# Patient Record
Sex: MALE | Race: WHITE | NOT HISPANIC OR LATINO | Employment: OTHER | URBAN - METROPOLITAN AREA
[De-identification: names, ages, dates, MRNs, and addresses within clinical notes are randomized per-mention and may not be internally consistent; named-entity substitution may affect disease eponyms.]

---

## 2017-04-09 ENCOUNTER — HOSPITAL ENCOUNTER (EMERGENCY)
Facility: HOSPITAL | Age: 72
Discharge: HOME | End: 2017-04-09
Attending: EMERGENCY MEDICINE
Payer: COMMERCIAL

## 2017-04-09 VITALS
BODY MASS INDEX: 37.75 KG/M2 | OXYGEN SATURATION: 95 % | HEART RATE: 90 BPM | DIASTOLIC BLOOD PRESSURE: 70 MMHG | SYSTOLIC BLOOD PRESSURE: 146 MMHG | HEIGHT: 76 IN | RESPIRATION RATE: 16 BRPM | WEIGHT: 310 LBS | TEMPERATURE: 99.2 F

## 2017-04-09 DIAGNOSIS — R79.1 SUPRATHERAPEUTIC INR: ICD-10-CM

## 2017-04-09 DIAGNOSIS — R04.0 ACUTE ANTERIOR EPISTAXIS: Primary | ICD-10-CM

## 2017-04-09 LAB
ANION GAP SERPL CALCULATED.3IONS-SCNC: 10 MMOL/L (ref 4–13)
APTT PPP: 41 SECONDS (ref 24–36)
BASOPHILS # BLD AUTO: 0.06 THOUSANDS/ΜL (ref 0–0.1)
BASOPHILS NFR BLD AUTO: 1 % (ref 0–1)
BUN SERPL-MCNC: 23 MG/DL (ref 5–25)
CALCIUM SERPL-MCNC: 9.2 MG/DL (ref 8.3–10.1)
CHLORIDE SERPL-SCNC: 103 MMOL/L (ref 100–108)
CO2 SERPL-SCNC: 27 MMOL/L (ref 21–32)
CREAT SERPL-MCNC: 1.17 MG/DL (ref 0.6–1.3)
EOSINOPHIL # BLD AUTO: 0.17 THOUSAND/ΜL (ref 0–0.61)
EOSINOPHIL NFR BLD AUTO: 2 % (ref 0–6)
ERYTHROCYTE [DISTWIDTH] IN BLOOD BY AUTOMATED COUNT: 16.1 % (ref 11.6–15.1)
GFR SERPL CREATININE-BSD FRML MDRD: >60 ML/MIN/1.73SQ M
GLUCOSE SERPL-MCNC: 106 MG/DL (ref 65–140)
HCT VFR BLD AUTO: 45.7 % (ref 36.5–49.3)
HGB BLD-MCNC: 14.8 G/DL (ref 12–17)
INR PPP: 3.3 (ref 0.86–1.16)
LYMPHOCYTES # BLD AUTO: 1.28 THOUSANDS/ΜL (ref 0.6–4.47)
LYMPHOCYTES NFR BLD AUTO: 16 % (ref 14–44)
MCH RBC QN AUTO: 30.4 PG (ref 26.8–34.3)
MCHC RBC AUTO-ENTMCNC: 32.4 G/DL (ref 31.4–37.4)
MCV RBC AUTO: 94 FL (ref 82–98)
MONOCYTES # BLD AUTO: 0.76 THOUSAND/ΜL (ref 0.17–1.22)
MONOCYTES NFR BLD AUTO: 10 % (ref 4–12)
NEUTROPHILS # BLD AUTO: 5.55 THOUSANDS/ΜL (ref 1.85–7.62)
NEUTS SEG NFR BLD AUTO: 71 % (ref 43–75)
PLATELET # BLD AUTO: 145 THOUSANDS/UL (ref 149–390)
PMV BLD AUTO: 11.4 FL (ref 8.9–12.7)
POTASSIUM SERPL-SCNC: 4.3 MMOL/L (ref 3.5–5.3)
PROTHROMBIN TIME: 32.4 SECONDS (ref 12–14.3)
RBC # BLD AUTO: 4.87 MILLION/UL (ref 3.88–5.62)
SODIUM SERPL-SCNC: 140 MMOL/L (ref 136–145)
WBC # BLD AUTO: 7.82 THOUSAND/UL (ref 4.31–10.16)

## 2017-04-09 PROCEDURE — A9270 NON-COVERED ITEM OR SERVICE: HCPCS | Performed by: EMERGENCY MEDICINE

## 2017-04-09 PROCEDURE — 85025 COMPLETE CBC W/AUTO DIFF WBC: CPT | Performed by: EMERGENCY MEDICINE

## 2017-04-09 PROCEDURE — 85730 THROMBOPLASTIN TIME PARTIAL: CPT | Performed by: EMERGENCY MEDICINE

## 2017-04-09 PROCEDURE — 85610 PROTHROMBIN TIME: CPT | Performed by: EMERGENCY MEDICINE

## 2017-04-09 PROCEDURE — 99284 EMERGENCY DEPT VISIT MOD MDM: CPT

## 2017-04-09 PROCEDURE — 36415 COLL VENOUS BLD VENIPUNCTURE: CPT | Performed by: EMERGENCY MEDICINE

## 2017-04-09 PROCEDURE — 80048 BASIC METABOLIC PNL TOTAL CA: CPT | Performed by: EMERGENCY MEDICINE

## 2017-04-09 RX ORDER — WARFARIN SODIUM 2 MG/1
2 TABLET ORAL
COMMUNITY
End: 2018-04-04 | Stop reason: CLARIF

## 2017-04-09 RX ORDER — OXYMETAZOLINE HYDROCHLORIDE 0.05 G/100ML
2 SPRAY NASAL ONCE
Status: COMPLETED | OUTPATIENT
Start: 2017-04-09 | End: 2017-04-09

## 2017-04-09 RX ADMIN — SILVER NITRATE APPLICATORS 1 APPLICATOR: 25; 75 STICK TOPICAL at 19:53

## 2017-04-09 RX ADMIN — OXYMETAZOLINE HYDROCHLORIDE 2 SPRAY: 5 SPRAY NASAL at 19:53

## 2017-04-12 ENCOUNTER — HOSPITAL ENCOUNTER (EMERGENCY)
Facility: HOSPITAL | Age: 72
Discharge: HOME | End: 2017-04-12
Attending: EMERGENCY MEDICINE | Admitting: EMERGENCY MEDICINE
Payer: COMMERCIAL

## 2017-04-12 ENCOUNTER — HOSPITAL ENCOUNTER (EMERGENCY)
Facility: HOSPITAL | Age: 72
Discharge: HOME | End: 2017-04-12
Admitting: EMERGENCY MEDICINE
Payer: COMMERCIAL

## 2017-04-12 VITALS
RESPIRATION RATE: 18 BRPM | TEMPERATURE: 97.5 F | HEART RATE: 64 BPM | DIASTOLIC BLOOD PRESSURE: 85 MMHG | WEIGHT: 305 LBS | OXYGEN SATURATION: 97 % | SYSTOLIC BLOOD PRESSURE: 146 MMHG | BODY MASS INDEX: 37.13 KG/M2

## 2017-04-12 VITALS
RESPIRATION RATE: 20 BRPM | TEMPERATURE: 97.4 F | SYSTOLIC BLOOD PRESSURE: 145 MMHG | DIASTOLIC BLOOD PRESSURE: 87 MMHG | OXYGEN SATURATION: 96 % | HEART RATE: 65 BPM | HEIGHT: 76 IN | BODY MASS INDEX: 37.14 KG/M2 | WEIGHT: 305 LBS

## 2017-04-12 DIAGNOSIS — R04.0 BLEEDING NOSE: Primary | ICD-10-CM

## 2017-04-12 DIAGNOSIS — R04.0 NOSEBLEED: Primary | ICD-10-CM

## 2017-04-12 LAB
ALBUMIN SERPL BCP-MCNC: 3.3 G/DL (ref 3.5–5)
ALP SERPL-CCNC: 97 U/L (ref 46–116)
ALT SERPL W P-5'-P-CCNC: 32 U/L (ref 12–78)
ANION GAP SERPL CALCULATED.3IONS-SCNC: 7 MMOL/L (ref 4–13)
APTT PPP: 37 SECONDS (ref 24–36)
AST SERPL W P-5'-P-CCNC: 41 U/L (ref 5–45)
BASOPHILS # BLD AUTO: 0.05 THOUSANDS/ΜL (ref 0–0.1)
BASOPHILS NFR BLD AUTO: 1 % (ref 0–1)
BILIRUB SERPL-MCNC: 1.9 MG/DL (ref 0.2–1)
BUN SERPL-MCNC: 21 MG/DL (ref 5–25)
CALCIUM SERPL-MCNC: 9.3 MG/DL (ref 8.3–10.1)
CHLORIDE SERPL-SCNC: 101 MMOL/L (ref 100–108)
CO2 SERPL-SCNC: 28 MMOL/L (ref 21–32)
CREAT SERPL-MCNC: 1.07 MG/DL (ref 0.6–1.3)
EOSINOPHIL # BLD AUTO: 0.22 THOUSAND/ΜL (ref 0–0.61)
EOSINOPHIL NFR BLD AUTO: 3 % (ref 0–6)
ERYTHROCYTE [DISTWIDTH] IN BLOOD BY AUTOMATED COUNT: 16.1 % (ref 11.6–15.1)
GFR SERPL CREATININE-BSD FRML MDRD: >60 ML/MIN/1.73SQ M
GLUCOSE SERPL-MCNC: 109 MG/DL (ref 65–140)
HCT VFR BLD AUTO: 46.9 % (ref 36.5–49.3)
HGB BLD-MCNC: 15.3 G/DL (ref 12–17)
INR PPP: 2.04 (ref 0.86–1.16)
LYMPHOCYTES # BLD AUTO: 1.34 THOUSANDS/ΜL (ref 0.6–4.47)
LYMPHOCYTES NFR BLD AUTO: 17 % (ref 14–44)
MCH RBC QN AUTO: 30.5 PG (ref 26.8–34.3)
MCHC RBC AUTO-ENTMCNC: 32.6 G/DL (ref 31.4–37.4)
MCV RBC AUTO: 94 FL (ref 82–98)
MONOCYTES # BLD AUTO: 0.78 THOUSAND/ΜL (ref 0.17–1.22)
MONOCYTES NFR BLD AUTO: 10 % (ref 4–12)
NEUTROPHILS # BLD AUTO: 5.38 THOUSANDS/ΜL (ref 1.85–7.62)
NEUTS SEG NFR BLD AUTO: 69 % (ref 43–75)
PLATELET # BLD AUTO: 172 THOUSANDS/UL (ref 149–390)
PMV BLD AUTO: 11.5 FL (ref 8.9–12.7)
POTASSIUM SERPL-SCNC: 3.9 MMOL/L (ref 3.5–5.3)
PROT SERPL-MCNC: 8.2 G/DL (ref 6.4–8.2)
PROTHROMBIN TIME: 22.5 SECONDS (ref 12–14.3)
RBC # BLD AUTO: 5.01 MILLION/UL (ref 3.88–5.62)
SODIUM SERPL-SCNC: 136 MMOL/L (ref 136–145)
WBC # BLD AUTO: 7.77 THOUSAND/UL (ref 4.31–10.16)

## 2017-04-12 PROCEDURE — A9270 NON-COVERED ITEM OR SERVICE: HCPCS | Performed by: EMERGENCY MEDICINE

## 2017-04-12 PROCEDURE — 85025 COMPLETE CBC W/AUTO DIFF WBC: CPT | Performed by: PHYSICIAN ASSISTANT

## 2017-04-12 PROCEDURE — 85730 THROMBOPLASTIN TIME PARTIAL: CPT | Performed by: PHYSICIAN ASSISTANT

## 2017-04-12 PROCEDURE — A9270 NON-COVERED ITEM OR SERVICE: HCPCS | Performed by: PHYSICIAN ASSISTANT

## 2017-04-12 PROCEDURE — 80053 COMPREHEN METABOLIC PANEL: CPT | Performed by: PHYSICIAN ASSISTANT

## 2017-04-12 PROCEDURE — 99283 EMERGENCY DEPT VISIT LOW MDM: CPT

## 2017-04-12 PROCEDURE — 85610 PROTHROMBIN TIME: CPT | Performed by: PHYSICIAN ASSISTANT

## 2017-04-12 PROCEDURE — 36415 COLL VENOUS BLD VENIPUNCTURE: CPT | Performed by: PHYSICIAN ASSISTANT

## 2017-04-12 RX ORDER — GINSENG 100 MG
1 CAPSULE ORAL ONCE
Status: COMPLETED | OUTPATIENT
Start: 2017-04-12 | End: 2017-04-12

## 2017-04-12 RX ORDER — GINSENG 100 MG
CAPSULE ORAL
Status: DISCONTINUED
Start: 2017-04-12 | End: 2017-04-12 | Stop reason: HOSPADM

## 2017-04-12 RX ORDER — OXYMETAZOLINE HYDROCHLORIDE 0.05 G/100ML
2 SPRAY NASAL ONCE
Status: COMPLETED | OUTPATIENT
Start: 2017-04-12 | End: 2017-04-12

## 2017-04-12 RX ADMIN — OXYMETAZOLINE HYDROCHLORIDE 2 SPRAY: 5 SPRAY NASAL at 19:53

## 2017-04-12 RX ADMIN — PHENYLEPHRINE HYDROCHLORIDE 2 SPRAY: 0.25 SPRAY NASAL at 13:08

## 2017-04-12 RX ADMIN — BACITRACIN 1 SMALL APPLICATION: 500 OINTMENT TOPICAL at 13:37

## 2017-04-12 RX ADMIN — COCAINE HYDROCHLORIDE: 40 SOLUTION TOPICAL at 19:52

## 2017-06-17 ENCOUNTER — TRANSCRIBE ORDERS (OUTPATIENT)
Dept: ADMINISTRATIVE | Facility: HOSPITAL | Age: 72
End: 2017-06-17

## 2017-06-17 ENCOUNTER — HOSPITAL ENCOUNTER (OUTPATIENT)
Dept: RADIOLOGY | Facility: HOSPITAL | Age: 72
Discharge: HOME | End: 2017-06-17
Payer: COMMERCIAL

## 2017-06-17 DIAGNOSIS — R05.9 COUGH: ICD-10-CM

## 2017-06-17 DIAGNOSIS — R05.9 COUGH: Primary | ICD-10-CM

## 2017-06-17 PROCEDURE — 71020 HB CHEST X-RAY 2VW FRONTAL&LATL: CPT

## 2018-01-14 NOTE — MISCELLANEOUS
Message  pt called and asked that we fax over his levd-r from 3/24 to Dr Edward Catherine which was done      Active Problems    1  Chronic venous hypertension with ulcer and inflammation involving right side (459 33)   (I87 331)   2  Chronic venous insufficiency (459 81) (I87 2)   3  Edema of leg (782 3) (R60 0)   4  Lymphedema (457 1) (I89 0)   5  Onychomycosis (110 1) (B35 1)   6  Peripheral vascular disease (443 9) (I73 9)   7  Tinea pedis of both feet (110 4) (B35 3)   8  Wound, open, foot (892 0) (S91 309A)    Current Meds   1  Ciprofloxacin HCl - 500 MG Oral Tablet; TAKE 1 TABLET EVERY 12 HOURS DAILY; Therapy: (Recorded:31Oct2014) to Recorded   2  Ciprofloxacin HCl - 500 MG Oral Tablet; TAKE 1 TABLET TWICE DAILY; Therapy: [id number] to (Evaluate:25Jan2016); Last Rx:15Jan2016 Ordered   3  Clotrimazole-Betamethasone 1-0 05 % External Cream; APPLY AS DIRECTED; Therapy: [id number] to (Last Rx:27Jan2016) Ordered   4  Co Q 10 CAPS; Therapy: (Recorded:59Gnx2690) to Recorded   5  Coumadin 5 MG Oral Tablet (Warfarin Sodium); Therapy: ([phone]) to Recorded   6  Digoxin-Tab 0 125 MG TABS; Therapy: ([phone]) to Recorded   7  Diltiazem HCl ER Coated Beads 180 MG Oral Capsule Extended Release 24 Hour; Therapy: ([phone]) to Recorded   8  FV Tussin DM LIQD;   Therapy: (QVWUIZ:[id number]) to Recorded   9  Hydrocodone-Acetaminophen 7 5-750 MG TABS; Therapy: ([phone]) to Recorded   10  Lasix 40 MG Oral Tablet (Furosemide); Therapy: ([phone]) to Recorded   11  Potassium Chloride Amalia ER 10 MEQ Oral Tablet Extended Release; Therapy: ([phone]) to Recorded   12  Quinapril HCl - 10 MG Oral Tablet; Therapy: ([phone]) to Recorded   13  Vitamin B12 TABS; Therapy: (Recorded:13Gvd4322) to Recorded   14  Zaroxolyn 2 5 MG TABS (Metolazone); Therapy: (Recorded:60Xqj6331) to Recorded    Allergies    1   No Known Drug Allergies    Signatures   Electronically signed by : Tia Valdez, ; May 13 2016 11:49AM EST                       (Author)

## 2018-01-15 NOTE — MISCELLANEOUS
Message  Mr Mazariegos called on 4/22/16 approximately at 1:50 pm  Patient was calling to inquire as to why we have not sent his [place], copies of his LEV done on 3/24/16  I explained to patent that we are unable to release those record and that his  would have to contact [facility] medical records and take it from there  Mr Naida Harmon states he already had "those papers signed" stating that we can send them his records and that we did not send anything  I apologized for the inconvenience and told him to call [facility] records and that I would provide him with the number  He insisted he already had the number and did not mention as if he would call or not  Active Problems    1  Chronic venous hypertension with ulcer and inflammation involving right side (459 33)   (I87 331)   2  Chronic venous insufficiency (459 81) (I87 2)   3  Edema of leg (782 3) (R60 0)   4  Lymphedema (457 1) (I89 0)   5  Onychomycosis (110 1) (B35 1)   6  Peripheral vascular disease (443 9) (I73 9)   7  Tinea pedis of both feet (110 4) (B35 3)   8  Wound, open, foot (892 0) (S91 309A)    Current Meds   1  Ciprofloxacin HCl - 500 MG Oral Tablet; TAKE 1 TABLET EVERY 12 HOURS DAILY; Therapy: (Recorded:31Oct2014) to Recorded   2  Ciprofloxacin HCl - 500 MG Oral Tablet; TAKE 1 TABLET TWICE DAILY; Therapy: [id number] to (Evaluate:25Jan2016); Last Rx:15Jan2016 Ordered   3  Clotrimazole-Betamethasone 1-0 05 % External Cream; APPLY AS DIRECTED; Therapy: [id number] to (Last Rx:27Jan2016) Ordered   4  Co Q 10 CAPS; Therapy: (Recorded:00Pzi5202) to Recorded   5  Coumadin 5 MG Oral Tablet; Therapy: ([phone]) to Recorded   6  Digoxin-Tab 0 125 MG TABS; Therapy: ([phone]) to Recorded   7  Diltiazem HCl ER Coated Beads 180 MG Oral Capsule Extended Release 24 Hour; Therapy: ([phone]) to Recorded   8  FV Tussin DM LIQD;   Therapy: (NAAHYMAI:74PQG7615) to Recorded   9  Hydrocodone-Acetaminophen 7 5-750 MG TABS; Therapy: [phone]) to Recorded   10  Lasix 40 MG Oral Tablet; Therapy: [phone]) to Recorded   11  Potassium Chloride Amalia ER 10 MEQ Oral Tablet Extended Release; Therapy: [phone]) to Recorded   12  Quinapril HCl - 10 MG Oral Tablet; Therapy: [phone]) to Recorded   13  Vitamin B12 TABS; Therapy: (Recorded:54Vsm2175) to Recorded   14  Zaroxolyn 2 5 MG TABS; Therapy: (Recorded:94Mjl7605) to Recorded    Allergies    1   No Known Drug Allergies    Signatures   Electronically signed by : Navid Murdock, ; Apr 22 2016  2:11PM EST                       (Author)

## 2018-03-09 ENCOUNTER — OFFICE VISIT (OUTPATIENT)
Dept: FAMILY MEDICINE CLINIC | Facility: HOSPITAL | Age: 73
End: 2018-03-09
Payer: COMMERCIAL

## 2018-03-09 VITALS
BODY MASS INDEX: 36.17 KG/M2 | HEIGHT: 76 IN | HEART RATE: 53 BPM | OXYGEN SATURATION: 97 % | DIASTOLIC BLOOD PRESSURE: 72 MMHG | SYSTOLIC BLOOD PRESSURE: 108 MMHG | WEIGHT: 297 LBS | TEMPERATURE: 97.5 F

## 2018-03-09 DIAGNOSIS — J32.9 SINUSITIS, UNSPECIFIED CHRONICITY, UNSPECIFIED LOCATION: Primary | ICD-10-CM

## 2018-03-09 DIAGNOSIS — K21.9 GASTROESOPHAGEAL REFLUX DISEASE WITHOUT ESOPHAGITIS: ICD-10-CM

## 2018-03-09 PROBLEM — Z87.39 HISTORY OF HERNIATED INTERVERTEBRAL DISC: Status: ACTIVE | Noted: 2018-03-09

## 2018-03-09 PROCEDURE — 3725F SCREEN DEPRESSION PERFORMED: CPT | Performed by: PHYSICIAN ASSISTANT

## 2018-03-09 PROCEDURE — 99202 OFFICE O/P NEW SF 15 MIN: CPT | Performed by: PHYSICIAN ASSISTANT

## 2018-03-09 RX ORDER — APIXABAN 5 MG/1
5 TABLET, FILM COATED ORAL 2 TIMES DAILY
COMMUNITY
End: 2020-08-14 | Stop reason: HOSPADM

## 2018-03-09 RX ORDER — BACITRACIN, NEOMYCIN, POLYMYXIN B 400; 3.5; 5 [USP'U]/G; MG/G; [USP'U]/G
OINTMENT TOPICAL 2 TIMES DAILY
Status: ON HOLD | COMMUNITY
End: 2019-01-03 | Stop reason: ALTCHOICE

## 2018-03-09 RX ORDER — FUROSEMIDE 40 MG/1
40 TABLET ORAL DAILY
COMMUNITY
End: 2019-01-16 | Stop reason: HOSPADM

## 2018-03-09 RX ORDER — VITAMIN E 268 MG
400 CAPSULE ORAL DAILY
Status: ON HOLD | COMMUNITY
End: 2019-01-03 | Stop reason: ALTCHOICE

## 2018-03-09 RX ORDER — LOSARTAN POTASSIUM 50 MG/1
50 TABLET ORAL DAILY
COMMUNITY
End: 2019-04-09 | Stop reason: SDUPTHER

## 2018-03-09 RX ORDER — ECHINACEA PURPUREA EXTRACT 125 MG
1 TABLET ORAL AS NEEDED
COMMUNITY
End: 2020-07-01 | Stop reason: HOSPADM

## 2018-03-09 RX ORDER — MELATONIN
1000 DAILY
Status: ON HOLD | COMMUNITY
End: 2019-01-03 | Stop reason: ALTCHOICE

## 2018-03-09 RX ORDER — AMOXICILLIN 500 MG/1
500 TABLET, FILM COATED ORAL
Qty: 30 TABLET | Refills: 0 | Status: SHIPPED | OUTPATIENT
Start: 2018-03-09 | End: 2018-03-19

## 2018-03-09 RX ORDER — PANTOPRAZOLE SODIUM 40 MG/1
40 TABLET, DELAYED RELEASE ORAL DAILY
COMMUNITY
End: 2018-03-09 | Stop reason: SDUPTHER

## 2018-03-09 RX ORDER — HYDROCODONE BITARTRATE AND ACETAMINOPHEN 5; 325 MG/1; MG/1
1 TABLET ORAL EVERY 6 HOURS PRN
COMMUNITY
End: 2018-03-09 | Stop reason: SDUPTHER

## 2018-03-09 RX ORDER — PANTOPRAZOLE SODIUM 40 MG/1
40 TABLET, DELAYED RELEASE ORAL DAILY
Qty: 30 TABLET | Refills: 3 | Status: SHIPPED | OUTPATIENT
Start: 2018-03-09 | End: 2019-07-03 | Stop reason: SDUPTHER

## 2018-03-09 RX ORDER — METOLAZONE 2.5 MG/1
2.5 TABLET ORAL DAILY
COMMUNITY
End: 2019-04-09 | Stop reason: ALTCHOICE

## 2018-03-09 NOTE — PROGRESS NOTES
Assessment/Plan:         Diagnoses and all orders for this visit:    Sinusitis, unspecified chronicity, unspecified location  -     amoxicillin (AMOXIL) 500 MG tablet; Take 1 tablet (500 mg total) by mouth 3 (three) times a day with meals for 10 days    Gastroesophageal reflux disease without esophagitis  -     pantoprazole (PROTONIX) 40 mg tablet; Take 1 tablet (40 mg total) by mouth daily      Subjective:      Patient ID: Lea Monique is a 71 y o  male  71year old white male c/o runny nose, and watery eyes past 1 1/2 weeks  Mucous in throat, no cough  Mucous clear, no headaches  Did have blood in mucous, last year had 4 nose bleeds in one week  Patient was a , and body guard, had multiple injuries in line of work, last one was 3 1/2 years ago  Has hx  Of foot wound, left foot, was in hospital for a long time  Has similar sx  Every year this time, concerned about getting nose bleed again, like last year  Sees cardiologist in [place], regularly, and gets blood test twice a year  URI    Associated symptoms include rhinorrhea and sinus pain  Pertinent negatives include no coughing, ear pain, headaches or sore throat  Review of Systems   Constitutional: Negative for chills, diaphoresis, fatigue and fever  HENT: Positive for hearing loss, postnasal drip, rhinorrhea, sinus pain and sinus pressure  Negative for ear pain and sore throat  Ears feel congested, has hearing aid in right ear  Respiratory: Negative for cough and shortness of breath  Neurological: Positive for weakness and light-headedness  Negative for headaches  Objective:      /72   Pulse (!) 53   Temp 97 5 °F (36 4 °C) (Tympanic)   Ht 6' 4" (1 93 m)   Wt 135 kg (297 lb)   SpO2 97%   BMI 36 15 kg/m²          Physical Exam   Constitutional: He is oriented to person, place, and time  Overweight, wheelchair bound  HENT:   Head: Normocephalic and atraumatic     Right Ear: External ear normal    Left Ear: External ear normal    Mouth/Throat: Oropharynx is clear and moist  No oropharyngeal exudate  Turbinates inflamed  Note has hearing aid, right ear  Eyes: Conjunctivae and EOM are normal  Right eye exhibits no discharge  Left eye exhibits no discharge  No scleral icterus  Cardiovascular: Normal rate, regular rhythm and normal heart sounds  Pulmonary/Chest: Effort normal and breath sounds normal  No respiratory distress  He has no wheezes  Neurological: He is alert and oriented to person, place, and time

## 2018-03-09 NOTE — PATIENT INSTRUCTIONS
Patient to start Amoxicillin 500 mg  Tid for 10 days, with meals  Info given and discussed recommendations to avoid nose bleeds, recommend otc nasal lubricant  Patient to get copy of blood test, and cardiologist to send us/fax us office visits

## 2018-04-02 ENCOUNTER — TELEPHONE (OUTPATIENT)
Dept: FAMILY MEDICINE CLINIC | Facility: HOSPITAL | Age: 73
End: 2018-04-02

## 2018-04-02 DIAGNOSIS — M25.519 SHOULDER PAIN, UNSPECIFIED CHRONICITY, UNSPECIFIED LATERALITY: Primary | ICD-10-CM

## 2018-04-02 NOTE — TELEPHONE ENCOUNTER
Pt called  He wanted to know if he had to see pain management for injection of his shoulder or would our doctor inject his shoulder  He went to pain management at [facility] previously for injection but does not want to travel that far  He would like to keep his care in [place] [address]  I was not sure what information to give this patient

## 2018-04-04 ENCOUNTER — OFFICE VISIT (OUTPATIENT)
Dept: FAMILY MEDICINE CLINIC | Facility: HOSPITAL | Age: 73
End: 2018-04-04
Payer: COMMERCIAL

## 2018-04-04 VITALS
DIASTOLIC BLOOD PRESSURE: 78 MMHG | RESPIRATION RATE: 16 BRPM | BODY MASS INDEX: 36.77 KG/M2 | HEIGHT: 76 IN | WEIGHT: 302 LBS | HEART RATE: 64 BPM | SYSTOLIC BLOOD PRESSURE: 118 MMHG | TEMPERATURE: 97.5 F

## 2018-04-04 DIAGNOSIS — J06.9 VIRAL UPPER RESPIRATORY TRACT INFECTION: Primary | ICD-10-CM

## 2018-04-04 PROCEDURE — 99213 OFFICE O/P EST LOW 20 MIN: CPT | Performed by: INTERNAL MEDICINE

## 2018-04-04 RX ORDER — BENZONATATE 100 MG/1
100 CAPSULE ORAL 3 TIMES DAILY PRN
Qty: 20 CAPSULE | Refills: 0 | Status: SHIPPED | OUTPATIENT
Start: 2018-04-04 | End: 2018-05-02 | Stop reason: DRUGHIGH

## 2018-04-04 RX ORDER — AZITHROMYCIN 500 MG/1
500 TABLET, FILM COATED ORAL EVERY 24 HOURS
Qty: 3 TABLET | Refills: 0 | Status: SHIPPED | OUTPATIENT
Start: 2018-04-04 | End: 2018-04-07

## 2018-04-04 NOTE — PROGRESS NOTES
Assessment/Plan:    Viral upper respiratory tract infection  Sx of cough, congestion, sinus pressure, fatigue    Patient Active Problem List   Diagnosis    Chronic venous hypertension with ulcer and inflammation involving right side (HCC)    Chronic venous insufficiency    Edema of leg    Lymphedema    Onychomycosis    Peripheral vascular disease (HCC)    Tinea pedis of both feet    Wound, open, foot    History of herniated intervertebral disc    Viral upper respiratory tract infection     No orders of the defined types were placed in this encounter  Diagnoses and all orders for this visit:    Viral upper respiratory tract infection          Subjective:      Patient ID: Ananth Crespo is a 71 y o  male  Presents c/o symptoms of upper respiratory inflammation  Nasal and sinus pressure, some PND,  Some ear pressure,  Occ  Scratchy throat  +/- fever  Sx for few days, not improving with OTC treatments  The following portions of the patient's history were reviewed and updated as appropriate: allergies, current medications, past family history, past medical history, past social history, past surgical history and problem list     Review of Systems   Constitutional: Positive for fatigue  HENT: Positive for congestion, postnasal drip, sinus pain and sore throat  Respiratory: Positive for cough  Neurological: Positive for headaches  Objective:      Current Outpatient Prescriptions:     apixaban (ELIQUIS) 5 mg, Take 5 mg by mouth 2 (two) times a day, Disp: , Rfl:     cholecalciferol (VITAMIN D3) 1,000 units tablet, Take 1,000 Units by mouth daily, Disp: , Rfl:     clotrimazole-betamethasone (LOTRISONE) 1-0 05 % cream, Clotrimazole-Betamethasone 1-0 05 % External Cream APPLY AS DIRECTED    Quantity: 30;  Refills: 1    [facility];  Started 27-Jan-2016 Active, Disp: , Rfl:     Coenzyme Q10 (CO Q 10) 10 MG CAPS, Co Q 10 CAPS  Refills: 0  Active, Disp: , Rfl:     digoxin (LANOXIN) 0 125 mg tablet, Digoxin-Tab 0 125 MG TABS  Refills: 0  Active, Disp: , Rfl:     diltiazem (CARDIZEM CD) 180 mg 24 hr capsule, Diltiazem HCl ER Coated Beads 180 MG Oral Capsule Extended Release 24 Hour  Refills: 0  Active, Disp: , Rfl:     furosemide (LASIX) 40 mg tablet, Take 40 mg by mouth daily Take 1 tablet Monday,Wednesday,Friday (30 min later take Metolazone tablet), Disp: , Rfl:     HYDROcodone-acetaminophen (NORCO) 7 5-325 mg per tablet, Hydrocodone-Acetaminophen 7 5-750 MG TABS  Refills: 0  Active, Disp: , Rfl:     losartan (COZAAR) 50 mg tablet, Take 50 mg by mouth daily, Disp: , Rfl:     metolazone (ZAROXOLYN) 2 5 mg tablet, Take 2 5 mg by mouth daily Take 1 tablet Monday,Wednesday,Friday, Disp: , Rfl:     neomycin-bacitracin-polymyxin b (NEOSPORIN) ointment, Apply topically 2 (two) times a day, Disp: , Rfl:     pantoprazole (PROTONIX) 40 mg tablet, Take 1 tablet (40 mg total) by mouth daily, Disp: 30 tablet, Rfl: 3    potassium chloride (K-DUR,KLOR-CON) 10 mEq tablet, Potassium Chloride Amalia ER 10 MEQ Oral Tablet Extended Release  Refills: 0  Active, Disp: , Rfl:     quinapril (ACCUPRIL) 10 mg tablet, 20 mg daily, Disp: , Rfl:     sodium chloride (OCEAN) 0 65 % nasal spray, 1 spray into each nostril as needed for congestion, Disp: , Rfl:     vitamin E, tocopherol, 400 units capsule, Take 400 Units by mouth daily, Disp: , Rfl:     warfarin (COUMADIN) 2 mg tablet, Take 2 mg by mouth daily, Disp: , Rfl:      Physical Exam   Constitutional: He is cooperative  He appears ill  HENT:   Right Ear: There is tenderness  Tympanic membrane is erythematous and bulging  Left Ear: There is tenderness  Tympanic membrane is erythematous and bulging  Nose: Mucosal edema and rhinorrhea present  Mouth/Throat: Mucous membranes are dry  Eyes: Pupils are equal, round, and reactive to light  Right eye exhibits no discharge  Left eye exhibits no discharge     Neck: Neck supple  Cardiovascular: Normal rate, regular rhythm and normal heart sounds  Pulmonary/Chest: He is in respiratory distress (cough)  He has no decreased breath sounds  He has no wheezes  He has no rales  Abdominal: Soft  Lymphadenopathy:        Head (right side): Preauricular adenopathy present  Head (left side): Preauricular adenopathy present  Cervical adenopathy: shotty nodes  Vitals reviewed

## 2018-05-02 ENCOUNTER — OFFICE VISIT (OUTPATIENT)
Dept: FAMILY MEDICINE CLINIC | Facility: HOSPITAL | Age: 73
End: 2018-05-02
Payer: COMMERCIAL

## 2018-05-02 VITALS
BODY MASS INDEX: 36.65 KG/M2 | OXYGEN SATURATION: 97 % | HEIGHT: 76 IN | SYSTOLIC BLOOD PRESSURE: 110 MMHG | DIASTOLIC BLOOD PRESSURE: 76 MMHG | HEART RATE: 71 BPM | TEMPERATURE: 96.1 F | WEIGHT: 301 LBS

## 2018-05-02 DIAGNOSIS — K21.9 GASTROESOPHAGEAL REFLUX DISEASE WITHOUT ESOPHAGITIS: ICD-10-CM

## 2018-05-02 DIAGNOSIS — R05.9 COUGH: Primary | ICD-10-CM

## 2018-05-02 PROCEDURE — 99213 OFFICE O/P EST LOW 20 MIN: CPT | Performed by: INTERNAL MEDICINE

## 2018-05-02 PROCEDURE — 1101F PT FALLS ASSESS-DOCD LE1/YR: CPT | Performed by: INTERNAL MEDICINE

## 2018-05-02 RX ORDER — OMEPRAZOLE 20 MG/1
20 CAPSULE, DELAYED RELEASE ORAL DAILY
Qty: 30 CAPSULE | Refills: 5 | Status: SHIPPED | OUTPATIENT
Start: 2018-05-02 | End: 2019-01-16 | Stop reason: HOSPADM

## 2018-05-02 RX ORDER — FLUTICASONE PROPIONATE 50 MCG
1 SPRAY, SUSPENSION (ML) NASAL DAILY
Qty: 16 G | Refills: 0 | Status: SHIPPED | OUTPATIENT
Start: 2018-05-02 | End: 2019-04-09 | Stop reason: SINTOL

## 2018-05-02 RX ORDER — BENZONATATE 200 MG/1
200 CAPSULE ORAL 3 TIMES DAILY PRN
Qty: 20 CAPSULE | Refills: 0 | Status: SHIPPED | OUTPATIENT
Start: 2018-05-02 | End: 2018-05-11 | Stop reason: SDUPTHER

## 2018-05-02 NOTE — PROGRESS NOTES
Assessment/Plan:    Cough  Treated for URI recently,  Still has cough    Gastroesophageal reflux disease without esophagitis  Does well on PPI    Patient Active Problem List   Diagnosis    Chronic venous hypertension with ulcer and inflammation involving right side (HCC)    Chronic venous insufficiency    Edema of leg    Lymphedema    Onychomycosis    Peripheral vascular disease (HCC)    Tinea pedis of both feet    Wound, open, foot    History of herniated intervertebral disc    Viral upper respiratory tract infection    Cough    Gastroesophageal reflux disease without esophagitis     No orders of the defined types were placed in this encounter  Diagnoses and all orders for this visit:    Cough  -     benzonatate (TESSALON) 200 MG capsule; Take 1 capsule (200 mg total) by mouth 3 (three) times a day as needed for cough  -     fluticasone (FLONASE) 50 mcg/act nasal spray; 1 spray into each nostril daily  -     omeprazole (PriLOSEC) 20 mg delayed release capsule; Take 1 capsule (20 mg total) by mouth daily for 30 days    Gastroesophageal reflux disease without esophagitis          Subjective:      Patient ID: Aleena Roca is a 71 y o  male  Presents c/o symptoms of upper respiratory inflammation  Nasal and sinus pressure, some PND,  Some ear pressure,  Occ  Scratchy throat  +/- fever  Sx for few days, not improving with OTC treatments  The following portions of the patient's history were reviewed and updated as appropriate: allergies, current medications, past family history, past medical history, past social history, past surgical history and problem list     Review of Systems   Constitutional: Positive for fatigue  HENT: Positive for congestion, postnasal drip, sinus pain and sore throat  Respiratory: Positive for cough  Neurological: Positive for headaches           Objective:      Current Outpatient Prescriptions:     apixaban (ELIQUIS) 5 mg, Take 5 mg by mouth 2 (two) times a day, Disp: , Rfl:     cholecalciferol (VITAMIN D3) 1,000 units tablet, Take 1,000 Units by mouth daily, Disp: , Rfl:     clotrimazole-betamethasone (LOTRISONE) 1-0 05 % cream, Clotrimazole-Betamethasone 1-0 05 % External Cream APPLY AS DIRECTED    Quantity: 30;  Refills: 1    [facility];  Started 27-Jan-2016 Active, Disp: , Rfl:     digoxin (LANOXIN) 0 125 mg tablet, Digoxin-Tab 0 125 MG TABS  Refills: 0  Active, Disp: , Rfl:     diltiazem (CARDIZEM CD) 180 mg 24 hr capsule, Diltiazem HCl ER Coated Beads 180 MG Oral Capsule Extended Release 24 Hour  Refills: 0  Active, Disp: , Rfl:     furosemide (LASIX) 40 mg tablet, Take 40 mg by mouth daily Take 1 tablet Monday,Wednesday,Friday (30 min later take Metolazone tablet), Disp: , Rfl:     HYDROcodone-acetaminophen (NORCO) 7 5-325 mg per tablet, Hydrocodone-Acetaminophen 7 5-750 MG TABS  Refills: 0  Active, Disp: , Rfl:     losartan (COZAAR) 50 mg tablet, Take 50 mg by mouth daily, Disp: , Rfl:     metolazone (ZAROXOLYN) 2 5 mg tablet, Take 2 5 mg by mouth daily Take 1 tablet Monday,Wednesday,Friday, Disp: , Rfl:     neomycin-bacitracin-polymyxin b (NEOSPORIN) ointment, Apply topically 2 (two) times a day, Disp: , Rfl:     pantoprazole (PROTONIX) 40 mg tablet, Take 1 tablet (40 mg total) by mouth daily, Disp: 30 tablet, Rfl: 3    potassium chloride (K-DUR,KLOR-CON) 10 mEq tablet, Potassium Chloride Amalia ER 10 MEQ Oral Tablet Extended Release  Refills: 0  Active, Disp: , Rfl:     quinapril (ACCUPRIL) 10 mg tablet, 20 mg daily, Disp: , Rfl:     sodium chloride (OCEAN) 0 65 % nasal spray, 1 spray into each nostril as needed for congestion, Disp: , Rfl:     vitamin E, tocopherol, 400 units capsule, Take 400 Units by mouth daily, Disp: , Rfl:     benzonatate (TESSALON) 200 MG capsule, Take 1 capsule (200 mg total) by mouth 3 (three) times a day as needed for cough, Disp: 20 capsule, Rfl: 0    fluticasone (FLONASE) 50 mcg/act nasal spray, 1 spray into each nostril daily, Disp: 16 g, Rfl: 0    omeprazole (PriLOSEC) 20 mg delayed release capsule, Take 1 capsule (20 mg total) by mouth daily for 30 days, Disp: 30 capsule, Rfl: 5     Physical Exam   Constitutional: He is cooperative  He appears ill  HENT:   Right Ear: There is tenderness  Tympanic membrane is erythematous and bulging  Left Ear: There is tenderness  Tympanic membrane is erythematous and bulging  Nose: Mucosal edema and rhinorrhea present  Mouth/Throat: Mucous membranes are dry  Eyes: Pupils are equal, round, and reactive to light  Right eye exhibits no discharge  Left eye exhibits no discharge  Neck: Neck supple  Cardiovascular: Normal rate, regular rhythm and normal heart sounds  Pulmonary/Chest: He is in respiratory distress (cough)  He has no decreased breath sounds  He has no wheezes  He has no rales  Abdominal: Soft  Lymphadenopathy:        Head (right side): Preauricular adenopathy present  Head (left side): Preauricular adenopathy present  Cervical adenopathy: shotty nodes  Vitals reviewed

## 2018-05-02 NOTE — PATIENT INSTRUCTIONS
Use flonase spray in nostril twice a day  Use ocena spray as needed  gargle warm salt water few times a day  Use cough caps 3 x day as needed

## 2018-05-11 ENCOUNTER — TELEPHONE (OUTPATIENT)
Dept: FAMILY MEDICINE CLINIC | Facility: HOSPITAL | Age: 73
End: 2018-05-11

## 2018-05-11 DIAGNOSIS — R05.9 COUGH: ICD-10-CM

## 2018-05-11 RX ORDER — BENZONATATE 100 MG/1
100 CAPSULE ORAL 3 TIMES DAILY PRN
Qty: 30 CAPSULE | Refills: 0 | Status: SHIPPED | OUTPATIENT
Start: 2018-05-11 | End: 2018-10-22 | Stop reason: SDUPTHER

## 2018-05-11 NOTE — TELEPHONE ENCOUNTER
I have not seen this patient yet  He is Dr Stefany Hernandez patient    Try Tessalon Perles 100 mg 1 p o  t i d  for 10 days, he is to be seen if not better next week

## 2018-06-11 ENCOUNTER — OFFICE VISIT (OUTPATIENT)
Dept: FAMILY MEDICINE CLINIC | Facility: HOSPITAL | Age: 73
End: 2018-06-11
Payer: COMMERCIAL

## 2018-06-11 VITALS
SYSTOLIC BLOOD PRESSURE: 128 MMHG | BODY MASS INDEX: 36.65 KG/M2 | DIASTOLIC BLOOD PRESSURE: 88 MMHG | HEART RATE: 64 BPM | OXYGEN SATURATION: 97 % | TEMPERATURE: 98.1 F | WEIGHT: 301 LBS | HEIGHT: 76 IN

## 2018-06-11 DIAGNOSIS — J06.9 UPPER RESPIRATORY TRACT INFECTION, UNSPECIFIED TYPE: Primary | ICD-10-CM

## 2018-06-11 DIAGNOSIS — J98.01 BRONCHOSPASM: ICD-10-CM

## 2018-06-11 DIAGNOSIS — R05.9 COUGH: ICD-10-CM

## 2018-06-11 PROCEDURE — 99213 OFFICE O/P EST LOW 20 MIN: CPT | Performed by: PHYSICIAN ASSISTANT

## 2018-06-11 RX ORDER — PROMETHAZINE HYDROCHLORIDE AND CODEINE PHOSPHATE 6.25; 1 MG/5ML; MG/5ML
5 SYRUP ORAL EVERY 4 HOURS PRN
Qty: 120 ML | Refills: 1 | Status: SHIPPED | OUTPATIENT
Start: 2018-06-11 | End: 2018-06-15 | Stop reason: SDUPTHER

## 2018-06-11 RX ORDER — CEFUROXIME AXETIL 250 MG/1
250 TABLET ORAL EVERY 12 HOURS SCHEDULED
Qty: 20 TABLET | Refills: 0 | Status: SHIPPED | OUTPATIENT
Start: 2018-06-11 | End: 2018-06-21

## 2018-06-11 RX ORDER — PREDNISONE 10 MG/1
10 TABLET ORAL DAILY
Qty: 20 TABLET | Refills: 2 | Status: SHIPPED | OUTPATIENT
Start: 2018-06-11 | End: 2018-10-22 | Stop reason: ALTCHOICE

## 2018-06-11 NOTE — PROGRESS NOTES
Assessment/Plan:         Diagnoses and all orders for this visit:    Upper respiratory tract infection, unspecified type  -     predniSONE 10 mg tablet; Take 1 tablet (10 mg total) by mouth daily Take 3 tabs  Daily for 3 days, then 2 tabs  Daily for 3 days, then one tab  Daily until completed  -     cefuroxime (CEFTIN) 250 mg tablet; Take 1 tablet (250 mg total) by mouth every 12 (twelve) hours for 10 days  -     promethazine-codeine (PHENERGAN WITH CODEINE) 6 25-10 mg/5 mL syrup; Take 5 mL by mouth every 4 (four) hours as needed for cough    Bronchospasm  -     predniSONE 10 mg tablet; Take 1 tablet (10 mg total) by mouth daily Take 3 tabs  Daily for 3 days, then 2 tabs  Daily for 3 days, then one tab  Daily until completed  -     cefuroxime (CEFTIN) 250 mg tablet; Take 1 tablet (250 mg total) by mouth every 12 (twelve) hours for 10 days  -     promethazine-codeine (PHENERGAN WITH CODEINE) 6 25-10 mg/5 mL syrup; Take 5 mL by mouth every 4 (four) hours as needed for cough    Cough  -     promethazine-codeine (PHENERGAN WITH CODEINE) 6 25-10 mg/5 mL syrup; Take 5 mL by mouth every 4 (four) hours as needed for cough        Subjective:      Patient ID: Andrea Jefferson is a 71 y o  male  71year old white male presents with c/o cough productive of clear mucous, since last night, but constant  Slightly sore throat, from coughing up phlegm  Has nasal congestion, and had 4 nose bleeds last year concerned about getting these sx  This year  Using Flonase, prn past one month  Cough   Associated symptoms include rhinorrhea and a sore throat  Pertinent negatives include no chills, ear pain, fever, headaches or shortness of breath  Review of Systems   Constitutional: Negative for chills, diaphoresis, fatigue and fever  HENT: Positive for congestion, hearing loss, rhinorrhea and sore throat  Negative for ear discharge, ear pain, sinus pain and sinus pressure  Respiratory: Positive for cough   Negative for shortness of breath  Neurological: Negative for dizziness, light-headedness and headaches  Objective:      /88   Pulse 64   Temp 98 1 °F (36 7 °C) (Tympanic)   Ht 6' 4" (1 93 m)   Wt (!) 137 kg (301 lb)   SpO2 97%   BMI 36 64 kg/m²          Physical Exam   Constitutional: He is oriented to person, place, and time  He appears well-developed and well-nourished  He appears distressed  HENT:   Head: Normocephalic and atraumatic  Mouth/Throat: No oropharyngeal exudate  Right TM erythematous, has hearing aid in right ear  Congestion noted in middle ears, turbinates inflamed  Eyes: Conjunctivae and EOM are normal  Right eye exhibits no discharge  Left eye exhibits no discharge  No scleral icterus  Pulmonary/Chest: He is in respiratory distress  He has wheezes  Unable to stop coughing, after attempting to take a deep breath while being examined  Neurological: He is alert and oriented to person, place, and time  Skin: He is not diaphoretic  Nursing note and vitals reviewed

## 2018-06-15 ENCOUNTER — TELEPHONE (OUTPATIENT)
Dept: FAMILY MEDICINE CLINIC | Facility: HOSPITAL | Age: 73
End: 2018-06-15

## 2018-06-15 DIAGNOSIS — J06.9 UPPER RESPIRATORY TRACT INFECTION, UNSPECIFIED TYPE: ICD-10-CM

## 2018-06-15 DIAGNOSIS — J98.01 BRONCHOSPASM: ICD-10-CM

## 2018-06-15 DIAGNOSIS — R05.9 COUGH: ICD-10-CM

## 2018-06-15 DIAGNOSIS — J06.9 VIRAL UPPER RESPIRATORY INFECTION: Primary | ICD-10-CM

## 2018-06-15 RX ORDER — PROMETHAZINE HYDROCHLORIDE AND CODEINE PHOSPHATE 6.25; 1 MG/5ML; MG/5ML
5 SYRUP ORAL EVERY 4 HOURS PRN
Qty: 120 ML | Refills: 0 | Status: SHIPPED | OUTPATIENT
Start: 2018-06-15 | End: 2018-06-18 | Stop reason: SDUPTHER

## 2018-06-18 ENCOUNTER — OFFICE VISIT (OUTPATIENT)
Dept: FAMILY MEDICINE CLINIC | Facility: HOSPITAL | Age: 73
End: 2018-06-18
Payer: COMMERCIAL

## 2018-06-18 VITALS
HEART RATE: 56 BPM | TEMPERATURE: 97.9 F | DIASTOLIC BLOOD PRESSURE: 78 MMHG | BODY MASS INDEX: 36.41 KG/M2 | WEIGHT: 299 LBS | HEIGHT: 76 IN | SYSTOLIC BLOOD PRESSURE: 114 MMHG | OXYGEN SATURATION: 97 %

## 2018-06-18 DIAGNOSIS — J98.01 BRONCHOSPASM: ICD-10-CM

## 2018-06-18 DIAGNOSIS — J06.9 UPPER RESPIRATORY TRACT INFECTION, UNSPECIFIED TYPE: ICD-10-CM

## 2018-06-18 DIAGNOSIS — R05.9 COUGH: ICD-10-CM

## 2018-06-18 PROCEDURE — 3008F BODY MASS INDEX DOCD: CPT | Performed by: PHYSICIAN ASSISTANT

## 2018-06-18 PROCEDURE — 99213 OFFICE O/P EST LOW 20 MIN: CPT | Performed by: PHYSICIAN ASSISTANT

## 2018-06-18 RX ORDER — PROMETHAZINE HYDROCHLORIDE AND CODEINE PHOSPHATE 6.25; 1 MG/5ML; MG/5ML
5 SYRUP ORAL EVERY 4 HOURS PRN
Qty: 120 ML | Refills: 1 | Status: SHIPPED | OUTPATIENT
Start: 2018-06-18 | End: 2018-10-22 | Stop reason: ALTCHOICE

## 2018-06-18 NOTE — PROGRESS NOTES
Assessment/Plan:         Diagnoses and all orders for this visit:    Upper respiratory tract infection, unspecified type  -     promethazine-codeine (PHENERGAN WITH CODEINE) 6 25-10 mg/5 mL syrup; Take 5 mL by mouth every 4 (four) hours as needed for cough  -     Complete pulmonary function test; Future    Bronchospasm  -     promethazine-codeine (PHENERGAN WITH CODEINE) 6 25-10 mg/5 mL syrup; Take 5 mL by mouth every 4 (four) hours as needed for cough  -     Complete pulmonary function test; Future    Cough  -     promethazine-codeine (PHENERGAN WITH CODEINE) 6 25-10 mg/5 mL syrup; Take 5 mL by mouth every 4 (four) hours as needed for cough  -     Complete pulmonary function test; Future        Subjective:      Patient ID: Alfred Valiente is a 71 y o  male  71year old white male presents with ongoing cough, still has medication from last week, few pills left  Feels congested, phlegm in throat, or in nasal passages  Cough worse while eating, or after taking deep breath  Plans to see ENT, requesting refill on cough syrup  Cough is day and night  Cough   Associated symptoms include ear pain and rhinorrhea  Pertinent negatives include no chills, fever, headaches, sore throat or shortness of breath  Review of Systems   Constitutional: Negative for chills, diaphoresis, fatigue and fever  HENT: Positive for congestion, ear pain and rhinorrhea  Negative for sore throat  Respiratory: Positive for cough  Negative for chest tightness and shortness of breath  Neurological: Negative for dizziness, light-headedness, numbness and headaches  Objective:      /78   Pulse 56   Temp 97 9 °F (36 6 °C) (Tympanic)   Ht 6' 4" (1 93 m)   Wt 136 kg (299 lb)   SpO2 97%   BMI 36 40 kg/m²          Physical Exam   Constitutional: He is oriented to person, place, and time  HENT:   Head: Normocephalic and atraumatic  Eyes: Conjunctivae and EOM are normal  Right eye exhibits no discharge  Left eye exhibits no discharge  Pulmonary/Chest: No respiratory distress  He has wheezes  Neurological: He is alert and oriented to person, place, and time  Nursing note and vitals reviewed

## 2018-08-22 ENCOUNTER — OFFICE VISIT (OUTPATIENT)
Dept: OBGYN CLINIC | Facility: CLINIC | Age: 73
End: 2018-08-22
Payer: COMMERCIAL

## 2018-08-22 ENCOUNTER — APPOINTMENT (OUTPATIENT)
Dept: RADIOLOGY | Facility: CLINIC | Age: 73
End: 2018-08-22
Payer: COMMERCIAL

## 2018-08-22 VITALS — SYSTOLIC BLOOD PRESSURE: 149 MMHG | HEART RATE: 84 BPM | DIASTOLIC BLOOD PRESSURE: 75 MMHG | HEIGHT: 76 IN

## 2018-08-22 DIAGNOSIS — M46.1 SI (SACROILIAC) JOINT INFLAMMATION (HCC): ICD-10-CM

## 2018-08-22 DIAGNOSIS — S30.0XXA PELVIC CONTUSION, INITIAL ENCOUNTER: Primary | ICD-10-CM

## 2018-08-22 DIAGNOSIS — M54.40 LOW BACK PAIN WITH SCIATICA, SCIATICA LATERALITY UNSPECIFIED, UNSPECIFIED BACK PAIN LATERALITY, UNSPECIFIED CHRONICITY: ICD-10-CM

## 2018-08-22 PROCEDURE — 72110 X-RAY EXAM L-2 SPINE 4/>VWS: CPT

## 2018-08-22 PROCEDURE — 99204 OFFICE O/P NEW MOD 45 MIN: CPT | Performed by: FAMILY MEDICINE

## 2018-08-22 RX ORDER — MELOXICAM 15 MG/1
15 TABLET ORAL DAILY
Qty: 60 TABLET | Refills: 0 | Status: ON HOLD | OUTPATIENT
Start: 2018-08-22 | End: 2019-01-03 | Stop reason: ALTCHOICE

## 2018-08-22 RX ORDER — CYCLOBENZAPRINE HCL 10 MG
10 TABLET ORAL 3 TIMES DAILY PRN
Qty: 30 TABLET | Refills: 0 | Status: ON HOLD | OUTPATIENT
Start: 2018-08-22 | End: 2019-01-03 | Stop reason: ALTCHOICE

## 2018-08-22 RX ORDER — PREDNISONE 20 MG/1
40 TABLET ORAL DAILY
Qty: 10 TABLET | Refills: 0 | Status: SHIPPED | OUTPATIENT
Start: 2018-08-22 | End: 2018-08-27

## 2018-08-22 NOTE — PROGRESS NOTES
Assessment:     1  Pelvic contusion, initial encounter    2  Low back pain with sciatica, sciatica laterality unspecified, unspecified back pain laterality, unspecified chronicity    3  SI (sacroiliac) joint inflammation (HCC)        Plan:     Problem List Items Addressed This Visit     Low back pain with sciatica    Relevant Medications    cyclobenzaprine (FLEXERIL) 10 mg tablet    predniSONE 20 mg tablet    meloxicam (MOBIC) 15 mg tablet    Other Relevant Orders    XR spine lumbar minimum 4 views non injury    Ambulatory referral to Pain Management    SI (sacroiliac) joint inflammation (HCC)    Relevant Medications    cyclobenzaprine (FLEXERIL) 10 mg tablet    predniSONE 20 mg tablet    meloxicam (MOBIC) 15 mg tablet    Other Relevant Orders    Ambulatory referral to Pain Management    Pelvic contusion, initial encounter - Primary    Relevant Medications    cyclobenzaprine (FLEXERIL) 10 mg tablet    predniSONE 20 mg tablet    meloxicam (MOBIC) 15 mg tablet    Other Relevant Orders    Ambulatory referral to Pain Management         Subjective:     Patient ID: Erickson Cook is a 68 y o  male  Chief Complaint:  Patient is a 21-year-old male who ambulates with the assistance of a motorized wheelchair presenting today for evaluation of lower back and pelvis pain  He reports approximately 6 days ago, having his motorized wheelchair stuck in mud  As he attempted to come out of the mud, his wheelchair went into the forward position and pinned him in the ground as the wheelchair ran over his back  He reported immediate onset of pain at the time of his injury  His pain continues today is a throbbing, achy pain along the left lower back and the left gluteal region  He does report a history of lumbar fusion and does see pain management at [facility] in [place] for pain control  He currently uses hydrocodone for his chronic pain and states that this has provided no relief to his back pain symptoms   There is no radiation of symptoms into his lower extremities  He does continue to be able to ambulate out of his motorized wheelchair with assistance  He takes he tells me he lives at home with his son and grandson  He denies any lower extremity weakness, numbness or tingling  He denies any saddle anesthesia  He denies any lose of bowel bladder function      Allergy:  No Known Allergies  Medications:  all current active meds have been reviewed  Past Medical History:  Past Medical History:   Diagnosis Date    Atrial fibrillation ([place] [address] )     Cardiac disease     Cellulitis     Chronic pain     Chronic venous hypertension     with ulceration    GERD (gastroesophageal reflux disease)     Hyperlipidemia     Hypertension     Obesity     Pulmonary hypertension ([place] [address] )     PVD (peripheral vascular disease) ([place] [address] )     Vascular disorder of extremity ([place] [address] )      Past Surgical History:  Past Surgical History:   Procedure Laterality Date    ANTERIOR RELEASE VERTEBRAL BODY W/ POSTERIOR FUSION      APPENDECTOMY  1954    CATARACT EXTRACTION      DECOMPRESSION SPINE LUMBAR POSTERIOR      ELBOW SURGERY      KNEE SURGERY      NOSE SURGERY      turbinectomy    RETINAL DETACHMENT SURGERY      RHINOPLASTY      TONSILLECTOMY      VEIN LIGATION AND STRIPPING      saphenous vein long     Family History:  Family History   Problem Relation Age of Onset    Cancer Mother         cancer of uterus    Diabetes Sister     Mental illness Neg Hx         neg fam hx    Substance Abuse Neg Hx         neg fam hx     Social History:  History   Alcohol Use No     History   Drug Use No     History   Smoking Status    Former Smoker   Smokeless Tobacco    Never Used     Review of Systems   Constitutional: Negative  HENT: Negative  Eyes: Negative  Respiratory: Negative  Cardiovascular: Negative  Gastrointestinal: Negative  Genitourinary: Negative  Musculoskeletal: Positive for arthralgias and myalgias  Skin: Negative  Allergic/Immunologic: Negative  Neurological: Negative  Hematological: Negative  Psychiatric/Behavioral: Negative  Objective:  BP Readings from Last 1 Encounters:   08/22/18 149/75      Wt Readings from Last 1 Encounters:   06/18/18 136 kg (299 lb)      BMI:   Estimated body mass index is 36 4 kg/m² as calculated from the following:    Height as of 6/18/18: 6' 4" (1 93 m)  Weight as of 6/18/18: 136 kg (299 lb)  BSA:   Estimated body surface area is 2 63 meters squared as calculated from the following:    Height as of 6/18/18: 6' 4" (1 93 m)  Weight as of 6/18/18: 136 kg (299 lb)  Physical Exam   Constitutional: He is oriented to person, place, and time  He appears well-developed and well-nourished  HENT:   Head: Normocephalic  Eyes: Pupils are equal, round, and reactive to light  Neck: Normal range of motion  Pulmonary/Chest: Effort normal    Musculoskeletal: He exhibits tenderness  Neurological: He is alert and oriented to person, place, and time  Skin: Skin is warm  Psychiatric: He has a normal mood and affect  Back Exam     Tenderness   The patient is experiencing tenderness in the lumbar and sacroiliac  Range of Motion   Extension: abnormal   Flexion: abnormal   Lateral Bend Right: abnormal   Lateral Bend Left: abnormal   Rotation Right: abnormal   Rotation Left: abnormal     Tests   Straight leg raise right: positive  Straight leg raise left: positive    Other   Sensation: normal  Gait: abnormal   Erythema: no back redness            I have personally reviewed pertinent films in PACS  No acute osseous abnormalities  Previously noted rods and screws from lumbar fusion

## 2018-10-22 ENCOUNTER — OFFICE VISIT (OUTPATIENT)
Dept: FAMILY MEDICINE CLINIC | Facility: HOSPITAL | Age: 73
End: 2018-10-22
Payer: COMMERCIAL

## 2018-10-22 VITALS
WEIGHT: 290 LBS | HEART RATE: 67 BPM | TEMPERATURE: 97.8 F | DIASTOLIC BLOOD PRESSURE: 72 MMHG | BODY MASS INDEX: 35.3 KG/M2 | SYSTOLIC BLOOD PRESSURE: 118 MMHG

## 2018-10-22 DIAGNOSIS — J02.9 PHARYNGITIS, UNSPECIFIED ETIOLOGY: Primary | ICD-10-CM

## 2018-10-22 DIAGNOSIS — R05.9 COUGH: ICD-10-CM

## 2018-10-22 PROCEDURE — 1160F RVW MEDS BY RX/DR IN RCRD: CPT | Performed by: PHYSICIAN ASSISTANT

## 2018-10-22 PROCEDURE — 1036F TOBACCO NON-USER: CPT | Performed by: PHYSICIAN ASSISTANT

## 2018-10-22 PROCEDURE — 99213 OFFICE O/P EST LOW 20 MIN: CPT | Performed by: PHYSICIAN ASSISTANT

## 2018-10-22 RX ORDER — AMOXICILLIN 500 MG/1
500 TABLET, FILM COATED ORAL
Qty: 30 TABLET | Refills: 0 | Status: SHIPPED | OUTPATIENT
Start: 2018-10-22 | End: 2018-10-23 | Stop reason: SDUPTHER

## 2018-10-22 RX ORDER — BENZONATATE 100 MG/1
100 CAPSULE ORAL 3 TIMES DAILY PRN
Qty: 30 CAPSULE | Refills: 3 | Status: SHIPPED | OUTPATIENT
Start: 2018-10-22 | End: 2018-11-01

## 2018-10-22 NOTE — PATIENT INSTRUCTIONS
Recommend Tessalon perles prn, and continuing increasing fluid intake  Given Rx  For Amoxicillin in case sx  [place]  Do not recommend Flu vaccine at this time

## 2018-10-22 NOTE — PROGRESS NOTES
Assessment/Plan:         Diagnoses and all orders for this visit:    Pharyngitis, unspecified etiology  -     amoxicillin (AMOXIL) 500 MG tablet; Take 1 tablet (500 mg total) by mouth 3 (three) times a day with meals for 10 days    Cough  -     benzonatate (TESSALON PERLES) 100 mg capsule; Take 1 capsule (100 mg total) by mouth 3 (three) times a day as needed for cough for up to 10 days        Subjective:      Patient ID: Cleveland Cage is a 68 y o  male  68year old white male c/o sore throat and joint pains since past Friday  Took cough supresants from past Rx  Had chills, and also having body aches, along with joint pains  Also has a dry cough and weakness  Sore Throat    Associated symptoms include coughing  Pertinent negatives include no congestion, ear pain, headaches or shortness of breath  Review of Systems   Constitutional: Positive for chills and fatigue  Negative for diaphoresis and fever  HENT: Positive for hearing loss and sore throat  Negative for congestion, ear pain and rhinorrhea  Respiratory: Positive for cough  Negative for shortness of breath  Neurological: Negative for dizziness, light-headedness and headaches  Objective:      /72   Pulse 67   Temp 97 8 °F (36 6 °C)   Wt 132 kg (290 lb)   BMI 35 30 kg/m²          Physical Exam   Constitutional: He is oriented to person, place, and time  He appears well-developed and well-nourished  No distress  HENT:   Head: Normocephalic and atraumatic  Right Ear: External ear normal    Left Ear: External ear normal    Mouth/Throat: Oropharynx is clear and moist  No oropharyngeal exudate  TM bulging with congestion noted in middle ears  Turbinates inflamed  Eyes: Conjunctivae and EOM are normal  Right eye exhibits no discharge  Left eye exhibits no discharge  No scleral icterus  Pulmonary/Chest: Effort normal and breath sounds normal  No respiratory distress  He has no wheezes  He has no rales  Neurological: He is alert and oriented to person, place, and time  Skin: He is not diaphoretic  Nursing note and vitals reviewed

## 2018-10-23 DIAGNOSIS — J02.9 PHARYNGITIS, UNSPECIFIED ETIOLOGY: ICD-10-CM

## 2018-10-23 RX ORDER — AMOXICILLIN 500 MG/1
500 TABLET, FILM COATED ORAL
Qty: 30 TABLET | Refills: 0 | Status: SHIPPED | OUTPATIENT
Start: 2018-10-23 | End: 2018-11-02

## 2018-12-11 ENCOUNTER — TELEPHONE (OUTPATIENT)
Dept: PAIN MEDICINE | Facility: CLINIC | Age: 73
End: 2018-12-11

## 2018-12-11 NOTE — TELEPHONE ENCOUNTER
Patient requested new patient appt with [facility]  Patient has seen previous pain doctor so we obtained records & Dr Cinthya Wetzel reviewed them  Dr Cinthya Wetzel feels he has nothing to offer  I called the patient & told him Dr Cinthya Wetzel has nothing to offer & suggested he continue with his current pain doctor  I also offered to send the patient a list of other area pain doctors  Intake & records in stack by [employer]

## 2019-01-02 ENCOUNTER — APPOINTMENT (EMERGENCY)
Dept: RADIOLOGY | Facility: HOSPITAL | Age: 74
DRG: 280 | End: 2019-01-02
Payer: COMMERCIAL

## 2019-01-02 ENCOUNTER — HOSPITAL ENCOUNTER (INPATIENT)
Facility: HOSPITAL | Age: 74
LOS: 14 days | Discharge: HOME HEALTH | DRG: 280 | End: 2019-01-16
Attending: EMERGENCY MEDICINE | Admitting: INTERNAL MEDICINE
Payer: COMMERCIAL

## 2019-01-02 DIAGNOSIS — I10 HYPERTENSION, UNSPECIFIED TYPE: ICD-10-CM

## 2019-01-02 DIAGNOSIS — I50.9 ACUTE CHF (CONGESTIVE HEART FAILURE) (HCC): Primary | ICD-10-CM

## 2019-01-02 DIAGNOSIS — R60.0 EDEMA OF LEG: ICD-10-CM

## 2019-01-02 DIAGNOSIS — Z86.79 HISTORY OF PULMONARY HYPERTENSION: ICD-10-CM

## 2019-01-02 DIAGNOSIS — L03.116 CELLULITIS OF LEFT LOWER EXTREMITY: ICD-10-CM

## 2019-01-02 DIAGNOSIS — E87.6 HYPOKALEMIA: ICD-10-CM

## 2019-01-02 DIAGNOSIS — I26.99 PULMONARY EMBOLISM (HCC): Chronic | ICD-10-CM

## 2019-01-02 DIAGNOSIS — E66.9 CLASS 2 OBESITY WITH BODY MASS INDEX (BMI) OF 37.0 TO 37.9 IN ADULT, UNSPECIFIED OBESITY TYPE, UNSPECIFIED WHETHER SERIOUS COMORBIDITY PRESENT: ICD-10-CM

## 2019-01-02 DIAGNOSIS — I48.21 PERMANENT ATRIAL FIBRILLATION (HCC): ICD-10-CM

## 2019-01-02 PROBLEM — R77.8 ELEVATED TROPONIN: Status: ACTIVE | Noted: 2019-01-02

## 2019-01-02 PROBLEM — R79.89 ELEVATED D-DIMER: Status: ACTIVE | Noted: 2019-01-02

## 2019-01-02 PROBLEM — J06.9 VIRAL UPPER RESPIRATORY TRACT INFECTION: Status: RESOLVED | Noted: 2018-04-04 | Resolved: 2019-01-02

## 2019-01-02 PROBLEM — IMO0002 WOUND ABSCESS: Status: ACTIVE | Noted: 2019-01-02

## 2019-01-02 PROBLEM — R05.9 COUGH: Status: RESOLVED | Noted: 2018-05-02 | Resolved: 2019-01-02

## 2019-01-02 PROBLEM — S81.802A WOUND OF LEFT LEG: Status: ACTIVE | Noted: 2019-01-02

## 2019-01-02 PROBLEM — R06.02 SOB (SHORTNESS OF BREATH): Status: ACTIVE | Noted: 2019-01-02

## 2019-01-02 PROBLEM — N17.9 ACUTE KIDNEY INJURY (HCC): Status: ACTIVE | Noted: 2019-01-02

## 2019-01-02 LAB
ANION GAP SERPL CALCULATED.3IONS-SCNC: 12 MMOL/L (ref 4–13)
APTT PPP: 38 SECONDS (ref 26–38)
BASOPHILS # BLD AUTO: 0.04 THOUSANDS/ΜL (ref 0–0.1)
BASOPHILS NFR BLD AUTO: 1 % (ref 0–1)
BUN SERPL-MCNC: 32 MG/DL (ref 5–25)
CALCIUM SERPL-MCNC: 9.3 MG/DL (ref 8.3–10.1)
CHLORIDE SERPL-SCNC: 100 MMOL/L (ref 100–108)
CO2 SERPL-SCNC: 27 MMOL/L (ref 21–32)
CREAT SERPL-MCNC: 1.47 MG/DL (ref 0.6–1.3)
DEPRECATED D DIMER PPP: 2008 NG/ML (FEU)
DIGOXIN SERPL-MCNC: <0.2 NG/ML (ref 0.8–2)
EOSINOPHIL # BLD AUTO: 0.05 THOUSAND/ΜL (ref 0–0.61)
EOSINOPHIL NFR BLD AUTO: 1 % (ref 0–6)
ERYTHROCYTE [DISTWIDTH] IN BLOOD BY AUTOMATED COUNT: 15.6 % (ref 11.6–15.1)
GFR SERPL CREATININE-BSD FRML MDRD: 47 ML/MIN/1.73SQ M
GLUCOSE SERPL-MCNC: 112 MG/DL (ref 65–140)
HCT VFR BLD AUTO: 46.2 % (ref 36.5–49.3)
HGB BLD-MCNC: 15.1 G/DL (ref 12–17)
IMM GRANULOCYTES # BLD AUTO: 0.05 THOUSAND/UL (ref 0–0.2)
IMM GRANULOCYTES NFR BLD AUTO: 1 % (ref 0–2)
INR PPP: 2.05 (ref 0.86–1.17)
LYMPHOCYTES # BLD AUTO: 1.1 THOUSANDS/ΜL (ref 0.6–4.47)
LYMPHOCYTES NFR BLD AUTO: 16 % (ref 14–44)
MCH RBC QN AUTO: 31.4 PG (ref 26.8–34.3)
MCHC RBC AUTO-ENTMCNC: 32.7 G/DL (ref 31.4–37.4)
MCV RBC AUTO: 96 FL (ref 82–98)
MONOCYTES # BLD AUTO: 0.51 THOUSAND/ΜL (ref 0.17–1.22)
MONOCYTES NFR BLD AUTO: 7 % (ref 4–12)
NEUTROPHILS # BLD AUTO: 5.15 THOUSANDS/ΜL (ref 1.85–7.62)
NEUTS SEG NFR BLD AUTO: 74 % (ref 43–75)
NRBC BLD AUTO-RTO: 0 /100 WBCS
NT-PROBNP SERPL-MCNC: 5876 PG/ML
PLATELET # BLD AUTO: 159 THOUSANDS/UL (ref 149–390)
PMV BLD AUTO: 11.5 FL (ref 8.9–12.7)
POTASSIUM SERPL-SCNC: 3.8 MMOL/L (ref 3.5–5.3)
PROTHROMBIN TIME: 22 SECONDS (ref 11.8–14.2)
RBC # BLD AUTO: 4.81 MILLION/UL (ref 3.88–5.62)
SODIUM SERPL-SCNC: 139 MMOL/L (ref 136–145)
TROPONIN I SERPL-MCNC: 0.14 NG/ML
TROPONIN I SERPL-MCNC: 0.15 NG/ML
WBC # BLD AUTO: 6.9 THOUSAND/UL (ref 4.31–10.16)

## 2019-01-02 PROCEDURE — 85610 PROTHROMBIN TIME: CPT | Performed by: EMERGENCY MEDICINE

## 2019-01-02 PROCEDURE — 87147 CULTURE TYPE IMMUNOLOGIC: CPT | Performed by: EMERGENCY MEDICINE

## 2019-01-02 PROCEDURE — 84484 ASSAY OF TROPONIN QUANT: CPT | Performed by: EMERGENCY MEDICINE

## 2019-01-02 PROCEDURE — 71046 X-RAY EXAM CHEST 2 VIEWS: CPT

## 2019-01-02 PROCEDURE — 99223 1ST HOSP IP/OBS HIGH 75: CPT | Performed by: NURSE PRACTITIONER

## 2019-01-02 PROCEDURE — 36415 COLL VENOUS BLD VENIPUNCTURE: CPT | Performed by: EMERGENCY MEDICINE

## 2019-01-02 PROCEDURE — 85025 COMPLETE CBC W/AUTO DIFF WBC: CPT | Performed by: EMERGENCY MEDICINE

## 2019-01-02 PROCEDURE — 93005 ELECTROCARDIOGRAM TRACING: CPT

## 2019-01-02 PROCEDURE — 87186 SC STD MICRODIL/AGAR DIL: CPT | Performed by: EMERGENCY MEDICINE

## 2019-01-02 PROCEDURE — 85730 THROMBOPLASTIN TIME PARTIAL: CPT | Performed by: EMERGENCY MEDICINE

## 2019-01-02 PROCEDURE — 87205 SMEAR GRAM STAIN: CPT | Performed by: EMERGENCY MEDICINE

## 2019-01-02 PROCEDURE — 94640 AIRWAY INHALATION TREATMENT: CPT

## 2019-01-02 PROCEDURE — 80162 ASSAY OF DIGOXIN TOTAL: CPT | Performed by: EMERGENCY MEDICINE

## 2019-01-02 PROCEDURE — 87070 CULTURE OTHR SPECIMN AEROBIC: CPT | Performed by: EMERGENCY MEDICINE

## 2019-01-02 PROCEDURE — 83880 ASSAY OF NATRIURETIC PEPTIDE: CPT | Performed by: EMERGENCY MEDICINE

## 2019-01-02 PROCEDURE — 80048 BASIC METABOLIC PNL TOTAL CA: CPT | Performed by: EMERGENCY MEDICINE

## 2019-01-02 PROCEDURE — 99285 EMERGENCY DEPT VISIT HI MDM: CPT

## 2019-01-02 PROCEDURE — 85379 FIBRIN DEGRADATION QUANT: CPT | Performed by: EMERGENCY MEDICINE

## 2019-01-02 RX ORDER — LISINOPRIL 20 MG/1
20 TABLET ORAL DAILY
Status: DISCONTINUED | OUTPATIENT
Start: 2019-01-03 | End: 2019-01-04

## 2019-01-02 RX ORDER — VITAMIN E 268 MG
400 CAPSULE ORAL DAILY
Status: DISCONTINUED | OUTPATIENT
Start: 2019-01-03 | End: 2019-01-16 | Stop reason: HOSPADM

## 2019-01-02 RX ORDER — PANTOPRAZOLE SODIUM 40 MG/1
40 TABLET, DELAYED RELEASE ORAL
Status: DISCONTINUED | OUTPATIENT
Start: 2019-01-03 | End: 2019-01-16 | Stop reason: HOSPADM

## 2019-01-02 RX ORDER — HYDROCODONE BITARTRATE AND ACETAMINOPHEN 5; 325 MG/1; MG/1
2 TABLET ORAL EVERY 6 HOURS PRN
Status: DISCONTINUED | OUTPATIENT
Start: 2019-01-02 | End: 2019-01-16 | Stop reason: HOSPADM

## 2019-01-02 RX ORDER — IPRATROPIUM BROMIDE AND ALBUTEROL SULFATE 2.5; .5 MG/3ML; MG/3ML
3 SOLUTION RESPIRATORY (INHALATION) ONCE
Status: COMPLETED | OUTPATIENT
Start: 2019-01-02 | End: 2019-01-02

## 2019-01-02 RX ORDER — POTASSIUM CHLORIDE 750 MG/1
10 TABLET, EXTENDED RELEASE ORAL DAILY
Status: DISCONTINUED | OUTPATIENT
Start: 2019-01-03 | End: 2019-01-09

## 2019-01-02 RX ORDER — FLUTICASONE PROPIONATE 50 MCG
1 SPRAY, SUSPENSION (ML) NASAL DAILY
Status: DISCONTINUED | OUTPATIENT
Start: 2019-01-03 | End: 2019-01-16 | Stop reason: HOSPADM

## 2019-01-02 RX ORDER — FUROSEMIDE 10 MG/ML
80 INJECTION INTRAMUSCULAR; INTRAVENOUS ONCE
Status: COMPLETED | OUTPATIENT
Start: 2019-01-02 | End: 2019-01-02

## 2019-01-02 RX ORDER — LOSARTAN POTASSIUM 50 MG/1
50 TABLET ORAL DAILY
Status: DISCONTINUED | OUTPATIENT
Start: 2019-01-03 | End: 2019-01-08

## 2019-01-02 RX ORDER — CYCLOBENZAPRINE HCL 10 MG
10 TABLET ORAL 3 TIMES DAILY PRN
Status: DISCONTINUED | OUTPATIENT
Start: 2019-01-02 | End: 2019-01-16 | Stop reason: HOSPADM

## 2019-01-02 RX ORDER — METOLAZONE 2.5 MG/1
2.5 TABLET ORAL
Status: DISCONTINUED | OUTPATIENT
Start: 2019-01-04 | End: 2019-01-03

## 2019-01-02 RX ORDER — DILTIAZEM HYDROCHLORIDE 180 MG/1
180 CAPSULE, COATED, EXTENDED RELEASE ORAL DAILY
Status: DISCONTINUED | OUTPATIENT
Start: 2019-01-03 | End: 2019-01-03

## 2019-01-02 RX ORDER — ECHINACEA PURPUREA EXTRACT 125 MG
1 TABLET ORAL AS NEEDED
Status: DISCONTINUED | OUTPATIENT
Start: 2019-01-02 | End: 2019-01-16 | Stop reason: HOSPADM

## 2019-01-02 RX ORDER — MELATONIN
1000 DAILY
Status: DISCONTINUED | OUTPATIENT
Start: 2019-01-03 | End: 2019-01-16 | Stop reason: HOSPADM

## 2019-01-02 RX ADMIN — IPRATROPIUM BROMIDE AND ALBUTEROL SULFATE 3 ML: 2.5; .5 SOLUTION RESPIRATORY (INHALATION) at 20:15

## 2019-01-02 RX ADMIN — FUROSEMIDE 80 MG: 10 INJECTION, SOLUTION INTRAMUSCULAR; INTRAVENOUS at 21:56

## 2019-01-03 ENCOUNTER — APPOINTMENT (INPATIENT)
Dept: NON INVASIVE DIAGNOSTICS | Facility: HOSPITAL | Age: 74
DRG: 280 | End: 2019-01-03
Payer: COMMERCIAL

## 2019-01-03 ENCOUNTER — APPOINTMENT (INPATIENT)
Dept: NUCLEAR MEDICINE | Facility: HOSPITAL | Age: 74
DRG: 280 | End: 2019-01-03
Payer: COMMERCIAL

## 2019-01-03 PROBLEM — L03.116 CELLULITIS OF LEFT LOWER EXTREMITY: Status: ACTIVE | Noted: 2019-01-02

## 2019-01-03 PROBLEM — I26.99 PULMONARY EMBOLISM (HCC): Chronic | Status: ACTIVE | Noted: 2019-01-03

## 2019-01-03 LAB
ANION GAP SERPL CALCULATED.3IONS-SCNC: 12 MMOL/L (ref 4–13)
APTT PPP: 38 SECONDS (ref 26–38)
APTT PPP: >210 SECONDS (ref 26–38)
ATRIAL RATE: 0 BPM
ATRIAL RATE: 375 BPM
ATRIAL RATE: 89 BPM
BASOPHILS # BLD AUTO: 0.05 THOUSANDS/ΜL (ref 0–0.1)
BASOPHILS NFR BLD AUTO: 1 % (ref 0–1)
BUN SERPL-MCNC: 31 MG/DL (ref 5–25)
CALCIUM SERPL-MCNC: 9 MG/DL (ref 8.3–10.1)
CHLORIDE SERPL-SCNC: 102 MMOL/L (ref 100–108)
CHOLEST SERPL-MCNC: 102 MG/DL (ref 50–200)
CO2 SERPL-SCNC: 25 MMOL/L (ref 21–32)
CREAT SERPL-MCNC: 1.26 MG/DL (ref 0.6–1.3)
EOSINOPHIL # BLD AUTO: 0.22 THOUSAND/ΜL (ref 0–0.61)
EOSINOPHIL NFR BLD AUTO: 4 % (ref 0–6)
ERYTHROCYTE [DISTWIDTH] IN BLOOD BY AUTOMATED COUNT: 15.6 % (ref 11.6–15.1)
ERYTHROCYTE [DISTWIDTH] IN BLOOD BY AUTOMATED COUNT: 15.8 % (ref 11.6–15.1)
GFR SERPL CREATININE-BSD FRML MDRD: 57 ML/MIN/1.73SQ M
GLUCOSE SERPL-MCNC: 84 MG/DL (ref 65–140)
HCT VFR BLD AUTO: 43.1 % (ref 36.5–49.3)
HCT VFR BLD AUTO: 44.7 % (ref 36.5–49.3)
HDLC SERPL-MCNC: 41 MG/DL (ref 40–60)
HGB BLD-MCNC: 14.1 G/DL (ref 12–17)
HGB BLD-MCNC: 14.7 G/DL (ref 12–17)
IMM GRANULOCYTES # BLD AUTO: 0.02 THOUSAND/UL (ref 0–0.2)
IMM GRANULOCYTES NFR BLD AUTO: 0 % (ref 0–2)
INR PPP: 1.9 (ref 0.86–1.17)
INR PPP: 2.27 (ref 0.86–1.17)
LDLC SERPL CALC-MCNC: 55 MG/DL (ref 0–100)
LYMPHOCYTES # BLD AUTO: 1.44 THOUSANDS/ΜL (ref 0.6–4.47)
LYMPHOCYTES NFR BLD AUTO: 24 % (ref 14–44)
MCH RBC QN AUTO: 31.6 PG (ref 26.8–34.3)
MCH RBC QN AUTO: 31.7 PG (ref 26.8–34.3)
MCHC RBC AUTO-ENTMCNC: 32.7 G/DL (ref 31.4–37.4)
MCHC RBC AUTO-ENTMCNC: 32.9 G/DL (ref 31.4–37.4)
MCV RBC AUTO: 97 FL (ref 82–98)
MCV RBC AUTO: 97 FL (ref 82–98)
MONOCYTES # BLD AUTO: 0.8 THOUSAND/ΜL (ref 0.17–1.22)
MONOCYTES NFR BLD AUTO: 13 % (ref 4–12)
NEUTROPHILS # BLD AUTO: 3.55 THOUSANDS/ΜL (ref 1.85–7.62)
NEUTS SEG NFR BLD AUTO: 58 % (ref 43–75)
NRBC BLD AUTO-RTO: 0 /100 WBCS
PLATELET # BLD AUTO: 119 THOUSANDS/UL (ref 149–390)
PLATELET # BLD AUTO: 165 THOUSANDS/UL (ref 149–390)
PMV BLD AUTO: 11.8 FL (ref 8.9–12.7)
PMV BLD AUTO: 12.1 FL (ref 8.9–12.7)
POTASSIUM SERPL-SCNC: 3.2 MMOL/L (ref 3.5–5.3)
PROTHROMBIN TIME: 20.7 SECONDS (ref 11.8–14.2)
PROTHROMBIN TIME: 23.8 SECONDS (ref 11.8–14.2)
QRS AXIS: -15 DEGREES
QRS AXIS: -49 DEGREES
QRS AXIS: -63 DEGREES
QRSD INTERVAL: 110 MS
QRSD INTERVAL: 92 MS
QRSD INTERVAL: 98 MS
QT INTERVAL: 290 MS
QT INTERVAL: 446 MS
QT INTERVAL: 522 MS
QTC INTERVAL: 326 MS
QTC INTERVAL: 501 MS
QTC INTERVAL: 575 MS
RBC # BLD AUTO: 4.46 MILLION/UL (ref 3.88–5.62)
RBC # BLD AUTO: 4.63 MILLION/UL (ref 3.88–5.62)
SODIUM SERPL-SCNC: 139 MMOL/L (ref 136–145)
T WAVE AXIS: 117 DEGREES
T WAVE AXIS: 200 DEGREES
T WAVE AXIS: 74 DEGREES
TRIGL SERPL-MCNC: 30 MG/DL
TROPONIN I SERPL-MCNC: 0.17 NG/ML
TROPONIN I SERPL-MCNC: 0.17 NG/ML
VENTRICULAR RATE: 73 BPM
VENTRICULAR RATE: 76 BPM
VENTRICULAR RATE: 76 BPM
WBC # BLD AUTO: 6.08 THOUSAND/UL (ref 4.31–10.16)
WBC # BLD AUTO: 7.05 THOUSAND/UL (ref 4.31–10.16)

## 2019-01-03 PROCEDURE — 99254 IP/OBS CNSLTJ NEW/EST MOD 60: CPT | Performed by: INTERNAL MEDICINE

## 2019-01-03 PROCEDURE — 85610 PROTHROMBIN TIME: CPT | Performed by: NURSE PRACTITIONER

## 2019-01-03 PROCEDURE — 93970 EXTREMITY STUDY: CPT | Performed by: SURGERY

## 2019-01-03 PROCEDURE — 93970 EXTREMITY STUDY: CPT

## 2019-01-03 PROCEDURE — 85730 THROMBOPLASTIN TIME PARTIAL: CPT | Performed by: INTERNAL MEDICINE

## 2019-01-03 PROCEDURE — A9558 XE133 XENON 10MCI: HCPCS

## 2019-01-03 PROCEDURE — 85610 PROTHROMBIN TIME: CPT | Performed by: INTERNAL MEDICINE

## 2019-01-03 PROCEDURE — 85730 THROMBOPLASTIN TIME PARTIAL: CPT | Performed by: NURSE PRACTITIONER

## 2019-01-03 PROCEDURE — 78582 LUNG VENTILAT&PERFUS IMAGING: CPT

## 2019-01-03 PROCEDURE — 84484 ASSAY OF TROPONIN QUANT: CPT | Performed by: NURSE PRACTITIONER

## 2019-01-03 PROCEDURE — 93306 TTE W/DOPPLER COMPLETE: CPT

## 2019-01-03 PROCEDURE — 99233 SBSQ HOSP IP/OBS HIGH 50: CPT | Performed by: INTERNAL MEDICINE

## 2019-01-03 PROCEDURE — 80061 LIPID PANEL: CPT | Performed by: NURSE PRACTITIONER

## 2019-01-03 PROCEDURE — 85027 COMPLETE CBC AUTOMATED: CPT | Performed by: NURSE PRACTITIONER

## 2019-01-03 PROCEDURE — 85025 COMPLETE CBC W/AUTO DIFF WBC: CPT | Performed by: NURSE PRACTITIONER

## 2019-01-03 PROCEDURE — A9540 TC99M MAA: HCPCS

## 2019-01-03 PROCEDURE — 80048 BASIC METABOLIC PNL TOTAL CA: CPT | Performed by: INTERNAL MEDICINE

## 2019-01-03 PROCEDURE — 93010 ELECTROCARDIOGRAM REPORT: CPT | Performed by: INTERNAL MEDICINE

## 2019-01-03 RX ORDER — DILTIAZEM HYDROCHLORIDE 180 MG/1
180 CAPSULE, COATED, EXTENDED RELEASE ORAL 2 TIMES DAILY
Status: ON HOLD | COMMUNITY
End: 2019-12-01 | Stop reason: SDUPTHER

## 2019-01-03 RX ORDER — HYDROCODONE BITARTRATE AND ACETAMINOPHEN 7.5; 325 MG/1; MG/1
1 TABLET ORAL EVERY 4 HOURS PRN
COMMUNITY
End: 2019-05-13 | Stop reason: SDUPTHER

## 2019-01-03 RX ORDER — MULTIVIT WITH MINERALS/LUTEIN
1000 TABLET ORAL 2 TIMES DAILY
COMMUNITY
End: 2020-08-14 | Stop reason: HOSPADM

## 2019-01-03 RX ORDER — POLYETHYLENE GLYCOL 3350 17 G/17G
17 POWDER, FOR SOLUTION ORAL DAILY PRN
Status: DISCONTINUED | OUTPATIENT
Start: 2019-01-03 | End: 2019-01-03 | Stop reason: SDUPTHER

## 2019-01-03 RX ORDER — LANOLIN ALCOHOL/MO/W.PET/CERES
1000 CREAM (GRAM) TOPICAL DAILY
COMMUNITY
End: 2019-04-09 | Stop reason: ALTCHOICE

## 2019-01-03 RX ORDER — POTASSIUM CHLORIDE 750 MG/1
10 TABLET, EXTENDED RELEASE ORAL 2 TIMES DAILY
COMMUNITY
End: 2019-01-16 | Stop reason: HOSPADM

## 2019-01-03 RX ORDER — DILTIAZEM HYDROCHLORIDE 120 MG/1
120 CAPSULE, COATED, EXTENDED RELEASE ORAL 2 TIMES DAILY
Status: DISCONTINUED | OUTPATIENT
Start: 2019-01-03 | End: 2019-01-16 | Stop reason: HOSPADM

## 2019-01-03 RX ORDER — HEPARIN SODIUM 1000 [USP'U]/ML
10000 INJECTION, SOLUTION INTRAVENOUS; SUBCUTANEOUS ONCE
Status: COMPLETED | OUTPATIENT
Start: 2019-01-03 | End: 2019-01-03

## 2019-01-03 RX ORDER — POTASSIUM CHLORIDE 20 MEQ/1
40 TABLET, EXTENDED RELEASE ORAL ONCE
Status: COMPLETED | OUTPATIENT
Start: 2019-01-03 | End: 2019-01-03

## 2019-01-03 RX ORDER — FUROSEMIDE 10 MG/ML
40 INJECTION INTRAMUSCULAR; INTRAVENOUS 2 TIMES DAILY
Status: DISCONTINUED | OUTPATIENT
Start: 2019-01-03 | End: 2019-01-07

## 2019-01-03 RX ORDER — CHOLECALCIFEROL (VITAMIN D3) 125 MCG
100 CAPSULE ORAL DAILY
Status: DISCONTINUED | OUTPATIENT
Start: 2019-01-04 | End: 2019-01-16 | Stop reason: HOSPADM

## 2019-01-03 RX ORDER — ASCORBIC ACID 500 MG
1000 TABLET ORAL DAILY
Status: DISCONTINUED | OUTPATIENT
Start: 2019-01-04 | End: 2019-01-16 | Stop reason: HOSPADM

## 2019-01-03 RX ORDER — HEPARIN SODIUM 1000 [USP'U]/ML
5000 INJECTION, SOLUTION INTRAVENOUS; SUBCUTANEOUS AS NEEDED
Status: DISCONTINUED | OUTPATIENT
Start: 2019-01-03 | End: 2019-01-03

## 2019-01-03 RX ORDER — CEFAZOLIN SODIUM 2 G/50ML
2000 SOLUTION INTRAVENOUS EVERY 8 HOURS
Status: DISCONTINUED | OUTPATIENT
Start: 2019-01-03 | End: 2019-01-04

## 2019-01-03 RX ORDER — ONDANSETRON 2 MG/ML
4 INJECTION INTRAMUSCULAR; INTRAVENOUS EVERY 6 HOURS PRN
Status: DISCONTINUED | OUTPATIENT
Start: 2019-01-03 | End: 2019-01-16 | Stop reason: HOSPADM

## 2019-01-03 RX ORDER — GUAIFENESIN 600 MG
1200 TABLET, EXTENDED RELEASE 12 HR ORAL EVERY 12 HOURS PRN
COMMUNITY
End: 2019-04-09 | Stop reason: ALTCHOICE

## 2019-01-03 RX ORDER — HEPARIN SODIUM 10000 [USP'U]/100ML
3-30 INJECTION, SOLUTION INTRAVENOUS
Status: DISCONTINUED | OUTPATIENT
Start: 2019-01-03 | End: 2019-01-03

## 2019-01-03 RX ORDER — HEPARIN SODIUM 1000 [USP'U]/ML
10000 INJECTION, SOLUTION INTRAVENOUS; SUBCUTANEOUS AS NEEDED
Status: DISCONTINUED | OUTPATIENT
Start: 2019-01-03 | End: 2019-01-03

## 2019-01-03 RX ORDER — POLYETHYLENE GLYCOL 3350 17 G/17G
17 POWDER, FOR SOLUTION ORAL DAILY PRN
Status: DISCONTINUED | OUTPATIENT
Start: 2019-01-03 | End: 2019-01-16 | Stop reason: HOSPADM

## 2019-01-03 RX ORDER — ACETAMINOPHEN 325 MG/1
650 TABLET ORAL EVERY 4 HOURS PRN
Status: DISCONTINUED | OUTPATIENT
Start: 2019-01-03 | End: 2019-01-16 | Stop reason: HOSPADM

## 2019-01-03 RX ORDER — SILDENAFIL 100 MG/1
100 TABLET, FILM COATED ORAL DAILY PRN
COMMUNITY
End: 2020-03-10 | Stop reason: HOSPADM

## 2019-01-03 RX ORDER — CHOLECALCIFEROL (VITAMIN D3) 125 MCG
1000 CAPSULE ORAL DAILY
Status: DISCONTINUED | OUTPATIENT
Start: 2019-01-04 | End: 2019-01-16 | Stop reason: HOSPADM

## 2019-01-03 RX ADMIN — LISINOPRIL 20 MG: 20 TABLET ORAL at 09:23

## 2019-01-03 RX ADMIN — POTASSIUM CHLORIDE 40 MEQ: 1500 TABLET, EXTENDED RELEASE ORAL at 23:45

## 2019-01-03 RX ADMIN — FLUTICASONE PROPIONATE 1 SPRAY: 50 SPRAY, METERED NASAL at 09:23

## 2019-01-03 RX ADMIN — FUROSEMIDE 40 MG: 10 INJECTION, SOLUTION INTRAMUSCULAR; INTRAVENOUS at 09:23

## 2019-01-03 RX ADMIN — CEFAZOLIN SODIUM 2000 MG: 2 SOLUTION INTRAVENOUS at 09:23

## 2019-01-03 RX ADMIN — HEPARIN SODIUM 10000 UNITS: 1000 INJECTION, SOLUTION INTRAVENOUS; SUBCUTANEOUS at 01:58

## 2019-01-03 RX ADMIN — HYDROCODONE BITARTRATE AND ACETAMINOPHEN 2 TABLET: 5; 325 TABLET ORAL at 21:52

## 2019-01-03 RX ADMIN — FUROSEMIDE 40 MG: 10 INJECTION, SOLUTION INTRAMUSCULAR; INTRAVENOUS at 17:07

## 2019-01-03 RX ADMIN — HEPARIN SODIUM AND DEXTROSE 18 UNITS/KG/HR: 10000; 5 INJECTION INTRAVENOUS at 01:56

## 2019-01-03 RX ADMIN — HEPARIN SODIUM AND DEXTROSE 15 UNITS/KG/HR: 10000; 5 INJECTION INTRAVENOUS at 14:24

## 2019-01-03 RX ADMIN — CEFAZOLIN SODIUM 2000 MG: 2 SOLUTION INTRAVENOUS at 01:57

## 2019-01-03 RX ADMIN — VITAMIN E CAP 400 UNIT 400 UNITS: 400 CAP at 09:23

## 2019-01-03 RX ADMIN — LOSARTAN POTASSIUM 50 MG: 50 TABLET, FILM COATED ORAL at 09:23

## 2019-01-03 RX ADMIN — CEFAZOLIN SODIUM 2000 MG: 2 SOLUTION INTRAVENOUS at 16:26

## 2019-01-03 RX ADMIN — APIXABAN 5 MG: 5 TABLET, FILM COATED ORAL at 17:07

## 2019-01-03 RX ADMIN — VITAMIN D, TAB 1000IU (100/BT) 1000 UNITS: 25 TAB at 09:23

## 2019-01-03 RX ADMIN — DILTIAZEM HYDROCHLORIDE 180 MG: 180 CAPSULE, COATED, EXTENDED RELEASE ORAL at 09:23

## 2019-01-03 RX ADMIN — PANTOPRAZOLE SODIUM 40 MG: 40 TABLET, DELAYED RELEASE ORAL at 05:35

## 2019-01-03 RX ADMIN — POTASSIUM CHLORIDE 10 MEQ: 750 TABLET, EXTENDED RELEASE ORAL at 09:23

## 2019-01-03 NOTE — ASSESSMENT & PLAN NOTE
· Continue IV Ancef  · Moderate for leukocytosis or febrile episodes  · Wound care  · Follow-up on culture result

## 2019-01-03 NOTE — ED NOTES
Telemetry Unit not able to accept pt until 2300, pt and family aware        Erica Valencia, RN  01/02/19 5960

## 2019-01-03 NOTE — ED NOTES
Pt pulled med bottle out and took 1 of his Vicodin tabs from home, discussed with Dr Hilario Beauchamp, RN  01/02/19 4993

## 2019-01-03 NOTE — ASSESSMENT & PLAN NOTE
BNP elevated to 5876  CXR shows vascular congestion with bilateral pleural effusions  Pt has bilateral LE edema  L>R  Patient had 20 lb weight gain over the last several months  Patient takes Zaroxolyn 2 5 mg and Lasix 40 mg on Mondays Wednesdays and Fridays  Will hold Zaroxolyn for now and increase Lasix to 40 mg IV b i d   Monitor I/O and daily weights  Echocardiogram   Monitor on telemetry  Patient has cardiologist but has not been seen for several months  Will place inpatient consult Cardiology

## 2019-01-03 NOTE — ASSESSMENT & PLAN NOTE
Troponin elevated at 0 14  Most likely due to acute heart failure and atrial fibrillation  Will obtain serial troponins  Monitor on telemetry  Obtain EKG with chest pain or change in rhythm

## 2019-01-03 NOTE — ASSESSMENT & PLAN NOTE
Left shin with open area draining small amount of serous drainage  Red swollen and warm to touch  Will start Ancef 2 g IV q 8 hours  Will place inpatient consult to wound care  For now will keep wound covered with clean dry dressing

## 2019-01-03 NOTE — ASSESSMENT & PLAN NOTE
· Cycling cardiac enzymes  · Echocardiogram  · Telemetry monitoring  · Await Cardiology evaluation and recommendations

## 2019-01-03 NOTE — ASSESSMENT & PLAN NOTE
Pt tachypneic with RR of 24  Most likely due to acute CHF  Administer O2 as needed to keep SPO2> 92%

## 2019-01-03 NOTE — ASSESSMENT & PLAN NOTE
· Unknown type, follow-up at transthoracic echocardiogram results  · Treating with IV diuresis Lasix 40 mg b i d   · 2 g daily sodium restricted diet strict ins and outs daily weights

## 2019-01-03 NOTE — ASSESSMENT & PLAN NOTE
Rate currently controlled in the 70s  Monitor on telemetry  Patient anticoagulated with Eliquis  Will hold Eliquis for now while patient is on heparin drip  Will hold digoxin for now due to acute kidney injury  Continue diltiazem  mg p o  Daily

## 2019-01-03 NOTE — ASSESSMENT & PLAN NOTE
Creatinine 1 47 in the ED  Up from 1 07 two years ago  Will continue diuretics for volume overload  Monitor urine output  Recheck kidney function in a m  Rachel Christian

## 2019-01-03 NOTE — ASSESSMENT & PLAN NOTE
· Continue Cozaar 50 mg lisinopril 20 mg (see home dosing)  · Continuing Cardizem 180 mg daily  · Moderate blood pressure with routine vitals

## 2019-01-03 NOTE — PLAN OF CARE
Problem: Potential for Falls  Goal: Patient will remain free of falls  INTERVENTIONS:  - Assess patient frequently for physical needs  -  Identify cognitive and physical deficits and behaviors that affect risk of falls    -  [place] fall precautions as indicated by assessment   - Educate patient/family on patient safety including physical limitations  - Instruct patient to call for assistance with activity based on assessment  - Modify environment to reduce risk of injury  - Consider OT/PT consult to assist with strengthening/mobility    Outcome: Progressing      Problem: PAIN - ADULT  Goal: Verbalizes/displays adequate comfort level or baseline comfort level  Interventions:  - Encourage patient to monitor pain and request assistance  - Assess pain using appropriate pain scale  - Administer analgesics based on type and severity of pain and evaluate response  - Implement non-pharmacological measures as appropriate and evaluate response  - Consider cultural and social influences on pain and pain management  - Notify physician/advanced practitioner if interventions unsuccessful or patient reports new pain   Outcome: Progressing      Problem: INFECTION - ADULT  Goal: Absence or prevention of progression during hospitalization  INTERVENTIONS:  - Assess and monitor for signs and symptoms of infection  - Monitor lab/diagnostic results  - Monitor all insertion sites, i e  indwelling lines, tubes, and drains  - Monitor endotracheal (as able) and nasal secretions for changes in amount and color  - [place] appropriate cooling/warming therapies per order  - Administer medications as ordered  - Instruct and encourage patient and family to use good hand hygiene technique  - Identify and instruct in appropriate isolation precautions for identified infection/condition   Outcome: Progressing    Goal: Absence of fever/infection during neutropenic period  INTERVENTIONS:  - Monitor WBC  - Implement neutropenic guidelines   Outcome: Progressing      Problem: SAFETY ADULT  Goal: Maintain or return to baseline ADL function  INTERVENTIONS:  -  Assess patient's ability to carry out ADLs; assess patient's baseline for ADL function and identify physical deficits which impact ability to perform ADLs (bathing, care of mouth/teeth, toileting, grooming, dressing, etc )  - Assess/evaluate cause of self-care deficits   - Assess range of motion  - Assess patient's mobility; develop plan if impaired  - Assess patient's need for assistive devices and provide as appropriate  - Encourage maximum independence but intervene and supervise when necessary  ¯ Involve family in performance of ADLs  ¯ Assess for home care needs following discharge   ¯ Request OT consult to assist with ADL evaluation and planning for discharge  ¯ Provide patient education as appropriate   Outcome: Progressing    Goal: Maintain or return mobility status to optimal level  INTERVENTIONS:  - Assess patient's baseline mobility status (ambulation, transfers, stairs, etc )    - Identify cognitive and physical deficits and behaviors that affect mobility  - Identify mobility aids required to assist with transfers and/or ambulation (gait belt, sit-to-stand, lift, walker, cane, etc )  - [place] fall precautions as indicated by assessment  - Record patient progress and toleration of activity level on Mobility SBAR; progress patient to next Phase/Stage  - Instruct patient to call for assistance with activity based on assessment  - Request Rehabilitation consult to assist with strengthening/weightbearing, etc    Outcome: Progressing    Goal: Patient will remain free of falls  INTERVENTIONS:  - Assess patient frequently for physical needs  -  Identify cognitive and physical deficits and behaviors that affect risk of falls    -  [place] fall precautions as indicated by assessment   - Educate patient/family on patient safety including physical limitations  - Instruct patient to call for assistance with activity based on assessment  - Modify environment to reduce risk of injury  - Consider OT/PT consult to assist with strengthening/mobility    Outcome: Progressing      Problem: DISCHARGE PLANNING  Goal: Discharge to home or other facility with appropriate resources  INTERVENTIONS:  - Identify barriers to discharge w/patient and caregiver  - Arrange for needed discharge resources and transportation as appropriate  - Identify discharge learning needs (meds, wound care, etc )  - Arrange for interpretive services to assist at discharge as needed  - Refer to Case Management Department for coordinating discharge planning if the patient needs post-hospital services based on physician/advanced practitioner order or complex needs related to functional status, cognitive ability, or social support system   Outcome: Progressing      Problem: Knowledge Deficit  Goal: Patient/family/caregiver demonstrates understanding of disease process, treatment plan, medications, and discharge instructions  Complete learning assessment and assess knowledge base  Interventions:  - Provide teaching at level of understanding  - Provide teaching via preferred learning methods   Outcome: Progressing      Problem: CARDIOVASCULAR - ADULT  Goal: Maintains optimal cardiac output and hemodynamic stability  INTERVENTIONS:  - Monitor I/O, vital signs and rhythm  - Monitor for S/S and trends of decreased cardiac output i e  bleeding, hypotension  - Administer and titrate ordered vasoactive medications to optimize hemodynamic stability  - Assess quality of pulses, skin color and temperature  - Assess for signs of decreased coronary artery perfusion - ex   Angina  - Instruct patient to report change in severity of symptoms  Outcome: Progressing    Goal: Absence of cardiac dysrhythmias or at baseline rhythm  INTERVENTIONS:  - Continuous cardiac monitoring, monitor vital signs, obtain 12 lead EKG if indicated  - Administer antiarrhythmic and heart rate control medications as ordered  - Monitor electrolytes and administer replacement therapy as ordered  Outcome: Progressing      Problem: RESPIRATORY - ADULT  Goal: Achieves optimal ventilation and oxygenation  INTERVENTIONS:  - Assess for changes in respiratory status  - Assess for changes in mentation and behavior  - Position to facilitate oxygenation and minimize respiratory effort  - Oxygen administration by appropriate delivery method based on oxygen saturation (per order) or ABGs  - Initiate smoking cessation education as indicated  - Encourage broncho-pulmonary hygiene including cough, deep breathe, Incentive Spirometry  - Assess the need for suctioning and aspirate as needed  - Assess and instruct to report SOB or any respiratory difficulty  - Respiratory Therapy support as indicated  Outcome: Progressing      Problem: METABOLIC, FLUID AND ELECTROLYTES - ADULT  Goal: Electrolytes maintained within normal limits  INTERVENTIONS:  - Monitor labs and assess patient for signs and symptoms of electrolyte imbalances  - Administer electrolyte replacement as ordered  - Monitor response to electrolyte replacements, including repeat lab results as appropriate  - Instruct patient on fluid and nutrition as appropriate  Outcome: Progressing    Goal: Fluid balance maintained  INTERVENTIONS:  - Monitor labs and assess for signs and symptoms of volume excess or deficit  - Monitor I/O and WT  - Instruct patient on fluid and nutrition as appropriate  Outcome: Progressing      Problem: SKIN/TISSUE INTEGRITY - ADULT  Goal: Skin integrity remains intact  INTERVENTIONS  - Identify patients at risk for skin breakdown  - Assess and monitor skin integrity  - Assess and monitor nutrition and hydration status  - Monitor labs (i e  albumin)  - Assess for incontinence   - Turn and reposition patient  - Assist with mobility/ambulation  - Relieve pressure over bony prominences  - Avoid friction and shearing  - Provide appropriate hygiene as needed including keeping skin clean and dry  - Evaluate need for skin moisturizer/barrier cream  - Collaborate with interdisciplinary team (i e  Nutrition, Rehabilitation, etc )   - Patient/family teaching  Outcome: Progressing    Goal: Incision(s), wounds(s) or drain site(s) healing without S/S of infection  INTERVENTIONS  - Assess and document risk factors for skin impairment   - Assess and document dressing, incision, wound bed, drain sites and surrounding tissue  - Initiate Nutrition services consult and/or wound management as needed  Outcome: Progressing      Problem: MUSCULOSKELETAL - ADULT  Goal: Maintain or return mobility to safest level of function  INTERVENTIONS:  - Assess patient's ability to carry out ADLs; assess patient's baseline for ADL function and identify physical deficits which impact ability to perform ADLs (bathing, care of mouth/teeth, toileting, grooming, dressing, etc )  - Assess/evaluate cause of self-care deficits   - Assess range of motion  - Assess patient's mobility; develop plan if impaired  - Assess patient's need for assistive devices and provide as appropriate  - Encourage maximum independence but intervene and supervise when necessary  - Involve family in performance of ADLs  - Assess for home care needs following discharge   - Request OT consult to assist with ADL evaluation and planning for discharge  - Provide patient education as appropriate  Outcome: Progressing

## 2019-01-03 NOTE — PROGRESS NOTES
Progress Note - Linwood Mazariegos 1945, 68 y o  male MRN: [id number]    Unit/Bed#: [address] [place] [address] Encounter: [id number]    Primary Care Provider: Nya Mandel PA-C   Date and time admitted to hospital: 1/2/2019  6:35 PM        * Pulmonary embolism ([facility] )   Assessment & Plan    · Lower extremity DVT visualized, likely provoked secondary to immobility  · Continue heparin drip for now, patient is reporting compliance with his Eliquis  At this point I would consider to failure of Eliquis and and will continue the heparin drip for treatment we would normally transition to Coumadin but the patient has a history of severe epistaxis on Coumadin as a result I will ask Hematology to evaluate the patient make the recommendation for anticoagulation in this setting  · O2 sat monitoring O2 by nasal cannula p r n  To maintain sat 92% or greater by pulse oximetry  · Transthoracic echocardiogram to evaluate for possible right heart strain  · Bed rest  · Supportive care     Cellulitis of left lower extremity   Assessment & Plan    · Continue IV Ancef  · Moderate for leukocytosis or febrile episodes  · Wound care  · Follow-up on culture result       Acute heart failure (HCC)   Assessment & Plan    · Unknown type, follow-up at transthoracic echocardiogram results  · Treating with IV diuresis Lasix 40 mg b i d   · 2 g daily sodium restricted diet strict ins and outs daily weights     Elevated troponin   Assessment & Plan    · Cycling cardiac enzymes  · Echocardiogram  · Telemetry monitoring  · Await Cardiology evaluation and recommendations     Atrial fibrillation ([facility] )   Assessment & Plan    · Patient's digoxin listed among home meds but his digit level was less than 0 2 on arrival  · Is likely the patient is not taking this and even though it is listed as result we will hold for now waiting Cardiology evaluation  · Continue Cardizem  80 mg p o   Q day for rate control  · Continue Eliquis for anticoagulation  · Currently rate controlled continue to monitor     Hypertension   Assessment & Plan    · Continue Cozaar 50 mg lisinopril 20 mg (see home dosing)  · Continuing Cardizem 180 mg daily  · Moderate blood pressure with routine vitals       VTE Pharmacologic Prophylaxis:   Pharmacologic: Heparin Drip  Mechanical VTE Prophylaxis in Place: Yes    Patient Centered Rounds: I have performed bedside rounds with nursing staff today  Discussions with Specialists or Other Care Team Provider:      Education and Discussions with Family / Patient:  Discussed with patient at bedside    Time Spent for Care: 30 minutes  More than 50% of total time spent on counseling and coordination of care as described above  Current Length of Stay: 1 day(s)    Current Patient Status: Inpatient   Certification Statement: The patient will continue to require additional inpatient hospital stay due to DVT PE    Discharge Plan:      Code Status: Level 1 - Full Code      Subjective:   No acute events overnight patient still reports shortness of breath  His current denies chest pain nausea vomiting diaphoresis, bowel disturbance  Objective:     Vitals:   Temp (24hrs), Av 4 °F (36 3 °C), Min:97 3 °F (36 3 °C), Max:97 4 °F (36 3 °C)    Temp:  [97 3 °F (36 3 °C)-97 4 °F (36 3 °C)] 97 4 °F (36 3 °C)  HR:  [50-86] 74  Resp:  [18-24] 18  BP: (118-134)/(60-95) 127/60  SpO2:  [91 %-96 %] 96 %  Body mass index is 37 57 kg/m²  Input and Output Summary (last 24 hours): Intake/Output Summary (Last 24 hours) at 19 1238  Last data filed at 19 1107   Gross per 24 hour   Intake                0 ml   Output             2600 ml   Net            -2600 ml       Physical Exam:     Physical Exam   Constitutional: He is oriented to person, place, and time  He appears well-developed and well-nourished  No distress  HENT:   Head: Normocephalic and atraumatic     Right Ear: External ear normal    Left Ear: External ear normal    Mouth/Throat: Oropharynx is clear and moist  No oropharyngeal exudate  Eyes: Pupils are equal, round, and reactive to light  Conjunctivae and EOM are normal  Right eye exhibits no discharge  Left eye exhibits no discharge  No scleral icterus  Neck: Normal range of motion  Neck supple  No JVD present  No tracheal deviation present  No thyromegaly present  Cardiovascular: Normal rate, regular rhythm, normal heart sounds and intact distal pulses  Exam reveals no gallop and no friction rub  No murmur heard  Pulmonary/Chest: Breath sounds normal  No stridor  No respiratory distress  He has no wheezes  He has no rales  He exhibits no tenderness  Abdominal: Soft  Bowel sounds are normal  He exhibits no distension and no mass  There is no tenderness  There is no rebound and no guarding  Musculoskeletal: Normal range of motion  He exhibits no edema, tenderness or deformity  Lymphadenopathy:     He has no cervical adenopathy  Neurological: He is alert and oriented to person, place, and time  He has normal reflexes  He displays no atrophy, no tremor and normal reflexes  No cranial nerve deficit  He exhibits normal muscle tone  He displays no seizure activity  Coordination normal    Skin: Skin is warm and dry  No rash noted  He is not diaphoretic  No erythema  No pallor  Psychiatric: He has a normal mood and affect  His behavior is normal  Judgment and thought content normal    Nursing note and vitals reviewed        Additional Data:     Labs:      Results from last 7 days  Lab Units 01/03/19  0814   WBC Thousand/uL 6 08   HEMOGLOBIN g/dL 14 1   HEMATOCRIT % 43 1   PLATELETS Thousands/uL 119*   NEUTROS PCT % 58   LYMPHS PCT % 24   MONOS PCT % 13*   EOS PCT % 4       Results from last 7 days  Lab Units 01/03/19  0814   SODIUM mmol/L 139   POTASSIUM mmol/L 3 2*   CHLORIDE mmol/L 102   CO2 mmol/L 25   BUN mg/dL 31*   CREATININE mg/dL 1 26   ANION GAP mmol/L 12   CALCIUM mg/dL 9 0   GLUCOSE RANDOM mg/dL 84       Results from last 7 days  Lab Units 01/03/19  0814   INR  2 27*                       * I Have Reviewed All Lab Data Listed Above  * Additional Pertinent Lab Tests Reviewed:  [facility] 67 Admission Reviewed    Imaging:    Imaging Reports Reviewed Today Include: chest xray  Imaging Personally Reviewed by Myself Includes:       Recent Cultures (last 7 days):       Results from last 7 days  Lab Units 01/02/19  1925   GRAM STAIN RESULT  No polys seen  No organisms seen       Last 24 Hours Medication List:     Current Facility-Administered Medications:  acetaminophen 650 mg Oral Q4H PRN NAVNEET Lewis    cefazolin 2,000 mg Intravenous Q8H MarKRISTY WhitesideNP Last Rate: 2,000 mg (01/03/19 7520)   cholecalciferol 1,000 Units Oral Daily Marnalini Nguyễn, CRNP    cyclobenzaprine 10 mg Oral TID PRN Forrestine American, CRNP    diltiazem 180 mg Oral Daily Marnalini Nguyễn, CRNP    fluticasone 1 spray Nasal Daily MarNAVNEET Whiteside    furosemide 40 mg Intravenous BID MarNAVNEET Whiteside    heparin (porcine) 3-30 Units/kg/hr (Order-Specific) Intravenous Titrated Adrian Villeda MD Last Rate: 15 Units/kg/hr (01/03/19 1004)   heparin (porcine) 10,000 Units Intravenous PRN Adrian Villeda MD    heparin (porcine) 5,000 Units Intravenous PRN Adrian Villeda MD    HYDROcodone-acetaminophen 2 tablet Oral Q6H PRN NAVNEET Lewis    influenza vaccine 0 5 mL Intramuscular Prior to discharge Adrian Villeda MD    lisinopril 20 mg Oral Daily Mar Macritchie, CRNP    losartan 50 mg Oral Daily Mar Macritchie, CRNP    ondansetron 4 mg Intravenous Q6H PRN Mar Macrijennifer, CRNP    pantoprazole 40 mg Oral Early Morning Mar Macrisvitlanaie, CRNP    potassium chloride 10 mEq Oral Daily Mar Macritchie, CRNP    sodium chloride 1 spray Each Nare PRN Forrestine American, CRNP    vitamin E (tocopherol) 400 Units Oral Daily Forrestine KRISTY SeverinoNP         Today, Patient Was Seen By: Darion Garnett MD    ** Please Note: Dictation voice to text software may have been used in the creation of this document   **

## 2019-01-03 NOTE — CONSULTS
Consultation - Cardiology   Linwood Mazariegos 68 y o  male MRN: [id number]  Unit/Bed#: [address] [place] [address] [id number] Encounter: [id number]      Assessment:  Principal Problem:    Pulmonary embolism ([facility] )  Active Problems:    Obesity    Hypertension    Atrial fibrillation ([facility] )    SOB (shortness of breath)    Elevated d-dimer    Acute kidney injury ([facility] )    Elevated troponin    Acute heart failure (HCC)    Cellulitis of left lower extremity      Plan:    1  Acute on chronic congestive heart failure - Mr Torsten Ramirez subjectively is up 20 lb over the last few months, showing signs of volume overload and significant lower extremity edema  Chest x-ray showing vascular congestion as well  Continue IV Lasix as ordered and then we will determine outpatient dosing of his diuretic regimen once he is more euvolemic  He will benefit from switching to torsemide taking this daily, with intermittent metolazone  2  Chronic atrial fibrillation - Heart rates of anything on the low side at times  PVCs also noted  At this point leave him off of digoxin and we will continue diltiazem  Possibly change over to a beta-blocker, but we will follow telemetry over the next 24 hours  On Eliquis for stroke prevention  3  Acute respiratory failure - V/Q scan shows low probability of pulmonary embolism  DVT seen and left lower extremity, likely chronic  Suggest just switching him back to Eliquis from heparin  4  Hypertension - Controlled on his current regimen  No changes made otherwise  History of Present Illness   Physician Requesting Consult: Chaz Elias MD  Reason for Consult / Principal Problem: CHF and AF    HPI: Beatriz Dodd is a 68y o  year old male who carries a history of persistent/chronic atrial fibrillation, chronic congestive heart failure, hypertension and chronic lower extremity lymphedema comes in with worsening shortness of breath, weight gain and signs of volume overload    He states the symptoms started about a week or 10 days ago  He thought he was getting congested in developing a upper respiratory infection  These progressed over the last week, where he came in for evaluation  He states he is up in weight about 20 lb over the last 2 months  He also was having a cough with sputum production  He has known chronic lower extremity edema, worse on the left from a prior injury  However both lower extremities were increasing in size, particularly the left  He was also showing cellulitic changes  He was admitted and started on IV diuretics and antibiotics  Because of the unilateral edema he also was worked up for a pulmonary embolism  His Eliquis was changed to IV heparin  Lower extremity duplex does show left lower thrombus, that was reported as likely chronic  V/Q scan shows low probability of pulmonary emboli  ECG shows atrial fibrillation with aberrancy and PVCs, with telemetry showing slow ventricular response at times  Chest x-ray showing pleural effusions and mild CHF  Mr Urban Patel states he does feel better  His breathing has improved  He is off oxygen  He denies chest pain or any symptoms of angina  He does not feel is atrial fibrillation  He denies orthopnea or PND  He denies lightheadedness or any recent syncope  He follows with a cardiologist at of [address] [place] [address], and diuretic regimen at home includes Lasix 40 mg daily with metolazone 3 days a week  Consults    Review of Systems:  Please refer to the HPI for all cardiac and pulmonary review of systems  Other than orthopedic and arthritic type complaints particularly of his hand and legs, all other 10 systems were reviewed and are negative      Historical Information   Past Medical History:   Diagnosis Date    Atrial fibrillation ([facility] )     Cardiac disease     Cellulitis     Chronic pain     Chronic venous hypertension     with ulceration    GERD (gastroesophageal reflux disease)     Hyperlipidemia     Hypertension     Obesity     [facility]     PVD (peripheral vascular disease) ([facility] )     Vascular disorder of extremity ([facility] )      Past Surgical History:   Procedure Laterality Date    ANTERIOR RELEASE VERTEBRAL BODY W/ POSTERIOR FUSION      APPENDECTOMY  1954    CATARACT EXTRACTION      DECOMPRESSION SPINE LUMBAR POSTERIOR      ELBOW SURGERY      KNEE SURGERY      NOSE SURGERY      turbinectomy    RETINAL DETACHMENT SURGERY      RHINOPLASTY      TONSILLECTOMY      VEIN LIGATION AND STRIPPING      saphenous vein long     History   Alcohol Use No     History   Drug Use No     History   Smoking Status    Former Smoker   Smokeless Tobacco    Never Used     Family History: non-contributory    Meds/Allergies   all current active meds have been reviewed  No Known Allergies      Current Facility-Administered Medications:     acetaminophen (TYLENOL) tablet 650 mg, 650 mg, Oral, Q4H PRN, Elveria , CRNP    ceFAZolin (ANCEF) IVPB (premix) 2,000 mg, 2,000 mg, Intravenous, Q8H, NAVNEET Martinez, Last Rate: 100 mL/hr at 01/03/19 0923, 2,000 mg at 01/03/19 6517    cholecalciferol (VITAMIN D3) tablet 1,000 Units, 1,000 Units, Oral, Daily, ElKRISTY PaintingNP, 1,000 Units at 01/03/19 4663    cyclobenzaprine (FLEXERIL) tablet 10 mg, 10 mg, Oral, TID PRN, Elbrockia , CRNP    diltiazem (CARDIZEM CD) 24 hr capsule 180 mg, 180 mg, Oral, Daily, Mar Macrijennifer, CRNP, 180 mg at 01/03/19 0923    fluticasone (FLONASE) 50 mcg/act nasal spray 1 spray, 1 spray, Nasal, Daily, MarKRISTY WhitesideNP, 1 spray at 01/03/19 0923    furosemide (LASIX) injection 40 mg, 40 mg, Intravenous, BID, Mar Agustinrijennifer CRNP, 40 mg at 01/03/19 0923    heparin (porcine) 25,000 units in 250 mL infusion (premix), 3-30 Units/kg/hr (Order-Specific), Intravenous, Titrated, Alan Evangelista MD, Last Rate: 18 8 mL/hr at 01/03/19 1424, 15 Units/kg/hr at 01/03/19 1424    heparin (porcine) injection 10,000 Units, 10,000 Units, Intravenous, PRN, Brad Salgado MD, 10,000 Units at 01/03/19 0158    heparin (porcine) injection 5,000 Units, 5,000 Units, Intravenous, PRN, Brad Salgado MD    HYDROcodone-acetaminophen (NORCO) 5-325 mg per tablet 2 tablet, 2 tablet, Oral, Q6H PRN, Carmelita Clayton, CRNP    influenza vaccine, high-dose (FLUZONE HIGH-DOSE) IM injection RITU 0 5 mL, 0 5 mL, Intramuscular, Prior to discharge, Brad Salgado MD    lisinopril (ZESTRIL) tablet 20 mg, 20 mg, Oral, Daily, Mar Macpeggy CRNP, 20 mg at 01/03/19 2529    losartan (COZAAR) tablet 50 mg, 50 mg, Oral, Daily, Mar Macrijennifer, CRNP, 50 mg at 01/03/19 0923    ondansetron (ZOFRAN) injection 4 mg, 4 mg, Intravenous, Q6H PRN, KRISTY GillNP    pantoprazole (PROTONIX) EC tablet 40 mg, 40 mg, Oral, Early Morning, Mar Macrijennifer, CRNP, 40 mg at 01/03/19 0535    potassium chloride (K-DUR,KLOR-CON) CR tablet 10 mEq, 10 mEq, Oral, Daily, Mar Macrijennifer, CRNP, 10 mEq at 01/03/19 1361    sodium chloride (OCEAN) 0 65 % nasal spray 1 spray, 1 spray, Each Nare, PRN, Carmelita Forrest, CRNP    vitamin E (tocopherol) capsule 400 Units, 400 Units, Oral, Daily, MarNAVNEET Whiteside, 400 Units at 01/03/19 8224    Objective   Vitals: Blood pressure 127/60, pulse 74, temperature (!) 97 4 °F (36 3 °C), temperature source Oral, resp  rate 18, height 6' 4" (1 93 m), weight (!) 140 kg (308 lb 10 3 oz), SpO2 96 %  , Body mass index is 37 57 kg/m² , Orthostatic Blood Pressures      Most Recent Value   Blood Pressure  127/60 filed at 01/03/2019 0804   Patient Position - Orthostatic VS  Lying filed at 01/03/2019 0804            Intake/Output Summary (Last 24 hours) at 01/03/19 1518  Last data filed at 01/03/19 1107   Gross per 24 hour   Intake                0 ml   Output             2600 ml   Net            -2600 ml       Physical Exam:  GEN: Karli Bill appears well, alert and oriented x 3, pleasant and cooperative   HEENT: pupils equal, round, and reactive to light; extraocular muscles intact  NECK: supple, no carotid bruits  + JVD  HEART: irregular rhythm, normal S1 and S2, no significant murmurs, clicks, gallops or rubs   LUNGS:  Diminished bilaterally; no wheezes, rales, or rhonchi   ABDOMEN: normal bowel sounds, soft, no tenderness, no distention  EXTREMITIES: peripheral pulses diminished; no clubbing, cyanosis  +++ edema left greater than right with venous stasis changes and cellulitic changes noted bilaterally    NEURO: no focal findings   SKIN: normal without suspicious lesions on exposed skin    Lab Results:   Admission on 01/02/2019   Component Date Value    Sodium 01/02/2019 139     Potassium 01/02/2019 3 8     Chloride 01/02/2019 100     CO2 01/02/2019 27     ANION GAP 01/02/2019 12     BUN 01/02/2019 32*    Creatinine 01/02/2019 1 47*    Glucose 01/02/2019 112     Calcium 01/02/2019 9 3     eGFR 01/02/2019 47     WBC 01/02/2019 6 90     RBC 01/02/2019 4 81     Hemoglobin 01/02/2019 15 1     Hematocrit 01/02/2019 46 2     MCV 01/02/2019 96     MCH 01/02/2019 31 4     MCHC 01/02/2019 32 7     RDW 01/02/2019 15 6*    MPV 01/02/2019 11 5     Platelets 20/36/5866 159     nRBC 01/02/2019 0     Neutrophils Relative 01/02/2019 74     Immat GRANS % 01/02/2019 1     Lymphocytes Relative 01/02/2019 16     Monocytes Relative 01/02/2019 7     Eosinophils Relative 01/02/2019 1     Basophils Relative 01/02/2019 1     Neutrophils Absolute 01/02/2019 5 15     Immature Grans Absolute 01/02/2019 0 05     Lymphocytes Absolute 01/02/2019 1 10     Monocytes Absolute 01/02/2019 0 51     Eosinophils Absolute 01/02/2019 0 05     Basophils Absolute 01/02/2019 0 04     Protime 01/02/2019 22 0*    INR 01/02/2019 2 05*    PTT 01/02/2019 38     Troponin I 01/02/2019 0 14*    NT-proBNP 01/02/2019 5876*    Digoxin Lvl 01/02/2019 <0 2*    D-Dimer, Quant 01/02/2019 2008*    Gram Stain Result 01/02/2019 No polys seen     Gram Stain Result 01/02/2019 No organisms seen     Troponin I 01/02/2019 0 15*    PTT 01/03/2019 38     WBC 01/03/2019 7 05     RBC 01/03/2019 4 63     Hemoglobin 01/03/2019 14 7     Hematocrit 01/03/2019 44 7     MCV 01/03/2019 97     MCH 01/03/2019 31 7     MCHC 01/03/2019 32 9     RDW 01/03/2019 15 8*    Platelets 78/83/3032 165     MPV 01/03/2019 12 1     Protime 01/03/2019 20 7*    INR 01/03/2019 1 90*    PTT 01/03/2019 >210*    Protime 01/03/2019 23 8*    INR 01/03/2019 2 27*    Troponin I 01/03/2019 0 17*    WBC 01/03/2019 6 08     RBC 01/03/2019 4 46     Hemoglobin 01/03/2019 14 1     Hematocrit 01/03/2019 43 1     MCV 01/03/2019 97     MCH 01/03/2019 31 6     MCHC 01/03/2019 32 7     RDW 01/03/2019 15 6*    MPV 01/03/2019 11 8     Platelets 46/65/6779 119*    nRBC 01/03/2019 0     Neutrophils Relative 01/03/2019 58     Immat GRANS % 01/03/2019 0     Lymphocytes Relative 01/03/2019 24     Monocytes Relative 01/03/2019 13*    Eosinophils Relative 01/03/2019 4     Basophils Relative 01/03/2019 1     Neutrophils Absolute 01/03/2019 3 55     Immature Grans Absolute 01/03/2019 0 02     Lymphocytes Absolute 01/03/2019 1 44     Monocytes Absolute 01/03/2019 0 80     Eosinophils Absolute 01/03/2019 0 22     Basophils Absolute 01/03/2019 0 05     Cholesterol 01/03/2019 102     Triglycerides 01/03/2019 30     HDL, Direct 01/03/2019 41     LDL Calculated 01/03/2019 55     Sodium 01/03/2019 139     Potassium 01/03/2019 3 2*    Chloride 01/03/2019 102     CO2 01/03/2019 25     ANION GAP 01/03/2019 12     BUN 01/03/2019 31*    Creatinine 01/03/2019 1 26     Glucose 01/03/2019 84     Calcium 01/03/2019 9 0     eGFR 01/03/2019 57     Troponin I 01/03/2019 0 17*    Ventricular Rate 01/02/2019 73     Atrial Rate 01/02/2019 89     QRSD Interval 01/02/2019 98     QT Interval 01/02/2019 522     QTC Interval 01/02/2019 575     QRS Axis 01/02/2019 -63     T Wave [place] 01/02/2019 117     Ventricular Rate 01/02/2019 76  Atrial Rate 01/02/2019 375     QRSD Interval 01/02/2019 110     QT Interval 01/02/2019 446     QTC Interval 01/02/2019 501     QRS Axis 01/02/2019 -52     T Wave Axis 01/02/2019 74     Ventricular Rate 01/02/2019 76     Atrial Rate 01/02/2019 0     QRSD Interval 01/02/2019 92     QT Interval 01/02/2019 290     QTC Interval 01/02/2019 326     QRS Axis 01/02/2019 -15     T Wave Axis 01/02/2019 200      Labs & Results:      Results from last 7 days  Lab Units 01/03/19  1153 01/03/19  0814 01/02/19  2208   TROPONIN I ng/mL 0 17* 0 17* 0 15*       Results from last 7 days  Lab Units 01/03/19  0814 01/03/19  0107 01/02/19  1924   WBC Thousand/uL 6 08 7 05 6 90   HEMOGLOBIN g/dL 14 1 14 7 15 1   HEMATOCRIT % 43 1 44 7 46 2   PLATELETS Thousands/uL 119* 165 159       Results from last 7 days  Lab Units 01/03/19  0814   TRIGLYCERIDES mg/dL 30   HDL mg/dL 41       Results from last 7 days  Lab Units 01/03/19  0814 01/02/19  1924   POTASSIUM mmol/L 3 2* 3 8   CHLORIDE mmol/L 102 100   CO2 mmol/L 25 27   BUN mg/dL 31* 32*   CREATININE mg/dL 1 26 1 47*   CALCIUM mg/dL 9 0 9 3       Results from last 7 days  Lab Units 01/03/19  0814 01/03/19  0107 01/02/19  1924   INR  2 27* 1 90* 2 05*   PTT seconds >210* 38 38             Imaging: I have personally reviewed pertinent reports  Xr Chest 2 Views    Result Date: 1/2/2019  Narrative: CHEST INDICATION:   Dyspnea  COMPARISON:  6/17/2017 EXAM PERFORMED/VIEWS:  XR CHEST PA & LATERAL FINDINGS: Heart shadow is enlarged but unchanged from prior exam  Vascular congestion with bilateral pleural effusions likely on the basis of mild CHF  Osseous structures appear within normal limits for patient age  Impression: Julian you with vascular congestion and bilateral pleural effusions likely on the basis of CHF   Workstation performed: [id number]     Nm Lung Ventilation / Perfusion    Result Date: 1/3/2019  Narrative: VENTILATION AND PERFUSION SCAN INDICATION: Chest pain, acute, PE suspected, low pretest prob; SOB COMPARISON:  Chest radiograph  1/2/2019 TECHNIQUE:  Posterior ventilation imaging was performed after the inhalation of 9 0 mCi Xe-133 gas  Multiplanar perfusion imaging was next performed following the intravenous administration of 2 0 mCi Tc-99m labeled MAA  FINDINGS:  Ventilation imaging demonstrates normal distribution of radiopharmaceutical throughout both lungs with mild patchy retention at the lung bases  Perfusion imaging demonstrates nonsegmental perfusion defects centrally  There is no significant  segmental or subsegmental defect  Impression: The probability for pulmonary embolus is low  Workstation performed: [id number]     Vas Lower Limb Venous Duplex Study, Complete Bilateral    Result Date: 1/3/2019  Narrative:  [facility] REPORT CLINICAL: Indications: Edema, unspecified [R60 9]  Patient presents with bilateral lower extremity edema (left worse than right) x 1 week  Hx: B/L LE lymphedema  CONCLUSION:  Impression: RIGHT LOWER LIMB: No evidence of acute or chronic deep vein thrombosis  No evidence of superficial thrombophlebitis noted  Doppler evaluation shows a normal response to augmentation maneuvers  Popliteal, posterior tibial and anterior tibial arterial Doppler waveforms are biphasic  LEFT LOWER LIMB: Evaluation shows non-occlusive or partially recanalized thrombus in the femoral vein and the popliteal vein which is most likely chronic  No evidence of superficial thrombophlebitis noted  Doppler evaluation shows a normal response to augmentation maneuvers  Popliteal, posterior tibial and anterior tibial arterial Doppler waveforms are biphasic  Tech Note: Patient unable to tolerate compression of the left leg and veins not well visualized at the left proximal calf due to vessel depth and edema  Technically difficult study due to patient's body habitus  Technical findings given to Dr Oscar Washington on 1/3/19 @ 0900    SIGNATURE: Electronically Signed by: Lexi Medina MD on 2019-01-03 12:06:28 PM      EKG:  Atrial fibrillation with intermittent aberrancy and PVCs  Telemetry review:  Atrial fibrillation    Counseling / Coordination of Care  Total floor / unit time spent today 40 minutes  Greater than 50% of total time was spent with the patient and / or family counseling and / or coordination of care

## 2019-01-03 NOTE — UTILIZATION REVIEW
Initial Clinical Review  Admission: Date/Time/Statement: 1/2/19 @ 2135   Orders Placed This Encounter   Procedures    Inpatient Admission (expected length of stay for this patient is greater than two midnights)     Standing Status:   Standing     Number of Occurrences:   1     Order Specific Question:   Admitting Physician     Answer:   Nyla Tobin [[id number]]     Order Specific Question:   Level of Care     Answer:   Level 1 Stepdown [13]     Order Specific Question:   Estimated length of stay     Answer:   More than 2 Midnights     Order Specific Question:   Certification     Answer:   I certify that inpatient services are medically necessary for this patient for a duration of greater than two midnights  See H&P and MD Progress Notes for additional information about the patient's course of treatment  ED: Date/Time/Mode of Arrival:   ED Arrival Information     Expected Arrival Acuity Means of Arrival Escorted By Service Admission Type    - 1/2/2019 18:17 Emergent Wheelchair Family Member General Medicine Emergency    Arrival Complaint    SOB      Chief Complaint:   Chief Complaint   Patient presents with    Shortness of Breath     Patient presents to the ER via his own wheelchair stating he has had increasing SOB and wheezing for 1 week  History of Illness:   81-year-old man with history of atrial fibrillation, lymphedema of left lower extremity, remote DVT of left lower extremity, [place] filter, peripheral vascular disease and pulmonary hypertension complains of dyspnea, shortness of breath on exertion and wheezing over the past week  This is associated with nasal congestion, rhinorrhea and postnasal drip  He has cough with yellow sputum production in the mornings  The patient states he has had chronic edema and lymphedema of left lower extremity due to a remote surgery  He states this leg has been more swollen this week with skin break and redness    ED Vital Signs:   ED Triage Vitals   Temperature Pulse Respirations Blood Pressure SpO2   01/02/19 1843 01/02/19 1843 01/02/19 1843 01/02/19 1843 01/02/19 1843   (!) 97 3 °F (36 3 °C) 81 22 121/80 94 %      Temp Source Heart Rate Source Patient Position - Orthostatic VS BP Location FiO2 (%)   01/02/19 1843 01/02/19 1843 01/02/19 1843 01/02/19 1843 --   Temporal Monitor Lying Right arm       Pain Score       01/03/19 0112       No Pain        Wt Readings from Last 1 Encounters:   01/03/19 (!) 140 kg (308 lb 10 3 oz)   Vital Signs (abnormal): Abnormal Labs/Diagnostic Test Results:   BUN 32 CR 1 47 GFR 47 PT 22 0 INR 2 05 BNP 5876 D-DIMER 2008  TROP 0 14, 0 15  DIG <0 2  CXR=Cardiomegaly with vascular congestion and bilateral pleural effusions likely on the basis of CHF   ekg=Rhythm: atrial fibrillation    Ectopy:     Ectopy: PVCs    QRS:     QRS axis:  Left    QRS intervals:  Normal  Conduction:     Conduction: normal    ST segments:     ST segments:  Normal  T waves:     T waves: normal    Q waves:     Q waves:  III, V2 and V3  Other findings:     Other findings comment:  Low voltage  ED Treatment:   Medication Administration from 01/02/2019 1817 to 01/02/2019 2317       Date/Time Order Dose Route Action Action by Comments     01/02/2019 2015 ipratropium-albuterol (DUO-NEB) 0 5-2 5 mg/3 mL inhalation solution 3 mL 3 mL Nebulization Given Star Sarmiento RN      01/02/2019 2156 furosemide (LASIX) injection 80 mg 80 mg Intravenous Given Star Sarmiento RN       Past Medical/Surgical History:    Active Ambulatory Problems     Diagnosis Date Noted    Chronic venous hypertension with ulcer and inflammation involving right side ([facility] ) 01/27/2016    Chronic venous insufficiency 03/30/2016    Edema of leg 12/19/2014    Lymphedema 01/15/2016    Onychomycosis 01/27/2016    Peripheral vascular disease ([facility] ) 01/27/2016    Tinea pedis of both feet 01/27/2016    Wound, open, foot 10/24/2014    History of herniated intervertebral disc 03/09/2018    Gastroesophageal reflux disease without esophagitis 05/02/2018    Low back pain with sciatica 08/22/2018    SI (sacroiliac) joint inflammation ([facility] ) 08/22/2018    Pelvic contusion, initial encounter 08/22/2018    PVD (peripheral vascular disease) ([place] [address] )     Pulmonary hypertension ([place] [address] )      Resolved Ambulatory Problems     Diagnosis Date Noted    Viral upper respiratory tract infection 04/04/2018    Cough 05/02/2018     Past Medical History:   Diagnosis Date    Atrial fibrillation (HCC)     Cardiac disease     Cellulitis     Chronic pain     Chronic venous hypertension     GERD (gastroesophageal reflux disease)     Hyperlipidemia     Hypertension     Obesity     Pulmonary hypertension ([place] [address] )     PVD (peripheral vascular disease) ([place] [address] )     Vascular disorder of extremity ([place] [address] )    Admitting Diagnosis: SOB (shortness of breath) [R06 02]  Permanent atrial fibrillation (HCC) [I48 2]  History of pulmonary hypertension [Z86 79]  Acute CHF (congestive heart failure) ([place] [address] ) [I50 9]  Hypertension, unspecified type [I10]  Age/Sex: 68 y o  male  Assessment/Plan:   SOB (shortness of breath)   Assessment & Plan     Pt tachypneic with RR of 24  Most likely due to acute CHF  Administer O2 as needed to keep SPO2> 92%  Acute heart failure (HCC)   Assessment & Plan     BNP elevated to 5876  CXR shows vascular congestion with bilateral pleural effusions  Pt has bilateral LE edema  L>R  Patient had 20 lb weight gain over the last several months  Patient takes Zaroxolyn 2 5 mg and Lasix 40 mg on Mondays Wednesdays and Fridays  Will hold Zaroxolyn for now and increase Lasix to 40 mg IV b i d   Monitor I/O and daily weights  Echocardiogram  Will place inpatient consult Cardiology  Elevated troponin   Assessment & Plan     Troponin elevated at 0 14  Most likely due to acute heart failure and atrial fibrillation  Will obtain serial troponins  Monitor on telemetry  Elevated d-dimer   Assessment & Plan     D-dimer elevated at 2008  May be elevated due to cellulitis of left lower extremity and poor kidney function  Unable to do CTA due to elevated creatinine  Will obtain venous duplex in a m  As well as V/Q scan  Will initiate high dose heparin drip for now  Acute kidney injury [facility])   Assessment & Plan     Creatinine 1 47 in the ED  Up from 1 07 two years ago  Will continue diuretics for volume overload  Monitor urine output  Recheck in a m  [place] Cellulitis of left lower extremity   Assessment & Plan     Left shin with open area draining small amount of serous drainage  Red swollen and warm to touch  Will start Ancef 2 g IV q 8 hours  Will place inpatient consult to wound care  For now will keep wound covered with clean dry dressing     Admission Orders:  LEVEL 1 STEPDOWN  LABS PER IV HEPARIN GTT PROTOCOL  CONSULT WOUND CARE  CONSULT CARDIO  Scheduled Meds:   Current Facility-Administered Medications:  acetaminophen 650 mg Oral Q4H PRN NAVENET Martinez    cefazolin 2,000 mg Intravenous Q8H NAVNEET Martinez Last Rate: 2,000 mg (01/03/19 2651)   cholecalciferol 1,000 Units Oral Daily NAVNEET Martinez    cyclobenzaprine 10 mg Oral TID PRN [employer]    diltiazem 180 mg Oral Daily NAVNEET Martinez    fluticasone 1 spray Nasal Daily NAVNEET Martinez    furosemide 40 mg Intravenous BID [employer]    HYDROcodone-acetaminophen 2 tablet Oral Q6H PRN NAVNEET [place]    influenza vaccine 0 5 mL Intramuscular Prior to discharge Lay Ryan MD    lisinopril 20 mg Oral Daily NAVNEET Martinez    losartan 50 mg Oral Daily NAVNEET Martinez    ondansetron 4 mg Intravenous Q6H PRN NAVNEET Martinez    pantoprazole 40 mg Oral Early Morning NAVNEET Martinez    potassium chloride 10 mEq Oral Daily NAVNEET Martinez    sodium chloride 1 spray Each Nare PRN [employer]    vitamin E (tocopherol) 400 Units Oral Daily [employer]    Continuous Infusions:   heparin (porcine) 3-30 Units/kg/hr (Order-Specific) Last Rate: Stopped (01/03/19 0853)   PRN Meds:   acetaminophen    cyclobenzaprine    heparin (porcine)    heparin (porcine)    HYDROcodone-acetaminophen    influenza vaccine    ondansetron    sodium chloride  [address] Utilization Review Department  Phone: [phone]; Fax [phone]  [email or web address]  ATTENTION: Please call with any questions or concerns to [phone]  and carefully listen to the prompts so that you are directed to the right person  Send all requests for admission clinical reviews, approved or denied determinations and any other requests to fax [phone]   All voicemails are confidential

## 2019-01-03 NOTE — ASSESSMENT & PLAN NOTE
· Patient's digoxin listed among home meds but his digit level was less than 0 2 on arrival  · Is likely the patient is not taking this and even though it is listed as result we will hold for now waiting Cardiology evaluation  · Continue Cardizem  80 mg p o   Q day for rate control  · Continue Eliquis for anticoagulation  · Currently rate controlled continue to monitor

## 2019-01-03 NOTE — ASSESSMENT & PLAN NOTE
BP controlled  Continue losartan  Hold lisinopril due to elevated creatinine  Monitor BP per nursing unit

## 2019-01-03 NOTE — H&P
H&P- Linwood Estephaniejim 1945, 68 y o  male MRN: [id number]    Unit/Bed#: [address] [place] [address] Encounter: [id number]    Primary Care Provider: Andrea Uriostegui PA-C   Date and time admitted to hospital: 1/2/2019  6:35 PM        * SOB (shortness of breath)   Assessment & Plan    Pt tachypneic with RR of 24  Most likely due to acute CHF  Administer O2 as needed to keep SPO2> 92%  Acute heart failure (HCC)   Assessment & Plan    BNP elevated to 5876  CXR shows vascular congestion with bilateral pleural effusions  Pt has bilateral LE edema  L>R  Patient had 20 lb weight gain over the last several months  Patient takes Zaroxolyn 2 5 mg and Lasix 40 mg on Mondays Wednesdays and Fridays  Will hold Zaroxolyn for now and increase Lasix to 40 mg IV b i d   Monitor I/O and daily weights  Echocardiogram   Monitor on telemetry  Patient has cardiologist but has not been seen for several months  Will place inpatient consult Cardiology  Elevated troponin   Assessment & Plan    Troponin elevated at 0 14  Most likely due to acute heart failure and atrial fibrillation  Will obtain serial troponins  Monitor on telemetry  Obtain EKG with chest pain or change in rhythm  Elevated d-dimer   Assessment & Plan    D-dimer elevated at 2008  May be elevated due to cellulitis of left lower extremity and poor kidney function  Unable to do CTA due to elevated creatinine  Will obtain venous duplex in a m  As well as V/Q scan  Will initiate high dose heparin drip for now  Acute kidney injury [facility])   Assessment & Plan    Creatinine 1 47 in the ED  Up from 1 07 two years ago  Will continue diuretics for volume overload  Monitor urine output  Recheck kidney function in a m  Luis Daniel Holloway Cellulitis of left lower extremity   Assessment & Plan    Left shin with open area draining small amount of serous drainage  Red swollen and warm to touch  Will start Ancef 2 g IV q 8 hours  Will place inpatient consult to wound care    For now will keep wound covered with clean dry dressing  Atrial fibrillation ([address] )   Assessment & Plan    Rate currently controlled in the 70s  Monitor on telemetry  Patient anticoagulated with Eliquis  Will hold Eliquis for now while patient is on heparin drip  Will hold digoxin for now due to acute kidney injury  Continue diltiazem  mg p o  Daily  Hypertension   Assessment & Plan    BP controlled  Continue losartan  Hold lisinopril due to elevated creatinine  Monitor BP per nursing unit  Obesity   Assessment & Plan    Place on 2 g sodium diet with 1800 mL fluid restriction  Place inpatient consult to Nutrition  VTE Prophylaxis: Heparin Drip  / sequential compression device   Code Status:  Full code  POLST: There is no POLST form on file for this patient (pre-hospital)  Discussion with family:  No family present    Anticipated Length of Stay:  Patient will be admitted on an Inpatient basis with an anticipated length of stay of  > 2 midnights  Justification for Hospital Stay:  IV medications    Total Time for Visit, including Counseling / Coordination of Care: 30 minutes  Greater than 50% of this total time spent on direct patient counseling and coordination of care  Chief Complaint:   Shortness of breath    History of Present Illness:    Rohini Max is a 68 y o  male with history of AFib, cardiac disease, hypertension, obesity, pulmonary hypertension, PVD who presents with complaints of difficulty breathing  Patient states that he started with a cold last week  He also says for about the last month he has had some left-sided mild chest pain with  exertion  Pain does not radiate  Pain resolved on its own  Patient started with cough for thick gray sputum and congestion  Today patient developed a wheezing and was seen by PCP  Patient was instructed to be transferred to ED for evaluation but refused EMS and instead drove self here    In the ED patient has elevated creatinine of 1 47, INR 2 05, troponin 0 14, BNP 5876, D-dimer 2008  Chest x-ray shows vascular congestion with bilateral pleural effusions  Respiratory rate elevated to 24  On exam breath sounds diminished  No signs of respiratory distress  Patient has bilateral lower extremity edema  Left greater than right  Small open wound to left shin which is draining small amount of serous drainage  Surrounding tissue red swollen and warm to touch  Patient reports weight gain of approximately 20 lb in the last several months  Review of Systems:    Review of Systems   Constitutional: Positive for unexpected weight change (20 lb in the last several months)  Negative for activity change, appetite change, chills and fever  HENT: Positive for congestion  Negative for rhinorrhea and sore throat  Respiratory: Positive for cough and shortness of breath  Negative for apnea  Cardiovascular: Positive for chest pain (Intermittent left-sided stabbing chest pain x1 month) and leg swelling  Negative for palpitations  Gastrointestinal: Negative for abdominal distention, abdominal pain, constipation, diarrhea, nausea and vomiting  Genitourinary: Negative for dysuria  Musculoskeletal: Positive for arthralgias (ralized)  Skin: Positive for wound (Left shin wound opened up last week  )  Neurological: Negative for dizziness, seizures, syncope, facial asymmetry, weakness, light-headedness, numbness and headaches  Psychiatric/Behavioral: Negative for agitation and confusion  The patient is not nervous/anxious  All other systems reviewed and are negative        Past Medical and Surgical History:     Past Medical History:   Diagnosis Date    Atrial fibrillation ([address] )     Cardiac disease     Cellulitis     Chronic pain     Chronic venous hypertension     with ulceration    GERD (gastroesophageal reflux disease)     Hyperlipidemia     Hypertension     Obesity     Pulmonary hypertension (HCC)     [facility]     Vascular disorder of extremity ([facility] )        Past Surgical History:   Procedure Laterality Date    ANTERIOR RELEASE VERTEBRAL BODY W/ POSTERIOR FUSION      APPENDECTOMY  1955    CATARACT EXTRACTION      DECOMPRESSION SPINE LUMBAR POSTERIOR      ELBOW SURGERY      KNEE SURGERY      NOSE SURGERY      turbinectomy    RETINAL DETACHMENT SURGERY      RHINOPLASTY      TONSILLECTOMY      VEIN LIGATION AND STRIPPING      saphenous vein long       Meds/Allergies:    Prior to Admission medications    Medication Sig Start Date End Date Taking?  Authorizing Provider   apixaban (ELIQUIS) 5 mg Take 5 mg by mouth 2 (two) times a day    Historical Provider, MD   cholecalciferol (VITAMIN D3) 1,000 units tablet Take 1,000 Units by mouth daily    Historical Provider, MD   cyclobenzaprine (FLEXERIL) 10 mg tablet Take 1 tablet (10 mg total) by mouth 3 (three) times a day as needed for muscle spasms 8/22/18   Monica Shine DO   digoxin (LANOXIN) 0 125 mg tablet Digoxin-Tab 0 125 MG TABS   Refills: 0    Active    Historical Provider, MD   diltiazem (CARDIZEM CD) 180 mg 24 hr capsule Diltiazem HCl ER Coated Beads 180 MG Oral Capsule Extended Release 24 Hour   Refills: 0    Active    Historical Provider, MD   fluticasone (FLONASE) 50 mcg/act nasal spray 1 spray into each nostril daily 5/2/18   Leonor Mckenzie MD   furosemide (LASIX) 40 mg tablet Take 40 mg by mouth daily Take 1 tablet Monday,Wednesday,Friday (30 min later take Metolazone tablet)    Historical Provider, MD   HYDROcodone-acetaminophen (NORCO) 7 5-325 mg per tablet Hydrocodone-Acetaminophen 7 5-750 MG TABS   Refills: 0    Active    Historical Provider, MD   losartan (COZAAR) 50 mg tablet Take 50 mg by mouth daily    Historical Provider, MD   meloxicam (MOBIC) 15 mg tablet Take 1 tablet (15 mg total) by mouth daily 8/22/18   Monica Shine DO   metolazone (ZAROXOLYN) 2 5 mg tablet Take 2 5 mg by mouth daily Take 1 tablet [address]    Historical Provider, MD   neomycin-bacitracin-polymyxin b (NEOSPORIN) ointment Apply topically 2 (two) times a day    Historical Provider, MD   omeprazole (PriLOSEC) 20 mg delayed release capsule Take 1 capsule (20 mg total) by mouth daily for 30 days 5/2/18 6/1/18  Cheryle Hernandez MD   pantoprazole (PROTONIX) 40 mg tablet Take 1 tablet (40 mg total) by mouth daily 3/9/18   Andrea Uriostegui PA-C   potassium chloride (K-DUR,KLOR-CON) 10 mEq tablet Potassium Chloride [place] ER 10 MEQ Oral Tablet Extended Release   Refills: 0    Active    Historical Provider, MD   quinapril (ACCUPRIL) 10 mg tablet 20 mg daily    Historical Provider, MD   sodium chloride (OCEAN) 0 65 % nasal spray 1 spray into each nostril as needed for congestion    Historical Provider, MD   vitamin E, tocopherol, 400 units capsule Take 400 Units by mouth daily    Historical Provider, MD     I have reviewed home medications with patient personally  Allergies: No Known Allergies    Social History:     Marital Status: Single   Occupation:  Disabled  Patient Pre-hospital Living Situation:  Lives with son and grandson  Patient Pre-hospital Level of Mobility:  Motorized wheelchair  Patient Pre-hospital Diet Restrictions:  None  Substance Use History:   History   Alcohol Use No     History   Smoking Status    Former Smoker   Smokeless Tobacco    Never Used     History   Drug Use No       Family History:    non-contributory    Physical Exam:     Vitals:   Blood Pressure: 131/89 (01/02/19 2333)  Pulse: 80 (01/02/19 2333)  Temperature: (!) 97 4 °F (36 3 °C) (01/02/19 2333)  Temp Source: Oral (01/02/19 2333)  Respirations: 22 (01/02/19 2333)  Height: 6' 4" (193 cm) (01/02/19 2333)  Weight - Scale: (!) 141 kg (310 lb) (01/02/19 1843)  SpO2: 91 % (01/02/19 2333)    Physical Exam   Constitutional: He is oriented to person, place, and time  He appears well-developed and well-nourished  He is cooperative  No distress     Morbidly obese HENT:   Head: Normocephalic and atraumatic  Eyes: Pupils are equal, round, and reactive to light  EOM are normal    Neck: Normal range of motion  Neck supple  Cardiovascular: Normal rate, normal heart sounds and intact distal pulses  An irregularly irregular rhythm present  Exam reveals no gallop and no friction rub  No murmur heard  Pulmonary/Chest: Effort normal  Tachypnea noted  No respiratory distress  He has decreased breath sounds  He has no wheezes  He has no rales  Abdominal: Soft  Bowel sounds are normal  He exhibits no distension  There is no tenderness  Musculoskeletal: Normal range of motion  He exhibits no edema (Plus one edema to right lower extremity and +2 edema to left lower extremity)  Neurological: He is alert and oriented to person, place, and time  Skin: Skin is warm and dry  Psychiatric: He has a normal mood and affect  Judgment normal    Nursing note and vitals reviewed  Additional Data:     Lab Results: I have personally reviewed pertinent reports  Results from last 7 days  Lab Units 01/02/19  1924   WBC Thousand/uL 6 90   HEMOGLOBIN g/dL 15 1   HEMATOCRIT % 46 2   PLATELETS Thousands/uL 159   NEUTROS PCT % 74   LYMPHS PCT % 16   MONOS PCT % 7   EOS PCT % 1       Results from last 7 days  Lab Units 01/02/19  1924   SODIUM mmol/L 139   POTASSIUM mmol/L 3 8   CHLORIDE mmol/L 100   CO2 mmol/L 27   BUN mg/dL 32*   CREATININE mg/dL 1 47*   ANION GAP mmol/L 12   CALCIUM mg/dL 9 3   GLUCOSE RANDOM mg/dL 112       Results from last 7 days  Lab Units 01/02/19  1924   INR  2 05*                   Imaging: I have personally reviewed pertinent reports  XR chest 2 views   ED Interpretation by Florentin Fernandez DO (01/02 2007)   Cardiomegaly  No obvious infiltrate, pulmonary effusion or pneumothorax      Final Result by Robert Moncada MD (01/02 2026)      [place] with vascular congestion and bilateral pleural effusions likely on the basis of CHF  Workstation performed: [id number]         VAS lower limb venous duplex study, complete bilateral    (Results Pending)   NM inpatient order    (Results Pending)       EKG, Pathology, and Other Studies Reviewed on Admission:   · EKG:  AFib in the 70s    Allscripts / Epic Records Reviewed: Yes     ** Please Note: This note has been constructed using a voice recognition system   **

## 2019-01-03 NOTE — ED PROVIDER NOTES
History  Chief Complaint   Patient presents with    Shortness of Breath     Patient presents to the ER via his own wheelchair stating he has had increasing SOB and wheezing for 1 week  This 70-year-old man with history of atrial fibrillation, lymphedema of left lower extremity, remote DVT of left lower extremity, [place] filter, peripheral vascular disease and pulmonary hypertension complains of dyspnea, shortness of breath on exertion and wheezing over the past week  This is associated with nasal congestion, rhinorrhea and postnasal drip  He has cough with yellow sputum production in the mornings  He has no fever, chest pain, palpitations, hemoptysis or syncope  The patient states he has had chronic edema and lymphedema of left lower extremity due to a remote surgery  He states this leg has been more swollen this week with skin break and redness  Prior to Admission Medications   Prescriptions Last Dose Informant Patient Reported? Taking?    HYDROcodone-acetaminophen (NORCO) 7 5-325 mg per tablet   Yes No   Sig: Hydrocodone-Acetaminophen 7 5-750 MG TABS   Refills: 0    Active   apixaban (ELIQUIS) 5 mg  Self Yes No   Sig: Take 5 mg by mouth 2 (two) times a day   cholecalciferol (VITAMIN D3) 1,000 units tablet   Yes No   Sig: Take 1,000 Units by mouth daily   cyclobenzaprine (FLEXERIL) 10 mg tablet   No No   Sig: Take 1 tablet (10 mg total) by mouth 3 (three) times a day as needed for muscle spasms   digoxin (LANOXIN) 0 125 mg tablet   Yes No   Sig: Digoxin-Tab 0 125 MG TABS   Refills: 0    Active   diltiazem (CARDIZEM CD) 180 mg 24 hr capsule   Yes No   Sig: Diltiazem HCl ER Coated Beads 180 MG Oral Capsule Extended Release 24 Hour   Refills: 0    Active   fluticasone (FLONASE) 50 mcg/act nasal spray   No No   Si spray into each nostril daily   furosemide (LASIX) 40 mg tablet  Self Yes No   Sig: Take 40 mg by mouth daily Take 1 tablet Monday,Wednesday,Friday (30 min later take Metolazone tablet)   losartan (COZAAR) 50 mg tablet  Self Yes No   Sig: Take 50 mg by mouth daily   meloxicam (MOBIC) 15 mg tablet   No No   Sig: Take 1 tablet (15 mg total) by mouth daily   metolazone (ZAROXOLYN) 2 5 mg tablet  Self Yes No   Sig: Take 2 5 mg by mouth daily Take 1 tablet Monday,Wednesday,Friday   neomycin-bacitracin-polymyxin b (NEOSPORIN) ointment  Self Yes No   Sig: Apply topically 2 (two) times a day   omeprazole (PriLOSEC) 20 mg delayed release capsule   No No   Sig: Take 1 capsule (20 mg total) by mouth daily for 30 days   pantoprazole (PROTONIX) 40 mg tablet   No No   Sig: Take 1 tablet (40 mg total) by mouth daily   potassium chloride (K-DUR,KLOR-CON) 10 mEq tablet   Yes No   Sig: Potassium Chloride Amalia ER 10 MEQ Oral Tablet Extended Release   Refills: 0    Active   quinapril (ACCUPRIL) 10 mg tablet   Yes No   Si mg daily   sodium chloride (OCEAN) 0 65 % nasal spray  Self Yes No   Si spray into each nostril as needed for congestion   vitamin E, tocopherol, 400 units capsule  Self Yes No   Sig: Take 400 Units by mouth daily      Facility-Administered Medications: None       Past Medical History:   Diagnosis Date    Atrial fibrillation (HCC)     Cardiac disease     Cellulitis     Chronic pain     Chronic venous hypertension     with ulceration    GERD (gastroesophageal reflux disease)     Hyperlipidemia     Hypertension     Obesity     Pulmonary hypertension (HCC)     [facility]     Vascular disorder of extremity ([facility] )        Past Surgical History:   Procedure Laterality Date    ANTERIOR RELEASE VERTEBRAL BODY W/ POSTERIOR FUSION      APPENDECTOMY      CATARACT EXTRACTION      DECOMPRESSION SPINE LUMBAR POSTERIOR      ELBOW SURGERY      KNEE SURGERY      NOSE SURGERY      turbinectomy    RETINAL DETACHMENT SURGERY      RHINOPLASTY      TONSILLECTOMY      VEIN LIGATION AND STRIPPING      saphenous vein long       Family History   Problem Relation Age of Onset    Cancer Mother         cancer of uterus    Diabetes Sister     Mental illness Neg Hx         neg fam hx    Substance Abuse Neg Hx         neg fam hx     I have reviewed and agree with the history as documented  Social History   Substance Use Topics    Smoking status: Former Smoker    Smokeless tobacco: Never Used    Alcohol use No        Review of Systems   Constitutional: Negative  HENT: Positive for congestion, postnasal drip, rhinorrhea and sinus pressure  Negative for ear pain, facial swelling, sore throat and trouble swallowing  Eyes: Negative  Respiratory: Positive for cough, shortness of breath and wheezing  Cardiovascular: Positive for leg swelling  Negative for chest pain and palpitations  Gastrointestinal: Negative  Endocrine: Negative  Genitourinary: Negative  Musculoskeletal: Negative  Skin: Negative  Allergic/Immunologic: Negative  Neurological: Negative  Hematological: Negative  Psychiatric/Behavioral: Negative  All other systems reviewed and are negative  Physical Exam  Physical Exam   Constitutional: He is oriented to person, place, and time  He appears well-developed and well-nourished  No distress  HENT:   Head: Normocephalic and atraumatic  Right Ear: External ear normal    Left Ear: External ear normal    Mouth/Throat: Oropharynx is clear and moist    Eyes: Pupils are equal, round, and reactive to light  Conjunctivae and EOM are normal    Neck: Normal range of motion  Neck supple  No JVD present  Cardiovascular: Normal rate, regular rhythm, normal heart sounds and intact distal pulses  No murmur heard  Pulmonary/Chest: Effort normal  No stridor  He has no wheezes  He has no rales  He exhibits no tenderness  Diminished breath sounds bilaterally   Abdominal: Soft  Bowel sounds are normal  He exhibits no mass  There is no tenderness  There is no rebound and no guarding     Musculoskeletal: Normal range of motion  He exhibits edema  He exhibits no tenderness  Left leg has 3+ edema  Right leg has 2+ edema  Left leg has chronic venous skin changes with several small skin ulcerations  There is a 1 centimeter x 2 centimeter superficial skin loss over the left shin with slight yellow exudate  No surrounding signs of cellulitis  Lymphadenopathy:     He has no cervical adenopathy  Neurological: He is alert and oriented to person, place, and time  He has normal reflexes  No cranial nerve deficit  Coordination normal    Skin: Skin is warm and dry  No rash noted  He is not diaphoretic  Psychiatric: He has a normal mood and affect  His behavior is normal    Nursing note and vitals reviewed        Vital Signs  ED Triage Vitals [01/02/19 1843]   Temperature Pulse Respirations Blood Pressure SpO2   (!) 97 3 °F (36 3 °C) 81 22 121/80 94 %      Temp Source Heart Rate Source Patient Position - Orthostatic VS BP Location FiO2 (%)   Temporal Monitor Lying Right arm --      Pain Score       --           Vitals:    01/02/19 2205 01/02/19 2231 01/02/19 2300 01/02/19 2333   BP: 128/75 133/76 118/68 131/89   Pulse: 86 75  80   Patient Position - Orthostatic VS: Sitting Sitting Sitting Lying       Visual Acuity      ED Medications  Medications   apixaban (ELIQUIS) tablet 5 mg (not administered)   cholecalciferol (VITAMIN D3) tablet 1,000 Units (not administered)   cyclobenzaprine (FLEXERIL) tablet 10 mg (not administered)   diltiazem (CARDIZEM CD) 24 hr capsule 180 mg (not administered)   fluticasone (FLONASE) 50 mcg/act nasal spray 1 spray (not administered)   HYDROcodone-acetaminophen (NORCO) 5-325 mg per tablet 2 tablet (not administered)   losartan (COZAAR) tablet 50 mg (not administered)   pantoprazole (PROTONIX) EC tablet 40 mg (not administered)   potassium chloride (K-DUR,KLOR-CON) CR tablet 10 mEq (not administered)   lisinopril (ZESTRIL) tablet 20 mg (not administered)   sodium chloride (OCEAN) 0 65 % nasal spray 1 spray (not administered)   vitamin E (tocopherol) capsule 400 Units (not administered)   influenza vaccine, high-dose (FLUZONE HIGH-DOSE) IM injection RITU 0 5 mL (not administered)   ipratropium-albuterol (DUO-NEB) 0 5-2 5 mg/3 mL inhalation solution 3 mL (3 mL Nebulization Given 1/2/19 2015)   furosemide (LASIX) injection 80 mg (80 mg Intravenous Given 1/2/19 2156)       Diagnostic Studies  Results Reviewed     Procedure Component Value Units Date/Time    Troponin I [[id number]]  (Abnormal) Collected:  01/02/19 2208    Lab Status:  Final result Specimen:  Blood from Line, Venous Updated:  01/02/19 2232     Troponin I 0 15 (H) ng/mL     D-Dimer [[id number]]  (Abnormal) Collected:  01/02/19 1924    Lab Status:  Final result Specimen:  Blood from Line, Venous Updated:  01/02/19 2041     D-Dimer, Rommie Coca 2,008 (H) ng/ml (FEU)     Basic metabolic panel [[id number]]  (Abnormal) Collected:  01/02/19 1924    Lab Status:  Final result Specimen:  Blood from Line, Venous Updated:  01/02/19 2018     Sodium 139 mmol/L      Potassium 3 8 mmol/L      Chloride 100 mmol/L      CO2 27 mmol/L      ANION GAP 12 mmol/L      BUN 32 (H) mg/dL      Creatinine 1 47 (H) mg/dL      Glucose 112 mg/dL      Calcium 9 3 mg/dL      eGFR 47 ml/min/1 73sq m     Narrative:         National Kidney Disease Education Program recommendations are as follows:  GFR calculation is accurate only with a steady state creatinine  Chronic Kidney disease less than 60 ml/min/1 73 sq  meters  Kidney failure less than 15 ml/min/1 73 sq  meters      Digoxin level [[id number]]  (Abnormal) Collected:  01/02/19 1924    Lab Status:  Final result Specimen:  Blood from Line, Venous Updated:  01/02/19 2018     Digoxin Lvl <0 2 (L) ng/mL     B-type natriuretic peptide [[id number]]  (Abnormal) Collected:  01/02/19 1924    Lab Status:  Final result Specimen:  Blood from Line, Venous Updated:  01/02/19 2012     NT-proBNP 5,876 (H) pg/mL     Troponin I [[id number]]  (Abnormal) Collected: 01/02/19 1924    Lab Status:  Final result Specimen:  Blood from Line, Venous Updated:  01/02/19 2008     Troponin I 0 14 (H) ng/mL     Protime-INR [[id number]]  (Abnormal) Collected:  01/02/19 1924    Lab Status:  Final result Specimen:  Blood from Line, Venous Updated:  01/02/19 2003     Protime 22 0 (H) seconds      INR 2 05 (H)    APTT [[id number]]  (Normal) Collected:  01/02/19 1924    Lab Status:  Final result Specimen:  Blood from Line, Venous Updated:  01/02/19 2003     PTT 38 seconds     CBC and differential [[id number]]  (Abnormal) Collected:  01/02/19 1924    Lab Status:  Final result Specimen:  Blood from Line, Venous Updated:  01/02/19 1949     WBC 6 90 Thousand/uL      RBC 4 81 Million/uL      Hemoglobin 15 1 g/dL      Hematocrit 46 2 %      MCV 96 fL      MCH 31 4 pg      MCHC 32 7 g/dL      RDW 15 6 (H) %      MPV 11 5 fL      Platelets 257 Thousands/uL      nRBC 0 /100 WBCs      Neutrophils Relative 74 %      Immat GRANS % 1 %      Lymphocytes Relative 16 %      Monocytes Relative 7 %      Eosinophils Relative 1 %      Basophils Relative 1 %      Neutrophils Absolute 5 15 Thousands/µL      Immature Grans Absolute 0 05 Thousand/uL      Lymphocytes Absolute 1 10 Thousands/µL      Monocytes Absolute 0 51 Thousand/µL      Eosinophils Absolute 0 05 Thousand/µL      Basophils Absolute 0 04 Thousands/µL     Wound culture and Gram stain [[id number]] Collected:  01/02/19 1925    Lab Status: In process Specimen:  Wound from Leg, Left Updated:  01/02/19 1947                 XR chest 2 views   ED Interpretation by Cherise Limon DO (01/02 2007)   Cardiomegaly  No obvious infiltrate, pulmonary effusion or pneumothorax      Final Result by Jacob Ramos MD (01/02 2026)      Roselee Yeyo with vascular congestion and bilateral pleural effusions likely on the basis of CHF              Workstation performed: [id number]                    Procedures  ECG 12 Lead Documentation  Date/Time: 1/2/2019 7:55 PM  Performed by: Nataliia Foster  Authorized by: Nataliia Foster     ECG reviewed by me, the ED Provider: yes    Patient location:  ED  Previous ECG:     Previous ECG:  Compared to current    Comparison ECG info:  Compared to EKG of December 17, 2015    Similarity:  No change  Rhythm:     Rhythm: atrial fibrillation    Ectopy:     Ectopy: PVCs    QRS:     QRS axis:  Left    QRS intervals:  Normal  Conduction:     Conduction: normal    ST segments:     ST segments:  Normal  T waves:     T waves: normal    Q waves:     Q waves:  III, V2 and V3  Other findings:     Other findings comment:  Low voltage           Phone Contacts  ED Phone Contact    ED Course  ED Course as of Jan 03 0027 Wed Jan 02, 2019 2157 Repeat EKG at 9:52 a m  Shows atrial fibrillation PVCs low voltage    There is QT prolongation It is unclear whether there is QT prolongation on the initial EKG as well                                MDM  Number of Diagnoses or Management Options  Acute CHF (congestive heart failure) ([facility] ): new and requires workup  History of pulmonary hypertension:      Amount and/or Complexity of Data Reviewed  Clinical lab tests: reviewed and ordered  Tests in the radiology section of CPT®: ordered and reviewed  Obtain history from someone other than the patient: yes  Review and summarize past medical records: yes  Discuss the patient with other providers: yes  Independent visualization of images, tracings, or specimens: yes      CritCare Time    Disposition  Final diagnoses:   Acute [facility]congestive heart failure) ([facility] )   History of pulmonary hypertension     Time reflects when diagnosis was documented in both MDM as applicable and the Disposition within this note     Time User Action Codes Description Comment    1/2/2019  9:34 PM Mike Cleveland [I50 9] Acute CHF (congestive heart failure) ([facility] )     1/2/2019  9:34 PM Mike Cleveland [Z86 79] History of pulmonary hypertension     1/2/2019 10:25 PM Natalie Nguyễn Hypertension, unspecified type     1/2/2019 10:25 PM Mckenna Nguyễn Modify [I10] Hypertension, unspecified type     1/2/2019 10:25 PM Mckenna Nguyễn Add [I48 2] P[facility]     1/2/2019 10:25 PM Mckenna Nguyễn Modify [I48 2] Permanent atrial fibrillation [facility])       ED Disposition     ED Disposition Condition Comment    Admit  Case was discussed with night practitioner and the patient's admission status was agreed to be Admission Status: inpatient status to the service of Dr Teri Estrada     Follow-up Information    None         Current Discharge Medication List      CONTINUE these medications which have NOT CHANGED    Details   apixaban (ELIQUIS) 5 mg Take 5 mg by mouth 2 (two) times a day      cholecalciferol (VITAMIN D3) 1,000 units tablet Take 1,000 Units by mouth daily      cyclobenzaprine (FLEXERIL) 10 mg tablet Take 1 tablet (10 mg total) by mouth 3 (three) times a day as needed for muscle spasms  Qty: 30 tablet, Refills: 0    Associated Diagnoses: Low back pain with sciatica, sciatica laterality unspecified, unspecified back pain laterality, unspecified chronicity; SI (sacroiliac) joint inflammation ([facility] );  Pelvic contusion, initial encounter      digoxin (LANOXIN) 0 125 mg tablet Digoxin-Tab 0 125 MG TABS   Refills: 0    Active      diltiazem (CARDIZEM CD) 180 mg 24 hr capsule Diltiazem HCl ER Coated Beads 180 MG Oral Capsule Extended Release 24 Hour   Refills: 0    Active      fluticasone (FLONASE) 50 mcg/act nasal spray 1 spray into each nostril daily  Qty: 16 g, Refills: 0    Associated Diagnoses: Cough      furosemide (LASIX) 40 mg tablet Take 40 mg by mouth daily Take 1 tablet Monday,Wednesday,Friday (30 min later take Metolazone tablet)      HYDROcodone-acetaminophen (NORCO) 7 5-325 mg per tablet Hydrocodone-Acetaminophen 7 5-750 MG TABS   Refills: 0    Active      losartan (COZAAR) 50 mg tablet Take 50 mg by mouth daily      meloxicam (MOBIC) 15 mg tablet Take 1 tablet (15 mg total) by mouth daily  Qty: 60 tablet, Refills: 0    Associated Diagnoses: Low back pain with sciatica, sciatica laterality unspecified, unspecified back pain laterality, unspecified chronicity; SI (sacroiliac) joint inflammation ([facility] ); Pelvic contusion, initial encounter      metolazone (ZAROXOLYN) 2 5 mg tablet Take 2 5 mg by mouth daily Take 1 tablet Monday,Wednesday,Friday      neomycin-bacitracin-polymyxin b (NEOSPORIN) ointment Apply topically 2 (two) times a day      omeprazole (PriLOSEC) 20 mg delayed release capsule Take 1 capsule (20 mg total) by mouth daily for 30 days  Qty: 30 capsule, Refills: 5    Associated Diagnoses: Cough      pantoprazole (PROTONIX) 40 mg tablet Take 1 tablet (40 mg total) by mouth daily  Qty: 30 tablet, Refills: 3    Associated Diagnoses: Gastroesophageal reflux disease without esophagitis      potassium chloride (K-DUR,KLOR-CON) 10 mEq tablet Potassium Chloride Amalia ER 10 MEQ Oral Tablet Extended Release   Refills: 0    Active      quinapril (ACCUPRIL) 10 mg tablet 20 mg daily      sodium chloride (OCEAN) 0 65 % nasal spray 1 spray into each nostril as needed for congestion      vitamin E, tocopherol, 400 units capsule Take 400 Units by mouth daily           No discharge procedures on file      ED Provider  Electronically Signed by           Madina Vaughan DO  01/03/19 0028

## 2019-01-03 NOTE — ASSESSMENT & PLAN NOTE
· Lower extremity DVT visualized, likely provoked secondary to immobility  · Continue heparin drip for now, patient is reporting compliance with his Eliquis  At this point I would consider to failure of Eliquis and and will continue the heparin drip for treatment we would normally transition to Coumadin but the patient has a history of severe epistaxis on Coumadin as a result I will ask Hematology to evaluate the patient make the recommendation for anticoagulation in this setting  · O2 sat monitoring O2 by nasal cannula p r n   To maintain sat 92% or greater by pulse oximetry  · Transthoracic echocardiogram to evaluate for possible right heart strain  · Bed rest  · Supportive care

## 2019-01-03 NOTE — ED NOTES
Admitted to Telemetry with ER Tech, pt has own motorized W/C, "it can't be pushed", per pt       Alessia Mcpherson, RN  01/02/19 0757

## 2019-01-03 NOTE — ASSESSMENT & PLAN NOTE
D-dimer elevated at 2008  May be elevated due to cellulitis of left lower extremity and poor kidney function  Unable to do CTA due to elevated creatinine  Will obtain venous duplex in a m  As well as V/Q scan  Will initiate high dose heparin drip for now

## 2019-01-04 PROBLEM — I50.23 ACUTE ON CHRONIC SYSTOLIC (CONGESTIVE) HEART FAILURE (HCC): Status: ACTIVE | Noted: 2019-01-02

## 2019-01-04 LAB
ANION GAP SERPL CALCULATED.3IONS-SCNC: 10 MMOL/L (ref 4–13)
BASOPHILS # BLD AUTO: 0.03 THOUSANDS/ΜL (ref 0–0.1)
BASOPHILS NFR BLD AUTO: 1 % (ref 0–1)
BUN SERPL-MCNC: 34 MG/DL (ref 5–25)
CALCIUM SERPL-MCNC: 8.6 MG/DL (ref 8.3–10.1)
CHLORIDE SERPL-SCNC: 103 MMOL/L (ref 100–108)
CO2 SERPL-SCNC: 28 MMOL/L (ref 21–32)
CREAT SERPL-MCNC: 1.45 MG/DL (ref 0.6–1.3)
EOSINOPHIL # BLD AUTO: 0.24 THOUSAND/ΜL (ref 0–0.61)
EOSINOPHIL NFR BLD AUTO: 4 % (ref 0–6)
ERYTHROCYTE [DISTWIDTH] IN BLOOD BY AUTOMATED COUNT: 15.4 % (ref 11.6–15.1)
GFR SERPL CREATININE-BSD FRML MDRD: 48 ML/MIN/1.73SQ M
GLUCOSE SERPL-MCNC: 96 MG/DL (ref 65–140)
HCT VFR BLD AUTO: 42.9 % (ref 36.5–49.3)
HGB BLD-MCNC: 13.9 G/DL (ref 12–17)
IMM GRANULOCYTES # BLD AUTO: 0.01 THOUSAND/UL (ref 0–0.2)
IMM GRANULOCYTES NFR BLD AUTO: 0 % (ref 0–2)
INR PPP: 1.73 (ref 0.86–1.17)
LYMPHOCYTES # BLD AUTO: 1.73 THOUSANDS/ΜL (ref 0.6–4.47)
LYMPHOCYTES NFR BLD AUTO: 28 % (ref 14–44)
MCH RBC QN AUTO: 31 PG (ref 26.8–34.3)
MCHC RBC AUTO-ENTMCNC: 32.4 G/DL (ref 31.4–37.4)
MCV RBC AUTO: 96 FL (ref 82–98)
MONOCYTES # BLD AUTO: 0.81 THOUSAND/ΜL (ref 0.17–1.22)
MONOCYTES NFR BLD AUTO: 13 % (ref 4–12)
NEUTROPHILS # BLD AUTO: 3.41 THOUSANDS/ΜL (ref 1.85–7.62)
NEUTS SEG NFR BLD AUTO: 54 % (ref 43–75)
NRBC BLD AUTO-RTO: 0 /100 WBCS
PLATELET # BLD AUTO: 124 THOUSANDS/UL (ref 149–390)
PMV BLD AUTO: 11.8 FL (ref 8.9–12.7)
POTASSIUM SERPL-SCNC: 3.5 MMOL/L (ref 3.5–5.3)
PROTHROMBIN TIME: 19.3 SECONDS (ref 11.8–14.2)
RBC # BLD AUTO: 4.48 MILLION/UL (ref 3.88–5.62)
SODIUM SERPL-SCNC: 141 MMOL/L (ref 136–145)
TROPONIN I SERPL-MCNC: 0.12 NG/ML
TROPONIN I SERPL-MCNC: 0.13 NG/ML
WBC # BLD AUTO: 6.23 THOUSAND/UL (ref 4.31–10.16)

## 2019-01-04 PROCEDURE — 80048 BASIC METABOLIC PNL TOTAL CA: CPT | Performed by: NURSE PRACTITIONER

## 2019-01-04 PROCEDURE — 99233 SBSQ HOSP IP/OBS HIGH 50: CPT | Performed by: INTERNAL MEDICINE

## 2019-01-04 PROCEDURE — 99232 SBSQ HOSP IP/OBS MODERATE 35: CPT | Performed by: INTERNAL MEDICINE

## 2019-01-04 PROCEDURE — 99232 SBSQ HOSP IP/OBS MODERATE 35: CPT | Performed by: NURSE PRACTITIONER

## 2019-01-04 PROCEDURE — 93306 TTE W/DOPPLER COMPLETE: CPT | Performed by: INTERNAL MEDICINE

## 2019-01-04 PROCEDURE — 85025 COMPLETE CBC W/AUTO DIFF WBC: CPT | Performed by: INTERNAL MEDICINE

## 2019-01-04 PROCEDURE — 85610 PROTHROMBIN TIME: CPT | Performed by: NURSE PRACTITIONER

## 2019-01-04 PROCEDURE — 84484 ASSAY OF TROPONIN QUANT: CPT | Performed by: INTERNAL MEDICINE

## 2019-01-04 RX ORDER — CEFUROXIME AXETIL 250 MG/1
500 TABLET ORAL EVERY 12 HOURS SCHEDULED
Status: DISCONTINUED | OUTPATIENT
Start: 2019-01-04 | End: 2019-01-05

## 2019-01-04 RX ADMIN — DILTIAZEM HYDROCHLORIDE 120 MG: 120 CAPSULE, COATED, EXTENDED RELEASE ORAL at 18:07

## 2019-01-04 RX ADMIN — Medication 100 MG: at 08:43

## 2019-01-04 RX ADMIN — FLUTICASONE PROPIONATE 1 SPRAY: 50 SPRAY, METERED NASAL at 08:43

## 2019-01-04 RX ADMIN — DILTIAZEM HYDROCHLORIDE 120 MG: 120 CAPSULE, COATED, EXTENDED RELEASE ORAL at 08:42

## 2019-01-04 RX ADMIN — APIXABAN 5 MG: 5 TABLET, FILM COATED ORAL at 08:42

## 2019-01-04 RX ADMIN — LISINOPRIL 20 MG: 20 TABLET ORAL at 08:42

## 2019-01-04 RX ADMIN — CYANOCOBALAMIN TAB 500 MCG 1000 MCG: 500 TAB at 08:42

## 2019-01-04 RX ADMIN — FUROSEMIDE 40 MG: 10 INJECTION, SOLUTION INTRAMUSCULAR; INTRAVENOUS at 18:07

## 2019-01-04 RX ADMIN — PANTOPRAZOLE SODIUM 40 MG: 40 TABLET, DELAYED RELEASE ORAL at 06:36

## 2019-01-04 RX ADMIN — LOSARTAN POTASSIUM 50 MG: 50 TABLET, FILM COATED ORAL at 08:42

## 2019-01-04 RX ADMIN — FUROSEMIDE 40 MG: 10 INJECTION, SOLUTION INTRAMUSCULAR; INTRAVENOUS at 08:42

## 2019-01-04 RX ADMIN — CEFAZOLIN SODIUM 2000 MG: 2 SOLUTION INTRAVENOUS at 01:26

## 2019-01-04 RX ADMIN — OXYCODONE HYDROCHLORIDE AND ACETAMINOPHEN 1000 MG: 500 TABLET ORAL at 08:42

## 2019-01-04 RX ADMIN — VITAMIN E CAP 400 UNIT 400 UNITS: 400 CAP at 08:43

## 2019-01-04 RX ADMIN — VITAMIN D, TAB 1000IU (100/BT) 1000 UNITS: 25 TAB at 08:42

## 2019-01-04 RX ADMIN — CEFAZOLIN SODIUM 2000 MG: 2 SOLUTION INTRAVENOUS at 08:42

## 2019-01-04 RX ADMIN — APIXABAN 5 MG: 5 TABLET, FILM COATED ORAL at 18:07

## 2019-01-04 RX ADMIN — CEFUROXIME AXETIL 500 MG: 250 TABLET ORAL at 21:18

## 2019-01-04 RX ADMIN — POTASSIUM CHLORIDE 10 MEQ: 750 TABLET, EXTENDED RELEASE ORAL at 08:42

## 2019-01-04 NOTE — PROGRESS NOTES
EVS stafff overheard pt stating" I want to put a gun to my head " This RN notified  I asked the patient if he felt that he wanted to hurt himself, pt stated " No, I do not want to hurt myself and did not say that"  [facility]

## 2019-01-04 NOTE — PROGRESS NOTES
Progress Note - Linwood Mazariegos 1945, 68 y o  male MRN: [id number]    Unit/Bed#: [address] [place] [address] Encounter: [id number]    Primary Care Provider: Taran Velásquez PA-C   Date and time admitted to hospital: 1/2/2019  6:35 PM        Pulmonary embolism [facility])   Assessment & Plan    · When the patient presented there was a lower extremity DVT that was visualized, given the patient's presentation with dyspnea was presumed that there was a PE until proven otherwise  At this point, after initial evaluation by Cardiology is likely that the DVT that was visualized is chronic  V/Q scan is low probability  Cellulitis of left lower extremity   Assessment & Plan    · Change IV Ancef to po ceftin  · Wound care       Atrial fibrillation (HCC)   Assessment & Plan    · Continue Cardizem  80 mg p o  Q day for rate control  · Continue Eliquis for anticoagulation  · Currently rate controlled continue to monitor     * Acute on chronic systolic (congestive) heart failure (HCC)   Assessment & Plan    · Ejection fraction 45% by transthoracic echocardiogram this represents acute on chronic systolic heart failure  · Continue treating with IV diuresis Lasix 40 mg b i d   · 2 g daily sodium restricted diet strict ins and outs daily weights  · Cardiology following       VTE Pharmacologic Prophylaxis:   Pharmacologic: Heparin  Mechanical VTE Prophylaxis in Place: Yes    Patient Centered Rounds: I have performed bedside rounds with nursing staff today  Discussions with Specialists or Other Care Team Provider:      Education and Discussions with Family / Patient:      Time Spent for Care: 30 minutes  More than 50% of total time spent on counseling and coordination of care as described above      Current Length of Stay: 2 day(s)    Current Patient Status: Inpatient   Certification Statement: The patient will continue to require additional inpatient hospital stay due to Ongoing need for IV diuresis    Discharge Plan:      Code Status: Level 1 - Full Code      Subjective:   No acute events overnight, today the patient reports his improvement in his breathing and improvement in his lower extremity swelling  He denies chest pain shortness of breath    Objective:     Vitals:   Temp (24hrs), Av 5 °F (36 4 °C), Min:97 3 °F (36 3 °C), Max:97 6 °F (36 4 °C)    Temp:  [97 3 °F (36 3 °C)-97 6 °F (36 4 °C)] 97 6 °F (36 4 °C)  HR:  [61-90] 61  Resp:  [18] 18  BP: (101-128)/(60-74) 121/74  SpO2:  [91 %-97 %] 91 %  Body mass index is 38 91 kg/m²  Input and Output Summary (last 24 hours): Intake/Output Summary (Last 24 hours) at 19 1053  Last data filed at 19 1001   Gross per 24 hour   Intake                0 ml   Output             2700 ml   Net            -2700 ml       Physical Exam:     Physical Exam   Constitutional: He is oriented to person, place, and time  He appears well-developed and well-nourished  No distress  HENT:   Head: Normocephalic and atraumatic  Right Ear: External ear normal    Left Ear: External ear normal    Nose: Nose normal    Eyes: Conjunctivae are normal  Right eye exhibits no discharge  Left eye exhibits no discharge  No scleral icterus  Neck: Normal range of motion  Neck supple  No JVD present  No tracheal deviation present  No thyromegaly present  Cardiovascular: Normal rate, normal heart sounds and intact distal pulses  Exam reveals no gallop and no friction rub  No murmur heard  Pulmonary/Chest: Effort normal  No stridor  No respiratory distress  He has no wheezes  He has rales (At the bases bilaterally)  He exhibits no tenderness  Abdominal: Soft  Bowel sounds are normal  He exhibits no distension  There is no tenderness  There is no rebound and no guarding  Musculoskeletal: Normal range of motion  He exhibits edema (2+ pitting edema bilateral  lower extremities left worse than right)  He exhibits no tenderness or deformity  Lymphadenopathy:     He has no cervical adenopathy  Neurological: He is alert and oriented to person, place, and time  He has normal reflexes  No cranial nerve deficit  He exhibits normal muscle tone  Coordination normal    Skin: Skin is warm and dry  No rash noted  He is not diaphoretic  No erythema  No pallor  Psychiatric: He has a normal mood and affect  His behavior is normal  Judgment and thought content normal    Nursing note and vitals reviewed  Additional Data:     Labs:      Results from last 7 days  Lab Units 01/04/19  0522   WBC Thousand/uL 6 23   HEMOGLOBIN g/dL 13 9   HEMATOCRIT % 42 9   PLATELETS Thousands/uL 124*   NEUTROS PCT % 54   LYMPHS PCT % 28   MONOS PCT % 13*   EOS PCT % 4       Results from last 7 days  Lab Units 01/04/19  0048   SODIUM mmol/L 141   POTASSIUM mmol/L 3 5   CHLORIDE mmol/L 103   CO2 mmol/L 28   BUN mg/dL 34*   CREATININE mg/dL 1 45*   ANION GAP mmol/L 10   CALCIUM mg/dL 8 6   GLUCOSE RANDOM mg/dL 96       Results from last 7 days  Lab Units 01/04/19  0048   INR  1 73*                       * I Have Reviewed All Lab Data Listed Above  * Additional Pertinent Lab Tests Reviewed:  Vladimir 67 Admission Reviewed    Imaging:    Imaging Reports Reviewed Today Include:    Imaging Personally Reviewed by Myself Includes:       Recent Cultures (last 7 days):       Results from last 7 days  Lab Units 01/02/19  1925   GRAM STAIN RESULT  No polys seen  No organisms seen       Last 24 Hours Medication List:     Current Facility-Administered Medications:  acetaminophen 650 mg Oral Q4H PRN NAVNEET Moore   apixaban 5 mg Oral BID Li Rubio MD   vitamin C 1,000 mg Oral Daily NAVNEET Moore   cefuroxime 500 mg Oral Q12H [address] Li Rubio MD   cholecalciferol 1,000 Units Oral Daily NAVNEET Martinez   co-enzyme Q-10 100 mg Oral Daily NAVNEET Martinez   cyanocobalamin 1,000 mcg Oral Daily NAVNEET Martinez   cyclobenzaprine 10 mg Oral TID PRN NAVNEET Moore   diltiazem 120 mg Oral BID NAVNEET Mason   fluticasone 1 spray Nasal Daily NAVNEET Martinez   furosemide 40 mg Intravenous BID NAVNEET Sheth   HYDROcodone-acetaminophen 2 tablet Oral Q6H PRN NAVNEET Mason   influenza vaccine 0 5 mL Intramuscular Prior to discharge Mecca Sales MD   lisinopril 20 mg Oral Daily NAVNEET Martinez   losartan 50 mg Oral Daily NAVNEET Martinze   ondansetron 4 mg Intravenous Q6H PRN NAVNEET Mason   pantoprazole 40 mg Oral Early Morning NAVNEET Martinez   polyethylene glycol 17 g Oral Daily PRN NAVNEET Mason   potassium chloride 10 mEq Oral Daily NAVNEET Martinez   sodium chloride 1 spray Each Nare PRN NAVNEET Sheth   vitamin E (tocopherol) 400 Units Oral Daily NAVNEET Sheth        Today, Patient Was Seen By: Tanya Holland MD    ** Please Note: Dictation voice to text software may have been used in the creation of this document   **

## 2019-01-04 NOTE — ASSESSMENT & PLAN NOTE
· Ejection fraction 45% by transthoracic echocardiogram this represents acute on chronic systolic heart failure  · Continue treating with IV diuresis Lasix 40 mg b i d   · 2 g daily sodium restricted diet strict ins and outs daily weights  · Cardiology following

## 2019-01-04 NOTE — ASSESSMENT & PLAN NOTE
· Continue Cardizem  80 mg p o   Q day for rate control  · Continue Eliquis for anticoagulation  · Currently rate controlled continue to monitor

## 2019-01-04 NOTE — ASSESSMENT & PLAN NOTE
· When the patient presented there was a lower extremity DVT that was visualized, given the patient's presentation with dyspnea was presumed that there was a PE until proven otherwise  At this point, after initial evaluation by Cardiology is likely that the DVT that was visualized is chronic  V/Q scan is low probability

## 2019-01-04 NOTE — CONSULTS
Consultation - Wound Care   Linwood Mazariegos 68 y o  male MRN: [id number]  Unit/Bed#: [address] [place] [address] 206-01 Encounter: [id number]    Assessment/Plan     Assessment:  Chronic venous hypertension with ulcer of left lower extremity  Cellulitis of  lower limb  Morbid obesity due to excess calories  Peripheral vascular disease, unspecified  Lymphedema, not elsewhere classified    Plan:  1  Cleanse wound with NSS, pat dry, apply Dermagran gauze to wound bed, cover with 4x4 gauze and secure with claudia and tape  Change dressing once daily  2  Patient is to elevate his LE's as often as possible to reduce his LE edema  3  Continue IV Lasix as ordered by Cardiology  4  Continue IV antibiotics as ordered by Internal Medicine    History of Present Illness   HPI:  Karli Bill is a 68 y o  male who has a PmHx of a-fib, CHF, HTN, and chronic lower extremity lymphedema  He was seen for a venous ulcer on his left lower extremity  He reports his wound developed 1 week ago  Mr Lamine Narvaez reports that he noticed his LE edema worsening prior to his hospitalization  He also noticed that his skin ripped and starting draining  He reports that the wound drains a minimal amount of drainage  Per the patient's chart, he has gained 20lbs in the past 2 months and he was admitted to the hospital for shortness of breath  He denies any pain, fevers, or chills  Inpatient consult to Wound Care  Consult performed by: NAVNEET Leal  Consult ordered by: NAVNEET Gill          Review of Systems   Constitutional: Positive for unexpected weight change  Negative for chills and fever  HENT: Positive for hearing loss  Negative for ear pain  Eyes: Negative for pain  Respiratory: Positive for shortness of breath  Cardiovascular: Positive for leg swelling  Gastrointestinal: Negative for diarrhea, nausea and vomiting  Skin: Positive for wound  Neurological: Negative for weakness     Psychiatric/Behavioral: Negative for behavioral problems, confusion and suicidal ideas  Historical Information   Past Medical History:   Diagnosis Date    [facility]     Cardiac disease     Cellulitis     Chronic pain     Chronic venous hypertension     with ulceration    GERD (gastroesophageal reflux disease)     Hyperlipidemia     Hypertension     Obesity     [facility]monary hypertension [facility]     [facility]     Vascular disorder of extremity ([place] [address] )      Past Surgical History:   Procedure Laterality Date    ANTERIOR RELEASE VERTEBRAL BODY W/ POSTERIOR FUSION      APPENDECTOMY  1955    CATARACT EXTRACTION      DECOMPRESSION SPINE LUMBAR POSTERIOR      ELBOW SURGERY      KNEE SURGERY      NOSE SURGERY      turbinectomy    RETINAL DETACHMENT SURGERY      RHINOPLASTY      TONSILLECTOMY      VEIN LIGATION AND STRIPPING      saphenous vein long     Social History   History   Alcohol Use No     History   Drug Use No     History   Smoking Status    Former Smoker   Smokeless Tobacco    Never Used     Family History:   Family History   Problem Relation Age of Onset    Cancer Mother         cancer of uterus    Diabetes Sister     Mental illness Neg Hx         neg fam hx    Substance Abuse Neg Hx         neg fam hx       Meds/Allergies   all current active meds have been reviewed  No Known Allergies    Objective   Vitals: Blood pressure 121/74, pulse 61, temperature 97 6 °F (36 4 °C), temperature source Oral, resp  rate 18, height 6' 4" (1 93 m), weight (!) 145 kg (319 lb 10 7 oz), SpO2 91 %  Physical Exam   Constitutional: He is oriented to person, place, and time  He appears well-developed and well-nourished  HENT:   Head: Normocephalic and atraumatic  Eyes: Right eye exhibits no discharge  Left eye exhibits no discharge  Neck: Normal range of motion  Cardiovascular: An irregular rhythm present  Pulmonary/Chest: He has wheezes     Slight expiratory wheezes bilaterally   Musculoskeletal: He exhibits edema  +2 pitting edema bilaterally on his lower extremities   Neurological: He is alert and oriented to person, place, and time  Skin: Skin is warm and dry  Psychiatric: He has a normal mood and affect  His behavior is normal  Judgment and thought content normal              Lab Results: I have personally reviewed pertinent reports  Imaging: I have personally reviewed pertinent reports  EKG, Pathology, and Other Studies: I have personally reviewed pertinent reports  Code Status: Level 1 - Full Code  Advance Directive and Living Will:      Power of :    POLST:      Counseling / Coordination of Care  Total floor / unit time spent today 15 minutes  Greater than 50% of total time was spent with the patient and / or family counseling and / or coordination of care  A description of the counseling / coordination of care: Wound care plan of care

## 2019-01-04 NOTE — MALNUTRITION/BMI
This medical record reflects one or more clinical indicators suggestive of malnutrition and/or morbid obesity  BMI Findings: Body mass index is 38 91 kg/m²  Treat with diet  See Nutrition note dated 01/04/2019  for additional details  Completed nutrition assessment is viewable in the nutrition documentation

## 2019-01-04 NOTE — PROGRESS NOTES
Cardiology Progress Note - Linwood Mazariegos 68 y o  male MRN: [id number]    Unit/Bed#: [address] [place] [address] 206-01 Encounter: [id number]      Assessment:  Principal Problem:    Acute on chronic systolic (congestive) heart failure (HCC)  Active Problems:    Obesity    Hypertension    Atrial fibrillation (HCC)    SOB (shortness of breath)    Elevated d-dimer    Acute kidney injury ([facility] )    Elevated troponin    Cellulitis of left lower extremity    Pulmonary embolism ([facility] )      Plan:    1  Acute on chronic diastolic congestive heart failure - Mr Gonzalez Sharma subjectively is up 20 lb over the last few months, showing signs of volume overload and significant lower extremity edema  Hard to tell if he has clinically changed, given in accurate weights  Continue IV Lasix today  When changing back to oral suggest 40 mg of torsemide daily  He could also continue 2 to 3 times a week metolazone  Continue to follow urine output and blood work closely  2  Cardiomyopathy - Ejection fraction 45% any also has significant LVH  He also appears to have at least moderate mitral regurgitation  He will need close outpatient follow-up with his cardiologist, at [facility]      3  Chronic atrial fibrillation - Heart rates controlled  Continue diltiazem and we will leave him off digoxin from this point forward  On Eliquis for stroke prevention      4  Acute respiratory failure - V/Q scan shows low probability of pulmonary embolism  DVT seen and left lower extremity, likely chronic  Remains on Eliquis for stroke prevention for atrial fibrillation      5  Hypertension - Controlled on his current regimen  No changes made otherwise  Subjective:   Patient seen and examined  No significant events overnight  Patient states breathing is improved  Still volume overloaded  Objective:     Vitals: Blood pressure 123/67, pulse 60, temperature 98 °F (36 7 °C), temperature source Oral, resp   rate 18, height 6' 4" (1 93 m), weight (!) 145 kg (319 lb 10 7 oz), SpO2 93 %  , Body mass index is 38 91 kg/m² , Orthostatic Blood Pressures      Most Recent Value   Blood Pressure  123/67 filed at 01/04/2019 1457   Patient Position - Orthostatic VS  Sitting filed at 01/04/2019 1457            Intake/Output Summary (Last 24 hours) at 01/04/19 1531  Last data filed at 01/04/19 1339   Gross per 24 hour   Intake                0 ml   Output             2600 ml   Net            -2600 ml     Telemetry review:  Atrial fibrillation with intermittent PVCs      Physical Exam:    GEN: Ankush Childress appears well, alert and oriented x 3, pleasant and cooperative   HEENT: pupils equal, round, and reactive to light; extraocular muscles intact  NECK: supple, no carotid bruits  ++ JVD  HEART: irregular rhythm, distant S1 and S2, no significant murmurs, clicks, gallops or rubs   LUNGS:  Diminished bilaterally; no wheezes, rales, or rhonchi   ABDOMEN: normal bowel sounds, soft, no tenderness, no distention  EXTREMITIES: peripheral pulses normal; no clubbing, cyanosis  +++ edema left greater than right with venous stasis changes in cellulitic changes    NEURO: no focal findings   SKIN: normal without suspicious lesions on exposed skin    Medications:      Current Facility-Administered Medications:     acetaminophen (TYLENOL) tablet 650 mg, 650 mg, Oral, Q4H PRN, NAVNEET Medeiros    apixaban (ELIQUIS) tablet 5 mg, 5 mg, Oral, BID, Lisa Rios MD, 5 mg at 01/04/19 7787    ascorbic acid (VITAMIN C) tablet 1,000 mg, 1,000 mg, Oral, Daily, NAVNEET Martinez, 1,000 mg at 01/04/19 6582    cefuroxime (CEFTIN) tablet 500 mg, 500 mg, Oral, Q12H [address], Lisa Rios MD    cholecalciferol (VITAMIN D3) tablet 1,000 Units, 1,000 Units, Oral, Daily, NAVNEET Medeiros, 1,000 Units at 01/04/19 5836    co-enzyme Q-10 capsule 100 mg, 100 mg, Oral, Daily, NAVNEET Martinez, 100 mg at 01/04/19 8390    cyanocobalamin (VITAMIN B-12) tablet 1,000 mcg, 1,000 mcg, Oral, Daily, Dwaine Tiwari NAVNEET Nguyễn, 1,000 mcg at 01/04/19 1828    cyclobenzaprine (FLEXERIL) tablet 10 mg, 10 mg, Oral, TID PRN, NAVNEET Hauser    diltiazem (CARDIZEM CD) 24 hr capsule 120 mg, 120 mg, Oral, BID, NAVNEET Martinez, 120 mg at 01/04/19 0842    fluticasone (FLONASE) 50 mcg/act nasal spray 1 spray, 1 spray, Nasal, Daily, NAVNEET Martinez, 1 spray at 01/04/19 0843    furosemide (LASIX) injection 40 mg, 40 mg, Intravenous, BID, NAVNEET Martinez, 40 mg at 01/04/19 3269    HYDROcodone-acetaminophen (NORCO) 5-325 mg per tablet 2 tablet, 2 tablet, Oral, Q6H PRN, NAVNEET Hauser, 2 tablet at 01/03/19 2152    influenza vaccine, high-dose (FLUZONE HIGH-DOSE) IM injection RITU 0 5 mL, 0 5 mL, Intramuscular, Prior to discharge, Lay Ryan MD    lisinopril (ZESTRIL) tablet 20 mg, 20 mg, Oral, Daily, NAVNEET Martinez, 20 mg at 01/04/19 4367    losartan (COZAAR) tablet 50 mg, 50 mg, Oral, Daily, NAVNEET Martinez, 50 mg at 01/04/19 0842    ondansetron (ZOFRAN) injection 4 mg, 4 mg, Intravenous, Q6H PRN, NAVNEET Hauser    pantoprazole (PROTONIX) EC tablet 40 mg, 40 mg, Oral, Early Morning, NAVNEET Martinez, 40 mg at 01/04/19 0636    polyethylene glycol (MIRALAX) packet 17 g, 17 g, Oral, Daily PRN, NAVNEET Hauser    potassium chloride (K-DUR,KLOR-CON) CR tablet 10 mEq, 10 mEq, Oral, Daily, NAVNEET Martinez, 10 mEq at 01/04/19 0842    sodium chloride (OCEAN) 0 65 % nasal spray 1 spray, 1 spray, Each Nare, PRN, NAVNEET Hauser    vitamin E (tocopherol) capsule 400 Units, 400 Units, Oral, Daily, NAVNEET Martinez, 400 Units at 01/04/19 0843     Labs & Results:      Results from last 7 days  Lab Units 01/04/19  1325 01/04/19  1022 01/03/19  1153   TROPONIN I ng/mL 0 13* 0 12* 0 17*       Results from last 7 days  Lab Units 01/04/19  0522 01/03/19  0814 01/03/19  0107   WBC Thousand/uL 6 23 6 08 7 05   HEMOGLOBIN g/dL 13 9 14 1 14 7 HEMATOCRIT % 42 9 43 1 44 7   PLATELETS Thousands/uL 124* 119* 165       Results from last 7 days  Lab Units 01/03/19  0814   TRIGLYCERIDES mg/dL 30   HDL mg/dL 41       Results from last 7 days  Lab Units 01/04/19  0048 01/03/19  0814 01/02/19  1924   POTASSIUM mmol/L 3 5 3 2* 3 8   CHLORIDE mmol/L 103 102 100   CO2 mmol/L 28 25 27   BUN mg/dL 34* 31* 32*   CREATININE mg/dL 1 45* 1 26 1 47*   CALCIUM mg/dL 8 6 9 0 9 3       Results from last 7 days  Lab Units 01/04/19  0048 01/03/19  0814 01/03/19  0107 01/02/19  1924   INR  1 73* 2 27* 1 90* 2 05*   PTT seconds  --  >210* 38 38             EKG personally reviewed by Guy Maharaj MD   Atrial fibrillation with intermittent aberrancy and PVCs  ECHO:  LEFT VENTRICLE:  Systolic function was mildly reduced  Ejection fraction was estimated to be 45 %  There was mild diffuse hypokinesis with regional variations  More servere hypokinesis was seen in the inferior wall  Wall thickness was moderately to markedly increased      VENTRICULAR SEPTUM:  There was moderate dyssynergic motion      RIGHT VENTRICLE:  The ventricle was dilated  Systolic function was mildly reduced      LEFT ATRIUM:  The atrium was moderately dilated      RIGHT ATRIUM:  The atrium was moderately dilated      MITRAL VALVE:  There was mild annular calcification  There was at least moderate regurgitation  The regurgitant jet was eccentric and not well captured on most images      TRICUSPID VALVE:  There was mild regurgitation      Counseling / Coordination of Care  Total floor / unit time spent today 25 minutes  Greater than 50% of total time was spent with the patient and / or family counseling and / or coordination of care

## 2019-01-05 LAB
BACTERIA WND AEROBE CULT: ABNORMAL
GRAM STN SPEC: ABNORMAL
GRAM STN SPEC: ABNORMAL

## 2019-01-05 PROCEDURE — 99233 SBSQ HOSP IP/OBS HIGH 50: CPT | Performed by: INTERNAL MEDICINE

## 2019-01-05 RX ORDER — DOXYCYCLINE HYCLATE 100 MG/1
100 CAPSULE ORAL EVERY 12 HOURS SCHEDULED
Status: DISCONTINUED | OUTPATIENT
Start: 2019-01-05 | End: 2019-01-14

## 2019-01-05 RX ADMIN — FUROSEMIDE 40 MG: 10 INJECTION, SOLUTION INTRAMUSCULAR; INTRAVENOUS at 17:25

## 2019-01-05 RX ADMIN — Medication 100 MG: at 08:24

## 2019-01-05 RX ADMIN — DILTIAZEM HYDROCHLORIDE 120 MG: 120 CAPSULE, COATED, EXTENDED RELEASE ORAL at 17:25

## 2019-01-05 RX ADMIN — POTASSIUM CHLORIDE 10 MEQ: 750 TABLET, EXTENDED RELEASE ORAL at 08:24

## 2019-01-05 RX ADMIN — FLUTICASONE PROPIONATE 1 SPRAY: 50 SPRAY, METERED NASAL at 08:24

## 2019-01-05 RX ADMIN — HYDROCODONE BITARTRATE AND ACETAMINOPHEN 2 TABLET: 5; 325 TABLET ORAL at 23:28

## 2019-01-05 RX ADMIN — PANTOPRAZOLE SODIUM 40 MG: 40 TABLET, DELAYED RELEASE ORAL at 05:15

## 2019-01-05 RX ADMIN — CYANOCOBALAMIN TAB 500 MCG 1000 MCG: 500 TAB at 08:24

## 2019-01-05 RX ADMIN — VITAMIN E CAP 400 UNIT 400 UNITS: 400 CAP at 08:24

## 2019-01-05 RX ADMIN — OXYCODONE HYDROCHLORIDE AND ACETAMINOPHEN 1000 MG: 500 TABLET ORAL at 08:24

## 2019-01-05 RX ADMIN — APIXABAN 5 MG: 5 TABLET, FILM COATED ORAL at 17:25

## 2019-01-05 RX ADMIN — DILTIAZEM HYDROCHLORIDE 120 MG: 120 CAPSULE, COATED, EXTENDED RELEASE ORAL at 08:24

## 2019-01-05 RX ADMIN — VITAMIN D, TAB 1000IU (100/BT) 1000 UNITS: 25 TAB at 08:24

## 2019-01-05 RX ADMIN — LOSARTAN POTASSIUM 50 MG: 50 TABLET, FILM COATED ORAL at 08:24

## 2019-01-05 RX ADMIN — FUROSEMIDE 40 MG: 10 INJECTION, SOLUTION INTRAMUSCULAR; INTRAVENOUS at 08:23

## 2019-01-05 RX ADMIN — HYDROCODONE BITARTRATE AND ACETAMINOPHEN 2 TABLET: 5; 325 TABLET ORAL at 05:15

## 2019-01-05 RX ADMIN — DOXYCYCLINE HYCLATE 100 MG: 100 CAPSULE ORAL at 20:54

## 2019-01-05 RX ADMIN — CEFUROXIME AXETIL 500 MG: 250 TABLET ORAL at 08:23

## 2019-01-05 RX ADMIN — APIXABAN 5 MG: 5 TABLET, FILM COATED ORAL at 08:24

## 2019-01-05 NOTE — ASSESSMENT & PLAN NOTE
· Continue Cardizem  80 mg p o   Q day for rate control  · Continue Eliquis for anticoagulation  · Still rate controlled continue to monitor  · Cardiology following

## 2019-01-05 NOTE — ASSESSMENT & PLAN NOTE
· Ejection fraction 45% by transthoracic echocardiogram this represents acute on chronic systolic heart failure  · Continue treating with IV diuresis Lasix 40 mg b i d   · 2 g daily sodium restricted diet strict ins and outs daily weights  · Cardiology following, appreciate their assistance

## 2019-01-05 NOTE — PROGRESS NOTES
Progress Note - Linwood Mazariegos 1945, 68 y o  male MRN: [id number]    Unit/Bed#: [address] [place] [address] Encounter: [id number]    Primary Care Provider: Leighton Salazar PA-C   Date and time admitted to hospital: 1/2/2019  6:35 PM        Cellulitis of left lower extremity   Assessment & Plan    · Culture came back positive with MR MCKEON  · Contact isolation  · Change p  Joseluis Freiberg to p o  Doxycycline to treat   · Wound care       Atrial fibrillation (HCC)   Assessment & Plan    · Continue Cardizem  80 mg p o  Q day for rate control  · Continue Eliquis for anticoagulation  · Still rate controlled continue to monitor  · Cardiology following     * Acute on chronic systolic (congestive) heart failure (HCC)   Assessment & Plan    · Ejection fraction 45% by transthoracic echocardiogram this represents acute on chronic systolic heart failure  · Continue treating with IV diuresis Lasix 40 mg b i d   · 2 g daily sodium restricted diet strict ins and outs daily weights  · Cardiology following, appreciate their assistance       VTE Pharmacologic Prophylaxis:   Pharmacologic: Apixaban (Eliquis)  Mechanical VTE Prophylaxis in Place: Yes    Patient Centered Rounds: I have performed bedside rounds with nursing staff today  Discussions with Specialists or Other Care Team Provider:      Education and Discussions with Family / Patient:      Time Spent for Care: 30 minutes  More than 50% of total time spent on counseling and coordination of care as described above  Current Length of Stay: 3 day(s)    Current Patient Status: Inpatient   Certification Statement: The patient will continue to require additional inpatient hospital stay due to Treatment of MRSA cellulitis, IV diuresis    Discharge Plan:      Code Status: Level 1 - Full Code      Subjective:   No acute events overnight no new complaints today  Patient denies chest pain shortness of breath    He reports that his lower extremity edema seems to be improving as his legs are softer    Objective:     Vitals:   Temp (24hrs), Av 6 °F (36 4 °C), Min:97 4 °F (36 3 °C), Max:97 9 °F (36 6 °C)    Temp:  [97 4 °F (36 3 °C)-97 9 °F (36 6 °C)] 97 9 °F (36 6 °C)  HR:  [73-78] 77  Resp:  [18-20] 20  BP: (113-118)/(58-70) 113/58  SpO2:  [92 %-96 %] 96 %  Body mass index is 38 78 kg/m²  Input and Output Summary (last 24 hours): Intake/Output Summary (Last 24 hours) at 19 1808  Last data filed at 19 1701   Gross per 24 hour   Intake                0 ml   Output             2600 ml   Net            -2600 ml       Physical Exam:     Physical Exam   Constitutional: He is oriented to person, place, and time  He appears well-developed and well-nourished  No distress  HENT:   Head: Normocephalic and atraumatic  Right Ear: External ear normal    Left Ear: External ear normal    Nose: Nose normal    Eyes: Conjunctivae are normal  Right eye exhibits no discharge  Left eye exhibits no discharge  No scleral icterus  Neck: Normal range of motion  Neck supple  No JVD present  No tracheal deviation present  No thyromegaly present  Cardiovascular: Normal rate, normal heart sounds and intact distal pulses  Exam reveals no gallop and no friction rub  No murmur heard  Irregularly irregular rate and rhythm   Pulmonary/Chest: Breath sounds normal  No stridor  No respiratory distress  He has no wheezes  He has no rales  Abdominal: Soft  Bowel sounds are normal  He exhibits no distension  There is no tenderness  There is no rebound and no guarding  Musculoskeletal: Normal range of motion  He exhibits edema (2+ pitting edema bilateral lower extremities, left lower extremity wrapped in Ace bandage)  He exhibits no tenderness or deformity  Neurological: He is alert and oriented to person, place, and time  He exhibits normal muscle tone  Coordination normal    Skin: Skin is warm and dry  No rash noted  He is not diaphoretic  No erythema  No pallor     Psychiatric: He has a normal mood and affect  His behavior is normal  Judgment and thought content normal    Nursing note and vitals reviewed  Additional Data:     Labs:      Results from last 7 days  Lab Units 01/04/19  0522   WBC Thousand/uL 6 23   HEMOGLOBIN g/dL 13 9   HEMATOCRIT % 42 9   PLATELETS Thousands/uL 124*   NEUTROS PCT % 54   LYMPHS PCT % 28   MONOS PCT % 13*   EOS PCT % 4       Results from last 7 days  Lab Units 01/04/19  0048   SODIUM mmol/L 141   POTASSIUM mmol/L 3 5   CHLORIDE mmol/L 103   CO2 mmol/L 28   BUN mg/dL 34*   CREATININE mg/dL 1 45*   ANION GAP mmol/L 10   CALCIUM mg/dL 8 6   GLUCOSE RANDOM mg/dL 96       Results from last 7 days  Lab Units 01/04/19  0048   INR  1 73*                       * I Have Reviewed All Lab Data Listed Above  * Additional Pertinent Lab Tests Reviewed:  Vladimir 66 Admission Reviewed    Imaging:    Imaging Reports Reviewed Today Include:    Imaging Personally Reviewed by Myself Includes:       Recent Cultures (last 7 days):       Results from last 7 days  Lab Units 01/02/19  1925   GRAM STAIN RESULT  No polys seen  No organisms seen   WOUND CULTURE  1+ Growth of Methicillin Resistant Staphylococcus aureus*       Last 24 Hours Medication List:     Current Facility-Administered Medications:  acetaminophen 650 mg Oral Q4H PRN NAVNEET Martinez   apixaban 5 mg Oral BID Baljit Duran MD   vitamin C 1,000 mg Oral Daily NAVNEET Solorzano   cholecalciferol 1,000 Units Oral Daily NAVNEET Martinez   co-enzyme Q-10 100 mg Oral Daily NAVNEET Martinez   cyanocobalamin 1,000 mcg Oral Daily NAVNEET Martinez   cyclobenzaprine 10 mg Oral TID PRN NAVNEET Solorzano   diltiazem 120 mg Oral BID NAVNEET Martinez   doxycycline hyclate 100 mg Oral Q12H Cole Brar MD   fluticasone 1 spray Nasal Daily NAVNEET Martinez   furosemide 40 mg Intravenous BID NAVNEET Solorzano   HYDROcodone-acetaminophen 2 tablet Oral Q6H PRN NAVNEET Damico   influenza vaccine 0 5 mL Intramuscular Prior to discharge Alan Evangelista MD   losartan 50 mg Oral Daily NAVNEET Damico   ondansetron 4 mg Intravenous Q6H PRN NAVNEET Trujillo   pantoprazole 40 mg Oral Early Morning NAVNEET Martinez   polyethylene glycol 17 g Oral Daily PRN NAVNEET Damico   potassium chloride 10 mEq Oral Daily NAVNEET Martinez   sodium chloride 1 spray Each Nare PRN NAVNEET Trujillo   vitamin E (tocopherol) 400 Units Oral Daily NAVNEET Trujillo        Today, Patient Was Seen By: Ariela Fournier MD    ** Please Note: Dictation voice to text software may have been used in the creation of this document   **

## 2019-01-06 PROCEDURE — 99232 SBSQ HOSP IP/OBS MODERATE 35: CPT | Performed by: INTERNAL MEDICINE

## 2019-01-06 RX ADMIN — CYANOCOBALAMIN TAB 500 MCG 1000 MCG: 500 TAB at 08:17

## 2019-01-06 RX ADMIN — FUROSEMIDE 40 MG: 10 INJECTION, SOLUTION INTRAMUSCULAR; INTRAVENOUS at 08:17

## 2019-01-06 RX ADMIN — FUROSEMIDE 40 MG: 10 INJECTION, SOLUTION INTRAMUSCULAR; INTRAVENOUS at 17:16

## 2019-01-06 RX ADMIN — OXYCODONE HYDROCHLORIDE AND ACETAMINOPHEN 1000 MG: 500 TABLET ORAL at 08:17

## 2019-01-06 RX ADMIN — HYDROCODONE BITARTRATE AND ACETAMINOPHEN 2 TABLET: 5; 325 TABLET ORAL at 22:41

## 2019-01-06 RX ADMIN — VITAMIN D, TAB 1000IU (100/BT) 1000 UNITS: 25 TAB at 08:17

## 2019-01-06 RX ADMIN — APIXABAN 5 MG: 5 TABLET, FILM COATED ORAL at 17:15

## 2019-01-06 RX ADMIN — DOXYCYCLINE HYCLATE 100 MG: 100 CAPSULE ORAL at 08:17

## 2019-01-06 RX ADMIN — LOSARTAN POTASSIUM 50 MG: 50 TABLET, FILM COATED ORAL at 08:17

## 2019-01-06 RX ADMIN — DOXYCYCLINE HYCLATE 100 MG: 100 CAPSULE ORAL at 22:15

## 2019-01-06 RX ADMIN — APIXABAN 5 MG: 5 TABLET, FILM COATED ORAL at 08:17

## 2019-01-06 RX ADMIN — POTASSIUM CHLORIDE 10 MEQ: 750 TABLET, EXTENDED RELEASE ORAL at 08:17

## 2019-01-06 RX ADMIN — DILTIAZEM HYDROCHLORIDE 120 MG: 120 CAPSULE, COATED, EXTENDED RELEASE ORAL at 17:15

## 2019-01-06 RX ADMIN — VITAMIN E CAP 400 UNIT 400 UNITS: 400 CAP at 08:20

## 2019-01-06 RX ADMIN — PANTOPRAZOLE SODIUM 40 MG: 40 TABLET, DELAYED RELEASE ORAL at 07:22

## 2019-01-06 RX ADMIN — FLUTICASONE PROPIONATE 1 SPRAY: 50 SPRAY, METERED NASAL at 08:19

## 2019-01-06 RX ADMIN — Medication 100 MG: at 08:19

## 2019-01-06 RX ADMIN — DILTIAZEM HYDROCHLORIDE 120 MG: 120 CAPSULE, COATED, EXTENDED RELEASE ORAL at 08:17

## 2019-01-06 NOTE — ASSESSMENT & PLAN NOTE
· Ejection fraction 45% by transthoracic echocardiogram this represents acute on chronic systolic heart failure  · Continue treating with IV diuresis Lasix 40 mg b i d , slow progress  · 2 g daily sodium restricted diet strict ins and outs daily weights  · Cardiology following, appreciate their assistance

## 2019-01-06 NOTE — PROGRESS NOTES
Progress Note - Linwood Mazariegos 1945, 68 y o  male MRN: [id number]    Unit/Bed#: [address] [place] [address] Encounter: [id number]    Primary Care Provider: Nimo Angel PA-C   Date and time admitted to hospital: 1/2/2019  6:35 PM        Cellulitis of left lower extremity   Assessment & Plan    · Culture came back positive with MR MCKEON  · Contact isolation  · Continue p o  Doxycycline to treat   · Wound care       Atrial fibrillation (HCC)   Assessment & Plan    · Continue Cardizem  mg p o  Q day for rate control  · Continue Eliquis for anticoagulation  · Still rate controlled continue to monitor  · Cardiology following     * Acute on chronic systolic (congestive) heart failure (HCC)   Assessment & Plan    · Ejection fraction 45% by transthoracic echocardiogram this represents acute on chronic systolic heart failure  · Continue treating with IV diuresis Lasix 40 mg b i d , slow progress  · 2 g daily sodium restricted diet strict ins and outs daily weights  · Cardiology following, appreciate their assistance         VTE Pharmacologic Prophylaxis:   Pharmacologic: Apixaban (Eliquis)  Mechanical VTE Prophylaxis in Place: Yes    Patient Centered Rounds: I have performed bedside rounds with nursing staff today  Discussions with Specialists or Other Care Team Provider:      Education and Discussions with Family / Patient:      Time Spent for Care: 20 minutes  More than 50% of total time spent on counseling and coordination of care as described above      Current Length of Stay: 4 day(s)    Current Patient Status: Inpatient   Certification Statement: The patient will continue to require additional inpatient hospital stay due to IV diuresis    Discharge Plan:  Plans for discharge home once medically stable    Code Status: Level 1 - Full Code      Subjective:   No acute events overnight today no new complaints denies chest pain shortness of breath lower extremity edema seems unchanged    Objective:     Vitals:   Temp (24hrs), Av 9 °F (36 6 °C), Min:97 5 °F (36 4 °C), Max:98 4 °F (36 9 °C)    Temp:  [97 5 °F (36 4 °C)-98 4 °F (36 9 °C)] 97 5 °F (36 4 °C)  HR:  [75-80] 75  Resp:  [18-20] 20  BP: (104-145)/(64-79) 104/64  SpO2:  [94 %-99 %] 99 %  Body mass index is 38 96 kg/m²  Input and Output Summary (last 24 hours): Intake/Output Summary (Last 24 hours) at 19 1701  Last data filed at 19 1330   Gross per 24 hour   Intake                0 ml   Output             1750 ml   Net            -1750 ml       Physical Exam:     Physical Exam   Constitutional: He is oriented to person, place, and time  He appears well-developed and well-nourished  No distress  HENT:   Head: Normocephalic and atraumatic  Right Ear: External ear normal    Left Ear: External ear normal    Nose: Nose normal    Mouth/Throat: Oropharynx is clear and moist  No oropharyngeal exudate  Eyes: Conjunctivae are normal  Right eye exhibits no discharge  Left eye exhibits no discharge  No scleral icterus  Neck: Normal range of motion  Neck supple  No JVD present  No tracheal deviation present  No thyromegaly present  Cardiovascular: Normal rate, regular rhythm, normal heart sounds and intact distal pulses  Exam reveals no gallop and no friction rub  No murmur heard  Pulmonary/Chest: Breath sounds normal  No stridor  No respiratory distress  He has no wheezes  He has no rales  Abdominal: Soft  Bowel sounds are normal  He exhibits no distension  There is no tenderness  There is no rebound and no guarding  Musculoskeletal: Normal range of motion  He exhibits edema  He exhibits no tenderness or deformity  Neurological: He is alert and oriented to person, place, and time  He exhibits normal muscle tone  Coordination normal    Skin: Skin is warm and dry  No rash noted  He is not diaphoretic  There is erythema (Darkened skin around lower extremities bilaterally consistent with chronic edematous states)  No pallor     Psychiatric: He has a normal mood and affect  His behavior is normal  Judgment and thought content normal    Nursing note and vitals reviewed  Additional Data:     Labs:      Results from last 7 days  Lab Units 01/04/19  0522   WBC Thousand/uL 6 23   HEMOGLOBIN g/dL 13 9   HEMATOCRIT % 42 9   PLATELETS Thousands/uL 124*   NEUTROS PCT % 54   LYMPHS PCT % 28   MONOS PCT % 13*   EOS PCT % 4       Results from last 7 days  Lab Units 01/04/19  0048   SODIUM mmol/L 141   POTASSIUM mmol/L 3 5   CHLORIDE mmol/L 103   CO2 mmol/L 28   BUN mg/dL 34*   CREATININE mg/dL 1 45*   ANION GAP mmol/L 10   CALCIUM mg/dL 8 6   GLUCOSE RANDOM mg/dL 96       Results from last 7 days  Lab Units 01/04/19  0048   INR  1 73*                       * I Have Reviewed All Lab Data Listed Above  * Additional Pertinent Lab Tests Reviewed:  Vladimir 66 Admission Reviewed    Imaging:    Imaging Reports Reviewed Today Include:    Imaging Personally Reviewed by Myself Includes:       Recent Cultures (last 7 days):       Results from last 7 days  Lab Units 01/02/19  1925   GRAM STAIN RESULT  No polys seen  No organisms seen   WOUND CULTURE  1+ Growth of Methicillin Resistant Staphylococcus aureus*       Last 24 Hours Medication List:     Current Facility-Administered Medications:  acetaminophen 650 mg Oral Q4H PRN NAVNEET Martinez   apixaban 5 mg Oral BID Nash Bergman MD   vitamin C 1,000 mg Oral Daily NAVNEET Soto   cholecalciferol 1,000 Units Oral Daily NAVNEET Martinez   co-enzyme Q-10 100 mg Oral Daily NAVNEET Martinez   cyanocobalamin 1,000 mcg Oral Daily NAVNEET Martinez   cyclobenzaprine 10 mg Oral TID PRN NAVNEET Soto   diltiazem 120 mg Oral BID NAVNEET Martinez   doxycycline hyclate 100 mg Oral Q12H Alta Palacios MD   fluticasone 1 spray Nasal Daily NAVNEET Martinez   furosemide 40 mg Intravenous BID NAVNEET Soto   HYDROcodone-acetaminophen 2 tablet Oral Q6H PRN Bud Gilma, CRNP   influenza vaccine 0 5 mL Intramuscular Prior to discharge Nelly Vieira MD   losartan 50 mg Oral Daily [employer] NAVNEET Nguyễn   ondansetron 4 mg Intravenous Q6H PRN Bud Gilma, CRNP   pantoprazole 40 mg Oral Early Morning MarNAVNEET Whiteside   polyethylene glycol 17 g Oral Daily PRN [employer], CRNP   potassium chloride 10 mEq Oral Daily MarNAVNEET Whiteside   sodium chloride 1 spray Each Nare PRN Bud Gilma, CRNP   vitamin E (tocopherol) 400 Units Oral Daily [place] Gilma, NAVNEET        Today, Patient Was Seen By: Stefan Lucia MD    ** Please Note: Dictation voice to text software may have been used in the creation of this document   **

## 2019-01-06 NOTE — ASSESSMENT & PLAN NOTE
· Continue Cardizem  mg p o   Q day for rate control  · Continue Eliquis for anticoagulation  · Still rate controlled continue to monitor  · Cardiology following

## 2019-01-06 NOTE — PROGRESS NOTES
Pt observed to be sleeping for short intervals overnight during hrly rounds after admin of PRN pain med

## 2019-01-07 LAB
ANION GAP SERPL CALCULATED.3IONS-SCNC: 8 MMOL/L (ref 4–13)
BASOPHILS # BLD AUTO: 0.03 THOUSANDS/ΜL (ref 0–0.1)
BASOPHILS NFR BLD AUTO: 1 % (ref 0–1)
BUN SERPL-MCNC: 42 MG/DL (ref 5–25)
CALCIUM SERPL-MCNC: 8.5 MG/DL (ref 8.3–10.1)
CHLORIDE SERPL-SCNC: 102 MMOL/L (ref 100–108)
CO2 SERPL-SCNC: 29 MMOL/L (ref 21–32)
CREAT SERPL-MCNC: 1.33 MG/DL (ref 0.6–1.3)
EOSINOPHIL # BLD AUTO: 0.26 THOUSAND/ΜL (ref 0–0.61)
EOSINOPHIL NFR BLD AUTO: 4 % (ref 0–6)
ERYTHROCYTE [DISTWIDTH] IN BLOOD BY AUTOMATED COUNT: 15.2 % (ref 11.6–15.1)
GFR SERPL CREATININE-BSD FRML MDRD: 53 ML/MIN/1.73SQ M
GLUCOSE SERPL-MCNC: 85 MG/DL (ref 65–140)
HCT VFR BLD AUTO: 41.9 % (ref 36.5–49.3)
HGB BLD-MCNC: 13.9 G/DL (ref 12–17)
IMM GRANULOCYTES # BLD AUTO: 0.03 THOUSAND/UL (ref 0–0.2)
IMM GRANULOCYTES NFR BLD AUTO: 1 % (ref 0–2)
LYMPHOCYTES # BLD AUTO: 1.74 THOUSANDS/ΜL (ref 0.6–4.47)
LYMPHOCYTES NFR BLD AUTO: 27 % (ref 14–44)
MAGNESIUM SERPL-MCNC: 1.5 MG/DL (ref 1.6–2.6)
MCH RBC QN AUTO: 31.8 PG (ref 26.8–34.3)
MCHC RBC AUTO-ENTMCNC: 33.2 G/DL (ref 31.4–37.4)
MCV RBC AUTO: 96 FL (ref 82–98)
MONOCYTES # BLD AUTO: 0.73 THOUSAND/ΜL (ref 0.17–1.22)
MONOCYTES NFR BLD AUTO: 11 % (ref 4–12)
NEUTROPHILS # BLD AUTO: 3.66 THOUSANDS/ΜL (ref 1.85–7.62)
NEUTS SEG NFR BLD AUTO: 56 % (ref 43–75)
NRBC BLD AUTO-RTO: 0 /100 WBCS
PLATELET # BLD AUTO: 129 THOUSANDS/UL (ref 149–390)
PMV BLD AUTO: 11.5 FL (ref 8.9–12.7)
POTASSIUM SERPL-SCNC: 3.8 MMOL/L (ref 3.5–5.3)
RBC # BLD AUTO: 4.37 MILLION/UL (ref 3.88–5.62)
SODIUM SERPL-SCNC: 139 MMOL/L (ref 136–145)
WBC # BLD AUTO: 6.45 THOUSAND/UL (ref 4.31–10.16)

## 2019-01-07 PROCEDURE — 99232 SBSQ HOSP IP/OBS MODERATE 35: CPT | Performed by: HOSPITALIST

## 2019-01-07 PROCEDURE — 83735 ASSAY OF MAGNESIUM: CPT | Performed by: INTERNAL MEDICINE

## 2019-01-07 PROCEDURE — 85025 COMPLETE CBC W/AUTO DIFF WBC: CPT | Performed by: INTERNAL MEDICINE

## 2019-01-07 PROCEDURE — 99232 SBSQ HOSP IP/OBS MODERATE 35: CPT | Performed by: INTERNAL MEDICINE

## 2019-01-07 PROCEDURE — 80048 BASIC METABOLIC PNL TOTAL CA: CPT | Performed by: INTERNAL MEDICINE

## 2019-01-07 RX ORDER — BUMETANIDE 0.25 MG/ML
2 INJECTION, SOLUTION INTRAMUSCULAR; INTRAVENOUS 2 TIMES DAILY
Status: DISCONTINUED | OUTPATIENT
Start: 2019-01-07 | End: 2019-01-08

## 2019-01-07 RX ADMIN — Medication 100 MG: at 09:00

## 2019-01-07 RX ADMIN — APIXABAN 5 MG: 5 TABLET, FILM COATED ORAL at 08:59

## 2019-01-07 RX ADMIN — VITAMIN E CAP 400 UNIT 400 UNITS: 400 CAP at 09:00

## 2019-01-07 RX ADMIN — APIXABAN 5 MG: 5 TABLET, FILM COATED ORAL at 17:03

## 2019-01-07 RX ADMIN — POTASSIUM CHLORIDE 10 MEQ: 750 TABLET, EXTENDED RELEASE ORAL at 08:59

## 2019-01-07 RX ADMIN — FUROSEMIDE 40 MG: 10 INJECTION, SOLUTION INTRAMUSCULAR; INTRAVENOUS at 09:00

## 2019-01-07 RX ADMIN — PANTOPRAZOLE SODIUM 40 MG: 40 TABLET, DELAYED RELEASE ORAL at 06:00

## 2019-01-07 RX ADMIN — CYANOCOBALAMIN TAB 500 MCG 1000 MCG: 500 TAB at 08:59

## 2019-01-07 RX ADMIN — BUMETANIDE 2 MG: 0.25 INJECTION INTRAMUSCULAR; INTRAVENOUS at 13:01

## 2019-01-07 RX ADMIN — DOXYCYCLINE HYCLATE 100 MG: 100 CAPSULE ORAL at 21:06

## 2019-01-07 RX ADMIN — DOXYCYCLINE HYCLATE 100 MG: 100 CAPSULE ORAL at 08:59

## 2019-01-07 RX ADMIN — VITAMIN D, TAB 1000IU (100/BT) 1000 UNITS: 25 TAB at 08:59

## 2019-01-07 RX ADMIN — DILTIAZEM HYDROCHLORIDE 120 MG: 120 CAPSULE, COATED, EXTENDED RELEASE ORAL at 08:59

## 2019-01-07 RX ADMIN — LOSARTAN POTASSIUM 50 MG: 50 TABLET, FILM COATED ORAL at 08:59

## 2019-01-07 RX ADMIN — FLUTICASONE PROPIONATE 1 SPRAY: 50 SPRAY, METERED NASAL at 08:59

## 2019-01-07 RX ADMIN — OXYCODONE HYDROCHLORIDE AND ACETAMINOPHEN 1000 MG: 500 TABLET ORAL at 08:59

## 2019-01-07 RX ADMIN — DILTIAZEM HYDROCHLORIDE 120 MG: 120 CAPSULE, COATED, EXTENDED RELEASE ORAL at 17:03

## 2019-01-07 NOTE — PROGRESS NOTES
Cardiology Progress Note - Linwood Mazariegos 68 y o  male MRN: [id number]    Unit/Bed#: [address] [place] [address] [id number] Encounter: [id number]        Subjective:    No significant events overnight  No chest pain  +++ edema bilaterally  ROS    Objective:   Vitals: Blood pressure 127/88, pulse 93, temperature 98 1 °F (36 7 °C), temperature source Oral, resp  rate 20, height 6' 4" (1 93 m), weight (!) 145 kg (320 lb 1 7 oz), SpO2 95 %  , Body mass index is 38 96 kg/m² , Orthostatic Blood Pressures      Most Recent Value   Blood Pressure  127/88 filed at 01/07/2019 5781   Patient Position - Orthostatic VS  Lying filed at 01/06/2019 6194         Systolic (83YTN), BPM:854 , Min:104 , IRVING:131     Diastolic (04QOX), NWE:59, Min:64, Max:88      Intake/Output Summary (Last 24 hours) at 01/07/19 1125  Last data filed at 01/07/19 0600   Gross per 24 hour   Intake               90 ml   Output             2260 ml   Net            -2170 ml     Weight (last 2 days)     Date/Time   Weight    01/06/19 0756  (!)  145 (320 11)    01/05/19 0804  (!)  145 (318 57)                    Physical Exam   Constitutional: He is oriented to person, place, and time  No distress  HENT:   Mouth/Throat: No oropharyngeal exudate  Eyes: No scleral icterus  Neck: No JVD present  Cardiovascular: Normal rate  An irregularly irregular rhythm present  No murmur heard  Pulmonary/Chest: Effort normal and breath sounds normal  No respiratory distress  He has no wheezes  He has no rales  Abdominal: Soft  Bowel sounds are normal  He exhibits no distension  There is no tenderness  There is no rebound  Musculoskeletal: He exhibits no edema  Neurological: He is alert and oriented to person, place, and time  Skin: Skin is warm and dry  He is not diaphoretic  Psychiatric: He has a normal mood and affect   His behavior is normal          Laboratory Results:    Results from last 7 days  Lab Units 01/04/19  1325 01/04/19  1022 01/03/19  1153   TROPONIN I ng/mL 0 13* 0 12* 0 17*       CBC with diff:   Results from last 7 days  Lab Units 01/07/19  0614 01/04/19  0522 01/03/19  0814 01/03/19  0107 01/02/19  1924   WBC Thousand/uL 6 45 6 23 6 08 7 05 6 90   HEMOGLOBIN g/dL 13 9 13 9 14 1 14 7 15 1   HEMATOCRIT % 41 9 42 9 43 1 44 7 46 2   MCV fL 96 96 97 97 96   PLATELETS Thousands/uL 129* 124* 119* 165 159   MCH pg 31 8 31 0 31 6 31 7 31 4   MCHC g/dL 33 2 32 4 32 7 32 9 32 7   RDW % 15 2* 15 4* 15 6* 15 8* 15 6*   MPV fL 11 5 11 8 11 8 12 1 11 5   NRBC AUTO /100 WBCs 0 0 0  --  0         CMP:  Results from last 7 days  Lab Units 01/07/19  0614 01/04/19  0048 01/03/19  0814 01/02/19  1924   POTASSIUM mmol/L 3 8 3 5 3 2* 3 8   CHLORIDE mmol/L 102 103 102 100   CO2 mmol/L 29 28 25 27   BUN mg/dL 42* 34* 31* 32*   CREATININE mg/dL 1 33* 1 45* 1 26 1 47*   CALCIUM mg/dL 8 5 8 6 9 0 9 3   EGFR ml/min/1 73sq m 53 48 57 47         BMP:  Results from last 7 days  Lab Units 01/07/19  0614 01/04/19  0048 01/03/19  0814 01/02/19  1924   POTASSIUM mmol/L 3 8 3 5 3 2* 3 8   CHLORIDE mmol/L 102 103 102 100   CO2 mmol/L 29 28 25 27   BUN mg/dL 42* 34* 31* 32*   CREATININE mg/dL 1 33* 1 45* 1 26 1 47*   CALCIUM mg/dL 8 5 8 6 9 0 9 3       BNP: No results for input(s): BNP in the last 72 hours      Magnesium:   Results from last 7 days  Lab Units 01/07/19 0614   MAGNESIUM mg/dL 1 5*       Coags:   Results from last 7 days  Lab Units 01/04/19  0048 01/03/19  0814 01/03/19  0107 01/02/19  1924   PTT seconds  --  >210* 38 38   INR  1 73* 2 27* 1 90* 2 05*       TSH: No results found for: TSH    Hemoglobin A1C       Lipid Profile:   Results from last 7 days  Lab Units 01/03/19  0814   TRIGLYCERIDES mg/dL 30   HDL mg/dL 41       Cardiac testing:   Results for orders placed during the hospital encounter of 01/02/19   Echo complete with contrast if indicated    Narrative [address] [place] [address]    Transthoracic Echocardiogram  2D, M-mode, Doppler, and Color Doppler    Study date:  2019    Patient: Jannie Fletcher  MR number: [id number]  Account number: [de-identified]  : 1945  Age: 68 years  Gender: Male  Status: Inpatient  Location: Bedside  Height: 76 in  Weight: 308 lb  BP: 127/ 60 mmHg    Indications: Heart failure  Diagnoses: I50 9 - Heart failure, unspecified    Sonographer:  Jm HERNANDEZ, [facility]  Primary Physician:  Steven Murdock PA-C  Referring Physician:  Steven Murdock PA-C  Group:  [facility]  Interpreting Physician:  Norma Wilson MD    SUMMARY    LEFT VENTRICLE:  Systolic function was mildly reduced  Ejection fraction was estimated to be 45 %  There was mild diffuse hypokinesis with regional variations  More servere hypokinesis was seen in the inferior wall  Wall thickness was moderately to markedly increased  VENTRICULAR SEPTUM:  There was moderate dyssynergic motion  RIGHT VENTRICLE:  The ventricle was dilated  Systolic function was mildly reduced  LEFT ATRIUM:  The atrium was moderately dilated  RIGHT ATRIUM:  The atrium was moderately dilated  MITRAL VALVE:  There was mild annular calcification  There was at least moderate regurgitation  The regurgitant jet was eccentric and not well captured on most images  TRICUSPID VALVE:  There was mild regurgitation  HISTORY: PRIOR HISTORY: Pulmonary embolus, heart failure, hypertension, PVD  Risk factors: hypertension and morbid obesity  PROCEDURE: The procedure was performed at the bedside  This was a routine study  The transthoracic approach was used  The study included complete 2D imaging, M-mode, complete spectral Doppler, and color Doppler  Images were obtained from  the parasternal, apical, subcostal, and suprasternal notch acoustic windows  Echocardiographic views were limited due to restricted patient mobility  Image quality was adequate  LEFT VENTRICLE: Size was normal  Systolic function was mildly reduced   Ejection fraction was estimated to be 45 %  There was mild diffuse hypokinesis with regional variations  More servere hypokinesis was seen in the inferior wall  Wall  thickness was moderately to markedly increased  DOPPLER: Transmitral flow pattern: atrial fibrillation  Left ventricular diastolic function parameters were abnormal     VENTRICULAR SEPTUM: There was moderate dyssynergic motion  RIGHT VENTRICLE: The ventricle was dilated  Systolic function was mildly reduced  Wall thickness was normal     LEFT ATRIUM: The atrium was moderately dilated  RIGHT ATRIUM: The atrium was moderately dilated  MITRAL VALVE: There was mild annular calcification  Valve structure was normal  There was normal leaflet separation  DOPPLER: The transmitral velocity was within the normal range  There was no evidence for stenosis  There was at least  moderate regurgitation  The regurgitant jet was eccentric and not well captured on most images  AORTIC VALVE: The valve was trileaflet  Leaflets exhibited normal thickness, normal cuspal separation, and sclerosis  DOPPLER: Transaortic velocity was within the normal range  There was no evidence for stenosis  There was no significant  regurgitation  TRICUSPID VALVE: The valve structure was normal  There was normal leaflet separation  DOPPLER: The transtricuspid velocity was within the normal range  There was no evidence for stenosis  There was mild regurgitation  PULMONIC VALVE: Not well visualized  DOPPLER: The transpulmonic velocity was within the normal range  There was no significant regurgitation  PERICARDIUM: There was no pericardial effusion  The pericardium was normal in appearance  AORTA: The root exhibited normal size  SYSTEMIC VEINS: IVC: The inferior vena cava was not well visualized      SYSTEM MEASUREMENT TABLES    2D  %FS: 31 8 %  Ao Diam: 4 05 cm  EDV(Teich): 95 67 ml  EF Biplane: 61 24 %  EF(Teich): 59 92 %  ESV(Teich): 38 35 ml  IVSd: 1 56 cm  LA Area: 39 52 cm2  LA Diam: 5 17 cm  LVEDV MOD A2C: 184 74 ml  LVEDV MOD A4C: 196 07 ml  LVEDV MOD BP: 192 04 ml  LVEF MOD A2C: 58 57 %  LVEF MOD A4C: 63 99 %  LVESV MOD A2C: 76 54 ml  LVESV MOD A4C: 70 6 ml  LVESV MOD BP: 74 43 ml  LVIDd: 4 57 cm  LVIDs: 3 11 cm  LVLd A2C: 9 79 cm  LVLd A4C: 9 45 cm  LVLs A2C: 8 52 cm  LVLs A4C: 8 26 cm  LVOT Diam: 2 09 cm  LVPWd: 1 75 cm  RA Area: 40 17 cm2  RVIDd: 5 4 cm  SV MOD A2C: 108 2 ml  SV MOD A4C: 125 48 ml  SV(Teich): 57 33 ml    CW  TR Vmax: 2 27 m/s  TR maxP 6 mmHg    MM  TAPSE: 1 07 cm    [facility]    Prepared and electronically signed by    Deisy Saucedo MD  Signed 2019 09:02:08       No results found for this or any previous visit  No results found for this or any previous visit  No results found for this or any previous visit      Meds/Allergies   current meds:   Current Facility-Administered Medications   Medication Dose Route Frequency    acetaminophen (TYLENOL) tablet 650 mg  650 mg Oral Q4H PRN    apixaban (ELIQUIS) tablet 5 mg  5 mg Oral BID    ascorbic acid (VITAMIN C) tablet 1,000 mg  1,000 mg Oral Daily    cholecalciferol (VITAMIN D3) tablet 1,000 Units  1,000 Units Oral Daily    co-enzyme Q-10 capsule 100 mg  100 mg Oral Daily    cyanocobalamin (VITAMIN B-12) tablet 1,000 mcg  1,000 mcg Oral Daily    cyclobenzaprine (FLEXERIL) tablet 10 mg  10 mg Oral TID PRN    diltiazem (CARDIZEM CD) 24 hr capsule 120 mg  120 mg Oral BID    doxycycline hyclate (VIBRAMYCIN) capsule 100 mg  100 mg Oral Q12H FRANKIE    fluticasone (FLONASE) 50 mcg/act nasal spray 1 spray  1 spray Nasal Daily    furosemide (LASIX) injection 40 mg  40 mg Intravenous BID    HYDROcodone-acetaminophen (NORCO) 5-325 mg per tablet 2 tablet  2 tablet Oral Q6H PRN    influenza vaccine, high-dose (FLUZONE HIGH-DOSE) IM injection RITU 0 5 mL  0 5 mL Intramuscular Prior to discharge    losartan (COZAAR) tablet 50 mg  50 mg Oral Daily    ondansetron (ZOFRAN) injection 4 mg  4 mg Intravenous Q6H PRN    pantoprazole (PROTONIX) EC tablet 40 mg  40 mg Oral Early Morning    polyethylene glycol (MIRALAX) packet 17 g  17 g Oral Daily PRN    potassium chloride (K-DUR,KLOR-CON) CR tablet 10 mEq  10 mEq Oral Daily    sodium chloride (OCEAN) 0 65 % nasal spray 1 spray  1 spray Each Nare PRN    vitamin E (tocopherol) capsule 400 Units  400 Units Oral Daily     Prescriptions Prior to Admission   Medication    Ascorbic Acid (VITAMIN C) 1000 MG tablet    co-enzyme Q-10 50 MG capsule    cyanocobalamin (VITAMIN B-12) 1,000 mcg tablet    diltiazem (CARDIZEM CD) 120 mg 24 hr capsule    guaiFENesin (MUCINEX) 600 mg 12 hr tablet    HYDROcodone-acetaminophen (NORCO) 7 5-325 mg per tablet    potassium chloride (K-DUR,KLOR-CON) 10 mEq tablet    sildenafil (VIAGRA) 100 mg tablet    apixaban (ELIQUIS) 5 mg    fluticasone (FLONASE) 50 mcg/act nasal spray    furosemide (LASIX) 40 mg tablet    losartan (COZAAR) 50 mg tablet    metolazone (ZAROXOLYN) 2 5 mg tablet    omeprazole (PriLOSEC) 20 mg delayed release capsule    pantoprazole (PROTONIX) 40 mg tablet    sodium chloride (OCEAN) 0 65 % nasal spray          Assessment:  Principal Problem:    Acute on chronic systolic (congestive) heart failure (HCC)  Active Problems:    Obesity    Hypertension    Atrial fibrillation (HCC)    SOB (shortness of breath)    Elevated d-dimer    Acute kidney injury (HCC)    Elevated troponin    Cellulitis of left lower extremity    Pulmonary embolism (HCC)    Plan:    Acute on Chronic Diastolic CHF/ Persistent AF/ HTN     It appears he has about 20 pounds of volume overload  Change Lasix to Bumex  Please weigh on a portable standing scale  Strict I/ O  Continue rate control and anticoagulation with Eliquis  Counseling / Coordination of Care  Total floor / unit time spent today 25 minutes    Greater than 50% of total time was spent with the patient and / or family counseling and / or coordination of care  A description of the counseling / coordination of care

## 2019-01-07 NOTE — PROGRESS NOTES
Pt observed to be sleeping during most hrly rounds overnight; denies pain after PRN med last night   Denies pain this am

## 2019-01-07 NOTE — ASSESSMENT & PLAN NOTE
· Culture came back positive with  SA  · Contact isolation  · Continue p o   Doxycycline to treat   · Wound care

## 2019-01-07 NOTE — PROGRESS NOTES
Progress Note - Linwood Mazariegos 1945, 68 y o  male MRN: [id number]    Unit/Bed#: [address] [place] [address] Encounter: [id number]    Primary Care Provider: Beba Pinzon PA-C   Date and time admitted to hospital: 1/2/2019  6:35 PM        Pulmonary embolism [facility])   Assessment & Plan    · When the patient presented there was a lower extremity DVT that was visualized, given the patient's presentation with dyspnea was presumed that there was a PE until proven otherwise  At this point, after initial evaluation by Cardiology is likely that the DVT that was visualized is chronic  V/Q scan is low probability  Cellulitis of left lower extremity   Assessment & Plan    · Culture came back positive with MR MCKEON  · Contact isolation  · Continue p o  Doxycycline to treat   · Wound care       Acute kidney injury [facility])   Assessment & Plan    -baseline Cr 1 1-1 2  -CKD3 at baseline  -continue trend creatinine with diuresis     Atrial fibrillation (HCC)   Assessment & Plan    · Continue Cardizem  mg p o  Q day for rate control  · Continue Eliquis for anticoagulation  · Still rate controlled continue to monitor  · Cardiology following     Hypertension   Assessment & Plan    -continue Cozaar/Cardizem     Obesity   Assessment & Plan    -Due to excess calories  -encourage weight loss     * Acute on chronic systolic (congestive) heart failure (HCC)   Assessment & Plan    · Ejection fraction 45% by transthoracic echocardiogram this represents acute on chronic systolic heart failure  · Continue treating with IV diuresis Lasix 40 mg b i d , slow progress  · 2 g daily sodium restricted diet strict ins and outs daily weights  · Cardiology following, appreciate their assistance       VTE Pharmacologic Prophylaxis:   Pharmacologic: Apixaban (Eliquis)  Mechanical VTE Prophylaxis in Place: No    Patient Centered Rounds: I have performed bedside rounds with nursing staff today      Education and Discussions with Family / Patient: Patient    Time Spent for Care: 15 minutes  More than 50% of total time spent on counseling and coordination of care as described above  Current Length of Stay: 5 day(s)    Current Patient Status: Inpatient   Certification Statement: The patient will continue to require additional inpatient hospital stay due to CHF exacerbation    Discharge Plan:  1-2 days    Code Status: Level 1 - Full Code      Subjective:   Patient denies shortness of breath  Objective:     Vitals:   Temp (24hrs), Av 4 °F (36 3 °C), Min:96 7 °F (35 9 °C), Max:98 1 °F (36 7 °C)    Temp:  [96 7 °F (35 9 °C)-98 1 °F (36 7 °C)] 98 1 °F (36 7 °C)  HR:  [64-93] 93  Resp:  [20] 20  BP: (104-127)/(64-88) 127/88  SpO2:  [95 %-99 %] 95 %  Body mass index is 38 96 kg/m²  Input and Output Summary (last 24 hours): Intake/Output Summary (Last 24 hours) at 19 1138  Last data filed at 19 0600   Gross per 24 hour   Intake               90 ml   Output             2260 ml   Net            -2170 ml       Physical Exam:     Physical Exam  Gen: NAD, AAOx3, well developed, well nourished  Eyes: EOMI, PERRLA, no scleral icterus  ENMT:  Oropharynx clear of erythema or exudates, no nasal discharge, no otic discharge, moist mucous membranes  Neck:  Supple  Lymph:  No anterior or posterior cervical or supraclavicular lymphadenopathy  Cardiovascular:  Normal rate, irregularly irregular rhythm, normal S1-S2, no murmurs, rubs, or gallops  Lungs:  Clear to auscultation bilaterally anteriorly, no wheezes, or rales, or rhonchi  Abdomen:  Positive bowel sounds, soft, nontender, nondistended, no palpable organomegaly   Skin:  Left lower extremity with clean dry and intact dressing  Trace lower extremity edema    Neuro: Cranial nerves 2-12 are intact, non-focal      Additional Data:     Labs:      Results from last 7 days  Lab Units 19  0614   WBC Thousand/uL 6 45   HEMOGLOBIN g/dL 13 9   HEMATOCRIT % 41 9   PLATELETS Thousands/uL 129* NEUTROS PCT % 56   LYMPHS PCT % 27   MONOS PCT % 11   EOS PCT % 4       Results from last 7 days  Lab Units 01/07/19  0614   SODIUM mmol/L 139   POTASSIUM mmol/L 3 8   CHLORIDE mmol/L 102   CO2 mmol/L 29   BUN mg/dL 42*   CREATININE mg/dL 1 33*   ANION GAP mmol/L 8   CALCIUM mg/dL 8 5   GLUCOSE RANDOM mg/dL 85       Results from last 7 days  Lab Units 01/04/19  0048   INR  1 73*                       * I Have Reviewed All Lab Data Listed Above  * Additional Pertinent Lab Tests Reviewed:  Vladimir 67 Admission Reviewed    Recent Cultures (last 7 days):       Results from last 7 days  Lab Units 01/02/19  1925   GRAM STAIN RESULT  No polys seen  No organisms seen   WOUND CULTURE  1+ Growth of Methicillin Resistant Staphylococcus aureus*       Last 24 Hours Medication List:     Current Facility-Administered Medications:  acetaminophen 650 mg Oral Q4H PRN NAVNEET Martinez   apixaban 5 mg Oral BID Artem Espinoza MD   vitamin C 1,000 mg Oral Daily NAVNEET Haider   cholecalciferol 1,000 Units Oral Daily NAVNEET Martinez   co-enzyme Q-10 100 mg Oral Daily NAVNEET Martinez   cyanocobalamin 1,000 mcg Oral Daily NAVNEET Martinez   cyclobenzaprine 10 mg Oral TID PRN NAVNEET Haider   diltiazem 120 mg Oral BID NAVNEET Martinez   doxycycline hyclate 100 mg Oral Q12H [address] Artem Espinoza MD   fluticasone 1 spray Nasal Daily NAVNEET Martinez   furosemide 40 mg Intravenous BID NAVNEET Haider   HYDROcodone-acetaminophen 2 tablet Oral Q6H PRN NAVNEET Brock   influenza vaccine 0 5 mL Intramuscular Prior to discharge Anne Morris MD   losartan 50 mg Oral Daily NAVNEET Martinez   ondansetron 4 mg Intravenous Q6H PRN NAVNEET Martinez   pantoprazole 40 mg Oral Early Morning NAVNEET Martinez   polyethylene glycol 17 g Oral Daily PRN NAVNEET Brock   potassium chloride 10 mEq Oral Daily NAVNEET Martinez   sodium chloride 1 spray Each Nare PRN NAVNEET Roman   vitamin E (tocopherol) 400 Units Oral Daily NAVNEET Roman        Today, Patient Was Seen By: Shruthi Blakely MD    ** Please Note: Dictation voice to text software may have been used in the creation of this document   **

## 2019-01-08 PROBLEM — I50.33 ACUTE ON CHRONIC DIASTOLIC CONGESTIVE HEART FAILURE (HCC): Status: ACTIVE | Noted: 2019-01-02

## 2019-01-08 PROBLEM — D69.6 THROMBOCYTOPENIA (HCC): Status: ACTIVE | Noted: 2019-01-08

## 2019-01-08 PROBLEM — I21.A1 TYPE 2 MYOCARDIAL INFARCTION (HCC): Status: ACTIVE | Noted: 2019-01-02

## 2019-01-08 PROBLEM — I82.509 CHRONIC DEEP VEIN THROMBOSIS (DVT) (HCC): Status: ACTIVE | Noted: 2019-01-03

## 2019-01-08 LAB
ANION GAP SERPL CALCULATED.3IONS-SCNC: 9 MMOL/L (ref 4–13)
BACTERIA UR QL AUTO: ABNORMAL /HPF
BILIRUB UR QL STRIP: NEGATIVE
BUN SERPL-MCNC: 42 MG/DL (ref 5–25)
CALCIUM SERPL-MCNC: 8.8 MG/DL (ref 8.3–10.1)
CHLORIDE SERPL-SCNC: 101 MMOL/L (ref 100–108)
CLARITY UR: CLEAR
CO2 SERPL-SCNC: 30 MMOL/L (ref 21–32)
COLOR UR: YELLOW
CREAT SERPL-MCNC: 1.33 MG/DL (ref 0.6–1.3)
GFR SERPL CREATININE-BSD FRML MDRD: 53 ML/MIN/1.73SQ M
GLUCOSE SERPL-MCNC: 88 MG/DL (ref 65–140)
GLUCOSE UR STRIP-MCNC: NEGATIVE MG/DL
HGB UR QL STRIP.AUTO: NEGATIVE
KETONES UR STRIP-MCNC: NEGATIVE MG/DL
LEUKOCYTE ESTERASE UR QL STRIP: NEGATIVE
NITRITE UR QL STRIP: NEGATIVE
NON-SQ EPI CELLS URNS QL MICRO: ABNORMAL /HPF
PH UR STRIP.AUTO: 5.5 [PH] (ref 4.5–8)
POTASSIUM SERPL-SCNC: 3.8 MMOL/L (ref 3.5–5.3)
PROT UR STRIP-MCNC: ABNORMAL MG/DL
RBC #/AREA URNS AUTO: ABNORMAL /HPF
SODIUM SERPL-SCNC: 140 MMOL/L (ref 136–145)
SP GR UR STRIP.AUTO: 1.01 (ref 1–1.03)
UROBILINOGEN UR QL STRIP.AUTO: 0.2 E.U./DL
WBC #/AREA URNS AUTO: ABNORMAL /HPF

## 2019-01-08 PROCEDURE — 80048 BASIC METABOLIC PNL TOTAL CA: CPT | Performed by: PHYSICIAN ASSISTANT

## 2019-01-08 PROCEDURE — 99232 SBSQ HOSP IP/OBS MODERATE 35: CPT | Performed by: INTERNAL MEDICINE

## 2019-01-08 PROCEDURE — 81001 URINALYSIS AUTO W/SCOPE: CPT | Performed by: PHYSICIAN ASSISTANT

## 2019-01-08 PROCEDURE — 99232 SBSQ HOSP IP/OBS MODERATE 35: CPT | Performed by: PHYSICIAN ASSISTANT

## 2019-01-08 RX ORDER — NYSTATIN 100000 [USP'U]/G
POWDER TOPICAL 2 TIMES DAILY
Status: DISCONTINUED | OUTPATIENT
Start: 2019-01-08 | End: 2019-01-16 | Stop reason: HOSPADM

## 2019-01-08 RX ORDER — BUMETANIDE 0.25 MG/ML
0.5 INJECTION INTRAMUSCULAR; INTRAVENOUS CONTINUOUS
Status: DISCONTINUED | OUTPATIENT
Start: 2019-01-08 | End: 2019-01-11

## 2019-01-08 RX ADMIN — NYSTATIN: 100000 POWDER TOPICAL at 17:21

## 2019-01-08 RX ADMIN — Medication 100 MG: at 08:41

## 2019-01-08 RX ADMIN — APIXABAN 5 MG: 5 TABLET, FILM COATED ORAL at 17:19

## 2019-01-08 RX ADMIN — FLUTICASONE PROPIONATE 1 SPRAY: 50 SPRAY, METERED NASAL at 08:41

## 2019-01-08 RX ADMIN — Medication 0.5 MG/HR: at 09:55

## 2019-01-08 RX ADMIN — DILTIAZEM HYDROCHLORIDE 120 MG: 120 CAPSULE, COATED, EXTENDED RELEASE ORAL at 17:19

## 2019-01-08 RX ADMIN — CYANOCOBALAMIN TAB 500 MCG 1000 MCG: 500 TAB at 08:41

## 2019-01-08 RX ADMIN — OXYCODONE HYDROCHLORIDE AND ACETAMINOPHEN 1000 MG: 500 TABLET ORAL at 08:41

## 2019-01-08 RX ADMIN — DOXYCYCLINE HYCLATE 100 MG: 100 CAPSULE ORAL at 21:03

## 2019-01-08 RX ADMIN — DOXYCYCLINE HYCLATE 100 MG: 100 CAPSULE ORAL at 08:41

## 2019-01-08 RX ADMIN — NYSTATIN: 100000 POWDER TOPICAL at 09:55

## 2019-01-08 RX ADMIN — DILTIAZEM HYDROCHLORIDE 120 MG: 120 CAPSULE, COATED, EXTENDED RELEASE ORAL at 08:42

## 2019-01-08 RX ADMIN — PANTOPRAZOLE SODIUM 40 MG: 40 TABLET, DELAYED RELEASE ORAL at 06:16

## 2019-01-08 RX ADMIN — VITAMIN E CAP 400 UNIT 400 UNITS: 400 CAP at 08:41

## 2019-01-08 RX ADMIN — VITAMIN D, TAB 1000IU (100/BT) 1000 UNITS: 25 TAB at 08:42

## 2019-01-08 RX ADMIN — POTASSIUM CHLORIDE 10 MEQ: 750 TABLET, EXTENDED RELEASE ORAL at 08:42

## 2019-01-08 RX ADMIN — APIXABAN 5 MG: 5 TABLET, FILM COATED ORAL at 08:42

## 2019-01-08 NOTE — ASSESSMENT & PLAN NOTE
· Initially this was thought to represent acute DVT in the patient was changed from Eliquis to heparin infusion however venous duplex is noted to suggest that this was likely chronic  · Continue Eliquis  · Venous duplex: Evaluation shows non-occlusive or partially recanalized thrombus in the femoral vein and the popliteal vein which is most likely chronic    · VQ: low probability

## 2019-01-08 NOTE — PROGRESS NOTES
Cardiology Progress Note - Linwood Mazariegos 68 y o  male MRN: [id number]    Unit/Bed#: [address] [place] [address] [id number] Encounter: [id number]        Subjective:    No significant events overnight  No chest pain  He complains of left foot pain  ROS    Objective:   Vitals: Blood pressure 124/61, pulse 81, temperature 97 6 °F (36 4 °C), temperature source Oral, resp  rate 18, height 6' 4" (1 93 m), weight (!) 140 kg (309 lb 11 9 oz), SpO2 93 %  , Body mass index is 37 7 kg/m² , Orthostatic Blood Pressures      Most Recent Value   Blood Pressure  124/61 filed at 01/08/2019 0749   Patient Position - Orthostatic VS  Lying filed at 01/08/2019 8747         Systolic (36COY), VEJ:863 , Min:91 , IVZ:280     Diastolic (96MDG), GNT:56, Min:55, Max:79      Intake/Output Summary (Last 24 hours) at 01/08/19 0821  Last data filed at 01/08/19 0750   Gross per 24 hour   Intake              480 ml   Output             2250 ml   Net            -1770 ml     Weight (last 2 days)     Date/Time   Weight    01/08/19 0749  (!)  140 (309 75)    01/07/19 1309  (!)  141 (310 19)    01/06/19 0756  (!)  145 (320 11)                    Physical Exam   Constitutional: He is oriented to person, place, and time  No distress  HENT:   Mouth/Throat: No oropharyngeal exudate  Eyes: No scleral icterus  Neck: No JVD present  Cardiovascular: Normal rate and regular rhythm  Pulmonary/Chest: Effort normal  He has no wheezes  He has no rales  Abdominal: Soft  Bowel sounds are normal  He exhibits no distension  There is no tenderness  There is no rebound  Musculoskeletal: He exhibits edema  Neurological: He is alert and oriented to person, place, and time  Skin: Skin is warm and dry  He is not diaphoretic  Psychiatric: He has a normal mood and affect   His behavior is normal          Laboratory Results:    Results from last 7 days  Lab Units 01/04/19  1325 01/04/19  1022 01/03/19  1153   TROPONIN I ng/mL 0 13* 0 12* 0 17*       CBC with diff:   Results from last 7 days  Lab Units 01/07/19  0614 01/04/19  0522 01/03/19  0814 01/03/19  0107 01/02/19  1924   WBC Thousand/uL 6 45 6 23 6 08 7 05 6 90   HEMOGLOBIN g/dL 13 9 13 9 14 1 14 7 15 1   HEMATOCRIT % 41 9 42 9 43 1 44 7 46 2   MCV fL 96 96 97 97 96   PLATELETS Thousands/uL 129* 124* 119* 165 159   MCH pg 31 8 31 0 31 6 31 7 31 4   MCHC g/dL 33 2 32 4 32 7 32 9 32 7   RDW % 15 2* 15 4* 15 6* 15 8* 15 6*   MPV fL 11 5 11 8 11 8 12 1 11 5   NRBC AUTO /100 WBCs 0 0 0  --  0         CMP:  Results from last 7 days  Lab Units 01/07/19  0614 01/04/19  0048 01/03/19  0814 01/02/19  1924   POTASSIUM mmol/L 3 8 3 5 3 2* 3 8   CHLORIDE mmol/L 102 103 102 100   CO2 mmol/L 29 28 25 27   BUN mg/dL 42* 34* 31* 32*   CREATININE mg/dL 1 33* 1 45* 1 26 1 47*   CALCIUM mg/dL 8 5 8 6 9 0 9 3   EGFR ml/min/1 73sq m 53 48 57 47         BMP:  Results from last 7 days  Lab Units 01/07/19  0614 01/04/19  0048 01/03/19  0814 01/02/19  1924   POTASSIUM mmol/L 3 8 3 5 3 2* 3 8   CHLORIDE mmol/L 102 103 102 100   CO2 mmol/L 29 28 25 27   BUN mg/dL 42* 34* 31* 32*   CREATININE mg/dL 1 33* 1 45* 1 26 1 47*   CALCIUM mg/dL 8 5 8 6 9 0 9 3       BNP: No results for input(s): BNP in the last 72 hours      Magnesium:   Results from last 7 days  Lab Units 01/07/19  0614   MAGNESIUM mg/dL 1 5*       Coags:   Results from last 7 days  Lab Units 01/04/19  0048 01/03/19  0814 01/03/19  0107 01/02/19  1924   PTT seconds  --  >210* 38 38   INR  1 73* 2 27* 1 90* 2 05*       TSH: No results found for: TSH    Hemoglobin A1C       Lipid Profile:   Results from last 7 days  Lab Units 01/03/19  0814   TRIGLYCERIDES mg/dL 30   HDL mg/dL 41       Cardiac testing:   Results for orders placed during the hospital encounter of 01/02/19   Echo complete with contrast if indicated    [place] [address] [facility] [address]    Transthoracic Echocardiogram  2D, M-mode, Doppler, and Color Doppler    Study date: 2019    Patient: Jaja Shea  MR number: [id number]  Account number: [de-identified]  : 1945  Age: 68 years  Gender: Male  Status: Inpatient  Location: Bedside  Height: 76 in  Weight: 308 lb  BP: 127/ 60 mmHg    Indications: Heart failure  Diagnoses: I50 9 - Heart failure, unspecified    Sonographer:  Rohini HERNANDEZ, [facility]  Primary Physician:  Lizbeth Abad PA-C  Referring Physician:  Lizbeth Abad PA-C  Group:  [facility]  Interpreting Physician:  Shannon Arroyo MD    SUMMARY    LEFT VENTRICLE:  Systolic function was mildly reduced  Ejection fraction was estimated to be 45 %  There was mild diffuse hypokinesis with regional variations  More servere hypokinesis was seen in the inferior wall  Wall thickness was moderately to markedly increased  VENTRICULAR SEPTUM:  There was moderate dyssynergic motion  RIGHT VENTRICLE:  The ventricle was dilated  Systolic function was mildly reduced  LEFT ATRIUM:  The atrium was moderately dilated  RIGHT ATRIUM:  The atrium was moderately dilated  MITRAL VALVE:  There was mild annular calcification  There was at least moderate regurgitation  The regurgitant jet was eccentric and not well captured on most images  TRICUSPID VALVE:  There was mild regurgitation  HISTORY: PRIOR HISTORY: Pulmonary embolus, heart failure, hypertension, PVD  Risk factors: hypertension and morbid obesity  PROCEDURE: The procedure was performed at the bedside  This was a routine study  The transthoracic approach was used  The study included complete 2D imaging, M-mode, complete spectral Doppler, and color Doppler  Images were obtained from  the parasternal, apical, subcostal, and suprasternal notch acoustic windows  Echocardiographic views were limited due to restricted patient mobility  Image quality was adequate  LEFT VENTRICLE: Size was normal  Systolic function was mildly reduced  Ejection fraction was estimated to be 45 %  There was mild diffuse hypokinesis with regional variations  More servere hypokinesis was seen in the inferior wall  Wall  thickness was moderately to markedly increased  DOPPLER: Transmitral flow pattern: atrial fibrillation  Left ventricular diastolic function parameters were abnormal     VENTRICULAR SEPTUM: There was moderate dyssynergic motion  RIGHT VENTRICLE: The ventricle was dilated  Systolic function was mildly reduced  Wall thickness was normal     LEFT ATRIUM: The atrium was moderately dilated  RIGHT ATRIUM: The atrium was moderately dilated  MITRAL VALVE: There was mild annular calcification  Valve structure was normal  There was normal leaflet separation  DOPPLER: The transmitral velocity was within the normal range  There was no evidence for stenosis  There was at least  moderate regurgitation  The regurgitant jet was eccentric and not well captured on most images  AORTIC VALVE: The valve was trileaflet  Leaflets exhibited normal thickness, normal cuspal separation, and sclerosis  DOPPLER: Transaortic velocity was within the normal range  There was no evidence for stenosis  There was no significant  regurgitation  TRICUSPID VALVE: The valve structure was normal  There was normal leaflet separation  DOPPLER: The transtricuspid velocity was within the normal range  There was no evidence for stenosis  There was mild regurgitation  PULMONIC VALVE: Not well visualized  DOPPLER: The transpulmonic velocity was within the normal range  There was no significant regurgitation  PERICARDIUM: There was no pericardial effusion  The pericardium was normal in appearance  AORTA: The root exhibited normal size  SYSTEMIC VEINS: IVC: The inferior vena cava was not well visualized      SYSTEM MEASUREMENT TABLES    2D  %FS: 31 8 %  Ao Diam: 4 05 cm  EDV(Teich): 95 67 ml  EF Biplane: 61 24 %  EF(Teich): 59 92 %  ESV(Teich): 38 35 ml  IVSd: 1 56 cm  LA Area: 39 52 cm2  LA Diam: 5 17 cm  LVEDV MOD A2C: 184 74 ml  LVEDV MOD A4C: 196 07 ml  LVEDV MOD BP: 192 04 ml  LVEF MOD A2C: 58 57 %  LVEF MOD A4C: 63 99 %  LVESV MOD A2C: 76 54 ml  LVESV MOD A4C: 70 6 ml  LVESV MOD BP: 74 43 ml  LVIDd: 4 57 cm  LVIDs: 3 11 cm  LVLd A2C: 9 79 cm  LVLd A4C: 9 45 cm  LVLs A2C: 8 52 cm  LVLs A4C: 8 26 cm  LVOT Diam: 2 09 cm  LVPWd: 1 75 cm  RA Area: 40 17 cm2  RVIDd: 5 4 cm  SV MOD A2C: 108 2 ml  SV MOD A4C: 125 48 ml  SV(Teich): 57 33 ml    CW  TR Vmax: 2 27 m/s  TR maxP 6 mmHg    MM  TAPSE: 1 07 cm    [facility]    Prepared and electronically signed by    Montana Kessler MD  Signed 2019 09:02:08       No results found for this or any previous visit  No results found for this or any previous visit  No results found for this or any previous visit      Meds/Allergies   current meds:   Current Facility-Administered Medications   Medication Dose Route Frequency    acetaminophen (TYLENOL) tablet 650 mg  650 mg Oral Q4H PRN    apixaban (ELIQUIS) tablet 5 mg  5 mg Oral BID    ascorbic acid (VITAMIN C) tablet 1,000 mg  1,000 mg Oral Daily    bumetanide (BUMEX) 12 5 mg infusion 50 mL  0 5 mg/hr Intravenous Continuous    cholecalciferol (VITAMIN D3) tablet 1,000 Units  1,000 Units Oral Daily    co-enzyme Q-10 capsule 100 mg  100 mg Oral Daily    cyanocobalamin (VITAMIN B-12) tablet 1,000 mcg  1,000 mcg Oral Daily    cyclobenzaprine (FLEXERIL) tablet 10 mg  10 mg Oral TID PRN    diltiazem (CARDIZEM CD) 24 hr capsule 120 mg  120 mg Oral BID    doxycycline hyclate (VIBRAMYCIN) capsule 100 mg  100 mg Oral Q12H FRANKIE    fluticasone (FLONASE) 50 mcg/act nasal spray 1 spray  1 spray Nasal Daily    HYDROcodone-acetaminophen (NORCO) 5-325 mg per tablet 2 tablet  2 tablet Oral Q6H PRN    influenza vaccine, high-dose (FLUZONE HIGH-DOSE) IM injection RITU 0 5 mL  0 5 mL Intramuscular Prior to discharge    nystatin (MYCOSTATIN) powder   Topical BID    ondansetron (ZOFRAN) injection 4 mg  4 mg Intravenous Q6H PRN    pantoprazole (PROTONIX) EC tablet 40 mg  40 mg Oral Early Morning    polyethylene glycol (MIRALAX) packet 17 g  17 g Oral Daily PRN    potassium chloride (K-DUR,KLOR-CON) CR tablet 10 mEq  10 mEq Oral Daily    sodium chloride (OCEAN) 0 65 % nasal spray 1 spray  1 spray Each Nare PRN    vitamin E (tocopherol) capsule 400 Units  400 Units Oral Daily     Prescriptions Prior to Admission   Medication    Ascorbic Acid (VITAMIN C) 1000 MG tablet    co-enzyme Q-10 50 MG capsule    cyanocobalamin (VITAMIN B-12) 1,000 mcg tablet    diltiazem (CARDIZEM CD) 120 mg 24 hr capsule    guaiFENesin (MUCINEX) 600 mg 12 hr tablet    HYDROcodone-acetaminophen (NORCO) 7 5-325 mg per tablet    potassium chloride (K-DUR,KLOR-CON) 10 mEq tablet    sildenafil (VIAGRA) 100 mg tablet    apixaban (ELIQUIS) 5 mg    fluticasone (FLONASE) 50 mcg/act nasal spray    furosemide (LASIX) 40 mg tablet    losartan (COZAAR) 50 mg tablet    metolazone (ZAROXOLYN) 2 5 mg tablet    omeprazole (PriLOSEC) 20 mg delayed release capsule    pantoprazole (PROTONIX) 40 mg tablet    sodium chloride (OCEAN) 0 65 % nasal spray          Assessment:  Principal Problem:    Acute on chronic diastolic congestive heart failure (HCC)  Active Problems:    Gastroesophageal reflux disease without esophagitis    Obesity    Hypertension    Atrial fibrillation (HCC)    SOB (shortness of breath)    Elevated d-dimer    Acute kidney injury (HCC)    Elevated troponin    Cellulitis of left lower extremity    Chronic deep vein thrombosis (DVT) (HCC)    Plan:    Acute on Chronic Diastolic CHF: Volume overload with diuretic resistance  Recommend Bumex drip  Continue rate control and antiagulation for persistent AF  Counseling / Coordination of Care  Total floor / unit time spent today 25 minutes  Greater than 50% of total time was spent with the patient and / or family counseling and / or coordination of care    A description of the counseling / coordination of care

## 2019-01-08 NOTE — ASSESSMENT & PLAN NOTE
· Continue Cardizem  mg PO daily for rate control  · Continue Eliquis for anticoagulation  · Rate controlled   HR 90-96  · Cardiology following

## 2019-01-08 NOTE — ASSESSMENT & PLAN NOTE
· Ejection fraction 45% on ECHO during this admission  Mild diffuse hypokinesis with regional variations  · Continue treating with IV diuresis - changed from IV lasix to IV bumex yesterday by cardiology  Plan for Bumex drip beginning today  · 2 g daily sodium restricted diet  I/Os, daily weights  · Cardiology following    · Net negative 13 L this admission  · Weights lable since admission from 310-320    Wt Readings from Last 3 Encounters:   01/07/19 (!) 141 kg (310 lb 3 oz)   10/22/18 132 kg (290 lb)   06/18/18 136 kg (299 lb)       Intake/Output Summary (Last 24 hours) at 01/08/19 0739  Last data filed at 01/08/19 0619   Gross per 24 hour   Intake              480 ml   Output             1950 ml   Net            -1470 ml

## 2019-01-08 NOTE — ASSESSMENT & PLAN NOTE
Lab Results   Component Value Date    CREATININE 1 33 (H) 01/07/2019    CREATININE 1 45 (H) 01/04/2019    CREATININE 1 26 01/03/2019    CREATININE 1 47 (H) 01/02/2019     · Creatinine at baseline 0 9-1 1  · Currently 1 33  Improved from 1 45 on 1/4/2019  · Repeat BMP now and daily while on diuresis  · Hold losartan secondary to hypotension and SAMY    · Check UA and PVR

## 2019-01-08 NOTE — ASSESSMENT & PLAN NOTE
· BP currently 91/55 last evening but today 124/61 this AM  · Stop losartan with SAMY  · Continue cardizem with hold parameters  · BP at baseline 105-120 past 2 days    · Monitor with diuresis

## 2019-01-08 NOTE — PROGRESS NOTES
[address] Internal Medicine  Progress Note - Linwood Mazariegos 1945, 68 y o  male MRN: [id number]  Unit/Bed#: [address] [place] [address] Encounter: [id number]  Primary Care Provider: Mara Tang PA-C   Date and time admitted to hospital: 1/2/2019  6:35 PM    * Acute on chronic diastolic congestive heart failure ([address] )   Assessment & Plan    · Ejection fraction 45% on ECHO during this admission  Mild diffuse hypokinesis with regional variations  · Continue treating with IV diuresis - changed from IV lasix to IV bumex yesterday by cardiology  Plan for Bumex drip beginning today  · 2 g daily sodium restricted diet  I/Os, daily weights  · Cardiology following  · Net negative 13 L this admission  · Weights lable since admission from 310-320    Wt Readings from Last 3 Encounters:   01/07/19 (!) 141 kg (310 lb 3 oz)   10/22/18 132 kg (290 lb)   06/18/18 136 kg (299 lb)       Intake/Output Summary (Last 24 hours) at 01/08/19 0739  Last data filed at 01/08/19 0619   Gross per 24 hour   Intake              480 ml   Output             1950 ml   Net            -1470 ml          Atrial fibrillation (HCC)   Assessment & Plan    · Continue Cardizem  mg PO daily for rate control  · Continue Eliquis for anticoagulation  · Rate controlled  HR 90-96  · Cardiology following     Hypertension   Assessment & Plan    · BP currently 91/55 last evening but today 124/61 this AM  · Stop losartan with SAMY  · Continue cardizem with hold parameters  · BP at baseline 105-120 past 2 days  · Monitor with diuresis      Obesity   Assessment & Plan    Body mass index is 37 76 kg/m²  Recommend dietary changes     Cellulitis of left lower extremity   Assessment & Plan    · Culture came back positive with MRSA  · Contact isolation  · Continue PO doxycycline (dose #5, day #3)   Continue PO doxycycline for total of 7 days  · Wound care  · afebrile       Gastroesophageal reflux disease without esophagitis   Assessment & Plan    · Continue PPI Chronic deep vein thrombosis (DVT) (HCC)   Assessment & Plan    · Initially this was thought to represent acute DVT in the patient was changed from Eliquis to heparin infusion however venous duplex is noted to suggest that this was likely chronic  · Continue Eliquis  · Venous duplex: Evaluation shows non-occlusive or partially recanalized thrombus in the femoral vein and the popliteal vein which is most likely chronic  · VQ: low probability     Type 2 myocardial infarction [facility])   Assessment & Plan    · Troponins noted be 0 12-0 15  · Cardiology following  · Likely related to CHF  Acute kidney injury [facility])   Assessment & Plan    Lab Results   Component Value Date    CREATININE 1 33 (H) 01/07/2019    CREATININE 1 45 (H) 01/04/2019    CREATININE 1 26 01/03/2019    CREATININE 1 47 (H) 01/02/2019     · Creatinine at baseline 0 9-1 1  · Currently 1 33  Improved from 1 45 on 1/4/2019  · Repeat BMP now and daily while on diuresis  · Hold losartan secondary to hypotension and SAMY  · Check UA and PVR     Thrombocytopenia (HCC)   Assessment & Plan    · Mild  Continue to trend  VTE Pharmacologic Prophylaxis:   Pharmacologic: Apixaban (Eliquis)  Mechanical VTE Prophylaxis in Place: No wounds      Education and Discussions with Family / Patient: patient    Time Spent for Care: 30 minutes  More than 50% of total time spent on counseling and coordination of care as described above  Current Length of Stay: 6 day(s)    Current Patient Status: Inpatient   Certification Statement: The patient will continue to require additional inpatient hospital stay due to IV diuresis    Discharge Plan:  Continue IV diuresis    Code Status: Level 1 - Full Code      Subjective:   Mr Hannah Vidales is a 81-year-old male who was noted to be admitted on 1/2/2019    He has past medical history of atrial fibrillation, hypertension, obesity, pulmonary hypertension, peripheral vascular disease who presented to the emergency department with shortness of breath  He was also complaining of intermittent chest pain for 1 month  Patient was noted to have a a KI, elevated troponin, elevated proBNP on imaging on admission with bilateral pleural effusions  There was noted to P acute heart failure, elevated troponin, elevated D-dimer, a KI, cellulitis of left lower extremity in concern for pulmonary embolism  The patient was started on IV heparin and V/Q scan was ordered  Left lower extremity DVT was noted which was nonocclusive or partially recanalized thrombus in the femoral vein and popliteal vein which was felt to be chronic  V/Q scan was performed which showed low likelihood for pulmonary embolism  The patient was noted to be discontinued from Eliquis and this was noted to be suspected failure  The patient was noted to be on chronic atrial fibrillation with PVCs  He was discontinued from digoxin and continued on diltiazem  He was noted to have significant left ventricular hypertrophy an EF of 45%  On 2018 the patient was changed from IV Lasix to Bumex  Patient reports that he has erythema and foul odor in his skin folds  He is asking for nystatin  He has no chest pain  He still having bilateral lower extremity edema  He reports he is urinating less often than previous  Objective:     Vitals:   Temp (24hrs), Av 1 °F (36 7 °C), Min:97 6 °F (36 4 °C), Max:98 6 °F (37 °C)    Temp:  [97 6 °F (36 4 °C)-98 6 °F (37 °C)] 97 6 °F (36 4 °C)  HR:  [81-96] 81  Resp:  [18-20] 18  BP: ()/(55-79) 124/61  SpO2:  [93 %-96 %] 93 %  Body mass index is 37 7 kg/m²  Input and Output Summary (last 24 hours): Intake/Output Summary (Last 24 hours) at 19 0828  Last data filed at 19 0750   Gross per 24 hour   Intake              480 ml   Output             2250 ml   Net            -1770 ml       Physical Exam:     Physical Exam   Constitutional: No distress  HENT:   Head: Normocephalic and atraumatic     Eyes: Conjunctivae are normal  No scleral icterus  Neck: No JVD present  Cardiovascular: Normal rate, normal heart sounds and intact distal pulses  An irregularly irregular rhythm present  No murmur heard  Pulmonary/Chest: No accessory muscle usage  No respiratory distress  He has no wheezes  He has no rales  Decreased effort  Decreased at bases   Abdominal: Soft  Bowel sounds are normal  He exhibits no distension  There is no tenderness  There is no rigidity, no rebound and no guarding  Musculoskeletal: He exhibits edema (Bilateral lower extremity) and tenderness (Right foot)  Neurological: He is alert  Hard of hearing   Skin: Skin is warm and dry  No rash noted  He is not diaphoretic  No erythema  Left leg dressed with Ace wrap  Bilateral lower extremities with 2+ edema and chronic venous stasis changes   Psychiatric: He has a normal mood and affect  His behavior is normal    Nursing note and vitals reviewed  Additional Data:     Labs:      Results from last 7 days  Lab Units 01/07/19  0614   WBC Thousand/uL 6 45   HEMOGLOBIN g/dL 13 9   HEMATOCRIT % 41 9   PLATELETS Thousands/uL 129*   NEUTROS PCT % 56   LYMPHS PCT % 27   MONOS PCT % 11   EOS PCT % 4       Results from last 7 days  Lab Units 01/07/19  0614   SODIUM mmol/L 139   POTASSIUM mmol/L 3 8   CHLORIDE mmol/L 102   CO2 mmol/L 29   BUN mg/dL 42*   CREATININE mg/dL 1 33*   ANION GAP mmol/L 8   CALCIUM mg/dL 8 5   GLUCOSE RANDOM mg/dL 85       Results from last 7 days  Lab Units 01/04/19  0048   INR  1 73*                       * I Have Reviewed All Lab Data Listed Above  * Additional Pertinent Lab Tests Reviewed:  All Labs Within Last 24 Hours Reviewed    Imaging:    Imaging Reports Reviewed Today Include: CXR, venous duplex  Imaging Personally Reviewed by Myself Includes:  none    Recent Cultures (last 7 days):       Results from last 7 days  Lab Units 01/02/19 1925   GRAM STAIN RESULT  No polys seen  No organisms seen   WOUND CULTURE  1+ Growth of Methicillin Resistant Staphylococcus aureus*       Last 24 Hours Medication List:     Current Facility-Administered Medications:  acetaminophen 650 mg Oral Q4H PRN NAVNEET Solorznao   apixaban 5 mg Oral BID Baljit Duran MD   vitamin C 1,000 mg Oral Daily NAVNEET Martinez   bumetanide 0 5 mg/hr Intravenous Continuous Dana Francis MD   cholecalciferol 1,000 Units Oral Daily NAVNEET Martinez   co-enzyme Q-10 100 mg Oral Daily NAVNEET Martinez   cyanocobalamin 1,000 mcg Oral Daily NAVNEET Martinez   cyclobenzaprine 10 mg Oral TID PRN NAVNEET Solorzano   diltiazem 120 mg Oral BID NAVNEET Pan   doxycycline hyclate 100 mg Oral Q12H [address] Baljit Duran MD   fluticasone 1 spray Nasal Daily NAVNEET Solorzano   HYDROcodone-acetaminophen 2 tablet Oral Q6H PRN NAVNEET Pan   influenza vaccine 0 5 mL Intramuscular Prior to discharge Kamar Yuen MD   nystatin  Topical BID Hannah Baird PA-C   ondansetron 4 mg Intravenous Q6H PRN NAVNEET Pan   pantoprazole 40 mg Oral Early Morning NAVNEET Martinez   polyethylene glycol 17 g Oral Daily PRN NAVNEET Pan   potassium chloride 10 mEq Oral Daily NAVNEET Martinez   sodium chloride 1 spray Each Nare PRN NAVNEET Pan   vitamin E (tocopherol) 400 Units Oral Daily NAVNEET Solorzano        Today, Patient Was Seen By: Kiarra Diaz PA-C    ** Please Note: Dictation voice to text software may have been used in the creation of this document   **

## 2019-01-08 NOTE — ASSESSMENT & PLAN NOTE
· Culture came back positive with MRSA  · Contact isolation  · Continue PO doxycycline (dose #5, day #3)   Continue PO doxycycline for total of 7 days  · Wound care  · afebrile

## 2019-01-09 LAB
ALBUMIN SERPL BCP-MCNC: 3.2 G/DL (ref 3.5–5)
ALP SERPL-CCNC: 87 U/L (ref 46–116)
ALT SERPL W P-5'-P-CCNC: 21 U/L (ref 12–78)
ANION GAP SERPL CALCULATED.3IONS-SCNC: 8 MMOL/L (ref 4–13)
AST SERPL W P-5'-P-CCNC: 39 U/L (ref 5–45)
BILIRUB SERPL-MCNC: 1.4 MG/DL (ref 0.2–1)
BUN SERPL-MCNC: 43 MG/DL (ref 5–25)
CALCIUM SERPL-MCNC: 9 MG/DL (ref 8.3–10.1)
CHLORIDE SERPL-SCNC: 101 MMOL/L (ref 100–108)
CO2 SERPL-SCNC: 32 MMOL/L (ref 21–32)
CREAT SERPL-MCNC: 1.32 MG/DL (ref 0.6–1.3)
ERYTHROCYTE [DISTWIDTH] IN BLOOD BY AUTOMATED COUNT: 15 % (ref 11.6–15.1)
GFR SERPL CREATININE-BSD FRML MDRD: 54 ML/MIN/1.73SQ M
GLUCOSE SERPL-MCNC: 85 MG/DL (ref 65–140)
HCT VFR BLD AUTO: 42.7 % (ref 36.5–49.3)
HGB BLD-MCNC: 14.1 G/DL (ref 12–17)
MCH RBC QN AUTO: 31.9 PG (ref 26.8–34.3)
MCHC RBC AUTO-ENTMCNC: 33 G/DL (ref 31.4–37.4)
MCV RBC AUTO: 97 FL (ref 82–98)
PLATELET # BLD AUTO: 132 THOUSANDS/UL (ref 149–390)
PMV BLD AUTO: 12 FL (ref 8.9–12.7)
POTASSIUM SERPL-SCNC: 3.5 MMOL/L (ref 3.5–5.3)
PROT SERPL-MCNC: 7.8 G/DL (ref 6.4–8.2)
RBC # BLD AUTO: 4.42 MILLION/UL (ref 3.88–5.62)
SODIUM SERPL-SCNC: 141 MMOL/L (ref 136–145)
WBC # BLD AUTO: 6.4 THOUSAND/UL (ref 4.31–10.16)

## 2019-01-09 PROCEDURE — 90662 IIV NO PRSV INCREASED AG IM: CPT | Performed by: INTERNAL MEDICINE

## 2019-01-09 PROCEDURE — 99232 SBSQ HOSP IP/OBS MODERATE 35: CPT | Performed by: PHYSICIAN ASSISTANT

## 2019-01-09 PROCEDURE — 99232 SBSQ HOSP IP/OBS MODERATE 35: CPT | Performed by: INTERNAL MEDICINE

## 2019-01-09 PROCEDURE — 80053 COMPREHEN METABOLIC PANEL: CPT | Performed by: PHYSICIAN ASSISTANT

## 2019-01-09 PROCEDURE — G0008 ADMIN INFLUENZA VIRUS VAC: HCPCS | Performed by: INTERNAL MEDICINE

## 2019-01-09 PROCEDURE — 85027 COMPLETE CBC AUTOMATED: CPT | Performed by: PHYSICIAN ASSISTANT

## 2019-01-09 RX ORDER — METOLAZONE 2.5 MG/1
2.5 TABLET ORAL DAILY
Status: DISCONTINUED | OUTPATIENT
Start: 2019-01-09 | End: 2019-01-16 | Stop reason: HOSPADM

## 2019-01-09 RX ORDER — POTASSIUM CHLORIDE 20 MEQ/1
40 TABLET, EXTENDED RELEASE ORAL ONCE
Status: COMPLETED | OUTPATIENT
Start: 2019-01-09 | End: 2019-01-09

## 2019-01-09 RX ORDER — POTASSIUM CHLORIDE 20 MEQ/1
40 TABLET, EXTENDED RELEASE ORAL DAILY
Status: DISCONTINUED | OUTPATIENT
Start: 2019-01-10 | End: 2019-01-11

## 2019-01-09 RX ADMIN — CYANOCOBALAMIN TAB 500 MCG 1000 MCG: 500 TAB at 08:06

## 2019-01-09 RX ADMIN — VITAMIN D, TAB 1000IU (100/BT) 1000 UNITS: 25 TAB at 08:07

## 2019-01-09 RX ADMIN — Medication 100 MG: at 08:07

## 2019-01-09 RX ADMIN — POTASSIUM CHLORIDE 40 MEQ: 1500 TABLET, EXTENDED RELEASE ORAL at 09:39

## 2019-01-09 RX ADMIN — DOXYCYCLINE HYCLATE 100 MG: 100 CAPSULE ORAL at 21:43

## 2019-01-09 RX ADMIN — APIXABAN 5 MG: 5 TABLET, FILM COATED ORAL at 17:01

## 2019-01-09 RX ADMIN — HYDROCODONE BITARTRATE AND ACETAMINOPHEN 2 TABLET: 5; 325 TABLET ORAL at 08:13

## 2019-01-09 RX ADMIN — DOXYCYCLINE HYCLATE 100 MG: 100 CAPSULE ORAL at 08:06

## 2019-01-09 RX ADMIN — DILTIAZEM HYDROCHLORIDE 120 MG: 120 CAPSULE, COATED, EXTENDED RELEASE ORAL at 17:01

## 2019-01-09 RX ADMIN — OXYCODONE HYDROCHLORIDE AND ACETAMINOPHEN 1000 MG: 500 TABLET ORAL at 08:07

## 2019-01-09 RX ADMIN — METOLAZONE 2.5 MG: 2.5 TABLET ORAL at 09:38

## 2019-01-09 RX ADMIN — PANTOPRAZOLE SODIUM 40 MG: 40 TABLET, DELAYED RELEASE ORAL at 05:42

## 2019-01-09 RX ADMIN — DILTIAZEM HYDROCHLORIDE 120 MG: 120 CAPSULE, COATED, EXTENDED RELEASE ORAL at 08:07

## 2019-01-09 RX ADMIN — Medication 0.5 MG/HR: at 13:36

## 2019-01-09 RX ADMIN — APIXABAN 5 MG: 5 TABLET, FILM COATED ORAL at 08:07

## 2019-01-09 RX ADMIN — FLUTICASONE PROPIONATE 1 SPRAY: 50 SPRAY, METERED NASAL at 08:07

## 2019-01-09 RX ADMIN — VITAMIN E CAP 400 UNIT 400 UNITS: 400 CAP at 08:07

## 2019-01-09 RX ADMIN — NYSTATIN: 100000 POWDER TOPICAL at 08:07

## 2019-01-09 RX ADMIN — POTASSIUM CHLORIDE 10 MEQ: 750 TABLET, EXTENDED RELEASE ORAL at 08:06

## 2019-01-09 RX ADMIN — INFLUENZA A VIRUS A/MICHIGAN/45/2015 X-275 (H1N1) ANTIGEN (FORMALDEHYDE INACTIVATED), INFLUENZA A VIRUS A/SINGAPORE/INFIMH-16-0019/2016 IVR-186 (H3N2) ANTIGEN (FORMALDEHYDE INACTIVATED), AND INFLUENZA B VIRUS B/MARYLAND/15/2016 BX-69A (A B/COLORADO/6/2017-LIKE VIRUS) ANTIGEN (FORMALDEHYDE INACTIVATED) 0.5 ML: 60; 60; 60 INJECTION, SUSPENSION INTRAMUSCULAR at 08:13

## 2019-01-09 NOTE — ASSESSMENT & PLAN NOTE
· BP currently 113/76  · Continue to hold losartan with SAMY  · Continue cardizem with hold parameters  · BP at baseline 105-120 past 2 days    · Monitor with diuresis

## 2019-01-09 NOTE — NURSING NOTE
No issues or complaints from 7p-11p  Patient requested acewrap to be removed temporarily  Offers no c/o pain otherwise  Bumex gtt infusing per order

## 2019-01-09 NOTE — ASSESSMENT & PLAN NOTE
· Culture came back positive with MRSA  · Contact isolation  · Continue PO doxycycline (day #4/7)   Continue PO doxycycline for total of 7 days  · Wound care  · Remains afebrile with no evidence of leukocytosis

## 2019-01-09 NOTE — PROGRESS NOTES
[facility]  Progress Note - Linwood Estephaniejim 1945, 68 y o  male MRN: [id number]  Unit/Bed#: [address] [place] [address] Encounter: [id number]  Primary Care Provider: Jessee Trejo PA-C   Date and time admitted to hospital: 1/2/2019  6:35 PM    * Acute on chronic diastolic congestive heart failure ([address] )   Assessment & Plan    · Ejection fraction 45% on ECHO during this admission  Mild diffuse hypokinesis with regional variations  · Continue treating with IV diuresis - continue on IV Bumex infusion today  Metolazone added by Cardiology this morning  · Forty mEq p o  Of potassium x1 now  Increase daily mEq to 40 mg  · 2 g daily sodium restricted diet  I/Os, daily weights  · Cardiology following  · Net negative 16 83 L this admission  Net -2 379 L yesterday  · Weights labile since admission from 310-320  Currently decreased to 307    Wt Readings from Last 3 Encounters:   01/09/19 (!) 140 kg (307 lb 8 7 oz)   10/22/18 132 kg (290 lb)   06/18/18 136 kg (299 lb)       Intake/Output Summary (Last 24 hours) at 01/09/19 0944  Last data filed at 01/09/19 0809   Gross per 24 hour   Intake              136 ml   Output             3665 ml   Net            -3529 ml          Atrial fibrillation (HCC)   Assessment & Plan    · Continue Cardizem  mg PO daily for rate control  · Continue Eliquis for anticoagulation  · Rate controlled  HR 90-96  · Cardiology following     Hypertension   Assessment & Plan    · BP currently 113/76  · Continue to hold losartan with SAMY  · Continue cardizem with hold parameters  · BP at baseline 105-120 past 2 days  · Monitor with diuresis      Obesity   Assessment & Plan    Body mass index is 37 44 kg/m²  Recommend dietary changes     Cellulitis of left lower extremity   Assessment & Plan    · Culture came back positive with MRSA  · Contact isolation  · Continue PO doxycycline (day #4/7)   Continue PO doxycycline for total of 7 days  · Wound care  · Remains afebrile with no evidence of leukocytosis       Gastroesophageal reflux disease without esophagitis   Assessment & Plan    · Continue PPI     Chronic deep vein thrombosis (DVT) ([address] )   Assessment & Plan    · Initially this was thought to represent acute DVT in the patient was changed from Eliquis to heparin infusion however venous duplex is noted to suggest that this was likely chronic  · Continue Eliquis  · Venous duplex: Evaluation shows non-occlusive or partially recanalized thrombus in the femoral vein and the popliteal vein which is most likely chronic  · VQ: low probability     Type 2 myocardial infarction [facility])   Assessment & Plan    · Troponins noted be 0 12-0 15  · Cardiology following  · Likely related to CHF  Acute kidney injury [facility])   Assessment & Plan    Lab Results   Component Value Date    CREATININE 1 32 (H) 01/09/2019    CREATININE 1 33 (H) 01/08/2019    CREATININE 1 33 (H) 01/07/2019    CREATININE 1 45 (H) 01/04/2019     · Creatinine at baseline 0 9-1 1  · Currently 1 32  Improved from 1 45 on 1/4/2019  · Repeat BMP now and daily while on diuresis  May need to accept higher creatinine to get euvolemic  · Hold losartan secondary to hypotension and SAMY  · UA with trace protein  · Check PVR     Thrombocytopenia (HCC)   Assessment & Plan    · Mild  Continue to trend  · Today 132       VTE Pharmacologic Prophylaxis:   Pharmacologic: Apixaban (Eliquis)  Mechanical VTE Prophylaxis in Place: Yes    Patient Centered Rounds: I have performed bedside rounds with nursing staff today  Discussions with Specialists or Other Care Team Provider: nursing    Education and Discussions with Family / Patient: patient, son via phone    Time Spent for Care: 30 minutes  More than 50% of total time spent on counseling and coordination of care as described above      Current Length of Stay: 7 day(s)    Current Patient Status: Inpatient   Certification Statement: The patient will continue to require additional inpatient hospital stay due to IV bumex    Discharge Plan: not stable given on IV bumex    Code Status: Level 1 - Full Code      Subjective:   Feels okay- frustrated because he states someone told him that "i am supposed to lose 20 lbs in 2 days"  No chest pain or sob  Still with leg edema    Objective:     Vitals:   Temp (24hrs), Av 1 °F (36 7 °C), Min:97 6 °F (36 4 °C), Max:98 5 °F (36 9 °C)    Temp:  [97 6 °F (36 4 °C)-98 5 °F (36 9 °C)] 97 6 °F (36 4 °C)  HR:  [69-80] 69  Resp:  [18] 18  BP: (113-147)/(76-84) 113/76  SpO2:  [92 %-95 %] 94 %  Body mass index is 37 44 kg/m²  Input and Output Summary (last 24 hours): Intake/Output Summary (Last 24 hours) at 19 0952  Last data filed at 19 0809   Gross per 24 hour   Intake              136 ml   Output             3665 ml   Net            -3529 ml       Physical Exam:     Physical Exam   Constitutional: He is oriented to person, place, and time  No distress  HENT:   Head: Normocephalic and atraumatic  Eyes: Conjunctivae are normal    Neck: No JVD present  Cardiovascular: Normal rate, regular rhythm, normal heart sounds and intact distal pulses  No murmur heard  Pulmonary/Chest: Effort normal  No accessory muscle usage  No respiratory distress  He has no wheezes  He has rales (faint bibasilar)  Abdominal: Soft  Bowel sounds are normal  He exhibits no distension  There is no tenderness  There is no rigidity, no rebound and no guarding  Musculoskeletal: He exhibits edema (2+ BLE)  BLE edema noted  Dressing CDI   Neurological: He is alert and oriented to person, place, and time  Skin: Skin is warm and dry  No rash noted  He is not diaphoretic  No erythema  Psychiatric: He has a normal mood and affect  His behavior is normal    Nursing note and vitals reviewed        Additional Data:     Labs:      Results from last 7 days  Lab Units 19  0536 19  0614   WBC Thousand/uL 6 40 6 45   HEMOGLOBIN g/dL 14 1 13 9   HEMATOCRIT % 42 7 41 9   PLATELETS Thousands/uL 132* 129*   NEUTROS PCT %  --  56   LYMPHS PCT %  --  27   MONOS PCT %  --  11   EOS PCT %  --  4       Results from last 7 days  Lab Units 01/09/19  0536   SODIUM mmol/L 141   POTASSIUM mmol/L 3 5   CHLORIDE mmol/L 101   CO2 mmol/L 32   BUN mg/dL 43*   CREATININE mg/dL 1 32*   ANION GAP mmol/L 8   CALCIUM mg/dL 9 0   ALBUMIN g/dL 3 2*   TOTAL BILIRUBIN mg/dL 1 40*   ALK PHOS U/L 87   ALT U/L 21   AST U/L 39   GLUCOSE RANDOM mg/dL 85       Results from last 7 days  Lab Units 01/04/19  0048   INR  1 73*                       * I Have Reviewed All Lab Data Listed Above  * Additional Pertinent Lab Tests Reviewed:  All Labs Within Last 24 Hours Reviewed    Imaging:    Imaging Reports Reviewed Today Include: none  Imaging Personally Reviewed by Myself Includes:  none    Recent Cultures (last 7 days):       Results from last 7 days  Lab Units 01/02/19  1925   GRAM STAIN RESULT  No polys seen  No organisms seen   WOUND CULTURE  1+ Growth of Methicillin Resistant Staphylococcus aureus*       Last 24 Hours Medication List:     Current Facility-Administered Medications:  acetaminophen 650 mg Oral Q4H PRN ElNAVNEET Painting    apixaban 5 mg Oral BID Ariela Fournier MD    vitamin C 1,000 mg Oral Daily NAVNEET Martinez    bumetanide 0 5 mg/hr Intravenous Continuous Violeta Ball MD Last Rate: 0 5 mg/hr (01/09/19 0600)   cholecalciferol 1,000 Units Oral Daily NAVNEET Martinez    co-enzyme Q-10 100 mg Oral Daily NAVNEET Martinez    cyanocobalamin 1,000 mcg Oral Daily NAVNEET Martinez    cyclobenzaprine 10 mg Oral TID PRN NAVNEET Trujillo    diltiazem 120 mg Oral BID NAVNEET Damico    doxycycline hyclate 100 mg Oral Q12H [facility] Ariela Fournier MD    fluticasone 1 spray Nasal Daily NAVNEET Trujillo    HYDROcodone-acetaminophen 2 tablet Oral Q6H PRN NAVNEET Damico    metolazone 2 5 mg Oral Daily Violeta Ball MD    nystatin  Topical BID Marlou Limes KEISHA Baird    ondansetron 4 mg Intravenous Q6H PRN NAVNEET Richard    pantoprazole 40 mg Oral Early Morning NAVNEET Martinez    polyethylene glycol 17 g Oral Daily PRN NAVNEET Richard    [START ON 1/10/2019] potassium chloride 40 mEq Oral Daily Hannah Baird PA-C    sodium chloride 1 spray Each Nare PRN NAVNEET Richard    vitamin E (tocopherol) 400 Units Oral Daily NAVNEET Richard         Today, Patient Was Seen By: Areli Sung PA-C    ** Please Note: Dictation voice to text software may have been used in the creation of this document   **

## 2019-01-09 NOTE — ASSESSMENT & PLAN NOTE
· Ejection fraction 45% on ECHO during this admission  Mild diffuse hypokinesis with regional variations  · Continue treating with IV diuresis - continue on IV Bumex infusion today  Metolazone added by Cardiology this morning  · Forty mEq p o  Of potassium x1 now  Increase daily mEq to 40 mg  · 2 g daily sodium restricted diet  I/Os, daily weights  · Cardiology following  · Net negative 16 83 L this admission  Net -2 379 L yesterday  · Weights labile since admission from 310-320    Currently decreased to 307    Wt Readings from Last 3 Encounters:   01/09/19 (!) 140 kg (307 lb 8 7 oz)   10/22/18 132 kg (290 lb)   06/18/18 136 kg (299 lb)       Intake/Output Summary (Last 24 hours) at 01/09/19 0944  Last data filed at 01/09/19 0809   Gross per 24 hour   Intake              136 ml   Output             3665 ml   Net            -3529 ml

## 2019-01-09 NOTE — UTILIZATION REVIEW
[address]  Utilization Review Department  Phone: [phone]; Fax [phone]  [email or web address]  ATTENTION: Please call with any questions or concerns to [phone]  and carefully listen to the prompts so that you are directed to the right person  Send all requests for admission clinical reviews, approved or denied determinations and any other requests to fax [phone]   All voicemails are confidential   Continued Stay Review    Date: 1/19/2018  Vital Signs: /76 (BP Location: Right arm)   Pulse 69   Temp 97 6 °F (36 4 °C) (Oral)   Resp 18   Ht 6' 4" (1 93 m)   Wt (!) 140 kg (307 lb 8 7 oz)   SpO2 94%   BMI 37 44 kg/m²   Assessment/Plan: acute on chronic diastolic chf-- ej 99%  Continue with iv diuresis-- continue bumex infusion today -- kcl 40 -- 2 gm na restr diet   Daily wt    celluitlis lle -- culture + mrsa-- contact isolation -- doxycycline day 4 of 7-- wound care  vq low prob-- dvt likely chronic  garry-- baseline cr 0 9-1 1  Cr 1 32  The patient will continue to require additional inpatient hospital stay due to IV bumex  Medications:   Scheduled Meds:   Current Facility-Administered Medications:  acetaminophen 650 mg Oral Q4H PRN NAVNEET Mendez    apixaban 5 mg Oral BID Nate Mendez MD    vitamin C 1,000 mg Oral Daily NAVNEET Martinez    bumetanide 0 5 mg/hr Intravenous Continuous Elyse Uriostegui MD Last Rate: 0 5 mg/hr (01/09/19 0600)   cholecalciferol 1,000 Units Oral Daily NAVNEET Martinez    co-enzyme Q-10 100 mg Oral Daily NAVNEET Martinez    cyanocobalamin 1,000 mcg Oral Daily NAVNEET Martinez    cyclobenzaprine 10 mg Oral TID PRN NAVNEET Mendez    diltiazem 120 mg Oral BID NAVNEET Mendiola    doxycycline hyclate 100 mg Oral Q12H [facility] Nate Mendez MD    fluticasone 1 spray Nasal Daily Doneen Cari, CRNP    HYDROcodone-acetaminophen 2 tablet Oral Q6H PRN NAVNEET Mendez metolazone 2 5 mg Oral Daily Elyse Uriostegui MD    nystatin  Topical BID Nicole Baird PA-C    ondansetron 4 mg Intravenous Q6H PRN NAVNEET Mendez    pantoprazole 40 mg Oral Early Morning NAVNEET Martinez    polyethylene glycol 17 g Oral Daily PRN NAVNEET Mendez    [START ON 1/10/2019] potassium chloride 40 mEq Oral Daily Hannah Baird PA-C    sodium chloride 1 spray Each Nare PRN NAVNEET Mendez    vitamin E (tocopherol) 400 Units Oral Daily NAVNEET Martinez      Continuous Infusions:   bumetanide 0 5 mg/hr Last Rate: 0 5 mg/hr (01/09/19 0600)     PRN Meds:   acetaminophen    cyclobenzaprine    HYDROcodone-acetaminophenx1    ondansetron    polyethylene glycol    sodium chloride  Pertinent Labs/Diagnostic Results:platlets 132-----bun 42--cr 1 32--alb 3 2--t  Bili 1  40Age/Sex: 68 y o  male   Discharge Plan:  Plans to go home lives with son and grandson

## 2019-01-09 NOTE — ASSESSMENT & PLAN NOTE
Lab Results   Component Value Date    CREATININE 1 32 (H) 01/09/2019    CREATININE 1 33 (H) 01/08/2019    CREATININE 1 33 (H) 01/07/2019    CREATININE 1 45 (H) 01/04/2019     · Creatinine at baseline 0 9-1 1  · Currently 1 32  Improved from 1 45 on 1/4/2019  · Repeat BMP now and daily while on diuresis  May need to accept higher creatinine to get euvolemic  · Hold losartan secondary to hypotension and SAMY    · UA with trace protein  · Check PVR

## 2019-01-09 NOTE — PROGRESS NOTES
Cardiology Progress Note - Linwood Mazariegos 68 y o  male MRN: [id number]    Unit/Bed#: [address] [place] [address] 206-01 Encounter: [id number]        Subjective:    No significant events overnight  No chest pain  Still has LE edema  ROS    Objective:   Vitals: Blood pressure 113/76, pulse 69, temperature 97 6 °F (36 4 °C), temperature source Oral, resp  rate 18, height 6' 4" (1 93 m), weight (!) 140 kg (307 lb 8 7 oz), SpO2 94 %  , Body mass index is 37 44 kg/m² , Orthostatic Blood Pressures      Most Recent Value   Blood Pressure  113/76 filed at 01/09/2019 1036   Patient Position - Orthostatic VS  Lying filed at 01/09/2019 0780         Systolic (52XGD), KVD:969 , Min:113 , YGN:393     Diastolic (57JWH), DTR:81, Min:76, Max:84      Intake/Output Summary (Last 24 hours) at 01/09/19 0917  Last data filed at 01/09/19 0809   Gross per 24 hour   Intake              136 ml   Output             3665 ml   Net            -3529 ml     Weight (last 2 days)     Date/Time   Weight    01/09/19 0752  (!)  140 (307 54)    01/08/19 0749  (!)  140 (309 75)    01/07/19 1309  (!)  141 (310 19)                    Physical Exam   Constitutional: He is oriented to person, place, and time  No distress  HENT:   Mouth/Throat: No oropharyngeal exudate  Eyes: No scleral icterus  Neck: No JVD present  Cardiovascular: Normal rate  An irregularly irregular rhythm present  Pulmonary/Chest: Effort normal and breath sounds normal  No respiratory distress  He has no wheezes  He has no rales  Abdominal: Soft  Bowel sounds are normal  He exhibits no distension  There is no tenderness  There is no rebound  Musculoskeletal: Normal range of motion  He exhibits edema  Neurological: He is alert and oriented to person, place, and time  Skin: Skin is warm and dry  He is not diaphoretic           Laboratory Results:    Results from last 7 days  Lab Units 01/04/19  1325 01/04/19  1022 01/03/19  1153   TROPONIN I ng/mL 0 13* 0 12* 0 17*       CBC with diff: Results from last 7 days  Lab Units 01/09/19  0536 01/07/19  0614 01/04/19  0522 01/03/19  0814 01/03/19 0107 01/02/19  1924   WBC Thousand/uL 6 40 6 45 6 23 6 08 7 05 6 90   HEMOGLOBIN g/dL 14 1 13 9 13 9 14 1 14 7 15 1   HEMATOCRIT % 42 7 41 9 42 9 43 1 44 7 46 2   MCV fL 97 96 96 97 97 96   PLATELETS Thousands/uL 132* 129* 124* 119* 165 159   MCH pg 31 9 31 8 31 0 31 6 31 7 31 4   MCHC g/dL 33 0 33 2 32 4 32 7 32 9 32 7   RDW % 15 0 15 2* 15 4* 15 6* 15 8* 15 6*   MPV fL 12 0 11 5 11 8 11 8 12 1 11 5   NRBC AUTO /100 WBCs  --  0 0 0  --  0         CMP:  Results from last 7 days  Lab Units 01/09/19  0536 01/08/19  0840 01/07/19  0614 01/04/19 0048 01/03/19 0814 01/02/19  1924   POTASSIUM mmol/L 3 5 3 8 3 8 3 5 3 2* 3 8   CHLORIDE mmol/L 101 101 102 103 102 100   CO2 mmol/L 32 30 29 28 25 27   BUN mg/dL 43* 42* 42* 34* 31* 32*   CREATININE mg/dL 1 32* 1 33* 1 33* 1 45* 1 26 1 47*   CALCIUM mg/dL 9 0 8 8 8 5 8 6 9 0 9 3   AST U/L 39  --   --   --   --   --    ALT U/L 21  --   --   --   --   --    ALK PHOS U/L 87  --   --   --   --   --    EGFR ml/min/1 73sq m 54 53 53 48 57 47         BMP:  Results from last 7 days  Lab Units 01/09/19  0536 01/08/19  0840 01/07/19  0614 01/04/19 0048 01/03/19 0814 01/02/19  1924   POTASSIUM mmol/L 3 5 3 8 3 8 3 5 3 2* 3 8   CHLORIDE mmol/L 101 101 102 103 102 100   CO2 mmol/L 32 30 29 28 25 27   BUN mg/dL 43* 42* 42* 34* 31* 32*   CREATININE mg/dL 1 32* 1 33* 1 33* 1 45* 1 26 1 47*   CALCIUM mg/dL 9 0 8 8 8 5 8 6 9 0 9 3       BNP: No results for input(s): BNP in the last 72 hours      Magnesium:   Results from last 7 days  Lab Units 01/07/19  0614   MAGNESIUM mg/dL 1 5*       Coags:   Results from last 7 days  Lab Units 01/04/19  0048 01/03/19  0814 01/03/19  0107 01/02/19  1924   PTT seconds  --  >210* 38 38   INR  1 73* 2 27* 1 90* 2 05*       TSH: No results found for: TSH    Hemoglobin A1C       Lipid Profile:   Results from last 7 days  Lab Units 01/03/19  0892 TRIGLYCERIDES mg/dL 30   HDL mg/dL 41       Cardiac testing:   Results for orders placed during the hospital encounter of 19   Echo complete with contrast if indicated    Narrative [address]  [address]    Transthoracic Echocardiogram  2D, M-mode, Doppler, and Color Doppler    Study date:  2019    Patient: Melo Whitfield  MR number: [id number]  Account number: [de-identified]  : 1945  Age: 68 years  Gender: Male  Status: Inpatient  Location: Bedside  Height: 76 in  Weight: 308 lb  BP: 127/ 60 mmHg    Indications: Heart failure  Diagnoses: I50 9 - Heart failure, unspecified    Sonographer:  Abundio HERNANDEZ, RCS  Primary Physician:  Joanie Luke PA-C  Referring Physician:  Joanie Luke PA-C  Group:  [facility]  Interpreting Physician:  Zev Cheng MD    SUMMARY    LEFT VENTRICLE:  Systolic function was mildly reduced  Ejection fraction was estimated to be 45 %  There was mild diffuse hypokinesis with regional variations  More servere hypokinesis was seen in the inferior wall  Wall thickness was moderately to markedly increased  VENTRICULAR SEPTUM:  There was moderate dyssynergic motion  RIGHT VENTRICLE:  The ventricle was dilated  Systolic function was mildly reduced  LEFT ATRIUM:  The atrium was moderately dilated  RIGHT ATRIUM:  The atrium was moderately dilated  MITRAL VALVE:  There was mild annular calcification  There was at least moderate regurgitation  The regurgitant jet was eccentric and not well captured on most images  TRICUSPID VALVE:  There was mild regurgitation  HISTORY: PRIOR HISTORY: Pulmonary embolus, heart failure, hypertension, PVD  Risk factors: hypertension and morbid obesity  PROCEDURE: The procedure was performed at the bedside  This was a routine study  The transthoracic approach was used   The study included complete 2D imaging, M-mode, complete spectral Doppler, and color Doppler  Images were obtained from  the parasternal, apical, subcostal, and suprasternal notch acoustic windows  Echocardiographic views were limited due to restricted patient mobility  Image quality was adequate  LEFT VENTRICLE: Size was normal  Systolic function was mildly reduced  Ejection fraction was estimated to be 45 %  There was mild diffuse hypokinesis with regional variations  More servere hypokinesis was seen in the inferior wall  Wall  thickness was moderately to markedly increased  DOPPLER: Transmitral flow pattern: atrial fibrillation  Left ventricular diastolic function parameters were abnormal     VENTRICULAR SEPTUM: There was moderate dyssynergic motion  RIGHT VENTRICLE: The ventricle was dilated  Systolic function was mildly reduced  Wall thickness was normal     LEFT ATRIUM: The atrium was moderately dilated  RIGHT ATRIUM: The atrium was moderately dilated  MITRAL VALVE: There was mild annular calcification  Valve structure was normal  There was normal leaflet separation  DOPPLER: The transmitral velocity was within the normal range  There was no evidence for stenosis  There was at least  moderate regurgitation  The regurgitant jet was eccentric and not well captured on most images  AORTIC VALVE: The valve was trileaflet  Leaflets exhibited normal thickness, normal cuspal separation, and sclerosis  DOPPLER: Transaortic velocity was within the normal range  There was no evidence for stenosis  There was no significant  regurgitation  TRICUSPID VALVE: The valve structure was normal  There was normal leaflet separation  DOPPLER: The transtricuspid velocity was within the normal range  There was no evidence for stenosis  There was mild regurgitation  PULMONIC VALVE: Not well visualized  DOPPLER: The transpulmonic velocity was within the normal range  There was no significant regurgitation  PERICARDIUM: There was no pericardial effusion   The pericardium was normal in appearance  AORTA: The root exhibited normal size  SYSTEMIC VEINS: IVC: The inferior vena cava was not well visualized  SYSTEM MEASUREMENT TABLES    2D  %FS: 31 8 %  Ao Diam: 4 05 cm  EDV(Teich): 95 67 ml  EF Biplane: 61 24 %  EF(Teich): 59 92 %  ESV(Teich): 38 35 ml  IVSd: 1 56 cm  LA Area: 39 52 cm2  LA Diam: 5 17 cm  LVEDV MOD A2C: 184 74 ml  LVEDV MOD A4C: 196 07 ml  LVEDV MOD BP: 192 04 ml  LVEF MOD A2C: 58 57 %  LVEF MOD A4C: 63 99 %  LVESV MOD A2C: 76 54 ml  LVESV MOD A4C: 70 6 ml  LVESV MOD BP: 74 43 ml  LVIDd: 4 57 cm  LVIDs: 3 11 cm  LVLd A2C: 9 79 cm  LVLd A4C: 9 45 cm  LVLs A2C: 8 52 cm  LVLs A4C: 8 26 cm  LVOT Diam: 2 09 cm  LVPWd: 1 75 cm  RA Area: 40 17 cm2  RVIDd: 5 4 cm  SV MOD A2C: 108 2 ml  SV MOD A4C: 125 48 ml  SV(Teich): 57 33 ml    CW  TR Vmax: 2 27 m/s  TR maxP 6 mmHg    MM  TAPSE: 1 07 cm    [facility]    Prepared and electronically signed by    Shannon Arroyo MD  Signed 2019 09:02:08       No results found for this or any previous visit  No results found for this or any previous visit  No results found for this or any previous visit      Meds/Allergies   current meds:   Current Facility-Administered Medications   Medication Dose Route Frequency    acetaminophen (TYLENOL) tablet 650 mg  650 mg Oral Q4H PRN    apixaban (ELIQUIS) tablet 5 mg  5 mg Oral BID    ascorbic acid (VITAMIN C) tablet 1,000 mg  1,000 mg Oral Daily    bumetanide (BUMEX) 12 5 mg infusion 50 mL  0 5 mg/hr Intravenous Continuous    cholecalciferol (VITAMIN D3) tablet 1,000 Units  1,000 Units Oral Daily    co-enzyme Q-10 capsule 100 mg  100 mg Oral Daily    cyanocobalamin (VITAMIN B-12) tablet 1,000 mcg  1,000 mcg Oral Daily    cyclobenzaprine (FLEXERIL) tablet 10 mg  10 mg Oral TID PRN    diltiazem (CARDIZEM CD) 24 hr capsule 120 mg  120 mg Oral BID    doxycycline hyclate (VIBRAMYCIN) capsule 100 mg  100 mg Oral Q12H [address]    fluticasone (FLONASE) 50 mcg/act nasal spray 1 spray  1 spray Nasal Daily    HYDROcodone-acetaminophen (NORCO) 5-325 mg per tablet 2 tablet  2 tablet Oral Q6H PRN    metolazone (ZAROXOLYN) tablet 2 5 mg  2 5 mg Oral Daily    nystatin (MYCOSTATIN) powder   Topical BID    ondansetron (ZOFRAN) injection 4 mg  4 mg Intravenous Q6H PRN    pantoprazole (PROTONIX) EC tablet 40 mg  40 mg Oral Early Morning    polyethylene glycol (MIRALAX) packet 17 g  17 g Oral Daily PRN    [START ON 1/10/2019] potassium chloride (K-DUR,KLOR-CON) CR tablet 40 mEq  40 mEq Oral Daily    sodium chloride (OCEAN) 0 65 % nasal spray 1 spray  1 spray Each Nare PRN    vitamin E (tocopherol) capsule 400 Units  400 Units Oral Daily     Prescriptions Prior to Admission   Medication    Ascorbic Acid (VITAMIN C) 1000 MG tablet    co-enzyme Q-10 50 MG capsule    cyanocobalamin (VITAMIN B-12) 1,000 mcg tablet    diltiazem (CARDIZEM CD) 120 mg 24 hr capsule    guaiFENesin (MUCINEX) 600 mg 12 hr tablet    HYDROcodone-acetaminophen (NORCO) 7 5-325 mg per tablet    potassium chloride (K-DUR,KLOR-CON) 10 mEq tablet    sildenafil (VIAGRA) 100 mg tablet    apixaban (ELIQUIS) 5 mg    fluticasone (FLONASE) 50 mcg/act nasal spray    furosemide (LASIX) 40 mg tablet    losartan (COZAAR) 50 mg tablet    metolazone (ZAROXOLYN) 2 5 mg tablet    omeprazole (PriLOSEC) 20 mg delayed release capsule    pantoprazole (PROTONIX) 40 mg tablet    sodium chloride (OCEAN) 0 65 % nasal spray         bumetanide 0 5 mg/hr Last Rate: 0 5 mg/hr (01/09/19 0600)     Assessment:  Principal Problem:    Acute on chronic diastolic congestive heart failure (HCC)  Active Problems:    Gastroesophageal reflux disease without esophagitis    Obesity    Hypertension    Atrial fibrillation (HCC)    Acute kidney injury (HCC)    Type 2 myocardial infarction (HCC)    Cellulitis of left lower extremity    Chronic deep vein thrombosis (DVT) (HCC)    Thrombocytopenia [facility])    Plan:    Acute on Chronic Diastolic CHF: Improvement in diuresis but also very diuretic resistant  Add metolazone  Keep K repleted  Continue IV bumex drip until he is clinically euvolemic and I think he will likely need multiple days of IV bumex drip if tolerated  Persistent AF: Continue anticoagulation and rate control  Counseling / Coordination of Care  Total floor / unit time spent today 25 minutes  Greater than 50% of total time was spent with the patient and / or family counseling and / or coordination of care  A description of the counseling / coordination of care

## 2019-01-10 LAB
ALBUMIN SERPL BCP-MCNC: 3.2 G/DL (ref 3.5–5)
ALP SERPL-CCNC: 87 U/L (ref 46–116)
ALT SERPL W P-5'-P-CCNC: 23 U/L (ref 12–78)
ANION GAP SERPL CALCULATED.3IONS-SCNC: 10 MMOL/L (ref 4–13)
AST SERPL W P-5'-P-CCNC: 37 U/L (ref 5–45)
BILIRUB SERPL-MCNC: 1.6 MG/DL (ref 0.2–1)
BUN SERPL-MCNC: 48 MG/DL (ref 5–25)
CALCIUM SERPL-MCNC: 9.5 MG/DL (ref 8.3–10.1)
CHLORIDE SERPL-SCNC: 97 MMOL/L (ref 100–108)
CO2 SERPL-SCNC: 33 MMOL/L (ref 21–32)
CREAT SERPL-MCNC: 1.43 MG/DL (ref 0.6–1.3)
ERYTHROCYTE [DISTWIDTH] IN BLOOD BY AUTOMATED COUNT: 15.1 % (ref 11.6–15.1)
GFR SERPL CREATININE-BSD FRML MDRD: 49 ML/MIN/1.73SQ M
GLUCOSE SERPL-MCNC: 78 MG/DL (ref 65–140)
HCT VFR BLD AUTO: 43.6 % (ref 36.5–49.3)
HGB BLD-MCNC: 14.4 G/DL (ref 12–17)
MAGNESIUM SERPL-MCNC: 1.5 MG/DL (ref 1.6–2.6)
MCH RBC QN AUTO: 31.8 PG (ref 26.8–34.3)
MCHC RBC AUTO-ENTMCNC: 33 G/DL (ref 31.4–37.4)
MCV RBC AUTO: 96 FL (ref 82–98)
PLATELET # BLD AUTO: 139 THOUSANDS/UL (ref 149–390)
PMV BLD AUTO: 12 FL (ref 8.9–12.7)
POTASSIUM SERPL-SCNC: 3.2 MMOL/L (ref 3.5–5.3)
PROT SERPL-MCNC: 8.4 G/DL (ref 6.4–8.2)
RBC # BLD AUTO: 4.53 MILLION/UL (ref 3.88–5.62)
SODIUM SERPL-SCNC: 140 MMOL/L (ref 136–145)
WBC # BLD AUTO: 6.28 THOUSAND/UL (ref 4.31–10.16)

## 2019-01-10 PROCEDURE — 83735 ASSAY OF MAGNESIUM: CPT | Performed by: PHYSICIAN ASSISTANT

## 2019-01-10 PROCEDURE — 99232 SBSQ HOSP IP/OBS MODERATE 35: CPT | Performed by: PHYSICIAN ASSISTANT

## 2019-01-10 PROCEDURE — 80053 COMPREHEN METABOLIC PANEL: CPT | Performed by: PHYSICIAN ASSISTANT

## 2019-01-10 PROCEDURE — 85027 COMPLETE CBC AUTOMATED: CPT | Performed by: PHYSICIAN ASSISTANT

## 2019-01-10 PROCEDURE — 99232 SBSQ HOSP IP/OBS MODERATE 35: CPT | Performed by: INTERNAL MEDICINE

## 2019-01-10 RX ORDER — POTASSIUM CHLORIDE 20 MEQ/1
40 TABLET, EXTENDED RELEASE ORAL ONCE
Status: COMPLETED | OUTPATIENT
Start: 2019-01-10 | End: 2019-01-10

## 2019-01-10 RX ORDER — MAGNESIUM SULFATE 1 G/100ML
1 INJECTION INTRAVENOUS ONCE
Status: COMPLETED | OUTPATIENT
Start: 2019-01-10 | End: 2019-01-10

## 2019-01-10 RX ADMIN — Medication 0.5 MG/HR: at 10:09

## 2019-01-10 RX ADMIN — PANTOPRAZOLE SODIUM 40 MG: 40 TABLET, DELAYED RELEASE ORAL at 06:27

## 2019-01-10 RX ADMIN — NYSTATIN: 100000 POWDER TOPICAL at 08:17

## 2019-01-10 RX ADMIN — MAGNESIUM SULFATE HEPTAHYDRATE 1 G: 1 INJECTION, SOLUTION INTRAVENOUS at 08:17

## 2019-01-10 RX ADMIN — DILTIAZEM HYDROCHLORIDE 120 MG: 120 CAPSULE, COATED, EXTENDED RELEASE ORAL at 17:14

## 2019-01-10 RX ADMIN — APIXABAN 5 MG: 5 TABLET, FILM COATED ORAL at 17:14

## 2019-01-10 RX ADMIN — APIXABAN 5 MG: 5 TABLET, FILM COATED ORAL at 08:16

## 2019-01-10 RX ADMIN — CYCLOBENZAPRINE HYDROCHLORIDE 10 MG: 10 TABLET, FILM COATED ORAL at 22:54

## 2019-01-10 RX ADMIN — OXYCODONE HYDROCHLORIDE AND ACETAMINOPHEN 1000 MG: 500 TABLET ORAL at 08:16

## 2019-01-10 RX ADMIN — HYDROCODONE BITARTRATE AND ACETAMINOPHEN 2 TABLET: 5; 325 TABLET ORAL at 02:46

## 2019-01-10 RX ADMIN — POTASSIUM CHLORIDE 40 MEQ: 1500 TABLET, EXTENDED RELEASE ORAL at 08:16

## 2019-01-10 RX ADMIN — HYDROCODONE BITARTRATE AND ACETAMINOPHEN 2 TABLET: 5; 325 TABLET ORAL at 21:12

## 2019-01-10 RX ADMIN — METOLAZONE 2.5 MG: 2.5 TABLET ORAL at 08:16

## 2019-01-10 RX ADMIN — DILTIAZEM HYDROCHLORIDE 120 MG: 120 CAPSULE, COATED, EXTENDED RELEASE ORAL at 08:16

## 2019-01-10 RX ADMIN — VITAMIN E CAP 400 UNIT 400 UNITS: 400 CAP at 08:16

## 2019-01-10 RX ADMIN — VITAMIN D, TAB 1000IU (100/BT) 1000 UNITS: 25 TAB at 08:16

## 2019-01-10 RX ADMIN — Medication 100 MG: at 08:17

## 2019-01-10 RX ADMIN — DOXYCYCLINE HYCLATE 100 MG: 100 CAPSULE ORAL at 08:16

## 2019-01-10 RX ADMIN — CYANOCOBALAMIN TAB 500 MCG 1000 MCG: 500 TAB at 08:16

## 2019-01-10 RX ADMIN — FLUTICASONE PROPIONATE 1 SPRAY: 50 SPRAY, METERED NASAL at 08:15

## 2019-01-10 RX ADMIN — DOXYCYCLINE HYCLATE 100 MG: 100 CAPSULE ORAL at 21:13

## 2019-01-10 RX ADMIN — NYSTATIN 1 APPLICATION: 100000 POWDER TOPICAL at 17:14

## 2019-01-10 NOTE — ASSESSMENT & PLAN NOTE
Lab Results   Component Value Date    CREATININE 1 43 (H) 01/10/2019    CREATININE 1 32 (H) 01/09/2019    CREATININE 1 33 (H) 01/08/2019    CREATININE 1 33 (H) 01/07/2019     · Creatinine at baseline 0 9-1 1  · Currently 1 43    · Repeat BMP daily while on diuresis  May need to accept higher creatinine to get euvolemic but has been trending upward, losartan on hold   Will discuss with cardio holding metazolone today  · Hold losartan   · UA with trace protein  · Check PVR

## 2019-01-10 NOTE — ASSESSMENT & PLAN NOTE
· Culture came back positive with MRSA  · Contact isolation  · Continue PO doxycycline (day #5/7)   Continue PO doxycycline for total of 7 days  · Wound care  · Remains afebrile with no evidence of leukocytosis

## 2019-01-10 NOTE — ASSESSMENT & PLAN NOTE
· Ejection fraction 45% on ECHO during this admission  Mild diffuse hypokinesis with regional variations  · Continue treating with IV diuresis - continue on IV Bumex infusion today  Metolazone added by Cardiology yesterday  · Creatinine continues to rise  Need to monitor creatinine closely with IV diuresis  If rises any further may need to hold metolazone and ARB med  · 40 mEq p o  Of potassium x1 now  Also on daily 40 mEq potassium  · 2 g daily sodium restricted diet  I/Os, daily weights  · Cardiology following  · Net negative 19 7 L this admission  Net -4 4 L yesterday  · Weights labile since admission from 310-320  Yesterday 307   Check weight today  · Tele while on continuous infusion of diuretics    Wt Readings from Last 3 Encounters:   01/09/19 (!) 140 kg (307 lb 8 7 oz)   10/22/18 132 kg (290 lb)   06/18/18 136 kg (299 lb)       Intake/Output Summary (Last 24 hours) at 01/10/19 0746  Last data filed at 01/10/19 0630   Gross per 24 hour   Intake              120 ml   Output             4000 ml   Net            -3880 ml

## 2019-01-10 NOTE — PROGRESS NOTES
[address] Internal Medicine  Progress Note - Linwood Mazariegos 1945, 68 y o  male MRN: [id number]  Unit/Bed#: [address] [place] [address] Encounter: [id number]  Primary Care Provider: Andrea Uriostegui PA-C   Date and time admitted to hospital: 1/2/2019  6:35 PM    * Acute on chronic diastolic congestive heart failure ([address] )   Assessment & Plan    · Ejection fraction 45% on ECHO during this admission  Mild diffuse hypokinesis with regional variations  · Continue treating with IV diuresis - continue on IV Bumex infusion today  Metolazone added by Cardiology yesterday  · Creatinine continues to rise  Need to monitor creatinine closely with IV diuresis  If rises any further may need to hold metolazone and ARB med  · 40 mEq p o  Of potassium x1 now  Also on daily 40 mEq potassium  · 2 g daily sodium restricted diet  I/Os, daily weights  · Cardiology following  · Net negative 19 7 L this admission  Net -4 4 L yesterday  · Weights labile since admission from 310-320  Yesterday 307  Check weight today  · Tele while on continuous infusion of diuretics    Wt Readings from Last 3 Encounters:   01/09/19 (!) 140 kg (307 lb 8 7 oz)   10/22/18 132 kg (290 lb)   06/18/18 136 kg (299 lb)       Intake/Output Summary (Last 24 hours) at 01/10/19 0746  Last data filed at 01/10/19 0630   Gross per 24 hour   Intake              120 ml   Output             4000 ml   Net            -3880 ml          Atrial fibrillation (HCC)   Assessment & Plan    · Continue Cardizem  mg PO daily for rate control  · Continue Eliquis for anticoagulation  · Rate controlled  HR 90-96  · Cardiology following     Hypertension   Assessment & Plan    · BP currently 134/87  · Continue to hold losartan with SAMY  · Continue cardizem with hold parameters  · Monitor with diuresis      Obesity   Assessment & Plan    Body mass index is 37 44 kg/m²    Recommend dietary changes     Cellulitis of left lower extremity   Assessment & Plan    · Culture came back positive with MRSA  · Contact isolation  · Continue PO doxycycline (day #5/7)  Continue PO doxycycline for total of 7 days  · Wound care  · Remains afebrile with no evidence of leukocytosis       Gastroesophageal reflux disease without esophagitis   Assessment & Plan    · Continue PPI     Chronic deep vein thrombosis (DVT) ([facility] [address] )   Assessment & Plan    · Initially this was thought to represent acute DVT in the patient was changed from Eliquis to heparin infusion however venous duplex is noted to suggest that this was likely chronic  · Continue Eliquis  · Venous duplex: Evaluation shows non-occlusive or partially recanalized thrombus in the femoral vein and the popliteal vein which is most likely chronic  · VQ: low probability     Type 2 myocardial infarction [facility])   Assessment & Plan    · Troponins noted be 0 12-0 15  · Cardiology following  · Likely related to CHF  Acute kidney injury [facility])   Assessment & Plan    Lab Results   Component Value Date    CREATININE 1 43 (H) 01/10/2019    CREATININE 1 32 (H) 01/09/2019    CREATININE 1 33 (H) 01/08/2019    CREATININE 1 33 (H) 01/07/2019     · Creatinine at baseline 0 9-1 1  · Currently 1 43    · Repeat BMP daily while on diuresis  May need to accept higher creatinine to get euvolemic but has been trending upward, losartan on hold  Will discuss with cardio holding metazolone today  · Hold losartan   · UA with trace protein  · Check PVR     Thrombocytopenia (HCC)   Assessment & Plan    · Mild  Continue to trend  · Today 139       VTE Pharmacologic Prophylaxis:   Pharmacologic: Apixaban (Eliquis)  Mechanical VTE Prophylaxis in Place: No wounds      Discussions with Specialists or Other Care Team Provider: will discuss with cardio    Education and Discussions with Family / Patient: patient    Time Spent for Care: 30 minutes  More than 50% of total time spent on counseling and coordination of care as described above      Current Length of Stay: 8 day(s)    Current Patient Status: Inpatient   Certification Statement: The patient will continue to require additional inpatient hospital stay due to IV diuresis    Discharge Plan: not stable for d/c given IV bumex- likely 48 hrs    Code Status: Level 1 - Full Code      Subjective:   Patient reports he feels that he is almost at his euvolemic state  He feels that at home when he knows his water pills are working as when his hand started to cramp up  He wants to be taken off Bumex  He denies any nausea or vomiting  He reports that he has pain in his right wrist secondary to a prior fracture which she follows with Orthopedics as an outpatient 4  Objective:     Vitals:   Temp (24hrs), Av 8 °F (36 6 °C), Min:97 6 °F (36 4 °C), Max:98 1 °F (36 7 °C)    Temp:  [97 6 °F (36 4 °C)-98 1 °F (36 7 °C)] 97 6 °F (36 4 °C)  HR:  [65-70] 70  Resp:  [18-20] 18  BP: (113-134)/(65-87) 134/87  SpO2:  [94 %-97 %] 97 %  Body mass index is 37 44 kg/m²  Input and Output Summary (last 24 hours): Intake/Output Summary (Last 24 hours) at 01/10/19 0751  Last data filed at 01/10/19 0630   Gross per 24 hour   Intake              120 ml   Output             4000 ml   Net            -3880 ml       Physical Exam:     Physical Exam   Constitutional: No distress  HENT:   Head: Normocephalic and atraumatic  Eyes: Conjunctivae are normal  No scleral icterus  Cardiovascular: Normal rate, regular rhythm, normal heart sounds and intact distal pulses  No murmur heard  Pulmonary/Chest: Effort normal and breath sounds normal  No accessory muscle usage  No respiratory distress  He has no wheezes  He has no rales  Abdominal: Soft  Bowel sounds are normal  He exhibits no distension  There is no tenderness  There is no rigidity, no rebound and no guarding  Musculoskeletal: He exhibits edema ( 2+ BLE edema) and tenderness ( right wrist deformity and tenderness- chronic)  Chronic BLE edma, chronic venous stasis changes, wounds dressed and wrapped   No surrounding erythema   Neurological: He is alert  Skin: Skin is warm and dry  No rash noted  He is not diaphoretic  No erythema  Psychiatric: He has a normal mood and affect  Nursing note and vitals reviewed  Additional Data:     Labs:      Results from last 7 days  Lab Units 01/10/19  0559  01/07/19  0614   WBC Thousand/uL 6 28  < > 6 45   HEMOGLOBIN g/dL 14 4  < > 13 9   HEMATOCRIT % 43 6  < > 41 9   PLATELETS Thousands/uL 139*  < > 129*   NEUTROS PCT %  --   --  56   LYMPHS PCT %  --   --  27   MONOS PCT %  --   --  11   EOS PCT %  --   --  4   < > = values in this interval not displayed  Results from last 7 days  Lab Units 01/10/19  0559   SODIUM mmol/L 140   POTASSIUM mmol/L 3 2*   CHLORIDE mmol/L 97*   CO2 mmol/L 33*   BUN mg/dL 48*   CREATININE mg/dL 1 43*   ANION GAP mmol/L 10   CALCIUM mg/dL 9 5   ALBUMIN g/dL 3 2*   TOTAL BILIRUBIN mg/dL 1 60*   ALK PHOS U/L 87   ALT U/L 23   AST U/L 37   GLUCOSE RANDOM mg/dL 78       Results from last 7 days  Lab Units 01/04/19  0048   INR  1 73*                       * I Have Reviewed All Lab Data Listed Above  * Additional Pertinent Lab Tests Reviewed:  All Labs Within Last 24 Hours Reviewed    Imaging:    Imaging Reports Reviewed Today Include: none  Imaging Personally Reviewed by Myself Includes:  none    Recent Cultures (last 7 days):           Last 24 Hours Medication List:     Current Facility-Administered Medications:  acetaminophen 650 mg Oral Q4H PRN NAVNEET Carlos    apixaban 5 mg Oral BID Jonathan Wilkinson MD    vitamin C 1,000 mg Oral Daily NAVNEET Ching    bumetanide 0 5 mg/hr Intravenous Continuous Fantasma Sharma MD Last Rate: 0 5 mg/hr (01/09/19 1336)   cholecalciferol 1,000 Units Oral Daily NAVNEET Martinez    co-enzyme Q-10 100 mg Oral Daily NAVNEET Martinez    cyanocobalamin 1,000 mcg Oral Daily NAVNEET Martinez    cyclobenzaprine 10 mg Oral TID PRN NAVNEET Carlos    diltiazem 120 mg Oral BID Phil Saxena NAVNEET Nguyễn    doxycycline hyclate 100 mg Oral Q12H [address] Jonathan Wilkinson MD    fluticasone 1 spray Nasal Daily NAVNEET Carlos    HYDROcodone-acetaminophen 2 tablet Oral Q6H PRN NAVNEET Carlos    [START ON 1/11/2019] magnesium oxide 400 mg Oral BID Hannah Baird PA-C    magnesium sulfate 1 g Intravenous Once Dylan Lundy PA-C    metolazone 2 5 mg Oral Daily Fantasma Sharma MD    nystatin  Topical BID Hannah Baird PA-C    ondansetron 4 mg Intravenous Q6H PRN NAVNEET Ching    pantoprazole 40 mg Oral Early Morning NAVNEET Martinez    polyethylene glycol 17 g Oral Daily PRN NAVNEET Ching    potassium chloride 40 mEq Oral Daily Hannah Baird PA-C    potassium chloride 40 mEq Oral Once [facility] KEISHA Baird    sodium chloride 1 spray Each Nare PRN NAVNEET Carlos    vitamin E (tocopherol) 400 Units Oral Daily NAVNEET Carlos         Today, Patient Was Seen By: Dylan Lundy PA-C    ** Please Note: Dictation voice to text software may have been used in the creation of this document   **

## 2019-01-10 NOTE — ASSESSMENT & PLAN NOTE
· BP currently 134/87  · Continue to hold losartan with SAMY  · Continue cardizem with hold parameters    · Monitor with diuresis

## 2019-01-10 NOTE — PROGRESS NOTES
Cardiology Progress Note - Linwood Jurist 68 y o  male MRN: [id number]    Unit/Bed#: [address] [place] [address] 206-01 Encounter: [id number]        Subjective:    No significant events overnight  Feeling better  Continues to diurese  Review of Systems   Cardiovascular: Positive for leg swelling  Negative for chest pain and palpitations  Respiratory: Positive for shortness of breath  Objective:   Vitals: Blood pressure 133/65, pulse 98, temperature 98 4 °F (36 9 °C), temperature source Oral, resp  rate 18, height 6' 4" (1 93 m), weight 134 kg (295 lb 13 7 oz), SpO2 93 %  , Body mass index is 36 01 kg/m² , Orthostatic Blood Pressures      Most Recent Value   Blood Pressure  133/65 filed at 01/10/2019 1519   Patient Position - Orthostatic VS  Lying filed at 01/10/2019 1303         Systolic (88ATY), THJ:115 , Min:118 , ILP:851     Diastolic (48QTG), KDV:23, Min:65, Max:87      Intake/Output Summary (Last 24 hours) at 01/10/19 1523  Last data filed at 01/10/19 1501   Gross per 24 hour   Intake              120 ml   Output             3705 ml   Net            -3585 ml     Weight (last 2 days)     Date/Time   Weight    01/10/19 0900  134 (295 86)    01/09/19 0752  (!)  140 (307 54)    01/08/19 0749  (!)  140 (309 75)                  Telemetry Review: Afib    Physical Exam   Cardiovascular: Normal rate and normal heart sounds  An irregularly irregular rhythm present  Exam reveals no gallop and no friction rub  No murmur heard  1+ B LE edema   Pulmonary/Chest: Breath sounds normal  He has no wheezes  He has no rales  Musculoskeletal: He exhibits no edema           Laboratory Results:    Results from last 7 days  Lab Units 01/04/19  1325 01/04/19  1022   TROPONIN I ng/mL 0 13* 0 12*       CBC with diff:   Results from last 7 days  Lab Units 01/10/19  0559 01/09/19  0536 01/07/19  0614 01/04/19  0522   WBC Thousand/uL 6 28 6 40 6 45 6 23   HEMOGLOBIN g/dL 14 4 14 1 13 9 13 9   HEMATOCRIT % 43 6 42 7 41 9 42 9   MCV fL 96 97 96 96   PLATELETS Thousands/uL 139* 132* 129* 124*   MCH pg 31 8 31 9 31 8 31 0   MCHC g/dL 33 0 33 0 33 2 32 4   RDW % 15 1 15 0 15 2* 15 4*   MPV fL 12 0 12 0 11 5 11 8   NRBC AUTO /100 WBCs  --   --  0 0         CMP:  Results from last 7 days  Lab Units 01/10/19  0559 19  0536 19  0840 19  0614 19  0048   POTASSIUM mmol/L 3 2* 3 5 3 8 3 8 3 5   CHLORIDE mmol/L 97* 101 101 102 103   CO2 mmol/L 33* 32 30 29 28   BUN mg/dL 48* 43* 42* 42* 34*   CREATININE mg/dL 1 43* 1 32* 1 33* 1 33* 1 45*   CALCIUM mg/dL 9 5 9 0 8 8 8 5 8 6   AST U/L 37 39  --   --   --    ALT U/L 23 21  --   --   --    ALK PHOS U/L 87 87  --   --   --    EGFR ml/min/1 73sq m 49 54 53 53 48         BMP:  Results from last 7 days  Lab Units 01/10/19  0559 19  0536 19  0840 19  0614 19  0048   POTASSIUM mmol/L 3 2* 3 5 3 8 3 8 3 5   CHLORIDE mmol/L 97* 101 101 102 103   CO2 mmol/L 33* 32 30 29 28   BUN mg/dL 48* 43* 42* 42* 34*   CREATININE mg/dL 1 43* 1 32* 1 33* 1 33* 1 45*   CALCIUM mg/dL 9 5 9 0 8 8 8 5 8 6       BNP:     Magnesium:   Results from last 7 days  Lab Units 01/10/19  0559 19  0614   MAGNESIUM mg/dL 1 5* 1 5*       Coags:   Results from last 7 days  Lab Units 19  0048   INR  1 73*           Cardiac testing:   Results for orders placed during the hospital encounter of 19   Echo complete with contrast if indicated    Narrative [address] [place] [address]    Transthoracic Echocardiogram  2D, M-mode, Doppler, and Color Doppler    Study date:  2019    Patient: Barbar Barthel  MR number: [id number]  Account number: [de-identified]  : 1945  Age: 68 years  Gender: Male  Status: Inpatient  Location: Bedside  Height: 76 in  Weight: 308 lb  BP: 127/ 60 mmHg    Indications: Heart failure      Diagnoses: I50 9 - Heart failure, unspecified    Sonographer:  Brittany HERNANDEZ, RCS  Primary Physician:  Mara Tang, KEISHA  Referring Physician:  Leighton Salazar PA-C  Group:  [facility]  Interpreting Physician:  Nasrin Murdock MD    SUMMARY    LEFT VENTRICLE:  Systolic function was mildly reduced  Ejection fraction was estimated to be 45 %  There was mild diffuse hypokinesis with regional variations  More servere hypokinesis was seen in the inferior wall  Wall thickness was moderately to markedly increased  VENTRICULAR SEPTUM:  There was moderate dyssynergic motion  RIGHT VENTRICLE:  The ventricle was dilated  Systolic function was mildly reduced  LEFT ATRIUM:  The atrium was moderately dilated  RIGHT ATRIUM:  The atrium was moderately dilated  MITRAL VALVE:  There was mild annular calcification  There was at least moderate regurgitation  The regurgitant jet was eccentric and not well captured on most images  TRICUSPID VALVE:  There was mild regurgitation  HISTORY: PRIOR HISTORY: Pulmonary embolus, heart failure, hypertension, PVD  Risk factors: hypertension and morbid obesity  PROCEDURE: The procedure was performed at the bedside  This was a routine study  The transthoracic approach was used  The study included complete 2D imaging, M-mode, complete spectral Doppler, and color Doppler  Images were obtained from  the parasternal, apical, subcostal, and suprasternal notch acoustic windows  Echocardiographic views were limited due to restricted patient mobility  Image quality was adequate  LEFT VENTRICLE: Size was normal  Systolic function was mildly reduced  Ejection fraction was estimated to be 45 %  There was mild diffuse hypokinesis with regional variations  More servere hypokinesis was seen in the inferior wall  Wall  thickness was moderately to markedly increased  DOPPLER: Transmitral flow pattern: atrial fibrillation  Left ventricular diastolic function parameters were abnormal     VENTRICULAR SEPTUM: There was moderate dyssynergic motion      RIGHT VENTRICLE: The ventricle was dilated  Systolic function was mildly reduced  Wall thickness was normal     LEFT ATRIUM: The atrium was moderately dilated  RIGHT ATRIUM: The atrium was moderately dilated  MITRAL VALVE: There was mild annular calcification  Valve structure was normal  There was normal leaflet separation  DOPPLER: The transmitral velocity was within the normal range  There was no evidence for stenosis  There was at least  moderate regurgitation  The regurgitant jet was eccentric and not well captured on most images  AORTIC VALVE: The valve was trileaflet  Leaflets exhibited normal thickness, normal cuspal separation, and sclerosis  DOPPLER: Transaortic velocity was within the normal range  There was no evidence for stenosis  There was no significant  regurgitation  TRICUSPID VALVE: The valve structure was normal  There was normal leaflet separation  DOPPLER: The transtricuspid velocity was within the normal range  There was no evidence for stenosis  There was mild regurgitation  PULMONIC VALVE: Not well visualized  DOPPLER: The transpulmonic velocity was within the normal range  There was no significant regurgitation  PERICARDIUM: There was no pericardial effusion  The pericardium was normal in appearance  AORTA: The root exhibited normal size  SYSTEMIC VEINS: IVC: The inferior vena cava was not well visualized      SYSTEM MEASUREMENT TABLES    2D  %FS: 31 8 %  Ao Diam: 4 05 cm  EDV(Teich): 95 67 ml  EF Biplane: 61 24 %  EF(Teich): 59 92 %  ESV(Teich): 38 35 ml  IVSd: 1 56 cm  LA Area: 39 52 cm2  LA Diam: 5 17 cm  LVEDV MOD A2C: 184 74 ml  LVEDV MOD A4C: 196 07 ml  LVEDV MOD BP: 192 04 ml  LVEF MOD A2C: 58 57 %  LVEF MOD A4C: 63 99 %  LVESV MOD A2C: 76 54 ml  LVESV MOD A4C: 70 6 ml  LVESV MOD BP: 74 43 ml  LVIDd: 4 57 cm  LVIDs: 3 11 cm  LVLd A2C: 9 79 cm  LVLd A4C: 9 45 cm  LVLs A2C: 8 52 cm  LVLs A4C: 8 26 cm  LVOT Diam: 2 09 cm  LVPWd: 1 75 cm  RA Area: 40 17 cm2  RVIDd: 5 4 cm  SV MOD A2C: 108 2 ml  SV MOD A4C: 125 48 ml  SV(Teich): 57 33 ml    CW  TR Vmax: 2 27 m/s  TR maxP 6 mmHg    MM  TAPSE: 1 07 cm    [facility]    Prepared and electronically signed by    Norma Lemos MD  Signed 2019 09:02:08       No results found for this or any previous visit  No results found for this or any previous visit  No results found for this or any previous visit      Meds/Allergies     Current Facility-Administered Medications:  acetaminophen 650 mg Oral Q4H PRN [place], CRNP    apixaban 5 mg Oral BID Jd Willard MD    vitamin C 1,000 mg Oral Daily Maricruz NAVNEET Guzman    bumetanide 0 5 mg/hr Intravenous Continuous Gable Leventhal, MD Last Rate: 0 5 mg/hr (01/10/19 1009)   cholecalciferol 1,000 Units Oral Daily NAVNEET Martinez    co-enzyme Q-10 100 mg Oral Daily NAVNEET Martinez    cyanocobalamin 1,000 mcg Oral Daily NAVNEET Martinez    cyclobenzaprine 10 mg Oral TID PRN [place], NAVNEET    diltiazem 120 mg Oral BID NAVNEET Baxter    doxycycline hyclate 100 mg Oral Q12H [address] Jd Willard MD    fluticasone 1 spray Nasal Daily [place], NAVNEET    HYDROcodone-acetaminophen 2 tablet Oral Q6H PRN Markel EssexNAVNEET    [START ON 2019] magnesium oxide 400 mg Oral BID Hannah Baird PA-C    metolazone 2 5 mg Oral Daily Gable Leventhal, MD    nystatin  Topical BID Hannah Baird PA-C    ondansetron 4 mg Intravenous Q6H PRN NAVNEET Baxter    pantoprazole 40 mg Oral Early Morning NAVNEET Martinez    polyethylene glycol 17 g Oral Daily PRN NAVNEET Baxter    potassium chloride 40 mEq Oral Daily Hannah Baird PA-C    sodium chloride 1 spray Each Nare PRN [place], NAVNEET    vitamin E (tocopherol) 400 Units Oral Daily NAVNEET Martinez        bumetanide 0 5 mg/hr Last Rate: 0 5 mg/hr (01/10/19 1009)     Prescriptions Prior to Admission   Medication    Ascorbic Acid (VITAMIN C) 1000 MG tablet    co-enzyme Q-10 50 MG capsule    cyanocobalamin (VITAMIN B-12) 1,000 mcg tablet    diltiazem (CARDIZEM CD) 120 mg 24 hr capsule    guaiFENesin (MUCINEX) 600 mg 12 hr tablet    HYDROcodone-acetaminophen (NORCO) 7 5-325 mg per tablet    potassium chloride (K-DUR,KLOR-CON) 10 mEq tablet    sildenafil (VIAGRA) 100 mg tablet    apixaban (ELIQUIS) 5 mg    fluticasone (FLONASE) 50 mcg/act nasal spray    furosemide (LASIX) 40 mg tablet    losartan (COZAAR) 50 mg tablet    metolazone (ZAROXOLYN) 2 5 mg tablet    omeprazole (PriLOSEC) 20 mg delayed release capsule    pantoprazole (PROTONIX) 40 mg tablet    sodium chloride (OCEAN) 0 65 % nasal spray       Assessment:  Principal Problem:    Acute on chronic diastolic congestive heart failure (HCC)  Active Problems:    Gastroesophageal reflux disease without esophagitis    Obesity    Hypertension    Atrial fibrillation (HCC)    Acute kidney injury (HCC)    Type 2 myocardial infarction (HCC)    Cellulitis of left lower extremity    Chronic deep vein thrombosis (DVT) (HCC)    Thrombocytopenia (HCC)      Impression:  1  Acute on chronic diastolic heart failure - continues to diurese aggressively on bumex gtt  Will continue infusion until euvolemic, but renal function is slowly increasing  Will follow  2  Atrial fibrillation - rate controlled  On anticoagulation  3  HTN - controlled  Plan:  1  Continue bumex gtt  2  Continue remainder of medications  3  Watch renal function

## 2019-01-11 LAB
ANION GAP SERPL CALCULATED.3IONS-SCNC: 8 MMOL/L (ref 4–13)
BUN SERPL-MCNC: 57 MG/DL (ref 5–25)
CALCIUM SERPL-MCNC: 9.1 MG/DL (ref 8.3–10.1)
CHLORIDE SERPL-SCNC: 94 MMOL/L (ref 100–108)
CO2 SERPL-SCNC: 35 MMOL/L (ref 21–32)
CREAT SERPL-MCNC: 1.46 MG/DL (ref 0.6–1.3)
ERYTHROCYTE [DISTWIDTH] IN BLOOD BY AUTOMATED COUNT: 15 % (ref 11.6–15.1)
GFR SERPL CREATININE-BSD FRML MDRD: 47 ML/MIN/1.73SQ M
GLUCOSE SERPL-MCNC: 81 MG/DL (ref 65–140)
HCT VFR BLD AUTO: 46.5 % (ref 36.5–49.3)
HGB BLD-MCNC: 15.7 G/DL (ref 12–17)
MAGNESIUM SERPL-MCNC: 1.7 MG/DL (ref 1.6–2.6)
MCH RBC QN AUTO: 31.8 PG (ref 26.8–34.3)
MCHC RBC AUTO-ENTMCNC: 33.8 G/DL (ref 31.4–37.4)
MCV RBC AUTO: 94 FL (ref 82–98)
PLATELET # BLD AUTO: 144 THOUSANDS/UL (ref 149–390)
PMV BLD AUTO: 12.1 FL (ref 8.9–12.7)
POTASSIUM SERPL-SCNC: 5.3 MMOL/L (ref 3.5–5.3)
RBC # BLD AUTO: 4.93 MILLION/UL (ref 3.88–5.62)
SODIUM SERPL-SCNC: 137 MMOL/L (ref 136–145)
WBC # BLD AUTO: 6.77 THOUSAND/UL (ref 4.31–10.16)

## 2019-01-11 PROCEDURE — 99233 SBSQ HOSP IP/OBS HIGH 50: CPT | Performed by: HOSPITALIST

## 2019-01-11 PROCEDURE — 83735 ASSAY OF MAGNESIUM: CPT | Performed by: PHYSICIAN ASSISTANT

## 2019-01-11 PROCEDURE — 80048 BASIC METABOLIC PNL TOTAL CA: CPT | Performed by: PHYSICIAN ASSISTANT

## 2019-01-11 PROCEDURE — 99232 SBSQ HOSP IP/OBS MODERATE 35: CPT | Performed by: INTERNAL MEDICINE

## 2019-01-11 PROCEDURE — 85027 COMPLETE CBC AUTOMATED: CPT | Performed by: PHYSICIAN ASSISTANT

## 2019-01-11 RX ORDER — BUMETANIDE 0.25 MG/ML
2 INJECTION, SOLUTION INTRAMUSCULAR; INTRAVENOUS 2 TIMES DAILY
Status: DISCONTINUED | OUTPATIENT
Start: 2019-01-11 | End: 2019-01-12

## 2019-01-11 RX ADMIN — DILTIAZEM HYDROCHLORIDE 120 MG: 120 CAPSULE, COATED, EXTENDED RELEASE ORAL at 17:35

## 2019-01-11 RX ADMIN — VITAMIN E CAP 400 UNIT 400 UNITS: 400 CAP at 08:40

## 2019-01-11 RX ADMIN — APIXABAN 5 MG: 5 TABLET, FILM COATED ORAL at 08:40

## 2019-01-11 RX ADMIN — Medication 100 MG: at 08:40

## 2019-01-11 RX ADMIN — PANTOPRAZOLE SODIUM 40 MG: 40 TABLET, DELAYED RELEASE ORAL at 05:18

## 2019-01-11 RX ADMIN — FLUTICASONE PROPIONATE 1 SPRAY: 50 SPRAY, METERED NASAL at 08:40

## 2019-01-11 RX ADMIN — VITAMIN D, TAB 1000IU (100/BT) 1000 UNITS: 25 TAB at 08:40

## 2019-01-11 RX ADMIN — Medication 400 MG: at 08:39

## 2019-01-11 RX ADMIN — APIXABAN 5 MG: 5 TABLET, FILM COATED ORAL at 17:35

## 2019-01-11 RX ADMIN — DILTIAZEM HYDROCHLORIDE 120 MG: 120 CAPSULE, COATED, EXTENDED RELEASE ORAL at 08:39

## 2019-01-11 RX ADMIN — DOXYCYCLINE HYCLATE 100 MG: 100 CAPSULE ORAL at 08:39

## 2019-01-11 RX ADMIN — Medication 0.5 MG/HR: at 16:11

## 2019-01-11 RX ADMIN — OXYCODONE HYDROCHLORIDE AND ACETAMINOPHEN 1000 MG: 500 TABLET ORAL at 08:39

## 2019-01-11 RX ADMIN — CYANOCOBALAMIN TAB 500 MCG 1000 MCG: 500 TAB at 08:39

## 2019-01-11 RX ADMIN — DOXYCYCLINE HYCLATE 100 MG: 100 CAPSULE ORAL at 22:08

## 2019-01-11 RX ADMIN — METOLAZONE 2.5 MG: 2.5 TABLET ORAL at 08:39

## 2019-01-11 RX ADMIN — Medication 400 MG: at 17:35

## 2019-01-11 RX ADMIN — NYSTATIN: 100000 POWDER TOPICAL at 08:40

## 2019-01-11 RX ADMIN — Medication 0.5 MG/HR: at 00:46

## 2019-01-11 RX ADMIN — NYSTATIN: 100000 POWDER TOPICAL at 17:35

## 2019-01-11 NOTE — PROGRESS NOTES
Pt observed to be awake during most hrly rounds, using urinal most times  Denies pain/discomfort  Bumex infusing as ordered

## 2019-01-11 NOTE — PROGRESS NOTES
Progress Note - Linwood Mazariegos 1945, 68 y o  male MRN: [id number]    Unit/Bed#: [address] [address] Encounter: [id number]    Primary Care Provider: Bj Freedman PA-C   Date and time admitted to hospital: 1/2/2019  6:35 PM        Thrombocytopenia (HCC)   Assessment & Plan    · Mild  Continue to trend  · Today 144     Chronic deep vein thrombosis (DVT) (HCC)   Assessment & Plan    · Initially this was thought to represent acute DVT in the patient was changed from Eliquis to heparin infusion however venous duplex is noted to suggest that this was likely chronic  · Continue Eliquis  · Venous duplex: Evaluation shows non-occlusive or partially recanalized thrombus in the femoral vein and the popliteal vein which is most likely chronic  · VQ: low probability     Cellulitis of left lower extremity   Assessment & Plan    · Culture came back positive with MRSA  · Contact isolation  · Continue PO doxycycline (day #5/7)  Continue PO doxycycline for total of 7 days  · Wound care  · Remains afebrile with no evidence of leukocytosis       Type 2 myocardial infarction [facility])   Assessment & Plan    · Indeterminate Troponins noted be 0 12-0 15  · Cardiology following  · Likely related to CHF  Acute kidney injury [facility])   Assessment & Plan    Lab Results   Component Value Date    CREATININE 1 46 (H) 01/11/2019    CREATININE 1 43 (H) 01/10/2019    CREATININE 1 32 (H) 01/09/2019    CREATININE 1 33 (H) 01/08/2019     · Creatinine at baseline 0 9-1 1  · Currently 1 46    · Trend creatinine daily while on diuresis  May need to accept higher creatinine to get euvolemic but has been trending upward, losartan on hold  · UA with trace protein     Atrial fibrillation ([address] )   Assessment & Plan    -continue Cardizem/Eliquis     Hypertension   Assessment & Plan    · Continue to hold losartan with SAMY  · Continue cardizem with hold parameters    · Monitor with diuresis      Obesity   Assessment & Plan    Body mass index is 34 08 kg/m²  Recommend dietary changes     Gastroesophageal reflux disease without esophagitis   Assessment & Plan    · Continue PPI     * Acute on chronic diastolic congestive heart failure (HCC)   Assessment & Plan    · Ejection fraction 45% on ECHO during this admission  Mild diffuse hypokinesis with regional variations  · Continue treating with IV diuresis - continue on IV Bumex infusion today  Metolazone added by Cardiology  · -25L diuresis this admission  · Wt 127kg form 141Kg on admission         VTE Pharmacologic Prophylaxis:   Pharmacologic: Apixaban (Eliquis)  Mechanical VTE Prophylaxis in Place: No    Patient Centered Rounds: I have performed bedside rounds with nursing staff today  Education and Discussions with Family / Patient:  Patient    Time Spent for Care: 20 minutes  More than 50% of total time spent on counseling and coordination of care as described above  Current Length of Stay: 9 day(s)    Current Patient Status: Inpatient   Certification Statement: The patient will continue to require additional inpatient hospital stay due to CHF exacerbation    Discharge Plan:  2-3 days    Code Status: Level 1 - Full Code      Subjective:   Patient denies shortness of breath at this moment    Objective:     Vitals:   Temp (24hrs), Av 5 °F (36 9 °C), Min:98 2 °F (36 8 °C), Max:98 9 °F (37 2 °C)    Temp:  [98 2 °F (36 8 °C)-98 9 °F (37 2 °C)] 98 9 °F (37 2 °C)  HR:  [90-98] 91  Resp:  [18-20] 20  BP: (128-135)/(63-80) 128/80  SpO2:  [93 %-96 %] 95 %  Body mass index is 34 08 kg/m²  Input and Output Summary (last 24 hours):        Intake/Output Summary (Last 24 hours) at 19 1122  Last data filed at 19 1047   Gross per 24 hour   Intake              616 ml   Output             5410 ml   Net            -4794 ml       Physical Exam:     Physical Exam  Gen: NAD, AAOx3, well developed, well nourished  Eyes: EOMI, PERRLA, no scleral icterus  ENMT:  Oropharynx clear of erythema or exudates, no nasal discharge, no otic discharge, moist mucous membranes  Neck:  Supple  Cardiovascular:  Normal rate, irregularly irregular rhythm, normal S1-S2, no murmurs, rubs, or gallops  Lungs:  Clear to auscultation bilaterally, no wheezes, or rales, or rhonchi  Abdomen:  Positive bowel sounds, soft, nontender, nondistended, no palpable organomegaly   Skin:  Trace lower extremity edema, right lower extremity with clean dry and intact dressing  Neuro: Cranial nerves 2-12 are intact, non-focal, 5/5 strength in all 4 extremities      Additional Data:     Labs:      Results from last 7 days  Lab Units 01/11/19 0528 01/07/19  0614   WBC Thousand/uL 6 77  < > 6 45   HEMOGLOBIN g/dL 15 7  < > 13 9   HEMATOCRIT % 46 5  < > 41 9   PLATELETS Thousands/uL 144*  < > 129*   NEUTROS PCT %  --   --  56   LYMPHS PCT %  --   --  27   MONOS PCT %  --   --  11   EOS PCT %  --   --  4   < > = values in this interval not displayed  Results from last 7 days  Lab Units 01/11/19 0528 01/10/19  0559   SODIUM mmol/L 137 140   POTASSIUM mmol/L 5 3 3 2*   CHLORIDE mmol/L 94* 97*   CO2 mmol/L 35* 33*   BUN mg/dL 57* 48*   CREATININE mg/dL 1 46* 1 43*   ANION GAP mmol/L 8 10   CALCIUM mg/dL 9 1 9 5   ALBUMIN g/dL  --  3 2*   TOTAL BILIRUBIN mg/dL  --  1 60*   ALK PHOS U/L  --  87   ALT U/L  --  23   AST U/L  --  37   GLUCOSE RANDOM mg/dL 81 78                           * I Have Reviewed All Lab Data Listed Above  * Additional Pertinent Lab Tests Reviewed:  [address] Admission Reviewed    Recent Cultures (last 7 days):           Last 24 Hours Medication List:     Current Facility-Administered Medications:  acetaminophen 650 mg Oral Q4H PRN NAVNEET Hauser    apixaban 5 mg Oral BID Garrett Figueroa MD    vitamin C 1,000 mg Oral Daily NAVNEET De La Fuente    bumetanide 0 5 mg/hr Intravenous Continuous Dallin Martins MD Last Rate: 0 5 mg/hr (01/11/19 0559)   cholecalciferol 1,000 Units Oral Daily NAVNEET Hauser co-enzyme Q-10 100 mg Oral Daily NAVNEET Martinez    cyanocobalamin 1,000 mcg Oral Daily NAVNEET Martinez    cyclobenzaprine 10 mg Oral TID PRN Opternativeon NAVNEET Gr    diltiazem 120 mg Oral BID [place]    doxycycline hyclate 100 mg Oral Q12H [address] Inocencio Chaudhry MD    fluticasone 1 spray Nasal Daily [place]    HYDROcodone-acetaminophen 2 tablet Oral Q6H PRN NAVNEET Green    magnesium oxide 400 mg Oral BID Hannah Baird PA-C    metolazone 2 5 mg Oral Daily Ritu Gipson MD    nystatin  Topical BID Hannah Baird PA-C    ondansetron 4 mg Intravenous Q6H PRN NAVNEET Martinez    pantoprazole 40 mg Oral Early Morning NAVNEET Martinez    polyethylene glycol 17 g Oral Daily PRN NAVNEET Green    sodium chloride 1 spray Each Nare PRN [place]VNEET    vitamin E (tocopherol) 400 Units Oral Daily [place]VNEET         Today, Patient Was Seen By: David Stein MD    ** Please Note: Dictation voice to text software may have been used in the creation of this document   **

## 2019-01-11 NOTE — ASSESSMENT & PLAN NOTE
· Continue to hold losartan with SAMY  · Continue cardizem with hold parameters    · Monitor with diuresis

## 2019-01-11 NOTE — PROGRESS NOTES
Cardiology Progress Note - Linwood Jurist 68 y o  male MRN: [id number]    Unit/Bed#: [address] [place] [address]  Encounter: [id number]        Subjective:    No significant events overnight  Feeling better  Continues to diurese  Review of Systems   Cardiovascular: Positive for leg swelling  Negative for chest pain and palpitations  Respiratory: Positive for shortness of breath  Objective:   Vitals: Blood pressure 137/74, pulse 97, temperature 97 6 °F (36 4 °C), temperature source Oral, resp  rate 18, height 6' 4" (1 93 m), weight 127 kg (279 lb 15 8 oz), SpO2 93 %  , Body mass index is 34 08 kg/m² ,   Orthostatic Blood Pressures      Most Recent Value   Blood Pressure  137/74 filed at 2019 1455   Patient Position - Orthostatic VS  Sitting filed at 2019 7256         Systolic (99BTM), EQ , Min:128 , OBP:759     Diastolic (20RQE), GFA:58, Min:63, Max:80      Intake/Output Summary (Last 24 hours) at 19 1745  Last data filed at 19 1047   Gross per 24 hour   Intake              616 ml   Output             4510 ml   Net            -3894 ml     Weight (last 2 days)     Date/Time   Weight    19 1109  127 (279 98)    01/10/19 0900  134 (295 86)    19 0752  (!)  140 (307 54)                  Telemetry Review: Afib    Physical Exam   Cardiovascular: Normal rate and normal heart sounds  An irregularly irregular rhythm present  Exam reveals no gallop and no friction rub  No murmur heard  1+ B LE edema   Pulmonary/Chest: Breath sounds normal  He has no wheezes  He has no rales  Musculoskeletal: He exhibits no edema           Laboratory Results:        CBC with diff:     Results from last 7 days  Lab Units 19  0528 01/10/19  0559 19  0536 19  0614   WBC Thousand/uL 6 77 6 28 6 40 6 45   HEMOGLOBIN g/dL 15 7 14 4 14 1 13 9   HEMATOCRIT % 46 5 43 6 42 7 41 9   MCV fL 94 96 97 96   PLATELETS Thousands/uL 144* 139* 132* 129*   MCH pg 31 8 31 8 31 9 31 8   MCHC g/dL 33 8 33 0 33 0 33 2   RDW % 15 0 15 1 15 0 15 2*   MPV fL 12 1 12 0 12 0 11 5   NRBC AUTO /100 WBCs  --   --   --  0         CMP:    Results from last 7 days  Lab Units 01/11/19  0528 01/10/19  0559 19  0536 19  0840 19  0614   POTASSIUM mmol/L 5 3 3 2* 3 5 3 8 3 8   CHLORIDE mmol/L 94* 97* 101 101 102   CO2 mmol/L 35* 33* 32 30 29   BUN mg/dL 57* 48* 43* 42* 42*   CREATININE mg/dL 1 46* 1 43* 1 32* 1 33* 1 33*   CALCIUM mg/dL 9 1 9 5 9 0 8 8 8 5   AST U/L  --  37 39  --   --    ALT U/L  --  23 21  --   --    ALK PHOS U/L  --  87 87  --   --    EGFR ml/min/1 73sq m 47 49 54 53 53         BMP:    Results from last 7 days  Lab Units 01/11/19  0528 01/10/19  0559 19  0536 19  0840 19  0614   POTASSIUM mmol/L 5 3 3 2* 3 5 3 8 3 8   CHLORIDE mmol/L 94* 97* 101 101 102   CO2 mmol/L 35* 33* 32 30 29   BUN mg/dL 57* 48* 43* 42* 42*   CREATININE mg/dL 1 46* 1 43* 1 32* 1 33* 1 33*   CALCIUM mg/dL 9 1 9 5 9 0 8 8 8 5       BNP:     Magnesium:     Results from last 7 days  Lab Units 01/11/19  0528 01/10/19  0559 19  0614   MAGNESIUM mg/dL 1 7 1 5* 1 5*       Coags:           Cardiac testing:   Results for orders placed during the hospital encounter of 19   Echo complete with contrast if indicated    [address] [address]    Transthoracic Echocardiogram  2D, M-mode, Doppler, and Color Doppler    Study date:  2019    Patient: Seema Huff  MR number: [id number]  Account number: [de-identified]  : 1945  Age: 68 years  Gender: Male  Status: Inpatient  Location: Bedside  Height: 76 in  Weight: 308 lb  BP: 127/ 60 mmHg    Indications: Heart failure      Diagnoses: I50 9 - Heart failure, unspecified    Sonographer:  Lulu Jackson BS, RCS  Primary Physician:  Bautista El PA-C  Referring Physician:  Bautista El PA-C  Group:  [facility]  Interpreting Physician:  Ginna Sagastume, MD    SUMMARY    LEFT VENTRICLE:  Systolic function was mildly reduced  Ejection fraction was estimated to be 45 %  There was mild diffuse hypokinesis with regional variations  More servere hypokinesis was seen in the inferior wall  Wall thickness was moderately to markedly increased  VENTRICULAR SEPTUM:  There was moderate dyssynergic motion  RIGHT VENTRICLE:  The ventricle was dilated  Systolic function was mildly reduced  LEFT ATRIUM:  The atrium was moderately dilated  RIGHT ATRIUM:  The atrium was moderately dilated  MITRAL VALVE:  There was mild annular calcification  There was at least moderate regurgitation  The regurgitant jet was eccentric and not well captured on most images  TRICUSPID VALVE:  There was mild regurgitation  HISTORY: PRIOR HISTORY: Pulmonary embolus, heart failure, hypertension, PVD  Risk factors: hypertension and morbid obesity  PROCEDURE: The procedure was performed at the bedside  This was a routine study  The transthoracic approach was used  The study included complete 2D imaging, M-mode, complete spectral Doppler, and color Doppler  Images were obtained from  the parasternal, apical, subcostal, and suprasternal notch acoustic windows  Echocardiographic views were limited due to restricted patient mobility  Image quality was adequate  LEFT VENTRICLE: Size was normal  Systolic function was mildly reduced  Ejection fraction was estimated to be 45 %  There was mild diffuse hypokinesis with regional variations  More servere hypokinesis was seen in the inferior wall  Wall  thickness was moderately to markedly increased  DOPPLER: Transmitral flow pattern: atrial fibrillation  Left ventricular diastolic function parameters were abnormal     VENTRICULAR SEPTUM: There was moderate dyssynergic motion  RIGHT VENTRICLE: The ventricle was dilated  Systolic function was mildly reduced   Wall thickness was normal     LEFT ATRIUM: The atrium was moderately dilated  RIGHT ATRIUM: The atrium was moderately dilated  MITRAL VALVE: There was mild annular calcification  Valve structure was normal  There was normal leaflet separation  DOPPLER: The transmitral velocity was within the normal range  There was no evidence for stenosis  There was at least  moderate regurgitation  The regurgitant jet was eccentric and not well captured on most images  AORTIC VALVE: The valve was trileaflet  Leaflets exhibited normal thickness, normal cuspal separation, and sclerosis  DOPPLER: Transaortic velocity was within the normal range  There was no evidence for stenosis  There was no significant  regurgitation  TRICUSPID VALVE: The valve structure was normal  There was normal leaflet separation  DOPPLER: The transtricuspid velocity was within the normal range  There was no evidence for stenosis  There was mild regurgitation  PULMONIC VALVE: Not well visualized  DOPPLER: The transpulmonic velocity was within the normal range  There was no significant regurgitation  PERICARDIUM: There was no pericardial effusion  The pericardium was normal in appearance  AORTA: The root exhibited normal size  SYSTEMIC VEINS: IVC: The inferior vena cava was not well visualized      SYSTEM MEASUREMENT TABLES    2D  %FS: 31 8 %  Ao Diam: 4 05 cm  EDV(Teich): 95 67 ml  EF Biplane: 61 24 %  EF(Teich): 59 92 %  ESV(Teich): 38 35 ml  IVSd: 1 56 cm  LA Area: 39 52 cm2  LA Diam: 5 17 cm  LVEDV MOD A2C: 184 74 ml  LVEDV MOD A4C: 196 07 ml  LVEDV MOD BP: 192 04 ml  LVEF MOD A2C: 58 57 %  LVEF MOD A4C: 63 99 %  LVESV MOD A2C: 76 54 ml  LVESV MOD A4C: 70 6 ml  LVESV MOD BP: 74 43 ml  LVIDd: 4 57 cm  LVIDs: 3 11 cm  LVLd A2C: 9 79 cm  LVLd A4C: 9 45 cm  LVLs A2C: 8 52 cm  LVLs A4C: 8 26 cm  LVOT Diam: 2 09 cm  LVPWd: 1 75 cm  RA Area: 40 17 cm2  RVIDd: 5 4 cm  SV MOD A2C: 108 2 ml  SV MOD A4C: 125 48 ml  SV(Teich): 57 33 ml    CW  TR Vmax: 2 27 m/s  TR maxP 6 mmHg    MM  TAPSE: 1 07 [facility] Accredited Echocardiography Laboratory    Prepared and electronically signed by    Michelle Foss MD  Signed 04-Jan-2019 09:02:08           Meds/Allergies     Current Facility-Administered Medications:  acetaminophen 650 mg Oral Q4H PRN NAVNEET Gill    apixaban 5 mg Oral BID Tawana Marion MD    vitamin C 1,000 mg Oral Daily NAVNEET Gill    bumetanide 0 5 mg/hr Intravenous Continuous Hollie Peabody, MD Last Rate: 0 5 mg/hr (01/11/19 1611)   cholecalciferol 1,000 Units Oral Daily NAVNEET Martinez    co-enzyme Q-10 100 mg Oral Daily NAVNEET Martinez    cyanocobalamin 1,000 mcg Oral Daily NAVNEET Martinez    cyclobenzaprine 10 mg Oral TID PRN NAVNEET Gill    diltiazem 120 mg Oral BID NAVNEET Thomas    doxycycline hyclate 100 mg Oral Q12H [facility] Tawana Marion MD    fluticasone 1 spray Nasal Daily NAVNEET Gill    HYDROcodone-acetaminophen 2 tablet Oral Q6H PRN NAVNEET Thomas    magnesium oxide 400 mg Oral BID Hannah Baird PA-C    metolazone 2 5 mg Oral Daily Hollie Peabody, MD    nystatin  Topical BID Hannah Baird PA-C    ondansetron 4 mg Intravenous Q6H PRN NAVNEET Thomas    pantoprazole 40 mg Oral Early Morning NAVNEET Martinez    polyethylene glycol 17 g Oral Daily PRN NAVNEET Thomas    sodium chloride 1 spray Each Nare PRN NAVNEET Gill    vitamin E (tocopherol) 400 Units Oral Daily NAVNEET Martinez        bumetanide 0 5 mg/hr Last Rate: 0 5 mg/hr (01/11/19 1611)     Prescriptions Prior to Admission   Medication    Ascorbic Acid (VITAMIN C) 1000 MG tablet    co-enzyme Q-10 50 MG capsule    cyanocobalamin (VITAMIN B-12) 1,000 mcg tablet    diltiazem (CARDIZEM CD) 120 mg 24 hr capsule    guaiFENesin (MUCINEX) 600 mg 12 hr tablet    HYDROcodone-acetaminophen (NORCO) 7 5-325 mg per tablet    potassium chloride (K-DUR,KLOR-CON) 10 mEq tablet    sildenafil (VIAGRA) 100 mg tablet    apixaban (ELIQUIS) 5 mg    fluticasone (FLONASE) 50 mcg/act nasal spray    furosemide (LASIX) 40 mg tablet    losartan (COZAAR) 50 mg tablet    metolazone (ZAROXOLYN) 2 5 mg tablet    omeprazole (PriLOSEC) 20 mg delayed release capsule    pantoprazole (PROTONIX) 40 mg tablet    sodium chloride (OCEAN) 0 65 % nasal spray       Assessment:  Principal Problem:    Acute on chronic diastolic congestive heart failure (HCC)  Active Problems:    Gastroesophageal reflux disease without esophagitis    Obesity    Hypertension    Atrial fibrillation (HCC)    Acute kidney injury ([address] )    Type 2 myocardial infarction (HCC)    Cellulitis of left lower extremity    Chronic deep vein thrombosis (DVT) ([facility])    Thrombocytopenia ([facility])      Impression:  1  Acute on chronic diastolic heart failure - continues to diurese aggressively on bumex gtt  Will switch to intermittent dosing of Bumex  2  Atrial fibrillation - rate controlled  On anticoagulation  3  HTN - controlled  4  SAMY - stable  Plan:  1  Switch to intermittent dosing diuretics  2  Continue remainder of medications  3  Watch renal function

## 2019-01-11 NOTE — ASSESSMENT & PLAN NOTE
· Ejection fraction 45% on ECHO during this admission  Mild diffuse hypokinesis with regional variations  · Continue treating with IV diuresis - continue on IV Bumex infusion today  Metolazone added by Cardiology    · -25L diuresis this admission  · Wt 127kg form 141Kg on admission

## 2019-01-12 LAB
ANION GAP SERPL CALCULATED.3IONS-SCNC: 9 MMOL/L (ref 4–13)
BUN SERPL-MCNC: 63 MG/DL (ref 5–25)
CALCIUM SERPL-MCNC: 9.3 MG/DL (ref 8.3–10.1)
CHLORIDE SERPL-SCNC: 91 MMOL/L (ref 100–108)
CO2 SERPL-SCNC: 37 MMOL/L (ref 21–32)
CREAT SERPL-MCNC: 1.62 MG/DL (ref 0.6–1.3)
GFR SERPL CREATININE-BSD FRML MDRD: 42 ML/MIN/1.73SQ M
GLUCOSE SERPL-MCNC: 75 MG/DL (ref 65–140)
POTASSIUM SERPL-SCNC: 3.1 MMOL/L (ref 3.5–5.3)
SODIUM SERPL-SCNC: 137 MMOL/L (ref 136–145)

## 2019-01-12 PROCEDURE — 99233 SBSQ HOSP IP/OBS HIGH 50: CPT | Performed by: INTERNAL MEDICINE

## 2019-01-12 PROCEDURE — 99232 SBSQ HOSP IP/OBS MODERATE 35: CPT | Performed by: INTERNAL MEDICINE

## 2019-01-12 PROCEDURE — 80048 BASIC METABOLIC PNL TOTAL CA: CPT | Performed by: INTERNAL MEDICINE

## 2019-01-12 RX ORDER — POTASSIUM CHLORIDE 20 MEQ/1
40 TABLET, EXTENDED RELEASE ORAL ONCE
Status: COMPLETED | OUTPATIENT
Start: 2019-01-12 | End: 2019-01-12

## 2019-01-12 RX ORDER — POTASSIUM CHLORIDE 14.9 MG/ML
20 INJECTION INTRAVENOUS
Status: COMPLETED | OUTPATIENT
Start: 2019-01-12 | End: 2019-01-12

## 2019-01-12 RX ORDER — BUMETANIDE 1 MG/1
2 TABLET ORAL 2 TIMES DAILY
Status: DISCONTINUED | OUTPATIENT
Start: 2019-01-12 | End: 2019-01-16 | Stop reason: HOSPADM

## 2019-01-12 RX ADMIN — HYDROCODONE BITARTRATE AND ACETAMINOPHEN 2 TABLET: 5; 325 TABLET ORAL at 01:52

## 2019-01-12 RX ADMIN — POTASSIUM CHLORIDE 20 MEQ: 200 INJECTION, SOLUTION INTRAVENOUS at 12:10

## 2019-01-12 RX ADMIN — OXYCODONE HYDROCHLORIDE AND ACETAMINOPHEN 1000 MG: 500 TABLET ORAL at 08:44

## 2019-01-12 RX ADMIN — DOXYCYCLINE HYCLATE 100 MG: 100 CAPSULE ORAL at 08:44

## 2019-01-12 RX ADMIN — Medication 400 MG: at 18:41

## 2019-01-12 RX ADMIN — DILTIAZEM HYDROCHLORIDE 120 MG: 120 CAPSULE, COATED, EXTENDED RELEASE ORAL at 08:44

## 2019-01-12 RX ADMIN — CYANOCOBALAMIN TAB 500 MCG 1000 MCG: 500 TAB at 08:44

## 2019-01-12 RX ADMIN — DILTIAZEM HYDROCHLORIDE 120 MG: 120 CAPSULE, COATED, EXTENDED RELEASE ORAL at 18:41

## 2019-01-12 RX ADMIN — NYSTATIN: 100000 POWDER TOPICAL at 08:47

## 2019-01-12 RX ADMIN — Medication 400 MG: at 08:44

## 2019-01-12 RX ADMIN — VITAMIN D, TAB 1000IU (100/BT) 1000 UNITS: 25 TAB at 08:44

## 2019-01-12 RX ADMIN — Medication 100 MG: at 08:45

## 2019-01-12 RX ADMIN — POTASSIUM CHLORIDE 40 MEQ: 1500 TABLET, EXTENDED RELEASE ORAL at 11:25

## 2019-01-12 RX ADMIN — METOLAZONE 2.5 MG: 2.5 TABLET ORAL at 08:44

## 2019-01-12 RX ADMIN — DOXYCYCLINE HYCLATE 100 MG: 100 CAPSULE ORAL at 21:27

## 2019-01-12 RX ADMIN — APIXABAN 5 MG: 5 TABLET, FILM COATED ORAL at 18:41

## 2019-01-12 RX ADMIN — NYSTATIN: 100000 POWDER TOPICAL at 18:43

## 2019-01-12 RX ADMIN — APIXABAN 5 MG: 5 TABLET, FILM COATED ORAL at 08:44

## 2019-01-12 RX ADMIN — BUMETANIDE 2 MG: 0.25 INJECTION INTRAMUSCULAR; INTRAVENOUS at 08:48

## 2019-01-12 RX ADMIN — BUMETANIDE 2 MG: 1 TABLET ORAL at 18:41

## 2019-01-12 RX ADMIN — POTASSIUM CHLORIDE 20 MEQ: 200 INJECTION, SOLUTION INTRAVENOUS at 13:42

## 2019-01-12 RX ADMIN — VITAMIN E CAP 400 UNIT 400 UNITS: 400 CAP at 08:45

## 2019-01-12 RX ADMIN — FLUTICASONE PROPIONATE 1 SPRAY: 50 SPRAY, METERED NASAL at 08:44

## 2019-01-12 RX ADMIN — PANTOPRAZOLE SODIUM 40 MG: 40 TABLET, DELAYED RELEASE ORAL at 05:57

## 2019-01-12 NOTE — PROGRESS NOTES
Cardiology Progress Note - Linwood Jurjim 68 y o  male MRN: [id number]    Unit/Bed#: [address] [place] [address]  Encounter: [id number]        Subjective:    Developed phlebitis of right hand  Continues to diuese  Review of Systems   Cardiovascular: Positive for leg swelling  Negative for chest pain and palpitations  Respiratory: Positive for shortness of breath  Objective:   Vitals: Blood pressure 111/80, pulse 76, temperature 97 5 °F (36 4 °C), temperature source Oral, resp  rate 16, height 6' 4" (1 93 m), weight 126 kg (277 lb 4 8 oz), SpO2 96 %  , Body mass index is 33 75 kg/m² ,   Orthostatic Blood Pressures      Most Recent Value   Blood Pressure  111/80 filed at 2019 0840   Patient Position - Orthostatic VS  Lying filed at 2019 5046         Systolic (72AEL), VWI:926 , Min:88 , RL     Diastolic (65XNM), LKE:30, Min:50, Max:80      Intake/Output Summary (Last 24 hours) at 19 1456  Last data filed at 19 1341   Gross per 24 hour   Intake              580 ml   Output             1925 ml   Net            -1345 ml     Weight (last 2 days)     Date/Time   Weight    19 0600  126 (277 3)    19 1109  127 (279 98)    01/10/19 0900  134 (295 86)                  Telemetry Review: Afib    Physical Exam   Cardiovascular: Normal rate and normal heart sounds  An irregularly irregular rhythm present  Exam reveals no gallop and no friction rub  No murmur heard  1+ B LE edema   Pulmonary/Chest: Breath sounds normal  He has no wheezes  He has no rales  Musculoskeletal: He exhibits no edema           Laboratory Results:        CBC with diff:     Results from last 7 days  Lab Units 19  0528 01/10/19  0559 19  0536 19  0614   WBC Thousand/uL 6 77 6 28 6 40 6 45   HEMOGLOBIN g/dL 15 7 14 4 14 1 13 9   HEMATOCRIT % 46 5 43 6 42 7 41 9   MCV fL 94 96 97 96   PLATELETS Thousands/uL 144* 139* 132* 129*   MCH pg 31 8 31 8 31 9 31 8   MCHC g/dL 33 8 33 0 33 0 33 2   RDW % 15 0 15 1 15 0 15 2*   MPV fL 12 1 12 0 12 0 11 5   NRBC AUTO /100 WBCs  --   --   --  0         CMP:    Results from last 7 days  Lab Units 19  0504 19  0528 01/10/19  0559 19  0536 19  0840 19  0614   POTASSIUM mmol/L 3 1* 5 3 3 2* 3 5 3 8 3 8   CHLORIDE mmol/L 91* 94* 97* 101 101 102   CO2 mmol/L 37* 35* 33* 32 30 29   BUN mg/dL 63* 57* 48* 43* 42* 42*   CREATININE mg/dL 1 62* 1 46* 1 43* 1 32* 1 33* 1 33*   CALCIUM mg/dL 9 3 9 1 9 5 9 0 8 8 8 5   AST U/L  --   --  37 39  --   --    ALT U/L  --   --  23 21  --   --    ALK PHOS U/L  --   --  87 87  --   --    EGFR ml/min/1 73sq m 42 47 49 54 53 53         BMP:    Results from last 7 days  Lab Units 19  0504 19  0528 01/10/19  0559 19  0536 19  0840 19  0614   POTASSIUM mmol/L 3 1* 5 3 3 2* 3 5 3 8 3 8   CHLORIDE mmol/L 91* 94* 97* 101 101 102   CO2 mmol/L 37* 35* 33* 32 30 29   BUN mg/dL 63* 57* 48* 43* 42* 42*   CREATININE mg/dL 1 62* 1 46* 1 43* 1 32* 1 33* 1 33*   CALCIUM mg/dL 9 3 9 1 9 5 9 0 8 8 8 5       BNP:     Magnesium:     Results from last 7 days  Lab Units 19  0528 01/10/19  0559 19  0614   MAGNESIUM mg/dL 1 7 1 5* 1 5*       Coags:           Cardiac testing:   Results for orders placed during the hospital encounter of 19   Echo complete with contrast if indicated    [place] [address] [employer] [place], [place] [address]    Transthoracic Echocardiogram  2D, M-mode, Doppler, and Color Doppler    Study date:  2019    Patient: Jacinta Barthel  MR number: [id number]  Account number: [de-identified]  : 1945  Age: 68 years  Gender: Male  Status: Inpatient  Location: Bedside  Height: 76 in  Weight: 308 lb  BP: 127/ 60 mmHg    Indications: Heart failure      Diagnoses: I50 9 - Heart failure, unspecified    Sonographer:  Keith HERNANDEZ, RCS  Primary Physician:  Taran Velásquez PA-C  Referring Physician:  Taran Velásquez PA-C  Group:  [facility]  Interpreting Physician:  Samir Buchanan MD    SUMMARY    LEFT VENTRICLE:  Systolic function was mildly reduced  Ejection fraction was estimated to be 45 %  There was mild diffuse hypokinesis with regional variations  More servere hypokinesis was seen in the inferior wall  Wall thickness was moderately to markedly increased  VENTRICULAR SEPTUM:  There was moderate dyssynergic motion  RIGHT VENTRICLE:  The ventricle was dilated  Systolic function was mildly reduced  LEFT ATRIUM:  The atrium was moderately dilated  RIGHT ATRIUM:  The atrium was moderately dilated  MITRAL VALVE:  There was mild annular calcification  There was at least moderate regurgitation  The regurgitant jet was eccentric and not well captured on most images  TRICUSPID VALVE:  There was mild regurgitation  HISTORY: PRIOR HISTORY: Pulmonary embolus, heart failure, hypertension, PVD  Risk factors: hypertension and morbid obesity  PROCEDURE: The procedure was performed at the bedside  This was a routine study  The transthoracic approach was used  The study included complete 2D imaging, M-mode, complete spectral Doppler, and color Doppler  Images were obtained from  the parasternal, apical, subcostal, and suprasternal notch acoustic windows  Echocardiographic views were limited due to restricted patient mobility  Image quality was adequate  LEFT VENTRICLE: Size was normal  Systolic function was mildly reduced  Ejection fraction was estimated to be 45 %  There was mild diffuse hypokinesis with regional variations  More servere hypokinesis was seen in the inferior wall  Wall  thickness was moderately to markedly increased  DOPPLER: Transmitral flow pattern: atrial fibrillation  Left ventricular diastolic function parameters were abnormal     VENTRICULAR SEPTUM: There was moderate dyssynergic motion  RIGHT VENTRICLE: The ventricle was dilated  Systolic function was mildly reduced   Wall thickness was normal     LEFT ATRIUM: The atrium was moderately dilated  RIGHT ATRIUM: The atrium was moderately dilated  MITRAL VALVE: There was mild annular calcification  Valve structure was normal  There was normal leaflet separation  DOPPLER: The transmitral velocity was within the normal range  There was no evidence for stenosis  There was at least  moderate regurgitation  The regurgitant jet was eccentric and not well captured on most images  AORTIC VALVE: The valve was trileaflet  Leaflets exhibited normal thickness, normal cuspal separation, and sclerosis  DOPPLER: Transaortic velocity was within the normal range  There was no evidence for stenosis  There was no significant  regurgitation  TRICUSPID VALVE: The valve structure was normal  There was normal leaflet separation  DOPPLER: The transtricuspid velocity was within the normal range  There was no evidence for stenosis  There was mild regurgitation  PULMONIC VALVE: Not well visualized  DOPPLER: The transpulmonic velocity was within the normal range  There was no significant regurgitation  PERICARDIUM: There was no pericardial effusion  The pericardium was normal in appearance  AORTA: The root exhibited normal size  SYSTEMIC VEINS: IVC: The inferior vena cava was not well visualized      SYSTEM MEASUREMENT TABLES    2D  %FS: 31 8 %  Ao Diam: 4 05 cm  EDV(Teich): 95 67 ml  EF Biplane: 61 24 %  EF(Teich): 59 92 %  ESV(Teich): 38 35 ml  IVSd: 1 56 cm  LA Area: 39 52 cm2  LA Diam: 5 17 cm  LVEDV MOD A2C: 184 74 ml  LVEDV MOD A4C: 196 07 ml  LVEDV MOD BP: 192 04 ml  LVEF MOD A2C: 58 57 %  LVEF MOD A4C: 63 99 %  LVESV MOD A2C: 76 54 ml  LVESV MOD A4C: 70 6 ml  LVESV MOD BP: 74 43 ml  LVIDd: 4 57 cm  LVIDs: 3 11 cm  LVLd A2C: 9 79 cm  LVLd A4C: 9 45 cm  LVLs A2C: 8 52 cm  LVLs A4C: 8 26 cm  LVOT Diam: 2 09 cm  LVPWd: 1 75 cm  RA Area: 40 17 cm2  RVIDd: 5 4 cm  SV MOD A2C: 108 2 ml  SV MOD A4C: 125 48 ml  SV(Teich): 57 33 ml    CW  TR Vmax: 2 27 m/s  TR maxP 6 mmHg    MM  TAPSE: 1 07 cm    [facility]    Prepared and electronically signed by    Agustin Cheek MD  Signed 2019 09:02:08           Meds/Allergies     Current Facility-Administered Medications:  acetaminophen 650 mg Oral Q4H PRN Bud Gilma, NAVNEET    apixaban 5 mg Oral BID Stefan Lucia MD    vitamin C 1,000 mg Oral Daily Altamease NAVNEET Flores    bumetanide 2 mg Intravenous BID Jackie Cornell MD    cholecalciferol 1,000 Units Oral Daily NAVNEET Martinez    co-enzyme Q-10 100 mg Oral Daily NAVNEET Martinez    cyanocobalamin 1,000 mcg Oral Daily NAVNEET Martinez    cyclobenzaprine 10 mg Oral TID PRN Bud GilmaNAVNEET jones    diltiazem 120 mg Oral BID Altamease NAVNEET Flores    doxycycline hyclate 100 mg Oral Q12H [facility] Stefan Lucia MD    fluticasone 1 spray Nasal Daily Bud GilmaNAVNEET    HYDROcodone-acetaminophen 2 tablet Oral Q6H PRN Altamease NAVNEET Flores    magnesium oxide 400 mg Oral BID Hannah Baird PA-C    metolazone 2 5 mg Oral Daily Samson Cronin MD    nystatin  Topical BID Hannah Baird PA-C    ondansetron 4 mg Intravenous Q6H PRN Altamease NAVNEET Flores    pantoprazole 40 mg Oral Early Morning NAVNEET Martinez    polyethylene glycol 17 g Oral Daily PRN Altamease NAVNEET Flores    potassium chloride 20 mEq Intravenous Q2H Franck Alejandra Last Rate: 20 mEq (19 1342)   sodium chloride 1 spray Each Nare PRN NAVNEET Martinez    vitamin E (tocopherol) 400 Units Oral Daily NAVNEET Martinez         Prescriptions Prior to Admission   Medication    Ascorbic Acid (VITAMIN C) 1000 MG tablet    co-enzyme Q-10 50 MG capsule    cyanocobalamin (VITAMIN B-12) 1,000 mcg tablet    diltiazem (CARDIZEM CD) 120 mg 24 hr capsule    guaiFENesin (MUCINEX) 600 mg 12 hr tablet    HYDROcodone-acetaminophen (NORCO) 7 5-325 mg per tablet    potassium chloride (K-DUR,KLOR-CON) 10 mEq tablet  sildenafil (VIAGRA) 100 mg tablet    apixaban (ELIQUIS) 5 mg    fluticasone (FLONASE) 50 mcg/act nasal spray    furosemide (LASIX) 40 mg tablet    losartan (COZAAR) 50 mg tablet    metolazone (ZAROXOLYN) 2 5 mg tablet    omeprazole (PriLOSEC) 20 mg delayed release capsule    pantoprazole (PROTONIX) 40 mg tablet    sodium chloride (OCEAN) 0 65 % nasal spray       Assessment:  Principal Problem:    Acute on chronic diastolic congestive heart failure (HCC)  Active Problems:    Gastroesophageal reflux disease without esophagitis    Obesity    Hypertension    Atrial fibrillation (HCC)    Acute kidney injury (HCC)    Type 2 myocardial infarction (HCC)    Cellulitis of left lower extremity    Chronic deep vein thrombosis (DVT) (HCC)    Thrombocytopenia (HCC)      Impression:  1  Acute on chronic diastolic heart failure - continues to diurese aggressively  Switch to oral bumex  2  Atrial fibrillation - rate controlled  On anticoagulation  3  HTN - controlled  4  SAMY - stable  Plan:  1  Switch to oral diuretics  2  Continue remainder of medications  3  Watch renal function  4  Will sign off for now  Patient to follow up with his cardiologist in [place]

## 2019-01-12 NOTE — PLAN OF CARE
Problem: Potential for Falls  Goal: Patient will remain free of falls  INTERVENTIONS:  - Assess patient frequently for physical needs  -  Identify cognitive and physical deficits and behaviors that affect risk of falls    -  [place] fall precautions as indicated by assessment   - Educate patient/family on patient safety including physical limitations  - Instruct patient to call for assistance with activity based on assessment  - Modify environment to reduce risk of injury  - Consider OT/PT consult to assist with strengthening/mobility    Outcome: Progressing      Problem: PAIN - ADULT  Goal: Verbalizes/displays adequate comfort level or baseline comfort level  Interventions:  - Encourage patient to monitor pain and request assistance  - Assess pain using appropriate pain scale  - Administer analgesics based on type and severity of pain and evaluate response  - Implement non-pharmacological measures as appropriate and evaluate response  - Consider cultural and social influences on pain and pain management  - Notify physician/advanced practitioner if interventions unsuccessful or patient reports new pain   Outcome: Progressing      Problem: INFECTION - ADULT  Goal: Absence or prevention of progression during hospitalization  INTERVENTIONS:  - Assess and monitor for signs and symptoms of infection  - Monitor lab/diagnostic results  - Monitor all insertion sites, i e  indwelling lines, tubes, and drains  - Monitor endotracheal (as able) and nasal secretions for changes in amount and color  - [place] appropriate cooling/warming therapies per order  - Administer medications as ordered  - Instruct and encourage patient and family to use good hand hygiene technique  - Identify and instruct in appropriate isolation precautions for identified infection/condition   Outcome: Progressing    Goal: Absence of fever/infection during neutropenic period  INTERVENTIONS:  - Monitor WBC  - Implement neutropenic guidelines   Outcome: Progressing      Problem: SAFETY ADULT  Goal: Maintain or return to baseline ADL function  INTERVENTIONS:  -  Assess patient's ability to carry out ADLs; assess patient's baseline for ADL function and identify physical deficits which impact ability to perform ADLs (bathing, care of mouth/teeth, toileting, grooming, dressing, etc )  - Assess/evaluate cause of self-care deficits   - Assess range of motion  - Assess patient's mobility; develop plan if impaired  - Assess patient's need for assistive devices and provide as appropriate  - Encourage maximum independence but intervene and supervise when necessary  ¯ Involve family in performance of ADLs  ¯ Assess for home care needs following discharge   ¯ Request OT consult to assist with ADL evaluation and planning for discharge  ¯ Provide patient education as appropriate   Outcome: Progressing    Goal: Maintain or return mobility status to optimal level  INTERVENTIONS:  - Assess patient's baseline mobility status (ambulation, transfers, stairs, etc )    - Identify cognitive and physical deficits and behaviors that affect mobility  - Identify mobility aids required to assist with transfers and/or ambulation (gait belt, sit-to-stand, lift, walker, cane, etc )  - [place] fall precautions as indicated by assessment  - Record patient progress and toleration of activity level on Mobility SBAR; progress patient to next Phase/Stage  - Instruct patient to call for assistance with activity based on assessment  - Request Rehabilitation consult to assist with strengthening/weightbearing, etc    Outcome: Progressing    Goal: Patient will remain free of falls  INTERVENTIONS:  - Assess patient frequently for physical needs  -  Identify cognitive and physical deficits and behaviors that affect risk of falls    -  [place] fall precautions as indicated by assessment   - Educate patient/family on patient safety including physical limitations  - Instruct patient to call for assistance with activity based on assessment  - Modify environment to reduce risk of injury  - Consider OT/PT consult to assist with strengthening/mobility    Outcome: Progressing      Problem: DISCHARGE PLANNING  Goal: Discharge to home or other facility with appropriate resources  INTERVENTIONS:  - Identify barriers to discharge w/patient and caregiver  - Arrange for needed discharge resources and transportation as appropriate  - Identify discharge learning needs (meds, wound care, etc )  - Arrange for interpretive services to assist at discharge as needed  - Refer to Case Management Department for coordinating discharge planning if the patient needs post-hospital services based on physician/advanced practitioner order or complex needs related to functional status, cognitive ability, or social support system   Outcome: Progressing      Problem: Knowledge Deficit  Goal: Patient/family/caregiver demonstrates understanding of disease process, treatment plan, medications, and discharge instructions  Complete learning assessment and assess knowledge base  Interventions:  - Provide teaching at level of understanding  - Provide teaching via preferred learning methods   Outcome: Progressing      Problem: CARDIOVASCULAR - ADULT  Goal: Maintains optimal cardiac output and hemodynamic stability  INTERVENTIONS:  - Monitor I/O, vital signs and rhythm  - Monitor for S/S and trends of decreased cardiac output i e  bleeding, hypotension  - Administer and titrate ordered vasoactive medications to optimize hemodynamic stability  - Assess quality of pulses, skin color and temperature  - Assess for signs of decreased coronary artery perfusion - ex   Angina  - Instruct patient to report change in severity of symptoms   Outcome: Progressing    Goal: Absence of cardiac dysrhythmias or at baseline rhythm  INTERVENTIONS:  - Continuous cardiac monitoring, monitor vital signs, obtain 12 lead EKG if indicated  - Administer antiarrhythmic and heart rate control medications as ordered  - Monitor electrolytes and administer replacement therapy as ordered   Outcome: Progressing      Problem: RESPIRATORY - ADULT  Goal: Achieves optimal ventilation and oxygenation  INTERVENTIONS:  - Assess for changes in respiratory status  - Assess for changes in mentation and behavior  - Position to facilitate oxygenation and minimize respiratory effort  - Oxygen administration by appropriate delivery method based on oxygen saturation (per order) or ABGs  - Initiate smoking cessation education as indicated  - Encourage broncho-pulmonary hygiene including cough, deep breathe, Incentive Spirometry  - Assess the need for suctioning and aspirate as needed  - Assess and instruct to report SOB or any respiratory difficulty  - Respiratory Therapy support as indicated   Outcome: Progressing      Problem: METABOLIC, FLUID AND ELECTROLYTES - ADULT  Goal: Electrolytes maintained within normal limits  INTERVENTIONS:  - Monitor labs and assess patient for signs and symptoms of electrolyte imbalances  - Administer electrolyte replacement as ordered  - Monitor response to electrolyte replacements, including repeat lab results as appropriate  - Instruct patient on fluid and nutrition as appropriate   Outcome: Progressing    Goal: Fluid balance maintained  INTERVENTIONS:  - Monitor labs and assess for signs and symptoms of volume excess or deficit  - Monitor I/O and WT  - Instruct patient on fluid and nutrition as appropriate   Outcome: Progressing      Problem: SKIN/TISSUE INTEGRITY - ADULT  Goal: Skin integrity remains intact  INTERVENTIONS  - Identify patients at risk for skin breakdown  - Assess and monitor skin integrity  - Assess and monitor nutrition and hydration status  - Monitor labs (i e  albumin)  - Assess for incontinence   - Turn and reposition patient  - Assist with mobility/ambulation  - Relieve pressure over bony prominences  - Avoid friction and shearing  - Provide appropriate hygiene as needed including keeping skin clean and dry  - Evaluate need for skin moisturizer/barrier cream  - Collaborate with interdisciplinary team (i e  Nutrition, Rehabilitation, etc )   - Patient/family teaching   Outcome: Progressing    Goal: Incision(s), wounds(s) or drain site(s) healing without S/S of infection  INTERVENTIONS  - Assess and document risk factors for skin impairment   - Assess and document dressing, incision, wound bed, drain sites and surrounding tissue  - Initiate Nutrition services consult and/or wound management as needed   Outcome: Progressing      Problem: MUSCULOSKELETAL - ADULT  Goal: Maintain or return mobility to safest level of function  INTERVENTIONS:  - Assess patient's ability to carry out ADLs; assess patient's baseline for ADL function and identify physical deficits which impact ability to perform ADLs (bathing, care of mouth/teeth, toileting, grooming, dressing, etc )  - Assess/evaluate cause of self-care deficits   - Assess range of motion  - Assess patient's mobility; develop plan if impaired  - Assess patient's need for assistive devices and provide as appropriate  - Encourage maximum independence but intervene and supervise when necessary  - Involve family in performance of ADLs  - Assess for home care needs following discharge   - Request OT consult to assist with ADL evaluation and planning for discharge  - Provide patient education as appropriate   Outcome: Progressing      Problem: Prexisting or High Potential for Compromised Skin Integrity  Goal: Skin integrity is maintained or improved  INTERVENTIONS:  - Identify patients at risk for skin breakdown  - Assess and monitor skin integrity  - Assess and monitor nutrition and hydration status  - Monitor labs (i e  albumin)  - Assess for incontinence   - Turn and reposition patient  - Assist with mobility/ambulation  - Relieve pressure over bony prominences  - Avoid friction and shearing  - Provide appropriate hygiene as needed including keeping skin clean and dry  - Evaluate need for skin moisturizer/barrier cream  - Collaborate with interdisciplinary team (i e  Nutrition, Rehabilitation, etc )   - Patient/family teaching   Outcome: Progressing

## 2019-01-12 NOTE — PROGRESS NOTES
Progress Note - Linwood Mazariegos 1945, 68 y o  male MRN: [id number]    Unit/Bed#: [address] [address]01 Encounter: [id number]    Primary Care Provider: Mara Tang PA-C   Date and time admitted to hospital: 1/2/2019  6:35 PM           Thrombocytopenia (HCC)   Assessment & Plan     · Mild  Continue to trend  · Today 133      Chronic deep vein thrombosis (DVT) (HCC)   Assessment & Plan     · Initially this was thought to represent acute DVT in the patient was changed from Eliquis to heparin infusion however venous duplex is noted to suggest that this was likely chronic  · Continue Eliquis  · Venous duplex: Evaluation shows non-occlusive or partially recanalized thrombus in the femoral vein and the popliteal vein which is most likely chronic  · VQ: low probability      Cellulitis of left lower extremity   Assessment & Plan     · Culture came back positive with MRSA  · Contact isolation  · Continue PO doxycycline (day # 6/7)  Continue PO doxycycline for total of 7 days  · Wound care  · Remains afebrile with no evidence of leukocytosis  · DC doxycycline tomorrow if clinical course allows          Type 2 myocardial infarction [facility])   Assessment & Plan     · Indeterminate Troponins noted be 0 12-0 15  · Cardiology following  · Likely related to CHF  · Continue with maximal medical management      Acute kidney injury [facility])   Assessment & Plan             Lab Results   Component Value Date     CREATININE 1 46 (H) 01/11/2019     CREATININE 1 43 (H) 01/10/2019     CREATININE 1 32 (H) 01/09/2019     CREATININE 1 33 (H) 01/08/2019      · Creatinine at baseline 0 9-1 1  · Currently 1 46    · Trend creatinine daily while on diuresis  May need to accept higher creatinine to get euvolemic but has been trending upward, losartan on hold     · UA with trace protein      Atrial fibrillation (HCC)   Assessment & Plan     -continue Cardizem/Eliquis      Hypertension   Assessment & Plan     · Continue to hold losartan with SAMY  · Continue cardizem with hold parameters  · Monitor with diuresis       Obesity   Assessment & Plan     Body mass index is 34 08 kg/m²  Recommend dietary changes      Gastroesophageal reflux disease without esophagitis   Assessment & Plan     · Continue PPI      * Acute on chronic diastolic congestive heart failure (HCC)   Assessment & Plan     · Ejection fraction 45% on ECHO during this admission  Mild diffuse hypokinesis with regional variations  · Continue treating with IV diuresis - continue on IV Bumex infusion today  Metolazone added by Cardiology  · -25L diuresis this admission  · Wt 127kg form 141Kg on admission          VTE Pharmacologic Prophylaxis:   Pharmacologic: Apixaban (Eliquis)  Mechanical VTE Prophylaxis in Place: No    Patient Centered Rounds: I have performed bedside rounds with nursing staff today  Education and Discussions with Family / Patient:  Patient    Time Spent for Care:   35 minutes  More than 50% of total time spent on counseling and coordination of care as described above  Current Length of Stay: 10 day(s)    Current Patient Status: Inpatient   Certification Statement: The patient will continue to require additional inpatient hospital stay due to CHF exacerbation    Discharge Plan:  2-3 days    Code Status: Level 1 - Full Code      Subjective:   No new complaints today  I am about the same   No events reported per nursing staff  Objective:     Vitals:   Temp (24hrs), Av 5 °F (36 4 °C), Min:97 5 °F (36 4 °C), Max:97 6 °F (36 4 °C)    Temp:  [97 5 °F (36 4 °C)-97 6 °F (36 4 °C)] 97 5 °F (36 4 °C)  HR:  [71-97] 76  Resp:  [16-18] 16  BP: ()/(50-80) 111/80  SpO2:  [93 %-96 %] 96 %  Body mass index is 33 75 kg/m²  Input and Output Summary (last 24 hours):        Intake/Output Summary (Last 24 hours) at 19 1258  Last data filed at 19 1219   Gross per 24 hour   Intake              480 ml   Output             1925 ml   Net            -1445 ml Physical Exam:     Physical Exam    Constitutional:  Well developed, well nourished, no acute distress, non-toxic appearance   Eyes:  PERRL, conjunctiva normal   HENT:  Atraumatic, external ears normal, nose normal, oropharynx moist, no pharyngeal exudates  Neck- normal range of motion, no tenderness, supple   Respiratory:  No respiratory distress, normal breath sounds, no rales, no wheezing   Cardiovascular:  Normal rate, irregularly irregular ridden, no murmurs, no gallops, no rubs   GI:  Soft, nondistended, normal bowel sounds, nontender, no organomegaly, no mass, no rebound, no guarding   :  No costovertebral angle tenderness   Musculoskeletal:  No edema, no tenderness, no deformities  Back- no tenderness  Skin:  Trace lower extremity edema, right lower extremity with clean dry and intact dressing  Neuro: Cranial nerves 2-12 are intact, non-focal, 5/5 strength in all 4 extremities      Additional Data:     Labs:      Results from last 7 days  Lab Units 01/11/19  0528  01/07/19  0614   WBC Thousand/uL 6 77  < > 6 45   HEMOGLOBIN g/dL 15 7  < > 13 9   HEMATOCRIT % 46 5  < > 41 9   PLATELETS Thousands/uL 144*  < > 129*   NEUTROS PCT %  --   --  56   LYMPHS PCT %  --   --  27   MONOS PCT %  --   --  11   EOS PCT %  --   --  4   < > = values in this interval not displayed  Results from last 7 days  Lab Units 01/12/19  0504  01/10/19  0559   SODIUM mmol/L 137  < > 140   POTASSIUM mmol/L 3 1*  < > 3 2*   CHLORIDE mmol/L 91*  < > 97*   CO2 mmol/L 37*  < > 33*   BUN mg/dL 63*  < > 48*   CREATININE mg/dL 1 62*  < > 1 43*   ANION GAP mmol/L 9  < > 10   CALCIUM mg/dL 9 3  < > 9 5   ALBUMIN g/dL  --   --  3 2*   TOTAL BILIRUBIN mg/dL  --   --  1 60*   ALK PHOS U/L  --   --  87   ALT U/L  --   --  23   AST U/L  --   --  37   GLUCOSE RANDOM mg/dL 75  < > 78   < > = values in this interval not displayed  * I Have Reviewed All Lab Data Listed Above    * Additional Pertinent Lab Tests Reviewed: [address] Admission Reviewed    Recent Cultures (last 7 days):           Last 24 Hours Medication List:     Current Facility-Administered Medications:  acetaminophen 650 mg Oral Q4H PRN KaylaNAVNEET Diaz    apixaban 5 mg Oral BID Paola Leavitt MD    vitamin C 1,000 mg Oral Daily NAVNEET Martinez    bumetanide 2 mg Intravenous BID Krystal Lazcano MD    cholecalciferol 1,000 Units Oral Daily NAVNEET Martinez    co-enzyme Q-10 100 mg Oral Daily NAVNEET Martinez    cyanocobalamin 1,000 mcg Oral Daily NAVNEET Martinez    cyclobenzaprine 10 mg Oral TID PRN Kayla NAVNEET Powell    diltiazem 120 mg Oral BID NAVNEET Rhodes    doxycycline hyclate 100 mg Oral Q12H [address] Paola Leavitt MD    fluticasone 1 spray Nasal Daily KaylaNAVNEET Clements    HYDROcodone-acetaminophen 2 tablet Oral Q6H PRN NAVNEET Rhodes    magnesium oxide 400 mg Oral BID Hannah Baird PA-C    metolazone 2 5 mg Oral Daily Angel Urbano MD    nystatin  Topical BID Hannah Baird PA-C    ondansetron 4 mg Intravenous Q6H PRN NAVNEET Rhodes    pantoprazole 40 mg Oral Early Morning NAVNEET Martinez    polyethylene glycol 17 g Oral Daily PRN NAVNEET Rhodes    potassium chloride 20 mEq Intravenous Q2H Franck Alejandra Last Rate: 20 mEq (01/12/19 1210)   sodium chloride 1 spray Each Nare PRN NAVNEET Rhodes    vitamin E (tocopherol) 400 Units Oral Daily KaylaNAVNEET Clements         Today, Patient Was Seen By: Aida Westbrook    ** Please Note: Dictation voice to text software may have been used in the creation of this document   **

## 2019-01-13 LAB
ANION GAP SERPL CALCULATED.3IONS-SCNC: 6 MMOL/L (ref 4–13)
BASOPHILS # BLD AUTO: 0.05 THOUSANDS/ΜL (ref 0–0.1)
BASOPHILS NFR BLD AUTO: 1 % (ref 0–1)
BUN SERPL-MCNC: 67 MG/DL (ref 5–25)
CALCIUM SERPL-MCNC: 9.4 MG/DL (ref 8.3–10.1)
CHLORIDE SERPL-SCNC: 91 MMOL/L (ref 100–108)
CO2 SERPL-SCNC: 38 MMOL/L (ref 21–32)
CREAT SERPL-MCNC: 1.66 MG/DL (ref 0.6–1.3)
EOSINOPHIL # BLD AUTO: 0.31 THOUSAND/ΜL (ref 0–0.61)
EOSINOPHIL NFR BLD AUTO: 5 % (ref 0–6)
ERYTHROCYTE [DISTWIDTH] IN BLOOD BY AUTOMATED COUNT: 14.6 % (ref 11.6–15.1)
GFR SERPL CREATININE-BSD FRML MDRD: 41 ML/MIN/1.73SQ M
GLUCOSE SERPL-MCNC: 82 MG/DL (ref 65–140)
HCT VFR BLD AUTO: 46 % (ref 36.5–49.3)
HGB BLD-MCNC: 15.3 G/DL (ref 12–17)
IMM GRANULOCYTES # BLD AUTO: 0.01 THOUSAND/UL (ref 0–0.2)
IMM GRANULOCYTES NFR BLD AUTO: 0 % (ref 0–2)
LYMPHOCYTES # BLD AUTO: 1.93 THOUSANDS/ΜL (ref 0.6–4.47)
LYMPHOCYTES NFR BLD AUTO: 28 % (ref 14–44)
MCH RBC QN AUTO: 31.2 PG (ref 26.8–34.3)
MCHC RBC AUTO-ENTMCNC: 33.3 G/DL (ref 31.4–37.4)
MCV RBC AUTO: 94 FL (ref 82–98)
MONOCYTES # BLD AUTO: 0.98 THOUSAND/ΜL (ref 0.17–1.22)
MONOCYTES NFR BLD AUTO: 14 % (ref 4–12)
NEUTROPHILS # BLD AUTO: 3.61 THOUSANDS/ΜL (ref 1.85–7.62)
NEUTS SEG NFR BLD AUTO: 52 % (ref 43–75)
NRBC BLD AUTO-RTO: 0 /100 WBCS
PLATELET # BLD AUTO: 168 THOUSANDS/UL (ref 149–390)
PMV BLD AUTO: 11.8 FL (ref 8.9–12.7)
POTASSIUM SERPL-SCNC: 3 MMOL/L (ref 3.5–5.3)
RBC # BLD AUTO: 4.91 MILLION/UL (ref 3.88–5.62)
SODIUM SERPL-SCNC: 135 MMOL/L (ref 136–145)
WBC # BLD AUTO: 6.89 THOUSAND/UL (ref 4.31–10.16)

## 2019-01-13 PROCEDURE — 80048 BASIC METABOLIC PNL TOTAL CA: CPT | Performed by: INTERNAL MEDICINE

## 2019-01-13 PROCEDURE — 99233 SBSQ HOSP IP/OBS HIGH 50: CPT | Performed by: INTERNAL MEDICINE

## 2019-01-13 PROCEDURE — 85025 COMPLETE CBC W/AUTO DIFF WBC: CPT | Performed by: INTERNAL MEDICINE

## 2019-01-13 RX ORDER — POTASSIUM CHLORIDE 20 MEQ/1
40 TABLET, EXTENDED RELEASE ORAL ONCE
Status: DISCONTINUED | OUTPATIENT
Start: 2019-01-13 | End: 2019-01-13 | Stop reason: SDUPTHER

## 2019-01-13 RX ORDER — POTASSIUM CHLORIDE 20 MEQ/1
40 TABLET, EXTENDED RELEASE ORAL 2 TIMES DAILY
Status: COMPLETED | OUTPATIENT
Start: 2019-01-13 | End: 2019-01-13

## 2019-01-13 RX ADMIN — Medication 100 MG: at 08:25

## 2019-01-13 RX ADMIN — Medication 400 MG: at 17:11

## 2019-01-13 RX ADMIN — NYSTATIN: 100000 POWDER TOPICAL at 17:15

## 2019-01-13 RX ADMIN — METOLAZONE 2.5 MG: 2.5 TABLET ORAL at 08:24

## 2019-01-13 RX ADMIN — DILTIAZEM HYDROCHLORIDE 120 MG: 120 CAPSULE, COATED, EXTENDED RELEASE ORAL at 17:11

## 2019-01-13 RX ADMIN — PANTOPRAZOLE SODIUM 40 MG: 40 TABLET, DELAYED RELEASE ORAL at 05:29

## 2019-01-13 RX ADMIN — DILTIAZEM HYDROCHLORIDE 120 MG: 120 CAPSULE, COATED, EXTENDED RELEASE ORAL at 08:24

## 2019-01-13 RX ADMIN — APIXABAN 5 MG: 5 TABLET, FILM COATED ORAL at 17:11

## 2019-01-13 RX ADMIN — POTASSIUM CHLORIDE 40 MEQ: 1500 TABLET, EXTENDED RELEASE ORAL at 09:21

## 2019-01-13 RX ADMIN — VITAMIN E CAP 400 UNIT 400 UNITS: 400 CAP at 08:25

## 2019-01-13 RX ADMIN — POTASSIUM CHLORIDE 40 MEQ: 1500 TABLET, EXTENDED RELEASE ORAL at 17:11

## 2019-01-13 RX ADMIN — VITAMIN D, TAB 1000IU (100/BT) 1000 UNITS: 25 TAB at 08:24

## 2019-01-13 RX ADMIN — NYSTATIN: 100000 POWDER TOPICAL at 08:26

## 2019-01-13 RX ADMIN — DOXYCYCLINE HYCLATE 100 MG: 100 CAPSULE ORAL at 20:44

## 2019-01-13 RX ADMIN — DOXYCYCLINE HYCLATE 100 MG: 100 CAPSULE ORAL at 08:24

## 2019-01-13 RX ADMIN — BUMETANIDE 2 MG: 1 TABLET ORAL at 17:13

## 2019-01-13 RX ADMIN — HYDROCODONE BITARTRATE AND ACETAMINOPHEN 2 TABLET: 5; 325 TABLET ORAL at 23:13

## 2019-01-13 RX ADMIN — Medication 400 MG: at 08:24

## 2019-01-13 RX ADMIN — HYDROCODONE BITARTRATE AND ACETAMINOPHEN 2 TABLET: 5; 325 TABLET ORAL at 00:34

## 2019-01-13 RX ADMIN — BUMETANIDE 2 MG: 1 TABLET ORAL at 08:25

## 2019-01-13 RX ADMIN — APIXABAN 5 MG: 5 TABLET, FILM COATED ORAL at 08:24

## 2019-01-13 RX ADMIN — OXYCODONE HYDROCHLORIDE AND ACETAMINOPHEN 1000 MG: 500 TABLET ORAL at 08:24

## 2019-01-13 RX ADMIN — FLUTICASONE PROPIONATE 1 SPRAY: 50 SPRAY, METERED NASAL at 08:26

## 2019-01-13 RX ADMIN — CYANOCOBALAMIN TAB 500 MCG 1000 MCG: 500 TAB at 08:24

## 2019-01-13 NOTE — PLAN OF CARE
Problem: Potential for Falls  Goal: Patient will remain free of falls  INTERVENTIONS:  - Assess patient frequently for physical needs  -  Identify cognitive and physical deficits and behaviors that affect risk of falls    -  [place] fall precautions as indicated by assessment   - Educate patient/family on patient safety including physical limitations  - Instruct patient to call for assistance with activity based on assessment  - Modify environment to reduce risk of injury  - Consider OT/PT consult to assist with strengthening/mobility    Outcome: Progressing      Problem: PAIN - ADULT  Goal: Verbalizes/displays adequate comfort level or baseline comfort level  Interventions:  - Encourage patient to monitor pain and request assistance  - Assess pain using appropriate pain scale  - Administer analgesics based on type and severity of pain and evaluate response  - Implement non-pharmacological measures as appropriate and evaluate response  - Consider cultural and social influences on pain and pain management  - Notify physician/advanced practitioner if interventions unsuccessful or patient reports new pain   Outcome: Progressing      Problem: INFECTION - ADULT  Goal: Absence or prevention of progression during hospitalization  INTERVENTIONS:  - Assess and monitor for signs and symptoms of infection  - Monitor lab/diagnostic results  - Monitor all insertion sites, i e  indwelling lines, tubes, and drains  - Monitor endotracheal (as able) and nasal secretions for changes in amount and color  - [place] appropriate cooling/warming therapies per order  - Administer medications as ordered  - Instruct and encourage patient and family to use good hand hygiene technique  - Identify and instruct in appropriate isolation precautions for identified infection/condition   Outcome: Progressing    Goal: Absence of fever/infection during neutropenic period  INTERVENTIONS:  - Monitor WBC  - Implement neutropenic guidelines   Outcome: Progressing      Problem: SAFETY ADULT  Goal: Maintain or return to baseline ADL function  INTERVENTIONS:  -  Assess patient's ability to carry out ADLs; assess patient's baseline for ADL function and identify physical deficits which impact ability to perform ADLs (bathing, care of mouth/teeth, toileting, grooming, dressing, etc )  - Assess/evaluate cause of self-care deficits   - Assess range of motion  - Assess patient's mobility; develop plan if impaired  - Assess patient's need for assistive devices and provide as appropriate  - Encourage maximum independence but intervene and supervise when necessary  ¯ Involve family in performance of ADLs  ¯ Assess for home care needs following discharge   ¯ Request OT consult to assist with ADL evaluation and planning for discharge  ¯ Provide patient education as appropriate   Outcome: Progressing    Goal: Maintain or return mobility status to optimal level  INTERVENTIONS:  - Assess patient's baseline mobility status (ambulation, transfers, stairs, etc )    - Identify cognitive and physical deficits and behaviors that affect mobility  - Identify mobility aids required to assist with transfers and/or ambulation (gait belt, sit-to-stand, lift, walker, cane, etc )  - [place] fall precautions as indicated by assessment  - Record patient progress and toleration of activity level on Mobility SBAR; progress patient to next Phase/Stage  - Instruct patient to call for assistance with activity based on assessment  - Request Rehabilitation consult to assist with strengthening/weightbearing, etc    Outcome: Progressing    Goal: Patient will remain free of falls  INTERVENTIONS:  - Assess patient frequently for physical needs  -  Identify cognitive and physical deficits and behaviors that affect risk of falls    -  [place] fall precautions as indicated by assessment   - Educate patient/family on patient safety including physical limitations  - Instruct patient to call for assistance with activity based on assessment  - Modify environment to reduce risk of injury  - Consider OT/PT consult to assist with strengthening/mobility    Outcome: Progressing      Problem: DISCHARGE PLANNING  Goal: Discharge to home or other facility with appropriate resources  INTERVENTIONS:  - Identify barriers to discharge w/patient and caregiver  - Arrange for needed discharge resources and transportation as appropriate  - Identify discharge learning needs (meds, wound care, etc )  - Arrange for interpretive services to assist at discharge as needed  - Refer to Case Management Department for coordinating discharge planning if the patient needs post-hospital services based on physician/advanced practitioner order or complex needs related to functional status, cognitive ability, or social support system   Outcome: Progressing      Problem: Knowledge Deficit  Goal: Patient/family/caregiver demonstrates understanding of disease process, treatment plan, medications, and discharge instructions  Complete learning assessment and assess knowledge base  Interventions:  - Provide teaching at level of understanding  - Provide teaching via preferred learning methods   Outcome: Progressing      Problem: CARDIOVASCULAR - ADULT  Goal: Maintains optimal cardiac output and hemodynamic stability  INTERVENTIONS:  - Monitor I/O, vital signs and rhythm  - Monitor for S/S and trends of decreased cardiac output i e  bleeding, hypotension  - Administer and titrate ordered vasoactive medications to optimize hemodynamic stability  - Assess quality of pulses, skin color and temperature  - Assess for signs of decreased coronary artery perfusion - ex   Angina  - Instruct patient to report change in severity of symptoms   Outcome: Progressing    Goal: Absence of cardiac dysrhythmias or at baseline rhythm  INTERVENTIONS:  - Continuous cardiac monitoring, monitor vital signs, obtain 12 lead EKG if indicated  - Administer antiarrhythmic and heart rate control medications as ordered  - Monitor electrolytes and administer replacement therapy as ordered   Outcome: Progressing      Problem: RESPIRATORY - ADULT  Goal: Achieves optimal ventilation and oxygenation  INTERVENTIONS:  - Assess for changes in respiratory status  - Assess for changes in mentation and behavior  - Position to facilitate oxygenation and minimize respiratory effort  - Oxygen administration by appropriate delivery method based on oxygen saturation (per order) or ABGs  - Initiate smoking cessation education as indicated  - Encourage broncho-pulmonary hygiene including cough, deep breathe, Incentive Spirometry  - Assess the need for suctioning and aspirate as needed  - Assess and instruct to report SOB or any respiratory difficulty  - Respiratory Therapy support as indicated   Outcome: Progressing      Problem: METABOLIC, FLUID AND ELECTROLYTES - ADULT  Goal: Electrolytes maintained within normal limits  INTERVENTIONS:  - Monitor labs and assess patient for signs and symptoms of electrolyte imbalances  - Administer electrolyte replacement as ordered  - Monitor response to electrolyte replacements, including repeat lab results as appropriate  - Instruct patient on fluid and nutrition as appropriate   Outcome: Progressing    Goal: Fluid balance maintained  INTERVENTIONS:  - Monitor labs and assess for signs and symptoms of volume excess or deficit  - Monitor I/O and WT  - Instruct patient on fluid and nutrition as appropriate   Outcome: Progressing      Problem: SKIN/TISSUE INTEGRITY - ADULT  Goal: Skin integrity remains intact  INTERVENTIONS  - Identify patients at risk for skin breakdown  - Assess and monitor skin integrity  - Assess and monitor nutrition and hydration status  - Monitor labs (i e  albumin)  - Assess for incontinence   - Turn and reposition patient  - Assist with mobility/ambulation  - Relieve pressure over bony prominences  - Avoid friction and shearing  - Provide appropriate hygiene as needed including keeping skin clean and dry  - Evaluate need for skin moisturizer/barrier cream  - Collaborate with interdisciplinary team (i e  Nutrition, Rehabilitation, etc )   - Patient/family teaching   Outcome: Progressing    Goal: Incision(s), wounds(s) or drain site(s) healing without S/S of infection  INTERVENTIONS  - Assess and document risk factors for skin impairment   - Assess and document dressing, incision, wound bed, drain sites and surrounding tissue  - Initiate Nutrition services consult and/or wound management as needed   Outcome: Progressing      Problem: MUSCULOSKELETAL - ADULT  Goal: Maintain or return mobility to safest level of function  INTERVENTIONS:  - Assess patient's ability to carry out ADLs; assess patient's baseline for ADL function and identify physical deficits which impact ability to perform ADLs (bathing, care of mouth/teeth, toileting, grooming, dressing, etc )  - Assess/evaluate cause of self-care deficits   - Assess range of motion  - Assess patient's mobility; develop plan if impaired  - Assess patient's need for assistive devices and provide as appropriate  - Encourage maximum independence but intervene and supervise when necessary  - Involve family in performance of ADLs  - Assess for home care needs following discharge   - Request OT consult to assist with ADL evaluation and planning for discharge  - Provide patient education as appropriate   Outcome: Progressing      Problem: Prexisting or High Potential for Compromised Skin Integrity  Goal: Skin integrity is maintained or improved  INTERVENTIONS:  - Identify patients at risk for skin breakdown  - Assess and monitor skin integrity  - Assess and monitor nutrition and hydration status  - Monitor labs (i e  albumin)  - Assess for incontinence   - Turn and reposition patient  - Assist with mobility/ambulation  - Relieve pressure over bony prominences  - Avoid friction and shearing  - Provide appropriate hygiene as needed including keeping skin clean and dry  - Evaluate need for skin moisturizer/barrier cream  - Collaborate with interdisciplinary team (i e  Nutrition, Rehabilitation, etc )   - Patient/family teaching   Outcome: Progressing

## 2019-01-13 NOTE — PROGRESS NOTES
Progress Note - Linwood Mazariegos 1945, 68 y o  male MRN: [id number]    Unit/Bed#: [address] [address] Encounter: [id number]    Primary Care Provider: Bautista El PA-C   Date and time admitted to hospital: 1/2/2019  6:35 PM           Thrombocytopenia (HCC)   Assessment & Plan     · Mild  Continue to trend  · Today 133      Chronic deep vein thrombosis (DVT) (HCC)   Assessment & Plan     · Initially this was thought to represent acute DVT in the patient was changed from Eliquis to heparin infusion however venous duplex is noted to suggest that this was likely chronic  · Continue Eliquis  · Venous duplex: Evaluation shows non-occlusive or partially recanalized thrombus in the femoral vein and the popliteal vein which is most likely chronic  · VQ: low probability      Cellulitis of left lower extremity   Assessment & Plan     · Culture came back positive with MRSA/continues to improve  · Contact isolation  · Continue PO doxycycline (day # 7/7)  Continue PO doxycycline for total of 7 days DC antibiotics in a m  · Wound care  · Remains afebrile with no evidence of leukocytosis  · DC doxycycline tomorrow if clinical course allows          Type 2 myocardial infarction [facility])   Assessment & Plan     · Indeterminate Troponins noted be 0 12-0 15  · Cardiology following  · Likely related to CHF  · Continue with maximal medical management      Acute kidney injury [facility])   Assessment & Plan             Lab Results   Component Value Date     CREATININE 1 46 (H) 01/11/2019     CREATININE 1 43 (H) 01/10/2019     CREATININE 1 32 (H) 01/09/2019     CREATININE 1 33 (H) 01/08/2019      · Creatinine at baseline 0 9-1 1  · Currently 1 46    · Trend creatinine daily while on diuresis  May need to accept higher creatinine to get euvolemic but has been trending upward, losartan on hold     · UA with trace protein      Atrial fibrillation ([address] )   Assessment & Plan     -continue Cardizem/Eliquis      Hypertension   Assessment & Plan     · Continue to hold losartan with SAMY  · Continue cardizem with hold parameters  · Monitor with diuresis       Obesity   Assessment & Plan     Body mass index is 34 08 kg/m²  Recommend dietary changes      Gastroesophageal reflux disease without esophagitis   Assessment & Plan     · Continue PPI      * Acute on chronic diastolic congestive heart failure (HCC)   Assessment & Plan     · Ejection fraction 45% on ECHO during this admission  Mild diffuse hypokinesis with regional variations  · Continue treating with IV diuresis - continue on IV Bumex infusion today  Metolazone added by Cardiology  · -25L diuresis this admission  · Wt 127kg form 141Kg on admission        Discharge:  Plan to discharge tomorrow if potassium correct  My concern is patient has been aggressively diuresed and had potassium may further decrease  Will follow up in a m  Labs  I have discussed current treatment plan and possibility of discharging him tomorrow with patient's son Annmarie Gracia patient and son agreeable with current treatment plan  VTE Pharmacologic Prophylaxis:   Pharmacologic: Apixaban (Eliquis)  Mechanical VTE Prophylaxis in Place: No    Patient Centered Rounds: I have performed bedside rounds with nursing staff today  Education and Discussions with Family / Patient:  Patient    Time Spent for Care:   35 minutes  More than 50% of total time spent on counseling and coordination of care as described above  Current Length of Stay: 11 day(s)    Current Patient Status: Inpatient   Certification Statement: The patient will continue to require additional inpatient hospital stay due to CHF exacerbation    Discharge Plan:  2-3 days    Code Status: Level 1 - Full Code      Subjective:   No new complaints today  I am about the same   No events reported per nursing staff  Did report patient is usually quite angry and apparently told them does not want any IV potassium any longer    I had prolonged conversation with patient given his further drop of potassium despite having aggressive potassium supplementation with IV and p o  Yesterday  Currently he is only agreeable to take p o  Potassium  Objective:     Vitals:   Temp (24hrs), Av 8 °F (36 6 °C), Min:97 6 °F (36 4 °C), Max:97 9 °F (36 6 °C)    Temp:  [97 6 °F (36 4 °C)-97 9 °F (36 6 °C)] 97 6 °F (36 4 °C)  HR:  [76-81] 81  Resp:  [18] 18  BP: (115-135)/(66-74) 115/66  SpO2:  [92 %-98 %] 92 %  Body mass index is 33 35 kg/m²  Input and Output Summary (last 24 hours): Intake/Output Summary (Last 24 hours) at 19 1210  Last data filed at 19 0851   Gross per 24 hour   Intake              220 ml   Output             2050 ml   Net            -1830 ml       Physical Exam:     Physical Exam    Constitutional:  Well developed, well nourished, no acute distress, non-toxic appearance   Eyes:  PERRL, conjunctiva normal   HENT:  Atraumatic, external ears normal, nose normal, oropharynx moist, no pharyngeal exudates  Neck- normal range of motion, no tenderness, supple   Respiratory:  No respiratory distress, normal breath sounds, no rales, no wheezing   Cardiovascular:  Normal rate, irregularly irregular ridden, no murmurs, no gallops, no rubs   GI:  Soft, nondistended, normal bowel sounds, nontender, no organomegaly, no mass, no rebound, no guarding   :  No costovertebral angle tenderness   Musculoskeletal:  No edema, no tenderness, no deformities   Back- no tenderness  Skin:  Trace lower extremity edema, right lower extremity with clean dry and intact dressing  Neuro: Cranial nerves 2-12 are intact, non-focal, 5/5 strength in all 4 extremities      Additional Data:     Labs:      Results from last 7 days  Lab Units 19  0503   WBC Thousand/uL 6 89   HEMOGLOBIN g/dL 15 3   HEMATOCRIT % 46 0   PLATELETS Thousands/uL 168   NEUTROS PCT % 52   LYMPHS PCT % 28   MONOS PCT % 14*   EOS PCT % 5       Results from last 7 days  Lab Units 19  0502  01/10/19  0559 SODIUM mmol/L 135*  < > 140   POTASSIUM mmol/L 3 0*  < > 3 2*   CHLORIDE mmol/L 91*  < > 97*   CO2 mmol/L 38*  < > 33*   BUN mg/dL 67*  < > 48*   CREATININE mg/dL 1 66*  < > 1 43*   ANION GAP mmol/L 6  < > 10   CALCIUM mg/dL 9 4  < > 9 5   ALBUMIN g/dL  --   --  3 2*   TOTAL BILIRUBIN mg/dL  --   --  1 60*   ALK PHOS U/L  --   --  87   ALT U/L  --   --  23   AST U/L  --   --  37   GLUCOSE RANDOM mg/dL 82  < > 78   < > = values in this interval not displayed  * I Have Reviewed All Lab Data Listed Above  * Additional Pertinent Lab Tests Reviewed:  [address] Admission Reviewed    Recent Cultures (last 7 days):           Last 24 Hours Medication List:     Current Facility-Administered Medications:  acetaminophen 650 mg Oral Q4H PRN Rubi Daily, CRNP   apixaban 5 mg Oral BID Qing Galindo MD   vitamin C 1,000 mg Oral Daily NAVNEET Martinez   bumetanide 2 mg Oral BID Hamida Spicer MD   cholecalciferol 1,000 Units Oral Daily NAVNEET Martinez   co-enzyme Q-10 100 mg Oral Daily NAVNEET Martinez   cyanocobalamin 1,000 mcg Oral Daily NAVNEET Martinez   cyclobenzaprine 10 mg Oral TID PRN Rubi Daily, CRROCÍO   diltiazem 120 mg Oral BID NAVNEET Srinivasan   doxycycline hyclate 100 mg Oral Q12H [address] Qing Galindo MD   fluticasone 1 spray Nasal Daily Rubi Daily, CRROCÍO   HYDROcodone-acetaminophen 2 tablet Oral Q6H PRN NAVNEET Srinivasan   magnesium oxide 400 mg Oral BID Hannah Baird PA-C   metolazone 2 5 mg Oral Daily Joss Bro MD   nystatin  Topical BID Hannah Baird PA-C   ondansetron 4 mg Intravenous Q6H PRN NAVNEET Martinez   pantoprazole 40 mg Oral Early Morning NAVNEET Martinez   polyethylene glycol 17 g Oral Daily PRN NAVNEET Martinez   potassium chloride 40 mEq Oral BID Franck Alejandra   sodium chloride 1 spray Each Nare PRN Rubi Daily, CRNP   vitamin E (tocopherol) 400 Units Oral Daily Samir Anguiano NAVNEET Nguyễn        Today, Patient Was Seen By: Preet Lux    ** Please Note: Dictation voice to text software may have been used in the creation of this document   **

## 2019-01-14 LAB
ANION GAP SERPL CALCULATED.3IONS-SCNC: 4 MMOL/L (ref 4–13)
ANION GAP SERPL CALCULATED.3IONS-SCNC: 4 MMOL/L (ref 4–13)
BASOPHILS # BLD AUTO: 0.05 THOUSANDS/ΜL (ref 0–0.1)
BASOPHILS NFR BLD AUTO: 1 % (ref 0–1)
BUN SERPL-MCNC: 75 MG/DL (ref 5–25)
BUN SERPL-MCNC: 76 MG/DL (ref 5–25)
CALCIUM SERPL-MCNC: 9.8 MG/DL (ref 8.3–10.1)
CALCIUM SERPL-MCNC: 9.8 MG/DL (ref 8.3–10.1)
CHLORIDE SERPL-SCNC: 93 MMOL/L (ref 100–108)
CHLORIDE SERPL-SCNC: 93 MMOL/L (ref 100–108)
CO2 SERPL-SCNC: 40 MMOL/L (ref 21–32)
CO2 SERPL-SCNC: 40 MMOL/L (ref 21–32)
CREAT SERPL-MCNC: 1.71 MG/DL (ref 0.6–1.3)
CREAT SERPL-MCNC: 1.77 MG/DL (ref 0.6–1.3)
EOSINOPHIL # BLD AUTO: 0.26 THOUSAND/ΜL (ref 0–0.61)
EOSINOPHIL NFR BLD AUTO: 3 % (ref 0–6)
ERYTHROCYTE [DISTWIDTH] IN BLOOD BY AUTOMATED COUNT: 14.7 % (ref 11.6–15.1)
GFR SERPL CREATININE-BSD FRML MDRD: 38 ML/MIN/1.73SQ M
GFR SERPL CREATININE-BSD FRML MDRD: 39 ML/MIN/1.73SQ M
GLUCOSE SERPL-MCNC: 91 MG/DL (ref 65–140)
GLUCOSE SERPL-MCNC: 93 MG/DL (ref 65–140)
HCT VFR BLD AUTO: 49.9 % (ref 36.5–49.3)
HGB BLD-MCNC: 16.4 G/DL (ref 12–17)
IMM GRANULOCYTES # BLD AUTO: 0.02 THOUSAND/UL (ref 0–0.2)
IMM GRANULOCYTES NFR BLD AUTO: 0 % (ref 0–2)
LYMPHOCYTES # BLD AUTO: 2.03 THOUSANDS/ΜL (ref 0.6–4.47)
LYMPHOCYTES NFR BLD AUTO: 25 % (ref 14–44)
MCH RBC QN AUTO: 31.1 PG (ref 26.8–34.3)
MCHC RBC AUTO-ENTMCNC: 32.9 G/DL (ref 31.4–37.4)
MCV RBC AUTO: 95 FL (ref 82–98)
MONOCYTES # BLD AUTO: 0.92 THOUSAND/ΜL (ref 0.17–1.22)
MONOCYTES NFR BLD AUTO: 12 % (ref 4–12)
NEUTROPHILS # BLD AUTO: 4.73 THOUSANDS/ΜL (ref 1.85–7.62)
NEUTS SEG NFR BLD AUTO: 59 % (ref 43–75)
NRBC BLD AUTO-RTO: 0 /100 WBCS
PLATELET # BLD AUTO: 181 THOUSANDS/UL (ref 149–390)
PMV BLD AUTO: 12.2 FL (ref 8.9–12.7)
POTASSIUM SERPL-SCNC: 3.4 MMOL/L (ref 3.5–5.3)
POTASSIUM SERPL-SCNC: 3.5 MMOL/L (ref 3.5–5.3)
RBC # BLD AUTO: 5.27 MILLION/UL (ref 3.88–5.62)
SODIUM SERPL-SCNC: 137 MMOL/L (ref 136–145)
SODIUM SERPL-SCNC: 137 MMOL/L (ref 136–145)
WBC # BLD AUTO: 8.01 THOUSAND/UL (ref 4.31–10.16)

## 2019-01-14 PROCEDURE — 80048 BASIC METABOLIC PNL TOTAL CA: CPT | Performed by: INTERNAL MEDICINE

## 2019-01-14 PROCEDURE — 85025 COMPLETE CBC W/AUTO DIFF WBC: CPT | Performed by: INTERNAL MEDICINE

## 2019-01-14 PROCEDURE — 99232 SBSQ HOSP IP/OBS MODERATE 35: CPT | Performed by: INTERNAL MEDICINE

## 2019-01-14 RX ORDER — POTASSIUM CHLORIDE 20 MEQ/1
40 TABLET, EXTENDED RELEASE ORAL ONCE
Status: COMPLETED | OUTPATIENT
Start: 2019-01-14 | End: 2019-01-14

## 2019-01-14 RX ADMIN — Medication 400 MG: at 17:34

## 2019-01-14 RX ADMIN — DOXYCYCLINE HYCLATE 100 MG: 100 CAPSULE ORAL at 09:02

## 2019-01-14 RX ADMIN — NYSTATIN: 100000 POWDER TOPICAL at 17:34

## 2019-01-14 RX ADMIN — APIXABAN 5 MG: 5 TABLET, FILM COATED ORAL at 17:34

## 2019-01-14 RX ADMIN — CYANOCOBALAMIN TAB 500 MCG 1000 MCG: 500 TAB at 09:02

## 2019-01-14 RX ADMIN — PANTOPRAZOLE SODIUM 40 MG: 40 TABLET, DELAYED RELEASE ORAL at 05:26

## 2019-01-14 RX ADMIN — OXYCODONE HYDROCHLORIDE AND ACETAMINOPHEN 1000 MG: 500 TABLET ORAL at 09:02

## 2019-01-14 RX ADMIN — BUMETANIDE 2 MG: 1 TABLET ORAL at 17:35

## 2019-01-14 RX ADMIN — METOLAZONE 2.5 MG: 2.5 TABLET ORAL at 09:02

## 2019-01-14 RX ADMIN — POTASSIUM CHLORIDE 40 MEQ: 1500 TABLET, EXTENDED RELEASE ORAL at 13:06

## 2019-01-14 RX ADMIN — Medication 100 MG: at 09:02

## 2019-01-14 RX ADMIN — HYDROCODONE BITARTRATE AND ACETAMINOPHEN 2 TABLET: 5; 325 TABLET ORAL at 23:05

## 2019-01-14 RX ADMIN — FLUTICASONE PROPIONATE 1 SPRAY: 50 SPRAY, METERED NASAL at 09:07

## 2019-01-14 RX ADMIN — DILTIAZEM HYDROCHLORIDE 120 MG: 120 CAPSULE, COATED, EXTENDED RELEASE ORAL at 17:33

## 2019-01-14 RX ADMIN — Medication 400 MG: at 09:02

## 2019-01-14 RX ADMIN — VITAMIN E CAP 400 UNIT 400 UNITS: 400 CAP at 09:02

## 2019-01-14 RX ADMIN — DILTIAZEM HYDROCHLORIDE 120 MG: 120 CAPSULE, COATED, EXTENDED RELEASE ORAL at 09:02

## 2019-01-14 RX ADMIN — NYSTATIN: 100000 POWDER TOPICAL at 09:07

## 2019-01-14 RX ADMIN — BUMETANIDE 2 MG: 1 TABLET ORAL at 09:02

## 2019-01-14 RX ADMIN — VITAMIN D, TAB 1000IU (100/BT) 1000 UNITS: 25 TAB at 09:02

## 2019-01-14 RX ADMIN — APIXABAN 5 MG: 5 TABLET, FILM COATED ORAL at 09:02

## 2019-01-14 NOTE — PLAN OF CARE
Problem: Potential for Falls  Goal: Patient will remain free of falls  INTERVENTIONS:  - Assess patient frequently for physical needs  -  Identify cognitive and physical deficits and behaviors that affect risk of falls    -  [place] fall precautions as indicated by assessment   - Educate patient/family on patient safety including physical limitations  - Instruct patient to call for assistance with activity based on assessment  - Modify environment to reduce risk of injury  - Consider OT/PT consult to assist with strengthening/mobility    Outcome: Progressing      Problem: PAIN - ADULT  Goal: Verbalizes/displays adequate comfort level or baseline comfort level  Interventions:  - Encourage patient to monitor pain and request assistance  - Assess pain using appropriate pain scale  - Administer analgesics based on type and severity of pain and evaluate response  - Implement non-pharmacological measures as appropriate and evaluate response  - Consider cultural and social influences on pain and pain management  - Notify physician/advanced practitioner if interventions unsuccessful or patient reports new pain   Outcome: Progressing      Problem: INFECTION - ADULT  Goal: Absence or prevention of progression during hospitalization  INTERVENTIONS:  - Assess and monitor for signs and symptoms of infection  - Monitor lab/diagnostic results  - Monitor all insertion sites, i e  indwelling lines, tubes, and drains  - Monitor endotracheal (as able) and nasal secretions for changes in amount and color  - [place] appropriate cooling/warming therapies per order  - Administer medications as ordered  - Instruct and encourage patient and family to use good hand hygiene technique  - Identify and instruct in appropriate isolation precautions for identified infection/condition   Outcome: Progressing    Goal: Absence of fever/infection during neutropenic period  INTERVENTIONS:  - Monitor WBC  - Implement neutropenic guidelines   Outcome: Progressing      Problem: SAFETY ADULT  Goal: Maintain or return to baseline ADL function  INTERVENTIONS:  -  Assess patient's ability to carry out ADLs; assess patient's baseline for ADL function and identify physical deficits which impact ability to perform ADLs (bathing, care of mouth/teeth, toileting, grooming, dressing, etc )  - Assess/evaluate cause of self-care deficits   - Assess range of motion  - Assess patient's mobility; develop plan if impaired  - Assess patient's need for assistive devices and provide as appropriate  - Encourage maximum independence but intervene and supervise when necessary  ¯ Involve family in performance of ADLs  ¯ Assess for home care needs following discharge   ¯ Request OT consult to assist with ADL evaluation and planning for discharge  ¯ Provide patient education as appropriate   Outcome: Progressing    Goal: Patient will remain free of falls  INTERVENTIONS:  - Assess patient frequently for physical needs  -  Identify cognitive and physical deficits and behaviors that affect risk of falls    -  [place] fall precautions as indicated by assessment   - Educate patient/family on patient safety including physical limitations  - Instruct patient to call for assistance with activity based on assessment  - Modify environment to reduce risk of injury  - Consider OT/PT consult to assist with strengthening/mobility    Outcome: Progressing      Problem: DISCHARGE PLANNING  Goal: Discharge to home or other facility with appropriate resources  INTERVENTIONS:  - Identify barriers to discharge w/patient and caregiver  - Arrange for needed discharge resources and transportation as appropriate  - Identify discharge learning needs (meds, wound care, etc )  - Arrange for interpretive services to assist at discharge as needed  - Refer to Case Management Department for coordinating discharge planning if the patient needs post-hospital services based on physician/advanced practitioner order or complex needs related to functional status, cognitive ability, or social support system   Outcome: Progressing      Problem: Knowledge Deficit  Goal: Patient/family/caregiver demonstrates understanding of disease process, treatment plan, medications, and discharge instructions  Complete learning assessment and assess knowledge base  Interventions:  - Provide teaching at level of understanding  - Provide teaching via preferred learning methods   Outcome: Progressing      Problem: CARDIOVASCULAR - ADULT  Goal: Maintains optimal cardiac output and hemodynamic stability  INTERVENTIONS:  - Monitor I/O, vital signs and rhythm  - Monitor for S/S and trends of decreased cardiac output i e  bleeding, hypotension  - Administer and titrate ordered vasoactive medications to optimize hemodynamic stability  - Assess quality of pulses, skin color and temperature  - Assess for signs of decreased coronary artery perfusion - ex   Angina  - Instruct patient to report change in severity of symptoms   Outcome: Progressing    Goal: Absence of cardiac dysrhythmias or at baseline rhythm  INTERVENTIONS:  - Continuous cardiac monitoring, monitor vital signs, obtain 12 lead EKG if indicated  - Administer antiarrhythmic and heart rate control medications as ordered  - Monitor electrolytes and administer replacement therapy as ordered   Outcome: Progressing      Problem: RESPIRATORY - ADULT  Goal: Achieves optimal ventilation and oxygenation  INTERVENTIONS:  - Assess for changes in respiratory status  - Assess for changes in mentation and behavior  - Position to facilitate oxygenation and minimize respiratory effort  - Oxygen administration by appropriate delivery method based on oxygen saturation (per order) or ABGs  - Initiate smoking cessation education as indicated  - Encourage broncho-pulmonary hygiene including cough, deep breathe, Incentive Spirometry  - Assess the need for suctioning and aspirate as needed  - Assess and instruct to report SOB or any respiratory difficulty  - Respiratory Therapy support as indicated   Outcome: Progressing      Problem: METABOLIC, FLUID AND ELECTROLYTES - ADULT  Goal: Electrolytes maintained within normal limits  INTERVENTIONS:  - Monitor labs and assess patient for signs and symptoms of electrolyte imbalances  - Administer electrolyte replacement as ordered  - Monitor response to electrolyte replacements, including repeat lab results as appropriate  - Instruct patient on fluid and nutrition as appropriate   Outcome: Progressing    Goal: Fluid balance maintained  INTERVENTIONS:  - Monitor labs and assess for signs and symptoms of volume excess or deficit  - Monitor I/O and WT  - Instruct patient on fluid and nutrition as appropriate   Outcome: Progressing      Problem: SKIN/TISSUE INTEGRITY - ADULT  Goal: Skin integrity remains intact  INTERVENTIONS  - Identify patients at risk for skin breakdown  - Assess and monitor skin integrity  - Assess and monitor nutrition and hydration status  - Monitor labs (i e  albumin)  - Assess for incontinence   - Turn and reposition patient  - Assist with mobility/ambulation  - Relieve pressure over bony prominences  - Avoid friction and shearing  - Provide appropriate hygiene as needed including keeping skin clean and dry  - Evaluate need for skin moisturizer/barrier cream  - Collaborate with interdisciplinary team (i e  Nutrition, Rehabilitation, etc )   - Patient/family teaching   Outcome: Progressing    Goal: Incision(s), wounds(s) or drain site(s) healing without S/S of infection  INTERVENTIONS  - Assess and document risk factors for skin impairment   - Assess and document dressing, incision, wound bed, drain sites and surrounding tissue  - Initiate Nutrition services consult and/or wound management as needed   Outcome: Progressing      Problem: MUSCULOSKELETAL - ADULT  Goal: Maintain or return mobility to safest level of function  INTERVENTIONS:  - Assess patient's ability to carry out ADLs; assess patient's baseline for ADL function and identify physical deficits which impact ability to perform ADLs (bathing, care of mouth/teeth, toileting, grooming, dressing, etc )  - Assess/evaluate cause of self-care deficits   - Assess range of motion  - Assess patient's mobility; develop plan if impaired  - Assess patient's need for assistive devices and provide as appropriate  - Encourage maximum independence but intervene and supervise when necessary  - Involve family in performance of ADLs  - Assess for home care needs following discharge   - Request OT consult to assist with ADL evaluation and planning for discharge  - Provide patient education as appropriate   Outcome: Progressing      Problem: Prexisting or High Potential for Compromised Skin Integrity  Goal: Skin integrity is maintained or improved  INTERVENTIONS:  - Identify patients at risk for skin breakdown  - Assess and monitor skin integrity  - Assess and monitor nutrition and hydration status  - Monitor labs (i e  albumin)  - Assess for incontinence   - Turn and reposition patient  - Assist with mobility/ambulation  - Relieve pressure over bony prominences  - Avoid friction and shearing  - Provide appropriate hygiene as needed including keeping skin clean and dry  - Evaluate need for skin moisturizer/barrier cream  - Collaborate with interdisciplinary team (i e  Nutrition, Rehabilitation, etc )   - Patient/family teaching   Outcome: Progressing

## 2019-01-14 NOTE — ASSESSMENT & PLAN NOTE
· Ejection fraction 45% on ECHO during this admission  Mild diffuse hypokinesis with regional variations  · Patient L p o   Diuresis, Cardiology signed off appreciate their assistance  · -25L diuresis this admission  · Wt 127kg form 141Kg on admission

## 2019-01-14 NOTE — PROGRESS NOTES
Progress Note - Linwood Mazariegos 1945, 68 y o  male MRN: [id number]    Unit/Bed#: [address] [place] [address] Encounter: [id number]    Primary Care Provider: Cyn Bhatti PA-C   Date and time admitted to hospital: 1/2/2019  6:35 PM        Chronic deep vein thrombosis (DVT) ([facility] [address] )   Assessment & Plan    · Initially this was thought to represent acute DVT in the patient was changed from Eliquis to heparin infusion however venous duplex is noted to suggest that this was likely chronic  · Continue Eliquis  · Venous duplex: Evaluation shows non-occlusive or partially recanalized thrombus in the femoral vein and the popliteal vein which is most likely chronic  · VQ: low probability     Thrombocytopenia (HCC)   Assessment & Plan    · No active bleeding  Continue to monitor  · CBC in the a m  Atrial fibrillation ([facility] [address] )   Assessment & Plan    -continue Cardizem/Eliquis     * Acute on chronic diastolic congestive heart failure (HCC)   Assessment & Plan    · Ejection fraction 45% on ECHO during this admission  Mild diffuse hypokinesis with regional variations  · Patient L p o  Diuresis, Cardiology signed off appreciate their assistance  · -25L diuresis this admission  · Wt 127kg form 141Kg on admission         VTE Pharmacologic Prophylaxis:   Pharmacologic: Apixaban (Eliquis)  Mechanical VTE Prophylaxis in Place: Yes    Patient Centered Rounds: I have performed bedside rounds with nursing staff today  Discussions with Specialists or Other Care Team Provider:      Education and Discussions with Family / Patient:      Time Spent for Care: 20 minutes  More than 50% of total time spent on counseling and coordination of care as described above  Current Length of Stay: 12 day(s)    Current Patient Status: Inpatient   Certification Statement: The patient will continue to require additional inpatient hospital stay due to Ongoing treatment of infection    Discharge Plan:   Will be for discharge home once medically stable    Code Status: Level 1 - Full Code      Subjective:   No acute events overnight no new complaints this morning denies chest pain shortness of breath nausea vomiting abdominal pain bowel disturbance    Objective:     Vitals:   Temp (24hrs), Av 9 °F (36 6 °C), Min:97 8 °F (36 6 °C), Max:97 9 °F (36 6 °C)    Temp:  [97 8 °F (36 6 °C)-97 9 °F (36 6 °C)] 97 8 °F (36 6 °C)  HR:  [74] 74  Resp:  [18] 18  BP: (133-152)/(72-92) 133/72  SpO2:  [95 %-96 %] 95 %  Body mass index is 33 33 kg/m²  Input and Output Summary (last 24 hours): Intake/Output Summary (Last 24 hours) at 19 1533  Last data filed at 19 1123   Gross per 24 hour   Intake                0 ml   Output             1600 ml   Net            -1600 ml       Physical Exam:     Physical Exam   Constitutional: He is oriented to person, place, and time  He appears well-developed and well-nourished  No distress  HENT:   Head: Normocephalic and atraumatic  Right Ear: External ear normal    Left Ear: External ear normal    Nose: Nose normal    Eyes: Conjunctivae are normal  Right eye exhibits no discharge  Left eye exhibits no discharge  No scleral icterus  Neck: Normal range of motion  Neck supple  No JVD present  No tracheal deviation present  No thyromegaly present  Cardiovascular: Normal rate, normal heart sounds and intact distal pulses  Exam reveals no gallop and no friction rub  No murmur heard  Irregularly irregular   Pulmonary/Chest: Effort normal and breath sounds normal  No stridor  No respiratory distress  He has no wheezes  He has no rales  He exhibits no tenderness  Abdominal: Soft  Bowel sounds are normal  He exhibits no distension  There is no tenderness  There is no rebound and no guarding  Musculoskeletal: Normal range of motion  He exhibits edema (Trace pitting edema bilateral lower extremities)  He exhibits no tenderness or deformity  Neurological: He is alert and oriented to person, place, and time   He exhibits normal muscle tone  Coordination normal    Skin: Skin is warm and dry  No rash noted  He is not diaphoretic  There is erythema (Resolving bilateral lower extremities)  No pallor  Psychiatric: He has a normal mood and affect  His behavior is normal  Judgment and thought content normal    Nursing note and vitals reviewed  Additional Data:     Labs:      Results from last 7 days  Lab Units 01/14/19  0548   WBC Thousand/uL 8 01   HEMOGLOBIN g/dL 16 4   HEMATOCRIT % 49 9*   PLATELETS Thousands/uL 181   NEUTROS PCT % 59   LYMPHS PCT % 25   MONOS PCT % 12   EOS PCT % 3       Results from last 7 days  Lab Units 01/14/19  0554  01/10/19  0559   SODIUM mmol/L 137  < > 140   POTASSIUM mmol/L 3 5  < > 3 2*   CHLORIDE mmol/L 93*  < > 97*   CO2 mmol/L 40*  < > 33*   BUN mg/dL 76*  < > 48*   CREATININE mg/dL 1 77*  < > 1 43*   ANION GAP mmol/L 4  < > 10   CALCIUM mg/dL 9 8  < > 9 5   ALBUMIN g/dL  --   --  3 2*   TOTAL BILIRUBIN mg/dL  --   --  1 60*   ALK PHOS U/L  --   --  87   ALT U/L  --   --  23   AST U/L  --   --  37   GLUCOSE RANDOM mg/dL 93  < > 78   < > = values in this interval not displayed  * I Have Reviewed All Lab Data Listed Above  * Additional Pertinent Lab Tests Reviewed:  [address] Admission Reviewed    Imaging:    Imaging Reports Reviewed Today Include:    Imaging Personally Reviewed by Myself Includes:       Recent Cultures (last 7 days):           Last 24 Hours Medication List:     Current Facility-Administered Medications:  acetaminophen 650 mg Oral Q4H PRN [employer]ET   apixaban 5 mg Oral BID Savannah Trevino MD   vitamin C 1,000 mg Oral Daily NAVNEET Martinez   bumetanide 2 mg Oral BID Xochilt Reyes MD   cholecalciferol 1,000 Units Oral Daily NAVNEET Martinez   co-enzyme Q-10 100 mg Oral Daily MarNAVNEET Whiteside   cyanocobalamin 1,000 mcg Oral Daily NAVNEET Martinez   cyclobenzaprine 10 mg Oral TID PRN [employer], NAVNEET   diltiazem 120 mg Oral BID NAVNEET Ramos   fluticasone 1 spray Nasal Daily NAVNEET Ramos   HYDROcodone-acetaminophen 2 tablet Oral Q6H PRN Eudelia Mass NAVNEET Nguyễn   magnesium oxide 400 mg Oral BID Hannah Baird PA-C   metolazone 2 5 mg Oral Daily Indigo Huynh MD   nystatin  Topical BID Hannah Baird PA-C   ondansetron 4 mg Intravenous Q6H PRN Eudelia Mass NAVNEET Nguyễn   pantoprazole 40 mg Oral Early Morning NAVNEET Martinez   polyethylene glycol 17 g Oral Daily PRN Eudelia Mass NAVNEET Nguyễn   sodium chloride 1 spray Each Nare PRN NAVNEET Ramos   vitamin E (tocopherol) 400 Units Oral Daily NAVNEET Ramos        Today, Patient Was Seen By: Mark Weston MD    ** Please Note: Dictation voice to text software may have been used in the creation of this document   **

## 2019-01-15 PROBLEM — E87.6 HYPOKALEMIA: Status: ACTIVE | Noted: 2019-01-15

## 2019-01-15 LAB
ANION GAP SERPL CALCULATED.3IONS-SCNC: 9 MMOL/L (ref 4–13)
BASOPHILS # BLD AUTO: 0.05 THOUSANDS/ΜL (ref 0–0.1)
BASOPHILS NFR BLD AUTO: 1 % (ref 0–1)
BUN SERPL-MCNC: 78 MG/DL (ref 5–25)
CALCIUM SERPL-MCNC: 9.9 MG/DL (ref 8.3–10.1)
CHLORIDE SERPL-SCNC: 91 MMOL/L (ref 100–108)
CO2 SERPL-SCNC: 36 MMOL/L (ref 21–32)
CREAT SERPL-MCNC: 1.58 MG/DL (ref 0.6–1.3)
EOSINOPHIL # BLD AUTO: 0.24 THOUSAND/ΜL (ref 0–0.61)
EOSINOPHIL NFR BLD AUTO: 3 % (ref 0–6)
ERYTHROCYTE [DISTWIDTH] IN BLOOD BY AUTOMATED COUNT: 14.6 % (ref 11.6–15.1)
GFR SERPL CREATININE-BSD FRML MDRD: 43 ML/MIN/1.73SQ M
GLUCOSE SERPL-MCNC: 85 MG/DL (ref 65–140)
HCT VFR BLD AUTO: 48.8 % (ref 36.5–49.3)
HGB BLD-MCNC: 16.6 G/DL (ref 12–17)
IMM GRANULOCYTES # BLD AUTO: 0.01 THOUSAND/UL (ref 0–0.2)
IMM GRANULOCYTES NFR BLD AUTO: 0 % (ref 0–2)
LYMPHOCYTES # BLD AUTO: 2.36 THOUSANDS/ΜL (ref 0.6–4.47)
LYMPHOCYTES NFR BLD AUTO: 32 % (ref 14–44)
MAGNESIUM SERPL-MCNC: 2.1 MG/DL (ref 1.6–2.6)
MCH RBC QN AUTO: 31.7 PG (ref 26.8–34.3)
MCHC RBC AUTO-ENTMCNC: 34 G/DL (ref 31.4–37.4)
MCV RBC AUTO: 93 FL (ref 82–98)
MONOCYTES # BLD AUTO: 0.9 THOUSAND/ΜL (ref 0.17–1.22)
MONOCYTES NFR BLD AUTO: 12 % (ref 4–12)
NEUTROPHILS # BLD AUTO: 3.83 THOUSANDS/ΜL (ref 1.85–7.62)
NEUTS SEG NFR BLD AUTO: 52 % (ref 43–75)
NRBC BLD AUTO-RTO: 0 /100 WBCS
PLATELET # BLD AUTO: 179 THOUSANDS/UL (ref 149–390)
PMV BLD AUTO: 11.8 FL (ref 8.9–12.7)
POTASSIUM SERPL-SCNC: 2.9 MMOL/L (ref 3.5–5.3)
RBC # BLD AUTO: 5.24 MILLION/UL (ref 3.88–5.62)
SODIUM SERPL-SCNC: 136 MMOL/L (ref 136–145)
WBC # BLD AUTO: 7.39 THOUSAND/UL (ref 4.31–10.16)

## 2019-01-15 PROCEDURE — 80048 BASIC METABOLIC PNL TOTAL CA: CPT | Performed by: INTERNAL MEDICINE

## 2019-01-15 PROCEDURE — 99233 SBSQ HOSP IP/OBS HIGH 50: CPT | Performed by: INTERNAL MEDICINE

## 2019-01-15 PROCEDURE — 83735 ASSAY OF MAGNESIUM: CPT | Performed by: NURSE PRACTITIONER

## 2019-01-15 PROCEDURE — 85025 COMPLETE CBC W/AUTO DIFF WBC: CPT | Performed by: INTERNAL MEDICINE

## 2019-01-15 RX ORDER — POTASSIUM CHLORIDE 20 MEQ/1
40 TABLET, EXTENDED RELEASE ORAL 2 TIMES DAILY
Status: DISCONTINUED | OUTPATIENT
Start: 2019-01-15 | End: 2019-01-16

## 2019-01-15 RX ORDER — POTASSIUM CHLORIDE 14.9 MG/ML
20 INJECTION INTRAVENOUS ONCE
Status: COMPLETED | OUTPATIENT
Start: 2019-01-15 | End: 2019-01-15

## 2019-01-15 RX ADMIN — DILTIAZEM HYDROCHLORIDE 120 MG: 120 CAPSULE, COATED, EXTENDED RELEASE ORAL at 17:33

## 2019-01-15 RX ADMIN — POTASSIUM CHLORIDE 40 MEQ: 1500 TABLET, EXTENDED RELEASE ORAL at 17:32

## 2019-01-15 RX ADMIN — DILTIAZEM HYDROCHLORIDE 120 MG: 120 CAPSULE, COATED, EXTENDED RELEASE ORAL at 09:38

## 2019-01-15 RX ADMIN — PANTOPRAZOLE SODIUM 40 MG: 40 TABLET, DELAYED RELEASE ORAL at 06:26

## 2019-01-15 RX ADMIN — POTASSIUM CHLORIDE 20 MEQ: 200 INJECTION, SOLUTION INTRAVENOUS at 09:38

## 2019-01-15 RX ADMIN — Medication 100 MG: at 09:39

## 2019-01-15 RX ADMIN — Medication 400 MG: at 17:33

## 2019-01-15 RX ADMIN — BUMETANIDE 2 MG: 1 TABLET ORAL at 09:38

## 2019-01-15 RX ADMIN — VITAMIN D, TAB 1000IU (100/BT) 1000 UNITS: 25 TAB at 09:38

## 2019-01-15 RX ADMIN — APIXABAN 5 MG: 5 TABLET, FILM COATED ORAL at 17:33

## 2019-01-15 RX ADMIN — POTASSIUM CHLORIDE 40 MEQ: 1500 TABLET, EXTENDED RELEASE ORAL at 09:38

## 2019-01-15 RX ADMIN — APIXABAN 5 MG: 5 TABLET, FILM COATED ORAL at 09:38

## 2019-01-15 RX ADMIN — CYANOCOBALAMIN TAB 500 MCG 1000 MCG: 500 TAB at 09:38

## 2019-01-15 RX ADMIN — Medication 400 MG: at 09:38

## 2019-01-15 RX ADMIN — FLUTICASONE PROPIONATE 1 SPRAY: 50 SPRAY, METERED NASAL at 14:12

## 2019-01-15 RX ADMIN — OXYCODONE HYDROCHLORIDE AND ACETAMINOPHEN 1000 MG: 500 TABLET ORAL at 09:38

## 2019-01-15 RX ADMIN — VITAMIN E CAP 400 UNIT 400 UNITS: 400 CAP at 09:39

## 2019-01-15 RX ADMIN — BUMETANIDE 2 MG: 1 TABLET ORAL at 17:33

## 2019-01-15 RX ADMIN — NYSTATIN: 100000 POWDER TOPICAL at 14:13

## 2019-01-15 RX ADMIN — METOLAZONE 2.5 MG: 2.5 TABLET ORAL at 09:38

## 2019-01-15 NOTE — ASSESSMENT & PLAN NOTE
· Culture came back positive with MRSA  · Contact isolation  · completed course of doxy  · Wound care  · Remains afebrile with no evidence of leukocytosis

## 2019-01-15 NOTE — ASSESSMENT & PLAN NOTE
Lab Results   Component Value Date    CREATININE 1 58 (H) 01/15/2019    CREATININE 1 77 (H) 01/14/2019    CREATININE 1 71 (H) 01/14/2019    CREATININE 1 66 (H) 01/13/2019     · Creatinine at baseline 0 9-1 1    · Currently 1 46    · Trend creatinine daily while hospitalized  · Losartan restarted  · UA with trace protein

## 2019-01-15 NOTE — ASSESSMENT & PLAN NOTE
· restart losartan for hypokalemia, monitor renal function  · Continue cardizem with hold parameters    · Monitor with diuresis

## 2019-01-15 NOTE — ASSESSMENT & PLAN NOTE
· Ongoing  · Bmp in AM  · Restart losartan  · Scheduled K supplement  · Tele monitoring for life threatening arrhythmias which may arise from low K

## 2019-01-15 NOTE — ASSESSMENT & PLAN NOTE
· Ejection fraction 45% on ECHO during this admission  Mild diffuse hypokinesis with regional variations  · Patient L p o   Diuresis, Cardiology signed off appreciate their assistance  · -25L diuresis this admission  · Wt 127kg form 141Kg on admission  · spoke to patient's cardiologist today, plan is K supplement scheduled, monitor BMP and restart home diuretic dosing on d/c

## 2019-01-15 NOTE — UTILIZATION REVIEW
Continued Stay Review    Date: 01/15/19  Vital Signs: /68 (BP Location: Right arm)   Pulse 74   Temp (!) 97 4 °F (36 3 °C) (Oral)   Resp 18   Ht 6' 4" (1 93 m)   Wt 124 kg (273 lb 2 4 oz)   SpO2 93%   BMI 33 25 kg/m²   Assessment/Plan:   Chronic deep vein thrombosis (DVT) ([facility])   Assessment & Plan     · Initially this was thought to represent acute DVT in the patient was changed from Eliquis to heparin infusion however venous duplex is noted to suggest that this was likely chronic  · Continue Eliquis  · Venous duplex: Evaluation shows non-occlusive or partially recanalized thrombus in the femoral vein and the popliteal vein which is most likely chronic  · VQ: low probability      Hypokalemia   Assessment & Plan     · Ongoing  · Bmp in AM  · Restart losartan  · Scheduled K supplement  · Tele monitoring for life threatening arrhythmias which may arise from low K      Thrombocytopenia ([facility])   Assessment & Plan     · No active bleeding  Continue to monitor  · CBC in the a m       Cellulitis of left lower extremity   Assessment & Plan     · Culture came back positive with MRSA  · Contact isolation  · completed course of doxy  · Wound care  · Remains afebrile with no evidence of leukocytosis         Acute kidney injury [facility])   Assessment & Plan             Lab Results   Component Value Date     CREATININE 1 58 (H) 01/15/2019     CREATININE 1 77 (H) 01/14/2019     CREATININE 1 71 (H) 01/14/2019     CREATININE 1 66 (H) 01/13/2019      · Creatinine at baseline 0 9-1 1  · Currently 1 46    · Trend creatinine daily while hospitalized  · Losartan restarted  · UA with trace protein      Atrial fibrillation (HCC)   Assessment & Plan     -continue Cardizem/Eliquis      Hypertension   Assessment & Plan     · restart losartan for hypokalemia, monitor renal function  · Continue cardizem with hold parameters    · Monitor with diuresis       * Acute on chronic diastolic congestive heart failure [facility])   Assessment & Plan   · Ejection fraction 45% on ECHO during this admission  Mild diffuse hypokinesis with regional variations  · Patient L p o  Diuresis, Cardiology signed off appreciate their assistance  · -25L diuresis this admission  · Wt 127kg form 141Kg on admission  · spoke to patient's cardiologist today, plan is K supplement scheduled, monitor BMP and restart home diuretic dosing on d/c            VTE Pharmacologic Prophylaxis:   Pharmacologic: Apixaban (Eliquis)  Mechanical VTE Prophylaxis in Place: Yes      Current Patient Status: Inpatient   Certification Statement: The patient will continue to require additional inpatient hospital stay due to hypokalemia    Medications:   Scheduled Meds:   Current Facility-Administered Medications:  acetaminophen 650 mg Oral Q4H PRN   apixaban 5 mg Oral BID   vitamin C 1,000 mg Oral Daily   bumetanide 2 mg Oral BID   cholecalciferol 1,000 Units Oral Daily   co-enzyme Q-10 100 mg Oral Daily   cyanocobalamin 1,000 mcg Oral Daily   cyclobenzaprine 10 mg Oral TID PRN   diltiazem 120 mg Oral BID   fluticasone 1 spray Nasal Daily   HYDROcodone-acetaminophen 2 tablet Oral Q6H PRN   magnesium oxide 400 mg Oral BID   metolazone 2 5 mg Oral Daily   nystatin  Topical BID   ondansetron 4 mg Intravenous Q6H PRN   pantoprazole 40 mg Oral Early Morning   polyethylene glycol 17 g Oral Daily PRN   potassium chloride 40 mEq Oral BID   sodium chloride 1 spray Each Nare PRN   vitamin E (tocopherol) 400 Units Oral Daily     Continuous Infusions:    PRN Meds:   acetaminophen    cyclobenzaprine    HYDROcodone-acetaminophen    ondansetron    polyethylene glycol    sodium chloride  Pertinent Labs/Diagnostic Results: k 2 9, chl 91, co2 36, bun 78, creat 1 58  Age/Sex: 68 y o  male   Discharge Plan: d/c home once medically stable    [address] Utilization Review Department  Phone: [phone]; Fax [phone]  [email or web address]  ATTENTION: Please call with any questions or concerns to [phone]  and carefully listen to the prompts so that you are directed to the right person  Send all requests for admission clinical reviews, approved or denied determinations and any other requests to fax [phone]   All voicemails are confidential

## 2019-01-15 NOTE — PROGRESS NOTES
pts IV expires today Pt refusing to have it changed  Per dr Tanna Flynn, Pt still requires iv access for hypokalemia   Iv left in at this time

## 2019-01-15 NOTE — PROGRESS NOTES
Progress Note - Linwood Mazariegos 1945, 68 y o  male MRN: [id number]    Unit/Bed#: [address] [place] [address] Encounter: [id number]    Primary Care Provider: Lee Love PA-C   Date and time admitted to hospital: 1/2/2019  6:35 PM        Chronic deep vein thrombosis (DVT) ([facility] [address] )   Assessment & Plan    · Initially this was thought to represent acute DVT in the patient was changed from Eliquis to heparin infusion however venous duplex is noted to suggest that this was likely chronic  · Continue Eliquis  · Venous duplex: Evaluation shows non-occlusive or partially recanalized thrombus in the femoral vein and the popliteal vein which is most likely chronic  · VQ: low probability     Hypokalemia   Assessment & Plan    · Ongoing  · Bmp in AM  · Restart losartan  · Scheduled K supplement  · Tele monitoring for life threatening arrhythmias which may arise from low K     Thrombocytopenia (HCC)   Assessment & Plan    · No active bleeding  Continue to monitor  · CBC in the a m  Cellulitis of left lower extremity   Assessment & Plan    · Culture came back positive with MRSA  · Contact isolation  · completed course of doxy  · Wound care  · Remains afebrile with no evidence of leukocytosis       Acute kidney injury [facility])   Assessment & Plan    Lab Results   Component Value Date    CREATININE 1 58 (H) 01/15/2019    CREATININE 1 77 (H) 01/14/2019    CREATININE 1 71 (H) 01/14/2019    CREATININE 1 66 (H) 01/13/2019     · Creatinine at baseline 0 9-1 1  · Currently 1 46    · Trend creatinine daily while hospitalized  · Losartan restarted  · UA with trace protein     Atrial fibrillation ([facility] )   Assessment & Plan    -continue Cardizem/Eliquis     Hypertension   Assessment & Plan    · restart losartan for hypokalemia, monitor renal function  · Continue cardizem with hold parameters    · Monitor with diuresis      * Acute on chronic diastolic congestive heart failure (HCC)   Assessment & Plan    · Ejection fraction 45% on ECHO during this admission  Mild diffuse hypokinesis with regional variations  · Patient L p o  Diuresis, Cardiology signed off appreciate their assistance  · -25L diuresis this admission  · Wt 127kg form 141Kg on admission  · spoke to patient's cardiologist today, plan is K supplement scheduled, monitor BMP and restart home diuretic dosing on d/c         VTE Pharmacologic Prophylaxis:   Pharmacologic: Apixaban (Eliquis)  Mechanical VTE Prophylaxis in Place: Yes    Patient Centered Rounds: I have performed bedside rounds with nursing staff today  Discussions with Specialists or Other Care Team Provider:  Discussed by phone with patient's cardiologist    Education and Discussions with Family / Patient:      Time Spent for Care: 20 minutes  More than 50% of total time spent on counseling and coordination of care as described above  Current Length of Stay: 13 day(s)    Current Patient Status: Inpatient   Certification Statement: The patient will continue to require additional inpatient hospital stay due to hypokalemia    Discharge Plan: d/c home once medically stable    Code Status: Level 1 - Full Code      Subjective:   No acute events o/n, no c/o today  Denies cp/sob    Objective:     Vitals:   Temp (24hrs), Av 6 °F (36 4 °C), Min:97 4 °F (36 3 °C), Max:97 9 °F (36 6 °C)    Temp:  [97 4 °F (36 3 °C)-97 9 °F (36 6 °C)] 97 4 °F (36 3 °C)  HR:  [63-85] 74  Resp:  [18] 18  BP: (124-129)/(68-94) 124/68  SpO2:  [93 %-95 %] 93 %  Body mass index is 33 25 kg/m²  Input and Output Summary (last 24 hours): Intake/Output Summary (Last 24 hours) at 01/15/19 0848  Last data filed at 01/15/19 0735   Gross per 24 hour   Intake                0 ml   Output             2500 ml   Net            -2500 ml       Physical Exam:     Physical Exam   Constitutional: He is oriented to person, place, and time  He appears well-developed and well-nourished  No distress  HENT:   Head: Normocephalic and atraumatic     Right Ear: External ear normal    Left Ear: External ear normal    Nose: Nose normal    Eyes: Conjunctivae are normal  Right eye exhibits no discharge  Left eye exhibits no discharge  No scleral icterus  Neck: Normal range of motion  Neck supple  No JVD present  No tracheal deviation present  No thyromegaly present  Cardiovascular: Normal rate, normal heart sounds and intact distal pulses  Exam reveals no gallop and no friction rub  No murmur heard  Irregularly irregular   Pulmonary/Chest: Effort normal and breath sounds normal  No stridor  No respiratory distress  He has no wheezes  He has no rales  Abdominal: Soft  Bowel sounds are normal  He exhibits no distension  There is no tenderness  There is no rebound and no guarding  Musculoskeletal: Normal range of motion  He exhibits edema (trace pitting le edema)  He exhibits no tenderness or deformity  Neurological: He is alert and oriented to person, place, and time  He exhibits normal muscle tone  Coordination normal    Skin: Skin is warm and dry  No rash noted  He is not diaphoretic  No erythema  No pallor  Psychiatric: He has a normal mood and affect  His behavior is normal  Judgment and thought content normal    Nursing note and vitals reviewed          Additional Data:     Labs:      Results from last 7 days  Lab Units 01/15/19  0549   WBC Thousand/uL 7 39   HEMOGLOBIN g/dL 16 6   HEMATOCRIT % 48 8   PLATELETS Thousands/uL 179   NEUTROS PCT % 52   LYMPHS PCT % 32   MONOS PCT % 12   EOS PCT % 3       Results from last 7 days  Lab Units 01/15/19  0549  01/10/19  0559   SODIUM mmol/L 136  < > 140   POTASSIUM mmol/L 2 9*  < > 3 2*   CHLORIDE mmol/L 91*  < > 97*   CO2 mmol/L 36*  < > 33*   BUN mg/dL 78*  < > 48*   CREATININE mg/dL 1 58*  < > 1 43*   ANION GAP mmol/L 9  < > 10   CALCIUM mg/dL 9 9  < > 9 5   ALBUMIN g/dL  --   --  3 2*   TOTAL BILIRUBIN mg/dL  --   --  1 60*   ALK PHOS U/L  --   --  87   ALT U/L  --   --  23   AST U/L  --   --  37   GLUCOSE RANDOM mg/dL 85 < > 78   < > = values in this interval not displayed  * I Have Reviewed All Lab Data Listed Above  * Additional Pertinent Lab Tests Reviewed: [facility] Admission Reviewed    Imaging:    Imaging Reports Reviewed Today Include:    Imaging Personally Reviewed by Myself Includes:       Recent Cultures (last 7 days):           Last 24 Hours Medication List:     Current Facility-Administered Medications:  acetaminophen 650 mg Oral Q4H PRN Forrestine Maycol CRNP   apixaban 5 mg Oral BID Darion Garnett MD   vitamin C 1,000 mg Oral Daily NAVNEET Martinez   bumetanide 2 mg Oral BID Nadia Snyder MD   cholecalciferol 1,000 Units Oral Daily NAVNEET Martinez   co-enzyme Q-10 100 mg Oral Daily NAVNEET Martinez   cyanocobalamin 1,000 mcg Oral Daily NAVNEET Martienz   cyclobenzaprine 10 mg Oral TID PRN Forrestine Maycol CRROCÍO   diltiazem 120 mg Oral BID NAVNEET Martinez   fluticasone 1 spray Nasal Daily ForresNAVNEET Vaz   HYDROcodone-acetaminophen 2 tablet Oral Q6H PRN NAVNEET Lewis   magnesium oxide 400 mg Oral BID Hannah Baird PA-C   metolazone 2 5 mg Oral Daily Belgica Renteria MD   nystatin  Topical BID Hannah Baird PA-C   ondansetron 4 mg Intravenous Q6H PRN NAVNEET Martinez   pantoprazole 40 mg Oral Early Morning NAVNEET Martinez   polyethylene glycol 17 g Oral Daily PRN NAVNEET Lewis   potassium chloride 40 mEq Oral BID Darion Garnett MD   potassium chloride 20 mEq Intravenous Once Darion Garnett MD   sodium chloride 1 spray Each Nare PRN Forrestine American, CRNP   vitamin E (tocopherol) 400 Units Oral Daily ForNAVNEET Anderson        Today, Patient Was Seen By: Darion Garnett MD    ** Please Note: Dictation voice to text software may have been used in the creation of this document   **

## 2019-01-16 ENCOUNTER — TELEPHONE (OUTPATIENT)
Dept: FAMILY MEDICINE CLINIC | Facility: HOSPITAL | Age: 74
End: 2019-01-16

## 2019-01-16 VITALS
HEIGHT: 76 IN | TEMPERATURE: 97.4 F | DIASTOLIC BLOOD PRESSURE: 74 MMHG | RESPIRATION RATE: 18 BRPM | SYSTOLIC BLOOD PRESSURE: 116 MMHG | BODY MASS INDEX: 32.8 KG/M2 | OXYGEN SATURATION: 96 % | HEART RATE: 65 BPM | WEIGHT: 269.31 LBS

## 2019-01-16 LAB
ANION GAP SERPL CALCULATED.3IONS-SCNC: 9 MMOL/L (ref 4–13)
BASOPHILS # BLD AUTO: 0.05 THOUSANDS/ΜL (ref 0–0.1)
BASOPHILS NFR BLD AUTO: 1 % (ref 0–1)
BUN SERPL-MCNC: 74 MG/DL (ref 5–25)
CALCIUM SERPL-MCNC: 9.6 MG/DL (ref 8.3–10.1)
CHLORIDE SERPL-SCNC: 91 MMOL/L (ref 100–108)
CO2 SERPL-SCNC: 37 MMOL/L (ref 21–32)
CREAT SERPL-MCNC: 1.63 MG/DL (ref 0.6–1.3)
EOSINOPHIL # BLD AUTO: 0.18 THOUSAND/ΜL (ref 0–0.61)
EOSINOPHIL NFR BLD AUTO: 2 % (ref 0–6)
ERYTHROCYTE [DISTWIDTH] IN BLOOD BY AUTOMATED COUNT: 14.6 % (ref 11.6–15.1)
GFR SERPL CREATININE-BSD FRML MDRD: 41 ML/MIN/1.73SQ M
GLUCOSE SERPL-MCNC: 94 MG/DL (ref 65–140)
HCT VFR BLD AUTO: 47.9 % (ref 36.5–49.3)
HGB BLD-MCNC: 16.3 G/DL (ref 12–17)
IMM GRANULOCYTES # BLD AUTO: 0.03 THOUSAND/UL (ref 0–0.2)
IMM GRANULOCYTES NFR BLD AUTO: 0 % (ref 0–2)
LYMPHOCYTES # BLD AUTO: 2.07 THOUSANDS/ΜL (ref 0.6–4.47)
LYMPHOCYTES NFR BLD AUTO: 28 % (ref 14–44)
MCH RBC QN AUTO: 31.7 PG (ref 26.8–34.3)
MCHC RBC AUTO-ENTMCNC: 34 G/DL (ref 31.4–37.4)
MCV RBC AUTO: 93 FL (ref 82–98)
MONOCYTES # BLD AUTO: 0.93 THOUSAND/ΜL (ref 0.17–1.22)
MONOCYTES NFR BLD AUTO: 13 % (ref 4–12)
NEUTROPHILS # BLD AUTO: 4.19 THOUSANDS/ΜL (ref 1.85–7.62)
NEUTS SEG NFR BLD AUTO: 56 % (ref 43–75)
NRBC BLD AUTO-RTO: 0 /100 WBCS
PLATELET # BLD AUTO: 169 THOUSANDS/UL (ref 149–390)
PMV BLD AUTO: 11.9 FL (ref 8.9–12.7)
POTASSIUM SERPL-SCNC: 3.2 MMOL/L (ref 3.5–5.3)
RBC # BLD AUTO: 5.15 MILLION/UL (ref 3.88–5.62)
SODIUM SERPL-SCNC: 137 MMOL/L (ref 136–145)
WBC # BLD AUTO: 7.45 THOUSAND/UL (ref 4.31–10.16)

## 2019-01-16 PROCEDURE — 80048 BASIC METABOLIC PNL TOTAL CA: CPT | Performed by: INTERNAL MEDICINE

## 2019-01-16 PROCEDURE — 99238 HOSP IP/OBS DSCHRG MGMT 30/<: CPT | Performed by: INTERNAL MEDICINE

## 2019-01-16 PROCEDURE — 85025 COMPLETE CBC W/AUTO DIFF WBC: CPT | Performed by: INTERNAL MEDICINE

## 2019-01-16 RX ORDER — POTASSIUM CHLORIDE 20 MEQ/1
40 TABLET, EXTENDED RELEASE ORAL
Status: DISCONTINUED | OUTPATIENT
Start: 2019-01-16 | End: 2019-01-16 | Stop reason: HOSPADM

## 2019-01-16 RX ORDER — BUMETANIDE 2 MG/1
2 TABLET ORAL 2 TIMES DAILY
Qty: 30 TABLET | Refills: 0 | Status: SHIPPED | OUTPATIENT
Start: 2019-01-16 | End: 2019-12-06 | Stop reason: ALTCHOICE

## 2019-01-16 RX ORDER — POTASSIUM CHLORIDE 20 MEQ/1
40 TABLET, EXTENDED RELEASE ORAL
Qty: 90 TABLET | Refills: 0 | Status: SHIPPED | OUTPATIENT
Start: 2019-01-16 | End: 2019-04-09 | Stop reason: DRUGHIGH

## 2019-01-16 RX ADMIN — Medication 400 MG: at 08:09

## 2019-01-16 RX ADMIN — POTASSIUM CHLORIDE 40 MEQ: 1500 TABLET, EXTENDED RELEASE ORAL at 08:09

## 2019-01-16 RX ADMIN — OXYCODONE HYDROCHLORIDE AND ACETAMINOPHEN 1000 MG: 500 TABLET ORAL at 08:09

## 2019-01-16 RX ADMIN — FLUTICASONE PROPIONATE 1 SPRAY: 50 SPRAY, METERED NASAL at 08:13

## 2019-01-16 RX ADMIN — DILTIAZEM HYDROCHLORIDE 120 MG: 120 CAPSULE, COATED, EXTENDED RELEASE ORAL at 08:09

## 2019-01-16 RX ADMIN — VITAMIN E CAP 400 UNIT 400 UNITS: 400 CAP at 08:10

## 2019-01-16 RX ADMIN — METOLAZONE 2.5 MG: 2.5 TABLET ORAL at 08:09

## 2019-01-16 RX ADMIN — APIXABAN 5 MG: 5 TABLET, FILM COATED ORAL at 08:09

## 2019-01-16 RX ADMIN — HYDROCODONE BITARTRATE AND ACETAMINOPHEN 2 TABLET: 5; 325 TABLET ORAL at 05:09

## 2019-01-16 RX ADMIN — POTASSIUM CHLORIDE 40 MEQ: 1500 TABLET, EXTENDED RELEASE ORAL at 11:45

## 2019-01-16 RX ADMIN — CYANOCOBALAMIN TAB 500 MCG 1000 MCG: 500 TAB at 08:09

## 2019-01-16 RX ADMIN — Medication 100 MG: at 08:10

## 2019-01-16 RX ADMIN — PANTOPRAZOLE SODIUM 40 MG: 40 TABLET, DELAYED RELEASE ORAL at 05:09

## 2019-01-16 RX ADMIN — BUMETANIDE 2 MG: 1 TABLET ORAL at 08:10

## 2019-01-16 RX ADMIN — NYSTATIN: 100000 POWDER TOPICAL at 08:14

## 2019-01-16 RX ADMIN — VITAMIN D, TAB 1000IU (100/BT) 1000 UNITS: 25 TAB at 08:09

## 2019-01-16 NOTE — PLAN OF CARE
CARDIOVASCULAR - ADULT     Maintains optimal cardiac output and hemodynamic stability Adequate for Discharge     Absence of cardiac dysrhythmias or at baseline rhythm Adequate for Discharge        DISCHARGE PLANNING     Discharge to home or other facility with appropriate resources Adequate for Discharge        DISCHARGE PLANNING - CARE MANAGEMENT     Discharge to post-acute care or home with appropriate resources Adequate for Discharge        INFECTION - ADULT     Absence or prevention of progression during hospitalization Adequate for Discharge     Absence of fever/infection during neutropenic period Adequate for Discharge        Knowledge Deficit     Patient/family/caregiver demonstrates understanding of disease process, treatment plan, medications, and discharge instructions Adequate for Discharge        METABOLIC, FLUID AND ELECTROLYTES - ADULT     Electrolytes maintained within normal limits Adequate for Discharge     Fluid balance maintained Adequate for Discharge        MUSCULOSKELETAL - ADULT     Maintain or return mobility to safest level of function Adequate for Discharge        PAIN - ADULT     Verbalizes/displays adequate comfort level or baseline comfort level Adequate for Discharge        Potential for Falls     Patient will remain free of falls Adequate for Discharge        Prexisting or High Potential for Compromised Skin Integrity     Skin integrity is maintained or improved Adequate for Discharge        RESPIRATORY - ADULT     Achieves optimal ventilation and oxygenation Adequate for Discharge        SAFETY ADULT     Maintain or return to baseline ADL function Adequate for Discharge     Maintain or return mobility status to optimal level Adequate for Discharge     Patient will remain free of falls Adequate for Discharge        SKIN/TISSUE INTEGRITY - ADULT     Skin integrity remains intact Adequate for Discharge     Incision(s), wounds(s) or drain site(s) healing without S/S of infection Adequate for Discharge

## 2019-01-16 NOTE — DISCHARGE SUMMARY
Discharge Summary - Linwood Mazariegos 68 y o  male MRN: [id number]    Unit/Bed#: [address] [place] [address] [id number] Encounter: [id number]    Admission Date:   Admission Orders     Ordered        01/02/19 2150  Inpatient Admission (expected length of stay for this patient is greater than two midnights)  Once         01/02/19 2135  Inpatient Admission (expected length of stay for this patient is greater than two midnights)  Once               Admitting Diagnosis: SOB (shortness of breath) [R06 02]  Permanent atrial fibrillation (HCC) [I48 2]  History of pulmonary hypertension [Z86 79]  Acute CHF (congestive heart failure) ([facility] [address] ) [I50 9]  Hypertension, unspecified type [I10]    HPI: Mendel Founds is a 68 y o  male with history of AFib, cardiac disease, hypertension, obesity, pulmonary hypertension, PVD who presents with complaints of difficulty breathing  Patient states that he started with a cold last week  He also says for about the last month he has had some left-sided mild chest pain with  exertion  Pain does not radiate  Pain resolved on its own  Patient started with cough for thick gray sputum and congestion  Today patient developed a wheezing and was seen by PCP  Patient was instructed to be transferred to ED for evaluation but refused EMS and instead drove self here  In the ED patient has elevated creatinine of 1 47, INR 2 05, troponin 0 14, BNP 5876, D-dimer 2008  Chest x-ray shows vascular congestion with bilateral pleural effusions  Respiratory rate elevated to 24  On exam breath sounds diminished  No signs of respiratory distress  Patient has bilateral lower extremity edema  Left greater than right  Small open wound to left shin which is draining small amount of serous drainage  Surrounding tissue red swollen and warm to touch  Patient reports weight gain of approximately 20 lb in the last several months      Procedures Performed:   Orders Placed This Encounter   Procedures    ED ECG Documentation Only       Summary of Hospital Course:  Patient's culture for his leg cellulitis came back positive for MRSA  Patient was continued on contact isolation throughout hospitalization is result  Patient face to a treatment course for his lower extremity cellulitis using doxycycline and will not require further antibiotics for treatment of this going home  Source lower extremity edema goes, the patient has diuretic changed from Lasix to Bumex the Bumex will be was prescribed continued on discharge  There was a question of possible DVT given his presentation with lower extremity edema, as result ultrasound was performed of lower extremities that indicated a chronic DVT  V/Q scan was performed that was low probability confirming the chronic nature of the patient's DVT  He will continue on therapeutic anticoagulation with Eliquis on discharge  Finally, patient suffered from persistent hypokalemia likely representing an increased efficacy of Bumex over his Lasix dosing  Patient was supplemented for several days in the hospital and our current supplementation since leaving the patient with a mild asymptomatic hypokalemia has been up titrated and our plan will be to discharge home with home health care and a BMP to recheck the patient's renal function and potassium 11/18/19 CC to his primary care physician  I personally contacted and discussed the case with the patient's primary care physician she is aware of the follow-up potassium and will moderate for him and adjust his regimen outpatient as needed  Significant Findings, Care, Treatment and Services Provided:      Complications:  None    Discharge Diagnosis:  Bilateral lower extremity MRSA cellulitis, chronic lower extremity DVT, acute on chronic diastolic congestive heart failure    Resolved Problems  Date Reviewed: 1/14/2019    None          Condition at Discharge: good         Discharge instructions/Information to patient and family:   See after visit summary for information provided to patient and family  Provisions for Follow-Up Care:  See after visit summary for information related to follow-up care and any pertinent home health orders  PCP: Nya Mandel PA-C    Disposition: Home    Planned Readmission: No    Discharge Statement   I spent 30 minutes discharging the patient  This time was spent on the day of discharge  I had direct contact with the patient on the day of discharge  Additional documentation is required if more than 30 minutes were spent on discharge  Discharge Medications:  See after visit summary for reconciled discharge medications provided to patient and family

## 2019-01-17 ENCOUNTER — TELEPHONE (OUTPATIENT)
Dept: FAMILY MEDICINE CLINIC | Facility: HOSPITAL | Age: 74
End: 2019-01-17

## 2019-01-17 NOTE — TELEPHONE ENCOUNTER
I did not see pt in the hospital, neither did I discharge him, can't comment  Is he coming for RONY?

## 2019-01-17 NOTE — PLAN OF CARE

## 2019-01-18 ENCOUNTER — LAB REQUISITION (OUTPATIENT)
Dept: LAB | Facility: HOSPITAL | Age: 74
End: 2019-01-18
Payer: COMMERCIAL

## 2019-01-18 ENCOUNTER — TRANSITIONAL CARE MANAGEMENT (OUTPATIENT)
Dept: FAMILY MEDICINE CLINIC | Facility: HOSPITAL | Age: 74
End: 2019-01-18

## 2019-01-18 DIAGNOSIS — E87.6 HYPOKALEMIA: ICD-10-CM

## 2019-01-18 LAB
ANION GAP SERPL CALCULATED.3IONS-SCNC: 6 MMOL/L (ref 4–13)
BUN SERPL-MCNC: 85 MG/DL (ref 5–25)
CALCIUM SERPL-MCNC: 9.5 MG/DL (ref 8.3–10.1)
CHLORIDE SERPL-SCNC: 93 MMOL/L (ref 100–108)
CO2 SERPL-SCNC: 35 MMOL/L (ref 21–32)
CREAT SERPL-MCNC: 2.41 MG/DL (ref 0.6–1.3)
GFR SERPL CREATININE-BSD FRML MDRD: 26 ML/MIN/1.73SQ M
GLUCOSE SERPL-MCNC: 89 MG/DL (ref 65–140)
POTASSIUM SERPL-SCNC: 4.2 MMOL/L (ref 3.5–5.3)
SODIUM SERPL-SCNC: 134 MMOL/L (ref 136–145)

## 2019-01-18 PROCEDURE — 80048 BASIC METABOLIC PNL TOTAL CA: CPT | Performed by: INTERNAL MEDICINE

## 2019-01-18 NOTE — TELEPHONE ENCOUNTER
Masha Wright see call  Lillie Lafleur will be calling pt today for RONY   Do you want to give wound care orders for [facility]?  >

## 2019-01-21 ENCOUNTER — TELEPHONE (OUTPATIENT)
Dept: OTHER | Facility: OTHER | Age: 74
End: 2019-01-21

## 2019-01-21 NOTE — TELEPHONE ENCOUNTER
I called pt  I tried to get the story from him as to what he wanted  He told me he has been waiting all weekend for an answer  I asked about what and he said about  blood work dated 1/16/19  and what to do with his diuretic  He was a hospital pt  Seen last here in our office in oct 2018  Has new doctor now --[facility] dr Emeka Shultz  He said he called [facility] and asked them and they never got back to him, so he told them today not to come back any longer  I was going thru his chart to see what I could find out and it looks like a hospitalist ordered the b/w  He said he called his new doctors office and they were no help  He got upset with me because I couldn't find out answers for him and then he ended up hanging up on me  I kept telling him I was trying to help him  dk    I talked to my manager and her and I agreed to send over the hospital notes and the latest BMP to his new doctor  I faxed over to [phone],    I also tried calling their office to give them a heads up, but their menu did not give me a doctors office option to press  Phone [phone]      nicki

## 2019-01-24 ENCOUNTER — LAB REQUISITION (OUTPATIENT)
Dept: LAB | Facility: HOSPITAL | Age: 74
End: 2019-01-24
Payer: COMMERCIAL

## 2019-01-24 DIAGNOSIS — I48.91 ATRIAL FIBRILLATION (HCC): ICD-10-CM

## 2019-01-24 DIAGNOSIS — I50.9 HEART FAILURE (HCC): ICD-10-CM

## 2019-01-24 LAB
ALBUMIN SERPL BCP-MCNC: 3.8 G/DL (ref 3.5–5)
ALP SERPL-CCNC: 105 U/L (ref 46–116)
ALT SERPL W P-5'-P-CCNC: 33 U/L (ref 12–78)
ANION GAP SERPL CALCULATED.3IONS-SCNC: 9 MMOL/L (ref 4–13)
AST SERPL W P-5'-P-CCNC: 48 U/L (ref 5–45)
BILIRUB SERPL-MCNC: 1.3 MG/DL (ref 0.2–1)
BUN SERPL-MCNC: 84 MG/DL (ref 5–25)
CALCIUM SERPL-MCNC: 9.7 MG/DL (ref 8.3–10.1)
CHLORIDE SERPL-SCNC: 94 MMOL/L (ref 100–108)
CO2 SERPL-SCNC: 30 MMOL/L (ref 21–32)
CREAT SERPL-MCNC: 2.5 MG/DL (ref 0.6–1.3)
GFR SERPL CREATININE-BSD FRML MDRD: 25 ML/MIN/1.73SQ M
GLUCOSE SERPL-MCNC: 83 MG/DL (ref 65–140)
MAGNESIUM SERPL-MCNC: 1.8 MG/DL (ref 1.6–2.6)
NT-PROBNP SERPL-MCNC: 3319 PG/ML
POTASSIUM SERPL-SCNC: 4.1 MMOL/L (ref 3.5–5.3)
PROT SERPL-MCNC: 8.9 G/DL (ref 6.4–8.2)
SODIUM SERPL-SCNC: 133 MMOL/L (ref 136–145)

## 2019-01-24 PROCEDURE — 83735 ASSAY OF MAGNESIUM: CPT | Performed by: INTERNAL MEDICINE

## 2019-01-24 PROCEDURE — 83880 ASSAY OF NATRIURETIC PEPTIDE: CPT | Performed by: INTERNAL MEDICINE

## 2019-01-24 PROCEDURE — 80053 COMPREHEN METABOLIC PANEL: CPT | Performed by: INTERNAL MEDICINE

## 2019-01-29 ENCOUNTER — LAB REQUISITION (OUTPATIENT)
Dept: LAB | Facility: HOSPITAL | Age: 74
End: 2019-01-29
Payer: COMMERCIAL

## 2019-01-29 DIAGNOSIS — I50.33 ACUTE ON CHRONIC DIASTOLIC CONGESTIVE HEART FAILURE (HCC): ICD-10-CM

## 2019-01-29 DIAGNOSIS — I11.0 HYPERTENSIVE HEART DISEASE WITH HEART FAILURE (HCC): ICD-10-CM

## 2019-01-29 LAB
ANION GAP SERPL CALCULATED.3IONS-SCNC: 9 MMOL/L (ref 4–13)
BUN SERPL-MCNC: 84 MG/DL (ref 5–25)
CALCIUM SERPL-MCNC: 10 MG/DL (ref 8.3–10.1)
CHLORIDE SERPL-SCNC: 98 MMOL/L (ref 100–108)
CO2 SERPL-SCNC: 29 MMOL/L (ref 21–32)
CREAT SERPL-MCNC: 1.93 MG/DL (ref 0.6–1.3)
GFR SERPL CREATININE-BSD FRML MDRD: 34 ML/MIN/1.73SQ M
GLUCOSE SERPL-MCNC: 75 MG/DL (ref 65–140)
NT-PROBNP SERPL-MCNC: 3478 PG/ML
POTASSIUM SERPL-SCNC: 4.7 MMOL/L (ref 3.5–5.3)
SODIUM SERPL-SCNC: 136 MMOL/L (ref 136–145)

## 2019-01-29 PROCEDURE — 83880 ASSAY OF NATRIURETIC PEPTIDE: CPT | Performed by: INTERNAL MEDICINE

## 2019-01-29 PROCEDURE — 80048 BASIC METABOLIC PNL TOTAL CA: CPT | Performed by: INTERNAL MEDICINE

## 2019-01-31 ENCOUNTER — TELEPHONE (OUTPATIENT)
Dept: OBGYN CLINIC | Facility: HOSPITAL | Age: 74
End: 2019-01-31

## 2019-01-31 NOTE — TELEPHONE ENCOUNTER
Note posted 12/11/18:  Patient requested new patient appt with [facility]  Patient has seen previous pain doctor so we obtained records & Dr Maria Del Carmen Soto reviewed them  Dr Maria Del Carmen Soto feels he has nothing to offer  I called the patient & told him Dr Maria Del Carmen Soto has nothing to offer & suggested he continue with his current pain doctor  I also offered to send the patient a list of other area pain doctors  Called pt and informed him of this note  Sent another list of area PM drs

## 2019-02-04 ENCOUNTER — TRANSCRIBE ORDERS (OUTPATIENT)
Dept: LAB | Facility: HOSPITAL | Age: 74
End: 2019-02-04

## 2019-02-04 ENCOUNTER — LAB REQUISITION (OUTPATIENT)
Dept: LAB | Facility: HOSPITAL | Age: 74
End: 2019-02-04
Payer: COMMERCIAL

## 2019-02-04 DIAGNOSIS — I21.A1 TYPE 2 MYOCARDIAL INFARCTION (HCC): Primary | ICD-10-CM

## 2019-02-04 DIAGNOSIS — I21.A1 TYPE 2 MYOCARDIAL INFARCTION (HCC): ICD-10-CM

## 2019-02-04 DIAGNOSIS — I11.0 HYPERTENSIVE HEART DISEASE WITH HEART FAILURE (HCC): ICD-10-CM

## 2019-02-04 LAB
ANION GAP SERPL CALCULATED.3IONS-SCNC: 11 MMOL/L (ref 4–13)
BUN SERPL-MCNC: 48 MG/DL (ref 5–25)
CALCIUM SERPL-MCNC: 9.3 MG/DL (ref 8.3–10.1)
CHLORIDE SERPL-SCNC: 100 MMOL/L (ref 100–108)
CO2 SERPL-SCNC: 26 MMOL/L (ref 21–32)
CREAT SERPL-MCNC: 1.59 MG/DL (ref 0.6–1.3)
GFR SERPL CREATININE-BSD FRML MDRD: 43 ML/MIN/1.73SQ M
GLUCOSE SERPL-MCNC: 79 MG/DL (ref 65–140)
NT-PROBNP SERPL-MCNC: 3874 PG/ML
POTASSIUM SERPL-SCNC: 4.7 MMOL/L (ref 3.5–5.3)
SODIUM SERPL-SCNC: 137 MMOL/L (ref 136–145)

## 2019-02-04 PROCEDURE — 36415 COLL VENOUS BLD VENIPUNCTURE: CPT

## 2019-02-04 PROCEDURE — 80048 BASIC METABOLIC PNL TOTAL CA: CPT | Performed by: INTERNAL MEDICINE

## 2019-02-04 PROCEDURE — 83880 ASSAY OF NATRIURETIC PEPTIDE: CPT

## 2019-02-13 ENCOUNTER — LAB REQUISITION (OUTPATIENT)
Dept: LAB | Facility: HOSPITAL | Age: 74
End: 2019-02-13
Payer: COMMERCIAL

## 2019-02-13 DIAGNOSIS — I50.33 ACUTE ON CHRONIC DIASTOLIC CONGESTIVE HEART FAILURE (HCC): ICD-10-CM

## 2019-02-13 LAB
ANION GAP SERPL CALCULATED.3IONS-SCNC: 10 MMOL/L (ref 4–13)
BUN SERPL-MCNC: 34 MG/DL (ref 5–25)
CALCIUM SERPL-MCNC: 9.3 MG/DL (ref 8.3–10.1)
CHLORIDE SERPL-SCNC: 103 MMOL/L (ref 100–108)
CO2 SERPL-SCNC: 27 MMOL/L (ref 21–32)
CREAT SERPL-MCNC: 1.24 MG/DL (ref 0.6–1.3)
GFR SERPL CREATININE-BSD FRML MDRD: 58 ML/MIN/1.73SQ M
GLUCOSE SERPL-MCNC: 75 MG/DL (ref 65–140)
NT-PROBNP SERPL-MCNC: 4594 PG/ML
POTASSIUM SERPL-SCNC: 4.3 MMOL/L (ref 3.5–5.3)
SODIUM SERPL-SCNC: 140 MMOL/L (ref 136–145)

## 2019-02-13 PROCEDURE — 83880 ASSAY OF NATRIURETIC PEPTIDE: CPT | Performed by: INTERNAL MEDICINE

## 2019-02-13 PROCEDURE — 80048 BASIC METABOLIC PNL TOTAL CA: CPT | Performed by: INTERNAL MEDICINE

## 2019-02-26 ENCOUNTER — LAB REQUISITION (OUTPATIENT)
Dept: LAB | Facility: HOSPITAL | Age: 74
End: 2019-02-26
Payer: COMMERCIAL

## 2019-02-26 DIAGNOSIS — I11.0 HYPERTENSIVE HEART DISEASE WITH HEART FAILURE (HCC): ICD-10-CM

## 2019-02-26 DIAGNOSIS — E87.6 HYPOKALEMIA: ICD-10-CM

## 2019-02-26 DIAGNOSIS — I50.33 ACUTE ON CHRONIC DIASTOLIC CONGESTIVE HEART FAILURE (HCC): ICD-10-CM

## 2019-02-26 LAB
ANION GAP SERPL CALCULATED.3IONS-SCNC: 11 MMOL/L (ref 4–13)
BUN SERPL-MCNC: 32 MG/DL (ref 5–25)
CALCIUM SERPL-MCNC: 9 MG/DL (ref 8.3–10.1)
CHLORIDE SERPL-SCNC: 101 MMOL/L (ref 100–108)
CO2 SERPL-SCNC: 28 MMOL/L (ref 21–32)
CREAT SERPL-MCNC: 1.12 MG/DL (ref 0.6–1.3)
GFR SERPL CREATININE-BSD FRML MDRD: 65 ML/MIN/1.73SQ M
GLUCOSE SERPL-MCNC: 77 MG/DL (ref 65–140)
NT-PROBNP SERPL-MCNC: 4743 PG/ML
POTASSIUM SERPL-SCNC: 4.2 MMOL/L (ref 3.5–5.3)
SODIUM SERPL-SCNC: 140 MMOL/L (ref 136–145)

## 2019-02-26 PROCEDURE — 80048 BASIC METABOLIC PNL TOTAL CA: CPT | Performed by: INTERNAL MEDICINE

## 2019-02-26 PROCEDURE — 83880 ASSAY OF NATRIURETIC PEPTIDE: CPT | Performed by: INTERNAL MEDICINE

## 2019-04-09 ENCOUNTER — OFFICE VISIT (OUTPATIENT)
Dept: FAMILY MEDICINE CLINIC | Facility: HOSPITAL | Age: 74
End: 2019-04-09
Payer: COMMERCIAL

## 2019-04-09 VITALS
HEART RATE: 86 BPM | DIASTOLIC BLOOD PRESSURE: 78 MMHG | SYSTOLIC BLOOD PRESSURE: 110 MMHG | WEIGHT: 280 LBS | BODY MASS INDEX: 34.1 KG/M2 | OXYGEN SATURATION: 96 % | TEMPERATURE: 100 F | HEIGHT: 76 IN

## 2019-04-09 DIAGNOSIS — I48.19 PERSISTENT ATRIAL FIBRILLATION (HCC): ICD-10-CM

## 2019-04-09 DIAGNOSIS — J40 BRONCHITIS: Primary | ICD-10-CM

## 2019-04-09 DIAGNOSIS — I95.2 HYPOTENSION DUE TO DRUGS: ICD-10-CM

## 2019-04-09 PROBLEM — S30.0XXA PELVIC CONTUSION, INITIAL ENCOUNTER: Status: RESOLVED | Noted: 2018-08-22 | Resolved: 2019-04-09

## 2019-04-09 PROBLEM — R26.2 AMBULATORY DYSFUNCTION: Status: ACTIVE | Noted: 2019-04-09

## 2019-04-09 PROBLEM — E87.6 HYPOKALEMIA: Status: RESOLVED | Noted: 2019-01-15 | Resolved: 2019-04-09

## 2019-04-09 PROBLEM — Z87.39 HISTORY OF HERNIATED INTERVERTEBRAL DISC: Status: RESOLVED | Noted: 2018-03-09 | Resolved: 2019-04-09

## 2019-04-09 PROBLEM — L03.116 CELLULITIS OF LEFT LOWER EXTREMITY: Status: RESOLVED | Noted: 2019-01-02 | Resolved: 2019-04-09

## 2019-04-09 PROBLEM — M46.1 SI (SACROILIAC) JOINT INFLAMMATION (HCC): Status: RESOLVED | Noted: 2018-08-22 | Resolved: 2019-04-09

## 2019-04-09 PROBLEM — I21.A1 TYPE 2 MYOCARDIAL INFARCTION (HCC): Status: RESOLVED | Noted: 2019-01-02 | Resolved: 2019-04-09

## 2019-04-09 PROBLEM — N17.9 ACUTE KIDNEY INJURY (HCC): Status: RESOLVED | Noted: 2019-01-02 | Resolved: 2019-04-09

## 2019-04-09 PROCEDURE — 3725F SCREEN DEPRESSION PERFORMED: CPT | Performed by: INTERNAL MEDICINE

## 2019-04-09 PROCEDURE — 99214 OFFICE O/P EST MOD 30 MIN: CPT | Performed by: INTERNAL MEDICINE

## 2019-04-09 RX ORDER — LOSARTAN POTASSIUM 50 MG/1
25 TABLET ORAL DAILY
Qty: 30 TABLET | Refills: 0 | Status: SHIPPED | OUTPATIENT
Start: 2019-04-09 | End: 2020-03-10 | Stop reason: HOSPADM

## 2019-04-09 RX ORDER — CEFUROXIME AXETIL 500 MG/1
500 TABLET ORAL EVERY 12 HOURS SCHEDULED
Qty: 14 TABLET | Refills: 0 | Status: SHIPPED | OUTPATIENT
Start: 2019-04-09 | End: 2019-04-16

## 2019-04-09 RX ORDER — MELOXICAM 15 MG/1
15 TABLET ORAL DAILY
COMMUNITY
End: 2019-04-24 | Stop reason: SDUPTHER

## 2019-04-09 RX ORDER — VITAMIN E 268 MG
800 CAPSULE ORAL DAILY
COMMUNITY
End: 2020-03-10 | Stop reason: HOSPADM

## 2019-04-09 RX ORDER — MELATONIN
1000 DAILY
COMMUNITY
End: 2020-08-14 | Stop reason: HOSPADM

## 2019-04-24 DIAGNOSIS — M54.42 LOW BACK PAIN WITH LEFT-SIDED SCIATICA, UNSPECIFIED BACK PAIN LATERALITY, UNSPECIFIED CHRONICITY: Primary | ICD-10-CM

## 2019-04-24 RX ORDER — MELOXICAM 15 MG/1
15 TABLET ORAL DAILY
Qty: 30 TABLET | Refills: 1 | Status: SHIPPED | OUTPATIENT
Start: 2019-04-24 | End: 2019-12-01 | Stop reason: HOSPADM

## 2019-05-13 ENCOUNTER — HOSPITAL ENCOUNTER (OUTPATIENT)
Dept: RADIOLOGY | Facility: HOSPITAL | Age: 74
Discharge: HOME | End: 2019-05-13
Attending: INTERNAL MEDICINE
Payer: COMMERCIAL

## 2019-05-13 ENCOUNTER — OFFICE VISIT (OUTPATIENT)
Dept: FAMILY MEDICINE CLINIC | Facility: HOSPITAL | Age: 74
End: 2019-05-13
Payer: COMMERCIAL

## 2019-05-13 VITALS
OXYGEN SATURATION: 98 % | HEIGHT: 76 IN | DIASTOLIC BLOOD PRESSURE: 70 MMHG | SYSTOLIC BLOOD PRESSURE: 110 MMHG | WEIGHT: 282 LBS | BODY MASS INDEX: 34.34 KG/M2 | TEMPERATURE: 97.9 F | HEART RATE: 62 BPM

## 2019-05-13 DIAGNOSIS — I48.19 PERSISTENT ATRIAL FIBRILLATION (HCC): ICD-10-CM

## 2019-05-13 DIAGNOSIS — G89.29 CHRONIC LEFT SHOULDER PAIN: ICD-10-CM

## 2019-05-13 DIAGNOSIS — I10 ESSENTIAL HYPERTENSION: ICD-10-CM

## 2019-05-13 DIAGNOSIS — I73.9 PVD (PERIPHERAL VASCULAR DISEASE) (HCC): ICD-10-CM

## 2019-05-13 DIAGNOSIS — I50.32 CHRONIC DIASTOLIC CONGESTIVE HEART FAILURE (HCC): ICD-10-CM

## 2019-05-13 DIAGNOSIS — M25.512 CHRONIC LEFT SHOULDER PAIN: ICD-10-CM

## 2019-05-13 DIAGNOSIS — I89.0 LYMPHEDEMA: ICD-10-CM

## 2019-05-13 DIAGNOSIS — I50.32 CHRONIC DIASTOLIC CONGESTIVE HEART FAILURE (HCC): Primary | ICD-10-CM

## 2019-05-13 PROBLEM — I50.33 ACUTE ON CHRONIC DIASTOLIC CONGESTIVE HEART FAILURE (HCC): Status: RESOLVED | Noted: 2019-01-02 | Resolved: 2019-05-13

## 2019-05-13 PROCEDURE — 71046 X-RAY EXAM CHEST 2 VIEWS: CPT

## 2019-05-13 PROCEDURE — 99214 OFFICE O/P EST MOD 30 MIN: CPT | Performed by: INTERNAL MEDICINE

## 2019-05-13 RX ORDER — HYDROCODONE BITARTRATE AND ACETAMINOPHEN 7.5; 325 MG/1; MG/1
1 TABLET ORAL EVERY 4 HOURS PRN
Qty: 120 TABLET | Refills: 0 | Status: SHIPPED | OUTPATIENT
Start: 2019-05-13 | End: 2019-07-23 | Stop reason: SDUPTHER

## 2019-05-17 ENCOUNTER — TELEPHONE (OUTPATIENT)
Dept: FAMILY MEDICINE CLINIC | Facility: HOSPITAL | Age: 74
End: 2019-05-17

## 2019-05-17 LAB
ALBUMIN SERPL-MCNC: 4.1 G/DL (ref 3.5–4.8)
ALBUMIN/GLOB SERPL: 1.1 {RATIO} (ref 1.2–2.2)
ALP SERPL-CCNC: 99 IU/L (ref 39–117)
ALT SERPL-CCNC: 15 IU/L (ref 0–44)
AST SERPL-CCNC: 25 IU/L (ref 0–40)
BASOPHILS # BLD AUTO: 0 X10E3/UL (ref 0–0.2)
BASOPHILS NFR BLD AUTO: 1 %
BILIRUB SERPL-MCNC: 1.2 MG/DL (ref 0–1.2)
BUN SERPL-MCNC: 35 MG/DL (ref 8–27)
BUN/CREAT SERPL: 25 (ref 10–24)
CALCIUM SERPL-MCNC: 9.7 MG/DL (ref 8.6–10.2)
CHLORIDE SERPL-SCNC: 99 MMOL/L (ref 96–106)
CO2 SERPL-SCNC: 23 MMOL/L (ref 20–29)
CREAT SERPL-MCNC: 1.4 MG/DL (ref 0.76–1.27)
EOSINOPHIL # BLD AUTO: 0.2 X10E3/UL (ref 0–0.4)
EOSINOPHIL NFR BLD AUTO: 4 %
ERYTHROCYTE [DISTWIDTH] IN BLOOD BY AUTOMATED COUNT: 16.3 % (ref 12.3–15.4)
GLOBULIN SER-MCNC: 3.7 G/DL (ref 1.5–4.5)
GLUCOSE SERPL-MCNC: 90 MG/DL (ref 65–99)
HCT VFR BLD AUTO: 39.9 % (ref 37.5–51)
HGB BLD-MCNC: 13.3 G/DL (ref 13–17.7)
IMM GRANULOCYTES # BLD: 0 X10E3/UL (ref 0–0.1)
IMM GRANULOCYTES NFR BLD: 0 %
LABCORP COMMENT: NORMAL
LYMPHOCYTES # BLD AUTO: 1.4 X10E3/UL (ref 0.7–3.1)
LYMPHOCYTES NFR BLD AUTO: 23 %
MCH RBC QN AUTO: 32.3 PG (ref 26.6–33)
MCHC RBC AUTO-ENTMCNC: 33.3 G/DL (ref 31.5–35.7)
MCV RBC AUTO: 97 FL (ref 79–97)
MONOCYTES # BLD AUTO: 0.7 X10E3/UL (ref 0.1–0.9)
MONOCYTES NFR BLD AUTO: 11 %
NEUTROPHILS # BLD AUTO: 3.8 X10E3/UL (ref 1.4–7)
NEUTROPHILS NFR BLD AUTO: 61 %
NT-PROBNP SERPL-MCNC: 3499 PG/ML (ref 0–376)
PLATELET # BLD AUTO: 155 X10E3/UL (ref 150–379)
POTASSIUM SERPL-SCNC: 4.7 MMOL/L (ref 3.5–5.2)
PROT SERPL-MCNC: 7.8 G/DL (ref 6–8.5)
RBC # BLD AUTO: 4.12 X10E6/UL (ref 4.14–5.8)
SL AMB EGFR AFRICAN AMERICAN: 58 ML/MIN/1.73
SL AMB EGFR NON AFRICAN AMERICAN: 50 ML/MIN/1.73
SODIUM SERPL-SCNC: 138 MMOL/L (ref 134–144)
WBC # BLD AUTO: 6.1 X10E3/UL (ref 3.4–10.8)

## 2019-05-20 PROBLEM — G89.29 CHRONIC LEFT SHOULDER PAIN: Status: ACTIVE | Noted: 2019-05-20

## 2019-05-20 PROBLEM — M25.512 CHRONIC LEFT SHOULDER PAIN: Status: ACTIVE | Noted: 2019-05-20

## 2019-05-24 ENCOUNTER — CONSULT (OUTPATIENT)
Dept: OBGYN CLINIC | Facility: CLINIC | Age: 74
End: 2019-05-24
Payer: COMMERCIAL

## 2019-05-24 ENCOUNTER — APPOINTMENT (OUTPATIENT)
Dept: RADIOLOGY | Facility: CLINIC | Age: 74
End: 2019-05-24
Payer: COMMERCIAL

## 2019-05-24 VITALS
HEART RATE: 71 BPM | SYSTOLIC BLOOD PRESSURE: 131 MMHG | DIASTOLIC BLOOD PRESSURE: 84 MMHG | HEIGHT: 76 IN | BODY MASS INDEX: 34.34 KG/M2 | WEIGHT: 282 LBS

## 2019-05-24 DIAGNOSIS — M25.512 CHRONIC LEFT SHOULDER PAIN: ICD-10-CM

## 2019-05-24 DIAGNOSIS — M12.812 ROTATOR CUFF ARTHROPATHY OF LEFT SHOULDER: Primary | ICD-10-CM

## 2019-05-24 DIAGNOSIS — G89.29 CHRONIC LEFT SHOULDER PAIN: ICD-10-CM

## 2019-05-24 PROCEDURE — 73030 X-RAY EXAM OF SHOULDER: CPT

## 2019-05-24 PROCEDURE — 20610 DRAIN/INJ JOINT/BURSA W/O US: CPT | Performed by: ORTHOPAEDIC SURGERY

## 2019-05-24 PROCEDURE — 99203 OFFICE O/P NEW LOW 30 MIN: CPT | Performed by: ORTHOPAEDIC SURGERY

## 2019-05-24 RX ORDER — METHYLPREDNISOLONE ACETATE 40 MG/ML
1 INJECTION, SUSPENSION INTRA-ARTICULAR; INTRALESIONAL; INTRAMUSCULAR; SOFT TISSUE
Status: COMPLETED | OUTPATIENT
Start: 2019-05-24 | End: 2019-05-24

## 2019-05-24 RX ORDER — LIDOCAINE HYDROCHLORIDE 10 MG/ML
3 INJECTION, SOLUTION EPIDURAL; INFILTRATION; INTRACAUDAL; PERINEURAL
Status: COMPLETED | OUTPATIENT
Start: 2019-05-24 | End: 2019-05-24

## 2019-05-24 RX ADMIN — LIDOCAINE HYDROCHLORIDE 3 ML: 10 INJECTION, SOLUTION EPIDURAL; INFILTRATION; INTRACAUDAL; PERINEURAL at 11:26

## 2019-05-24 RX ADMIN — METHYLPREDNISOLONE ACETATE 1 ML: 40 INJECTION, SUSPENSION INTRA-ARTICULAR; INTRALESIONAL; INTRAMUSCULAR; SOFT TISSUE at 11:26

## 2019-06-13 ENCOUNTER — TELEPHONE (OUTPATIENT)
Dept: FAMILY MEDICINE CLINIC | Facility: HOSPITAL | Age: 74
End: 2019-06-13

## 2019-06-13 ENCOUNTER — OFFICE VISIT (OUTPATIENT)
Dept: FAMILY MEDICINE CLINIC | Facility: HOSPITAL | Age: 74
End: 2019-06-13
Payer: COMMERCIAL

## 2019-06-13 VITALS
DIASTOLIC BLOOD PRESSURE: 60 MMHG | TEMPERATURE: 98.5 F | BODY MASS INDEX: 34.1 KG/M2 | HEART RATE: 56 BPM | SYSTOLIC BLOOD PRESSURE: 110 MMHG | HEIGHT: 76 IN | WEIGHT: 280 LBS

## 2019-06-13 DIAGNOSIS — I48.19 PERSISTENT ATRIAL FIBRILLATION (HCC): ICD-10-CM

## 2019-06-13 DIAGNOSIS — I50.32 CHRONIC DIASTOLIC CONGESTIVE HEART FAILURE (HCC): ICD-10-CM

## 2019-06-13 DIAGNOSIS — R05.9 COUGH: Primary | ICD-10-CM

## 2019-06-13 DIAGNOSIS — I10 ESSENTIAL HYPERTENSION: ICD-10-CM

## 2019-06-13 PROCEDURE — 99214 OFFICE O/P EST MOD 30 MIN: CPT | Performed by: INTERNAL MEDICINE

## 2019-06-13 RX ORDER — SPIRONOLACTONE 50 MG/1
50 TABLET, FILM COATED ORAL DAILY
Qty: 30 TABLET | Refills: 5 | Status: SHIPPED | OUTPATIENT
Start: 2019-06-13 | End: 2019-07-23 | Stop reason: ALTCHOICE

## 2019-07-03 DIAGNOSIS — K21.9 GASTROESOPHAGEAL REFLUX DISEASE WITHOUT ESOPHAGITIS: ICD-10-CM

## 2019-07-03 RX ORDER — PANTOPRAZOLE SODIUM 40 MG/1
40 TABLET, DELAYED RELEASE ORAL DAILY
Qty: 90 TABLET | Refills: 3 | Status: SHIPPED | OUTPATIENT
Start: 2019-07-03 | End: 2020-05-07 | Stop reason: SDUPTHER

## 2019-07-09 ENCOUNTER — HOSPITAL ENCOUNTER (OUTPATIENT)
Dept: RADIOLOGY | Facility: HOSPITAL | Age: 74
Discharge: HOME | End: 2019-07-09
Attending: PODIATRIST
Payer: COMMERCIAL

## 2019-07-09 ENCOUNTER — TRANSCRIBE ORDERS (OUTPATIENT)
Dept: ADMINISTRATIVE | Facility: HOSPITAL | Age: 74
End: 2019-07-09

## 2019-07-09 ENCOUNTER — APPOINTMENT (OUTPATIENT)
Dept: WOUND CARE | Facility: HOSPITAL | Age: 74
End: 2019-07-09
Payer: COMMERCIAL

## 2019-07-09 DIAGNOSIS — T84.223A DISPLACEMENT OF INTERNAL FIXATION DEVICE OF BONES OF FOOT, INITIAL ENCOUNTER (HCC): ICD-10-CM

## 2019-07-09 DIAGNOSIS — T84.223A DISPLACEMENT OF INTERNAL FIXATION DEVICE OF BONES OF FOOT, INITIAL ENCOUNTER (HCC): Primary | ICD-10-CM

## 2019-07-09 PROCEDURE — 11042 DBRDMT SUBQ TIS 1ST 20SQCM/<: CPT | Performed by: PODIATRIST

## 2019-07-09 PROCEDURE — 73630 X-RAY EXAM OF FOOT: CPT

## 2019-07-09 PROCEDURE — 99213 OFFICE O/P EST LOW 20 MIN: CPT | Performed by: PODIATRIST

## 2019-07-23 ENCOUNTER — TRANSCRIBE ORDERS (OUTPATIENT)
Dept: ADMINISTRATIVE | Facility: HOSPITAL | Age: 74
End: 2019-07-23

## 2019-07-23 ENCOUNTER — APPOINTMENT (OUTPATIENT)
Dept: WOUND CARE | Facility: HOSPITAL | Age: 74
End: 2019-07-23
Payer: COMMERCIAL

## 2019-07-23 ENCOUNTER — OFFICE VISIT (OUTPATIENT)
Dept: FAMILY MEDICINE CLINIC | Facility: HOSPITAL | Age: 74
End: 2019-07-23
Payer: COMMERCIAL

## 2019-07-23 VITALS
BODY MASS INDEX: 32.15 KG/M2 | DIASTOLIC BLOOD PRESSURE: 80 MMHG | SYSTOLIC BLOOD PRESSURE: 110 MMHG | HEART RATE: 52 BPM | HEIGHT: 76 IN | WEIGHT: 264 LBS

## 2019-07-23 DIAGNOSIS — I48.19 PERSISTENT ATRIAL FIBRILLATION (HCC): ICD-10-CM

## 2019-07-23 DIAGNOSIS — L97.919 IDIOPATHIC CHRONIC VENOUS HYPERTENSION OF RIGHT LOWER EXTREMITY WITH ULCER AND INFLAMMATION (HCC): Primary | ICD-10-CM

## 2019-07-23 DIAGNOSIS — M25.512 CHRONIC LEFT SHOULDER PAIN: ICD-10-CM

## 2019-07-23 DIAGNOSIS — R60.0 EDEMA OF LEG: Primary | ICD-10-CM

## 2019-07-23 DIAGNOSIS — I87.331 IDIOPATHIC CHRONIC VENOUS HYPERTENSION OF RIGHT LOWER EXTREMITY WITH ULCER AND INFLAMMATION (HCC): Primary | ICD-10-CM

## 2019-07-23 DIAGNOSIS — G89.29 CHRONIC LEFT SHOULDER PAIN: ICD-10-CM

## 2019-07-23 PROCEDURE — 99213 OFFICE O/P EST LOW 20 MIN: CPT | Performed by: INTERNAL MEDICINE

## 2019-07-23 PROCEDURE — 11042 DBRDMT SUBQ TIS 1ST 20SQCM/<: CPT | Performed by: PODIATRIST

## 2019-07-23 PROCEDURE — 99213 OFFICE O/P EST LOW 20 MIN: CPT | Performed by: PODIATRIST

## 2019-07-23 RX ORDER — HYDROCODONE BITARTRATE AND ACETAMINOPHEN 7.5; 325 MG/1; MG/1
1 TABLET ORAL EVERY 4 HOURS PRN
Qty: 120 TABLET | Refills: 0 | Status: SHIPPED | OUTPATIENT
Start: 2019-07-23 | End: 2019-08-16 | Stop reason: SDUPTHER

## 2019-07-23 NOTE — PROGRESS NOTES
Assessment/Plan:       Diagnoses and all orders for this visit:    Edema of leg    Persistent atrial fibrillation ([address] )          All of the above diagnoses have been assessed  Additional COMMENTS/PLAN: Only taking bumex BID MWF-this could be more days      Subjective:      Patient ID: Tyler Ascencio is a 68 y o  male  HPI     Here to f/u on edema -this did go down  The following portions of the patient's history were revised and updated as appropriate: Problem list, allergies, med list, FH, SH, Past medical and surgical histories  Current Outpatient Medications   Medication Sig Dispense Refill    apixaban (ELIQUIS) 5 mg Take 2 5 mg by mouth 2 (two) times a day       Ascorbic Acid (VITAMIN C) 1000 MG tablet Take 1,000 mg by mouth daily      bumetanide (BUMEX) 2 mg tablet Take 1 tablet (2 mg total) by mouth 2 (two) times a day 30 tablet 0    cholecalciferol (VITAMIN D3) 1,000 units tablet Take 1,000 Units by mouth daily      diltiazem (CARDIZEM CD) 180 mg 24 hr capsule Take 180 mg by mouth 2 (two) times a day       HYDROcodone-acetaminophen (NORCO) 7 5-325 mg per tablet Take 1 tablet by mouth every 4 (four) hours as needed for pain Hydrocodone-acetaminophen 7 5-750mg Max Daily Amount: 6 tablets 120 tablet 0    losartan (COZAAR) 50 mg tablet Take 0 5 tablets (25 mg total) by mouth daily 30 tablet 0    meloxicam (MOBIC) 15 mg tablet Take 1 tablet (15 mg total) by mouth daily 30 tablet 1    pantoprazole (PROTONIX) 40 mg tablet Take 1 tablet (40 mg total) by mouth daily 90 tablet 3    sildenafil (VIAGRA) 100 mg tablet Take 100 mg by mouth daily as needed for erectile dysfunction      sodium chloride (OCEAN) 0 65 % nasal spray 1 spray into each nostril as needed for congestion      vitamin E, tocopherol, 400 units capsule Take 800 Units by mouth daily       No current facility-administered medications for this visit  Review of Systems   All other systems reviewed and are negative  Objective:    /80 (BP Location: Left arm, Patient Position: Sitting, Cuff Size: Large)   Pulse (!) 52   Ht 6' 4" (1 93 m)   Wt 120 kg (264 lb) Comment: pt reported  BMI 32 14 kg/m²     BP Readings from Last 3 Encounters:   07/23/19 110/80   06/13/19 110/60   05/24/19 131/84                  Wt Readings from Last 3 Encounters:   07/23/19 120 kg (264 lb)   06/13/19 127 kg (280 lb)   05/24/19 128 kg (282 lb)         Physical Exam   Constitutional: He appears well-developed and well-nourished  Cardiovascular: Normal rate  Pulmonary/Chest: He is in respiratory distress  Musculoskeletal: He exhibits edema (plus 1 edema)  Vitals reviewed  No visits with results within 2 Week(s) from this visit     Latest known visit with results is:   Orders Only on 05/15/2019   Component Date Value Ref Range Status    White Blood Cell Count 05/15/2019 6 1  3 4 - 10 8 x10E3/uL Final    Red Blood Cell Count 05/15/2019 4 12* 4 14 - 5 80 x10E6/uL Final    Hemoglobin 05/15/2019 13 3  13 0 - 17 7 g/dL Final    HCT 05/15/2019 39 9  37 5 - 51 0 % Final    MCV 05/15/2019 97  79 - 97 fL Final    MCH 05/15/2019 32 3  26 6 - 33 0 pg Final    MCHC 05/15/2019 33 3  31 5 - 35 7 g/dL Final    RDW 05/15/2019 16 3* 12 3 - 15 4 % Final    Platelet Count 06/85/7947 155  150 - 379 x10E3/uL Final    Comment:                **Effective May 20, 2019 the reference interval for**                   Platelets will be changing to:                                 0 - 7 d            140 - 396 x10E3/uL                                 8 - 30 d           139 - 531 x10E3/uL                                31 d - 999 yrs      150 - 450 x10E3/uL      Neutrophils 05/15/2019 61  Not Estab  % Final    Lymphocytes 05/15/2019 23  Not Estab  % Final    Monocytes 05/15/2019 11  Not Estab  % Final    Eosinophils 05/15/2019 4  Not Estab  % Final    Basophils PCT 05/15/2019 1  Not Estab  % Final    Neutrophils (Absolute) 05/15/2019 3 8  1 4 - 7 0 x10E3/uL Final    Lymphocytes (Absolute) 05/15/2019 1 4  0 7 - 3 1 x10E3/uL Final    Monocytes (Absolute) 05/15/2019 0 7  0 1 - 0 9 x10E3/uL Final    Eosinophils (Absolute) 05/15/2019 0 2  0 0 - 0 4 x10E3/uL Final    Basophils ABS 05/15/2019 0 0  0 0 - 0 2 x10E3/uL Final    Immature Granulocytes 05/15/2019 0  Not Estab  % Final    Immature Granulocytes (Absolute) 05/15/2019 0 0  0 0 - 0 1 x10E3/uL Final    Comment: A hand-written panel/profile was received from your office  In  accordance with the [facility] Ambiguous Test Code Policy dated July 0374, we have assigned CBC with Differential/Platelet, Test Code  #[id number] to this request  If this is not the testing you wished to  receive on this specimen, please contact the [facility] Department to clarify the test order  We  appreciate your business        Glucose, Random 05/15/2019 90  65 - 99 mg/dL Final    BUN 05/15/2019 35* 8 - 27 mg/dL Final    Creatinine 05/15/2019 1 40* 0 76 - 1 27 mg/dL Final    eGFR Non African American 05/15/2019 50* >59 mL/min/1 73 Final    eGFR  05/15/2019 58* >59 mL/min/1 73 Final    SL AMB BUN/CREATININE RATIO 05/15/2019 25* 10 - 24 Final    Sodium 05/15/2019 138  134 - 144 mmol/L Final    Potassium 05/15/2019 4 7  3 5 - 5 2 mmol/L Final    Chloride 05/15/2019 99  96 - 106 mmol/L Final    CO2 05/15/2019 23  20 - 29 mmol/L Final    CALCIUM 05/15/2019 9 7  8 6 - 10 2 mg/dL Final    Protein, Total 05/15/2019 7 8  6 0 - 8 5 g/dL Final    Albumin 05/15/2019 4 1  3 5 - 4 8 g/dL Final    Globulin, Total 05/15/2019 3 7  1 5 - 4 5 g/dL Final    Albumin/Globulin Ratio 05/15/2019 1 1* 1 2 - 2 2 Final    TOTAL BILIRUBIN 05/15/2019 1 2  0 0 - 1 2 mg/dL Final    Alk Phos Isoenzymes 05/15/2019 99  39 - 117 IU/L Final    AST 05/15/2019 25  0 - 40 IU/L Final    ALT 05/15/2019 15  0 - 44 IU/L Final    proBNP 05/15/2019 3,499* 0 - 376 pg/mL Final    Comment: The following cut-points have been suggested for the  use of proBNP for the diagnostic evaluation of heart  failure (HF) in patients with acute dyspnea:  Modality                     Age           Optimal Cut                             (years)            Point  ------------------------------------------------------  Diagnosis (rule in HF)        <50            450 pg/mL                            50 - 75            900 pg/mL                                >75           1800 pg/mL  Exclusion (rule out HF)  Age independent     300 pg/mL      Comment 05/15/2019 Comment   Final    Comment: Julius Ragsdale CMP14 Default  Carroll Major CMP14 Default  A hand-written panel/profile was received from your office  In  accordance with the [facility] Ambiguous Test Code Policy dated July 7293, we have completed your order by using the closest currently  or formerly recognized AMA panel  We have assigned Comprehensive  Metabolic Panel (14), Test Code #[id number] to this request   If this  is not the testing you wished to receive on this specimen, please  contact the [facility] Client Inquiry/Technical Services Department  to clarify the test order  We appreciate your business             Angela Pina MD

## 2019-07-24 ENCOUNTER — LAB REQUISITION (OUTPATIENT)
Dept: LAB | Facility: HOSPITAL | Age: 74
End: 2019-07-24
Payer: COMMERCIAL

## 2019-07-24 DIAGNOSIS — L02.611 CUTANEOUS ABSCESS OF RIGHT FOOT: ICD-10-CM

## 2019-07-24 PROCEDURE — 87077 CULTURE AEROBIC IDENTIFY: CPT | Performed by: PODIATRIST

## 2019-07-24 PROCEDURE — 87205 SMEAR GRAM STAIN: CPT | Performed by: PODIATRIST

## 2019-07-24 PROCEDURE — 87070 CULTURE OTHR SPECIMN AEROBIC: CPT | Performed by: PODIATRIST

## 2019-07-24 PROCEDURE — 87186 SC STD MICRODIL/AGAR DIL: CPT | Performed by: PODIATRIST

## 2019-07-25 NOTE — PROGRESS NOTES
Ortho Sports Medicine Shoulder Follow Up Visit     Assesment:     68year old Male Left shoulder severe rotator cuff arthropathy     Plan:    Conservative treatment:    Ice to shoulder 1-2 times daily, for 20 minutes at a time  Home exercise program for shoulder, including ROM and strenthening  Instructions given to patient of what exercises to perform  10 day course Naproxen 500mg BID given       Imaging: All imaging from today was reviewed by myself and explained to the patient  Injection:    No Injection planned at this time  Surgery:     No surgery is recommended at this point, continue with conservative treatment plan as noted  Follow up:    Return if symptoms worsen or fail to improve  consider future steroid injections      Chief Complaint   Patient presents with    Left Shoulder - Follow-up         History of Present Illness: The patient is returns for follow up of Left shoulder pain  Since the prior visit, He reports minimal improvement  He received an injection at his last appointment and stated that it took the edge off, but he still gets pain in the shoulder when he leans up against anything  He has been doing his home exercises  Pain is improved by rest and injection  Pain is aggravated by overall shoulder use  Symptoms include clicking and popping  The patient has weakness  The patient has tried rest, ice, NSAIDS, physical therapy and injection  I have reviewed the past medical, surgical, social and family history, medications and allergies as documented in the EMR  Review of systems: ROS is negative other than that noted in the HPI  Constitutional: Negative for fatigue and fever  Physical Exam:    Blood pressure 131/82, pulse 82, height 6' 4" (1 93 m), weight 120 kg (264 lb)      General/Constitutional: NAD, well developed, well nourished  HENT: Normocephalic, atraumatic  CV: Intact distal pulses, regular rate  Resp: No respiratory distress or labored breathing  Lymphatic: No lymphadenopathy palpated  Neuro: Alert and Oriented x 3, no focal deficits  Psych: Normal mood, normal affect, normal judgement, normal behavior  Skin: Warm, dry, no rashes, no erythema      Shoulder focused exam:       RIGHT LEFT    Scapula Atrophy Negative Negative     Winging Negative Negative     Protraction Negative Negative    Rotator cuff SS 5/5 4/5     IS 5/5 4/5     SubS 5/5 4/5    ROM     100     ER0 60 30                         TTP: AC Negative Negative     Biceps Negative Negative     Coracoid Negative Negative    Special Tests: O'Briens Negative Negative     Bear-shear Negative Positive     Cross body Adduction Negative Positive     Speeds  Negative Positive     Kvng's Negative Negative     Whipple Negative Positive       Neer Negative Positive     Hensley Negative Positive    Instability: Apprehension & relocation not tested not tested     Load & shift not tested not tested    Other: Crank Negative Negative               UE NV Exam: +2 Radial pulses bilaterally  Sensation intact to light touch C5-T1 bilaterally, Radial/median/ulnar nerve motor intact    Cervical ROM is full without pain, numbness or tingling      Shoulder Imaging    No imaging was performed today

## 2019-07-26 ENCOUNTER — OFFICE VISIT (OUTPATIENT)
Dept: OBGYN CLINIC | Facility: CLINIC | Age: 74
End: 2019-07-26
Payer: COMMERCIAL

## 2019-07-26 VITALS
SYSTOLIC BLOOD PRESSURE: 131 MMHG | WEIGHT: 264 LBS | BODY MASS INDEX: 32.15 KG/M2 | DIASTOLIC BLOOD PRESSURE: 82 MMHG | HEIGHT: 76 IN | HEART RATE: 82 BPM

## 2019-07-26 DIAGNOSIS — M12.812 ROTATOR CUFF ARTHROPATHY OF LEFT SHOULDER: Primary | ICD-10-CM

## 2019-07-26 LAB
BACTERIA WND AEROBE CULT: ABNORMAL
BACTERIA WND AEROBE CULT: ABNORMAL
GRAM STN SPEC: ABNORMAL
GRAM STN SPEC: ABNORMAL

## 2019-07-26 PROCEDURE — 99213 OFFICE O/P EST LOW 20 MIN: CPT | Performed by: ORTHOPAEDIC SURGERY

## 2019-07-26 RX ORDER — NAPROXEN 500 MG/1
500 TABLET ORAL 2 TIMES DAILY WITH MEALS
Qty: 20 TABLET | Refills: 0 | Status: SHIPPED | OUTPATIENT
Start: 2019-07-26 | End: 2019-12-01 | Stop reason: HOSPADM

## 2019-07-30 ENCOUNTER — APPOINTMENT (OUTPATIENT)
Dept: WOUND CARE | Facility: HOSPITAL | Age: 74
End: 2019-07-30
Payer: COMMERCIAL

## 2019-07-30 PROCEDURE — 11042 DBRDMT SUBQ TIS 1ST 20SQCM/<: CPT | Performed by: PODIATRIST

## 2019-07-30 PROCEDURE — 99213 OFFICE O/P EST LOW 20 MIN: CPT | Performed by: PODIATRIST

## 2019-08-06 ENCOUNTER — APPOINTMENT (OUTPATIENT)
Dept: WOUND CARE | Facility: HOSPITAL | Age: 74
End: 2019-08-06
Payer: COMMERCIAL

## 2019-08-06 PROCEDURE — 11042 DBRDMT SUBQ TIS 1ST 20SQCM/<: CPT

## 2019-08-14 ENCOUNTER — TELEPHONE (OUTPATIENT)
Dept: FAMILY MEDICINE CLINIC | Facility: HOSPITAL | Age: 74
End: 2019-08-14

## 2019-08-15 ENCOUNTER — HOSPITAL ENCOUNTER (OUTPATIENT)
Dept: NON INVASIVE DIAGNOSTICS | Facility: HOSPITAL | Age: 74
Discharge: HOME | End: 2019-08-15
Attending: PODIATRIST
Payer: COMMERCIAL

## 2019-08-15 DIAGNOSIS — I87.331 IDIOPATHIC CHRONIC VENOUS HYPERTENSION OF RIGHT LOWER EXTREMITY WITH ULCER AND INFLAMMATION (HCC): ICD-10-CM

## 2019-08-15 DIAGNOSIS — L97.919 IDIOPATHIC CHRONIC VENOUS HYPERTENSION OF RIGHT LOWER EXTREMITY WITH ULCER AND INFLAMMATION (HCC): ICD-10-CM

## 2019-08-15 PROCEDURE — 93925 LOWER EXTREMITY STUDY: CPT

## 2019-08-15 PROCEDURE — 93923 UPR/LXTR ART STDY 3+ LVLS: CPT

## 2019-08-16 ENCOUNTER — OFFICE VISIT (OUTPATIENT)
Dept: FAMILY MEDICINE CLINIC | Facility: HOSPITAL | Age: 74
End: 2019-08-16
Payer: COMMERCIAL

## 2019-08-16 VITALS
TEMPERATURE: 98.4 F | WEIGHT: 270 LBS | HEIGHT: 76 IN | BODY MASS INDEX: 32.88 KG/M2 | HEART RATE: 63 BPM | SYSTOLIC BLOOD PRESSURE: 120 MMHG | DIASTOLIC BLOOD PRESSURE: 70 MMHG

## 2019-08-16 DIAGNOSIS — M25.512 CHRONIC LEFT SHOULDER PAIN: ICD-10-CM

## 2019-08-16 DIAGNOSIS — G89.29 CHRONIC LEFT SHOULDER PAIN: ICD-10-CM

## 2019-08-16 DIAGNOSIS — R19.7 DIARRHEA OF PRESUMED INFECTIOUS ORIGIN: Primary | ICD-10-CM

## 2019-08-16 PROCEDURE — 93922 UPR/L XTREMITY ART 2 LEVELS: CPT | Performed by: SURGERY

## 2019-08-16 PROCEDURE — 99214 OFFICE O/P EST MOD 30 MIN: CPT | Performed by: INTERNAL MEDICINE

## 2019-08-16 PROCEDURE — 93925 LOWER EXTREMITY STUDY: CPT | Performed by: SURGERY

## 2019-08-16 RX ORDER — HYDROCODONE BITARTRATE AND ACETAMINOPHEN 7.5; 325 MG/1; MG/1
1 TABLET ORAL EVERY 4 HOURS PRN
Qty: 120 TABLET | Refills: 0 | Status: SHIPPED | OUTPATIENT
Start: 2019-08-16 | End: 2019-09-06 | Stop reason: SDUPTHER

## 2019-08-16 RX ORDER — METRONIDAZOLE 500 MG/1
500 TABLET ORAL EVERY 8 HOURS SCHEDULED
Qty: 21 TABLET | Refills: 0 | Status: SHIPPED | OUTPATIENT
Start: 2019-08-16 | End: 2019-08-23

## 2019-08-16 NOTE — PROGRESS NOTES
Assessment/Plan:       Diagnoses and all orders for this visit:    Diarrhea of presumed infectious origin  -     Clostridium difficile toxin by PCR; Future  -     metroNIDAZOLE (FLAGYL) 500 mg tablet; Take 1 tablet (500 mg total) by mouth every 8 (eight) hours for 7 days    Chronic left shoulder pain  -     HYDROcodone-acetaminophen (NORCO) 7 5-325 mg per tablet; Take 1 tablet by mouth every 4 (four) hours as needed for pain Hydrocodone-acetaminophen 7 5-750mg Max Daily Amount: 4 tablets          All of the above diagnoses have been assessed  Additional COMMENTS/PLAN:  Concerned about C diff  Will place empirically on treatment for that at this time  Subjective:      Patient ID: Evelyne Denise is a 69 y o  male  HPI     Diarrhea-started Monday  Moving bowels [id number] since last hours  No N/V  No fever-did have chills 2 days ago  No blood in stool  Was on an antibiotic x2  The following portions of the patient's history were revised and updated as appropriate: Problem list, allergies, med list, FH, SH, Past medical and surgical histories      Current Outpatient Medications   Medication Sig Dispense Refill    apixaban (ELIQUIS) 5 mg Take 2 5 mg by mouth 2 (two) times a day       Ascorbic Acid (VITAMIN C) 1000 MG tablet Take 1,000 mg by mouth daily      bumetanide (BUMEX) 2 mg tablet Take 1 tablet (2 mg total) by mouth 2 (two) times a day 30 tablet 0    cholecalciferol (VITAMIN D3) 1,000 units tablet Take 1,000 Units by mouth daily      diltiazem (CARDIZEM CD) 180 mg 24 hr capsule Take 180 mg by mouth 2 (two) times a day       HYDROcodone-acetaminophen (NORCO) 7 5-325 mg per tablet Take 1 tablet by mouth every 4 (four) hours as needed for pain Hydrocodone-acetaminophen 7 5-750mg Max Daily Amount: 4 tablets 120 tablet 0    losartan (COZAAR) 50 mg tablet Take 0 5 tablets (25 mg total) by mouth daily 30 tablet 0    meloxicam (MOBIC) 15 mg tablet Take 1 tablet (15 mg total) by mouth daily 30 tablet 1    naproxen (NAPROSYN) 500 mg tablet Take 1 tablet (500 mg total) by mouth 2 (two) times a day with meals for 10 days 20 tablet 0    pantoprazole (PROTONIX) 40 mg tablet Take 1 tablet (40 mg total) by mouth daily 90 tablet 3    sildenafil (VIAGRA) 100 mg tablet Take 100 mg by mouth daily as needed for erectile dysfunction      sodium chloride (OCEAN) 0 65 % nasal spray 1 spray into each nostril as needed for congestion      vitamin E, tocopherol, 400 units capsule Take 800 Units by mouth daily      metroNIDAZOLE (FLAGYL) 500 mg tablet Take 1 tablet (500 mg total) by mouth every 8 (eight) hours for 7 days 21 tablet 0     No current facility-administered medications for this visit  Review of Systems   All other systems reviewed and are negative  Objective:    /70 (BP Location: Left arm, Patient Position: Sitting, Cuff Size: Large)   Pulse 63   Temp 98 4 °F (36 9 °C) (Tympanic)   Ht 6' 4" (1 93 m)   Wt 122 kg (270 lb) Comment: patient reported  BMI 32 87 kg/m²     BP Readings from Last 3 Encounters:   08/16/19 120/70   07/26/19 131/82   07/23/19 110/80                  Wt Readings from Last 3 Encounters:   08/16/19 122 kg (270 lb)   07/26/19 120 kg (264 lb)   07/23/19 120 kg (264 lb)         Physical Exam   Constitutional: He appears well-developed and well-nourished  Abdominal: Soft  Bowel sounds are normal  He exhibits no distension  Vitals reviewed  No visits with results within 2 Week(s) from this visit  Latest known visit with results is:   Lab Requisition on 07/24/2019   Component Date Value Ref Range Status    Wound Culture 07/24/2019 3+ Growth of Serratia marcescens*  Final    Wound Culture 07/24/2019 1+ Growth of    Final    Mixed Skin Danni    Gram Stain Result 07/24/2019 1+ Gram negative rods*  Final    This is an appended report  These results have been appended to a previously preliminary verified report      Gram Stain Result 07/24/2019 1+ Polys* Final    This is an appended report  These results have been appended to a previously preliminary verified report           Stefany Mathis MD

## 2019-08-20 ENCOUNTER — APPOINTMENT (OUTPATIENT)
Dept: WOUND CARE | Facility: HOSPITAL | Age: 74
End: 2019-08-20
Payer: COMMERCIAL

## 2019-08-20 PROCEDURE — 11042 DBRDMT SUBQ TIS 1ST 20SQCM/<: CPT

## 2019-08-26 ENCOUNTER — TELEPHONE (OUTPATIENT)
Dept: FAMILY MEDICINE CLINIC | Facility: HOSPITAL | Age: 74
End: 2019-08-26

## 2019-08-27 ENCOUNTER — APPOINTMENT (OUTPATIENT)
Dept: WOUND CARE | Facility: HOSPITAL | Age: 74
End: 2019-08-27
Payer: COMMERCIAL

## 2019-08-27 PROCEDURE — 11042 DBRDMT SUBQ TIS 1ST 20SQCM/<: CPT

## 2019-09-03 ENCOUNTER — APPOINTMENT (OUTPATIENT)
Dept: WOUND CARE | Facility: HOSPITAL | Age: 74
End: 2019-09-03
Payer: COMMERCIAL

## 2019-09-03 DIAGNOSIS — R05.9 COUGH: Primary | ICD-10-CM

## 2019-09-03 PROCEDURE — 11042 DBRDMT SUBQ TIS 1ST 20SQCM/<: CPT

## 2019-09-03 RX ORDER — BENZONATATE 100 MG/1
100 CAPSULE ORAL 3 TIMES DAILY PRN
Qty: 20 CAPSULE | Refills: 0 | Status: SHIPPED | OUTPATIENT
Start: 2019-09-03 | End: 2020-06-18 | Stop reason: HOSPADM

## 2019-09-05 ENCOUNTER — TELEPHONE (OUTPATIENT)
Dept: FAMILY MEDICINE CLINIC | Facility: HOSPITAL | Age: 74
End: 2019-09-05

## 2019-09-05 NOTE — TELEPHONE ENCOUNTER
Last time I saw him he had a few days of diarrhea-if this is a new illness I don't know what to prescribe

## 2019-09-06 ENCOUNTER — OFFICE VISIT (OUTPATIENT)
Dept: FAMILY MEDICINE CLINIC | Facility: HOSPITAL | Age: 74
End: 2019-09-06
Payer: COMMERCIAL

## 2019-09-06 VITALS
WEIGHT: 277 LBS | BODY MASS INDEX: 33.72 KG/M2 | SYSTOLIC BLOOD PRESSURE: 110 MMHG | HEART RATE: 64 BPM | TEMPERATURE: 96.9 F | DIASTOLIC BLOOD PRESSURE: 66 MMHG

## 2019-09-06 DIAGNOSIS — M25.512 CHRONIC LEFT SHOULDER PAIN: ICD-10-CM

## 2019-09-06 DIAGNOSIS — G89.29 CHRONIC LEFT SHOULDER PAIN: ICD-10-CM

## 2019-09-06 DIAGNOSIS — J06.9 UPPER RESPIRATORY TRACT INFECTION, UNSPECIFIED TYPE: Primary | ICD-10-CM

## 2019-09-06 PROCEDURE — 99213 OFFICE O/P EST LOW 20 MIN: CPT | Performed by: PHYSICIAN ASSISTANT

## 2019-09-06 RX ORDER — AZITHROMYCIN 250 MG/1
TABLET, FILM COATED ORAL
Qty: 6 TABLET | Refills: 0 | Status: SHIPPED | OUTPATIENT
Start: 2019-09-06 | End: 2019-10-22 | Stop reason: SDUPTHER

## 2019-09-06 RX ORDER — HYDROCODONE BITARTRATE AND ACETAMINOPHEN 7.5; 325 MG/1; MG/1
1 TABLET ORAL EVERY 4 HOURS PRN
Qty: 120 TABLET | Refills: 0 | Status: SHIPPED | OUTPATIENT
Start: 2019-09-06 | End: 2019-10-09 | Stop reason: SDUPTHER

## 2019-09-06 RX ORDER — POTASSIUM CHLORIDE 750 MG/1
TABLET, FILM COATED, EXTENDED RELEASE ORAL
Refills: 10 | COMMUNITY
Start: 2019-08-26 | End: 2020-03-10 | Stop reason: HOSPADM

## 2019-09-06 NOTE — PROGRESS NOTES
Assessment/Plan:         Diagnoses and all orders for this visit:    Upper respiratory tract infection, unspecified type  -     azithromycin (ZITHROMAX) 250 mg tablet; Take 2 tablets today then 1 tablet daily x 4 days    Chronic left shoulder pain  -     HYDROcodone-acetaminophen (NORCO) 7 5-325 mg per tablet; Take 1 tablet by mouth every 4 (four) hours as needed for pain Hydrocodone-acetaminophen 7 5-750mg Max Daily Amount: 4 tablets    Other orders  -     potassium chloride (K-DUR) 10 mEq tablet; TAKE THREE TABLETS BY MOUTH TWICE A DAY IN THE MORNING AND AT DINNER TIME        Subjective:      Patient ID: Anshul Godwin is a 69 y o  male  69year old white male c/o cough and nasal congestion past 7-10 days  No runny nose  Has been in bed a great deal due to foot problem, open wounds/screws coming out of skin, from past surgeries, right foot/lower ext  seeing Dr Barbie Foster - podiatry  Review of Systems   Constitutional: Positive for chills and fatigue  Negative for diaphoresis and fever  HENT: Positive for congestion and hearing loss  Negative for ear pain, rhinorrhea and sore throat  Respiratory: Positive for cough and shortness of breath  Neurological: Positive for dizziness  Objective:      /66   Pulse 64   Wt 126 kg (277 lb)   BMI 33 72 kg/m²          Physical Exam   Constitutional: He is oriented to person, place, and time  He appears well-developed and well-nourished  No distress  HENT:   Head: Normocephalic and atraumatic  Right Ear: External ear normal    Left Ear: External ear normal    Nose: Nose normal    Mouth/Throat: Oropharynx is clear and moist  No oropharyngeal exudate  Pulmonary/Chest: Effort normal and breath sounds normal  No stridor  No respiratory distress  He has no wheezes  He has no rales  Neurological: He is alert and oriented to person, place, and time  Skin: He is not diaphoretic  Nursing note and vitals reviewed

## 2019-09-06 NOTE — PATIENT INSTRUCTIONS
Start Zithromax antibiotics, and over the counter probiotics, ex  Align, or [employer]  Also to try otc Robitussin

## 2019-09-10 ENCOUNTER — APPOINTMENT (OUTPATIENT)
Dept: WOUND CARE | Facility: HOSPITAL | Age: 74
End: 2019-09-10
Payer: COMMERCIAL

## 2019-09-10 PROCEDURE — 11042 DBRDMT SUBQ TIS 1ST 20SQCM/<: CPT

## 2019-09-17 ENCOUNTER — APPOINTMENT (OUTPATIENT)
Dept: WOUND CARE | Facility: HOSPITAL | Age: 74
End: 2019-09-17
Payer: COMMERCIAL

## 2019-09-17 PROCEDURE — 99213 OFFICE O/P EST LOW 20 MIN: CPT

## 2019-10-01 ENCOUNTER — APPOINTMENT (OUTPATIENT)
Dept: WOUND CARE | Facility: HOSPITAL | Age: 74
End: 2019-10-01
Payer: COMMERCIAL

## 2019-10-01 ENCOUNTER — TRANSCRIBE ORDERS (OUTPATIENT)
Dept: ADMINISTRATIVE | Facility: HOSPITAL | Age: 74
End: 2019-10-01

## 2019-10-01 DIAGNOSIS — M79.662 PAIN OF LEFT CALF: Primary | ICD-10-CM

## 2019-10-01 PROCEDURE — 29581 APPL MULTLAYER CMPRN SYS LEG: CPT

## 2019-10-08 ENCOUNTER — APPOINTMENT (OUTPATIENT)
Dept: WOUND CARE | Facility: HOSPITAL | Age: 74
End: 2019-10-08
Payer: COMMERCIAL

## 2019-10-08 PROCEDURE — 11042 DBRDMT SUBQ TIS 1ST 20SQCM/<: CPT

## 2019-10-09 DIAGNOSIS — M25.512 CHRONIC LEFT SHOULDER PAIN: ICD-10-CM

## 2019-10-09 DIAGNOSIS — G89.29 CHRONIC LEFT SHOULDER PAIN: ICD-10-CM

## 2019-10-10 RX ORDER — HYDROCODONE BITARTRATE AND ACETAMINOPHEN 7.5; 325 MG/1; MG/1
1 TABLET ORAL EVERY 6 HOURS PRN
Qty: 120 TABLET | Refills: 0 | Status: SHIPPED | OUTPATIENT
Start: 2019-10-10 | End: 2020-06-18 | Stop reason: HOSPADM

## 2019-10-11 ENCOUNTER — TRANSCRIBE ORDERS (OUTPATIENT)
Dept: ADMINISTRATIVE | Facility: HOSPITAL | Age: 74
End: 2019-10-11

## 2019-10-15 ENCOUNTER — HOSPITAL ENCOUNTER (OUTPATIENT)
Dept: NON INVASIVE DIAGNOSTICS | Facility: HOSPITAL | Age: 74
Discharge: HOME | End: 2019-10-15
Attending: PODIATRIST
Payer: COMMERCIAL

## 2019-10-15 ENCOUNTER — APPOINTMENT (OUTPATIENT)
Dept: WOUND CARE | Facility: HOSPITAL | Age: 74
End: 2019-10-15
Payer: COMMERCIAL

## 2019-10-15 DIAGNOSIS — M79.662 PAIN OF LEFT CALF: ICD-10-CM

## 2019-10-15 PROCEDURE — 93971 EXTREMITY STUDY: CPT

## 2019-10-15 PROCEDURE — 99212 OFFICE O/P EST SF 10 MIN: CPT

## 2019-10-15 PROCEDURE — 93971 EXTREMITY STUDY: CPT | Performed by: INTERNAL MEDICINE

## 2019-10-22 ENCOUNTER — TELEPHONE (OUTPATIENT)
Dept: FAMILY MEDICINE CLINIC | Facility: HOSPITAL | Age: 74
End: 2019-10-22

## 2019-10-22 DIAGNOSIS — J06.9 UPPER RESPIRATORY TRACT INFECTION, UNSPECIFIED TYPE: ICD-10-CM

## 2019-10-22 RX ORDER — AZITHROMYCIN 250 MG/1
TABLET, FILM COATED ORAL
Qty: 6 TABLET | Refills: 0 | Status: SHIPPED | OUTPATIENT
Start: 2019-10-22 | End: 2019-10-26

## 2019-10-25 ENCOUNTER — TELEPHONE (OUTPATIENT)
Dept: FAMILY MEDICINE CLINIC | Facility: HOSPITAL | Age: 74
End: 2019-10-25

## 2019-10-25 NOTE — TELEPHONE ENCOUNTER
Had few questions about recent back pain, and was recently on metalozone for edema, then told to stop after having back pain  Recommend increasing water if suspected kidney stone, until back pain goes away, or vitamin D 2000 IU daily

## 2019-11-18 ENCOUNTER — OFFICE VISIT (OUTPATIENT)
Dept: FAMILY MEDICINE CLINIC | Facility: HOSPITAL | Age: 74
End: 2019-11-18
Payer: COMMERCIAL

## 2019-11-18 ENCOUNTER — HOSPITAL ENCOUNTER (OUTPATIENT)
Dept: RADIOLOGY | Facility: HOSPITAL | Age: 74
Discharge: HOME | End: 2019-11-18
Attending: INTERNAL MEDICINE

## 2019-11-18 ENCOUNTER — APPOINTMENT (OUTPATIENT)
Dept: LAB | Facility: HOSPITAL | Age: 74
End: 2019-11-18
Attending: INTERNAL MEDICINE
Payer: COMMERCIAL

## 2019-11-18 VITALS
SYSTOLIC BLOOD PRESSURE: 118 MMHG | HEIGHT: 76 IN | HEART RATE: 64 BPM | WEIGHT: 285 LBS | TEMPERATURE: 96.3 F | RESPIRATION RATE: 16 BRPM | BODY MASS INDEX: 34.7 KG/M2 | DIASTOLIC BLOOD PRESSURE: 68 MMHG

## 2019-11-18 DIAGNOSIS — I50.32 CHRONIC DIASTOLIC CONGESTIVE HEART FAILURE (HCC): Primary | ICD-10-CM

## 2019-11-18 DIAGNOSIS — R06.02 SOB (SHORTNESS OF BREATH): ICD-10-CM

## 2019-11-18 DIAGNOSIS — I48.19 PERSISTENT ATRIAL FIBRILLATION (HCC): ICD-10-CM

## 2019-11-18 LAB — NT-PROBNP SERPL-MCNC: 4386 PG/ML

## 2019-11-18 PROCEDURE — 99214 OFFICE O/P EST MOD 30 MIN: CPT | Performed by: INTERNAL MEDICINE

## 2019-11-18 PROCEDURE — 36415 COLL VENOUS BLD VENIPUNCTURE: CPT

## 2019-11-18 PROCEDURE — 83880 ASSAY OF NATRIURETIC PEPTIDE: CPT

## 2019-11-18 NOTE — PROGRESS NOTES
Assessment/Plan:       Diagnoses and all orders for this visit:    Chronic diastolic congestive heart failure (HCC)    Persistent atrial fibrillation    SOB (shortness of breath)  -     Basic metabolic panel; Future  -     NT-BNP PRO; Future  -     XR chest pa & lateral; Future  -     CBC; Future          All of the above diagnoses have been assessed  Additional COMMENTS/PLAN: need to r/o chf      Subjective:      Patient ID: Servando Rush is a 69 y o  male  HPI     Has URi sxs  This is for the last few days  No cough or fever , chills  NO CP, PND or orthopnea  f    The following portions of the patient's history were revised and updated as appropriate: Problem list, allergies, med list, FH, SH, Past medical and surgical histories      Current Outpatient Medications   Medication Sig Dispense Refill    apixaban (ELIQUIS) 5 mg Take 5 mg by mouth 2 (two) times a day       Ascorbic Acid (VITAMIN C) 1000 MG tablet Take 1,000 mg by mouth daily      benzonatate (TESSALON PERLES) 100 mg capsule Take 1 capsule (100 mg total) by mouth 3 (three) times a day as needed for cough 20 capsule 0    bumetanide (BUMEX) 2 mg tablet Take 1 tablet (2 mg total) by mouth 2 (two) times a day (Patient taking differently: Take 2 mg by mouth Take one tablet by mouth every Tues, Thurs, Sat and Sunday and 1 tablet twice a day on Mon, Wed and Friday) 30 tablet 0    cholecalciferol (VITAMIN D3) 1,000 units tablet Take 1,000 Units by mouth daily      diltiazem (CARDIZEM CD) 180 mg 24 hr capsule Take 180 mg by mouth 2 (two) times a day       HYDROcodone-acetaminophen (NORCO) 7 5-325 mg per tablet Take 1 tablet by mouth every 6 (six) hours as needed for pain Hydrocodone-acetaminophen 7 5-750mg Max Daily Amount: 4 tablets 120 tablet 0    losartan (COZAAR) 50 mg tablet Take 0 5 tablets (25 mg total) by mouth daily 30 tablet 0    meloxicam (MOBIC) 15 mg tablet Take 1 tablet (15 mg total) by mouth daily 30 tablet 1    naproxen (NAPROSYN) 500 mg tablet Take 1 tablet (500 mg total) by mouth 2 (two) times a day with meals for 10 days 20 tablet 0    pantoprazole (PROTONIX) 40 mg tablet Take 1 tablet (40 mg total) by mouth daily 90 tablet 3    potassium chloride (K-DUR) 10 mEq tablet TAKE THREE TABLETS BY MOUTH TWICE A DAY IN THE MORNING AND AT DINNER TIME  10    sildenafil (VIAGRA) 100 mg tablet Take 100 mg by mouth daily as needed for erectile dysfunction      sodium chloride (OCEAN) 0 65 % nasal spray 1 spray into each nostril as needed for congestion      vitamin E, tocopherol, 400 units capsule Take 800 Units by mouth daily       No current facility-administered medications for this visit  Review of Systems      Objective:    /68   Pulse 64   Temp (!) 96 3 °F (35 7 °C) (Tympanic)   Resp 16   Ht 6' 4" (1 93 m)   Wt 129 kg (285 lb)   BMI 34 69 kg/m²     BP Readings from Last 3 Encounters:   11/18/19 118/68   09/06/19 110/66   08/16/19 120/70                  Wt Readings from Last 3 Encounters:   11/18/19 129 kg (285 lb)   09/06/19 126 kg (277 lb)   08/16/19 122 kg (270 lb)         Physical Exam   Constitutional: He appears well-developed and well-nourished  Neck: No JVD present  Cardiovascular:   afib   Pulmonary/Chest: He has wheezes  Abdominal: Soft  Bowel sounds are normal    Musculoskeletal: Edema: plus 2  Vitals reviewed  No visits with results within 2 Week(s) from this visit  Latest known visit with results is:   Lab Requisition on 07/24/2019   Component Date Value Ref Range Status    Wound Culture 07/24/2019 3+ Growth of Serratia marcescens*  Final    Wound Culture 07/24/2019 1+ Growth of    Final    Mixed Skin Danni    Gram Stain Result 07/24/2019 1+ Gram negative rods*  Final    This is an appended report  These results have been appended to a previously preliminary verified report   Gram Stain Result 07/24/2019 1+ Polys*  Final    This is an appended report   These results have been appended to a previously preliminary verified report           Keiry Van MD

## 2019-11-20 ENCOUNTER — TELEPHONE (OUTPATIENT)
Dept: FAMILY MEDICINE CLINIC | Facility: HOSPITAL | Age: 74
End: 2019-11-20

## 2019-11-20 DIAGNOSIS — I50.32 CHRONIC DIASTOLIC CONGESTIVE HEART FAILURE (HCC): Primary | ICD-10-CM

## 2019-11-20 NOTE — TELEPHONE ENCOUNTER
----- Message from Rocco Ac MD sent at 11/20/2019  3:06 PM EST -----  Add spironolactone 25 mg daily -apt in 10-14 days with BMP the day before

## 2019-11-21 RX ORDER — SPIRONOLACTONE 25 MG/1
25 TABLET ORAL DAILY
Qty: 30 TABLET | Refills: 5 | OUTPATIENT
Start: 2019-11-21 | End: 2020-02-17 | Stop reason: SDUPTHER

## 2019-11-22 LAB
BASOPHILS # BLD AUTO: 0 X10E3/UL (ref 0–0.2)
BASOPHILS NFR BLD AUTO: 0 %
BUN SERPL-MCNC: 49 MG/DL (ref 8–27)
BUN/CREAT SERPL: 33 (ref 10–24)
CALCIUM SERPL-MCNC: 10 MG/DL (ref 8.6–10.2)
CHLORIDE SERPL-SCNC: 99 MMOL/L (ref 96–106)
CO2 SERPL-SCNC: 24 MMOL/L (ref 20–29)
CREAT SERPL-MCNC: 1.5 MG/DL (ref 0.76–1.27)
EOSINOPHIL # BLD AUTO: 0.2 X10E3/UL (ref 0–0.4)
EOSINOPHIL NFR BLD AUTO: 3 %
ERYTHROCYTE [DISTWIDTH] IN BLOOD BY AUTOMATED COUNT: 15.3 % (ref 12.3–15.4)
GLUCOSE SERPL-MCNC: 87 MG/DL (ref 65–99)
HCT VFR BLD AUTO: 41.3 % (ref 37.5–51)
HGB BLD-MCNC: 14.5 G/DL (ref 13–17.7)
IMM GRANULOCYTES # BLD: 0 X10E3/UL (ref 0–0.1)
IMM GRANULOCYTES NFR BLD: 0 %
LYMPHOCYTES # BLD AUTO: 1.2 X10E3/UL (ref 0.7–3.1)
LYMPHOCYTES NFR BLD AUTO: 19 %
MCH RBC QN AUTO: 32.3 PG (ref 26.6–33)
MCHC RBC AUTO-ENTMCNC: 35.1 G/DL (ref 31.5–35.7)
MCV RBC AUTO: 92 FL (ref 79–97)
MONOCYTES # BLD AUTO: 0.8 X10E3/UL (ref 0.1–0.9)
MONOCYTES NFR BLD AUTO: 14 %
NEUTROPHILS # BLD AUTO: 3.9 X10E3/UL (ref 1.4–7)
NEUTROPHILS NFR BLD AUTO: 64 %
NT-PROBNP SERPL-MCNC: 3807 PG/ML (ref 0–376)
PLATELET # BLD AUTO: 117 X10E3/UL (ref 150–450)
POTASSIUM SERPL-SCNC: 4.2 MMOL/L (ref 3.5–5.2)
RBC # BLD AUTO: 4.49 X10E6/UL (ref 4.14–5.8)
SL AMB EGFR AFRICAN AMERICAN: 53 ML/MIN/1.73
SL AMB EGFR NON AFRICAN AMERICAN: 46 ML/MIN/1.73
SODIUM SERPL-SCNC: 140 MMOL/L (ref 134–144)
WBC # BLD AUTO: 6.1 X10E3/UL (ref 3.4–10.8)

## 2019-11-24 ENCOUNTER — HOSPITAL ENCOUNTER (OUTPATIENT)
Dept: RADIOLOGY | Facility: HOSPITAL | Age: 74
Discharge: HOME | DRG: 291 | End: 2019-11-24
Attending: INTERNAL MEDICINE
Payer: COMMERCIAL

## 2019-11-24 ENCOUNTER — HOSPITAL ENCOUNTER (INPATIENT)
Facility: HOSPITAL | Age: 74
LOS: 7 days | Discharge: HOME | DRG: 291 | End: 2019-12-01
Attending: EMERGENCY MEDICINE | Admitting: INTERNAL MEDICINE
Payer: COMMERCIAL

## 2019-11-24 ENCOUNTER — TELEPHONE (OUTPATIENT)
Dept: OTHER | Facility: OTHER | Age: 74
End: 2019-11-24

## 2019-11-24 DIAGNOSIS — I50.33 ACUTE ON CHRONIC DIASTOLIC CONGESTIVE HEART FAILURE (HCC): Primary | ICD-10-CM

## 2019-11-24 PROBLEM — N17.9 ACUTE KIDNEY INJURY SUPERIMPOSED ON CKD (HCC): Status: ACTIVE | Noted: 2019-11-24

## 2019-11-24 PROBLEM — I50.43 ACUTE ON CHRONIC COMBINED SYSTOLIC AND DIASTOLIC CONGESTIVE HEART FAILURE (HCC): Status: ACTIVE | Noted: 2019-01-02

## 2019-11-24 PROBLEM — M25.531 CHRONIC PAIN OF RIGHT WRIST: Status: ACTIVE | Noted: 2019-11-24

## 2019-11-24 PROBLEM — R77.8 ELEVATED TROPONIN LEVEL NOT DUE MYOCARDIAL INFARCTION: Status: ACTIVE | Noted: 2019-11-24

## 2019-11-24 PROBLEM — N18.9 ACUTE KIDNEY INJURY SUPERIMPOSED ON CKD (HCC): Status: ACTIVE | Noted: 2019-11-24

## 2019-11-24 PROBLEM — G89.29 CHRONIC PAIN OF RIGHT WRIST: Status: ACTIVE | Noted: 2019-11-24

## 2019-11-24 LAB
ANION GAP SERPL CALCULATED.3IONS-SCNC: 7 MMOL/L (ref 4–13)
BASOPHILS # BLD AUTO: 0.04 THOUSANDS/ΜL (ref 0–0.1)
BASOPHILS NFR BLD AUTO: 1 % (ref 0–1)
BUN SERPL-MCNC: 47 MG/DL (ref 5–25)
CALCIUM SERPL-MCNC: 9.8 MG/DL (ref 8.3–10.1)
CHLORIDE SERPL-SCNC: 101 MMOL/L (ref 100–108)
CO2 SERPL-SCNC: 28 MMOL/L (ref 21–32)
CREAT SERPL-MCNC: 1.79 MG/DL (ref 0.6–1.3)
EOSINOPHIL # BLD AUTO: 0.09 THOUSAND/ΜL (ref 0–0.61)
EOSINOPHIL NFR BLD AUTO: 1 % (ref 0–6)
ERYTHROCYTE [DISTWIDTH] IN BLOOD BY AUTOMATED COUNT: 15.7 % (ref 11.6–15.1)
GFR SERPL CREATININE-BSD FRML MDRD: 37 ML/MIN/1.73SQ M
GLUCOSE SERPL-MCNC: 93 MG/DL (ref 65–140)
HCT VFR BLD AUTO: 45.8 % (ref 36.5–49.3)
HGB BLD-MCNC: 14.9 G/DL (ref 12–17)
IMM GRANULOCYTES # BLD AUTO: 0.01 THOUSAND/UL (ref 0–0.2)
IMM GRANULOCYTES NFR BLD AUTO: 0 % (ref 0–2)
LYMPHOCYTES # BLD AUTO: 1.08 THOUSANDS/ΜL (ref 0.6–4.47)
LYMPHOCYTES NFR BLD AUTO: 17 % (ref 14–44)
MCH RBC QN AUTO: 31.4 PG (ref 26.8–34.3)
MCHC RBC AUTO-ENTMCNC: 32.5 G/DL (ref 31.4–37.4)
MCV RBC AUTO: 96 FL (ref 82–98)
MONOCYTES # BLD AUTO: 0.56 THOUSAND/ΜL (ref 0.17–1.22)
MONOCYTES NFR BLD AUTO: 9 % (ref 4–12)
NEUTROPHILS # BLD AUTO: 4.64 THOUSANDS/ΜL (ref 1.85–7.62)
NEUTS SEG NFR BLD AUTO: 72 % (ref 43–75)
NT-PROBNP SERPL-MCNC: 6437 PG/ML
PLATELET # BLD AUTO: 119 THOUSANDS/UL (ref 149–390)
PMV BLD AUTO: 11.6 FL (ref 8.9–12.7)
POTASSIUM SERPL-SCNC: 4.1 MMOL/L (ref 3.5–5.3)
RBC # BLD AUTO: 4.75 MILLION/UL (ref 3.88–5.62)
SODIUM SERPL-SCNC: 136 MMOL/L (ref 136–145)
TROPONIN I SERPL-MCNC: 0.09 NG/ML
TROPONIN I SERPL-MCNC: 0.1 NG/ML
TROPONIN I SERPL-MCNC: 0.11 NG/ML
WBC # BLD AUTO: 6.42 THOUSAND/UL (ref 4.31–10.16)

## 2019-11-24 PROCEDURE — 84484 ASSAY OF TROPONIN QUANT: CPT | Performed by: EMERGENCY MEDICINE

## 2019-11-24 PROCEDURE — 36415 COLL VENOUS BLD VENIPUNCTURE: CPT | Performed by: EMERGENCY MEDICINE

## 2019-11-24 PROCEDURE — 93005 ELECTROCARDIOGRAM TRACING: CPT

## 2019-11-24 PROCEDURE — 99222 1ST HOSP IP/OBS MODERATE 55: CPT | Performed by: INTERNAL MEDICINE

## 2019-11-24 PROCEDURE — 85025 COMPLETE CBC W/AUTO DIFF WBC: CPT | Performed by: EMERGENCY MEDICINE

## 2019-11-24 PROCEDURE — 83880 ASSAY OF NATRIURETIC PEPTIDE: CPT | Performed by: EMERGENCY MEDICINE

## 2019-11-24 PROCEDURE — 80048 BASIC METABOLIC PNL TOTAL CA: CPT | Performed by: EMERGENCY MEDICINE

## 2019-11-24 PROCEDURE — 99285 EMERGENCY DEPT VISIT HI MDM: CPT

## 2019-11-24 PROCEDURE — 99285 EMERGENCY DEPT VISIT HI MDM: CPT | Performed by: EMERGENCY MEDICINE

## 2019-11-24 PROCEDURE — 71046 X-RAY EXAM CHEST 2 VIEWS: CPT

## 2019-11-24 PROCEDURE — 84484 ASSAY OF TROPONIN QUANT: CPT | Performed by: INTERNAL MEDICINE

## 2019-11-24 PROCEDURE — 96374 THER/PROPH/DIAG INJ IV PUSH: CPT

## 2019-11-24 RX ORDER — SILDENAFIL 100 MG/1
100 TABLET, FILM COATED ORAL DAILY PRN
Status: DISCONTINUED | OUTPATIENT
Start: 2019-11-24 | End: 2019-11-24

## 2019-11-24 RX ORDER — FUROSEMIDE 10 MG/ML
40 INJECTION INTRAMUSCULAR; INTRAVENOUS
Status: DISCONTINUED | OUTPATIENT
Start: 2019-11-24 | End: 2019-11-25

## 2019-11-24 RX ORDER — POTASSIUM CHLORIDE 750 MG/1
20 TABLET, EXTENDED RELEASE ORAL 2 TIMES DAILY WITH MEALS
Status: DISCONTINUED | OUTPATIENT
Start: 2019-11-24 | End: 2019-12-01 | Stop reason: HOSPADM

## 2019-11-24 RX ORDER — PANTOPRAZOLE SODIUM 40 MG/1
40 TABLET, DELAYED RELEASE ORAL
Status: DISCONTINUED | OUTPATIENT
Start: 2019-11-25 | End: 2019-12-01 | Stop reason: HOSPADM

## 2019-11-24 RX ORDER — MELATONIN
1000 DAILY
Status: DISCONTINUED | OUTPATIENT
Start: 2019-11-25 | End: 2019-12-01 | Stop reason: HOSPADM

## 2019-11-24 RX ORDER — BUMETANIDE 0.25 MG/ML
1 INJECTION, SOLUTION INTRAMUSCULAR; INTRAVENOUS ONCE
Status: COMPLETED | OUTPATIENT
Start: 2019-11-24 | End: 2019-11-24

## 2019-11-24 RX ORDER — BENZONATATE 100 MG/1
100 CAPSULE ORAL 3 TIMES DAILY PRN
Status: DISCONTINUED | OUTPATIENT
Start: 2019-11-24 | End: 2019-12-01 | Stop reason: HOSPADM

## 2019-11-24 RX ORDER — VITAMIN E 268 MG
800 CAPSULE ORAL DAILY
Status: DISCONTINUED | OUTPATIENT
Start: 2019-11-25 | End: 2019-12-01 | Stop reason: HOSPADM

## 2019-11-24 RX ORDER — DILTIAZEM HYDROCHLORIDE 180 MG/1
180 CAPSULE, COATED, EXTENDED RELEASE ORAL DAILY
Status: DISCONTINUED | OUTPATIENT
Start: 2019-11-25 | End: 2019-12-01 | Stop reason: HOSPADM

## 2019-11-24 RX ORDER — ASCORBIC ACID 500 MG
1000 TABLET ORAL DAILY
Status: DISCONTINUED | OUTPATIENT
Start: 2019-11-25 | End: 2019-12-01 | Stop reason: HOSPADM

## 2019-11-24 RX ORDER — HYDROCODONE BITARTRATE AND ACETAMINOPHEN 5; 325 MG/1; MG/1
1 TABLET ORAL EVERY 6 HOURS PRN
Status: DISCONTINUED | OUTPATIENT
Start: 2019-11-24 | End: 2019-12-01 | Stop reason: HOSPADM

## 2019-11-24 RX ADMIN — Medication 400 MG: at 18:32

## 2019-11-24 RX ADMIN — POTASSIUM CHLORIDE 20 MEQ: 750 TABLET, EXTENDED RELEASE ORAL at 17:55

## 2019-11-24 RX ADMIN — BUMETANIDE 1 MG: 0.25 INJECTION INTRAMUSCULAR; INTRAVENOUS at 15:28

## 2019-11-24 RX ADMIN — APIXABAN 5 MG: 5 TABLET, FILM COATED ORAL at 17:55

## 2019-11-24 RX ADMIN — FUROSEMIDE 40 MG: 10 INJECTION, SOLUTION INTRAMUSCULAR; INTRAVENOUS at 18:32

## 2019-11-24 NOTE — ASSESSMENT & PLAN NOTE
History of chronic left shoulder pain status post steroid injection  Still in pain with minimal movement  Continue with patient's home medication hydrocodone p r n

## 2019-11-24 NOTE — ASSESSMENT & PLAN NOTE
Wt Readings from Last 3 Encounters:   11/24/19 130 kg (287 lb)   11/18/19 129 kg (285 lb)   09/06/19 126 kg (277 lb)       Acute on chronic diastolic congestive failure, present on admission, as evidenced by shortness of breath, chest x-ray finding of interstitial edema and right pleural effusion (my read), proBNP 6,437    Last echo (January 2019), EF 45% with moderate akinesis and ventricular dilation    · Monitor on telemetry  · Follow-up official chest x-ray read  · 1 mg Bumex given in the ED  · Switch to IV Lasix 40 mg b i d   For now  · Daily weight  · Strict Is/Os  · Goal net -1 L in 24 hours  · Continue with Cardizem  · Check echo in the morning  · Cardiology consulted, appreciate recommendations

## 2019-11-24 NOTE — H&P
H&P- Linwood Jurist 1945, 69 y o  male MRN: [id number]    Unit/Bed#: [address] [place] [address] Encounter: [id number]    Primary Care Provider: Alex Rivera MD   Date and time admitted to hospital: 11/24/2019  2:21 PM        * Acute on chronic combined systolic and diastolic congestive heart failure (HCC)  Assessment & Plan  Wt Readings from Last 3 Encounters:   11/24/19 130 kg (287 lb)   11/18/19 129 kg (285 lb)   09/06/19 126 kg (277 lb)       Acute on chronic diastolic congestive failure, present on admission, as evidenced by shortness of breath, chest x-ray finding of interstitial edema and right pleural effusion (my read), proBNP 6,437    Last echo (January 2019), EF 45% with moderate akinesis and ventricular dilation    · Monitor on telemetry  · Follow-up official chest x-ray read  · 1 mg Bumex given in the ED  · Switch to IV Lasix 40 mg b i d  For now  · Daily weight  · Strict Is/Os  · Goal net -1 L in 24 hours  · Continue with Cardizem  · Check echo in the morning  · Cardiology consulted, appreciate recommendations    Elevated troponin level not due myocardial infarction  Assessment & Plan  Elevated troponin, 0 09 the setting of acute on chronic CHF  No EKG changes seen  No chest pain  Trend troponin    Acute kidney injury superimposed on CKD [facility])  Assessment & Plan  · Patient presenting with acute kidney injury, creatinine 1 79, on presentation superimposed on CKD, baseline creatinine 1 12-1 5, in setting of cardiorenal syndrome  · IV Lasix 40 mg b i d  Started  · Continue with diuretics  · Trend BMP    Chronic pain of right wrist  Assessment & Plan  History of chronic pain of the right wrist, in the setting of fracture 1 year ago  Patient reports that he initially set his wrist himself and by the time he presented to Orthopedics it was too late to do anything   They recommended other interventions but he is unwilling to have any surgeries as he believes it would hamper his ability to use his crutches prn   · Pain control with hydrocodone  · No acute intervention    Chronic left shoulder pain  Assessment & Plan  History of chronic left shoulder pain status post steroid injection  Still in pain with minimal movement  Continue with patient's home medication hydrocodone p r n  Persistent atrial fibrillation  Assessment & Plan  History of persistent Afib, currently rate controlled  Continue diltiazem and Eliquis    Essential hypertension  Assessment & Plan  History of hypertension  Continue with Cardizem and Lasix for now  Hold losartan and spironolactone secondary to elevated creatinine  Losartan and spironolactone can resume tomorrow once creatinine is at baseline    Gastroesophageal reflux disease without esophagitis  Assessment & Plan  History of GERD continue with PPI          VTE Prophylaxis: Apixaban (Eliquis)  / reason for no mechanical VTE prophylaxis Patient has small wounds on both legs   Code Status:  Full code  POLST: There is no POLST form on file for this patient (pre-hospital)  Discussion with family:  Patient would like to update family himself at this point  Anticipated Length of Stay:  Patient will be admitted on an Inpatient basis with an anticipated length of stay of  greater than 2 midnights  Justification for Hospital Stay:  Acute CHF    Total Time for Visit, including Counseling / Coordination of Care: 45 minutes  Greater than 50% of this total time spent on direct patient counseling and coordination of care  Chief Complaint:  Shortness of breath    History of Present Illness:    Ric Mendieta is a 69 y o  male with past medical history of combined systolic and diastolic CHF, EF 84% , AFib on Eliquis who presents with shortness of breath of 10 days duration, associated with cough, congestion, bilateral lower extremity swelling worse on the left    Patient was seen by his primary care physician and started on Z-Jacinto with improvement of upper respiratory tract infections but bilateral lower extremity swelling increased  Patient reports weight gain of over 17 lb  At baseline patient alternates between the wheelchair and crutches but he denies orthopnea or hemoptysis  In the ER, patient was tachypneic respiratory rate 23 but hemodynamically stable and saturating 96% on room air  Labs were notable for BUN 47, creatinine 1 79, troponin 0 09, proBNP 6,437, and chest x-ray finding of pleural effusion (preliminary read)  Review of Systems:    Review of Systems   Constitutional: Positive for fatigue and unexpected weight change (weight gain)  Negative for activity change, appetite change, chills, diaphoresis and fever  HENT: Positive for congestion and postnasal drip  Negative for dental problem and drooling  Respiratory: Positive for cough and shortness of breath  Negative for apnea, choking, chest tightness, wheezing and stridor  Gastrointestinal: Negative for abdominal distention, abdominal pain, anal bleeding, blood in stool and constipation  Genitourinary: Negative for difficulty urinating, dysuria, enuresis, flank pain, frequency and genital sores  Musculoskeletal: Positive for arthralgias (Left shoulder pain, right wrist pain) and gait problem (wheelchair bound, transfers)  Negative for back pain  Skin: Positive for color change (stasis dermatitis) and wound (small scratches noted on both legs)  Neurological: Negative for dizziness, tremors, seizures, syncope, facial asymmetry, speech difficulty, weakness, light-headedness, numbness and headaches  Hematological: Negative for adenopathy  Does not bruise/bleed easily  Psychiatric/Behavioral: Negative for agitation, behavioral problems, confusion, decreased concentration, dysphoric mood and hallucinations  The patient is not nervous/anxious and is not hyperactive          Past Medical and Surgical History:     Past Medical History:   Diagnosis Date    Atrial fibrillation [facility])     Cardiac disease     Cellulitis     Chronic pain     Chronic venous hypertension     with ulceration    GERD (gastroesophageal reflux disease)     Hyperlipidemia     Hypertension     Obesity     Pulmonary hypertension (HCC)     PVD (peripheral vascular disease) ([facility] [address]     [facility]        Past Surgical History:   Procedure Laterality Date    ANTERIOR RELEASE VERTEBRAL BODY W/ POSTERIOR FUSION      APPENDECTOMY  1955    CATARACT EXTRACTION      DECOMPRESSION SPINE LUMBAR POSTERIOR      ELBOW SURGERY      KNEE SURGERY      NOSE SURGERY      turbinectomy    RETINAL DETACHMENT SURGERY      RHINOPLASTY      TONSILLECTOMY      VEIN LIGATION AND STRIPPING      saphenous vein long       Meds/Allergies:    Prior to Admission medications    Medication Sig Start Date End Date Taking?  Authorizing Provider   apixaban (ELIQUIS) 5 mg Take 5 mg by mouth 2 (two) times a day     Historical Provider, MD   Ascorbic Acid (VITAMIN C) 1000 MG tablet Take 1,000 mg by mouth daily    Historical Provider, MD   benzonatate (TESSALON PERLES) 100 mg capsule Take 1 capsule (100 mg total) by mouth 3 (three) times a day as needed for cough 9/3/19   Alejandra Hernandez MD   bumetanide (BUMEX) 2 mg tablet Take 1 tablet (2 mg total) by mouth 2 (two) times a day  Patient taking differently: Take 2 mg by mouth Take one tablet by mouth every Tues, Thurs, Sat and Sunday and 1 tablet twice a day on Mon, Wed and Friday 1/16/19   Ghada Judd MD   cholecalciferol (VITAMIN D3) 1,000 units tablet Take 1,000 Units by mouth daily    Historical Provider, MD   diltiazem (CARDIZEM CD) 180 mg 24 hr capsule Take 180 mg by mouth 2 (two) times a day     Historical Provider, MD   HYDROcodone-acetaminophen ([facility]) 7 5-325 mg per tablet Take 1 tablet by mouth every 6 (six) hours as needed for pain Hydrocodone-acetaminophen 7 5-750mg Max Daily Amount: 4 tablets 10/10/19   Alejandra Hernandez MD   losartan (COZAAR) 50 mg tablet Take 0 5 tablets (25 mg total) by mouth daily 4/9/19   Stacey He MD   meloxicam (MOBIC) 15 mg tablet Take 1 tablet (15 mg total) by mouth daily 4/24/19   Misbah Shaikh PA-C   naproxen (NAPROSYN) 500 mg tablet Take 1 tablet (500 mg total) by mouth 2 (two) times a day with meals for 10 days 7/26/19 11/18/19  Anna Lackey PA-C   pantoprazole (PROTONIX) 40 mg tablet Take 1 tablet (40 mg total) by mouth daily 7/3/19   Stacey He MD   potassium chloride (K-DUR) 10 mEq tablet TAKE THREE TABLETS BY MOUTH TWICE A DAY IN THE MORNING AND AT [facility] TIME 8/26/19   Historical Provider, MD   sildenafil (VIAGRA) 100 mg tablet Take 100 mg by mouth daily as needed for erectile dysfunction    Historical Provider, MD   sodium chloride (OCEAN) 0 65 % nasal spray 1 spray into each nostril as needed for congestion    Historical Provider, MD   spironolactone (ALDACTONE) 25 mg tablet Take 1 tablet (25 mg total) by mouth daily 11/21/19   Stacey He MD   vitamin E, tocopherol, 400 units capsule Take 800 Units by mouth daily    Historical Provider, MD MARADIAGA have reviewed home medications using allscripts      Allergies: No Known Allergies    Social History:     Marital Status:  Single  Occupation:  Retired  Patient Pre-hospital Living Situation:  Lives with his son and grandson  Patient Pre-hospital Level of Mobility:  Wheelchair bound  Patient Pre-hospital Diet Restrictions:  Cardiac diet, low-salt  Substance Use History:   Social History     Substance and Sexual Activity   Alcohol Use No    Frequency: Never     Social History     Tobacco Use   Smoking Status Never Smoker   Smokeless Tobacco Never Used     Social History     Substance and Sexual Activity   Drug Use No       Family History:    Family History   Problem Relation Age of Onset    Cancer Mother         cancer of uterus    Diabetes Sister     Mental illness Neg Hx         neg fam hx    Substance Abuse Neg Hx         neg fam hx       Physical Exam:     Vitals:   Blood Pressure: 118/74 (11/24/19 1700)  Pulse: 63 (11/24/19 1700)  Temperature: 97 7 °F (36 5 °C) (11/24/19 1700)  Temp Source: Oral (11/24/19 1700)  Respirations: 22 (11/24/19 1700)  Height: 6' 4" (193 cm) (11/24/19 1426)  Weight - Scale: 130 kg (287 lb) (11/24/19 1745)  SpO2: 92 % (11/24/19 1700)    Physical Exam   Constitutional: He is oriented to person, place, and time  He appears well-developed and well-nourished  HENT:   Head: Normocephalic and atraumatic  Right Ear: External ear normal    Left Ear: External ear normal    Nose: Nose normal    Eyes: Pupils are equal, round, and reactive to light  Conjunctivae and EOM are normal  Right eye exhibits no discharge  Left eye exhibits no discharge  No scleral icterus  Neck: Normal range of motion  Neck supple  No thyromegaly present  Cardiovascular: An irregular rhythm present  Murmur heard  Systolic murmur is present with a grade of 2/6  Pulmonary/Chest: No stridor  Tachypnea noted  He has decreased breath sounds in the right lower field and the left lower field  He has no wheezes  He has no rhonchi  He has no rales  Abdominal: Soft  Bowel sounds are normal  He exhibits no distension and no mass  There is no tenderness  There is no guarding  Musculoskeletal:        Left shoulder: He exhibits decreased range of motion, tenderness (Chronic tender shoulder s/p steroid injection), bony tenderness and decreased strength  He exhibits no swelling and no effusion  Right wrist: He exhibits decreased range of motion, tenderness, bony tenderness, swelling and deformity (Deformed right wrist s/p fracture one year ago, for which he never saw orthopedics until later)  Neurological: He is alert and oriented to person, place, and time  He displays normal reflexes  No cranial nerve deficit  Coordination normal    Skin: Capillary refill takes 2 to 3 seconds  Rash (Bilateral stasis dermatitis) noted  He is not diaphoretic  There is erythema  No pallor     Cold feet   Psychiatric: He has a normal mood and affect  His behavior is normal        Additional Data:     Lab Results: I have personally reviewed pertinent reports  Results from last 7 days   Lab Units 11/24/19  1520   WBC Thousand/uL 6 42   HEMOGLOBIN g/dL 14 9   HEMATOCRIT % 45 8   PLATELETS Thousands/uL 119*   NEUTROS PCT % 72   LYMPHS PCT % 17   MONOS PCT % 9   EOS PCT % 1     Results from last 7 days   Lab Units 11/24/19  1520   SODIUM mmol/L 136   POTASSIUM mmol/L 4 1   CHLORIDE mmol/L 101   CO2 mmol/L 28   BUN mg/dL 47*   CREATININE mg/dL 1 79*   ANION GAP mmol/L 7   CALCIUM mg/dL 9 8   GLUCOSE RANDOM mg/dL 93                       Imaging: I have personally reviewed pertinent reports  No orders to display       EKG, Pathology, and Other Studies Reviewed on Admission:   · EKG:  Rate controlled AFib    Allscripts / [facility] Records Reviewed: Yes     ** Please Note: This note has been constructed using a voice recognition system   **

## 2019-11-24 NOTE — ASSESSMENT & PLAN NOTE
History of hypertension  Continue with Cardizem and Lasix for now  Hold losartan and spironolactone secondary to elevated creatinine  Losartan and spironolactone can resume tomorrow once creatinine is at baseline

## 2019-11-24 NOTE — ASSESSMENT & PLAN NOTE
Elevated troponin, 0 09 the setting of acute on chronic CHF  No EKG changes seen  No chest pain  Trend troponin

## 2019-11-24 NOTE — TELEPHONE ENCOUNTER
Carri Haynes 1945  CONFIDENTIALTY NOTICE: This fax transmission is intended only for the addressee  It contains information that is legally privileged,  confidential or otherwise protected from use or disclosure  If you are not the intended recipient, you are strictly prohibited from reviewing,  disclosing, copying using or disseminating any of this information or taking any action in reliance on or regarding this information  If you have  received this fax in error, please notify us immediately by telephone so that we can arrange for its return to us  Page:   Call Id: [id number]  Health Call  Standard Call Report  Health Call  Patient Name: Carri Haynes  Gender: Male  : 1945  Age: 69 Y 3 M 22 D  Return Phone  Number: [phone] (Cell)  Address: [place] [address]  City/State/Zip: [address] [place] [address]  Practice Name: [facility]  Practice Charged:  Physician:  [address] [place] [address] Name:  Relationship To  Patient: Self  Return Phone Number: [phone] (Cell)  Presenting Problem: "I am short of breath  I'm not sure if I  should go to the ER "  Service Type: Triage  Charged Service 1:  Pharmacy Name and  Number:  Nurse Assessment  Nurse: Molinda Saint Date/Time: 2019 10:35:55  AM  Type of assessment required:  ---General (Adult or Child)  Duration of Current S/S  ---1 week  Location/Radiation  ---Breathing  Temperature (F) and route:  ---Denies  Symptom Specific Meds (Dose/Time):  ---Regular medicine  Other S/S  ---Patient stated that he has been short of breath for one week  Patient stated that he is  SOB even at rest  Patient has been having non productive cough  Denies any chest pain  Patient stated that his "left leg from knee down is swollen, and it looks like 8 times  bigger than other lag " Patient denies any other symptoms    Pain Scale on scale of 1-10, 10 being the worst:  ---Denies  Symptom progression:  ---worse  Carri Haynes 1945  CONFIDENTIALTY NOTICE: This fax transmission is intended only for the addressee  It contains information that is legally privileged,  confidential or otherwise protected from use or disclosure  If you are not the intended recipient, you are strictly prohibited from reviewing,  disclosing, copying using or disseminating any of this information or taking any action in reliance on or regarding this information  If you have  received this fax in error, please notify us immediately by telephone so that we can arrange for its return to us  Page: 2 of 2  Call Id: [id number]  Nurse Assessment  Intake and Output  ---Normal intake, reduced urination  Last Exam/Treatment:  ---11/18/2019  Protocols  Protocol Title Nurse Date/Time  Breathing Difficulty Diego Keith 11/24/2019 10:46:56 AM  Question Caller Affirmed  Disp  Time Disposition Final User  11/24/2019 10:49:13 AM See Physician within 4 Hours (or PCP  triage)  Yes [facility] [address] Advice Given Per Protocol  SEE PHYSICIAN WITHIN 4 HOURS (or PCP triage): * IF OFFICE WILL BE CLOSED AND NO PCP TRIAGE: You need to be seen  within the next 3 or 4 hours  A nearby Urgent Care Center is often a good source of care  Another choice is to go to the ER  Go sooner if  you become worse  CALL BACK IF: * You become worse  CARE ADVICE given per Breathing Difficulty (Adult) guideline  Caller Understands: Yes  Caller Disagree/Comply: Comply  PreDisposition: Unsure  Comments  User: Moris Acevedo Date/Time: 11/24/2019 10:50:59 AM  Patient agreed to go to [place]  in [facility]  Patient stated that his son would drive him to ER as soon as he gets home  any minute

## 2019-11-24 NOTE — ASSESSMENT & PLAN NOTE
History of chronic pain of the right wrist, in the setting of fracture 1 year ago  Patient reports that he initially set his wrist himself and by the time he presented to Orthopedics it was too late to do anything  They recommended other interventions but he is unwilling to have any surgeries as he believes it would hamper his ability to use his crutches prn    · Pain control with hydrocodone  · No acute intervention

## 2019-11-24 NOTE — PLAN OF CARE
Problem: Potential for Falls  Goal: Patient will remain free of falls  Description  INTERVENTIONS:  - Assess patient frequently for physical needs  -  Identify cognitive and physical deficits and behaviors that affect risk of falls    -  [place] fall precautions as indicated by assessment   - Educate patient/family on patient safety including physical limitations  - Instruct patient to call for assistance with activity based on assessment  - Modify environment to reduce risk of injury  - Consider OT/PT consult to assist with strengthening/mobility  Outcome: Progressing     Problem: PAIN - ADULT  Goal: Verbalizes/displays adequate comfort level or baseline comfort level  Description  Interventions:  - Encourage patient to monitor pain and request assistance  - Assess pain using appropriate pain scale  - Administer analgesics based on type and severity of pain and evaluate response  - Implement non-pharmacological measures as appropriate and evaluate response  - Consider cultural and social influences on pain and pain management  - Notify physician/advanced practitioner if interventions unsuccessful or patient reports new pain  Outcome: Progressing     Problem: INFECTION - ADULT  Goal: Absence or prevention of progression during hospitalization  Description  INTERVENTIONS:  - Assess and monitor for signs and symptoms of infection  - Monitor lab/diagnostic results  - Monitor all insertion sites, i e  indwelling lines, tubes, and drains    - [place] appropriate cooling/warming therapies per order  - Administer medications as ordered  - Instruct and encourage patient and family to use good hand hygiene technique  - Identify and instruct in appropriate isolation precautions for identified infection/condition   Outcome: Progressing     Problem: SAFETY ADULT  Goal: Maintain or return to baseline ADL function  Description  INTERVENTIONS:  -  Assess patient's ability to carry out ADLs; assess patient's baseline for ADL function and identify physical deficits which impact ability to perform ADLs (bathing, care of mouth/teeth, toileting, grooming, dressing, etc )  - Assess/evaluate cause of self-care deficits   - Assess range of motion  - Assess patient's mobility; develop plan if impaired  - Assess patient's need for assistive devices and provide as appropriate  - Encourage maximum independence but intervene and supervise when necessary  - Involve family in performance of ADLs  - Assess for home care needs following discharge   - Consider OT consult to assist with ADL evaluation and planning for discharge  - Provide patient education as appropriate  Outcome: Progressing

## 2019-11-24 NOTE — ED PROVIDER NOTES
History  Chief Complaint   Patient presents with    Shortness of Breath     Pt with dyspnea x 10 days, denies fever, denies N/V/D  This 71-year-old male with history of atrial fibrillation, chronic diastolic congestive heart failure, pulmonary arterial hypertension, peripheral vascular disease and chronic pain states that he has been having shortness of breath for 2 5 weeks  Just combing his hair or bending over try to pull his compression socks on cause shortness of breath  States his left leg has swelling up tremendously over the last few weeks  He states that he has gained 17 pounds within the last month  He had Dopplers done on 10/15/2019 that failed to show any acute DVT of left lower extremity  He has a very mild nonproductive cough  He denies any new chest pain, palpitations, fever and GI symptoms  He states this feels like prior episodes of CHF          Prior to Admission Medications   Prescriptions Last Dose Informant Patient Reported? Taking?    Ascorbic Acid (VITAMIN C) 1000 MG tablet  Self Yes No   Sig: Take 1,000 mg by mouth daily   HYDROcodone-acetaminophen (NORCO) 7 5-325 mg per tablet   No No   Sig: Take 1 tablet by mouth every 6 (six) hours as needed for pain Hydrocodone-acetaminophen 7 5-750mg Max Daily Amount: 4 tablets   apixaban (ELIQUIS) 5 mg  Self Yes No   Sig: Take 5 mg by mouth 2 (two) times a day    benzonatate (TESSALON PERLES) 100 mg capsule   No No   Sig: Take 1 capsule (100 mg total) by mouth 3 (three) times a day as needed for cough   bumetanide (BUMEX) 2 mg tablet  Self No No   Sig: Take 1 tablet (2 mg total) by mouth 2 (two) times a day   Patient taking differently: Take 2 mg by mouth Take one tablet by mouth every Tues, Thurs, Sat and Sunday and 1 tablet twice a day on Mon, Wed and Friday   cholecalciferol (VITAMIN D3) 1,000 units tablet  Self Yes No   Sig: Take 1,000 Units by mouth daily   diltiazem (CARDIZEM CD) 180 mg 24 hr capsule  Self Yes No   Sig: Take 180 mg by mouth 2 (two) times a day    losartan (COZAAR) 50 mg tablet  Self No No   Sig: Take 0 5 tablets (25 mg total) by mouth daily   meloxicam (MOBIC) 15 mg tablet  Self No No   Sig: Take 1 tablet (15 mg total) by mouth daily   naproxen (NAPROSYN) 500 mg tablet  Self No No   Sig: Take 1 tablet (500 mg total) by mouth 2 (two) times a day with meals for 10 days   pantoprazole (PROTONIX) 40 mg tablet  Self No No   Sig: Take 1 tablet (40 mg total) by mouth daily   potassium chloride (K-DUR) 10 mEq tablet   Yes No   Sig: TAKE THREE TABLETS BY MOUTH TWICE A DAY IN THE MORNING AND AT DINNER TIME   sildenafil (VIAGRA) 100 mg tablet  Self Yes No   Sig: Take 100 mg by mouth daily as needed for erectile dysfunction   sodium chloride (OCEAN) 0 65 % nasal spray  Self Yes No   Si spray into each nostril as needed for congestion   spironolactone (ALDACTONE) 25 mg tablet   No No   Sig: Take 1 tablet (25 mg total) by mouth daily   vitamin E, tocopherol, 400 units capsule  Self Yes No   Sig: Take 800 Units by mouth daily      Facility-Administered Medications: None       Past Medical History:   Diagnosis Date    Atrial fibrillation (HCC)     Cardiac disease     Cellulitis     Chronic pain     Chronic venous hypertension     with ulceration    GERD (gastroesophageal reflux disease)     Hyperlipidemia     Hypertension     Obesity     Pulmonary hypertension ([facility] [address] )     PVD [facility]peripheral vascular disease) [facility]     [facility]        Past Surgical History:   Procedure Laterality Date    ANTERIOR RELEASE VERTEBRAL BODY W/ POSTERIOR FUSION      APPENDECTOMY      CATARACT EXTRACTION      DECOMPRESSION SPINE LUMBAR POSTERIOR      ELBOW SURGERY      KNEE SURGERY      NOSE SURGERY      turbinectomy    RETINAL DETACHMENT SURGERY      RHINOPLASTY      TONSILLECTOMY      VEIN LIGATION AND STRIPPING      saphenous vein long       Family History   Problem Relation Age of Onset    Cancer Mother cancer of uterus    Diabetes Sister     Mental illness Neg Hx         neg fam hx    Substance Abuse Neg Hx         neg fam hx     I have reviewed and agree with the history as documented  Social History     Tobacco Use    Smoking status: Never Smoker    Smokeless tobacco: Never Used   Substance Use Topics    Alcohol use: No     Frequency: Never    Drug use: No        Review of Systems   Constitutional: Positive for activity change and unexpected weight change  Negative for chills, diaphoresis, fatigue and fever  HENT: Positive for congestion and postnasal drip  Negative for dental problem, ear pain, facial swelling, rhinorrhea, sinus pressure and sore throat  Eyes: Negative  Respiratory: Positive for cough and shortness of breath  Cardiovascular: Negative  Gastrointestinal: Negative  Endocrine: Negative  Genitourinary: Negative  Musculoskeletal: Positive for arthralgias (Chronic left shoulder pain) and gait problem  Skin: Negative  Allergic/Immunologic: Negative  Hematological: Negative  Psychiatric/Behavioral: Negative  All other systems reviewed and are negative  Physical Exam  Physical Exam   Constitutional: He is oriented to person, place, and time  He appears well-developed  Non-toxic appearance  He appears ill  He appears distressed  Elevated BMI   HENT:   Head: Normocephalic and atraumatic  Right Ear: External ear normal    Left Ear: External ear normal    Mouth/Throat: Oropharynx is clear and moist    Eyes: Pupils are equal, round, and reactive to light  Conjunctivae and EOM are normal    Neck: Normal range of motion  Neck supple  No JVD present  Cardiovascular: Normal rate, normal heart sounds and intact distal pulses  No murmur heard  Irregularly irregular   Pulmonary/Chest: Effort normal  No accessory muscle usage  Tachypnea noted  He has decreased breath sounds  He has no rales  He exhibits no bony tenderness, no crepitus and no deformity  Abdominal: Soft  Bowel sounds are normal  He exhibits no mass  There is no tenderness  There is no rebound and no guarding  Musculoskeletal: Normal range of motion  He exhibits no tenderness  Right lower leg: He exhibits edema  Left lower leg: He exhibits edema  Lymphadenopathy:     He has no cervical adenopathy  Neurological: He is alert and oriented to person, place, and time  He has normal reflexes  No cranial nerve deficit  Coordination normal    Skin: Skin is warm and dry  Capillary refill takes 2 to 3 seconds  No rash noted  There is a 2 centimeter right 1 5 centimeter raised thickened skin lesion of the pannus on the left side of the lower abdomen  Both legs are edematous with darkened skin  There are several superficially opened wounds of both lower extremities  No sign of acute infection  No palpable calf cord  Psychiatric: He has a normal mood and affect  His behavior is normal    Nursing note and vitals reviewed        Vital Signs  ED Triage Vitals   Temperature Pulse Respirations Blood Pressure SpO2   11/24/19 1426 11/24/19 1426 11/24/19 1426 11/24/19 1429 11/24/19 1426   97 9 °F (36 6 °C) 75 22 121/70 96 %      Temp Source Heart Rate Source Patient Position - Orthostatic VS BP Location FiO2 (%)   11/24/19 1426 11/24/19 1426 11/24/19 1429 11/24/19 1429 --   Temporal Monitor Sitting Right arm       Pain Score       11/24/19 1426       No Pain           Vitals:    11/24/19 1430 11/24/19 1445 11/24/19 1500 11/24/19 1530   BP: 121/70 119/59 111/57 111/61   Pulse: 69 71 (!) 48 (!) 53   Patient Position - Orthostatic VS:             Visual Acuity      ED Medications  Medications   bumetanide (BUMEX) injection 1 mg (1 mg Intravenous Given 11/24/19 1528)       Diagnostic Studies  Results Reviewed     Procedure Component Value Units Date/Time    NT-BNP PRO [[id number]]  (Abnormal) Collected:  11/24/19 1520    Lab Status:  Final result Specimen:  Blood from Arm, Right Updated:  11/24/19 1554     NT-proBNP 6,437 pg/mL     Troponin I [[id number]]  (Abnormal) Collected:  11/24/19 1520    Lab Status:  Final result Specimen:  Blood from Arm, Right Updated:  11/24/19 1550     Troponin I 0 09 ng/mL     Basic metabolic panel [[id number]]  (Abnormal) Collected:  11/24/19 1520    Lab Status:  Final result Specimen:  Blood from Arm, Right Updated:  11/24/19 1540     Sodium 136 mmol/L      Potassium 4 1 mmol/L      Chloride 101 mmol/L      CO2 28 mmol/L      ANION GAP 7 mmol/L      BUN 47 mg/dL      Creatinine 1 79 mg/dL      Glucose 93 mg/dL      Calcium 9 8 mg/dL      eGFR 37 ml/min/1 73sq m     Narrative:       [place] guidelines for Chronic Kidney Disease (CKD):     Stage 1 with normal or high GFR (GFR > 90 mL/min/1 73 square meters)    Stage 2 Mild CKD (GFR = 60-89 mL/min/1 73 square meters)    Stage 3A Moderate CKD (GFR = 45-59 mL/min/1 73 square meters)    Stage 3B Moderate CKD (GFR = 30-44 mL/min/1 73 square meters)    Stage 4 Severe CKD (GFR = 15-29 mL/min/1 73 square meters)    Stage 5 End Stage CKD (GFR <15 mL/min/1 73 square meters)  Note: GFR calculation is accurate only with a steady state creatinine    CBC and differential [[id number]]  (Abnormal) Collected:  11/24/19 1520    Lab Status:  Final result Specimen:  Blood from Arm, Right Updated:  11/24/19 1529     WBC 6 42 Thousand/uL      RBC 4 75 Million/uL      Hemoglobin 14 9 g/dL      Hematocrit 45 8 %      MCV 96 fL      MCH 31 4 pg      MCHC 32 5 g/dL      RDW 15 7 %      MPV 11 6 fL      Platelets 532 Thousands/uL      Neutrophils Relative 72 %      Immat GRANS % 0 %      Lymphocytes Relative 17 %      Monocytes Relative 9 %      Eosinophils Relative 1 %      Basophils Relative 1 %      Neutrophils Absolute 4 64 Thousands/µL      Immature Grans Absolute 0 01 Thousand/uL      Lymphocytes Absolute 1 08 Thousands/µL      Monocytes Absolute 0 56 Thousand/µL      Eosinophils Absolute 0 09 Thousand/µL      Basophils Absolute 0 04 Thousands/µL              PROCEDURE: Outpatient PA, lat CXR from today: cardiomegaly, unchanged since3 5/13/19  Blunting of right costophrenic angle, more prominent than on 5/13/19, r/o effusion  No orders to display              Procedures  ECG 12 Lead Documentation Only  Date/Time: 11/24/2019 2:37 PM  Performed by: Bambi Mack DO  Authorized by: Bambi Mack DO     ECG reviewed by me, the ED Provider: yes    Patient location:  ED  Previous ECG:     Previous ECG:  Compared to current    Comparison ECG info:  Compared to EKG of January 2, 2019,    Similarity:  No change  Rhythm:     Rhythm: atrial fibrillation    Ectopy:     Ectopy: PVCs      PVCs:  Unifocal and infrequent  QRS:     QRS axis:  Left    QRS intervals:  Normal  Conduction:     Conduction: normal    ST segments:     ST segments:  Normal  T waves:     T waves: normal    Q waves:     Q waves:  V1 and V2           ED Course  ED Course as of Nov 24 1619   Sun Nov 24, 2019   1612 Patient unchanged  Tachypneic at rest  Elevated BNP and trop  Texted IM for admit                                    MDM  Number of Diagnoses or Management Options  Acute on chronic diastolic congestive heart failure ([facility] [address] ): established and worsening     Amount and/or Complexity of Data Reviewed  Clinical lab tests: ordered and reviewed  Tests in the radiology section of CPT®: ordered and reviewed  Review and summarize past medical records: yes  Discuss the patient with other providers: yes  Independent visualization of images, tracings, or specimens: yes        Disposition  Final diagnoses:   Acute on chronic diastolic congestive heart failure ([address] )     Time reflects when diagnosis was documented in both MDM as applicable and the Disposition within this note     Time User Action Codes Description Comment    11/24/2019  4:12 PM Valeriy Iron Add [I50 33] Acute on chronic diastolic congestive heart failure [facility])       ED Disposition     None Follow-up Information    None         Patient's Medications   Discharge Prescriptions    No medications on file     No discharge procedures on file      ED Provider  Electronically Signed by           Jonathan Pablo DO  11/24/19 7402

## 2019-11-24 NOTE — ASSESSMENT & PLAN NOTE
· Patient presenting with acute kidney injury, creatinine 1 79, on presentation superimposed on CKD, baseline creatinine 1 12-1 5, in setting of cardiorenal syndrome  · IV Lasix 40 mg b i d   Started  · Continue with diuretics  · Trend BMP

## 2019-11-25 ENCOUNTER — APPOINTMENT (INPATIENT)
Dept: NON INVASIVE DIAGNOSTICS | Facility: HOSPITAL | Age: 74
DRG: 291 | End: 2019-11-25
Payer: COMMERCIAL

## 2019-11-25 ENCOUNTER — TELEPHONE (OUTPATIENT)
Dept: FAMILY MEDICINE CLINIC | Facility: HOSPITAL | Age: 74
End: 2019-11-25

## 2019-11-25 LAB
ANION GAP SERPL CALCULATED.3IONS-SCNC: 8 MMOL/L (ref 4–13)
ATRIAL RATE: 416 BPM
BASOPHILS # BLD AUTO: 0.03 THOUSANDS/ΜL (ref 0–0.1)
BASOPHILS NFR BLD AUTO: 1 % (ref 0–1)
BUN SERPL-MCNC: 47 MG/DL (ref 5–25)
CALCIUM SERPL-MCNC: 9.4 MG/DL (ref 8.3–10.1)
CHLORIDE SERPL-SCNC: 101 MMOL/L (ref 100–108)
CO2 SERPL-SCNC: 28 MMOL/L (ref 21–32)
CREAT SERPL-MCNC: 1.63 MG/DL (ref 0.6–1.3)
EOSINOPHIL # BLD AUTO: 0.21 THOUSAND/ΜL (ref 0–0.61)
EOSINOPHIL NFR BLD AUTO: 4 % (ref 0–6)
ERYTHROCYTE [DISTWIDTH] IN BLOOD BY AUTOMATED COUNT: 15.8 % (ref 11.6–15.1)
GFR SERPL CREATININE-BSD FRML MDRD: 41 ML/MIN/1.73SQ M
GLUCOSE SERPL-MCNC: 76 MG/DL (ref 65–140)
HCT VFR BLD AUTO: 45.8 % (ref 36.5–49.3)
HGB BLD-MCNC: 14.6 G/DL (ref 12–17)
IMM GRANULOCYTES # BLD AUTO: 0.01 THOUSAND/UL (ref 0–0.2)
IMM GRANULOCYTES NFR BLD AUTO: 0 % (ref 0–2)
LYMPHOCYTES # BLD AUTO: 1.18 THOUSANDS/ΜL (ref 0.6–4.47)
LYMPHOCYTES NFR BLD AUTO: 21 % (ref 14–44)
MAGNESIUM SERPL-MCNC: 2.2 MG/DL (ref 1.6–2.6)
MCH RBC QN AUTO: 30.8 PG (ref 26.8–34.3)
MCHC RBC AUTO-ENTMCNC: 31.9 G/DL (ref 31.4–37.4)
MCV RBC AUTO: 97 FL (ref 82–98)
MONOCYTES # BLD AUTO: 0.62 THOUSAND/ΜL (ref 0.17–1.22)
MONOCYTES NFR BLD AUTO: 11 % (ref 4–12)
NEUTROPHILS # BLD AUTO: 3.65 THOUSANDS/ΜL (ref 1.85–7.62)
NEUTS SEG NFR BLD AUTO: 63 % (ref 43–75)
PLATELET # BLD AUTO: 100 THOUSANDS/UL (ref 149–390)
PMV BLD AUTO: 12.2 FL (ref 8.9–12.7)
POTASSIUM SERPL-SCNC: 4 MMOL/L (ref 3.5–5.3)
QRS AXIS: -72 DEGREES
QRSD INTERVAL: 110 MS
QT INTERVAL: 442 MS
QTC INTERVAL: 473 MS
RBC # BLD AUTO: 4.74 MILLION/UL (ref 3.88–5.62)
SODIUM SERPL-SCNC: 137 MMOL/L (ref 136–145)
T WAVE AXIS: 94 DEGREES
VENTRICULAR RATE: 69 BPM
WBC # BLD AUTO: 5.7 THOUSAND/UL (ref 4.31–10.16)

## 2019-11-25 PROCEDURE — 93010 ELECTROCARDIOGRAM REPORT: CPT | Performed by: INTERNAL MEDICINE

## 2019-11-25 PROCEDURE — 99222 1ST HOSP IP/OBS MODERATE 55: CPT | Performed by: INTERNAL MEDICINE

## 2019-11-25 PROCEDURE — 93971 EXTREMITY STUDY: CPT | Performed by: INTERNAL MEDICINE

## 2019-11-25 PROCEDURE — 83735 ASSAY OF MAGNESIUM: CPT | Performed by: NURSE PRACTITIONER

## 2019-11-25 PROCEDURE — 85025 COMPLETE CBC W/AUTO DIFF WBC: CPT | Performed by: INTERNAL MEDICINE

## 2019-11-25 PROCEDURE — 87081 CULTURE SCREEN ONLY: CPT | Performed by: HOSPITALIST

## 2019-11-25 PROCEDURE — 93306 TTE W/DOPPLER COMPLETE: CPT

## 2019-11-25 PROCEDURE — 80048 BASIC METABOLIC PNL TOTAL CA: CPT | Performed by: NURSE PRACTITIONER

## 2019-11-25 PROCEDURE — 93971 EXTREMITY STUDY: CPT

## 2019-11-25 PROCEDURE — 99233 SBSQ HOSP IP/OBS HIGH 50: CPT | Performed by: HOSPITALIST

## 2019-11-25 PROCEDURE — 93306 TTE W/DOPPLER COMPLETE: CPT | Performed by: INTERNAL MEDICINE

## 2019-11-25 RX ORDER — BUMETANIDE 0.25 MG/ML
0.5 INJECTION INTRAMUSCULAR; INTRAVENOUS CONTINUOUS
Status: DISCONTINUED | OUTPATIENT
Start: 2019-11-25 | End: 2019-11-29

## 2019-11-25 RX ORDER — FUROSEMIDE 10 MG/ML
40 INJECTION INTRAMUSCULAR; INTRAVENOUS ONCE
Status: COMPLETED | OUTPATIENT
Start: 2019-11-25 | End: 2019-11-25

## 2019-11-25 RX ORDER — FUROSEMIDE 10 MG/ML
80 INJECTION INTRAMUSCULAR; INTRAVENOUS
Status: DISCONTINUED | OUTPATIENT
Start: 2019-11-25 | End: 2019-11-25

## 2019-11-25 RX ADMIN — PANTOPRAZOLE SODIUM 40 MG: 40 TABLET, DELAYED RELEASE ORAL at 06:13

## 2019-11-25 RX ADMIN — POTASSIUM CHLORIDE 20 MEQ: 750 TABLET, EXTENDED RELEASE ORAL at 16:39

## 2019-11-25 RX ADMIN — DILTIAZEM HYDROCHLORIDE 180 MG: 180 CAPSULE, COATED, EXTENDED RELEASE ORAL at 09:09

## 2019-11-25 RX ADMIN — POTASSIUM CHLORIDE 20 MEQ: 750 TABLET, EXTENDED RELEASE ORAL at 07:40

## 2019-11-25 RX ADMIN — MELATONIN 1000 UNITS: at 09:09

## 2019-11-25 RX ADMIN — Medication 0.5 MG/HR: at 14:04

## 2019-11-25 RX ADMIN — Medication 800 UNITS: at 09:14

## 2019-11-25 RX ADMIN — APIXABAN 5 MG: 5 TABLET, FILM COATED ORAL at 19:29

## 2019-11-25 RX ADMIN — APIXABAN 5 MG: 5 TABLET, FILM COATED ORAL at 09:09

## 2019-11-25 RX ADMIN — Medication 400 MG: at 09:10

## 2019-11-25 RX ADMIN — FUROSEMIDE 40 MG: 10 INJECTION, SOLUTION INTRAMUSCULAR; INTRAVENOUS at 09:07

## 2019-11-25 RX ADMIN — HYDROCODONE BITARTRATE AND ACETAMINOPHEN 1 TABLET: 5; 325 TABLET ORAL at 22:09

## 2019-11-25 RX ADMIN — Medication 400 MG: at 19:29

## 2019-11-25 RX ADMIN — OXYCODONE HYDROCHLORIDE AND ACETAMINOPHEN 1000 MG: 500 TABLET ORAL at 09:09

## 2019-11-25 RX ADMIN — HYDROCODONE BITARTRATE AND ACETAMINOPHEN 1 TABLET: 5; 325 TABLET ORAL at 06:22

## 2019-11-25 RX ADMIN — FUROSEMIDE 40 MG: 10 INJECTION, SOLUTION INTRAMUSCULAR; INTRAVENOUS at 07:40

## 2019-11-25 NOTE — ASSESSMENT & PLAN NOTE
-History of chronic pain of the right wrist, in the setting of fracture 1 year ago  Patient reports that he initially set his wrist himself and by the time he presented to Orthopedics it was too late to do anything   They recommended other interventions but he is unwilling to have any surgeries as he believes it would hamper his ability to use his crutches prn   -Pain control with hydrocodone (chronic opioid use/dependence)  -No acute intervention

## 2019-11-25 NOTE — NURSING NOTE
Pt described feeling of bladder fullness, was bladder scanned and found to have 736 ml  Urinary retention protocols obtained  Pt asked to try again prior to straight cath, was able to urinate 450 ml  Follow up bladder scan revealed 233 ml  ZOE Loving RN

## 2019-11-25 NOTE — ASSESSMENT & PLAN NOTE
-Indeterminate, 0 09/0 11/0 10 the setting of acute on chronic CHF  -No ischemic EKG changes or chest pain

## 2019-11-25 NOTE — ASSESSMENT & PLAN NOTE
-History of chronic left shoulder pain status post steroid injection  -Still in pain with minimal movement  -Continue with patient's home medication hydrocodone p r n

## 2019-11-25 NOTE — PROGRESS NOTES
Progress Note - Linwood Mazariegos 1945, 69 y o  male MRN: [id number]    Unit/Bed#: [address] [place] [address] Encounter: [id number]    Primary Care Provider: Enrique Villagran MD   Date and time admitted to hospital: 11/24/2019  2:21 PM        Elevated troponin level not due myocardial infarction  Assessment & Plan  -Indeterminate, 0 09/0 11/0 10 the setting of acute on chronic CHF  -No ischemic EKG changes or chest pain    Acute kidney injury superimposed on CKD (HCC)  Assessment & Plan  -Cr 1 79, on presentation   -baseline Cr 1 5  -pt with cardiorenal syndrome  -Cr improving with IV lasix    Chronic pain of right wrist  Assessment & Plan  -History of chronic pain of the right wrist, in the setting of fracture 1 year ago  Patient reports that he initially set his wrist himself and by the time he presented to Orthopedics it was too late to do anything  They recommended other interventions but he is unwilling to have any surgeries as he believes it would hamper his ability to use his crutches prn   -Pain control with hydrocodone (chronic opioid use/dependence)  -No acute intervention    Chronic left shoulder pain  Assessment & Plan  -History of chronic left shoulder pain status post steroid injection  -Still in pain with minimal movement  -Continue with patient's home medication hydrocodone p r n      Persistent atrial fibrillation  Assessment & Plan  -Continue diltiazem and Eliquis    Essential hypertension  Assessment & Plan  -cont Cardizem/Lasix for now  -Holding losartan and spironolactone secondary to slightly elevated creatinine (baseline Cr 1 5, was 1 79 on admission, now 1 63)  -based on current BPs, will continue to hold    Class 1 obesity due to excess calories with serious comorbidity and body mass index (BMI) of 34 0 to 34 9 in adult  Assessment & Plan  -encourage wt loss    Gastroesophageal reflux disease without esophagitis  Assessment & Plan  -continue PPI    * Acute on chronic combined systolic and diastolic congestive heart failure [facility])  Assessment & Plan  Wt Readings from Last 3 Encounters:   19 130 kg (285 lb 15 oz)   19 129 kg (285 lb)   19 126 kg (277 lb)       -CXR: Mild pulmonary vascular congestion and small bilateral pleural effusions  -proBNP 6,437  -Last echo (2019), EF 45% with moderate akinesis and ventricular dilation   -check echo this admission  -cont IV Lasix but increase to 80mg IV Q12H  -note that LLE edema>RLE, check LLE U/S        VTE Pharmacologic Prophylaxis:   Pharmacologic: Apixaban (Eliquis)  Mechanical VTE Prophylaxis in Place: No    Patient Centered Rounds: I have performed bedside rounds with nursing staff today  Education and Discussions with Family / Patient: Pt    Time Spent for Care: 20 minutes  More than 50% of total time spent on counseling and coordination of care as described above  Current Length of Stay: 1 day(s)    Current Patient Status: Inpatient   Certification Statement: The patient will continue to require additional inpatient hospital stay due to CHF    Discharge Plan: 3+ days    Code Status: Level 1 - Full Code      Subjective:   Patient currently denies chest pain or shortness of breath  Objective:     Vitals:   Temp (24hrs), Av 7 °F (36 5 °C), Min:97 5 °F (36 4 °C), Max:97 9 °F (36 6 °C)    Temp:  [97 5 °F (36 4 °C)-97 9 °F (36 6 °C)] 97 5 °F (36 4 °C)  HR:  [48-75] 55  Resp:  [14-24] 20  BP: (104-121)/(57-74) 116/69  SpO2:  [92 %-96 %] 95 %  Body mass index is 34 81 kg/m²  Input and Output Summary (last 24 hours):        Intake/Output Summary (Last 24 hours) at 2019 0824  Last data filed at 2019 0755  Gross per 24 hour   Intake 480 ml   Output 2100 ml   Net -1620 ml       Physical Exam:     Physical Exam  Gen: NAD, AAOx3, well developed, well nourished  Eyes: EOMI, PERRLA, no scleral icterus  ENMT:  no nasal discharge, no otic discharge, moist mucous membranes  Neck:  Supple  Cardiovascular:  irreg/irreg, normal S1-S2, no murmurs, rubs, or gallops  Lungs:  Clear to auscultation bilaterally, no wheezes, or rales, or rhonchi  Abdomen:  Positive bowel sounds, soft, nontender, nondistended, no palpable organomegaly   Skin:  Intact, no obvious lesions or rashes, LLE 4+ edema, RLE 3+ edema  Neuro: Cranial nerves 2-12 are intact, non-focal, 5/5 strength in all 4 extremities      Additional Data:     Labs:    Results from last 7 days   Lab Units 11/25/19  0449   WBC Thousand/uL 5 70   HEMOGLOBIN g/dL 14 6   HEMATOCRIT % 45 8   PLATELETS Thousands/uL 100*   NEUTROS PCT % 63   LYMPHS PCT % 21   MONOS PCT % 11   EOS PCT % 4     Results from last 7 days   Lab Units 11/25/19  0449   SODIUM mmol/L 137   POTASSIUM mmol/L 4 0   CHLORIDE mmol/L 101   CO2 mmol/L 28   BUN mg/dL 47*   CREATININE mg/dL 1 63*   ANION GAP mmol/L 8   CALCIUM mg/dL 9 4   GLUCOSE RANDOM mg/dL 76                           * I Have Reviewed All Lab Data Listed Above  * Additional Pertinent Lab Tests Reviewed:  [address] Admission Reviewed    Recent Cultures (last 7 days):           Last 24 Hours Medication List:     Current Facility-Administered Medications:  apixaban 5 mg Oral BID Dada Villeda MD   vitamin C 1,000 mg Oral Daily Dada Villeda MD   benzonatate 100 mg Oral TID PRN Dada Villeda MD   cholecalciferol 1,000 Units Oral Daily Dada Villeda MD   diltiazem 180 mg Oral Daily Dada Villeda MD   furosemide 40 mg Intravenous Once Luis Fernando Iraheta MD   furosemide 80 mg Intravenous BID (diuretic) Luis Fernando Iraheta MD   HYDROcodone-acetaminophen 1 tablet Oral Q6H PRN Dada Villeda MD   magnesium oxide 400 mg Oral BID Dada Villeda MD   pantoprazole 40 mg Oral Early Morning Dada Villeda MD   potassium chloride 20 mEq Oral BID With Meals Dada Villeda MD   vitamin E (tocopherol) 800 Units Oral Daily Dada Villeda MD        Today, Patient Was Seen By: Luis Fernando Iraheta MD    ** Please Note: Dictation voice to text software may have been used in the creation of this document   **

## 2019-11-25 NOTE — NURSING NOTE
Pt OOB with assist of one to the bathroom, pt depends on holding onto furniture to complete ambulation, pt weak, SOB with exertion

## 2019-11-25 NOTE — ASSESSMENT & PLAN NOTE
Wt Readings from Last 3 Encounters:   11/25/19 130 kg (285 lb 15 oz)   11/18/19 129 kg (285 lb)   09/06/19 126 kg (277 lb)       -CXR: Mild pulmonary vascular congestion and small bilateral pleural effusions    -proBNP 6,437  -Last echo (January 2019), EF 45% with moderate akinesis and ventricular dilation   -check echo this admission  -cont IV Lasix but increase to 80mg IV Q12H  -note that LLE edema>RLE, check LLE U/S

## 2019-11-25 NOTE — SOCIAL WORK
LOS 1   Patient is not a 30 day re admission or a Medicare Bundled patient  Met with patient and reviewed the discharge planning process including identifying help at home and patient preference for discharge  Patient resides in a modular home with his son and grandson  With 2 LUCERO  He uses a commercial food cart in the home and a power chair when outside since his doorways in the home are not wide enough for the power chair  He drives and prepare meals  He reports no h/o of SNF/BHU and D&A rehab admission  Patient uses [facility] in [facility] for his medications and can afford his current medications  He is disappointed with a workman's comp claim that was denied by the courts  He has been followed by [facility] in the past and not sure if he wants them to see him again   Will follow and assist

## 2019-11-25 NOTE — ASSESSMENT & PLAN NOTE
-cont Cardizem/Lasix for now  -Holding losartan and spironolactone secondary to slightly elevated creatinine (baseline Cr 1 5, was 1 79 on admission, now 1 63)  -based on current BPs, will continue to hold

## 2019-11-25 NOTE — UTILIZATION REVIEW
Initial Clinical Review    Admission: Date/Time/Statement: Inpatient Admission Orders (From admission, onward)     Ordered        11/24/19 1619  Inpatient Admission (expected length of stay for this patient Order details is greater than two midnights)  Once                   Orders Placed This Encounter   Procedures    Inpatient Admission (expected length of stay for this patient Order details is greater than two midnights)     Standing Status:   Standing     Number of Occurrences:   1     Order Specific Question:   Admitting Physician     Answer:   Johnella Felty     Order Specific Question:   Level of Care     Answer:   Med Surg [16]     Order Specific Question:   Estimated length of stay     Answer:   More than 2 Midnights     Order Specific Question:   Certification     Answer:   I certify that inpatient services are medically necessary for this patient for a duration of greater than two midnights  See H&P and MD Progress Notes for additional information about the patient's course of treatment  ED Arrival Information     Expected Arrival Acuity Means of Arrival Escorted By Service Admission Type    - 11/24/2019 14:18 Urgent Wheelchair Family Member Hospitalist Urgent    Arrival Complaint    SOB        Chief Complaint   Patient presents with    Shortness of Breath     Pt with dyspnea x 10 days, denies fever, denies N/V/D  Assessment/Plan:   25-year-old male with history of atrial fibrillation (on eliquis), chronic diastolic congestive heart failure (EF 45%), pulmonary arterial hypertension, peripheral vascular disease and chronic pain states that he has been having shortness of breath  States his left leg has swelling up tremendously over the last few weeks, gained 17 pounds within the last month  He had Dopplers done on 10/15/2019 that failed to show any acute DVT of left lower extremity   Presents bradycardic & tachypneic, sob, cough, lungs with decreased breath sounds, ble edema, 2 centimeter right 1 5 centimeter raised thickened skin lesion of the pannus on the left side of the lower abdomen  Both legs are edematous with darkened skin with several superficially opened wounds of both lower extremities, cxr +pulonary vascular congestion, +troponin, elevated bnp, garry  Admitted to inpatient status for acute on chronic combined chf  Placed on contact isolation, started on iv diuresis with lasix bid, fluid restriction, cardio consulted  ED Triage Vitals   Temperature Pulse Respirations Blood Pressure SpO2   11/24/19 1426 11/24/19 1426 11/24/19 1426 11/24/19 1429 11/24/19 1426   97 9 °F (36 6 °C) 75 22 121/70 96 %      Temp Source Heart Rate Source Patient Position - Orthostatic VS BP Location FiO2 (%)   11/24/19 1426 11/24/19 1426 11/24/19 1429 11/24/19 1429 --   Temporal Monitor Sitting Right arm       Pain Score       11/24/19 1426       No Pain        Wt Readings from Last 1 Encounters:   11/25/19 130 kg (285 lb 15 oz)     Additional Vital Signs:   11/24/19 1530    53  Abnormal   23  Abnormal   111/61  93 %       11/24/19 1515      24  Abnormal            11/24/19 1500    48  Abnormal   23  Abnormal   111/57  92 %       11/24/19 1445    71  20  119/59  93 %       11/24/19 1430    69  14  121/70  92 %       11/24/19 1429        121/70      Sitting   11/24/19 1426  97 9 °F (36 6 °C)  75  22    96 %  None (Room air)     Pertinent Labs/Diagnostic Test Results:   Results from last 7 days   Lab Units 11/25/19 0449 11/24/19  1520 11/19/19  1320   WBC Thousand/uL 5 70 6 42  --    WHITE BLOOD CELL COUNT  x10E3/uL  --   --  6 1   HEMOGLOBIN g/dL 14 6 14 9  --    HEMOGLOBIN  g/dL  --   --  14 5   HEMATOCRIT % 45 8 45 8  --    HEMATOCRIT  %  --   --  41 3   PLATELETS Thousands/uL 100* 119*  --    PLATELETS  K65T3/TE  --   --  117*   NEUTROS ABS Thousands/µL 3 65 4 64  --    NEUTROS ABS   x10E3/uL  --   --  3 9     Results from last 7 days   Lab Units 11/25/19  0449 11/24/19  1520 11/19/19  1320   SODIUM mmol/L 137 136 140   POTASSIUM mmol/L 4 0 4 1 4 2   CHLORIDE mmol/L 101 101 99   CO2 mmol/L 28 28 24   ANION GAP mmol/L 8 7  --    BUN mg/dL 47* 47* 49*   CREATININE mg/dL 1 63* 1 79* 1 50*   EGFR ml/min/1 73sq m 41 37  --    CALCIUM mg/dL 9 4 9 8  --    MAGNESIUM mg/dL 2 2  --   --      Results from last 7 days   Lab Units 11/25/19  0449 11/24/19  1520 11/19/19  1320   GLUCOSE RANDOM mg/dL 76 93 87     Results from last 7 days   Lab Units 11/24/19  2216 11/24/19  1810 11/24/19  1520   TROPONIN I ng/mL 0 10* 0 11* 0 09*     Results from last 7 days   Lab Units 11/24/19  1520 11/18/19  1339   NT-PRO BNP pg/mL 6,437* 4,386*     11/24  Cxr= pulmonary vascular congestion and small bilateral pleural effusions  11/25  ble venous duplex=RIGHT LOWER LIMB LIMITED:  Evaluation shows no evidence of thrombus in the common femoral vein  Doppler evaluation shows a normal response to augmentation maneuvers  LEFT LOWER LIMB:  No gross evidence of acute deep vein thrombosis based on color flow analysis  No gross evidence of superficial thrombophlebitis noted  Doppler evaluation shows a normal response to augmentation maneuvers    Popliteal, posterior tibial and anterior tibial arterial Doppler waveforms are  Hyperemic  ED Treatment:   Medication Administration from 11/24/2019 1418 to 11/24/2019 1710       Date/Time Order Dose Route     11/24/2019 1528 bumetanide (BUMEX) injection 1 mg 1 mg Intravenous        Past Medical History:   Diagnosis Date    Atrial fibrillation (HCC)     Cardiac disease     Cellulitis     Chronic pain     Chronic venous hypertension     with ulceration    GERD (gastroesophageal reflux disease)     Hyperlipidemia     Hypertension     Obesity     [facility]monary hypertension ([facility] )     PVD (peripheral vascular disease) ([facility] )     Vascular disorder of extremity ([facility] )      Present on Admission:   Gastroesophageal reflux disease without esophagitis   Persistent atrial fibrillation   Essential hypertension   Chronic left shoulder pain   Chronic pain of right wrist   Acute on chronic combined systolic and diastolic congestive heart failure (HCC)   Acute kidney injury superimposed on CKD (HCC)   Elevated troponin level not due myocardial infarction  Admitting Diagnosis: SOB (shortness of breath) [R06 02]  Acute on chronic diastolic congestive heart failure (HCC) [I50 33]  Age/Sex: 69 y o  male  Admission Orders:  Telemetry  Consult nutrition  Consult cardio  Incentive spirometry  Urinary retention protocol  1800cc fluid restriction  Contact isolation  Scheduled Medications:  apixaban 5 mg Oral BID   vitamin C 1,000 mg Oral Daily   cholecalciferol 1,000 Units Oral Daily   diltiazem 180 mg Oral Daily   furosemide 80 mg Intravenous BID (diuretic)   magnesium oxide 400 mg Oral BID   pantoprazole 40 mg Oral Early Morning   potassium chloride 20 mEq Oral BID With Meals   vitamin E (tocopherol) 800 Units Oral Daily     PRN Meds:  benzonatate 100 mg Oral TID PRN   HYDROcodone-acetaminophen 1 tablet Oral Q6H PRN     Network Utilization Review Department  [email or web address]  ATTENTION: Please call with any questions or concerns to [phone] and carefully listen to the prompts so that you are directed to the right person  All voicemails are confidential   Fadi Sheehan all requests for admission clinical reviews, approved or denied determinations and any other requests to dedicated fax number below belonging to the campus where the patient is receiving treatment    FACILITY NAME UR FAX NUMBER   ADMISSION DENIALS (Administrative/Medical Necessity) [phone]   PARENT CHILD HEALTH (Maternity/NICU/Pediatrics) [phone]   [facility] [id number] [place] [address] [address] [facility] [phone]   [address] [phone]

## 2019-11-25 NOTE — PLAN OF CARE
Problem: Potential for Falls  Goal: Patient will remain free of falls  Description  INTERVENTIONS:  - Assess patient frequently for physical needs  -  Identify cognitive and physical deficits and behaviors that affect risk of falls    -  [place] fall precautions as indicated by assessment   - Educate patient/family on patient safety including physical limitations  - Instruct patient to call for assistance with activity based on assessment  - Modify environment to reduce risk of injury  - Consider OT/PT consult to assist with strengthening/mobility  Outcome: Progressing     Problem: PAIN - ADULT  Goal: Verbalizes/displays adequate comfort level or baseline comfort level  Description  Interventions:  - Encourage patient to monitor pain and request assistance  - Assess pain using appropriate pain scale  - Administer analgesics based on type and severity of pain and evaluate response  - Implement non-pharmacological measures as appropriate and evaluate response  - Consider cultural and social influences on pain and pain management  - Notify physician/advanced practitioner if interventions unsuccessful or patient reports new pain  Outcome: Progressing     Problem: INFECTION - ADULT  Goal: Absence or prevention of progression during hospitalization  Description  INTERVENTIONS:  - Assess and monitor for signs and symptoms of infection  - Monitor lab/diagnostic results  - Monitor all insertion sites, i e  indwelling lines, tubes, and drains    - [place] appropriate cooling/warming therapies per order  - Administer medications as ordered  - Instruct and encourage patient and family to use good hand hygiene technique  - Identify and instruct in appropriate isolation precautions for identified infection/condition   Outcome: Progressing  Goal: Absence of fever/infection during neutropenic period  Description  INTERVENTIONS:  - Monitor WBC    Outcome: Progressing     Problem: SAFETY ADULT  Goal: Maintain or return to baseline ADL function  Description  INTERVENTIONS:  -  Assess patient's ability to carry out ADLs; assess patient's baseline for ADL function and identify physical deficits which impact ability to perform ADLs (bathing, care of mouth/teeth, toileting, grooming, dressing, etc )  - Assess/evaluate cause of self-care deficits   - Assess range of motion  - Assess patient's mobility; develop plan if impaired  - Assess patient's need for assistive devices and provide as appropriate  - Encourage maximum independence but intervene and supervise when necessary  - Involve family in performance of ADLs  - Assess for home care needs following discharge   - Consider OT consult to assist with ADL evaluation and planning for discharge  - Provide patient education as appropriate  Outcome: Progressing  Goal: Maintain or return mobility status to optimal level  Description  INTERVENTIONS:  - Assess patient's baseline mobility status (ambulation, transfers, stairs, etc )    - Identify cognitive and physical deficits and behaviors that affect mobility  - Identify mobility aids required to assist with transfers and/or ambulation (gait belt, sit-to-stand, lift, walker, cane, etc )  - [place] fall precautions as indicated by assessment  - Record patient progress and toleration of activity level on Mobility SBAR; progress patient to next Phase/Stage  - Instruct patient to call for assistance with activity based on assessment  - Consider rehabilitation consult to assist with strengthening/weightbearing, etc   Outcome: Progressing     Problem: DISCHARGE PLANNING  Goal: Discharge to home or other facility with appropriate resources  Description  INTERVENTIONS:  - Identify barriers to discharge w/patient and caregiver  - Arrange for needed discharge resources and transportation as appropriate  - Identify discharge learning needs (meds, wound care, etc )  - Arrange for interpretive services to assist at discharge as needed  - Refer to Case Management Department for coordinating discharge planning if the patient needs post-hospital services based on physician/advanced practitioner order or complex needs related to functional status, cognitive ability, or social support system  Outcome: Progressing     Problem: Knowledge Deficit  Goal: Patient/family/caregiver demonstrates understanding of disease process, treatment plan, medications, and discharge instructions  Description  Complete learning assessment and assess knowledge base    Interventions:  - Provide teaching at level of understanding  - Provide teaching via preferred learning methods  Outcome: Progressing     Problem: Prexisting or High Potential for Compromised Skin Integrity  Goal: Skin integrity is maintained or improved  Description  INTERVENTIONS:  - Identify patients at risk for skin breakdown  - Assess and monitor skin integrity  - Assess and monitor nutrition and hydration status  - Monitor labs   - Assess for incontinence   - Turn and reposition patient  - Assist with mobility/ambulation  - Relieve pressure over bony prominences  - Avoid friction and shearing  - Provide appropriate hygiene as needed including keeping skin clean and dry  - Evaluate need for skin moisturizer/barrier cream  - Collaborate with interdisciplinary team   - Patient/family teaching  - Consider wound care consult   Outcome: Progressing

## 2019-11-25 NOTE — CONSULTS
Consultation - Cardiology   Linwood Mazariegos 69 y o  male MRN: [id number]  Unit/Bed#: [address] [place] [address] Encounter: [id number]    Assessment/Plan     Assessment:    Acute on Chronic Diastolic CHF    Persistent Atrial Fibrillation      Plan:    1  Acute on Chronic Diastolic CHF: HE has marked volume overload and is about twenty pounds over his dry weight  He has some diuretic resistance, would recommend bumex drip to improve diuresis  Trend I/O, renal function and daily weights  Would diurese as tolerated  He may have chronic volume overload as well so if tolerated would shoot below his "dry weight" if possible  2  Persistent Atrial Fibrillation: Continue rate control with diltiazem and continue anticoagulation  Will review his echocardiogram        History of Present Illness   Physician Requesting Consult: Lionel Valerio MD  Reason for Consult / Principal Problem: Shortness of breath  HPI: Anthony Cherry is a 69y o  year old male who presents with shortness of breath  PMHx is significant for Chronic Diastolic CHF and Persistent Atrial Fibrillation  He clearly has a history of diuretic resistance and has chronic lower extremity edema  Over the past few weeks he has had an increase in water weight despite outpatient modification of his oral diuretics  His primary cards is in [place]  He currently has no chest pain at rest, no dyspnea at rest, he has abdominal distention and marked bilateral LE edema  He is adherent with his medical therapy  Inpatient consult to Cardiology  Consult performed by: Ivania Alvarez MD  Consult ordered by: Raulito Alonzo MD          Review of Systems   Constitutional: Positive for fatigue  HENT: Negative  Eyes: Negative  Respiratory: Positive for shortness of breath  Cardiovascular: Positive for leg swelling  Gastrointestinal: Negative  Endocrine: Negative  Genitourinary: Negative  Musculoskeletal: Negative  Skin: Negative  Allergic/Immunologic: Negative  Neurological: Negative  Hematological: Negative  Psychiatric/Behavioral: Negative          Historical Information   Past Medical History:   Diagnosis Date    Atrial fibrillation ([place] [address] )     Cardiac disease     Cellulitis     Chronic pain     Chronic venous hypertension     with ulceration    GERD (gastroesophageal reflux disease)     Hyperlipidemia     Hypertension     Obesity     Pulmonary hypertension ([place] [address] )     PVD (peripheral vascular disease) ([place] [address] )     Vascular disorder of extremity ([place] [address] )      Past Surgical History:   Procedure Laterality Date    ANTERIOR RELEASE VERTEBRAL BODY W/ POSTERIOR FUSION      APPENDECTOMY  1955    CATARACT EXTRACTION      DECOMPRESSION SPINE LUMBAR POSTERIOR      ELBOW SURGERY      KNEE SURGERY      NOSE SURGERY      turbinectomy    RETINAL DETACHMENT SURGERY      RHINOPLASTY      TONSILLECTOMY      VEIN LIGATION AND STRIPPING      saphenous vein long     Social History     Substance and Sexual Activity   Alcohol Use No    Frequency: Never     Social History     Substance and Sexual Activity   Drug Use No     Social History     Tobacco Use   Smoking Status Never Smoker   Smokeless Tobacco Never Used     Family History:   Family History   Problem Relation Age of Onset    Cancer Mother         cancer of uterus    Diabetes Sister     Mental illness Neg Hx         neg fam hx    Substance Abuse Neg Hx         neg fam hx       Meds/Allergies   current meds:   Current Facility-Administered Medications   Medication Dose Route Frequency    apixaban (ELIQUIS) tablet 5 mg  5 mg Oral BID    ascorbic acid (VITAMIN C) tablet 1,000 mg  1,000 mg Oral Daily    benzonatate (TESSALON PERLES) capsule 100 mg  100 mg Oral TID PRN    bumetanide (BUMEX) 12 5 mg infusion 50 mL  0 5 mg/hr Intravenous Continuous    cholecalciferol (VITAMIN D3) tablet 1,000 Units  1,000 Units Oral Daily    diltiazem (CARDIZEM CD) 24 hr capsule 180 mg  180 mg Oral Daily    HYDROcodone-acetaminophen (NORCO) 5-325 mg per tablet 1 tablet  1 tablet Oral Q6H PRN    magnesium oxide (MAG-OX) tablet 400 mg  400 mg Oral BID    pantoprazole (PROTONIX) EC tablet 40 mg  40 mg Oral Early Morning    potassium chloride (K-DUR,KLOR-CON) CR tablet 20 mEq  20 mEq Oral BID With Meals    vitamin E (tocopherol) capsule 800 Units  800 Units Oral Daily     No Known Allergies    Objective   Vitals: Blood pressure 116/69, pulse 55, temperature 97 5 °F (36 4 °C), temperature source Oral, resp  rate 20, height 6' 4" (1 93 m), weight 130 kg (285 lb 15 oz), SpO2 95 %  Orthostatic Blood Pressures      Most Recent Value   Blood Pressure  116/69 filed at 11/24/2019 2249   Patient Position - Orthostatic VS  Lying filed at 11/24/2019 2249            Intake/Output Summary (Last 24 hours) at 11/25/2019 0848  Last data filed at 11/25/2019 0755  Gross per 24 hour   Intake 480 ml   Output 2100 ml   Net -1620 ml       Invasive Devices     Peripheral Intravenous Line            Peripheral IV 11/24/19 Right Hand less than 1 day                Physical Exam   Constitutional: He is oriented to person, place, and time  No distress  HENT:   Mouth/Throat: No oropharyngeal exudate  Eyes: No scleral icterus  Neck: JVD present  Cardiovascular: Normal rate  An irregularly irregular rhythm present  Pulmonary/Chest: Effort normal  He has no wheezes  He has rales  Abdominal: Soft  He exhibits distension  Musculoskeletal: He exhibits edema  Neurological: He is alert and oriented to person, place, and time  Skin: Skin is warm and dry  He is not diaphoretic  There is erythema  Psychiatric: He has a normal mood and affect  His behavior is normal        Lab Results:   I have personally reviewed pertinent lab results      CBC with diff:   Results from last 7 days   Lab Units 11/25/19  0449   WBC Thousand/uL 5 70   RBC Million/uL 4 74   HEMOGLOBIN g/dL 14 6   HEMATOCRIT % 45 8   MCV fL 97   MCH pg 30 8 MCHC g/dL 31 9   RDW % 15 8*   MPV fL 12 2   PLATELETS Thousands/uL 100*     CMP:   Results from last 7 days   Lab Units 11/25/19  0449   SODIUM mmol/L 137   POTASSIUM mmol/L 4 0   CHLORIDE mmol/L 101   CO2 mmol/L 28   BUN mg/dL 47*   CREATININE mg/dL 1 63*   CALCIUM mg/dL 9 4   EGFR ml/min/1 73sq m 41     Troponin:   0   Lab Value Date/Time    TROPONINI 0 10 (H) 11/24/2019 2216    TROPONINI 0 11 (H) 11/24/2019 1810    TROPONINI 0 09 (H) 11/24/2019 1520    TROPONINI 0 13 (H) 01/04/2019 1325    TROPONINI 0 12 (H) 01/04/2019 1022    TROPONINI 0 17 (H) 01/03/2019 1153    TROPONINI 0 17 (H) 01/03/2019 0814    TROPONINI 0 15 (H) 01/02/2019 2208    TROPONINI 0 14 (H) 01/02/2019 1924     BNP:   Results from last 7 days   Lab Units 11/25/19  0449   POTASSIUM mmol/L 4 0   CHLORIDE mmol/L 101   CO2 mmol/L 28   BUN mg/dL 47*   CREATININE mg/dL 1 63*   CALCIUM mg/dL 9 4   EGFR ml/min/1 73sq m 41     Coags:     TSH:     Magnesium:   Results from last 7 days   Lab Units 11/25/19  0449   MAGNESIUM mg/dL 2 2     Lipid Profile:     Imaging: I have personally reviewed pertinent films in PACS  EKG: Atrial Fibrillation LAD  Code Status: Level 1 - Full Code  Advance Directive and Living Will:      Power of :    POLST:      Counseling / Coordination of Care  Total floor / unit time spent today 45 minutes  Greater than 50% of total time was spent with the patient and / or family counseling and / or coordination of care  A description of the counseling / coordination of care

## 2019-11-25 NOTE — UTILIZATION REVIEW
Notification of Inpatient Admission/Inpatient Authorization Request   This is a Notification of Inpatient Admission for [address]  Be advised that this patient was admitted to our facility under Inpatient Status  Contact Valerie Fairchild at [phone] for additional admission information  Radhika Nails UR DEPT  DEDICATED [phone]  Patient Name:   Lizbet Romero   YOB: 1945       [address]   [address]:   [address] [facility]  Tax ID: [id number]  NPI: [id number] Attending Provider/NPI: Phil Hairston, [address] [place] [address]   Attending Physician:  ANTONIO Bray  [employer] Practioner ID- [id number]  Primary Office:  [address] [place] [address]  Phone: [phone]  Fax: [phone]      Place of Service Code: 24     Place of Service Name:  [address] [place] [address]   Start Date: 11/24/19 1618     Discharge Date & Time: No discharge date for patient encounter  Type of Admission: Inpatient Status Discharge Disposition (if discharged): Home with [address]   Patient Diagnoses: SOB (shortness of breath) [R06 02]  Acute on chronic diastolic congestive heart failure ([facility] ) [I50 33]     Orders: Admission Orders (From admission, onward)     Ordered        11/24/19 1619  Inpatient Admission (expected length of stay for this patient Order details is greater than two midnights)  Once                    Assigned Utilization Review Contact: Jaquan Fairchild  Utilization   Network Utilization Review Department  Phone: [phone]; Fax [phone]  Email: [email or web address]   ATTENTION PAYERS: Please call the assigned Utilization  directly with any questions or concerns ALL voicemails in the department are confidential  Send all requests for admission clinical reviews, approved or denied determinations and any other requests to dedicated fax number belonging to the campus where the patient is receiving treatment

## 2019-11-25 NOTE — ASSESSMENT & PLAN NOTE
-Cr 1 79, on presentation   -baseline Cr 1 5  -pt with cardiorenal syndrome  -Cr improving with IV lasix

## 2019-11-25 NOTE — CONSULTS
Visited pt to provided CHF diet education  Pt with minimal interest with speaking with this RD  Just reports that he is waiting  for lunch to my questions  Pt known to us from multiple admissions  Pt not amenable to diet ed today or on any recent admit  Will re-attempt diet ed again at next follow up

## 2019-11-26 LAB
ANION GAP SERPL CALCULATED.3IONS-SCNC: 11 MMOL/L (ref 4–13)
BUN SERPL-MCNC: 48 MG/DL (ref 5–25)
CALCIUM SERPL-MCNC: 9.3 MG/DL (ref 8.3–10.1)
CHLORIDE SERPL-SCNC: 96 MMOL/L (ref 100–108)
CO2 SERPL-SCNC: 28 MMOL/L (ref 21–32)
CREAT SERPL-MCNC: 1.58 MG/DL (ref 0.6–1.3)
GFR SERPL CREATININE-BSD FRML MDRD: 43 ML/MIN/1.73SQ M
GLUCOSE SERPL-MCNC: 85 MG/DL (ref 65–140)
MRSA NOSE QL CULT: NORMAL
POTASSIUM SERPL-SCNC: 4.1 MMOL/L (ref 3.5–5.3)
SODIUM SERPL-SCNC: 135 MMOL/L (ref 136–145)

## 2019-11-26 PROCEDURE — 99232 SBSQ HOSP IP/OBS MODERATE 35: CPT | Performed by: INTERNAL MEDICINE

## 2019-11-26 PROCEDURE — 99232 SBSQ HOSP IP/OBS MODERATE 35: CPT | Performed by: HOSPITALIST

## 2019-11-26 PROCEDURE — 80048 BASIC METABOLIC PNL TOTAL CA: CPT | Performed by: HOSPITALIST

## 2019-11-26 RX ADMIN — PANTOPRAZOLE SODIUM 40 MG: 40 TABLET, DELAYED RELEASE ORAL at 05:52

## 2019-11-26 RX ADMIN — MELATONIN 1000 UNITS: at 08:48

## 2019-11-26 RX ADMIN — APIXABAN 5 MG: 5 TABLET, FILM COATED ORAL at 17:46

## 2019-11-26 RX ADMIN — OXYCODONE HYDROCHLORIDE AND ACETAMINOPHEN 1000 MG: 500 TABLET ORAL at 08:48

## 2019-11-26 RX ADMIN — POTASSIUM CHLORIDE 20 MEQ: 750 TABLET, EXTENDED RELEASE ORAL at 08:48

## 2019-11-26 RX ADMIN — HYDROCODONE BITARTRATE AND ACETAMINOPHEN 1 TABLET: 5; 325 TABLET ORAL at 12:18

## 2019-11-26 RX ADMIN — Medication 800 UNITS: at 08:49

## 2019-11-26 RX ADMIN — APIXABAN 5 MG: 5 TABLET, FILM COATED ORAL at 08:49

## 2019-11-26 RX ADMIN — POTASSIUM CHLORIDE 20 MEQ: 750 TABLET, EXTENDED RELEASE ORAL at 17:46

## 2019-11-26 RX ADMIN — Medication 0.5 MG/HR: at 07:20

## 2019-11-26 RX ADMIN — HYDROCODONE BITARTRATE AND ACETAMINOPHEN 1 TABLET: 5; 325 TABLET ORAL at 23:05

## 2019-11-26 RX ADMIN — Medication 400 MG: at 08:49

## 2019-11-26 RX ADMIN — Medication 400 MG: at 17:46

## 2019-11-26 NOTE — PROGRESS NOTES
Cardiology Progress Note - Linwood Mazariegos 69 y o  male MRN: [id number]    Unit/Bed#: [address] [place] [address] Encounter: [id number]        Subjective:    No significant events overnight  Diuresed over night  BP was lower this AM and bumex was held  Review of Systems   Constitution: Positive for malaise/fatigue  Cardiovascular: Positive for leg swelling  Negative for chest pain  Respiratory: Positive for shortness of breath  Objective:   Vitals: Blood pressure 96/71, pulse 74, temperature 97 9 °F (36 6 °C), temperature source Oral, resp  rate 18, height 6' 4" (1 93 m), weight 127 kg (279 lb 3 2 oz), SpO2 96 %  , Body mass index is 33 99 kg/m² , Orthostatic Blood Pressures      Most Recent Value   Blood Pressure  96/71 filed at 11/26/2019 1994   Patient Position - Orthostatic VS  Sitting filed at 11/26/2019 7541         Systolic (27DEK), HCQ:265 , Min:96 , NSN:447     Diastolic (96GNK), KQI:98, Min:62, Max:82      Intake/Output Summary (Last 24 hours) at 11/26/2019 0824  Last data filed at 11/26/2019 0700  Gross per 24 hour   Intake 236 ml   Output 3900 ml   Net -3664 ml     Weight (last 2 days)     Date/Time   Weight    11/26/19 0545   127 (279 2)    11/25/19 0600   130 (285 94)    11/24/19 1745   130 (287)    11/24/19 1430   (!) 149 (327 82)    11/24/19 1426   130 (287 26)                    Physical Exam   Constitutional: He is oriented to person, place, and time  No distress  HENT:   Mouth/Throat: No oropharyngeal exudate  Eyes: No scleral icterus  Neck: JVD present  Cardiovascular: Normal rate  An irregularly irregular rhythm present  Pulmonary/Chest: Effort normal and breath sounds normal  He has no wheezes  He has no rales  Abdominal: Soft  He exhibits distension  Musculoskeletal: He exhibits edema  Neurological: He is alert and oriented to person, place, and time  Skin: Skin is warm and dry  He is not diaphoretic  Psychiatric: He has a normal mood and affect   His behavior is normal  Laboratory Results:  Results from last 7 days   Lab Units 11/24/19  2216 11/24/19  1810 11/24/19  1520   TROPONIN I ng/mL 0 10* 0 11* 0 09*       CBC with diff: Results from last 7 days   Lab Units 11/25/19 0449 11/24/19  1520 11/19/19  1320   WBC Thousand/uL 5 70 6 42  --    WHITE BLOOD CELL COUNT  x10E3/uL  --   --  6 1   HEMOGLOBIN g/dL 14 6 14 9  --    HEMOGLOBIN  g/dL  --   --  14 5   HEMATOCRIT % 45 8 45 8  --    HEMATOCRIT  %  --   --  41 3   MCV fL 97 96  --    MCV  fL  --   --  92   PLATELETS Thousands/uL 100* 119*  --    PLATELETS  X50N7/MH  --   --  117*   MCH pg 30 8 31 4  --    MCH  pg  --   --  32 3   MCHC g/dL 31 9 32 5  --    MCHC  g/dL  --   --  35 1   RDW % 15 8* 15 7*  --    RDW  %  --   --  15 3   MPV fL 12 2 11 6  --          CMP:  Results from last 7 days   Lab Units 11/25/19 0449 11/24/19  1520 11/19/19  1320   POTASSIUM mmol/L 4 0 4 1 4 2   CHLORIDE mmol/L 101 101 99   CO2 mmol/L 28 28 24   BUN mg/dL 47* 47* 49*   CREATININE mg/dL 1 63* 1 79* 1 50*   CALCIUM mg/dL 9 4 9 8  --    EGFR ml/min/1 73sq m 41 37  --          BMP:  Results from last 7 days   Lab Units 11/25/19 0449 11/24/19  1520 11/19/19  1320   POTASSIUM mmol/L 4 0 4 1 4 2   CHLORIDE mmol/L 101 101 99   CO2 mmol/L 28 28 24   BUN mg/dL 47* 47* 49*   CREATININE mg/dL 1 63* 1 79* 1 50*   CALCIUM mg/dL 9 4 9 8  --        BNP: No results for input(s): BNP in the last 72 hours      Magnesium:   Results from last 7 days   Lab Units 11/25/19  0449   MAGNESIUM mg/dL 2 2       Coags:       TSH: No results found for: TSH    Hemoglobin A1C       Lipid Profile:       Cardiac testing:   Results for orders placed during the hospital encounter of 11/24/19   Echo complete with contrast if indicated    [address]    Transthoracic Echocardiogram  2D, M-mode, Doppler, and Color Doppler    Study date:  25-Nov-2019    Patient: David Shultz  MR number: [id number]  Account number: [de-identified]  : 1945  Age: 69 years  Gender: Male  Status: Inpatient  Location: [facility]  Height: 76 in  Weight: 284 5 lb  BP: 116/ 69 mmHg    Indications: Heart failure    Diagnoses: I50 9 - Heart failure, unspecified    Sonographer:  DELLA Martin  Primary Physician:  Wilbert Sims MD  Referring Physician:  Anne Wilkinson MD  Group:  [facility]  Interpreting Physician:  Nesha Andersen MD    SUMMARY    LEFT VENTRICLE:  Systolic function was mildly reduced by visual assessment  Ejection fraction was estimated to be 45 %  There was mild diffuse hypokinesis  Wall thickness was markedly increased  RIGHT VENTRICLE:  The ventricle was moderately dilated  Systolic function was mildly reduced  LEFT ATRIUM:  The atrium was moderately dilated  RIGHT ATRIUM:  The atrium was markedly dilated  MITRAL VALVE:  There was mild regurgitation  TRICUSPID VALVE:  There was mild regurgitation  HISTORY: PRIOR HISTORY: GERD; Obesity; HTN; HLD; Afib; CHF; Edema; Lymphedema; PVD; Pulm HTN; DVT    PROCEDURE: The study was performed in the [facility]  This was a routine study  The transthoracic approach was used  The study included complete 2D imaging, M-mode, complete spectral Doppler, and color Doppler  Echocardiographic views were limited due to decreased penetration and lung interference  This was a technically difficult study  LEFT VENTRICLE: Size was normal  Systolic function was mildly reduced by visual assessment  Ejection fraction was estimated to be 45 %  There was mild diffuse hypokinesis  Wall thickness was markedly increased  DOPPLER: Transmitral flow  pattern: atrial fibrillation  RIGHT VENTRICLE: The ventricle was moderately dilated  Systolic function was mildly reduced  DOPPLER: Estimated peak pressure was at least 60 mmHg  LEFT ATRIUM: The atrium was moderately dilated      RIGHT ATRIUM: The atrium was markedly dilated  MITRAL VALVE: Valve structure was normal  There was normal leaflet separation  DOPPLER: The transmitral velocity was within the normal range  There was no evidence for stenosis  There was mild regurgitation  AORTIC VALVE: The valve was trileaflet  Leaflets exhibited normal thickness and normal cuspal separation  DOPPLER: Transaortic velocity was within the normal range  There was no evidence for stenosis  There was no regurgitation  TRICUSPID VALVE: The valve structure was normal  There was normal leaflet separation  DOPPLER: The transtricuspid velocity was within the normal range  There was no evidence for stenosis  There was mild regurgitation  PULMONIC VALVE: Leaflets exhibited normal thickness, no calcification, and normal cuspal separation  DOPPLER: The transpulmonic velocity was within the normal range  There was no regurgitation  PERICARDIUM: There was no pericardial effusion  AORTA: The root exhibited normal size  SYSTEMIC VEINS: IVC: The inferior vena cava was not well visualized      SYSTEM MEASUREMENT TABLES    2D  %FS: 9 84 %  AV Diam: 2 98 cm  EDV(Teich): 51 19 ml  EF Biplane: 22 23 %  EF(Teich): 22 17 %  ESV(Teich): 39 85 ml  IVSd: 1 6 cm  LA Area: 37 85 cm2  LA Diam: 5 36 cm  LVEDV MOD A2C: 242 91 ml  LVEDV MOD A4C: 147 73 ml  LVEDV MOD BP: 187 2 ml  LVEF MOD A2C: 25 41 %  LVEF MOD A4C: 21 32 %  LVESV MOD A2C: 181 19 ml  LVESV MOD A4C: 116 24 ml  LVESV MOD BP: 145 57 ml  LVIDd: 3 51 cm  LVIDs: 3 16 cm  LVLd A2C: 9 1 cm  LVLd A4C: 9 09 cm  LVLs A2C: 8 43 cm  LVLs A4C: 8 06 cm  LVPWd: 1 61 cm  RA Area: 60 3 cm2  RVIDd: 5 72 cm  SV MOD A2C: 61 71 ml  SV MOD A4C: 31 49 ml  SV(Teich): 11 35 ml    CW  RAP: 10 mmHg  TR Vmax: 2 98 m/s  TR maxP 44 mmHg    MM  TAPSE: 2 93 cm    PW  E': 0 05 m/s  E/E': 12 61  MV A Anthony: 0 29 m/s  MV Dec Garvin: 5 25 m/s2  MV DecT: 124 59 ms  MV E Anthony: 0 65 m/s  MV E/A Ratio: 2 24  MV PHT: 36 13 ms  MVA By PHT: 6 09 cm2  RVSP: 45 44 mmHg    [facility] Accredited Ech[facility]    Prepared and electronically signed by    Ramiro Stallworth MD  Signed [id number] 14:49:13       No results found for this or any previous visit  No results found for this or any previous visit  No results found for this or any previous visit      Meds/Allergies   current meds:   Current Facility-Administered Medications   Medication Dose Route Frequency    apixaban (ELIQUIS) tablet 5 mg  5 mg Oral BID    ascorbic acid (VITAMIN C) tablet 1,000 mg  1,000 mg Oral Daily    benzonatate (TESSALON PERLES) capsule 100 mg  100 mg Oral TID PRN    bumetanide (BUMEX) 12 5 mg infusion 50 mL  0 5 mg/hr Intravenous Continuous    cholecalciferol (VITAMIN D3) tablet 1,000 Units  1,000 Units Oral Daily    diltiazem (CARDIZEM CD) 24 hr capsule 180 mg  180 mg Oral Daily    HYDROcodone-acetaminophen (NORCO) 5-325 mg per tablet 1 tablet  1 tablet Oral Q6H PRN    magnesium oxide (MAG-OX) tablet 400 mg  400 mg Oral BID    pantoprazole (PROTONIX) EC tablet 40 mg  40 mg Oral Early Morning    potassium chloride (K-DUR,KLOR-CON) CR tablet 20 mEq  20 mEq Oral BID With Meals    vitamin E (tocopherol) capsule 800 Units  800 Units Oral Daily     Medications Prior to Admission   Medication    apixaban (ELIQUIS) 5 mg    Ascorbic Acid (VITAMIN C) 1000 MG tablet    benzonatate (TESSALON PERLES) 100 mg capsule    bumetanide (BUMEX) 2 mg tablet    cholecalciferol (VITAMIN D3) 1,000 units tablet    diltiazem (CARDIZEM CD) 180 mg 24 hr capsule    HYDROcodone-acetaminophen (NORCO) 7 5-325 mg per tablet    losartan (COZAAR) 50 mg tablet    meloxicam (MOBIC) 15 mg tablet    naproxen (NAPROSYN) 500 mg tablet    pantoprazole (PROTONIX) 40 mg tablet    potassium chloride (K-DUR) 10 mEq tablet    sildenafil (VIAGRA) 100 mg tablet    sodium chloride (OCEAN) 0 65 % nasal spray    spironolactone (ALDACTONE) 25 mg tablet    vitamin E, tocopherol, 400 units capsule         bumetanide 0 5 mg/hr Last Rate: 0 5 mg/hr (11/26/19 6937)     Assessment:  Principal Problem:    Acute on chronic combined systolic and diastolic congestive heart failure (HCC)  Active Problems:    Gastroesophageal reflux disease without esophagitis    Class 1 obesity due to excess calories with serious comorbidity and body mass index (BMI) of 34 0 to 34 9 in adult    Essential hypertension    Persistent atrial fibrillation    Chronic left shoulder pain    Chronic pain of right wrist    Acute kidney injury superimposed on CKD (HCC)    Elevated troponin level not due myocardial infarction    Plan:    Acute on Chronic Diastolic CHF: Continue with IV bumex drip once his BP has improved  Trend I/O, daily weights, renal function  He likely has chronic volume overload as well so if possible diurese below "dry weight"    Persistent AF: Continue diltiazem and AC  Counseling / Coordination of Care  Total floor / unit time spent today 25 minutes  Greater than 50% of total time was spent with the patient and / or family counseling and / or coordination of care  A description of the counseling / coordination of care

## 2019-11-26 NOTE — PROGRESS NOTES
Progress Note - Linwood Mazariegos 1945, 69 y o  male MRN: [id number]    Unit/Bed#: [address] [place] [address] Encounter: [id number]    Primary Care Provider: Soo Mckay MD   Date and time admitted to hospital: 11/24/2019  2:21 PM        Elevated troponin level not due myocardial infarction  Assessment & Plan  -Indeterminate, 0 09/0 11/0 10 the setting of acute on chronic CHF  -No ischemic EKG changes or chest pain    Acute kidney injury superimposed on CKD (HCC)  Assessment & Plan  -Cr 1 79, on presentation   -baseline Cr 1 5  -pt with cardiorenal syndrome  -follow Cr    Chronic pain of right wrist  Assessment & Plan  -History of chronic pain of the right wrist, in the setting of fracture 1 year ago  Patient reports that he initially set his wrist himself and by the time he presented to Orthopedics it was too late to do anything  They recommended other interventions but he is unwilling to have any surgeries as he believes it would hamper his ability to use his crutches prn   -Pain control with hydrocodone (chronic opioid use/dependence)  -No acute intervention    Chronic left shoulder pain  Assessment & Plan  -History of chronic left shoulder pain status post steroid injection  -Still in pain with minimal movement  -Continue with patient's home medication hydrocodone p r n      Persistent atrial fibrillation  Assessment & Plan  -Continue diltiazem (holding parameters) and Eliquis    Essential hypertension  Assessment & Plan  -cont Cardizem/Lasix qs BP allows  -Holding losartan and spironolactone for now    Class 1 obesity due to excess calories with serious comorbidity and body mass index (BMI) of 34 0 to 34 9 in adult  Assessment & Plan  -encourage wt loss    Gastroesophageal reflux disease without esophagitis  Assessment & Plan  -continue PPI    * Acute on chronic combined systolic and diastolic congestive heart failure (HCC)  Assessment & Plan  Wt Readings from Last 3 Encounters:   11/26/19 127 kg (279 lb 3 2 oz) 19 129 kg (285 lb)   19 126 kg (277 lb)       -CXR: Mild pulmonary vascular congestion and small bilateral pleural effusions  -proBNP 6,437  -Echo: EF 45%, mild diffuse hypokinesis, mild reduction in RV systolic function  -cont bumex gtt as long as BP allows        VTE Pharmacologic Prophylaxis:   Pharmacologic: Apixaban (Eliquis)  Mechanical VTE Prophylaxis in Place: No    Patient Centered Rounds: I have performed bedside rounds with nursing staff today  Education and Discussions with Family / Patient: Pt    Time Spent for Care: 20 minutes  More than 50% of total time spent on counseling and coordination of care as described above  Current Length of Stay: 2 day(s)    Current Patient Status: Inpatient   Certification Statement: The patient will continue to require additional inpatient hospital stay due to CHF    Discharge Plan: 1-3 days    Code Status: Level 1 - Full Code      Subjective:   Patient denies shortness of breath at this moment  He did have some shortness of breath last night  Objective:     Vitals:   Temp (24hrs), Av 8 °F (36 6 °C), Min:97 6 °F (36 4 °C), Max:97 9 °F (36 6 °C)    Temp:  [97 6 °F (36 4 °C)-97 9 °F (36 6 °C)] 97 9 °F (36 6 °C)  HR:  [72-82] 74  Resp:  [18] 18  BP: ()/(62-82) 96/71  SpO2:  [93 %-96 %] 96 %  Body mass index is 33 99 kg/m²  Input and Output Summary (last 24 hours): Intake/Output Summary (Last 24 hours) at 2019 1144  Last data filed at 2019 1101  Gross per 24 hour   Intake 476 ml   Output 4850 ml   Net -4374 ml       Physical Exam:     Physical Exam   Constitutional: He is oriented to person, place, and time  He appears well-developed and well-nourished  HENT:   Head: Normocephalic and atraumatic  Eyes: Pupils are equal, round, and reactive to light  EOM are normal    Neck: Normal range of motion  Neck supple  Cardiovascular: Normal rate and normal heart sounds  Exam reveals no gallop and no friction rub     No murmur heard  Irregularly irregular rhythm   Pulmonary/Chest: Effort normal and breath sounds normal  No respiratory distress  Abdominal: Soft  Bowel sounds are normal  He exhibits no distension  There is no tenderness  Musculoskeletal: Normal range of motion  He exhibits edema (Left lower extremity greater than right lower extremity)  Neurological: He is alert and oriented to person, place, and time  No cranial nerve deficit  Skin: Skin is warm and dry  Psychiatric: He has a normal mood and affect  Vitals reviewed  Additional Data:     Labs:    Results from last 7 days   Lab Units 11/25/19  0449   WBC Thousand/uL 5 70   HEMOGLOBIN g/dL 14 6   HEMATOCRIT % 45 8   PLATELETS Thousands/uL 100*   NEUTROS PCT % 63   LYMPHS PCT % 21   MONOS PCT % 11   EOS PCT % 4     Results from last 7 days   Lab Units 11/25/19  0449   SODIUM mmol/L 137   POTASSIUM mmol/L 4 0   CHLORIDE mmol/L 101   CO2 mmol/L 28   BUN mg/dL 47*   CREATININE mg/dL 1 63*   ANION GAP mmol/L 8   CALCIUM mg/dL 9 4   GLUCOSE RANDOM mg/dL 76                           * I Have Reviewed All Lab Data Listed Above  * Additional Pertinent Lab Tests Reviewed:  Vladimir 67 Admission Reviewed    Recent Cultures (last 7 days):           Last 24 Hours Medication List:     Current Facility-Administered Medications:  apixaban 5 mg Oral BID Maryan Rosenberg MD    vitamin C 1,000 mg Oral Daily Maryan Rosenberg MD    benzonatate 100 mg Oral TID PRN Maryan Rosenberg MD    bumetanide 0 5 mg/hr Intravenous Continuous Tiara Downey MD Last Rate: Stopped (11/26/19 6714)   cholecalciferol 1,000 Units Oral Daily Maryan Rosenberg MD    diltiazem 180 mg Oral Daily Maryan Rosenberg MD    HYDROcodone-acetaminophen 1 tablet Oral Q6H PRN Maryan Rosenberg MD    magnesium oxide 400 mg Oral BID Maryan Rosenberg MD    pantoprazole 40 mg Oral Early Morning Maryan Rosenberg MD    potassium chloride 20 mEq Oral BID With Meals Delio Elliott MD    vitamin E (tocopherol) 800 Units Oral Daily Delio Elliott MD         Today, Patient Was Seen By: Yady Stringer MD    ** Please Note: Dictation voice to text software may have been used in the creation of this document   **

## 2019-11-26 NOTE — ASSESSMENT & PLAN NOTE
Wt Readings from Last 3 Encounters:   11/26/19 127 kg (279 lb 3 2 oz)   11/18/19 129 kg (285 lb)   09/06/19 126 kg (277 lb)       -CXR: Mild pulmonary vascular congestion and small bilateral pleural effusions    -proBNP 6,437  -Echo: EF 45%, mild diffuse hypokinesis, mild reduction in RV systolic function  -cont bumex gtt as long as BP allows

## 2019-11-26 NOTE — PLAN OF CARE
Problem: Potential for Falls  Goal: Patient will remain free of falls  Description  INTERVENTIONS:  - Assess patient frequently for physical needs  -  Identify cognitive and physical deficits and behaviors that affect risk of falls    -  [place] fall precautions as indicated by assessment   - Educate patient/family on patient safety including physical limitations  - Instruct patient to call for assistance with activity based on assessment  - Modify environment to reduce risk of injury  - Consider OT/PT consult to assist with strengthening/mobility  Outcome: Progressing

## 2019-11-27 LAB
ANION GAP SERPL CALCULATED.3IONS-SCNC: 8 MMOL/L (ref 4–13)
BUN SERPL-MCNC: 51 MG/DL (ref 5–25)
CALCIUM SERPL-MCNC: 9.2 MG/DL (ref 8.3–10.1)
CHLORIDE SERPL-SCNC: 98 MMOL/L (ref 100–108)
CO2 SERPL-SCNC: 31 MMOL/L (ref 21–32)
CREAT SERPL-MCNC: 1.63 MG/DL (ref 0.6–1.3)
GFR SERPL CREATININE-BSD FRML MDRD: 41 ML/MIN/1.73SQ M
GLUCOSE SERPL-MCNC: 70 MG/DL (ref 65–140)
POTASSIUM SERPL-SCNC: 3.8 MMOL/L (ref 3.5–5.3)
SODIUM SERPL-SCNC: 137 MMOL/L (ref 136–145)

## 2019-11-27 PROCEDURE — 99232 SBSQ HOSP IP/OBS MODERATE 35: CPT | Performed by: INTERNAL MEDICINE

## 2019-11-27 PROCEDURE — 80048 BASIC METABOLIC PNL TOTAL CA: CPT | Performed by: HOSPITALIST

## 2019-11-27 PROCEDURE — 99233 SBSQ HOSP IP/OBS HIGH 50: CPT | Performed by: HOSPITALIST

## 2019-11-27 RX ADMIN — HYDROCODONE BITARTRATE AND ACETAMINOPHEN 1 TABLET: 5; 325 TABLET ORAL at 11:44

## 2019-11-27 RX ADMIN — Medication 400 MG: at 17:23

## 2019-11-27 RX ADMIN — PANTOPRAZOLE SODIUM 40 MG: 40 TABLET, DELAYED RELEASE ORAL at 05:11

## 2019-11-27 RX ADMIN — DILTIAZEM HYDROCHLORIDE 180 MG: 180 CAPSULE, COATED, EXTENDED RELEASE ORAL at 09:17

## 2019-11-27 RX ADMIN — POTASSIUM CHLORIDE 20 MEQ: 750 TABLET, EXTENDED RELEASE ORAL at 09:16

## 2019-11-27 RX ADMIN — MELATONIN 1000 UNITS: at 09:17

## 2019-11-27 RX ADMIN — Medication 0.5 MG/HR: at 10:46

## 2019-11-27 RX ADMIN — APIXABAN 5 MG: 5 TABLET, FILM COATED ORAL at 09:17

## 2019-11-27 RX ADMIN — Medication 800 UNITS: at 09:19

## 2019-11-27 RX ADMIN — Medication 400 MG: at 09:17

## 2019-11-27 RX ADMIN — POTASSIUM CHLORIDE 20 MEQ: 750 TABLET, EXTENDED RELEASE ORAL at 17:23

## 2019-11-27 RX ADMIN — OXYCODONE HYDROCHLORIDE AND ACETAMINOPHEN 1000 MG: 500 TABLET ORAL at 09:18

## 2019-11-27 RX ADMIN — APIXABAN 5 MG: 5 TABLET, FILM COATED ORAL at 17:23

## 2019-11-27 NOTE — PROGRESS NOTES
Cardiology Progress Note - Linwood Mazariegos 69 y o  male MRN: [id number]    Unit/Bed#: [address] [place] [address] Encounter: [id number]      Assessment:  Principal Problem:    Acute on chronic combined systolic and diastolic congestive heart failure (HCC)  Active Problems:    Gastroesophageal reflux disease without esophagitis    Class 1 obesity due to excess calories with serious comorbidity and body mass index (BMI) of 34 0 to 34 9 in adult    Essential hypertension    Persistent atrial fibrillation    Chronic left shoulder pain    Chronic pain of right wrist    Acute kidney injury superimposed on CKD (HCC)    Elevated troponin level not due myocardial infarction      Plan:  Patient is comfortable  He feels improved  He continues on intravenous diuresis in the setting of acute on chronic diastolic congestive heart failure  BMP today with potassium of 3 8 and creatinine of 1 63  He has chronic atrial fibrillation on rate control and anticoagulation  To continue diuresis and monitor renal function and electrolytes  Subjective:   Patient seen and examined  No significant events overnight   negative  Objective:     Vitals: Blood pressure 118/80, pulse 70, temperature 97 5 °F (36 4 °C), temperature source Oral, resp  rate 17, height 6' 4" (1 93 m), weight 122 kg (268 lb 8 3 oz), SpO2 96 %  , Body mass index is 32 69 kg/m² ,   Orthostatic Blood Pressures      Most Recent Value   Blood Pressure  118/80 filed at 11/27/2019 0754   Patient Position - Orthostatic VS  Sitting filed at 11/27/2019 0754      ,      Intake/Output Summary (Last 24 hours) at 11/27/2019 1346  Last data filed at 11/27/2019 1101  Gross per 24 hour   Intake 509 53 ml   Output 4250 ml   Net -3740 47 ml             Physical Exam:    GEN: Freddie Ron appears well, alert and oriented x 3, pleasant and cooperative   NECK: supple, no carotid bruits, no JVD or HJR  HEART: normal rate, regular rhythm, normal S1 and S2, no murmurs, clicks, gallops or rubs   LUNGS: clear to auscultation bilaterally; no wheezes, rales, or rhonchi   ABDOMEN: normal bowel sounds, soft, no tenderness, no distention  EXTREMITIES: edema  Labs & Results:    Admission on 11/24/2019   Component Date Value    WBC 11/24/2019 6 42     RBC 11/24/2019 4 75     Hemoglobin 11/24/2019 14 9     Hematocrit 11/24/2019 45 8     MCV 11/24/2019 96     MCH 11/24/2019 31 4     MCHC 11/24/2019 32 5     RDW 11/24/2019 15 7*    MPV 11/24/2019 11 6     Platelets 30/95/6473 119*    Neutrophils Relative 11/24/2019 72     Immat GRANS % 11/24/2019 0     Lymphocytes Relative 11/24/2019 17     Monocytes Relative 11/24/2019 9     Eosinophils Relative 11/24/2019 1     Basophils Relative 11/24/2019 1     Neutrophils Absolute 11/24/2019 4 64     Immature Grans Absolute 11/24/2019 0 01     Lymphocytes Absolute 11/24/2019 1 08     Monocytes Absolute 11/24/2019 0 56     Eosinophils Absolute 11/24/2019 0 09     Basophils Absolute 11/24/2019 0 04     Sodium 11/24/2019 136     Potassium 11/24/2019 4 1     Chloride 11/24/2019 101     CO2 11/24/2019 28     ANION GAP 11/24/2019 7     BUN 11/24/2019 47*    Creatinine 11/24/2019 1 79*    Glucose 11/24/2019 93     Calcium 11/24/2019 9 8     eGFR 11/24/2019 37     Troponin I 11/24/2019 0 09*    NT-proBNP 11/24/2019 6,437*    Troponin I 11/24/2019 0 11*    Troponin I 11/24/2019 0 10*    WBC 11/25/2019 5 70     RBC 11/25/2019 4 74     Hemoglobin 11/25/2019 14 6     Hematocrit 11/25/2019 45 8     MCV 11/25/2019 97     MCH 11/25/2019 30 8     MCHC 11/25/2019 31 9     RDW 11/25/2019 15 8*    MPV 11/25/2019 12 2     Platelets 26/21/3189 100*    Neutrophils Relative 11/25/2019 63     Immat GRANS % 11/25/2019 0     Lymphocytes Relative 11/25/2019 21     Monocytes Relative 11/25/2019 11     Eosinophils Relative 11/25/2019 4     Basophils Relative 11/25/2019 1     Neutrophils Absolute 11/25/2019 3 65     Immature Grans Absolute 11/25/2019 0 01     Lymphocytes Absolute 11/25/2019 1 18     Monocytes Absolute 11/25/2019 0 62     Eosinophils Absolute 11/25/2019 0 21     Basophils Absolute 11/25/2019 0 03     Sodium 11/25/2019 137     Potassium 11/25/2019 4 0     Chloride 11/25/2019 101     CO2 11/25/2019 28     ANION GAP 11/25/2019 8     BUN 11/25/2019 47*    Creatinine 11/25/2019 1 63*    Glucose 11/25/2019 76     Calcium 11/25/2019 9 4     eGFR 11/25/2019 41     Magnesium 11/25/2019 2 2     MRSA Culture Only 11/25/2019 No Methicillin Resistant Staphlyococcus aureus (MRSA) isolated     Ventricular Rate 11/24/2019 69     Atrial Rate 11/24/2019 416     QRSD Interval 11/24/2019 110     QT Interval 11/24/2019 442     QTC Interval 11/24/2019 473     QRS Axis 11/24/2019 -72     T Wave Axis 11/24/2019 94     Sodium 11/26/2019 135*    Potassium 11/26/2019 4 1     Chloride 11/26/2019 96*    CO2 11/26/2019 28     ANION GAP 11/26/2019 11     BUN 11/26/2019 48*    Creatinine 11/26/2019 1 58*    Glucose 11/26/2019 85     Calcium 11/26/2019 9 3     eGFR 11/26/2019 43     Sodium 11/27/2019 137     Potassium 11/27/2019 3 8     Chloride 11/27/2019 98*    CO2 11/27/2019 31     ANION GAP 11/27/2019 8     BUN 11/27/2019 51*    Creatinine 11/27/2019 1 63*    Glucose 11/27/2019 70     Calcium 11/27/2019 9 2     eGFR 11/27/2019 41        Xr Chest Pa & Lateral    Result Date: 11/25/2019  Narrative: CHEST INDICATION:   R06 02: Shortness of breath  COMPARISON:  5/13/2019 EXAM PERFORMED/VIEWS:  XR CHEST PA & LATERAL FINDINGS: Stable cardiomegaly is noted  Mild pulmonary vascular congestion is noted  Small bilateral pleural effusions are present  Osseous structures appear within normal limits for patient age  Impression: Mild pulmonary vascular congestion and small bilateral pleural effusions   Workstation performed: [id number]     Vas Lower Limb Venous Duplex Study, Unilateral/limited    Result Date: 11/25/2019  Narrative:  [facility] REPORT [facility]INICAL: Indications: Patient presents with Left lower extremity pain and edema and swelling X 3 weeks  Operative History: GSV Ligation Risk Factors The patient has history of HTN, CHF, AFib, and DVT  The patient current BMI is 33 71, Weight is 277 lb and height is 76 in  CONCLUSION:  Impression:  RIGHT LOWER LIMB LIMITED: Evaluation shows no evidence of thrombus in the common femoral vein  Doppler evaluation shows a normal response to augmentation maneuvers  LEFT LOWER LIMB: No gross evidence of acute deep vein thrombosis based on color flow analysis  No gross evidence of superficial thrombophlebitis noted  Doppler evaluation shows a normal response to augmentation maneuvers  Popliteal, posterior tibial and anterior tibial arterial Doppler waveforms are Hyperemic  Fawad Search Note: Technically difficult and limited study due to patient's body habitus, positioning and pain with minimal pressure  SIGNATURE: Electronically Signed by: Silvia Moore MD on 2019-11-25 11:37:28 AM      EKG personally reviewed by Paula Cartagena MD      Counseling / Coordination of Care  Total floor / unit time spent today 30 minutes  Greater than 50% of total time was spent with the patient and / or family counseling and / or coordination of care

## 2019-11-27 NOTE — PROGRESS NOTES
Progress Note - Linwood Mazariegos 1945, 69 y o  male MRN: [id number]    Unit/Bed#: [address] [place] [address] Encounter: [id number]    Primary Care Provider: Cameron Green MD   Date and time admitted to hospital: 11/24/2019  2:21 PM        Elevated troponin level not due myocardial infarction  Assessment & Plan  -Indeterminate, 0 09/0 11/0 10 the setting of acute on chronic CHF  -No ischemic EKG changes or chest pain    Acute kidney injury superimposed on CKD (HCC)  Assessment & Plan  -Cr 1 79, on presentation   -baseline Cr 1 5  -pt with cardiorenal syndrome  -follow Cr    Chronic pain of right wrist  Assessment & Plan  -History of chronic pain of the right wrist, in the setting of fracture 1 year ago  Patient reports that he initially set his wrist himself and by the time he presented to Orthopedics it was too late to do anything  They recommended other interventions but he is unwilling to have any surgeries as he believes it would hamper his ability to use his crutches prn   -Pain control with hydrocodone (chronic opioid use/dependence)  -No acute intervention    Chronic left shoulder pain  Assessment & Plan  -History of chronic left shoulder pain status post steroid injection  -Still in pain with minimal movement  -Continue with patient's home medication hydrocodone p r n      Persistent atrial fibrillation  Assessment & Plan  -Continue diltiazem (holding parameters) and Eliquis    Essential hypertension  Assessment & Plan  -cont Cardizem/Lasix qs BP allows  -Holding losartan and spironolactone for now    Class 1 obesity due to excess calories with serious comorbidity and body mass index (BMI) of 34 0 to 34 9 in adult  Assessment & Plan  -encourage wt loss    Gastroesophageal reflux disease without esophagitis  Assessment & Plan  -continue PPI    * Acute on chronic combined systolic and diastolic congestive heart failure (HCC)  Assessment & Plan  Wt Readings from Last 3 Encounters:   11/27/19 122 kg (268 lb 8 3 oz) 19 129 kg (285 lb)   19 126 kg (277 lb)       -CXR: Mild pulmonary vascular congestion and small bilateral pleural effusions  -proBNP 6,437  -Echo: EF 45%, mild diffuse hypokinesis, mild reduction in RV systolic function  -cont bumex gtt as long as BP allows        VTE Pharmacologic Prophylaxis:   Pharmacologic: Apixaban (Eliquis)  Mechanical VTE Prophylaxis in Place: No    Patient Centered Rounds: I have performed bedside rounds with nursing staff today  Education and Discussions with Family / Patient: Pt    Time Spent for Care: 20 minutes  More than 50% of total time spent on counseling and coordination of care as described above  Current Length of Stay: 3 day(s)    Current Patient Status: Inpatient   Certification Statement: The patient will continue to require additional inpatient hospital stay due to CHF    Discharge Plan:  And 2-4 days    Code Status: Level 1 - Full Code      Subjective:   Patient without any new complaints  Objective:     Vitals:   Temp (24hrs), Av 4 °F (36 3 °C), Min:96 7 °F (35 9 °C), Max:98 1 °F (36 7 °C)    Temp:  [96 7 °F (35 9 °C)-98 1 °F (36 7 °C)] 97 5 °F (36 4 °C)  HR:  [] 70  Resp:  [17-18] 17  BP: (106-118)/(59-83) 118/80  SpO2:  [92 %-99 %] 96 %  Body mass index is 32 69 kg/m²  Input and Output Summary (last 24 hours):        Intake/Output Summary (Last 24 hours) at 2019 1109  Last data filed at 2019 0854  Gross per 24 hour   Intake 269 53 ml   Output 4250 ml   Net -3980 47 ml       Physical Exam:     Physical Exam  Gen: NAD, AAOx3, well developed, well nourished  Eyes: EOMI, PERRLA, no scleral icterus  ENMT:  no nasal discharge, no otic discharge, moist mucous membranes  Neck:  Supple  Cardiovascular:  Normal rate, irregularly irregular rhythm, normal S1-S2, no murmurs, rubs, or gallops  Lungs:  Clear to auscultation bilaterally, no wheezes, or rales, or rhonchi  Abdomen:  Positive bowel sounds, soft, nontender, nondistended, no palpable organomegaly   Skin:  Intact, no obvious lesions or rashes, left greater than right lower extremity edema edema  Neuro: Cranial nerves 2-12 are intact, non-focal, 5/5 strength in all 4 extremities      Additional Data:     Labs:    Results from last 7 days   Lab Units 11/25/19  0449   WBC Thousand/uL 5 70   HEMOGLOBIN g/dL 14 6   HEMATOCRIT % 45 8   PLATELETS Thousands/uL 100*   NEUTROS PCT % 63   LYMPHS PCT % 21   MONOS PCT % 11   EOS PCT % 4     Results from last 7 days   Lab Units 11/27/19  0542   SODIUM mmol/L 137   POTASSIUM mmol/L 3 8   CHLORIDE mmol/L 98*   CO2 mmol/L 31   BUN mg/dL 51*   CREATININE mg/dL 1 63*   ANION GAP mmol/L 8   CALCIUM mg/dL 9 2   GLUCOSE RANDOM mg/dL 70                           * I Have Reviewed All Lab Data Listed Above  * Additional Pertinent Lab Tests Reviewed: Vladimir Sawant Admission Reviewed    Recent Cultures (last 7 days):           Last 24 Hours Medication List:     Current Facility-Administered Medications:  apixaban 5 mg Oral BID Néstor Zaidi MD    vitamin C 1,000 mg Oral Daily Néstor Zaidi MD    benzonatate 100 mg Oral TID PRN Néstor Zaidi MD    bumetanide 0 5 mg/hr Intravenous Continuous Alex Paz MD Last Rate: 0 5 mg/hr (11/27/19 1046)   cholecalciferol 1,000 Units Oral Daily Néstor Zaidi MD    diltiazem 180 mg Oral Daily Néstor Zaidi MD    HYDROcodone-acetaminophen 1 tablet Oral Q6H PRN Néstor Zaidi MD    magnesium oxide 400 mg Oral BID Néstor Zaidi MD    pantoprazole 40 mg Oral Early Morning Néstor Zaidi MD    potassium chloride 20 mEq Oral BID With Meals Néstor Zaidi MD    vitamin E (tocopherol) 800 Units Oral Daily Néstor Zaidi MD         Today, Patient Was Seen By: Emery Castillo MD    ** Please Note: Dictation voice to text software may have been used in the creation of this document   **

## 2019-11-27 NOTE — PHYSICAL THERAPY NOTE
PT screen  Order rec'd chart reviewed  Met with pt who states he uses forearm crutches at home and has other equipment but prefers to stay in [facility] chair and can transfer without AD of assistance  Declines PT services   Screen only d/c PT

## 2019-11-27 NOTE — UTILIZATION REVIEW
Continued Stay Review    Date: 11/27/2019                          Current Patient Class: inpatient   Current Level of Care: med surg    HPI:74 y o  male initially admitted on 11/24/2109 inpatient from home to ED due to acute on chronic combine CHF  CxR showed vascular congestion and pleural effusions, proBNP elevated to 6437  Assessment/Plan:  today 11/27/2019  On exam patient with persistent lower extremity edema, right > left  Creatinine elevated to 1 63 with baseline of 1 5  Bumex gtt continues for CHF  Pertinent Labs/Diagnostic Results:   11/25/2109 CxR - Mild pulmonary vascular congestion and small bilateral pleural effusions    Results from last 7 days   Lab Units 11/25/19  0449 11/24/19  1520   WBC Thousand/uL 5 70 6 42   HEMOGLOBIN g/dL 14 6 14 9   HEMATOCRIT % 45 8 45 8   PLATELETS Thousands/uL 100* 119*   NEUTROS ABS Thousands/µL 3 65 4 64     Results from last 7 days   Lab Units 11/27/19  0542 11/26/19  1218 11/25/19  0449 11/24/19  1520   SODIUM mmol/L 137 135* 137 136   POTASSIUM mmol/L 3 8 4 1 4 0 4 1   CHLORIDE mmol/L 98* 96* 101 101   CO2 mmol/L 31 28 28 28   ANION GAP mmol/L 8 11 8 7   BUN mg/dL 51* 48* 47* 47*   CREATININE mg/dL 1 63* 1 58* 1 63* 1 79*   EGFR ml/min/1 73sq m 41 43 41 37   CALCIUM mg/dL 9 2 9 3 9 4 9 8   MAGNESIUM mg/dL  --   --  2 2  --      Results from last 7 days   Lab Units 11/27/19  0542 11/26/19  1218 11/25/19  0449 11/24/19  1520   GLUCOSE RANDOM mg/dL 70 85 76 93     Results from last 7 days   Lab Units 11/24/19  2216 11/24/19  1810 11/24/19  1520   TROPONIN I ng/mL 0 10* 0 11* 0 09*     Results from last 7 days   Lab Units 11/24/19  1520   NT-PRO BNP pg/mL 6,437*     Vital Signs:   11/27/19 0754  97 5 °F (36 4 °C)  70  17  118/80    96 %  None (Room air)     11/27/19 0900  122 kg (268 lb 8 3 oz)  Standing scale     11/26/19 0545  127 kg (279 lb 3 2 oz)  Standing scale     11/25/19 0600  130 kg (285 lb 15 oz)  Standing scale     11/24/19 1745  130 kg (287 lb)  Standing scale     11/24/19 1430  149 kg (327 lb 13 2 oz)Abnormal   Bed scale     11/24/19 1426  130 kg (287 lb 4 2 oz)  Bed scale  6' 4" (1 93 m)      11/25 0701 11/26 0700 11/26 0701  11/27 0700 11/27 0701 11/28 0700   P  O  716 650 240   I V  (mL/kg)  39 5 (0 3)    Total Intake(mL/kg) 716 (5 6) 689 5 (5 4) 240 (2)   Urine (mL/kg/hr) 3900 (1 3) 3750 (1 2) 1450 (2 4)   Total Output 3900 3750 1450   Net -3367 -1952 5 1210     Medications:   Scheduled Medications:  Medications:  apixaban 5 mg Oral BID   vitamin C 1,000 mg Oral Daily   cholecalciferol 1,000 Units Oral Daily   diltiazem 180 mg Oral Daily   magnesium oxide 400 mg Oral BID   pantoprazole 40 mg Oral Early Morning   potassium chloride 20 mEq Oral BID With Meals   vitamin E (tocopherol) 800 Units Oral Daily     Continuous IV Infusions:  bumetanide 0 5 mg/hr Intravenous Continuous     PRN Meds:  benzonatate 100 mg Oral TID PRN   HYDROcodone-acetaminophen 1 tablet Oral Q6H PRN       Discharge Plan: to be determined  Network Utilization Review Department  [email or web address]  ATTENTION: Please call with any questions or concerns to [phone] and carefully listen to the prompts so that you are directed to the right person  All voicemails are confidential   Nicolette Das all requests for admission clinical reviews, approved or denied determinations and any other requests to dedicated fax number below belonging to the campus where the patient is receiving treatment    FACILITY NAME UR FAX NUMBER   ADMISSION DENIALS (Administrative/Medical Necessity) [phone]   PARENT CHILD HEALTH (Maternity/NICU/Pediatrics) [phone]   [facility] [phone]   [facility] [phone]   [facility]autista Quiros [address] [facility] [phone]   [facility] Davidson [address] [place] [address] [address] [phone]

## 2019-11-27 NOTE — ASSESSMENT & PLAN NOTE
Wt Readings from Last 3 Encounters:   11/27/19 122 kg (268 lb 8 3 oz)   11/18/19 129 kg (285 lb)   09/06/19 126 kg (277 lb)       -CXR: Mild pulmonary vascular congestion and small bilateral pleural effusions    -proBNP 6,437  -Echo: EF 45%, mild diffuse hypokinesis, mild reduction in RV systolic function  -cont bumex gtt as long as BP allows

## 2019-11-28 LAB
ANION GAP SERPL CALCULATED.3IONS-SCNC: 10 MMOL/L (ref 4–13)
BUN SERPL-MCNC: 58 MG/DL (ref 5–25)
CALCIUM SERPL-MCNC: 9.2 MG/DL (ref 8.3–10.1)
CHLORIDE SERPL-SCNC: 96 MMOL/L (ref 100–108)
CO2 SERPL-SCNC: 31 MMOL/L (ref 21–32)
CREAT SERPL-MCNC: 1.59 MG/DL (ref 0.6–1.3)
GFR SERPL CREATININE-BSD FRML MDRD: 42 ML/MIN/1.73SQ M
GLUCOSE SERPL-MCNC: 83 MG/DL (ref 65–140)
POTASSIUM SERPL-SCNC: 4 MMOL/L (ref 3.5–5.3)
SODIUM SERPL-SCNC: 137 MMOL/L (ref 136–145)

## 2019-11-28 PROCEDURE — 99233 SBSQ HOSP IP/OBS HIGH 50: CPT | Performed by: INTERNAL MEDICINE

## 2019-11-28 PROCEDURE — 80048 BASIC METABOLIC PNL TOTAL CA: CPT | Performed by: HOSPITALIST

## 2019-11-28 RX ADMIN — APIXABAN 5 MG: 5 TABLET, FILM COATED ORAL at 18:13

## 2019-11-28 RX ADMIN — HYDROCODONE BITARTRATE AND ACETAMINOPHEN 1 TABLET: 5; 325 TABLET ORAL at 07:02

## 2019-11-28 RX ADMIN — OXYCODONE HYDROCHLORIDE AND ACETAMINOPHEN 1000 MG: 500 TABLET ORAL at 08:31

## 2019-11-28 RX ADMIN — APIXABAN 5 MG: 5 TABLET, FILM COATED ORAL at 08:31

## 2019-11-28 RX ADMIN — POTASSIUM CHLORIDE 20 MEQ: 750 TABLET, EXTENDED RELEASE ORAL at 15:52

## 2019-11-28 RX ADMIN — Medication 400 MG: at 08:33

## 2019-11-28 RX ADMIN — DILTIAZEM HYDROCHLORIDE 180 MG: 180 CAPSULE, COATED, EXTENDED RELEASE ORAL at 08:36

## 2019-11-28 RX ADMIN — PANTOPRAZOLE SODIUM 40 MG: 40 TABLET, DELAYED RELEASE ORAL at 05:06

## 2019-11-28 RX ADMIN — Medication 800 UNITS: at 08:34

## 2019-11-28 RX ADMIN — POTASSIUM CHLORIDE 20 MEQ: 750 TABLET, EXTENDED RELEASE ORAL at 08:31

## 2019-11-28 RX ADMIN — Medication 0.5 MG/HR: at 08:41

## 2019-11-28 RX ADMIN — Medication 400 MG: at 18:13

## 2019-11-28 RX ADMIN — MELATONIN 1000 UNITS: at 08:36

## 2019-11-28 NOTE — PLAN OF CARE
Problem: Potential for Falls  Goal: Patient will remain free of falls  Description  INTERVENTIONS:  - Assess patient frequently for physical needs  -  Identify cognitive and physical deficits and behaviors that affect risk of falls    -  [place] fall precautions as indicated by assessment   - Educate patient/family on patient safety including physical limitations  - Instruct patient to call for assistance with activity based on assessment  - Modify environment to reduce risk of injury  - Consider OT/PT consult to assist with strengthening/mobility  Outcome: Progressing

## 2019-11-28 NOTE — PROGRESS NOTES
[facility] Progress Note  Patient: Darrell Pettit 69 y o  male   MRN: [id number]  PCP: Teresa Wilkinson MD  Unit/Bed#: [address] [place] [address] Encounter: [id number]  Date Of Visit: 11/28/19    Assessment:    Principal Problem:    Acute on chronic combined systolic and diastolic congestive heart failure (HCC)  Active Problems:    Gastroesophageal reflux disease without esophagitis    Class 1 obesity due to excess calories with serious comorbidity and body mass index (BMI) of 34 0 to 34 9 in adult    Essential hypertension    Persistent atrial fibrillation    Chronic left shoulder pain    Chronic pain of right wrist    Acute kidney injury superimposed on CKD (HCC)    Elevated troponin level not due myocardial infarction      Plan:      1  Acute on chronic combined systolic and diastolic congestive heart failure (HCC)  Wt Readings from Last 3 Encounters:  11/27/19 122 kg (268 lb 8 3 oz)  11/18/19 129 kg (285 lb)  09/06/19 126 kg (277 lb)  Diagnosed by abnormal chest x-ray elevated BNP close to 6000  And clinical exam   Continue Bumex drip for another 24 hours follow up with a m  Weight  Patient is near euvolemic Still reporting significant amount of shortness of breath  2  Elevated troponin level not due myocardial infarction  This is likely due to troponin leak  Due to AFib/acute kidney injury     3   Acute kidney injury superimposed on CKD (HCC)  Worsening creatinine likely due to cardiorenal syndrome  Continue daily labs/replace electrolytes appropriately     4  Persistent atrial fibrillation  Rate controlled  Continue diltiazem/Eliquis     5  Essential hypertension  Currently normotensive     6  Class 1 obesity due to excess calories with serious comorbidity and body mass index (BMI) of 34 0 to 34 9 in adult  Appropriate lifestyle nutritional modifications have been discussed, unfortunately he is currently limited by his inability to exercise and inability to ambulate      7  Gastroesophageal reflux disease without esophagitis  Acute symptoms  On PPI     8  Generalized deconditioning  Patient often time noncompliant with PT  Occasionally chasing them away from room  Discuss this matter with patient      VTE Pharmacologic Prophylaxis:   Pharmacologic: Enoxaparin (Lovenox)  Mechanical VTE Prophylaxis in Place: Yes    Patient Centered Rounds: I have performed bedside rounds with nursing staff today  Discussions with Specialists or Other Care Team Provider:  Yes    Education and Discussions with Family / Patient:  Yes/patient    Time Spent for Care: 30 minutes  More than 50% of total time spent on counseling and coordination of care as described above  Current Length of Stay: 4 day(s)    Current Patient Status: Inpatient   Certification Statement: The patient will continue to require additional inpatient hospital stay due to Acute on chronic heart failure    Discharge Plan / Estimated Discharge Date: To be determined    Code Status: Level 1 - Full Code      Subjective:   No new complaints  Patient source of dyspnea on exertion  My legs are looking better less swollen   Objective:     Vitals:   Temp (24hrs), Av 5 °F (36 4 °C), Min:97 4 °F (36 3 °C), Max:97 6 °F (36 4 °C)    Temp:  [97 4 °F (36 3 °C)-97 6 °F (36 4 °C)] 97 4 °F (36 3 °C)  HR:  [80-86] 86  Resp:  [18] 18  BP: (110-134)/(70-79) 134/79  SpO2:  [95 %-96 %] 96 %  Body mass index is 32 kg/m²  Input and Output Summary (last 24 hours): Intake/Output Summary (Last 24 hours) at 2019 1322  Last data filed at 2019 0900  Gross per 24 hour   Intake    Output 2150 ml   Net -2150 ml       Physical Exam:     Physical Exam      Constitutional:  Well developed, well nourished, no acute distress, non-toxic appearance   Eyes:  PERRL, conjunctiva normal   HENT:  Atraumatic, external ears normal, nose normal, oropharynx moist, no pharyngeal exudates     Neck- normal range of motion, no tenderness, supple   Respiratory:  No respiratory distress, normal breath sounds, no rales, no wheezing   Cardiovascular:  Normal rate, normal rhythm, no murmurs, no gallops, no rubs   GI:  Soft, nondistended, normal bowel sounds, nontender, no organomegaly, no mass, no rebound, no guarding   :  No costovertebral angle tenderness   Musculoskeletal:  Compression stocking in place, positive bilateral lower extremity pitting edema, no tenderness, no deformities  Back- no tenderness  Integument:  Well hydrated, no rash   Lymphatic:  No lymphadenopathy noted   Neurologic:  Alert & oriented x 3, CN 2-12 normal, normal motor function, normal sensory function, no focal deficits noted   Psychiatric:  Speech and behavior appropriate           Additional Data:     Labs:    Results from last 7 days   Lab Units 11/25/19  0449   WBC Thousand/uL 5 70   HEMOGLOBIN g/dL 14 6   HEMATOCRIT % 45 8   PLATELETS Thousands/uL 100*   NEUTROS PCT % 63   LYMPHS PCT % 21   MONOS PCT % 11   EOS PCT % 4     Results from last 7 days   Lab Units 11/28/19  0511   POTASSIUM mmol/L 4 0   CHLORIDE mmol/L 96*   CO2 mmol/L 31   BUN mg/dL 58*   CREATININE mg/dL 1 59*   CALCIUM mg/dL 9 2           * I Have Reviewed All Lab Data Listed Above  * Additional Pertinent Lab Tests Reviewed:  [facility] Admission Reviewed    Imaging:    Imaging Reports Reviewed Today Include:  Reviewed  Imaging Personally Reviewed by Myself Includes:  Reviewed    Recent Cultures (last 7 days):           Last 24 Hours Medication List:     Current Facility-Administered Medications:  apixaban 5 mg Oral BID Narendra August MD    vitamin C 1,000 mg Oral Daily Narendra August MD    benzonatate 100 mg Oral TID PRN Narendra August MD    bumetanide 0 5 mg/hr Intravenous Continuous Real PassMD arely Last Rate: 0 5 mg/hr (11/28/19 0841)   cholecalciferol 1,000 Units Oral Daily Narendra August MD    diltiazem 180 mg Oral Daily Narendra August MD    HYDROcodone-acetaminophen 1 tablet Oral Q6H PRN Eugenia Hunt MD    magnesium oxide 400 mg Oral BID Eugenia Hunt MD    pantoprazole 40 mg Oral Early Morning Eugenia Hunt MD    potassium chloride 20 mEq Oral BID With Meals Eugenia Hunt MD    vitamin E (tocopherol) 800 Units Oral Daily Eugenia Hunt MD         Today, Patient Was Seen By: Ottoniel Barr    ** Please Note: This note has been constructed using a voice recognition system   **

## 2019-11-29 LAB
ALBUMIN SERPL BCP-MCNC: 3 G/DL (ref 3.5–5)
ALP SERPL-CCNC: 115 U/L (ref 46–116)
ALT SERPL W P-5'-P-CCNC: 25 U/L (ref 12–78)
ANION GAP SERPL CALCULATED.3IONS-SCNC: 10 MMOL/L (ref 4–13)
AST SERPL W P-5'-P-CCNC: 38 U/L (ref 5–45)
BILIRUB SERPL-MCNC: 1.2 MG/DL (ref 0.2–1)
BUN SERPL-MCNC: 57 MG/DL (ref 5–25)
CALCIUM SERPL-MCNC: 9.2 MG/DL (ref 8.3–10.1)
CHLORIDE SERPL-SCNC: 97 MMOL/L (ref 100–108)
CO2 SERPL-SCNC: 30 MMOL/L (ref 21–32)
CREAT SERPL-MCNC: 1.61 MG/DL (ref 0.6–1.3)
ERYTHROCYTE [DISTWIDTH] IN BLOOD BY AUTOMATED COUNT: 15.2 % (ref 11.6–15.1)
GFR SERPL CREATININE-BSD FRML MDRD: 42 ML/MIN/1.73SQ M
GLUCOSE SERPL-MCNC: 76 MG/DL (ref 65–140)
HCT VFR BLD AUTO: 43.8 % (ref 36.5–49.3)
HGB BLD-MCNC: 14.4 G/DL (ref 12–17)
MCH RBC QN AUTO: 31.4 PG (ref 26.8–34.3)
MCHC RBC AUTO-ENTMCNC: 32.9 G/DL (ref 31.4–37.4)
MCV RBC AUTO: 95 FL (ref 82–98)
PLATELET # BLD AUTO: 114 THOUSANDS/UL (ref 149–390)
PMV BLD AUTO: 12.4 FL (ref 8.9–12.7)
POTASSIUM SERPL-SCNC: 3.6 MMOL/L (ref 3.5–5.3)
PROT SERPL-MCNC: 8.2 G/DL (ref 6.4–8.2)
RBC # BLD AUTO: 4.59 MILLION/UL (ref 3.88–5.62)
SODIUM SERPL-SCNC: 137 MMOL/L (ref 136–145)
WBC # BLD AUTO: 5.03 THOUSAND/UL (ref 4.31–10.16)

## 2019-11-29 PROCEDURE — G8988 SELF CARE GOAL STATUS: HCPCS

## 2019-11-29 PROCEDURE — 80053 COMPREHEN METABOLIC PANEL: CPT | Performed by: INTERNAL MEDICINE

## 2019-11-29 PROCEDURE — 99232 SBSQ HOSP IP/OBS MODERATE 35: CPT | Performed by: INTERNAL MEDICINE

## 2019-11-29 PROCEDURE — G8987 SELF CARE CURRENT STATUS: HCPCS

## 2019-11-29 PROCEDURE — 99232 SBSQ HOSP IP/OBS MODERATE 35: CPT | Performed by: HOSPITALIST

## 2019-11-29 PROCEDURE — 97167 OT EVAL HIGH COMPLEX 60 MIN: CPT

## 2019-11-29 PROCEDURE — 85027 COMPLETE CBC AUTOMATED: CPT | Performed by: INTERNAL MEDICINE

## 2019-11-29 RX ORDER — POTASSIUM CHLORIDE 20 MEQ/1
20 TABLET, EXTENDED RELEASE ORAL 2 TIMES DAILY
Status: DISCONTINUED | OUTPATIENT
Start: 2019-11-29 | End: 2019-11-29

## 2019-11-29 RX ORDER — TORSEMIDE 20 MG/1
20 TABLET ORAL 2 TIMES DAILY
Status: DISCONTINUED | OUTPATIENT
Start: 2019-11-29 | End: 2019-12-01 | Stop reason: HOSPADM

## 2019-11-29 RX ADMIN — MELATONIN 1000 UNITS: at 08:40

## 2019-11-29 RX ADMIN — APIXABAN 5 MG: 5 TABLET, FILM COATED ORAL at 17:28

## 2019-11-29 RX ADMIN — Medication 0.5 MG/HR: at 00:09

## 2019-11-29 RX ADMIN — Medication 400 MG: at 17:28

## 2019-11-29 RX ADMIN — HYDROCODONE BITARTRATE AND ACETAMINOPHEN 1 TABLET: 5; 325 TABLET ORAL at 16:07

## 2019-11-29 RX ADMIN — POTASSIUM CHLORIDE 20 MEQ: 750 TABLET, EXTENDED RELEASE ORAL at 08:40

## 2019-11-29 RX ADMIN — TORSEMIDE 20 MG: 20 TABLET ORAL at 14:47

## 2019-11-29 RX ADMIN — APIXABAN 5 MG: 5 TABLET, FILM COATED ORAL at 08:40

## 2019-11-29 RX ADMIN — POTASSIUM CHLORIDE 20 MEQ: 750 TABLET, EXTENDED RELEASE ORAL at 16:08

## 2019-11-29 RX ADMIN — DILTIAZEM HYDROCHLORIDE 180 MG: 180 CAPSULE, COATED, EXTENDED RELEASE ORAL at 08:40

## 2019-11-29 RX ADMIN — PANTOPRAZOLE SODIUM 40 MG: 40 TABLET, DELAYED RELEASE ORAL at 05:23

## 2019-11-29 RX ADMIN — OXYCODONE HYDROCHLORIDE AND ACETAMINOPHEN 1000 MG: 500 TABLET ORAL at 08:40

## 2019-11-29 RX ADMIN — Medication 800 UNITS: at 08:41

## 2019-11-29 RX ADMIN — TORSEMIDE 20 MG: 20 TABLET ORAL at 17:28

## 2019-11-29 RX ADMIN — Medication 400 MG: at 08:40

## 2019-11-29 NOTE — PROGRESS NOTES
Progress Note - Linwood Mazariegos 1945, 69 y o  male MRN: [id number]    Unit/Bed#: [address] [place] [address] Encounter: [id number]    Primary Care Provider: Eugene Marin MD   Date and time admitted to hospital: 11/24/2019  2:21 PM        Elevated troponin level not due myocardial infarction  Assessment & Plan  -Indeterminate, 0 09/0 11/0 10 the setting of acute on chronic CHF  -No ischemic EKG changes or chest pain    Acute kidney injury superimposed on CKD (HCC)  Assessment & Plan  -Cr 1 79, on presentation   -baseline Cr 1 5  -pt with cardiorenal syndrome  -follow Cr (currently stable at 1 61)    Chronic pain of right wrist  Assessment & Plan  -History of chronic pain of the right wrist, in the setting of fracture 1 year ago  Patient reports that he initially set his wrist himself and by the time he presented to Orthopedics it was too late to do anything  They recommended other interventions but he is unwilling to have any surgeries as he believes it would hamper his ability to use his crutches prn   -Pain control with hydrocodone (chronic opioid use/dependence)  -No acute intervention    Chronic left shoulder pain  Assessment & Plan  -History of chronic left shoulder pain status post steroid injection  -Still in pain with minimal movement  -Continue with patient's home medication hydrocodone p r n      Persistent atrial fibrillation  Assessment & Plan  -Continue diltiazem (holding parameters) and Eliquis    Essential hypertension  Assessment & Plan  -cont Cardizem/Bumex qs BP allows  -Holding losartan and spironolactone for now    Class 1 obesity due to excess calories with serious comorbidity and body mass index (BMI) of 34 0 to 34 9 in adult  Assessment & Plan  -encourage wt loss    Gastroesophageal reflux disease without esophagitis  Assessment & Plan  -continue PPI    * Acute on chronic combined systolic and diastolic congestive heart failure [facility])  Assessment & Plan  Wt Readings from Last 3 Encounters:   11/29/19 117 kg (258 lb 9 6 oz)   19 129 kg (285 lb)   19 126 kg (277 lb)       -CXR: Mild pulmonary vascular congestion and small bilateral pleural effusions  -proBNP 6,437  -Echo: EF 45%, mild diffuse hypokinesis, mild reduction in RV systolic function  -cont bumex gtt as long as BP allows  -NEG 15 5L so far        VTE Pharmacologic Prophylaxis:   Pharmacologic: Apixaban (Eliquis)  Mechanical VTE Prophylaxis in Place: Yes    Patient Centered Rounds: I have performed bedside rounds with nursing staff today  Education and Discussions with Family / Patient: Pt    Time Spent for Care: 20 minutes  More than 50% of total time spent on counseling and coordination of care as described above  Current Length of Stay: 5 day(s)    Current Patient Status: Inpatient   Certification Statement: The patient will continue to require additional inpatient hospital stay due to CHF    Discharge Plan: 1-3 days    Code Status: Level 1 - Full Code      Subjective:   Patient denies chest pain or shortness of breath  Objective:     Vitals:   Temp (24hrs), Av 2 °F (36 2 °C), Min:97 2 °F (36 2 °C), Max:97 3 °F (36 3 °C)    Temp:  [97 2 °F (36 2 °C)-97 3 °F (36 3 °C)] 97 3 °F (36 3 °C)  HR:  [54-84] 84  Resp:  [16-18] 18  BP: ()/(61-83) 125/83  SpO2:  [97 %-99 %] 98 %  Body mass index is 31 48 kg/m²  Input and Output Summary (last 24 hours):        Intake/Output Summary (Last 24 hours) at 2019 1129  Last data filed at 2019 0801  Gross per 24 hour   Intake 696 ml   Output 4725 ml   Net -4029 ml       Physical Exam:     Physical Exam  Gen: NAD, AAOx3, well developed, well nourished  Eyes: EOMI, PERRLA, no scleral icterus  ENMT:  no nasal discharge, no otic discharge, moist mucous membranes  Neck:  Supple  Cardiovascular:  Normal rate, irregularly irregular rhythm, normal S1-S2, no murmurs, rubs, or gallops  Lungs:  Clear to auscultation bilaterally, no wheezes, or rales, or rhonchi  Abdomen:  Positive bowel sounds, soft, nontender, nondistended, no palpable organomegaly   Skin:  Intact, no obvious lesions or rashes, left greater than right lower extremity edema, improving  Neuro: Cranial nerves 2-12 are intact, non-focal, moves all 4 extremities      Additional Data:     Labs:    Results from last 7 days   Lab Units 11/29/19  0523 11/25/19  0449   WBC Thousand/uL 5 03 5 70   HEMOGLOBIN g/dL 14 4 14 6   HEMATOCRIT % 43 8 45 8   PLATELETS Thousands/uL 114* 100*   NEUTROS PCT %  --  63   LYMPHS PCT %  --  21   MONOS PCT %  --  11   EOS PCT %  --  4     Results from last 7 days   Lab Units 11/29/19  0523   SODIUM mmol/L 137   POTASSIUM mmol/L 3 6   CHLORIDE mmol/L 97*   CO2 mmol/L 30   BUN mg/dL 57*   CREATININE mg/dL 1 61*   ANION GAP mmol/L 10   CALCIUM mg/dL 9 2   ALBUMIN g/dL 3 0*   TOTAL BILIRUBIN mg/dL 1 20*   ALK PHOS U/L 115   ALT U/L 25   AST U/L 38   GLUCOSE RANDOM mg/dL 76                           * I Have Reviewed All Lab Data Listed Above  * Additional Pertinent Lab Tests Reviewed:  Vladimir [facility] Admission Reviewed    Recent Cultures (last 7 days):           Last 24 Hours Medication List:     Current Facility-Administered Medications:  apixaban 5 mg Oral BID Vincenzo Cuba MD    vitamin C 1,000 mg Oral Daily Vincenzo Cuba MD    benzonatate 100 mg Oral TID PRN Vincenzo Cuba MD    bumetanide 0 5 mg/hr Intravenous Continuous Cleo Ziegler MD Last Rate: 0 5 mg/hr (11/29/19 0009)   cholecalciferol 1,000 Units Oral Daily Vincenzo Cuba MD    diltiazem 180 mg Oral Daily Vincenzo Cuba MD    HYDROcodone-acetaminophen 1 tablet Oral Q6H PRN Vincenzo Cuba MD    magnesium oxide 400 mg Oral BID Vincenzo Cuba MD    pantoprazole 40 mg Oral Early Morning Vincenzo Cuba MD    potassium chloride 20 mEq Oral BID With Meals Vincenzo Cuba MD    vitamin E (tocopherol) 800 Units Oral Daily Vincenzo Cuba MD         Today, Patient Was Seen By: Leo Mendez Dontae Rob MD    ** Please Note: Dictation voice to text software may have been used in the creation of this document   **

## 2019-11-29 NOTE — OCCUPATIONAL THERAPY NOTE
Occupational Therapy Evaluation     Patient Name: Lj Tidwell  Today's Date: 11/29/2019  Problem List  Principal Problem:    Acute on chronic combined systolic and diastolic congestive heart failure (HCC)  Active Problems:    Gastroesophageal reflux disease without esophagitis    Class 1 obesity due to excess calories with serious comorbidity and body mass index (BMI) of 34 0 to 34 9 in adult    Essential hypertension    Persistent atrial fibrillation    Chronic left shoulder pain    Chronic pain of right wrist    Acute kidney injury superimposed on CKD (HCC)    Elevated troponin level not due myocardial infarction    Past Medical History  Past Medical History:   Diagnosis Date    At[facility] )     Cardiac disease     Cellulitis     Chronic pain     Chronic venous hypertension     with ulceration    GERD (gastroesophageal reflux disease)     History of methicillin resistant staphylococcus aureus (MRSA) 11/26/2019    negative nasal swab     Hyperlipidemia     Hypertension     Obesity     Pulmonary hypertension ([facility] )     [facility]per[facility]     Vascular disorder of extremity ([facility] )      Past Surgical History  Past Surgical History:   Procedure Laterality Date    ANTERIOR RELEASE VERTEBRAL BODY W/ POSTERIOR FUSION      APPENDECTOMY  1954    CATARACT EXTRACTION      DECOMPRESSION SPINE LUMBAR POSTERIOR      ELBOW SURGERY      KNEE SURGERY      NOSE SURGERY      turbinectomy    RETINAL DETACHMENT SURGERY      RHINOPLASTY      TONSILLECTOMY      VEIN LIGATION AND STRIPPING      saphenous vein long         11/29/19 1102   Note Type   Note type Eval only   Pain Assessment   Pain Assessment Villar[place]-Baker FACES   Villarreal-Baker FACES Pain Rating 4   Pain Location Shoulder   Pain Orientation Left   Pain Frequency Constant/continuous   Pain Onset Ongoing   Home Living   Type of [facility] One level;Stairs to enter with rails  (4 LUCERO)   Bathroom Shower/Tub Tub/shower unit   [address] [place] [address] [address]   Bathroom Accessibility Not accessible  (Pt reports power chair unable to fit, uses shopping cart )   Home Equipment Wheelchair-electric  (shopping cart for within home)   Prior Function   Level of [place] Modified independent with wheelchair   Lives With Son  (grandson)   Receives Help From Family   ADL Assistance Independent   IADLs Independent   Falls in the last 6 months 0   Vocational Retired   Lifestyle   Autonomy Pt endorses that he was independent with ADLs and functional mobilty  Within home he relied on furniture walking and pushing a shoppin cart  Outside of home pt relied on power chair  Pt reports that home is not accessible to use power chair within home  Pt does reports independently being able to transer to powerchair  Intrinsic Gratification reading newspaper   Psychosocial   Psychosocial (WDL) X   Patient Behaviors/Mood Angry;Irritable;Verbal   Subjective   Subjective Pt states "What good is therapy, you can't do anything for me"  Pt thorougly educated on role and benefits of OT      ADL   Eating Assistance 5  Supervision/Setup   Eating Deficit Setup   Grooming Assistance 5  Supervision/Setup   Grooming Deficit Setup   UB Bathing Assistance 5  Supervision/Setup   LB Bathing Assistance 3  Moderate Assistance   UB Dressing Assistance 5  Supervision/Setup   LB Dressing Assistance 3  Moderate Assistance   Toileting Assistance  5  Supervision/Setup   Bed Mobility   Supine to Sit 5  Supervision   Sit to Supine 5  Supervision   Transfers   Sit to Stand 4  Minimal assistance  (CGA; pt maintaining unilateral UE support on bed, refusing OT assistance)   Stand to Sit 4  Minimal assistance  (CGA; pt maintaining unilateral UE support on bed, refusing OT assistance)   Balance   Static Sitting Fair   Dynamic Sitting Fair   Static Standing Fair -   Dynamic Standing Poor +   Activity Tolerance   Activity Tolerance Treatment limited secondary to agitation   Medical Staff Made Aware JERSEY Corona   RUPALMER Assessment   RUE Assessment X   RUE Overall AROM   R Wrist Flexion 0- 5 degrees   R Wrist Extension 0-15 degrees   LUE Assessment   LUE Assessment X   LUE Overall AROM   L Shoulder Flexion 0-90 degrees   Cognition   Overall Cognitive Status WFL   Arousal/Participation Alert   Attention Within functional limits   Orientation Level Oriented X4   Memory Within functional limits   Following Commands Follows one step commands without difficulty   Assessment   Limitation Decreased ADL status; Decreased UE ROM; Decreased UE strength;Decreased Safe judgement during ADL;Decreased self-care trans;Decreased high-level ADLs   Prognosis Fair   Assessment Pt is a 69 y o  male seen for OT evaluation at [facility], admitted 11/24/2019 w/ Acute on chronic combined systolic and diastolic congestive heart failure ([facility] )  OT completed extensive review of pt's medical and social history  Pt noted to be irritable t/o encounter and refusing OT assistance during transfer assessment despite education  Comorbidities affecting pt's functional performance at time of assessment include: CHF, obesity, HTN, left shoulder pain, right wrist pain, leg edema, low back pain, and ambulatory dysfunction  Personal factors affecting pt at time of IE include:steps to enter environment, behavioral pattern, difficulty performing ADLS, difficulty performing IADLS , compliance and decreased initiation and engagement   Prior to admission, pt was living with son and grandson in house with steps to manage  Pt endorses that he was independent with ADLs and functional mobilty  Within home he relied on furniture walking and pushing a shopping cart  Outside of home pt relied on power chair  Pt reports that home is not accessible to use power chair within home  Pt does reports independently being able to transer to powerchair   Upon evaluation, pt presents to OT below baseline due to the following performance deficits: weakness, decreased ROM, decreased strength, decreased balance and impaired interpersonal skills  Pt to benefit from continued skilled OT tx while in the hospital to address deficits as defined above and maximize level of functional independence w ADL's and functional mobility  Occupational Performance areas to address include: bathing/shower, toilet hygiene, dressing, health maintenance, functional mobility, community mobility, cleaning and social participation  Based on findings, pt is of high complexity  At this time, OT recommendations at time of discharge are short term rehab  However, pt wishes to go home and does not want therapy services upon D/C  Pt only agreeable to inpatient OT  Goals   Patient Goals Pt wishes to get home   Plan   Treatment Interventions ADL retraining;Functional transfer training; Endurance training;UE strengthening/ROM; Patient/family training;Equipment evaluation/education; Compensatory technique education; Activityengagement   Goal Expiration Date 12/09/19   OT Treatment Day 0   OT Frequency 3-5x/wk   Recommendation   OT Discharge Recommendation Short Term Rehab  (Pt deferring therapy upon D/C  Only agreeable to inpt OT  )   OT - OK to Discharge No   Barthel Index   Feeding 10   Bathing 0   Grooming Score 0   Dressing Score 5   Bladder Score 10   Bowels Score 10   Toilet Use Score 10   Transfers (Bed/Chair) Score 10   Mobility (Level Surface) Score 0   Stairs Score 0   Barthel Index Score 55     Pt will achieve the following goals within 10 days  *Pt will complete UB bathing and dressing with mod I     *Pt will complete LB bathing and dressing with mod I      *Pt will complete toileting (hygiene and clothing management) with mod I     *Pt will perform functional transfers with mod I in order to complete ADL routine  *Pt will increase standing tolerance to 5 minutes in order to complete sinkside ADL task      *Pt will complete item retrieval and light home management with mod I while demonstrating good safety  *Pt will demonstrate increased activity tolerance in order to complete ADL routine  *Pt will participate in UE therapeutic exercise in order to maximize strength for ADL transfers        Georgia Patel OTR/L

## 2019-11-29 NOTE — PLAN OF CARE
Problem: Potential for Falls  Goal: Patient will remain free of falls  Description  INTERVENTIONS:  - Assess patient frequently for physical needs  -  Identify cognitive and physical deficits and behaviors that affect risk of falls    -  [place] fall precautions as indicated by assessment   - Educate patient/family on patient safety including physical limitations  - Instruct patient to call for assistance with activity based on assessment  - Modify environment to reduce risk of injury  - Consider OT/PT consult to assist with strengthening/mobility  Outcome: Progressing

## 2019-11-29 NOTE — PLAN OF CARE
Problem: OCCUPATIONAL THERAPY ADULT  Goal: Performs self-care activities at highest level of function for planned discharge setting  See evaluation for individualized goals  Description  Treatment Interventions: ADL retraining, Functional transfer training, Endurance training, UE strengthening/ROM, Patient/family training, Equipment evaluation/education, Compensatory technique education, Activityengagement          See flowsheet documentation for full assessment, interventions and recommendations  11/29/2019 1631 by Bambi Azar OT  Note:   Limitation: Decreased ADL status, Decreased UE ROM, Decreased UE strength, Decreased Safe judgement during ADL, Decreased self-care trans, Decreased high-level ADLs  Prognosis: Fair  Assessment: Pt is a 69 y o  male seen for OT evaluation at [facility], admitted 11/24/2019 w/ Acute on chronic combined systolic and diastolic congestive heart failure ([facility] )  OT completed extensive review of pt's medical and social history  Pt noted to be irritable t/o encounter and refusing OT assistance during transfer assessment despite education  Comorbidities affecting pt's functional performance at time of assessment include: CHF, obesity, HTN, left shoulder pain, right wrist pain, leg edema, low back pain, and ambulatory dysfunction  Personal factors affecting pt at time of IE include:steps to enter environment, behavioral pattern, difficulty performing ADLS, difficulty performing IADLS , compliance and decreased initiation and engagement   Prior to admission, pt was living with son and grandson in house with steps to manage  Pt endorses that he was independent with ADLs and functional mobilty  Within home he relied on furniture walking and pushing a shopping cart  Outside of home pt relied on power chair  Pt reports that home is not accessible to use power chair within home  Pt does reports independently being able to transer to powerchair   Upon evaluation, pt presents to OT below baseline due to the following performance deficits: weakness, decreased ROM, decreased strength, decreased balance and impaired interpersonal skills  Pt to benefit from continued skilled OT tx while in the hospital to address deficits as defined above and maximize level of functional independence w ADL's and functional mobility  Occupational Performance areas to address include: bathing/shower, toilet hygiene, dressing, health maintenance, functional mobility, community mobility, cleaning and social participation  Based on findings, pt is of high complexity  At this time, OT recommendations at time of discharge are short term rehab  However, pt wishes to go home and does not want therapy services upon D/C  Pt only agreeable to inpatient OT  OT Discharge Recommendation: Short Term Rehab(Pt deferring therapy upon D/C   Agreeable to inpt OT  )  OT - OK to Discharge: No

## 2019-11-29 NOTE — UTILIZATION REVIEW
Continued Stay Review    Date: 11/29/19                        Current Patient Class: inpatient  Current Level of Care: med surg  HPI:74 y o  male initially admitted on 11/24/19  Assessment/Plan:   Per cardio:  Acute on Chronic diastolic CHF; dramatic volume weight loss  Transition off Iv Bumex gtt, trial torsemide 20 mg bid  Bowel edema likely effected diuretic absorption before he was here  Persistent ble edema, L>R    Pertinent Labs/Diagnostic Results:   Results from last 7 days   Lab Units 11/29/19  0523 11/25/19 0449 11/24/19  1520   WBC Thousand/uL 5 03 5 70 6 42   HEMOGLOBIN g/dL 14 4 14 6 14 9   HEMATOCRIT % 43 8 45 8 45 8   PLATELETS Thousands/uL 114* 100* 119*   NEUTROS ABS Thousands/µL  --  3 65 4 64     Results from last 7 days   Lab Units 11/29/19  0523 11/28/19  0511 11/27/19  0542 11/26/19  1218 11/25/19  0449   SODIUM mmol/L 137 137 137 135* 137   POTASSIUM mmol/L 3 6 4 0 3 8 4 1 4 0   CHLORIDE mmol/L 97* 96* 98* 96* 101   CO2 mmol/L 30 31 31 28 28   ANION GAP mmol/L 10 10 8 11 8   BUN mg/dL 57* 58* 51* 48* 47*   CREATININE mg/dL 1 61* 1 59* 1 63* 1 58* 1 63*   EGFR ml/min/1 73sq m 42 42 41 43 41   CALCIUM mg/dL 9 2 9 2 9 2 9 3 9 4   MAGNESIUM mg/dL  --   --   --   --  2 2     Results from last 7 days   Lab Units 11/29/19  0523   AST U/L 38   ALT U/L 25   ALK PHOS U/L 115   TOTAL PROTEIN g/dL 8 2   ALBUMIN g/dL 3 0*   TOTAL BILIRUBIN mg/dL 1 20*     Results from last 7 days   Lab Units 11/29/19  0523 11/28/19  0511 11/27/19  0542 11/26/19  1218 11/25/19  0449 11/24/19  1520   GLUCOSE RANDOM mg/dL 76 83 70 85 76 93     Results from last 7 days   Lab Units 11/24/19  2216 11/24/19  1810 11/24/19  1520   TROPONIN I ng/mL 0 10* 0 11* 0 09*     Results from last 7 days   Lab Units 11/24/19  1520   NT-PRO BNP pg/mL 6,437*     Vital Signs: /83 (BP Location: Right arm)   Pulse 84   Temp (!) 97 3 °F (36 3 °C) (Oral)   Resp 18   Ht 6' 4" (1 93 m)   Wt 117 kg (258 lb 9 6 oz)   SpO2 98%   BMI 31 48 kg/m²    Intake/Output Summary (Last 24 hours) at 11/29/2019 1129  Last data filed at 11/29/2019 0801      Gross per 24 hour   Intake 696 ml   Output 4725 ml   Net -4029 ml     Medications:   apixaban 5 mg Oral BID   vitamin C 1,000 mg Oral Daily   cholecalciferol 1,000 Units Oral Daily   diltiazem 180 mg Oral Daily   magnesium oxide 400 mg Oral BID   pantoprazole 40 mg Oral Early Morning   potassium chloride 20 mEq Oral BID With Meals   torsemide 20 mg Oral BID   vitamin E (tocopherol) 800 Units Oral Daily   PRN Meds:  benzonatate 100 mg Oral TID PRN   HYDROcodone-acetaminophen 1 tablet Oral Q6H PRN     Discharge Plan: tbd  Network Utilization Review Department  [email or web address]  ATTENTION: Please call with any questions or concerns to [phone] and carefully listen to the prompts so that you are directed to the right person  All voicemails are confidential   Jadyn Muss all requests for admission clinical reviews, approved or denied determinations and any other requests to dedicated fax number below belonging to the campus where the patient is receiving treatment    FACILITY NAME UR FAX NUMBER   ADMISSION DENIALS (Administrative/Medical Necessity) [address] [facility] [place] (Maternity/NICU/Pediatrics) [phone]   [facility] [address] [facility] [phone]   [address] [place] [address] 15[address] [facility] [phone]   [facility] [address] [place] [address] [phone]

## 2019-11-29 NOTE — ASSESSMENT & PLAN NOTE
Wt Readings from Last 3 Encounters:   11/29/19 117 kg (258 lb 9 6 oz)   11/18/19 129 kg (285 lb)   09/06/19 126 kg (277 lb)       -CXR: Mild pulmonary vascular congestion and small bilateral pleural effusions    -proBNP 6,437  -Echo: EF 45%, mild diffuse hypokinesis, mild reduction in RV systolic function  -cont bumex gtt as long as BP allows  -NEG 15 5L so far

## 2019-11-29 NOTE — PROGRESS NOTES
Cardiology Progress Note - Linwood Mazariegos 69 y o  male MRN: [id number]    Unit/Bed#: [address] [place] [address] Encounter: [id number]        Subjective:    No significant events overnight  No chest pain or dyspena  Review of Systems   Constitution: Positive for malaise/fatigue  Cardiovascular: Negative for chest pain  Respiratory: Negative for shortness of breath  Objective:   Vitals: Blood pressure 125/83, pulse 84, temperature (!) 97 3 °F (36 3 °C), temperature source Oral, resp  rate 18, height 6' 4" (1 93 m), weight 117 kg (258 lb 9 6 oz), SpO2 98 %  , Body mass index is 31 48 kg/m² , Orthostatic Blood Pressures      Most Recent Value   Blood Pressure  125/83 filed at 2019 6578   Patient Position - Orthostatic VS  Lying filed at 2019 6047         Systolic (94BYE), HR , Min:95 , CQO:729     Diastolic (26THH), KSC:37, Min:61, Max:83      Intake/Output Summary (Last 24 hours) at 2019 0957  Last data filed at 2019 0801  Gross per 24 hour   Intake 696 ml   Output 4725 ml   Net -4029 ml     Weight (last 2 days)     Date/Time   Weight    19 0600   117 (258 6)    19 0600   119 (262 9)    19 0900   122 (268 52)                    Physical Exam   Constitutional: He is oriented to person, place, and time  No distress  HENT:   Mouth/Throat: No oropharyngeal exudate  Eyes: No scleral icterus  Neck: No JVD present  Cardiovascular: Normal rate  An irregularly irregular rhythm present  Exam reveals no friction rub  No murmur heard  Pulmonary/Chest: Effort normal and breath sounds normal  No stridor  No respiratory distress  He has no wheezes  Abdominal: Soft  Bowel sounds are normal  He exhibits no distension  There is no tenderness  Musculoskeletal: He exhibits edema  Neurological: He is alert and oriented to person, place, and time  Skin: Skin is warm and dry  He is not diaphoretic  Psychiatric: He has a normal mood and affect   His behavior is normal  Laboratory Results:  Results from last 7 days   Lab Units 11/24/19  2216 11/24/19  1810 11/24/19  1520   TROPONIN I ng/mL 0 10* 0 11* 0 09*       CBC with diff: Results from last 7 days   Lab Units 11/29/19  0523 11/25/19  0449 11/24/19  1520   WBC Thousand/uL 5 03 5 70 6 42   HEMOGLOBIN g/dL 14 4 14 6 14 9   HEMATOCRIT % 43 8 45 8 45 8   MCV fL 95 97 96   PLATELETS Thousands/uL 114* 100* 119*   MCH pg 31 4 30 8 31 4   MCHC g/dL 32 9 31 9 32 5   RDW % 15 2* 15 8* 15 7*   MPV fL 12 4 12 2 11 6         CMP:  Results from last 7 days   Lab Units 11/29/19 0523 11/28/19  0511 11/27/19  0542 11/26/19  1218 11/25/19 0449 11/24/19  1520   POTASSIUM mmol/L 3 6 4 0 3 8 4 1 4 0 4 1   CHLORIDE mmol/L 97* 96* 98* 96* 101 101   CO2 mmol/L 30 31 31 28 28 28   BUN mg/dL 57* 58* 51* 48* 47* 47*   CREATININE mg/dL 1 61* 1 59* 1 63* 1 58* 1 63* 1 79*   CALCIUM mg/dL 9 2 9 2 9 2 9 3 9 4 9 8   AST U/L 38  --   --   --   --   --    ALT U/L 25  --   --   --   --   --    ALK PHOS U/L 115  --   --   --   --   --    EGFR ml/min/1 73sq m 42 42 41 43 41 37         BMP:  Results from last 7 days   Lab Units 11/29/19  0523 11/28/19  0511 11/27/19  0542 11/26/19  1218 11/25/19 0449 11/24/19  1520   POTASSIUM mmol/L 3 6 4 0 3 8 4 1 4 0 4 1   CHLORIDE mmol/L 97* 96* 98* 96* 101 101   CO2 mmol/L 30 31 31 28 28 28   BUN mg/dL 57* 58* 51* 48* 47* 47*   CREATININE mg/dL 1 61* 1 59* 1 63* 1 58* 1 63* 1 79*   CALCIUM mg/dL 9 2 9 2 9 2 9 3 9 4 9 8       BNP: No results for input(s): BNP in the last 72 hours      Magnesium:   Results from last 7 days   Lab Units 11/25/19  0449   MAGNESIUM mg/dL 2 2       Coags:       TSH: No results found for: TSH    Hemoglobin A1C       Lipid Profile:       Cardiac testing:   Results for orders placed during the hospital encounter of 11/24/19   Echo complete with contrast if indicated    Narrative [address]    Transthoracic Echocardiogram  2D, M-mode, Doppler, and Color Doppler    Study date:  2019    Patient: Bernice Cruz  MR number: [id number]  Account number: [de-identified]  : 1945  Age: 69 years  Gender: Male  Status: Inpatient  Location: [facility]  Height: 76 in  Weight: 284 5 lb  BP: 116/ 69 mmHg    Indications: Heart failure    Diagnoses: I50 9 - Heart failure, unspecified    Sonographer:  DELLA Morrison  Primary Physician:  Hever Hermosillo MD  Referring Physician:  Nighat Miner MD  Group:  [facility]  Interpreting Physician:  Rodriguez Mcwilliams MD    SUMMARY    LEFT VENTRICLE:  Systolic function was mildly reduced by visual assessment  Ejection fraction was estimated to be 45 %  There was mild diffuse hypokinesis  Wall thickness was markedly increased  RIGHT VENTRICLE:  The ventricle was moderately dilated  Systolic function was mildly reduced  LEFT ATRIUM:  The atrium was moderately dilated  RIGHT ATRIUM:  The atrium was markedly dilated  MITRAL VALVE:  There was mild regurgitation  TRICUSPID VALVE:  There was mild regurgitation  HISTORY: PRIOR HISTORY: GERD; Obesity; HTN; HLD; Afib; CHF; Edema; Lymphedema; PVD; Pulm HTN; DVT    PROCEDURE: The study was performed in the [facility]  This was a routine study  The transthoracic approach was used  The study included complete 2D imaging, M-mode, complete spectral Doppler, and color Doppler  Echocardiographic views were limited due to decreased penetration and lung interference  This was a technically difficult study  LEFT VENTRICLE: Size was normal  Systolic function was mildly reduced by visual assessment  Ejection fraction was estimated to be 45 %  There was mild diffuse hypokinesis  Wall thickness was markedly increased  DOPPLER: Transmitral flow  pattern: atrial fibrillation  RIGHT VENTRICLE: The ventricle was moderately dilated  Systolic function was mildly reduced   DOPPLER: Estimated peak pressure was at least 60 mmHg  LEFT ATRIUM: The atrium was moderately dilated  RIGHT ATRIUM: The atrium was markedly dilated  MITRAL VALVE: Valve structure was normal  There was normal leaflet separation  DOPPLER: The transmitral velocity was within the normal range  There was no evidence for stenosis  There was mild regurgitation  AORTIC VALVE: The valve was trileaflet  Leaflets exhibited normal thickness and normal cuspal separation  DOPPLER: Transaortic velocity was within the normal range  There was no evidence for stenosis  There was no regurgitation  TRICUSPID VALVE: The valve structure was normal  There was normal leaflet separation  DOPPLER: The transtricuspid velocity was within the normal range  There was no evidence for stenosis  There was mild regurgitation  PULMONIC VALVE: Leaflets exhibited normal thickness, no calcification, and normal cuspal separation  DOPPLER: The transpulmonic velocity was within the normal range  There was no regurgitation  PERICARDIUM: There was no pericardial effusion  AORTA: The root exhibited normal size  SYSTEMIC VEINS: IVC: The inferior vena cava was not well visualized      SYSTEM MEASUREMENT TABLES    2D  %FS: 9 84 %  AV Diam: 2 98 cm  EDV(Teich): 51 19 ml  EF Biplane: 22 23 %  EF(Teich): 22 17 %  ESV(Teich): 39 85 ml  IVSd: 1 6 cm  LA Area: 37 85 cm2  LA Diam: 5 36 cm  LVEDV MOD A2C: 242 91 ml  LVEDV MOD A4C: 147 73 ml  LVEDV MOD BP: 187 2 ml  LVEF MOD A2C: 25 41 %  LVEF MOD A4C: 21 32 %  LVESV MOD A2C: 181 19 ml  LVESV MOD A4C: 116 24 ml  LVESV MOD BP: 145 57 ml  LVIDd: 3 51 cm  LVIDs: 3 16 cm  LVLd A2C: 9 1 cm  LVLd A4C: 9 09 cm  LVLs A2C: 8 43 cm  LVLs A4C: 8 06 cm  LVPWd: 1 61 cm  RA Area: 60 3 cm2  RVIDd: 5 72 cm  SV MOD A2C: 61 71 ml  SV MOD A4C: 31 49 ml  SV(Teich): 11 35 ml    CW  RAP: 10 mmHg  TR Vmax: 2 98 m/s  TR maxP 44 mmHg    MM  TAPSE: 2 93 cm    PW  E': 0 05 m/s  E/E': 12 61  MV A Anthony: 0 29 m/s  MV Dec Thomas: 5 25 m/s2  MV DecT: 124 59 ms  MV E Anthony: 0 65 m/s  MV E/A Ratio: 2 24  MV PHT: 36 13 ms  MVA By PHT: 6 09 cm2  RVSP: 45 44 mmHg    [facility] Accredited Echocardiography Laboratory    Prepared and electronically signed by    Nusrat Landon MD  Signed [id number] 14:49:13       No results found for this or any previous visit  No results found for this or any previous visit  No results found for this or any previous visit      Meds/Allergies   current meds:   Current Facility-Administered Medications   Medication Dose Route Frequency    apixaban (ELIQUIS) tablet 5 mg  5 mg Oral BID    ascorbic acid (VITAMIN C) tablet 1,000 mg  1,000 mg Oral Daily    benzonatate (TESSALON PERLES) capsule 100 mg  100 mg Oral TID PRN    cholecalciferol (VITAMIN D3) tablet 1,000 Units  1,000 Units Oral Daily    diltiazem (CARDIZEM CD) 24 hr capsule 180 mg  180 mg Oral Daily    HYDROcodone-acetaminophen (NORCO) 5-325 mg per tablet 1 tablet  1 tablet Oral Q6H PRN    magnesium oxide (MAG-OX) tablet 400 mg  400 mg Oral BID    pantoprazole (PROTONIX) EC tablet 40 mg  40 mg Oral Early Morning    potassium chloride (K-DUR,KLOR-CON) CR tablet 20 mEq  20 mEq Oral BID With Meals    torsemide (DEMADEX) tablet 20 mg  20 mg Oral BID    vitamin E (tocopherol) capsule 800 Units  800 Units Oral Daily     Medications Prior to Admission   Medication    apixaban (ELIQUIS) 5 mg    Ascorbic Acid (VITAMIN C) 1000 MG tablet    benzonatate (TESSALON PERLES) 100 mg capsule    bumetanide (BUMEX) 2 mg tablet    cholecalciferol (VITAMIN D3) 1,000 units tablet    diltiazem (CARDIZEM CD) 180 mg 24 hr capsule    HYDROcodone-acetaminophen (NORCO) 7 5-325 mg per tablet    losartan (COZAAR) 50 mg tablet    meloxicam (MOBIC) 15 mg tablet    naproxen (NAPROSYN) 500 mg tablet    pantoprazole (PROTONIX) 40 mg tablet    potassium chloride (K-DUR) 10 mEq tablet    sildenafil (VIAGRA) 100 mg tablet    sodium chloride (OCEAN) 0 65 % nasal spray    spironolactone (ALDACTONE) 25 mg tablet    vitamin E, tocopherol, 400 units capsule         bumetanide 0 5 mg/hr Last Rate: 0 5 mg/hr (11/29/19 0009)     Assessment:  Principal Problem:    Acute on chronic combined systolic and diastolic congestive heart failure (HCC)  Active Problems:    Gastroesophageal reflux disease without esophagitis    Class 1 obesity due to excess calories with serious comorbidity and body mass index (BMI) of 34 0 to 34 9 in adult    Essential hypertension    Persistent atrial fibrillation    Chronic left shoulder pain    Chronic pain of right wrist    Acute kidney injury superimposed on CKD (HCC)    Elevated troponin level not due myocardial infarction      Plan:    Acute on Chronic diastolic CHF; At this point he has had dramatic volume weight loss  Would start with torsemide 20 mg bid and see how he does  Bowel edema likely effected diuretic absorption before he was here  WOuuld assess his response to this first      Persistent AF: Continue with current med rx       Counseling / Coordination of Care  Total floor / unit time spent today 25 minutes  Greater than 50% of total time was spent with the patient and / or family counseling and / or coordination of care  A description of the counseling / coordination of care

## 2019-11-29 NOTE — ASSESSMENT & PLAN NOTE
-Cr 1 79, on presentation   -baseline Cr 1 5  -pt with cardiorenal syndrome  -follow Cr (currently stable at 1 61)

## 2019-11-30 PROBLEM — R77.8 ELEVATED TROPONIN LEVEL NOT DUE MYOCARDIAL INFARCTION: Status: RESOLVED | Noted: 2019-11-24 | Resolved: 2019-11-30

## 2019-11-30 PROBLEM — N18.9 ACUTE KIDNEY INJURY SUPERIMPOSED ON CKD (HCC): Status: RESOLVED | Noted: 2019-11-24 | Resolved: 2019-11-30

## 2019-11-30 PROBLEM — N17.9 ACUTE KIDNEY INJURY SUPERIMPOSED ON CKD (HCC): Status: RESOLVED | Noted: 2019-11-24 | Resolved: 2019-11-30

## 2019-11-30 LAB
ANION GAP SERPL CALCULATED.3IONS-SCNC: 7 MMOL/L (ref 4–13)
BUN SERPL-MCNC: 53 MG/DL (ref 5–25)
CALCIUM SERPL-MCNC: 9.5 MG/DL (ref 8.3–10.1)
CHLORIDE SERPL-SCNC: 98 MMOL/L (ref 100–108)
CO2 SERPL-SCNC: 32 MMOL/L (ref 21–32)
CREAT SERPL-MCNC: 1.63 MG/DL (ref 0.6–1.3)
GFR SERPL CREATININE-BSD FRML MDRD: 41 ML/MIN/1.73SQ M
GLUCOSE SERPL-MCNC: 83 MG/DL (ref 65–140)
POTASSIUM SERPL-SCNC: 3.6 MMOL/L (ref 3.5–5.3)
SODIUM SERPL-SCNC: 137 MMOL/L (ref 136–145)

## 2019-11-30 PROCEDURE — 80048 BASIC METABOLIC PNL TOTAL CA: CPT | Performed by: INTERNAL MEDICINE

## 2019-11-30 PROCEDURE — 99232 SBSQ HOSP IP/OBS MODERATE 35: CPT | Performed by: INTERNAL MEDICINE

## 2019-11-30 RX ADMIN — POTASSIUM CHLORIDE 20 MEQ: 750 TABLET, EXTENDED RELEASE ORAL at 08:38

## 2019-11-30 RX ADMIN — HYDROCODONE BITARTRATE AND ACETAMINOPHEN 1 TABLET: 5; 325 TABLET ORAL at 13:53

## 2019-11-30 RX ADMIN — OXYCODONE HYDROCHLORIDE AND ACETAMINOPHEN 1000 MG: 500 TABLET ORAL at 08:38

## 2019-11-30 RX ADMIN — APIXABAN 5 MG: 5 TABLET, FILM COATED ORAL at 08:38

## 2019-11-30 RX ADMIN — TORSEMIDE 20 MG: 20 TABLET ORAL at 17:01

## 2019-11-30 RX ADMIN — TORSEMIDE 20 MG: 20 TABLET ORAL at 08:38

## 2019-11-30 RX ADMIN — HYDROCODONE BITARTRATE AND ACETAMINOPHEN 1 TABLET: 5; 325 TABLET ORAL at 22:05

## 2019-11-30 RX ADMIN — Medication 400 MG: at 17:01

## 2019-11-30 RX ADMIN — DILTIAZEM HYDROCHLORIDE 180 MG: 180 CAPSULE, COATED, EXTENDED RELEASE ORAL at 08:38

## 2019-11-30 RX ADMIN — Medication 400 MG: at 08:38

## 2019-11-30 RX ADMIN — POTASSIUM CHLORIDE 20 MEQ: 750 TABLET, EXTENDED RELEASE ORAL at 17:01

## 2019-11-30 RX ADMIN — HYDROCODONE BITARTRATE AND ACETAMINOPHEN 1 TABLET: 5; 325 TABLET ORAL at 05:36

## 2019-11-30 RX ADMIN — Medication 800 UNITS: at 08:41

## 2019-11-30 RX ADMIN — MELATONIN 1000 UNITS: at 08:38

## 2019-11-30 RX ADMIN — PANTOPRAZOLE SODIUM 40 MG: 40 TABLET, DELAYED RELEASE ORAL at 05:36

## 2019-11-30 RX ADMIN — APIXABAN 5 MG: 5 TABLET, FILM COATED ORAL at 17:01

## 2019-11-30 NOTE — PROGRESS NOTES
Progress Note - Linwood Mazariegos 69 y o  male MRN: [id number]    Unit/Bed#: [address] [place] [address] Encounter: [id number]      Assessment:    Principal Problem:    Acute on chronic combined systolic and diastolic congestive heart failure (HCC)  Active Problems:    Gastroesophageal reflux disease without esophagitis    Class 1 obesity due to excess calories with serious comorbidity and body mass index (BMI) of 34 0 to 34 9 in adult    Essential hypertension    Persistent atrial fibrillation    Chronic left shoulder pain    Chronic pain of right wrist  Resolved Problems:    Acute kidney injury superimposed on CKD (HCC)    Elevated troponin level not due myocardial infarction      Plan:  I would discharge patient today however he has no ride  Be discharged tomorrow  Continue torsemide  Patient has lost 40 lb of weight  Check BMP in the a m     I have asked him to trying get up and around  Subjective:   No shortness of breath  Seems somewhat grumpy today  Objective:     Vitals: Blood pressure 130/75, pulse 76, temperature 97 7 °F (36 5 °C), temperature source Axillary, resp  rate 18, height 6' 4" (1 93 m), weight 113 kg (248 lb 7 3 oz), SpO2 97 %  ,Body mass index is 30 24 kg/m²  Intake/Output Summary (Last 24 hours) at 11/30/2019 1015  Last data filed at 11/30/2019 0539  Gross per 24 hour   Intake    Output 2450 ml   Net -2450 ml       Physical Exam:    Alert and awake in no acute distress  Neck supple, no lymphadenopathy  Lungs clear to auscultation bilaterally  Heart AFib  Abdomen soft, active bowel sounds, non-tender, non-distended  Extremities:  Significant decrease in edema          Invasive Devices     Peripheral Intravenous Line            Peripheral IV 11/30/19 Right;Ventral (anterior) Forearm less than 1 day                            Lab, Imaging and other studies: I have personally reviewed pertinent reports       Results from last 7 days   Lab Units 11/29/19  0523 11/25/19  0449 11/24/19  1520   WBC Thousand/uL 5 03 5 70 6 42   HEMOGLOBIN g/dL 14 4 14 6 14 9   HEMATOCRIT % 43 8 45 8 45 8   PLATELETS Thousands/uL 114* 100* 119*   NEUTROS PCT %  --  63 72   LYMPHS PCT %  --  21 17   MONOS PCT %  --  11 9   EOS PCT %  --  4 1     Results from last 7 days   Lab Units 11/30/19  0550 11/29/19  0523 11/28/19  0511   POTASSIUM mmol/L 3 6 3 6 4 0   CHLORIDE mmol/L 98* 97* 96*   CO2 mmol/L 32 30 31   BUN mg/dL 53* 57* 58*   CREATININE mg/dL 1 63* 1 61* 1 59*   CALCIUM mg/dL 9 5 9 2 9 2   ALK PHOS U/L  --  115  --    ALT U/L  --  25  --    AST U/L  --  38  --      Lab Results   Component Value Date    TROPONINI 0 10 (H) 11/24/2019    TROPONINI 0 11 (H) 11/24/2019    TROPONINI 0 09 (H) 11/24/2019         Lab Results   Component Value Date    WOUNDCULT 3+ Growth of Serratia marcescens (A) 07/24/2019    WOUNDCULT 1+ Growth of  07/24/2019    WOUNDCULT (A) 01/02/2019     1+ Growth of Methicillin Resistant Staphylococcus aureus       Imaging:  No results found for this or any previous visit  Results for orders placed during the hospital encounter of 11/18/19   XR chest pa & lateral    Narrative CHEST     INDICATION:   R06 02: Shortness of breath  COMPARISON:  5/13/2019    EXAM PERFORMED/VIEWS:  XR CHEST PA & LATERAL      FINDINGS:    Stable cardiomegaly is noted  Mild pulmonary vascular congestion is noted  Small bilateral pleural effusions are present  Osseous structures appear within normal limits for patient age  Impression Mild pulmonary vascular congestion and small bilateral pleural effusions  Workstation performed: [id number]         PATIENT CENTERED ROUNDS: I have performed rounds with the nursing staff  This note has been constructed using a voice recognition system      Rocco Ac MD

## 2019-11-30 NOTE — PLAN OF CARE
Problem: Potential for Falls  Goal: Patient will remain free of falls  Description  INTERVENTIONS:  - Assess patient frequently for physical needs  -  Identify cognitive and physical deficits and behaviors that affect risk of falls    -  [place] fall precautions as indicated by assessment   - Educate patient/family on patient safety including physical limitations  - Instruct patient to call for assistance with activity based on assessment  - Modify environment to reduce risk of injury  - Consider OT/PT consult to assist with strengthening/mobility  Outcome: Progressing     Problem: PAIN - ADULT  Goal: Verbalizes/displays adequate comfort level or baseline comfort level  Description  Interventions:  - Encourage patient to monitor pain and request assistance  - Assess pain using appropriate pain scale  - Administer analgesics based on type and severity of pain and evaluate response  - Implement non-pharmacological measures as appropriate and evaluate response  - Consider cultural and social influences on pain and pain management  - Notify physician/advanced practitioner if interventions unsuccessful or patient reports new pain  Outcome: Progressing     Problem: INFECTION - ADULT  Goal: Absence or prevention of progression during hospitalization  Description  INTERVENTIONS:  - Assess and monitor for signs and symptoms of infection  - Monitor lab/diagnostic results  - Monitor all insertion sites, i e  indwelling lines, tubes, and drains    - [place] appropriate cooling/warming therapies per order  - Administer medications as ordered  - Instruct and encourage patient and family to use good hand hygiene technique  - Identify and instruct in appropriate isolation precautions for identified infection/condition   Outcome: Progressing     Problem: SAFETY ADULT  Goal: Maintain or return to baseline ADL function  Description  INTERVENTIONS:  -  Assess patient's ability to carry out ADLs; assess patient's baseline for ADL function and identify physical deficits which impact ability to perform ADLs (bathing, care of mouth/teeth, toileting, grooming, dressing, etc )  - Assess/evaluate cause of self-care deficits   - Assess range of motion  - Assess patient's mobility; develop plan if impaired  - Assess patient's need for assistive devices and provide as appropriate  - Encourage maximum independence but intervene and supervise when necessary  - Involve family in performance of ADLs  - Assess for home care needs following discharge   - Consider OT consult to assist with ADL evaluation and planning for discharge  - Provide patient education as appropriate  Outcome: Progressing     Problem: SAFETY ADULT  Goal: Maintain or return mobility status to optimal level  Description  INTERVENTIONS:  - Assess patient's baseline mobility status (ambulation, transfers, stairs, etc )    - Identify cognitive and physical deficits and behaviors that affect mobility  - Identify mobility aids required to assist with transfers and/or ambulation (gait belt, sit-to-stand, lift, walker, cane, etc )  - [place] fall precautions as indicated by assessment  - Record patient progress and toleration of activity level on Mobility SBAR; progress patient to next Phase/Stage  - Instruct patient to call for assistance with activity based on assessment  - Consider rehabilitation consult to assist with strengthening/weightbearing, etc   Outcome: Progressing

## 2019-12-01 VITALS
DIASTOLIC BLOOD PRESSURE: 80 MMHG | HEART RATE: 83 BPM | RESPIRATION RATE: 18 BRPM | SYSTOLIC BLOOD PRESSURE: 136 MMHG | OXYGEN SATURATION: 96 % | HEIGHT: 76 IN | TEMPERATURE: 97.1 F | WEIGHT: 257.5 LBS | BODY MASS INDEX: 31.36 KG/M2

## 2019-12-01 PROBLEM — N18.30 STAGE 3 CHRONIC KIDNEY DISEASE (HCC): Status: ACTIVE | Noted: 2019-12-01

## 2019-12-01 LAB
ANION GAP SERPL CALCULATED.3IONS-SCNC: 9 MMOL/L (ref 4–13)
BUN SERPL-MCNC: 57 MG/DL (ref 5–25)
CALCIUM SERPL-MCNC: 9.9 MG/DL (ref 8.3–10.1)
CHLORIDE SERPL-SCNC: 97 MMOL/L (ref 100–108)
CO2 SERPL-SCNC: 29 MMOL/L (ref 21–32)
CREAT SERPL-MCNC: 1.78 MG/DL (ref 0.6–1.3)
GFR SERPL CREATININE-BSD FRML MDRD: 37 ML/MIN/1.73SQ M
GLUCOSE SERPL-MCNC: 134 MG/DL (ref 65–140)
POTASSIUM SERPL-SCNC: 3.9 MMOL/L (ref 3.5–5.3)
SODIUM SERPL-SCNC: 135 MMOL/L (ref 136–145)

## 2019-12-01 PROCEDURE — 99239 HOSP IP/OBS DSCHRG MGMT >30: CPT | Performed by: INTERNAL MEDICINE

## 2019-12-01 PROCEDURE — 80048 BASIC METABOLIC PNL TOTAL CA: CPT | Performed by: INTERNAL MEDICINE

## 2019-12-01 RX ORDER — DILTIAZEM HYDROCHLORIDE 180 MG/1
180 CAPSULE, COATED, EXTENDED RELEASE ORAL DAILY
Refills: 0
Start: 2019-12-01 | End: 2020-03-10 | Stop reason: HOSPADM

## 2019-12-01 RX ADMIN — DILTIAZEM HYDROCHLORIDE 180 MG: 180 CAPSULE, COATED, EXTENDED RELEASE ORAL at 08:54

## 2019-12-01 RX ADMIN — Medication 800 UNITS: at 08:56

## 2019-12-01 RX ADMIN — HYDROCODONE BITARTRATE AND ACETAMINOPHEN 1 TABLET: 5; 325 TABLET ORAL at 09:01

## 2019-12-01 RX ADMIN — TORSEMIDE 20 MG: 20 TABLET ORAL at 08:54

## 2019-12-01 RX ADMIN — Medication 400 MG: at 08:54

## 2019-12-01 RX ADMIN — MELATONIN 1000 UNITS: at 08:54

## 2019-12-01 RX ADMIN — PANTOPRAZOLE SODIUM 40 MG: 40 TABLET, DELAYED RELEASE ORAL at 05:38

## 2019-12-01 RX ADMIN — APIXABAN 5 MG: 5 TABLET, FILM COATED ORAL at 08:55

## 2019-12-01 RX ADMIN — OXYCODONE HYDROCHLORIDE AND ACETAMINOPHEN 1000 MG: 500 TABLET ORAL at 08:54

## 2019-12-01 RX ADMIN — POTASSIUM CHLORIDE 20 MEQ: 750 TABLET, EXTENDED RELEASE ORAL at 08:55

## 2019-12-01 NOTE — PLAN OF CARE
Problem: Potential for Falls  Goal: Patient will remain free of falls  Description  INTERVENTIONS:  - Assess patient frequently for physical needs  -  Identify cognitive and physical deficits and behaviors that affect risk of falls    -  [place] fall precautions as indicated by assessment   - Educate patient/family on patient safety including physical limitations  - Instruct patient to call for assistance with activity based on assessment  - Modify environment to reduce risk of injury  - Consider OT/PT consult to assist with strengthening/mobility  Outcome: Adequate for Discharge     Problem: PAIN - ADULT  Goal: Verbalizes/displays adequate comfort level or baseline comfort level  Description  Interventions:  - Encourage patient to monitor pain and request assistance  - Assess pain using appropriate pain scale  - Administer analgesics based on type and severity of pain and evaluate response  - Implement non-pharmacological measures as appropriate and evaluate response  - Consider cultural and social influences on pain and pain management  - Notify physician/advanced practitioner if interventions unsuccessful or patient reports new pain  Outcome: Adequate for Discharge     Problem: INFECTION - ADULT  Goal: Absence or prevention of progression during hospitalization  Description  INTERVENTIONS:  - Assess and monitor for signs and symptoms of infection  - Monitor lab/diagnostic results  - Monitor all insertion sites, i e  indwelling lines, tubes, and drains    - [place] appropriate cooling/warming therapies per order  - Administer medications as ordered  - Instruct and encourage patient and family to use good hand hygiene technique  - Identify and instruct in appropriate isolation precautions for identified infection/condition   Outcome: Adequate for Discharge     Problem: SAFETY ADULT  Goal: Maintain or return to baseline ADL function  Description  INTERVENTIONS:  -  Assess patient's ability to carry out ADLs; assess patient's baseline for ADL function and identify physical deficits which impact ability to perform ADLs (bathing, care of mouth/teeth, toileting, grooming, dressing, etc )  - Assess/evaluate cause of self-care deficits   - Assess range of motion  - Assess patient's mobility; develop plan if impaired  - Assess patient's need for assistive devices and provide as appropriate  - Encourage maximum independence but intervene and supervise when necessary  - Involve family in performance of ADLs  - Assess for home care needs following discharge   - Consider OT consult to assist with ADL evaluation and planning for discharge  - Provide patient education as appropriate  Outcome: Adequate for Discharge     Problem: DISCHARGE PLANNING  Goal: Discharge to home or other facility with appropriate resources  Description  INTERVENTIONS:  - Identify barriers to discharge w/patient and caregiver  - Arrange for needed discharge resources and transportation as appropriate  - Identify discharge learning needs (meds, wound care, etc )  - Arrange for interpretive services to assist at discharge as needed  - Refer to Case Management Department for coordinating discharge planning if the patient needs post-hospital services based on physician/advanced practitioner order or complex needs related to functional status, cognitive ability, or social support system  Outcome: Adequate for Discharge     Problem: Knowledge Deficit  Goal: Patient/family/caregiver demonstrates understanding of disease process, treatment plan, medications, and discharge instructions  Description  Complete learning assessment and assess knowledge base    Interventions:  - Provide teaching at level of understanding  - Provide teaching via preferred learning methods  Outcome: Adequate for Discharge     Problem: Prexisting or High Potential for Compromised Skin Integrity  Goal: Skin integrity is maintained or improved  Description  INTERVENTIONS:  - Identify patients at risk for skin breakdown  - Assess and monitor skin integrity  - Assess and monitor nutrition and hydration status  - Monitor labs   - Assess for incontinence   - Turn and reposition patient  - Assist with mobility/ambulation  - Relieve pressure over bony prominences  - Avoid friction and shearing  - Provide appropriate hygiene as needed including keeping skin clean and dry  - Evaluate need for skin moisturizer/barrier cream  - Collaborate with interdisciplinary team   - Patient/family teaching  - Consider wound care consult   Outcome: Adequate for Discharge

## 2019-12-01 NOTE — DISCHARGE INSTRUCTIONS
Heart Failure   WHAT YOU NEED TO KNOW:   Heart failure (HF) is a condition that does not allow your heart to fill or pump properly  Not enough oxygen in your blood gets to your organs and tissues  HF can occur in the right side, the left side, or both lower chambers of your heart  HF is often caused by damage or injury to your heart  The damage may be caused by heart attack, other heart conditions, or high blood pressure  HF is a long-term condition that tends to get worse over time  It is important to manage your health to improve your quality of life  HF can be worsened by heavy alcohol use, smoking, diabetes that is not controlled, or obesity  DISCHARGE INSTRUCTIONS:   Call 911 if:   · You have any of the following signs of a heart attack:      ¨ Squeezing, pressure, or pain in your chest that lasts longer than 5 minutes or returns    ¨ Discomfort or pain in your back, neck, jaw, stomach, or arm     ¨ Trouble breathing    ¨ Nausea or vomiting    ¨ Lightheadedness or a sudden cold sweat, especially with chest pain or trouble breathing    Seek care immediately if:   · You gain 3 or more pounds (1 4 kg) in a day, or more than your healthcare provider says you should  · Your heartbeat is fast, slow, or uneven all the time  Contact your healthcare provider if:   · You have symptoms of worsening HF:      ¨ Shortness of breath at rest, at night, or that is getting worse in any way     ¨ Weight gain of 5 or more pounds (2 2 kg) in a week     ¨ More swelling in your legs or ankles     ¨ Abdominal pain or swelling     ¨ More coughing     ¨ Loss of appetite     ¨ Feeling tired all the time    · You feel hopeless or depressed, or you have lost interest in things you used to enjoy  · You often feel worried or afraid  · You have questions or concerns about your condition or care  Medicines: You may  need any of the following:  · Medicines  may be given to help regulate your heart rhythm   You may also need medicines to lower your blood pressure, and to get rid of extra fluids  · Take your medicine as directed  Contact your healthcare provider if you think your medicine is not helping or if you have side effects  Tell him or her if you are allergic to any medicine  Keep a list of the medicines, vitamins, and herbs you take  Include the amounts, and when and why you take them  Bring the list or the pill bottles to follow-up visits  Carry your medicine list with you in case of an emergency  Follow up with your healthcare provider or cardiologist as directed: You may need to return for other tests  You may need home health care  A healthcare provider will monitor your vital signs, weight, and make sure your medicines are working  Write down your questions so you remember to ask them during your visits  Go to cardiac rehab as directed:  Cardiac rehab is a program run by specialists who will help you safely strengthen your heart  The program includes exercise, relaxation, stress management, and heart-healthy nutrition  Healthcare providers will also make sure your medicines are helping to reduce your symptoms  Manage your HF:  · Do not smoke  Nicotine and other chemicals in cigarettes and cigars can cause lung damage and make HF difficult to manage  Ask your healthcare provider for information if you currently smoke and need help to quit  E-cigarettes or smokeless tobacco still contain nicotine  Talk to your healthcare provider before you use these products  · Do not drink alcohol or take illegal drugs  Alcohol and drugs can worsen your symptoms quickly  · Weigh yourself every morning  Use the same scale, in the same spot  Do this after you use the bathroom, but before you eat or drink anything  Wear the same type of clothing  Do not wear shoes  Record your weight each day so you will notice any sudden weight gain  Swelling and weight gain are signs of fluid retention   If you are overweight, ask how to lose weight safely  · Check your blood pressure and heart rate every day  Ask for more information about how to measure your blood pressure and heart rate correctly  Ask what these numbers should be for you  · Manage any chronic health conditions you have  These include high blood pressure, diabetes, obesity, high cholesterol, metabolic syndrome, and COPD  You will have fewer symptoms if you manage these health conditions  Follow your healthcare provider's recommendations and follow up with him or her regularly  · Eat heart-healthy foods and limit sodium (salt)  An easy way to do this is to eat more fresh fruits and vegetables and fewer canned and processed foods  Replace butter and margarine with heart-healthy oils such as olive oil and canola oil  Other heart-healthy foods include walnuts, whole-grain breads, low-fat dairy products, beans, and lean meats  Fatty fish such as salmon and tuna are also heart healthy  Ask how much salt you can eat each day  Do not use salt substitutes  · Drink liquids as directed  You may need to limit the amount of liquids you drink if you retain fluid  Ask how much liquid to drink each day and which liquids are best for you  · Stay active  If you are not active, your symptoms are likely to worsen quickly  Walking, bicycling, and other types of physical activity help maintain your strength and improve your mood  Physical activity also helps you manage your weight  Work with your healthcare provider to create an exercise plan that is right for you  · Get vaccines as directed  Get a flu shot every year  You may also need the pneumonia vaccine  The flu and pneumonia can be severe for a person who has HF  Vaccines protect you from these infections  Join a support group:  Living with HF can be difficult  It may be helpful to talk with others who have HF  You may learn how to better manage your condition or get emotional support   For more information:   · [address] [place] [address]   Phone: Ijqcywgrb 0573  Web Address: https://[employer] com/  org   © 2017 [employer] Information is for End User's use only and may not be sold, redistributed or otherwise used for commercial purposes  All illustrations and images included in CareNotes® are the copyrighted property of [employer]  or Bharath Camarena  The above information is an  only  It is not intended as medical advice for individual conditions or treatments  Talk to your doctor, nurse or pharmacist before following any medical regimen to see if it is safe and effective for you

## 2019-12-01 NOTE — ASSESSMENT & PLAN NOTE
Wt Readings from Last 3 Encounters:   12/01/19 117 kg (257 lb 8 oz)   11/18/19 129 kg (285 lb)   09/06/19 126 kg (277 lb)       · CXR: Mild pulmonary vascular congestion and small bilateral pleural effusions    · proBNP 6,437  · Echo: EF 45%, mild diffuse hypokinesis, mild reduction in RV systolic function  · Patient diuresed well on IV Bumex  · Medically stable for discharge today, can transition back to oral diuretics Bumex  · To follow up with his primary care physician and cardiologist on discharge

## 2019-12-01 NOTE — DISCHARGE SUMMARY
Discharge- Servando Se 1945, 69 y o  male MRN: [id number]    Unit/Bed#: [address] [place] [address] Encounter: [id number]    Primary Care Provider: Paula Dhaliwal MD   Date and time admitted to hospital: 11/24/2019  2:21 PM        * Acute on chronic combined systolic and diastolic congestive heart failure [facility])  Assessment & Plan  Wt Readings from Last 3 Encounters:   12/01/19 117 kg (257 lb 8 oz)   11/18/19 129 kg (285 lb)   09/06/19 126 kg (277 lb)       · CXR: Mild pulmonary vascular congestion and small bilateral pleural effusions  · proBNP 6,437  · Echo: EF 45%, mild diffuse hypokinesis, mild reduction in RV systolic function  · Patient diuresed well on IV Bumex  · Medically stable for discharge today, can transition back to oral diuretics Bumex  · To follow up with his primary care physician and cardiologist on discharge      Stage 3 chronic kidney disease ([address] )  Assessment & Plan  Stage 3 CKD, baseline Cr 1 1-1 5  continue with home meds  Cr 1 78 today    Chronic pain of right wrist  Assessment & Plan  -History of chronic pain of the right wrist, in the setting of fracture 1 year ago  Patient reports that he initially set his wrist himself and by the time he presented to Orthopedics it was too late to do anything  They recommended other interventions but he is unwilling to have any surgeries as he believes it would hamper his ability to use his crutches prn   -Pain control with hydrocodone (chronic opioid use/dependence)  -No acute intervention    Chronic left shoulder pain  Assessment & Plan  -History of chronic left shoulder pain status post steroid injection  -Still in pain with minimal movement  -Continue with patient's home medication hydrocodone p r n      Persistent atrial fibrillation  Assessment & Plan  Discharge on diltiazem and Eliquis    Essential hypertension  Assessment & Plan  -cont Cardizem/Bumex qs BP allows  -Holding losartan and spironolactone for now    Class 1 obesity due to excess calories with serious comorbidity and body mass index (BMI) of 34 0 to 34 9 in adult  Assessment & Plan  -encourage wt loss    Gastroesophageal reflux disease without esophagitis  Assessment & Plan  -continue PPI      Discharging Physician / Practitioner: Irene Ramesh MD  PCP: Lissett Kimbrough MD  Admission Date:   Admission Orders (From admission, onward)     Ordered        11/24/19 1619  Inpatient Admission (expected length of stay for this patient Order details is greater than two midnights)  Once                   Discharge Date: 12/01/19    Resolved Problems  Date Reviewed: 12/1/2019          Resolved    Acute kidney injury superimposed on CKD ([facility] [address] ) 11/30/2019     Resolved by  Lissett Kimbrough MD    Elevated troponin level not due myocardial infarction 11/30/2019     Resolved by  Lissett Kimbrough MD          Consultations During Hospital Stay:  · Cardiology    Procedures Performed:   · None    Significant Findings / Test Results:   · Chest Xray -  FINDINGS:    Stable cardiomegaly is noted  Mild pulmonary vascular congestion is noted   Small bilateral pleural effusions are present  Osseous structures appear within normal limits for patient age  Impression:       Mild pulmonary vascular congestion and small bilateral pleural effusions  Incidental Findings:   · None     Test Results Pending at Discharge (will require follow up): · None     Outpatient Tests Requested:  · None    Complications:  None    Reason for Admission:  Shortness of breath    Hospital Course:     Minh Edouard is a 69 y o  male patient with past medical history of combined systolic and diastolic CHF, EF 01%, AFib on Eliquis who originally presented to the hospital on 11/24/2019 due to shortness of breath, bilateral lower extremity swelling and was found to be in congestive heart failure  Patient was treated with intravenous Bumex and he improved  Now medically stable for discharge      Please see above list of diagnoses and related plan for additional information  Condition at Discharge: stable     Discharge Day Visit / Exam:     Subjective:  No overnight events  Patient still has some left-sided shoulder pain and would see his orthopedic surgeon within 1 week    Vitals: Blood Pressure: 136/80 (12/01/19 0819)  Pulse: 83 (12/01/19 0819)  Temperature: (!) 97 1 °F (36 2 °C) (12/01/19 0819)  Temp Source: Oral (12/01/19 0819)  Respirations: 18 (12/01/19 0819)  Height: 6' 4" (193 cm) (11/24/19 1426)  Weight - Scale: 117 kg (257 lb 8 oz) (12/01/19 0600)  SpO2: 96 % (12/01/19 0819)  Exam:   Physical Exam   Constitutional: He is oriented to person, place, and time  He appears well-developed and well-nourished  No distress  Eyes: Conjunctivae are normal  Right eye exhibits no discharge  Left eye exhibits no discharge  No scleral icterus  Neck: Normal range of motion  Neck supple  No thyromegaly present  Cardiovascular: Normal rate  An irregularly irregular rhythm present  Pulmonary/Chest: Effort normal and breath sounds normal  No stridor  No respiratory distress  He has no wheezes  He has no rales  Neurological: He is alert and oriented to person, place, and time  No cranial nerve deficit  Skin: Skin is warm  He is not diaphoretic  Discussion with Family:  Discussed with patient's son    Discharge instructions/Information to patient and family:   See after visit summary for information provided to patient and family  Provisions for Follow-Up Care:  See after visit summary for information related to follow-up care and any pertinent home health orders  Disposition:     Home    For Discharges to [facility] SNF:   · Not Applicable to this Patient - Not Applicable to this Patient    Planned Readmission:  No     Discharge Statement:  I spent 40 minutes discharging the patient  This time was spent on the day of discharge  I had direct contact with the patient on the day of discharge   Greater than 50% of the total time was spent examining patient, answering all patient questions, arranging and discussing plan of care with patient as well as directly providing post-discharge instructions  Additional time then spent on discharge activities  Discharge Medications:  See after visit summary for reconciled discharge medications provided to patient and family        ** Please Note: This note has been constructed using a voice recognition system **

## 2019-12-01 NOTE — NURSING NOTE
Patient to be discharged to home in stable condition  Son to pick him up  Instructions given to patient

## 2019-12-02 ENCOUNTER — TRANSITIONAL CARE MANAGEMENT (OUTPATIENT)
Dept: FAMILY MEDICINE CLINIC | Facility: HOSPITAL | Age: 74
End: 2019-12-02

## 2019-12-03 PROBLEM — I50.32 CHRONIC DIASTOLIC CONGESTIVE HEART FAILURE (HCC): Status: RESOLVED | Noted: 2019-05-13 | Resolved: 2019-12-03

## 2019-12-03 PROBLEM — I50.42 CHRONIC COMBINED SYSTOLIC AND DIASTOLIC CONGESTIVE HEART FAILURE (HCC): Status: ACTIVE | Noted: 2019-12-03

## 2019-12-05 NOTE — PROGRESS NOTES
Heart Failure   Follow Up Office Visit Note -     Linwood Mazariegos   69 y o    male   MRN: [id number]  [facility]  [address] [facility] [address] [address] [phone]    PCP: Andrés Howe MD  Cardiologist : Will be [facility]- appointment in 2 weeks, per the patient        Impression/plan  Chronic combined heart failure, recent adm 11/24/2019 - 12/1/2019 (7 days)  12/01/19 117 kg (257 lb 8 oz)   11/18/19 129 kg (285 lb)   09/06/19 126 kg (277 lb)   Weight today 260lb  --Discharge creatinine: 1 78  --Last labs:  December 9:  Creatinine 1 58, BUN 68  Potassium 4 6  --Beta-Blocker: NOT on  Ideally should be  --ACEi, ARB or ARNi: losartan 50 mg/d  --Aldosterone Receptor Blocker: spironolactone 25 mg/d  --Diuretic:discharged on BUMEX 2 mgb i d (  Back in January 2019, he was on Lasix 40 mg daily with metolazone 3 times a week)  --Educated regarding adherence to a 1 5g -2 gram sodium diet/ 2000 ml fluid restriction, daily weights and to call us if  he gains  2-3 lbs in 1 day or 5 lbs/ 1 week  Persistent Atrial fibrillation  On oral anticoagulation with Eliquis 5 b i d  Rate controlled with Cardizem 180 mg daily  Hypertension with LVH  /60, On losartan, diltiazem and spironolactone  Moderate MR  Chronic kidney disease, stage III  Baseline creatinine 1 1-1 5  Stable  Labs yesterday  History DVT  Lymphedema  Cardiac testing  --TTE 1/19  EF 45%  mild diffuse hypokinesis with regional variations  More severe hypokinesis was seen in the inferior wall  Wall thickness was moderately to markedly increased  Right ventricle is dilated with mildly reduced systolic function  Left and right atrium moderately dilated  At least moderate MR  --TTE 11/25/19  EF 45 %  mild diffuse hypokinesis  Wall thickness was markedly increased  RV mildly dilated with mildly reduced systolic function  Moderate left atrial enlargement, marked right atrial enlargement    Mild MR         Summary of Recommendations  Low-sodium diet, Heart failure education reviewed  I gave him written material   Given his cardiomyopathy, beta-blocker is recommended-I will leave this to the discretion of his usual cardiologist  I made no change in his medications  He tells me he had blood work today  It is pending  Follow-up with his usual cardiologist   He reports he has an appointment in 2 weeks, [place] [facility]              HPI:   Donna Roth is a 70 yo male with chronic combined congestive heart failure,EF 45%, persistent atrial fibrillation, and chronic lower extremity edema  He is known to be diuretic resistant  He follows with [facility]iology in [place]  Recently, he presented to [facility] with shortness of breath  He  Reported over the last few weeks he had an increase in edema despite up titration of outpatient diuretics, and treatment with Z-Jacinto by his PCP  His weight was up 17 lb over his baseline  A chest x-ray showed mild pulmonary vascular congestion with small bilateral pleural effusions  His proBNP was elevated over 6400  His echocardiogram showed an ejection fraction 45% with mild diffuse hypokinesis and mild reduction in RV systolic function, similar to a prior in January 2019  He was diuresed on IV Bumex  It was recommended he be transition to torsemide 20 b i d  However the patient tells me he has been taking Bumex 2 mg b i d       12/10/19  He presents for follow-up  He is late to the visit today as he was at orthopedics  He is frustrated by left shoulder pain  From a cardiac standpoint he denies any chest pain or shortness of breath  He tells me they got the fluid off  He has been taking Bumex 2 mg b i d  He has lab work that was drawn this morning  He is scheduled to see his usual outpatient cardiologist in 2 weeks  He needs no refills          Assessment  Diagnoses and all orders for this visit:    Chronic combined systolic and diastolic HF (heart failure) ([place] [address] )    Persistent atrial fibrillation    PVD (peripheral vascular disease) ([place] [address] )    Essential hypertension    Cardiomyopathy, unspecified type [facility])        Past Medical History:   Diagnosis Date    Atrial fibrillation (HCC)     Cardiac disease     Cellulitis     Chronic pain     Chronic venous hypertension     with ulceration    GERD (gastroesophageal reflux disease)     History of methicillin resistant staphylococcus aureus (MRSA) 11/26/2019    negative nasal swab     Hyperlipidemia     Hypertension     Obesity     Pulmonary hypertension ([place] [address] )     PVD (peripheral vascular disease) ([place] [address] )     Vascular disorder of extremity ([place] [address] )        Review of Systems   Constitution: Negative for chills  Cardiovascular: Negative for chest pain, claudication, cyanosis, dyspnea on exertion, irregular heartbeat, leg swelling, near-syncope, orthopnea, palpitations, paroxysmal nocturnal dyspnea and syncope  Respiratory: Negative for cough and shortness of breath  Musculoskeletal:        Chronic pain right ankle/ left shoulder, back   Gastrointestinal: Negative for heartburn and nausea  Neurological: Negative for dizziness, focal weakness, headaches, light-headedness and weakness  All other systems reviewed and are negative  No Known Allergies        Current Outpatient Medications:     apixaban (ELIQUIS) 5 mg, Take 5 mg by mouth 2 (two) times a day , Disp: , Rfl:     Ascorbic Acid (VITAMIN C) 1000 MG tablet, Take 1,000 mg by mouth daily, Disp: , Rfl:     benzonatate (TESSALON PERLES) 100 mg capsule, Take 1 capsule (100 mg total) by mouth 3 (three) times a day as needed for cough, Disp: 20 capsule, Rfl: 0    cholecalciferol (VITAMIN D3) 1,000 units tablet, Take 1,000 Units by mouth daily, Disp: , Rfl:     diltiazem (CARDIZEM CD) 180 mg 24 hr capsule, Take 1 capsule (180 mg total) by mouth daily, Disp: , Rfl: 0    HYDROcodone-acetaminophen (NORCO) 7 5-325 mg per tablet, Take 1 tablet by mouth every 6 (six) hours as needed for pain Hydrocodone-acetaminophen 7 5-750mg Max Daily Amount: 4 tablets, Disp: 120 tablet, Rfl: 0    losartan (COZAAR) 50 mg tablet, Take 0 5 tablets (25 mg total) by mouth daily, Disp: 30 tablet, Rfl: 0    pantoprazole (PROTONIX) 40 mg tablet, Take 1 tablet (40 mg total) by mouth daily, Disp: 90 tablet, Rfl: 3    potassium chloride (K-DUR) 10 mEq tablet, TAKE THREE TABLETS BY MOUTH TWICE A DAY IN THE MORNING AND AT DINNER TIME, Disp: , Rfl: 10    sildenafil (VIAGRA) 100 mg tablet, Take 100 mg by mouth daily as needed for erectile dysfunction, Disp: , Rfl:     sodium chloride (OCEAN) 0 65 % nasal spray, 1 spray into each nostril as needed for congestion, Disp: , Rfl:     spironolactone (ALDACTONE) 25 mg tablet, Take 1 tablet (25 mg total) by mouth daily, Disp: 30 tablet, Rfl: 5    vitamin E, tocopherol, 400 units capsule, Take 800 Units by mouth daily, Disp: , Rfl:     Social History     Socioeconomic History    Marital status: Single     Spouse name: Not on file    Number of children: Not on file    Years of education: Not on file    Highest education level: Not on file   Occupational History    Not on file   Social Needs    Financial resource strain: Not on file    Food insecurity:     Worry: Not on file     Inability: Not on file    Transportation needs:     Medical: No     Non-medical: No   Tobacco Use    Smoking status: Never Smoker    Smokeless tobacco: Never Used   Substance and Sexual Activity    Alcohol use: No     Frequency: Never    Drug use: No    Sexual activity: Not Currently   Lifestyle    Physical activity:     Days per week: Not on file     Minutes per session: Not on file    Stress: Not on file   Relationships    Social connections:     Talks on phone: Not on file     Gets together: Not on file     Attends Alevism service: Not on file     Active member of club or organization: Not on file     Attends meetings of clubs or organizations: Not on file Relationship status: Not on file    Intimate partner violence:     Fear of current or ex partner: Not on file     Emotionally abused: Not on file     Physically abused: Not on file     Forced sexual activity: Not on file   Other Topics Concern    Not on file   Social History Narrative    Lives with grandson and son  Feels safe at home  Has living will  Sees dentist reg  Family History   Problem Relation Age of Onset   Sheree Kimball Cancer Mother         cancer of uterus    Diabetes Sister     Mental illness Neg Hx         neg fam hx    Substance Abuse Neg Hx         neg fam hx       Physical Exam   Constitutional: He is oriented to person, place, and time  Vital signs are normal  He appears well-developed  No distress  He is in a motorized scooter  HENT:   Head: Normocephalic and atraumatic  Eyes: Conjunctivae and EOM are normal    Neck: Normal range of motion  Neck supple  Cardiovascular: Normal rate, regular rhythm, normal heart sounds and intact distal pulses  Pulmonary/Chest: Effort normal and breath sounds normal    Abdominal: Soft  Bowel sounds are normal    Musculoskeletal: Normal range of motion  Neurological: He is alert and oriented to person, place, and time  Skin: Skin is warm and dry  Psychiatric: He has a normal mood and affect  Nursing note and vitals reviewed  Vitals: There were no vitals taken for this visit     Wt Readings from Last 3 Encounters:   12/06/19 116 kg (255 lb)   12/01/19 117 kg (257 lb 8 oz)   11/18/19 129 kg (285 lb)         Labs & Results:  Lab Results   Component Value Date    WBC 5 03 11/29/2019    HGB 14 4 11/29/2019    HCT 43 8 11/29/2019    MCV 95 11/29/2019     (L) 11/29/2019     BNP   Date Value Ref Range Status   12/17/2015 310 (H) 5 - 99 pg/mL Final     Comment:     The above 1 analytes were performed by [facility]  [address] [id number]       No components found for: CHEM    Results for orders placed during the hospital encounter of 19   Echo complete with contrast if indicated    Narrative [address]  [facility], [address] [place] [address]    Transthoracic Echocardiogram  2D, M-mode, Doppler, and Color Doppler    Study date:  2019    Patient: Rhonda Batista  MR number: [id number]  Account number: [de-identified]  : 1945  Age: 69 years  Gender: Male  Status: Inpatient  Location: [facility]  Height: 76 in  Weight: 284 5 lb  BP: 116/ 69 mmHg    Indications: Heart failure    Diagnoses: I50 9 - Heart failure, unspecified    Sonographer:  DELLA Long  Primary Physician:  Timmy Gifford MD  Referring Physician:  Klaus Jenkins MD  Group:  [facility]  Interpreting Physician:  Gamal Kinney MD    SUMMARY    LEFT VENTRICLE:  Systolic function was mildly reduced by visual assessment  Ejection fraction was estimated to be 45 %  There was mild diffuse hypokinesis  Wall thickness was markedly increased  RIGHT VENTRICLE:  The ventricle was moderately dilated  Systolic function was mildly reduced  LEFT ATRIUM:  The atrium was moderately dilated  RIGHT ATRIUM:  The atrium was markedly dilated  MITRAL VALVE:  There was mild regurgitation  TRICUSPID VALVE:  There was mild regurgitation  HISTORY: PRIOR HISTORY: GERD; Obesity; HTN; HLD; Afib; CHF; Edema; Lymphedema; PVD; Pulm HTN; DVT    PROCEDURE: The study was performed in the [facility]  This was a routine study  The transthoracic approach was used  The study included complete 2D imaging, M-mode, complete spectral Doppler, and color Doppler  Echocardiographic views were limited due to decreased penetration and lung interference  This was a technically difficult study  LEFT VENTRICLE: Size was normal  Systolic function was mildly reduced by visual assessment  Ejection fraction was estimated to be 45 %  There was mild diffuse hypokinesis   Wall thickness was markedly increased  DOPPLER: Transmitral flow  pattern: atrial fibrillation  RIGHT VENTRICLE: The ventricle was moderately dilated  Systolic function was mildly reduced  DOPPLER: Estimated peak pressure was at least 60 mmHg  LEFT ATRIUM: The atrium was moderately dilated  RIGHT ATRIUM: The atrium was markedly dilated  MITRAL VALVE: Valve structure was normal  There was normal leaflet separation  DOPPLER: The transmitral velocity was within the normal range  There was no evidence for stenosis  There was mild regurgitation  AORTIC VALVE: The valve was trileaflet  Leaflets exhibited normal thickness and normal cuspal separation  DOPPLER: Transaortic velocity was within the normal range  There was no evidence for stenosis  There was no regurgitation  TRICUSPID VALVE: The valve structure was normal  There was normal leaflet separation  DOPPLER: The transtricuspid velocity was within the normal range  There was no evidence for stenosis  There was mild regurgitation  PULMONIC VALVE: Leaflets exhibited normal thickness, no calcification, and normal cuspal separation  DOPPLER: The transpulmonic velocity was within the normal range  There was no regurgitation  PERICARDIUM: There was no pericardial effusion  AORTA: The root exhibited normal size  SYSTEMIC VEINS: IVC: The inferior vena cava was not well visualized      SYSTEM MEASUREMENT TABLES    2D  %FS: 9 84 %  AV Diam: 2 98 cm  EDV(Teich): 51 19 ml  EF Biplane: 22 23 %  EF(Teich): 22 17 %  ESV(Teich): 39 85 ml  IVSd: 1 6 cm  LA Area: 37 85 cm2  LA Diam: 5 36 cm  LVEDV MOD A2C: 242 91 ml  LVEDV MOD A4C: 147 73 ml  LVEDV MOD BP: 187 2 ml  LVEF MOD A2C: 25 41 %  LVEF MOD A4C: 21 32 %  LVESV MOD A2C: 181 19 ml  LVESV MOD A4C: 116 24 ml  LVESV MOD BP: 145 57 ml  LVIDd: 3 51 cm  LVIDs: 3 16 cm  LVLd A2C: 9 1 cm  LVLd A4C: 9 09 cm  LVLs A2C: 8 43 cm  LVLs A4C: 8 06 cm  LVPWd: 1 61 cm  RA Area: 60 3 cm2  RVIDd: 5 72 cm  SV MOD A2C: 61 71 ml  SV MOD A4C: 31 49 ml  SV(Teich): 11 35 ml    CW  RAP: 10 mmHg  TR Vmax: 2 98 m/s  TR maxP 44 mmHg    MM  TAPSE: 2 93 cm    PW  E': 0 05 m/s  E/E': 12 61  MV A Anthony: 0 29 m/s  MV Dec Hand: 5 25 m/s2  MV DecT: 124 59 ms  MV E Anthony: 0 65 m/s  MV E/A Ratio: 2 24  MV PHT: 36 13 ms  MVA By PHT: 6 09 cm2  RVSP: 45 44 mmHg    [facility] Accredited [facility]    Prepared and electronically signed by    Deon Ellis MD  Signed [id number] 14:49:13       No results found for this or any previous visit  This note was completed in part utilizing m-[employer] direct voice recognition software  Grammatical errors, random word insertion, spelling mistakes, and incomplete sentences may be an occasional consequence of the system secondary to software limitations, ambient noise and hardware issues  At the time of dictation, efforts were made to edit, clarify and /or correct errors  Please read the chart carefully and recognize, using context, where substitutions have occurred    If you have any questions or concerns about the context, text or information contained within the body of this dictation, please contact myself, the provider, for further clarification

## 2019-12-06 ENCOUNTER — OFFICE VISIT (OUTPATIENT)
Dept: FAMILY MEDICINE CLINIC | Facility: HOSPITAL | Age: 74
End: 2019-12-06
Payer: COMMERCIAL

## 2019-12-06 ENCOUNTER — TELEPHONE (OUTPATIENT)
Dept: FAMILY MEDICINE CLINIC | Facility: HOSPITAL | Age: 74
End: 2019-12-06

## 2019-12-06 VITALS — BODY MASS INDEX: 31.05 KG/M2 | WEIGHT: 255 LBS | HEIGHT: 76 IN

## 2019-12-06 DIAGNOSIS — I10 ESSENTIAL HYPERTENSION: ICD-10-CM

## 2019-12-06 DIAGNOSIS — Z23 NEED FOR VACCINATION: ICD-10-CM

## 2019-12-06 DIAGNOSIS — I27.20 PULMONARY HYPERTENSION (HCC): ICD-10-CM

## 2019-12-06 DIAGNOSIS — I48.19 PERSISTENT ATRIAL FIBRILLATION (HCC): ICD-10-CM

## 2019-12-06 DIAGNOSIS — I50.42 CHRONIC COMBINED SYSTOLIC AND DIASTOLIC CONGESTIVE HEART FAILURE (HCC): Primary | ICD-10-CM

## 2019-12-06 DIAGNOSIS — N18.30 STAGE 3 CHRONIC KIDNEY DISEASE (HCC): ICD-10-CM

## 2019-12-06 PROCEDURE — G0008 ADMIN INFLUENZA VIRUS VAC: HCPCS

## 2019-12-06 PROCEDURE — 99496 TRANSJ CARE MGMT HIGH F2F 7D: CPT | Performed by: INTERNAL MEDICINE

## 2019-12-06 PROCEDURE — 90662 IIV NO PRSV INCREASED AG IM: CPT

## 2019-12-06 NOTE — PROGRESS NOTES
Assessment/Plan:      Diagnoses and all orders for this visit:    Chronic combined systolic and diastolic congestive heart failure ([address] )  -     Basic metabolic panel; Future    Essential hypertension    Persistent atrial fibrillation    Stage 3 chronic kidney disease (HCC)    Pulmonary hypertension (HCC)         Subjective:     Patient ID: Baltazar Matthews is a 69 y o  male  HPI     Patient seen back after being discharged from the hospital with acute on chronic diastolic heart failure  Patient was diuresed over 20 lb  Shortness of breath is improved  Checking weight daily-it is stable  Left shoulder pain is worse  Out of Vicodin     Review of Systems   All other systems reviewed and are negative  Objective:     Physical Exam   Constitutional: He appears well-developed and well-nourished  Neck: No JVD present  Thyromegaly present  Cardiovascular:   afib chronic   Pulmonary/Chest: Effort normal and breath sounds normal    Musculoskeletal: Edema: plus 1  Pain with movement of shoulder  Vitals reviewed  Vitals:    12/06/19 1057   Weight: 116 kg (255 lb)   Height: 6' 4" (1 93 m)       Transitional Care Management Review:  Baltazar Matthews is a 69 y o  male here for TCM follow up  During the TCM phone call patient stated:    TCM Call (since 11/5/2019)     Date and time call was made  12/2/2019  2:40 PM    Hospital care reviewed  Records reviewed    Patient was hospitialized at  [facility]    Date of Admission  11/24/19    Date of discharge  12/01/19    Diagnosis  Acute on chronic combined systolic and diastolic CHF    Disposition  Home    Were the patients medications reviewed and updated  Yes    Current Symptoms  None      TCM Call (since 11/5/2019)     Scheduled for follow up?   Yes    Patients specialists  Cardiologist    Cardiologist name  Monae Hernandez MD     Did you obtain your prescribed medications  Yes    Do you need help managing your prescriptions or medications  No    Is transportation to your appointment needed  No    I have advised the patient to call PCP with any new or worsening symptoms  ROGERS Montes De Oca MA     Living Arrangements  Spouse or Significiant other    Support System  Spouse; Friends;  Family    Are you recieving any outpatient services  No    Are you recieving home care services  No              Jose Manuel Copeland MD

## 2019-12-10 ENCOUNTER — OFFICE VISIT (OUTPATIENT)
Dept: CARDIOLOGY CLINIC | Facility: CLINIC | Age: 74
End: 2019-12-10
Payer: COMMERCIAL

## 2019-12-10 VITALS
DIASTOLIC BLOOD PRESSURE: 60 MMHG | HEIGHT: 76 IN | SYSTOLIC BLOOD PRESSURE: 90 MMHG | HEART RATE: 78 BPM | BODY MASS INDEX: 31.66 KG/M2 | WEIGHT: 260 LBS

## 2019-12-10 DIAGNOSIS — I48.19 PERSISTENT ATRIAL FIBRILLATION (HCC): ICD-10-CM

## 2019-12-10 DIAGNOSIS — G89.29 CHRONIC LEFT SHOULDER PAIN: ICD-10-CM

## 2019-12-10 DIAGNOSIS — I42.9 CARDIOMYOPATHY, UNSPECIFIED TYPE (HCC): ICD-10-CM

## 2019-12-10 DIAGNOSIS — I50.42 CHRONIC COMBINED SYSTOLIC AND DIASTOLIC HF (HEART FAILURE) (HCC): Primary | ICD-10-CM

## 2019-12-10 DIAGNOSIS — I10 ESSENTIAL HYPERTENSION: ICD-10-CM

## 2019-12-10 DIAGNOSIS — M25.512 CHRONIC LEFT SHOULDER PAIN: ICD-10-CM

## 2019-12-10 DIAGNOSIS — I73.9 PVD (PERIPHERAL VASCULAR DISEASE) (HCC): ICD-10-CM

## 2019-12-10 LAB
BUN SERPL-MCNC: 68 MG/DL (ref 8–27)
BUN/CREAT SERPL: 43 (ref 10–24)
CALCIUM SERPL-MCNC: 9.6 MG/DL (ref 8.6–10.2)
CHLORIDE SERPL-SCNC: 93 MMOL/L (ref 96–106)
CO2 SERPL-SCNC: 25 MMOL/L (ref 20–29)
CREAT SERPL-MCNC: 1.58 MG/DL (ref 0.76–1.27)
GLUCOSE SERPL-MCNC: 88 MG/DL (ref 65–99)
POTASSIUM SERPL-SCNC: 4.6 MMOL/L (ref 3.5–5.2)
SL AMB EGFR AFRICAN AMERICAN: 49 ML/MIN/1.73
SL AMB EGFR NON AFRICAN AMERICAN: 43 ML/MIN/1.73
SODIUM SERPL-SCNC: 135 MMOL/L (ref 134–144)

## 2019-12-10 PROCEDURE — 99214 OFFICE O/P EST MOD 30 MIN: CPT | Performed by: NURSE PRACTITIONER

## 2019-12-10 RX ORDER — BUMETANIDE 2 MG/1
TABLET ORAL
COMMUNITY
End: 2020-06-18 | Stop reason: HOSPADM

## 2019-12-10 NOTE — LETTER
December 10, 2019     MD Kendy Crenshaw  [address] [address]    Patient: Hardik Guzman   YOB: 1945   Date of Visit: 12/10/2019     Dear Dr Catalino Wagoner      Thank you for referring Hardik Guzman to me for evaluation  Below are the relevant portions of my assessment and plan of care  If you have questions, please do not hesitate to call me  I look forward to following [place] along with you           Sincerely,        NAVNEET Connelly        CC: No Recipients    Progress Notes:

## 2019-12-10 NOTE — TELEPHONE ENCOUNTER
His medication was not renewed  He wants a refill and he wants you to take over prescribing this medication  The pain management doctor wants him to come in every 30 days for an appointment to have his script renewed  He does not want to do that, he said it is too hard for him to get out to go to appointments

## 2019-12-10 NOTE — PATIENT INSTRUCTIONS
Seasoning Without Salt   WHAT YOU NEED TO KNOW:   Why is it important to season foods without salt? Seasoning foods without salt during cooking and eating can help decrease the amount of sodium in your diet  Sodium is found in salt and in many other foods  Limit sodium if you have high blood pressure and heart failure  You may also need to limit sodium if you have liver problems or kidney disease  Sodium makes your blood pressure rise if you have high blood pressure  If you have heart failure, eating too much sodium causes extra fluid to build up in your body  This extra body fluid may cause swelling, shortness of breath, or weight gain  People with other health conditions such as diabetes or heart disease may also need to make other diet changes  Ask your healthcare provider if you need to make other diet changes  What are some high-sodium seasonings and condiments that I should limit or avoid? · Ross sauce, soup, and other packaged sauce mixes  · Barbecue, taco, and steak sauce  · Dry salad dressing mixes  · Garlic, onion, and celery salt  · Imitation preciado bits  · Meat tenderizers and sauces  · Monosodium glutamate (MSG)  MSG may be found in [place] food, soy sauce, and oyster sauce  · Mustard, prepared horseradish sauce, and ketchup  · Pickle relish  · Salt, seasoned salt, kosher salt, and sea salt  · Soy, Worcestershire, and teriyaki sauces  Limit low sodium varieties because they still contain high amounts of sodium  · Tartar, fish, and cocktail sauce  What are some low-sodium herbs that I can use? · Basil with eggs, fish and shellfish, beef, liver, veal, tomato sauce, soups, pasta, green salad, and vegetables  · Bay leaf with beef, white fish, soups, and tomato dishes  · Cilantro, chili powder, and cumin with egg dishes, [place] food, pork, fish, and rice      · Dill weed with breads, chicken, cooked fresh vegetables, cucumbers, fish or shellfish, potato salad, and soup  · Marjoram with beef, lamb, chicken, turkey, pasta, green salad, cream sauce, eggs, soups, and vegetables  · Parsley with stuffing, rice, egg salad, green salad, vegetable salad, baked beans, vegetables, soups, and tomato sauces  · Rosemary and thyme with veal, pork, beef, potatoes, cream or tomato sauce, soups, and vegetables  · Florencio with chicken, turkey, fish, pork, veal, soups, onions, stuffing, tomato sauce, and vegetables  · Savory with beef, stuffing, chicken soup, green beans, poultry, red meats, and potatoes  · Tarragon with eggs, fish or shellfish, chicken, turkey, green salad, soups, sauces, and salad dressings  What are some low-sodium herb blends that I can use? · Chili blend: mix black pepper, chili powder, cilantro, cumin, dry mustard, garlic powder, onion powder, oregano, and paprika  · McClure slaw blend: mix celery seed, dill weed, dried onion, sugar, and tarragon  · [facility] food blend:  mix basil, black pepper, garlic powder, ground red pepper, marjoram, oregano, savory, and thyme  · Onion herb blend:  mix basil, black pepper, cumin, dill weed, dried onion flakes, and garlic powder  What are some low-sodium spices that I can use? · Cinnamon in custard and pudding, sweet breads, rolls, fruits, fruit salad, pork, pumpkin, winter squash, and sweet potatoes  · Cloves   in sweet breads, fruit, ham, pork, baked beans, tomatoes, winter squash, and sweet potatoes  · Marx powder with beef, veal, chicken, turkey, and fish or potato soup  · Ginna with baked fish, carrots, pot roast, ham, chicken, turkey, rice, and fruit  · Mace in chicken soup, baked fruit desserts, carrots, cauliflower, custard, fruit jams, lamb, potatoes, and pumpkin  · Nutmeg in sweet breads, fruits, vegetables, and custard  What are some low-sodium seasonings that I can use? · Chives in eggs, pasta, cream or potato soup, corn, potatoes, and salad dressing      · Garlic (minced, powdered, or freshly chopped) with shellfish, lamb, soup, dips and sauces, Italian dishes, meats, and poultry  · Lemon with chicken, fruit salads, grilled or baked fish, shellfish, spinach, and tossed salads  · Onion (dried, powdered, or freshly chopped) with beef, liver, egg salad, green salad, casseroles, pasta dishes, and stews  · Vinegar (such as balsamic, cider, flavored, red wine, or white) with cucumbers, cooked greens, potatoes, salad dressings, spinach, and seafood  How can I use food labels to choose seasonings that are low in sodium? Reading food labels is a good way to learn whether foods contain sodium and how much sodium they contain  The ingredient list on the food label will tell you if the seasoning or food contains sodium  The food contains sodium if an ingredient has Na (symbol for sodium), salt, soda, or sodium in its name  Food labels list the amount of sodium in the food in milligrams (mg)  Following are some words about sodium that may appear on a label and what they mean  Ask your healthcare provider for more information about how to read food labels  · Sodium free or salt free: Less than five mg in each serving  · Very low sodium:  Thirty-five (35) mg of sodium or less in each serving  · Low sodium:  One-hundred and forty (140) mg of sodium or less in each serving  · Reduced or less sodium: At least 25 percent less sodium in each serving  For example, a food may have 800 mg of sodium in each serving  The same food made with reduced sodium would contain 600 mg of sodium  · Light in sodium: Fifty (50) percent less sodium in each serving  For example, a food may have 500 mg of sodium in each serving  The same food that is light in sodium would have 250 mg of sodium  · Unsalted or no added salt: No extra salt is added  What are some other ways to decrease sodium?    · Check with your healthcare provider before using salt substitutes if you need to limit potassium in your diet  They may be too high in potassium for you to use safely  · Fast food and packaged foods are often high in sodium  Buy low salt or low sodium foods whenever possible  Eat homemade or fresh foods and meals to avoid getting too much sodium  Buy fresh vegetables, frozen vegetables or low sodium or no salt added canned vegetables  · Avoid regular canned soups or soups made from dry mixes  Buy low sodium soups or make your own at home without salt  Use low sodium broth, bouillon, or consommé  · Avoid canned, smoked, or processed poultry (chicken, turkey), fish, or meats  Limit cured meats such as preciado and ham      · Regular cheese contains a medium to high amount of salt  If you eat cheese, buy low sodium kinds as often as possible  Add only one third to one half the amount of cheese listed on recipes  CARE AGREEMENT:   You have the right to help plan your care  To help with this plan, you must learn your health problems and how to season without salt  You can then discuss treatment options with your healthcare providers  Work with them to decide what care will be used to treat you  You always have the right to refuse treatment  The above information is an  only  It is not intended as medical advice for individual conditions or treatments  Talk to your doctor, nurse or pharmacist before following any medical regimen to see if it is safe and effective for you  © 2017 [address] Information is for End User's use only and may not be sold, redistributed or otherwise used for commercial purposes  All illustrations and images included in CareNotes® are the copyrighted property of A D A M , Inc  or Bharath Camarena  Low-Sodium Diet   AMBULATORY CARE:   A low-sodium diet  limits foods that are high in sodium (salt)  You will need to follow a low-sodium diet if you have high blood pressure, kidney disease, or heart failure   You may also need to follow this diet if you have a condition that is causing your body to retain (hold) extra fluid  You may need to limit the amount of sodium you eat to 1,500 mg  Ask your healthcare provider how much sodium you can have each day  How to use food labels to choose foods that are low in sodium:  Read food labels to find the amount of sodium they contain  The amount of sodium is listed in milligrams (mg)  The % Daily Value (DV) column tells you how much of your daily needs are met by 1 serving of the food for each nutrient listed  Choose foods that have less than 5% of the DV of sodium  These foods are considered low in sodium  Foods that have 20% or more of the DV of sodium are considered high in sodium  Some food labels may also list any of the following terms that tell you about the sodium content in the food:  · Sodium-free:  Less than 5 mg in each serving    · Very low sodium:  35 mg of sodium or less in each serving    · Low sodium:  140 mg of sodium or less in each serving    · Reduced sodium: At least 25% less sodium in each serving than the regular type    · Light in sodium:  50% less sodium in each serving    · Unsalted or no added salt:  No extra salt is added during processing (the food may still contain sodium)  Foods to avoid:  Salty foods are high in sodium   You should avoid the following:  · Processed foods:      ¨ Mixes for cornbread, biscuits, cake, and pudding     ¨ Instant foods, such as potatoes, cereals, noodles, and rice     ¨ Packaged foods, such as bread stuffing, rice and pasta mixes, snack dip mixes, and macaroni and cheese     ¨ Canned foods, such as canned vegetables, soups, broths, sauces, and vegetable or tomato juice    ¨ Snack foods, such as salted chips, popcorn, pretzels, pork rinds, salted crackers, and salted nuts    ¨ Frozen foods, such as dinners, entrees, vegetables with sauces, and breaded meats    ¨ Sauerkraut, pickled vegetables, and other foods prepared in brine    · Meats and cheeses: ¨ Smoked or cured meat, such as corned beef, preciado, ham, hot dogs, and sausage    ¨ Canned meats or spreads, such as potted meats, sardines, anchovies, and imitation seafood    ¨ Deli or lunch meats, such as bologna, ham, turkey, and roast beef    ¨ Processed cheese, such as American cheese and cheese spreads    · Condiments, sauces, and seasonings:      ¨ Salt (¼ teaspoon of salt contains 575 mg of sodium)    ¨ Seasonings made with salt, such as garlic salt, celery salt, onion salt, and seasoned salt    ¨ Regular soy sauce, barbecue sauce, teriyaki sauce, steak sauce, Worcestershire sauce, and most flavored vinegars    ¨ Canned gravy and mixes     ¨ Regular condiments, such as mustard, ketchup, and salad dressings    ¨ Pickles and olives    ¨ Meat tenderizers and monosodium glutamate (MSG)  Foods to include:  Read the food label to find the exact amount of sodium in each serving  · Bread and cereal:  Try to choose breads with less than 80 mg of sodium per serving  ¨ Bread, roll, marcelino, tortilla, or unsalted crackers  ¨ Ready-to-eat cereals with less than 5% DV of sodium (examples include shredded wheat and puffed rice)    ¨ Pasta    · Vegetables and fruits:      ¨ Unsalted fresh, frozen, or canned vegetables    ¨ Fresh, frozen, or canned fruits    ¨ Fruit juice    · Dairy:  One serving has about 150 mg of sodium  ¨ Milk, all types    ¨ Yogurt    ¨ Hard cheese, such as cheddar, Swiss, [employer], or mozzarella    · Meat and other protein foods:  Some raw meats may have added sodium  ¨ Plain meats, fish, and poultry     ¨ Eggs    · Other foods:      ¨ Homemade pudding    ¨ Unsalted nuts, popcorn, or pretzels    ¨ Unsalted butter or margarine  Ways to decrease sodium:   · Add spices and herbs to foods instead of salt during cooking  Use salt-free seasonings to add flavor to foods  Examples include onion powder, garlic powder, basil, rodriguez powder, paprika, and parsley   Try lemon or lime juice or vinegar to give foods a tart flavor  Use hot peppers, pepper, or cayenne pepper to add a spicy flavor to foods  · Do not keep a salt shaker at your kitchen table  This may help keep you from adding salt to food at the table  It may take time to get used to enjoying the natural flavor of food instead of adding salt  Talk to your healthcare provider before you use salt substitutes  Some salt substitutes have a high amount of potassium and need to be avoided if you have kidney disease  · Choose low-sodium foods at restaurants  Meals from restaurants are often high in sodium  Some restaurants have nutrition information on the menu that tells you the amount of sodium in their foods  If possible, ask for your food to be prepared with less, or no salt  · Shop for unsalted or low-sodium foods and snacks at the grocery store  Examples include unsalted or low-sodium broths, soups, and canned vegetables  Choose fresh or frozen vegetables instead  Choose unsalted nuts or seeds or fresh fruits or vegetables as snacks  Read food labels and choose salt-free, very low-sodium, or low-sodium foods  © 2017 [address]  Information is for End User's use only and may not be sold, redistributed or otherwise used for commercial purposes  All illustrations and images included in CareNotes® are the copyrighted property of A D A M , Inc  or Bharath Camarena  The above information is an  only  It is not intended as medical advice for individual conditions or treatments  Talk to your doctor, nurse or pharmacist before following any medical regimen to see if it is safe and effective for you

## 2019-12-23 ENCOUNTER — TELEPHONE (OUTPATIENT)
Dept: OTHER | Facility: OTHER | Age: 74
End: 2019-12-23

## 2019-12-24 DIAGNOSIS — M54.40 LOW BACK PAIN WITH SCIATICA, SCIATICA LATERALITY UNSPECIFIED, UNSPECIFIED BACK PAIN LATERALITY, UNSPECIFIED CHRONICITY: Primary | ICD-10-CM

## 2019-12-24 RX ORDER — MELOXICAM 15 MG/1
15 TABLET ORAL DAILY
Qty: 30 TABLET | Refills: 5 | Status: SHIPPED | OUTPATIENT
Start: 2019-12-24 | End: 2020-03-10 | Stop reason: HOSPADM

## 2019-12-24 NOTE — TELEPHONE ENCOUNTER
PT calling stating that he needs his medication (Meloxicam 15MG) refill  When the Pharmacy called today one of the staff members told them that that PT didn't exist  Please follow up with the PT  Thanks

## 2020-01-07 ENCOUNTER — OFFICE VISIT (OUTPATIENT)
Dept: FAMILY MEDICINE CLINIC | Facility: HOSPITAL | Age: 75
End: 2020-01-07
Payer: COMMERCIAL

## 2020-01-07 VITALS
WEIGHT: 249 LBS | DIASTOLIC BLOOD PRESSURE: 60 MMHG | BODY MASS INDEX: 30.32 KG/M2 | HEART RATE: 55 BPM | HEIGHT: 76 IN | SYSTOLIC BLOOD PRESSURE: 110 MMHG

## 2020-01-07 DIAGNOSIS — I48.19 PERSISTENT ATRIAL FIBRILLATION (HCC): ICD-10-CM

## 2020-01-07 DIAGNOSIS — I10 ESSENTIAL HYPERTENSION: ICD-10-CM

## 2020-01-07 DIAGNOSIS — F32.1 MODERATE MAJOR DEPRESSION, SINGLE EPISODE (HCC): ICD-10-CM

## 2020-01-07 DIAGNOSIS — I50.42 CHRONIC COMBINED SYSTOLIC AND DIASTOLIC CONGESTIVE HEART FAILURE (HCC): Primary | ICD-10-CM

## 2020-01-07 PROBLEM — I50.43 ACUTE ON CHRONIC COMBINED SYSTOLIC AND DIASTOLIC CONGESTIVE HEART FAILURE (HCC): Status: RESOLVED | Noted: 2019-01-02 | Resolved: 2020-01-07

## 2020-01-07 PROCEDURE — 3725F SCREEN DEPRESSION PERFORMED: CPT | Performed by: INTERNAL MEDICINE

## 2020-01-07 PROCEDURE — 99214 OFFICE O/P EST MOD 30 MIN: CPT | Performed by: INTERNAL MEDICINE

## 2020-01-07 RX ORDER — DULOXETIN HYDROCHLORIDE 30 MG/1
30 CAPSULE, DELAYED RELEASE ORAL DAILY
Qty: 30 CAPSULE | Refills: 5 | Status: SHIPPED | OUTPATIENT
Start: 2020-01-07 | End: 2020-05-21 | Stop reason: SDUPTHER

## 2020-01-07 NOTE — PROGRESS NOTES
Assessment/Plan:       Diagnoses and all orders for this visit:    Chronic combined systolic and diastolic congestive heart failure (HCC)    Essential hypertension    Persistent atrial fibrillation    Moderate major depression, single episode (HCC)  -     DULoxetine (CYMBALTA) 30 mg delayed release capsule; Take 1 capsule (30 mg total) by mouth daily          All of the above diagnoses have been assessed  Additional COMMENTS/PLAN: will see back in 6 weeks with attn to depression and pain on cymbalta  Subjective:      Patient ID: Lj Tidwell is a 69 y o  male  HPI     Pain-not happy with pain management  Still has pain of the left shoulder  Ortho has not been willing to do anything about it  CHF-this is improved on the regimen  No SOB -less edema  The following portions of the patient's history were revised and updated as appropriate: Problem list, allergies, med list, FH, SH, Past medical and surgical histories      Current Outpatient Medications   Medication Sig Dispense Refill    apixaban (ELIQUIS) 5 mg Take 5 mg by mouth 2 (two) times a day       Ascorbic Acid (VITAMIN C) 1000 MG tablet Take 1,000 mg by mouth daily      benzonatate (TESSALON PERLES) 100 mg capsule Take 1 capsule (100 mg total) by mouth 3 (three) times a day as needed for cough 20 capsule 0    bumetanide (BUMEX) 2 mg tablet Take 2 mg by mouth 2 (two) times a day       cholecalciferol (VITAMIN D3) 1,000 units tablet Take 1,000 Units by mouth daily      diltiazem (CARDIZEM CD) 180 mg 24 hr capsule Take 1 capsule (180 mg total) by mouth daily  0    HYDROcodone-acetaminophen (NORCO) 7 5-325 mg per tablet Take 1 tablet by mouth every 6 (six) hours as needed for pain Hydrocodone-acetaminophen 7 5-750mg Max Daily Amount: 4 tablets 120 tablet 0    losartan (COZAAR) 50 mg tablet Take 0 5 tablets (25 mg total) by mouth daily 30 tablet 0    meloxicam (MOBIC) 15 mg tablet Take 1 tablet (15 mg total) by mouth daily 30 tablet 5  pantoprazole (PROTONIX) 40 mg tablet Take 1 tablet (40 mg total) by mouth daily 90 tablet 3    potassium chloride (K-DUR) 10 mEq tablet TAKE THREE TABLETS BY MOUTH TWICE A DAY IN THE MORNING AND AT DINNER TIME  10    sildenafil (VIAGRA) 100 mg tablet Take 100 mg by mouth daily as needed for erectile dysfunction      sodium chloride (OCEAN) 0 65 % nasal spray 1 spray into each nostril as needed for congestion      spironolactone (ALDACTONE) 25 mg tablet Take 1 tablet (25 mg total) by mouth daily 30 tablet 5    vitamin E, tocopherol, 400 units capsule Take 800 Units by mouth daily      DULoxetine (CYMBALTA) 30 mg delayed release capsule Take 1 capsule (30 mg total) by mouth daily 30 capsule 5     No current facility-administered medications for this visit  Review of Systems   All other systems reviewed and are negative  Objective:    /60 (BP Location: Right arm, Patient Position: Sitting, Cuff Size: Large)   Pulse 55   Ht 6' 4" (1 93 m)   Wt 113 kg (249 lb) Comment: patient reported  BMI 30 31 kg/m²     BP Readings from Last 3 Encounters:   01/07/20 110/60   12/10/19 90/60   12/01/19 136/80                  Wt Readings from Last 3 Encounters:   01/07/20 113 kg (249 lb)   12/10/19 118 kg (260 lb)   12/06/19 116 kg (255 lb)         Physical Exam   Constitutional: He appears well-developed and well-nourished  Neck: No JVD present  Cardiovascular:   afib   Pulmonary/Chest: Effort normal and breath sounds normal    Abdominal: Soft  Bowel sounds are normal    Musculoskeletal: Edema: plus1  Vitals reviewed  No visits with results within 2 Week(s) from this visit     Latest known visit with results is:   Orders Only on 12/09/2019   Component Date Value Ref Range Status    Glucose, Random 12/09/2019 88  65 - 99 mg/dL Final    BUN 12/09/2019 68* 8 - 27 mg/dL Final    Creatinine 12/09/2019 1 58* 0 76 - 1 27 mg/dL Final    eGFR Non  12/09/2019 43* >59 mL/min/1 73 Final    eGFR  12/09/2019 49* >59 mL/min/1 73 Final    SL AMB BUN/CREATININE RATIO 12/09/2019 43* 10 - 24 Final    Sodium 12/09/2019 135  134 - 144 mmol/L Final    Potassium 12/09/2019 4 6  3 5 - 5 2 mmol/L Final    Chloride 12/09/2019 93* 96 - 106 mmol/L Final    CO2 12/09/2019 25  20 - 29 mmol/L Final    CALCIUM 12/09/2019 9 6  8 6 - 10 2 mg/dL Final         Karan Martinez MD  Depression Screening Follow-up Plan: Patient's depression screening was positive with a PHQ-2 score of 6  Their PHQ-9 score was 9  Patient assessed for underlying major depression  They have no active suicidal ideations  Brief counseling provided and recommend additional follow-up/re-evaluation next office visit

## 2020-01-07 NOTE — PROGRESS NOTES
BMI Counseling: Body mass index is 30 31 kg/m²  The BMI is above normal  Nutrition recommendations include reducing portion sizes

## 2020-01-28 ENCOUNTER — TELEPHONE (OUTPATIENT)
Dept: FAMILY MEDICINE CLINIC | Facility: HOSPITAL | Age: 75
End: 2020-01-28

## 2020-01-28 NOTE — TELEPHONE ENCOUNTER
I checked chart--no results seen, [facility] were [facility]  I called pt and he said labs were done on fri 1/24/20 at the  location, but his card ordered them and in the interim, he called his card and got the results  His card is in [place]--dr lopez--spelling ? ???  Card doc told pt to only take his lasix 2 tabs M/W/F, 1 tab the other days as his was getting too dried out  I checked chart and there is no lasix listed so I called pt back and told him this  I see aldactone  I told pt to call his card back and get the correct med that his card doc is talking about so I can put it in the chart  dk     Pt called on result line for lab results done at [facility]   Pt call back #[phone]  I will look into  pww pt    dk

## 2020-01-29 ENCOUNTER — DOCUMENTATION (OUTPATIENT)
Dept: FAMILY MEDICINE CLINIC | Facility: HOSPITAL | Age: 75
End: 2020-01-29

## 2020-01-29 NOTE — TELEPHONE ENCOUNTER
Pt called back--the correct med is Bumex--was told by his card to take 2 tabs M/W/F, 1 the other days as he was getting too dried out  nicki toro----Tried calling pt as I didn't hear back from him  Had to LM  Trying to find out what med he is talking about    dk

## 2020-02-04 ENCOUNTER — OFFICE VISIT (OUTPATIENT)
Dept: FAMILY MEDICINE CLINIC | Facility: HOSPITAL | Age: 75
End: 2020-02-04
Payer: COMMERCIAL

## 2020-02-04 VITALS
WEIGHT: 239 LBS | TEMPERATURE: 97.9 F | DIASTOLIC BLOOD PRESSURE: 60 MMHG | SYSTOLIC BLOOD PRESSURE: 100 MMHG | HEIGHT: 76 IN | BODY MASS INDEX: 29.1 KG/M2 | HEART RATE: 64 BPM

## 2020-02-04 DIAGNOSIS — I50.42 CHRONIC COMBINED SYSTOLIC AND DIASTOLIC CONGESTIVE HEART FAILURE (HCC): Primary | ICD-10-CM

## 2020-02-04 DIAGNOSIS — I48.19 PERSISTENT ATRIAL FIBRILLATION (HCC): ICD-10-CM

## 2020-02-04 DIAGNOSIS — I10 ESSENTIAL HYPERTENSION: ICD-10-CM

## 2020-02-04 PROCEDURE — 3074F SYST BP LT 130 MM HG: CPT | Performed by: INTERNAL MEDICINE

## 2020-02-04 PROCEDURE — 3008F BODY MASS INDEX DOCD: CPT | Performed by: INTERNAL MEDICINE

## 2020-02-04 PROCEDURE — 1036F TOBACCO NON-USER: CPT | Performed by: INTERNAL MEDICINE

## 2020-02-04 PROCEDURE — 3078F DIAST BP <80 MM HG: CPT | Performed by: INTERNAL MEDICINE

## 2020-02-04 PROCEDURE — 99214 OFFICE O/P EST MOD 30 MIN: CPT | Performed by: INTERNAL MEDICINE

## 2020-02-04 PROCEDURE — 1160F RVW MEDS BY RX/DR IN RCRD: CPT | Performed by: INTERNAL MEDICINE

## 2020-02-04 NOTE — PROGRESS NOTES
Assessment/Plan:       Diagnoses and all orders for this visit:    Chronic combined systolic and diastolic congestive heart failure (HCC)    Persistent atrial fibrillation    Essential hypertension          All of the above diagnoses have been assessed  Additional COMMENTS/PLAN: It appears shoulder replacements relatively contraindicated due to the fact he is nonambulatory without crutches  Subjective:      Patient ID: Magdalene Melton is a 69 y o  male  HPI     CHF -this is better with regards to edema  URI-has some nose bleeds  [place]-still seeing pain management  The following portions of the patient's history were revised and updated as appropriate: Problem list, allergies, med list, FH, SH, Past medical and surgical histories      Current Outpatient Medications   Medication Sig Dispense Refill    apixaban (ELIQUIS) 5 mg Take 5 mg by mouth 2 (two) times a day       Ascorbic Acid (VITAMIN C) 1000 MG tablet Take 1,000 mg by mouth daily      benzonatate (TESSALON PERLES) 100 mg capsule Take 1 capsule (100 mg total) by mouth 3 (three) times a day as needed for cough 20 capsule 0    bumetanide (BUMEX) 2 mg tablet 2 tabs M/W/F,  1 tab the other days per card      cholecalciferol (VITAMIN D3) 1,000 units tablet Take 1,000 Units by mouth daily      diltiazem (CARDIZEM CD) 180 mg 24 hr capsule Take 1 capsule (180 mg total) by mouth daily  0    DULoxetine (CYMBALTA) 30 mg delayed release capsule Take 1 capsule (30 mg total) by mouth daily 30 capsule 5    HYDROcodone-acetaminophen (NORCO) 7 5-325 mg per tablet Take 1 tablet by mouth every 6 (six) hours as needed for pain Hydrocodone-acetaminophen 7 5-750mg Max Daily Amount: 4 tablets 120 tablet 0    losartan (COZAAR) 50 mg tablet Take 0 5 tablets (25 mg total) by mouth daily 30 tablet 0    meloxicam (MOBIC) 15 mg tablet Take 1 tablet (15 mg total) by mouth daily 30 tablet 5    pantoprazole (PROTONIX) 40 mg tablet Take 1 tablet (40 mg total) by mouth daily 90 tablet 3    potassium chloride (K-DUR) 10 mEq tablet TAKE THREE TABLETS BY MOUTH TWICE A DAY IN THE MORNING AND AT DINNER TIME  10    sildenafil (VIAGRA) 100 mg tablet Take 100 mg by mouth daily as needed for erectile dysfunction      sodium chloride (OCEAN) 0 65 % nasal spray 1 spray into each nostril as needed for congestion      spironolactone (ALDACTONE) 25 mg tablet Take 1 tablet (25 mg total) by mouth daily 30 tablet 5    vitamin E, tocopherol, 400 units capsule Take 800 Units by mouth daily       No current facility-administered medications for this visit  Review of Systems   All other systems reviewed and are negative  Objective:    /60 (BP Location: Left arm, Patient Position: Sitting, Cuff Size: Standard)   Pulse 64   Temp 97 9 °F (36 6 °C) (Tympanic)   Ht 6' 4" (1 93 m)   Wt 108 kg (239 lb) Comment: patient reported  BMI 29 09 kg/m²     BP Readings from Last 3 Encounters:   02/04/20 100/60   01/07/20 110/60   12/10/19 90/60                  Wt Readings from Last 3 Encounters:   02/04/20 108 kg (239 lb)   01/07/20 113 kg (249 lb)   12/10/19 118 kg (260 lb)         Physical Exam   Constitutional: He appears well-developed and well-nourished  HENT:   Some areas of erosion in the nose   Neck: No JVD present  Cardiovascular:   Atrial fibrillation   Pulmonary/Chest: Effort normal and breath sounds normal    Musculoskeletal: He exhibits no edema  Vitals reviewed  No visits with results within 2 Week(s) from this visit     Latest known visit with results is:   Orders Only on 12/09/2019   Component Date Value Ref Range Status    Glucose, Random 12/09/2019 88  65 - 99 mg/dL Final    BUN 12/09/2019 68* 8 - 27 mg/dL Final    Creatinine 12/09/2019 1 58* 0 76 - 1 27 mg/dL Final    eGFR Non  12/09/2019 43* >59 mL/min/1 73 Final    eGFR  12/09/2019 49* >59 mL/min/1 73 Final    SL AMB BUN/CREATININE RATIO 12/09/2019 43* 10 - 24 Final    Sodium 12/09/2019 135  134 - 144 mmol/L Final    Potassium 12/09/2019 4 6  3 5 - 5 2 mmol/L Final    Chloride 12/09/2019 93* 96 - 106 mmol/L Final    CO2 12/09/2019 25  20 - 29 mmol/L Final    CALCIUM 12/09/2019 9 6  8 6 - 10 2 mg/dL Final         Paula MD Isra

## 2020-02-05 ENCOUNTER — TELEPHONE (OUTPATIENT)
Dept: FAMILY MEDICINE CLINIC | Facility: HOSPITAL | Age: 75
End: 2020-02-05

## 2020-02-05 DIAGNOSIS — N18.30 STAGE 3 CHRONIC KIDNEY DISEASE (HCC): Primary | ICD-10-CM

## 2020-02-05 NOTE — TELEPHONE ENCOUNTER
----- Message from Jose Manuel Copeland MD sent at 2/5/2020  2:19 PM EST -----  Make KCL 10 Meq BID and repeat BMP in 1 week  ----- Message -----  From: William Gentile  Sent: 2/4/2020   4:25 PM EST  To: Jose Manuel Copeland MD        ----- Message -----  From: Interface, Transcription Incoming  Sent: 2/4/2020   4:16 PM EST  To: [facility] Internal Med Clinical

## 2020-02-17 ENCOUNTER — TELEPHONE (OUTPATIENT)
Dept: FAMILY MEDICINE CLINIC | Facility: HOSPITAL | Age: 75
End: 2020-02-17

## 2020-02-17 DIAGNOSIS — I50.32 CHRONIC DIASTOLIC CONGESTIVE HEART FAILURE (HCC): ICD-10-CM

## 2020-02-17 RX ORDER — SPIRONOLACTONE 25 MG/1
25 TABLET ORAL DAILY
Qty: 30 TABLET | Refills: 5 | Status: SHIPPED | OUTPATIENT
Start: 2020-02-17 | End: 2020-03-10 | Stop reason: HOSPADM

## 2020-02-17 NOTE — TELEPHONE ENCOUNTER
Can get lozenges for throat  Try vaseline up nose at bedtime   It is made worse by eliquis-if it persists-ENT

## 2020-02-17 NOTE — TELEPHONE ENCOUNTER
Message relayed to patient  He states that he has tried all of Dr Any Cornell suggestions and nothing his helping  He will call ENT

## 2020-02-19 ENCOUNTER — APPOINTMENT (INPATIENT)
Dept: RADIOLOGY | Facility: HOSPITAL | Age: 75
DRG: 571 | End: 2020-02-19
Payer: COMMERCIAL

## 2020-02-19 ENCOUNTER — HOSPITAL ENCOUNTER (INPATIENT)
Facility: HOSPITAL | Age: 75
LOS: 20 days | Discharge: NURSING, REHAB OR LONG-TERM CARE | DRG: 571 | End: 2020-03-10
Attending: EMERGENCY MEDICINE | Admitting: FAMILY MEDICINE
Payer: COMMERCIAL

## 2020-02-19 DIAGNOSIS — I48.19 PERSISTENT ATRIAL FIBRILLATION (HCC): ICD-10-CM

## 2020-02-19 DIAGNOSIS — L03.115 CELLULITIS OF RIGHT LOWER EXTREMITY: ICD-10-CM

## 2020-02-19 DIAGNOSIS — B37.0 ORAL CANDIDIASIS: ICD-10-CM

## 2020-02-19 DIAGNOSIS — L03.115 CELLULITIS OF RIGHT FOOT: Primary | ICD-10-CM

## 2020-02-19 DIAGNOSIS — N17.9 AKI (ACUTE KIDNEY INJURY) (HCC): ICD-10-CM

## 2020-02-19 DIAGNOSIS — L03.116 CELLULITIS OF LEFT LOWER EXTREMITY: ICD-10-CM

## 2020-02-19 DIAGNOSIS — L02.612 ABSCESS OF LEFT FOOT: ICD-10-CM

## 2020-02-19 DIAGNOSIS — E87.1 HYPONATREMIA: ICD-10-CM

## 2020-02-19 DIAGNOSIS — K59.00 CONSTIPATION: ICD-10-CM

## 2020-02-19 PROBLEM — D72.829 LEUKOCYTOSIS: Status: ACTIVE | Noted: 2020-02-19

## 2020-02-19 PROBLEM — Z86.14 HISTORY OF METHICILLIN RESISTANT STAPHYLOCOCCUS AUREUS (MRSA): Status: ACTIVE | Noted: 2019-11-26

## 2020-02-19 PROBLEM — M79.671 FOOT PAIN, RIGHT: Status: ACTIVE | Noted: 2020-02-19

## 2020-02-19 PROBLEM — I50.9 CONGESTIVE HEART FAILURE (CHF) (HCC): Status: ACTIVE | Noted: 2020-02-19

## 2020-02-19 PROBLEM — N18.9 ACUTE KIDNEY INJURY SUPERIMPOSED ON CKD (HCC): Status: ACTIVE | Noted: 2019-12-01

## 2020-02-19 LAB
ANION GAP SERPL CALCULATED.3IONS-SCNC: 7 MMOL/L (ref 4–13)
BACTERIA UR QL AUTO: ABNORMAL /HPF
BASOPHILS # BLD MANUAL: 0 THOUSAND/UL (ref 0–0.1)
BASOPHILS NFR MAR MANUAL: 0 % (ref 0–1)
BILIRUB UR QL STRIP: NEGATIVE
BUN SERPL-MCNC: 72 MG/DL (ref 5–25)
CALCIUM SERPL-MCNC: 9.1 MG/DL (ref 8.3–10.1)
CHLORIDE SERPL-SCNC: 95 MMOL/L (ref 100–108)
CLARITY UR: ABNORMAL
CO2 SERPL-SCNC: 27 MMOL/L (ref 21–32)
COLOR UR: YELLOW
CREAT SERPL-MCNC: 1.9 MG/DL (ref 0.6–1.3)
EOSINOPHIL # BLD MANUAL: 0 THOUSAND/UL (ref 0–0.4)
EOSINOPHIL NFR BLD MANUAL: 0 % (ref 0–6)
ERYTHROCYTE [DISTWIDTH] IN BLOOD BY AUTOMATED COUNT: 15.9 % (ref 11.6–15.1)
GFR SERPL CREATININE-BSD FRML MDRD: 34 ML/MIN/1.73SQ M
GLUCOSE SERPL-MCNC: 89 MG/DL (ref 65–140)
GLUCOSE UR STRIP-MCNC: NEGATIVE MG/DL
HCT VFR BLD AUTO: 42.6 % (ref 36.5–49.3)
HGB BLD-MCNC: 14.3 G/DL (ref 12–17)
HGB UR QL STRIP.AUTO: NEGATIVE
HYALINE CASTS #/AREA URNS LPF: ABNORMAL /LPF
KETONES UR STRIP-MCNC: NEGATIVE MG/DL
LEUKOCYTE ESTERASE UR QL STRIP: ABNORMAL
LYMPHOCYTES # BLD AUTO: 0.77 THOUSAND/UL (ref 0.6–4.47)
LYMPHOCYTES # BLD AUTO: 4 % (ref 14–44)
MCH RBC QN AUTO: 32.1 PG (ref 26.8–34.3)
MCHC RBC AUTO-ENTMCNC: 33.6 G/DL (ref 31.4–37.4)
MCV RBC AUTO: 96 FL (ref 82–98)
MONOCYTES # BLD AUTO: 0.39 THOUSAND/UL (ref 0–1.22)
MONOCYTES NFR BLD: 2 % (ref 4–12)
NEUTROPHILS # BLD MANUAL: 18.1 THOUSAND/UL (ref 1.85–7.62)
NEUTS BAND NFR BLD MANUAL: 4 % (ref 0–8)
NEUTS SEG NFR BLD AUTO: 90 % (ref 43–75)
NITRITE UR QL STRIP: NEGATIVE
NON-SQ EPI CELLS URNS QL MICRO: ABNORMAL /HPF
NRBC BLD AUTO-RTO: 0 /100 WBCS
OTHER STN SPEC: ABNORMAL
PH UR STRIP.AUTO: 5.5 [PH]
PLATELET # BLD AUTO: 120 THOUSANDS/UL (ref 149–390)
PLATELET BLD QL SMEAR: ABNORMAL
PMV BLD AUTO: 11.3 FL (ref 8.9–12.7)
POTASSIUM SERPL-SCNC: 4.2 MMOL/L (ref 3.5–5.3)
PROT UR STRIP-MCNC: NEGATIVE MG/DL
RBC # BLD AUTO: 4.46 MILLION/UL (ref 3.88–5.62)
RBC #/AREA URNS AUTO: ABNORMAL /HPF
RBC MORPH BLD: NORMAL
SODIUM SERPL-SCNC: 129 MMOL/L (ref 136–145)
SP GR UR STRIP.AUTO: 1.01 (ref 1–1.03)
TOTAL CELLS COUNTED SPEC: 100
UROBILINOGEN UR QL STRIP.AUTO: 0.2 E.U./DL
WBC # BLD AUTO: 19.26 THOUSAND/UL (ref 4.31–10.16)
WBC #/AREA URNS AUTO: ABNORMAL /HPF
WBC CLUMPS # UR AUTO: PRESENT /UL

## 2020-02-19 PROCEDURE — 81001 URINALYSIS AUTO W/SCOPE: CPT | Performed by: NURSE PRACTITIONER

## 2020-02-19 PROCEDURE — 87086 URINE CULTURE/COLONY COUNT: CPT | Performed by: NURSE PRACTITIONER

## 2020-02-19 PROCEDURE — 73610 X-RAY EXAM OF ANKLE: CPT

## 2020-02-19 PROCEDURE — 36415 COLL VENOUS BLD VENIPUNCTURE: CPT | Performed by: EMERGENCY MEDICINE

## 2020-02-19 PROCEDURE — 80048 BASIC METABOLIC PNL TOTAL CA: CPT | Performed by: EMERGENCY MEDICINE

## 2020-02-19 PROCEDURE — 99285 EMERGENCY DEPT VISIT HI MDM: CPT

## 2020-02-19 PROCEDURE — 99285 EMERGENCY DEPT VISIT HI MDM: CPT | Performed by: EMERGENCY MEDICINE

## 2020-02-19 PROCEDURE — 85027 COMPLETE CBC AUTOMATED: CPT | Performed by: EMERGENCY MEDICINE

## 2020-02-19 PROCEDURE — 99221 1ST HOSP IP/OBS SF/LOW 40: CPT | Performed by: NURSE PRACTITIONER

## 2020-02-19 PROCEDURE — 96374 THER/PROPH/DIAG INJ IV PUSH: CPT

## 2020-02-19 PROCEDURE — 85007 BL SMEAR W/DIFF WBC COUNT: CPT | Performed by: EMERGENCY MEDICINE

## 2020-02-19 RX ORDER — PANTOPRAZOLE SODIUM 40 MG/1
40 TABLET, DELAYED RELEASE ORAL DAILY
Status: DISCONTINUED | OUTPATIENT
Start: 2020-02-20 | End: 2020-03-10 | Stop reason: HOSPADM

## 2020-02-19 RX ORDER — HYDROCODONE BITARTRATE AND ACETAMINOPHEN 5; 325 MG/1; MG/1
1.5 TABLET ORAL ONCE
Status: COMPLETED | OUTPATIENT
Start: 2020-02-19 | End: 2020-02-19

## 2020-02-19 RX ORDER — HYDROCODONE BITARTRATE AND ACETAMINOPHEN 5; 325 MG/1; MG/1
1.5 TABLET ORAL EVERY 6 HOURS PRN
Status: DISCONTINUED | OUTPATIENT
Start: 2020-02-19 | End: 2020-02-19

## 2020-02-19 RX ORDER — DILTIAZEM HYDROCHLORIDE 180 MG/1
180 CAPSULE, COATED, EXTENDED RELEASE ORAL DAILY
Status: DISCONTINUED | OUTPATIENT
Start: 2020-02-20 | End: 2020-02-20

## 2020-02-19 RX ORDER — MELATONIN
1000 DAILY
Status: DISCONTINUED | OUTPATIENT
Start: 2020-02-20 | End: 2020-03-10 | Stop reason: HOSPADM

## 2020-02-19 RX ORDER — CEFAZOLIN SODIUM 2 G/50ML
2000 SOLUTION INTRAVENOUS EVERY 8 HOURS
Status: DISCONTINUED | OUTPATIENT
Start: 2020-02-20 | End: 2020-02-22

## 2020-02-19 RX ORDER — CEFAZOLIN SODIUM 2 G/50ML
2000 SOLUTION INTRAVENOUS ONCE
Status: COMPLETED | OUTPATIENT
Start: 2020-02-19 | End: 2020-02-19

## 2020-02-19 RX ORDER — DULOXETIN HYDROCHLORIDE 30 MG/1
30 CAPSULE, DELAYED RELEASE ORAL DAILY
Status: DISCONTINUED | OUTPATIENT
Start: 2020-02-20 | End: 2020-03-10 | Stop reason: HOSPADM

## 2020-02-19 RX ORDER — BENZONATATE 100 MG/1
100 CAPSULE ORAL 3 TIMES DAILY PRN
Status: DISCONTINUED | OUTPATIENT
Start: 2020-02-19 | End: 2020-03-10 | Stop reason: HOSPADM

## 2020-02-19 RX ORDER — HYDROCODONE BITARTRATE AND ACETAMINOPHEN 5; 325 MG/1; MG/1
2 TABLET ORAL EVERY 4 HOURS PRN
Status: DISCONTINUED | OUTPATIENT
Start: 2020-02-19 | End: 2020-03-10 | Stop reason: HOSPADM

## 2020-02-19 RX ORDER — ASCORBIC ACID 500 MG
1000 TABLET ORAL DAILY
Status: DISCONTINUED | OUTPATIENT
Start: 2020-02-20 | End: 2020-03-10 | Stop reason: HOSPADM

## 2020-02-19 RX ADMIN — CEFAZOLIN SODIUM 2000 MG: 2 SOLUTION INTRAVENOUS at 23:52

## 2020-02-19 RX ADMIN — SODIUM CHLORIDE 1000 ML: 0.9 INJECTION, SOLUTION INTRAVENOUS at 15:50

## 2020-02-19 RX ADMIN — APIXABAN 5 MG: 5 TABLET, FILM COATED ORAL at 18:22

## 2020-02-19 RX ADMIN — HYDROCODONE BITARTRATE AND ACETAMINOPHEN 1.5 TABLET: 5; 325 TABLET ORAL at 16:50

## 2020-02-19 RX ADMIN — HYDROCODONE BITARTRATE AND ACETAMINOPHEN 2 TABLET: 5; 325 TABLET ORAL at 21:07

## 2020-02-19 RX ADMIN — CEFAZOLIN SODIUM 2000 MG: 2 SOLUTION INTRAVENOUS at 15:49

## 2020-02-19 NOTE — ASSESSMENT & PLAN NOTE
Background:  Patient presents the emergency department for evaluation of right foot pain and discoloration  He is currently status post an ankle fusion with multiple surgeries  Per review of emergency department records patient has not been advised to wear her boot as it will blood pressures on the screws that had been placed  Yesterday he was out and did a bit more walking than he usually does he feels as though that her pain may be secondary to the increased exertion as after he took off his boot he did have a tingling needles and pins like sensation of his foot and ankle  · Follows outpatient with Dr Terry  · Marked leukocytosis of 19 26 on admission    Afebrile, no tachycardia   Likely secondary to soft tissue infection versus diabetic ulcers versus venous insufficiency    ESR/CRP ordered    Imaging ordered   Antibiotics with Ancef IV for now  [facility] Orthopedic consult given extensive hardware history; if they sign off and patient does not improve would consider Podiatry involvement   Serial foot/leg exams

## 2020-02-19 NOTE — ASSESSMENT & PLAN NOTE
· Blood pressure acceptable on admission with systolics in the 543Q  · Recommend monitoring closely given current holding of diuretics  · Continue to monitor with schedule vitals

## 2020-02-19 NOTE — ED NOTES
Attempted to give pain medication to patient  While in room patient would not this RNs answer questions would not confirm name or , continued to talk on cell phone, not making eye contact with nurse  Will attempt at another time        Giovani Leach RN  [phone]

## 2020-02-19 NOTE — ED PROVIDER NOTES
History  Chief Complaint   Patient presents with    Foot Pain     patient complaint of foot pain and discloration x 1 day     77y M here for evaluation of right foot pain and discoloration  Pt doesn't recall any specific incident - denies falls or injuries  Pt s/p ankle fusion/multiple surgeries  Follows w/ Dr Levon Cooks - concern for possible issue with one or more of the screws backing out, but not recently  Has been told "not to wear my boot" because of the pressure it puts on the screws  Usually just wears his sneakers very loose and does fine  Needed to be out and run some errands yesterday and wore the walking boot but thinks he may have had it on too tight because when he took it off, had severe pins/needles sensation in the foot and ankle  Thinks the redness started last night and increased today  Denies f/c/s, no cp/pressure, no sob/billy  Legs normally a very dusky color  Pt does note between his pcm and cardiologist, there have been some changes made to his diuretic - has been decreased  History provided by:  Patient   used: No    Medical Problem   Location:  Right foot pain / swelling  Quality:  Pins / needles  Severity:  Moderate  Onset quality:  Gradual  Duration:  1 day  Timing:  Constant  Progression:  Worsening  Chronicity:  New  Context:  See above  Relieved by:  Nothing  Worsened by: Movement/pressure  Ineffective treatments:  Nothing tried  Associated symptoms: no abdominal pain, no chest pain, no diarrhea, no fever, no nausea, no shortness of breath and no vomiting        Prior to Admission Medications   Prescriptions Last Dose Informant Patient Reported? Taking?    Ascorbic Acid (VITAMIN C) 1000 MG tablet 2/19/2020 at Unknown time Self Yes Yes   Sig: Take 1,000 mg by mouth daily   DULoxetine (CYMBALTA) 30 mg delayed release capsule 2/19/2020 at Unknown time  No Yes   Sig: Take 1 capsule (30 mg total) by mouth daily   HYDROcodone-acetaminophen (NORCO) 7 5-325 mg per tablet 2020 at Unknown time Self No Yes   Sig: Take 1 tablet by mouth every 6 (six) hours as needed for pain Hydrocodone-acetaminophen 7 5-750mg Max Daily Amount: 4 tablets   apixaban (ELIQUIS) 5 mg 2020 at Unknown time Self Yes Yes   Sig: Take 5 mg by mouth 2 (two) times a day    benzonatate (TESSALON PERLES) 100 mg capsule Unknown at Unknown time Self No No   Sig: Take 1 capsule (100 mg total) by mouth 3 (three) times a day as needed for cough   bumetanide (BUMEX) 2 mg tablet 2020 at Unknown time Self Yes Yes   Si tabs M/W/F,  1 tab the other days per card   cholecalciferol (VITAMIN D3) 1,000 units tablet 2020 at Unknown time Self Yes Yes   Sig: Take 1,000 Units by mouth daily   diltiazem (CARDIZEM CD) 180 mg 24 hr capsule 2020 at Unknown time Self No Yes   Sig: Take 1 capsule (180 mg total) by mouth daily   losartan (COZAAR) 50 mg tablet 2020 at Unknown time Self No Yes   Sig: Take 0 5 tablets (25 mg total) by mouth daily   meloxicam (MOBIC) 15 mg tablet 2020 at Unknown time  No Yes   Sig: Take 1 tablet (15 mg total) by mouth daily   pantoprazole (PROTONIX) 40 mg tablet 2020 at Unknown time Self No Yes   Sig: Take 1 tablet (40 mg total) by mouth daily   potassium chloride (K-DUR) 10 mEq tablet 2020 at Unknown time Self Yes Yes   Sig: TAKE THREE TABLETS BY MOUTH TWICE A DAY IN THE MORNING AND AT DINNER TIME   sildenafil (VIAGRA) 100 mg tablet Unknown at Unknown time Self Yes No   Sig: Take 100 mg by mouth daily as needed for erectile dysfunction   sodium chloride (OCEAN) 0 65 % nasal spray Unknown at Unknown time Self Yes No   Si spray into each nostril as needed for congestion   spironolactone (ALDACTONE) 25 mg tablet 2020 at Unknown time  No Yes   Sig: Take 1 tablet (25 mg total) by mouth daily   vitamin E, tocopherol, 400 units capsule 2020 at Unknown time Self Yes Yes   Sig: Take 800 Units by mouth daily      Facility-Administered Medications: None       Past Medical History:   Diagnosis Date    Atrial fibrillation ([place] [address] )     Cardiac disease     Cellulitis     [facility])     Chronic pain     Chronic venous hypertension     with ulceration    GERD (gastroesophageal reflux disease)     History of methicillin resistant staphylococcus aureus (MRSA) 11/26/2019    negative nasal swab     Hyperlipidemia     Hypertension     Obesity     Pulmonary hypertension ([place] [address] )     PVD (peripheral vascular disease) ([place] [address] )     Vascular disorder of extremity ([place] [address] )        Past Surgical History:   Procedure Laterality Date    ANTERIOR RELEASE VERTEBRAL BODY W/ POSTERIOR FUSION      APPENDECTOMY  1954    CATARACT EXTRACTION      DECOMPRESSION SPINE LUMBAR POSTERIOR      ELBOW SURGERY      KNEE SURGERY      NOSE SURGERY      turbinectomy    RETINAL DETACHMENT SURGERY      RHINOPLASTY      TONSILLECTOMY      VEIN LIGATION AND STRIPPING      saphenous vein long       Family History   Problem Relation Age of Onset    Cancer Mother         cancer of uterus    Diabetes Sister     Mental illness Neg Hx         neg fam hx    Substance Abuse Neg Hx         neg fam hx     I have reviewed and agree with the history as documented  Social History     Tobacco Use    Smoking status: Never Smoker    Smokeless tobacco: Never Used   Substance Use Topics    Alcohol use: No     Frequency: Never     Binge frequency: Never    Drug use: No       Review of Systems   Constitutional: Negative for chills and fever  Respiratory: Negative for chest tightness and shortness of breath  Cardiovascular: Negative for chest pain  Gastrointestinal: Negative for abdominal pain, diarrhea, nausea and vomiting  Skin: Positive for color change  All other systems reviewed and are negative  Physical Exam  Physical Exam   Constitutional: He is oriented to person, place, and time  He appears well-developed and well-nourished     HENT: Nose: Nose normal    Mouth/Throat: Oropharynx is clear and moist    Eyes: Conjunctivae are normal    Neck: Neck supple  Cardiovascular: Normal rate and regular rhythm  Pulmonary/Chest: Effort normal and breath sounds normal    Abdominal: He exhibits no distension  Musculoskeletal: He exhibits edema, tenderness and deformity  Right lower leg: He exhibits tenderness, swelling and edema  He exhibits no bony tenderness  Left lower leg: He exhibits no tenderness and no bony tenderness  Legs:       Right foot: There is decreased range of motion and swelling  Neurological: He is alert and oriented to person, place, and time  Skin: Skin is warm  There is erythema  Psychiatric: His affect is inappropriate  Nursing note and vitals reviewed        Vital Signs  ED Triage Vitals   Temperature Pulse Respirations Blood Pressure SpO2   02/19/20 1241 02/19/20 1241 02/19/20 1241 02/19/20 1241 02/19/20 1241   97 8 °F (36 6 °C) 80 18 101/54 95 %      Temp Source Heart Rate Source Patient Position - Orthostatic VS BP Location FiO2 (%)   02/19/20 2320 02/19/20 1421 02/19/20 1421 02/19/20 1421 --   Oral Monitor Sitting Right arm       Pain Score       02/19/20 1241       7           Vitals:    02/20/20 0330 02/20/20 0514 02/20/20 0516 02/20/20 0700   BP: (!) 87/52 96/54  126/58   Pulse: 71  71 75   Patient Position - Orthostatic VS:  Lying  Sitting         Visual Acuity      ED Medications  Medications   apixaban (ELIQUIS) tablet 5 mg (5 mg Oral Given 2/20/20 4965)   ascorbic acid (VITAMIN C) tablet 1,000 mg (1,000 mg Oral Given 2/20/20 1699)   cholecalciferol (VITAMIN D3) tablet 1,000 Units (1,000 Units Oral Given 2/20/20 0808)   benzonatate (TESSALON PERLES) capsule 100 mg (has no administration in time range)   diltiazem (CARDIZEM CD) 24 hr capsule 180 mg (180 mg Oral Given 2/20/20 0817)   DULoxetine (CYMBALTA) delayed release capsule 30 mg (30 mg Oral Given 2/20/20 0808)   HYDROcodone-acetaminophen [facility]) 5-325 mg per tablet 2 tablet (2 tablets Oral Given 2/20/20 0807)   pantoprazole (PROTONIX) EC tablet 40 mg (40 mg Oral Given 2/20/20 0808)   ceFAZolin (ANCEF) IVPB (premix) 2,000 mg (2,000 mg Intravenous New Bag 2/20/20 0817)   sodium chloride 0 9 % bolus 1,000 mL (0 mL Intravenous Stopped 2/19/20 1744)   ceFAZolin (ANCEF) IVPB (premix) 2,000 mg (0 mg Intravenous Stopped 2/19/20 1729)   HYDROcodone-acetaminophen (NORCO) 5-325 mg per tablet 1 5 tablet (1 5 tablets Oral Given 2/19/20 1650)       Diagnostic Studies  Results Reviewed     Procedure Component Value Units Date/Time    Magnesium [[id number]]  (Normal) Collected:  02/20/20 0519    Lab Status:  Final result Specimen:  Blood from Arm, Right Updated:  02/20/20 0710     Magnesium 1 7 mg/dL     Comprehensive metabolic panel [[id number]]  (Abnormal) Collected:  02/20/20 0519    Lab Status:  Final result Specimen:  Blood from Arm, Right Updated:  02/20/20 0606     Sodium 133 mmol/L      Potassium 4 3 mmol/L      Chloride 99 mmol/L      CO2 25 mmol/L      ANION GAP 9 mmol/L      BUN 69 mg/dL      Creatinine 1 84 mg/dL      Glucose 70 mg/dL      Calcium 8 9 mg/dL      AST 32 U/L      ALT 18 U/L      Alkaline Phosphatase 82 U/L      Total Protein 7 6 g/dL      Albumin 2 9 g/dL      Total Bilirubin 1 10 mg/dL      eGFR 36 ml/min/1 73sq m     Narrative:       [place] guidelines for Chronic Kidney Disease (CKD):     Stage 1 with normal or high GFR (GFR > 90 mL/min/1 73 square meters)    Stage 2 Mild CKD (GFR = 60-89 mL/min/1 73 square meters)    Stage 3A Moderate CKD (GFR = 45-59 mL/min/1 73 square meters)    Stage 3B Moderate CKD (GFR = 30-44 mL/min/1 73 square meters)    Stage 4 Severe CKD (GFR = 15-29 mL/min/1 73 square meters)    Stage 5 End Stage CKD (GFR <15 mL/min/1 73 square meters)  Note: GFR calculation is accurate only with a steady state creatinine    Phosphorus [[id number]]  (Normal) Collected:  02/20/20 0519    Lab Status:  Final result Specimen:  Blood from Arm, Right Updated:  02/20/20 0606     Phosphorus 3 5 mg/dL     CBC and differential [[id number]]  (Abnormal) Collected:  02/20/20 0519    Lab Status:  Final result Specimen:  Blood from Arm, Right Updated:  02/20/20 0543     WBC 14 78 Thousand/uL      RBC 4 36 Million/uL      Hemoglobin 14 1 g/dL      Hematocrit 42 0 %      MCV 96 fL      MCH 32 3 pg      MCHC 33 6 g/dL      RDW 15 9 %      MPV 11 3 fL      Platelets 348 Thousands/uL      nRBC 0 /100 WBCs      Neutrophils Relative 87 %      Immat GRANS % 1 %      Lymphocytes Relative 4 %      Monocytes Relative 7 %      Eosinophils Relative 1 %      Basophils Relative 0 %      Neutrophils Absolute 12 89 Thousands/µL      Immature Grans Absolute 0 10 Thousand/uL      Lymphocytes Absolute 0 56 Thousands/µL      Monocytes Absolute 0 98 Thousand/µL      Eosinophils Absolute 0 21 Thousand/µL      Basophils Absolute 0 04 Thousands/µL     UA w Reflex to Microscopic w Reflex to Culture [[id number]]  (Abnormal) Collected:  02/19/20 2122    Lab Status:  Final result Specimen:  Urine, Clean Catch Updated:  02/19/20 2220     Color, UA Yellow     Clarity, UA Hazy     Specific [place], UA 1 010     pH, UA 5 5     Leukocytes, UA Small     Nitrite, UA Negative     Protein, UA Negative mg/dl      Glucose, UA Negative mg/dl      Ketones, UA Negative mg/dl      Urobilinogen, UA 0 2 E U /dl      Bilirubin, UA Negative     Blood, UA Negative    CBC and differential [[id number]]  (Abnormal) Collected:  02/19/20 1412    Lab Status:  Final result Specimen:  Blood from Arm, Right Updated:  02/19/20 1532     WBC 19 26 Thousand/uL      RBC 4 46 Million/uL      Hemoglobin 14 3 g/dL      Hematocrit 42 6 %      MCV 96 fL      MCH 32 1 pg      MCHC 33 6 g/dL      RDW 15 9 %      MPV 11 3 fL      Platelets 481 Thousands/uL      nRBC 0 /100 WBCs     Narrative:        No Clots    Basic metabolic panel [[id number]]  (Abnormal) Collected:  02/19/20 1412    Lab Status:  Final result Specimen:  Blood from Arm, Right Updated:  02/19/20 1436     Sodium 129 mmol/L      Potassium 4 2 mmol/L      Chloride 95 mmol/L      CO2 27 mmol/L      ANION GAP 7 mmol/L      BUN 72 mg/dL      Creatinine 1 90 mg/dL      Glucose 89 mg/dL      Calcium 9 1 mg/dL      eGFR 34 ml/min/1 73sq m     Narrative:       [place] guidelines for Chronic Kidney Disease (CKD):     Stage 1 with normal or high GFR (GFR > 90 mL/min/1 73 square meters)    Stage 2 Mild CKD (GFR = 60-89 mL/min/1 73 square meters)    Stage 3A Moderate CKD (GFR = 45-59 mL/min/1 73 square meters)    Stage 3B Moderate CKD (GFR = 30-44 mL/min/1 73 square meters)    Stage 4 Severe CKD (GFR = 15-29 mL/min/1 73 square meters)    Stage 5 End Stage CKD (GFR <15 mL/min/1 73 square meters)  Note: GFR calculation is accurate only with a steady state creatinine                 XR ankle 3+ views RIGHT   Final Result by Rito Carrillo MD (02/20 2067)      No acute osseous abnormality  Postoperative changes status post hindfoot fusion, as above              Workstation performed: [id number]                    Procedures  Procedures         ED Course                               MDM  Number of Diagnoses or Management Options  SAMY (acute kidney injury) [facility]): new and requires workup  Cellulitis of right foot: new and requires workup  Cellulitis of right lower extremity: new and requires workup  Hyponatremia: new and requires workup     Amount and/or Complexity of Data Reviewed  Clinical lab tests: reviewed and ordered  Tests in the radiology section of CPT®: ordered and reviewed  Decide to obtain previous medical records or to obtain history from someone other than the patient: yes          Disposition  Final diagnoses:   Cellulitis of right foot   Cellulitis of right lower extremity   SAMY (acute kidney injury) ([facility] [address] )   Hyponatremia     Time reflects when diagnosis was documented in both MDM as applicable and the Disposition within this note     Time User Action Codes Description Comment    2/19/2020  3:49 PM Te Kannan L Add [L03 115] Cellulitis of right foot     2/19/2020  3:50 PM Juan JoseJohanna garces L Add [L03 115] Cellulitis of right lower extremity     2/19/2020  3:50 PM Calli Johanna CAMPOS Add [N17 9] SAMY (acute kidney injury) ([facility] [address] )     2/19/2020  3:50 PM Johanna Mccurdy Add [E87 1] Hyponatremia       ED Disposition     ED Disposition Condition Date/Time Comment    Admit Stable Wed Feb 19, 2020  3:49 PM Case was discussed with YAZMIN Beckman and the patient's admission status was agreed to be Admission Status: inpatient status to the service of Dr Hiram Peabody   Follow-up Information    None         Current Discharge Medication List      CONTINUE these medications which have NOT CHANGED    Details   apixaban (ELIQUIS) 5 mg Take 5 mg by mouth 2 (two) times a day       Ascorbic Acid (VITAMIN C) 1000 MG tablet Take 1,000 mg by mouth daily      bumetanide (BUMEX) 2 mg tablet 2 tabs M/W/F,  1 tab the other days per card      cholecalciferol (VITAMIN D3) 1,000 units tablet Take 1,000 Units by mouth daily      diltiazem (CARDIZEM CD) 180 mg 24 hr capsule Take 1 capsule (180 mg total) by mouth daily  Refills: 0    Associated Diagnoses: Acute on chronic diastolic congestive heart failure (HCC)      DULoxetine (CYMBALTA) 30 mg delayed release capsule Take 1 capsule (30 mg total) by mouth daily  Qty: 30 capsule, Refills: 5    Associated Diagnoses:  Moderate major depression, single episode (HCC)      HYDROcodone-acetaminophen (NORCO) 7 5-325 mg per tablet Take 1 tablet by mouth every 6 (six) hours as needed for pain Hydrocodone-acetaminophen 7 5-750mg Max Daily Amount: 4 tablets  Qty: 120 tablet, Refills: 0    Associated Diagnoses: Chronic left shoulder pain      losartan (COZAAR) 50 mg tablet Take 0 5 tablets (25 mg total) by mouth daily  Qty: 30 tablet, Refills: 0    Associated Diagnoses: Hypotension due to drugs      meloxicam (MOBIC) 15 mg tablet Take 1 tablet (15 mg total) by mouth daily  Qty: 30 tablet, Refills: 5    Associated Diagnoses: Low back pain with sciatica, sciatica laterality unspecified, unspecified back pain laterality, unspecified chronicity      pantoprazole (PROTONIX) 40 mg tablet Take 1 tablet (40 mg total) by mouth daily  Qty: 90 tablet, Refills: 3    Associated Diagnoses: Gastroesophageal reflux disease without esophagitis      potassium chloride (K-DUR) 10 mEq tablet TAKE THREE TABLETS BY MOUTH TWICE A DAY IN THE MORNING AND AT DINNER TIME  Refills: 10      spironolactone (ALDACTONE) 25 mg tablet Take 1 tablet (25 mg total) by mouth daily  Qty: 30 tablet, Refills: 5    Associated Diagnoses: Chronic diastolic congestive heart failure (HCC)      vitamin E, tocopherol, 400 units capsule Take 800 Units by mouth daily      benzonatate (TESSALON PERLES) 100 mg capsule Take 1 capsule (100 mg total) by mouth 3 (three) times a day as needed for cough  Qty: 20 capsule, Refills: 0    Associated Diagnoses: Cough      sildenafil (VIAGRA) 100 mg tablet Take 100 mg by mouth daily as needed for erectile dysfunction      sodium chloride (OCEAN) 0 65 % nasal spray 1 spray into each nostril as needed for congestion           No discharge procedures on file      PDMP Review       Value Time User    PDMP Reviewed  Yes 12/10/2019  3:40 PM Ta Mcadams MD          ED Provider  Electronically Signed by           Ck De La Cruz DO  02/20/20 3668

## 2020-02-19 NOTE — ASSESSMENT & PLAN NOTE
· Chronic kidney disease stage 3; baseline appears to be approximately 1 2-1 5; on admission is markedly elevated at 1 9  · Hold diuretic therapy at this time  · Gentle IV fluids given CHF history  · Repeat BMP in the morning  · Avoid nephrotoxins, hypotension, and NSAIDs

## 2020-02-19 NOTE — ASSESSMENT & PLAN NOTE
· Marked leukocytosis of 19 on admission  · Differential diagnosis includes cellulitis versus hardware infection versus osteo  · Imaging pending  · Antibiotic therapy as above  · Trend CBC  · Serial foot exams

## 2020-02-19 NOTE — ASSESSMENT & PLAN NOTE
· Questionable history of MRSA on review of chart; in review of records cannot find any noted history  · Patient denies any history that he knows of; will hold off on initiation of Vancomycin at this time

## 2020-02-19 NOTE — ASSESSMENT & PLAN NOTE
Wt Readings from Last 3 Encounters:   02/19/20 108 kg (238 lb)   02/04/20 108 kg (239 lb)   01/07/20 113 kg (249 lb)     · Managed on home on Bumex Monday Wednesday Friday but per patient he has not been taking this as it was discontinued by his Cardiologist and but does take spironolactone  · Echocardiogram reveals LVEF of 45%  Mild diffuse hypokinesis  Wall thickness markedly increased  RV Systolic function mildly reduced  Mild mitral valve regurgitation  Mild tricuspid valve regurgitation    · Daily weight  · Intake and output  · Cardiac diet, low-sodium, fluid restriction

## 2020-02-19 NOTE — ED NOTES
assisted pt with transfer from wheelchair to stretcher, given gown to change for provider evaluation        Roseanna Blakely RN  02/19/20 5446 breathing through pursed lips, very uncomfortable/YES

## 2020-02-19 NOTE — H&P
H&P- Linwood Mazariegos 1945, 69 y o  male MRN: [id number]    Unit/Bed#: ED 02 Encounter: [id number]    Primary Care Provider: Clarita Sherwood MD   Date and time admitted to hospital: 2/19/2020  1:18 PM    * Foot pain, right  Assessment & Plan  Background:  Patient presents the emergency department for evaluation of right foot pain and discoloration  He is currently status post an ankle fusion with multiple surgeries  Per review of emergency department records patient has not been advised to wear her boot as it will blood pressures on the screws that had been placed  Yesterday he was out and did a bit more walking than he usually does he feels as though that her pain may be secondary to the increased exertion as after he took off his boot he did have a tingling needles and pins like sensation of his foot and ankle  · Follows outpatient with Dr Terry  · Marked leukocytosis of 19 26 on admission    Afebrile, no tachycardia   Likely secondary to soft tissue infection versus diabetic ulcers versus venous insufficiency    ESR/CRP ordered    Imaging ordered   Antibiotics with Ancef IV for now  Sheree [facility]ball Orthopedic consult given extensive hardware history; if they sign off and patient does not improve would consider Podiatry involvement   Serial foot/leg exams      History of methicillin resistant staphylococcus aureus (MRSA)  Assessment & Plan  · Questionable history of MRSA on review of chart; in review of records cannot find any noted history  · Patient denies any history that he knows of; will hold off on initiation of Vancomycin at this time     Persistent atrial fibrillation  Assessment & Plan  · Managed on diltiazem and Eliquis; rate controlled on admission  · Continue while inpatient    Acute kidney injury superimposed on CKD ([facility] )  Assessment & Plan  · Chronic kidney disease stage 3; baseline appears to be approximately 1 2-1 5; on admission is markedly elevated at 1 9  · Hold diuretic therapy at this time  · Gentle IV fluids given CHF history  · Repeat BMP in the morning  · Avoid nephrotoxins, hypotension, and NSAIDs    Hypertension  Assessment & Plan  · Blood pressure acceptable on admission with systolics in the 240X  · Recommend monitoring closely given current holding of diuretics  · Continue to monitor with schedule vitals    Hyperlipidemia  Assessment & Plan  · Does not appear to be on any statin therapy at home  · Lipid panel ordered    Congestive heart failure (CHF) ([facility])  Assessment & Plan  Wt Readings from Last 3 Encounters:   02/19/20 108 kg (238 lb)   02/04/20 108 kg (239 lb)   01/07/20 113 kg (249 lb)     · Managed on home on Bumex Monday Wednesday Friday but per patient he has not been taking this as it was discontinued by his Cardiologist and but does take spironolactone  · Echocardiogram reveals LVEF of 45%  Mild diffuse hypokinesis  Wall thickness markedly increased  RV Systolic function mildly reduced  Mild mitral valve regurgitation  Mild tricuspid valve regurgitation  · Daily weight  · Intake and output  · Cardiac diet, low-sodium, fluid restriction        Leukocytosis  Assessment & Plan  · Marked leukocytosis of 19 on admission  · Differential diagnosis includes cellulitis versus hardware infection versus osteo  · Imaging pending  · Antibiotic therapy as above  · Trend CBC  · Serial foot exams    Hyponatremia  Assessment & Plan  · Present on admission with sodium of 129  · Gentle IV fluids  · Repeat morning    Chronic deep vein thrombosis (DVT) ([facility])  Assessment & Plan  · VAS lower limb venous duplex studies completed in November of 2019 revealed in no evidence of right lower limb thrombus in the common femoral artery  No gross evidence of acute DVT based on color-flow analysis the left lower limb  · See picture for current appearance       Class 1 obesity due to excess calories with serious comorbidity and body mass index (BMI) of 34 0 to 34 9 in adult  Assessment & Plan  · Lifestyle modifications recommended    Gastroesophageal reflux disease without esophagitis  Assessment & Plan  · Continue Protonix    VTE Prophylaxis: Apixaban (Eliquis)  / sequential compression device   Code Status: full code   Discussion with family: offered to call son; patient refused  Anticipated Length of Stay:  Patient will be admitted on an Inpatient basis with an anticipated length of stay of  > 2 midnights  Justification for Hospital Stay: foot pain    Total Time for Visit, including Counseling / Coordination of Care: 45 minutes  Greater than 50% of this total time spent on direct patient counseling and coordination of care  Chief Complaint:   Foot pain    History of Present Illness:    Maddison Gutierrez is a 69 y o  male who presents to the emergency department for evaluation of right foot pain and discoloration  He is currently status post an ankle fusion with multiple surgeries  Per review of emergency department records patient has not been advised to wear her boot as it will blood pressures on the screws that had been placed  Yesterday he was out and did a bit more walking than he usually does he feels as though that her pain may be secondary to the increased exertion as after he took off his boot he did have a tingling needles and pins like sensation of his foot and ankle  Follows outpatient with Dr Terry  See rest of plan as noted above  Review of Systems:    Review of Systems   Constitutional: Negative for chills, diaphoresis, fever and unexpected weight change  HENT: Negative for congestion and sinus pain  Respiratory: Negative for cough, chest tightness and shortness of breath  Cardiovascular: Positive for leg swelling  Negative for chest pain and palpitations  Gastrointestinal: Negative for abdominal distention, abdominal pain, constipation, diarrhea, nausea and vomiting  Genitourinary: Positive for difficulty urinating  Musculoskeletal: Positive for gait problem  Shoulder pain    Neurological: Negative for dizziness, facial asymmetry, numbness and headaches  Past Medical and Surgical History:     Past Medical History:   Diagnosis Date    Atrial fibrillation ([facility] )     Cardiac disease     Cellulitis     [facility])     Chronic pain     Chronic venous hypertension     with ulceration    GERD (gastroesophageal reflux disease)     History of methicillin resistant staphylococcus aureus (MRSA) 11/26/2019    negative nasal swab     Hyperlipidemia     Hypertension     Obesity     Pul[place] hypertension ([place] [address] )     PVD (peripheral vascular disease) ([place] [address] )     Vascular disorder of extremity ([place] [address] )        Past Surgical History:   Procedure Laterality Date    ANTERIOR RELEASE VERTEBRAL BODY W/ POSTERIOR FUSION      APPENDECTOMY  1955    CATARACT EXTRACTION      DECOMPRESSION SPINE LUMBAR POSTERIOR      ELBOW SURGERY      KNEE SURGERY      NOSE SURGERY      turbinectomy    RETINAL DETACHMENT SURGERY      RHINOPLASTY      TONSILLECTOMY      VEIN LIGATION AND STRIPPING      saphenous vein long       Meds/Allergies:    Prior to Admission medications    Medication Sig Start Date End Date Taking?  Authorizing Provider   apixaban (ELIQUIS) 5 mg Take 5 mg by mouth 2 (two) times a day     Historical Provider, MD   Ascorbic Acid (VITAMIN C) 1000 MG tablet Take 1,000 mg by mouth daily    Historical Provider, MD   benzonatate (TESSALON PERLES) 100 mg capsule Take 1 capsule (100 mg total) by mouth 3 (three) times a day as needed for cough 9/3/19   Fer Reich MD   bumetanide (BUMEX) 2 mg tablet 2 tabs M/W/F,  1 tab the other days per card    Historical Provider, MD   cholecalciferol (VITAMIN D3) 1,000 units tablet Take 1,000 Units by mouth daily    Historical Provider, MD   diltiazem (CARDIZEM CD) 180 mg 24 hr capsule Take 1 capsule (180 mg total) by mouth daily 12/1/19   Sandhya Sexton MD   DULoxetine (CYMBALTA) 30 mg delayed release capsule Take 1 capsule (30 mg total) by mouth daily 1/7/20   Hever Hermosillo MD   HYDROcodone-acetaminophen [facility] 7 5-325 mg per tablet Take 1 tablet by mouth every 6 (six) hours as needed for pain Hydrocodone-acetaminophen 7 5-750mg Max Daily Amount: 4 tablets 10/10/19   Hever Hermosillo MD   losartan (COZAAR) 50 mg tablet Take 0 5 tablets (25 mg total) by mouth daily 4/9/19   Hever Hermosillo MD   meloxicam (MOBIC) 15 mg tablet Take 1 tablet (15 mg total) by mouth daily 12/24/19   Hever Hermosillo MD   pantoprazole (PROTONIX) 40 mg tablet Take 1 tablet (40 mg total) by mouth daily 7/3/19   Hever Hermosillo MD   potassium chloride (K-DUR) 10 mEq tablet TAKE THREE TABLETS BY MOUTH TWICE A DAY IN THE MORNING AND AT [facility] TIME 8/26/19   Historical Provider, MD   sildenafil (VIAGRA) 100 mg tablet Take 100 mg by mouth daily as needed for erectile dysfunction    Historical Provider, MD   sodium chloride (OCEAN) 0 65 % nasal spray 1 spray into each nostril as needed for congestion    Historical Provider, MD   spironolactone (ALDACTONE) 25 mg tablet Take 1 tablet (25 mg total) by mouth daily 2/17/20   Hever Hermosillo MD   vitamin E, tocopherol, 400 units capsule Take 800 Units by mouth daily    Historical Provider, MD     I have reviewed home medications with patient personally      Allergies: No Known Allergies    Social History:     Marital Status: Single   Occupation: unemployed  Patient Pre-hospital Living Situation: with son and grand son  Patient Pre-hospital Level of Mobility: uses motorized chair  Patient Pre-hospital Diet Restrictions: none  Substance Use History:   Social History     Substance and Sexual Activity   Alcohol Use No    Frequency: Never     Social History     Tobacco Use   Smoking Status Never Smoker   Smokeless Tobacco Never Used     Social History     Substance and Sexual Activity   Drug Use No       Family History:    Family History   Problem Relation Age of Onset    Cancer Mother         cancer of uterus    Diabetes Sister     Mental illness Neg Hx         neg fam hx    Substance Abuse Neg Hx         neg fam hx       Physical Exam:     Vitals:   Blood Pressure: 103/55 (02/19/20 1421)  Pulse: 74 (02/19/20 1421)  Temperature: 97 8 °F (36 6 °C) (02/19/20 1241)  Respirations: 20 (02/19/20 1421)  Height: 6' (182 9 cm) (02/19/20 1241)  Weight - Scale: 108 kg (238 lb) (02/19/20 1241)  SpO2: 95 % (02/19/20 1421)    Physical Exam   Constitutional: He is oriented to person, place, and time  He appears well-nourished  He is cooperative  No distress  Cardiovascular: Normal rate, regular rhythm, normal heart sounds and intact distal pulses  Pulses:       Radial pulses are 2+ on the right side, and 2+ on the left side  Dorsalis pedis pulses are 1+ on the right side, and 1+ on the left side  Posterior tibial pulses are 1+ on the right side, and 1+ on the left side  Pulmonary/Chest: Effort normal and breath sounds normal  No respiratory distress  He has no wheezes  Abdominal: Soft  Bowel sounds are normal  He exhibits no distension  There is no tenderness  Musculoskeletal: He exhibits no edema  Neurological: He is alert and oriented to person, place, and time  Skin: Skin is warm and dry  Capillary refill takes less than 2 seconds  He is not diaphoretic  Psychiatric: He has a normal mood and affect  His speech is normal and behavior is normal  Judgment normal    Vitals reviewed  Additional Data:     Lab Results: I have personally reviewed pertinent reports        Results from last 7 days   Lab Units 02/19/20  1412   WBC Thousand/uL 19 26*   HEMOGLOBIN g/dL 14 3   HEMATOCRIT % 42 6   PLATELETS Thousands/uL 120*   BANDS PCT % 4   LYMPHO PCT % 4*   MONO PCT % 2*   EOS PCT % 0     Results from last 7 days   Lab Units 02/19/20  1412   SODIUM mmol/L 129*   POTASSIUM mmol/L 4 2   CHLORIDE mmol/L 95*   CO2 mmol/L 27   BUN mg/dL 72*   CREATININE mg/dL 1 90*   ANION GAP mmol/L 7   CALCIUM mg/dL 9 1 GLUCOSE RANDOM mg/dL 89                       Imaging: I have personally reviewed pertinent reports  XR ankle 3+ views RIGHT    (Results Pending)       EKG, Pathology, and Other Studies Reviewed on Admission:   · Previous imaging  Allscripts / Epic Records Reviewed: Yes     ** Please Note: This note has been constructed using a voice recognition system   **

## 2020-02-19 NOTE — ASSESSMENT & PLAN NOTE
· VAS lower limb venous duplex studies completed in November of 2019 revealed in no evidence of right lower limb thrombus in the common femoral artery  No gross evidence of acute DVT based on color-flow analysis the left lower limb  · See picture for current appearance

## 2020-02-20 PROBLEM — L03.115 CELLULITIS OF RIGHT LOWER EXTREMITY: Status: ACTIVE | Noted: 2020-02-19

## 2020-02-20 LAB
ALBUMIN SERPL BCP-MCNC: 2.9 G/DL (ref 3.5–5)
ALP SERPL-CCNC: 82 U/L (ref 46–116)
ALT SERPL W P-5'-P-CCNC: 18 U/L (ref 12–78)
ANION GAP SERPL CALCULATED.3IONS-SCNC: 9 MMOL/L (ref 4–13)
AST SERPL W P-5'-P-CCNC: 32 U/L (ref 5–45)
BASOPHILS # BLD AUTO: 0.04 THOUSANDS/ΜL (ref 0–0.1)
BASOPHILS NFR BLD AUTO: 0 % (ref 0–1)
BILIRUB SERPL-MCNC: 1.1 MG/DL (ref 0.2–1)
BUN SERPL-MCNC: 69 MG/DL (ref 5–25)
CALCIUM SERPL-MCNC: 8.9 MG/DL (ref 8.3–10.1)
CHLORIDE SERPL-SCNC: 99 MMOL/L (ref 100–108)
CO2 SERPL-SCNC: 25 MMOL/L (ref 21–32)
CREAT SERPL-MCNC: 1.84 MG/DL (ref 0.6–1.3)
EOSINOPHIL # BLD AUTO: 0.21 THOUSAND/ΜL (ref 0–0.61)
EOSINOPHIL NFR BLD AUTO: 1 % (ref 0–6)
ERYTHROCYTE [DISTWIDTH] IN BLOOD BY AUTOMATED COUNT: 15.9 % (ref 11.6–15.1)
GFR SERPL CREATININE-BSD FRML MDRD: 36 ML/MIN/1.73SQ M
GLUCOSE SERPL-MCNC: 70 MG/DL (ref 65–140)
HCT VFR BLD AUTO: 42 % (ref 36.5–49.3)
HGB BLD-MCNC: 14.1 G/DL (ref 12–17)
IMM GRANULOCYTES # BLD AUTO: 0.1 THOUSAND/UL (ref 0–0.2)
IMM GRANULOCYTES NFR BLD AUTO: 1 % (ref 0–2)
LYMPHOCYTES # BLD AUTO: 0.56 THOUSANDS/ΜL (ref 0.6–4.47)
LYMPHOCYTES NFR BLD AUTO: 4 % (ref 14–44)
MAGNESIUM SERPL-MCNC: 1.7 MG/DL (ref 1.6–2.6)
MCH RBC QN AUTO: 32.3 PG (ref 26.8–34.3)
MCHC RBC AUTO-ENTMCNC: 33.6 G/DL (ref 31.4–37.4)
MCV RBC AUTO: 96 FL (ref 82–98)
MONOCYTES # BLD AUTO: 0.98 THOUSAND/ΜL (ref 0.17–1.22)
MONOCYTES NFR BLD AUTO: 7 % (ref 4–12)
NEUTROPHILS # BLD AUTO: 12.89 THOUSANDS/ΜL (ref 1.85–7.62)
NEUTS SEG NFR BLD AUTO: 87 % (ref 43–75)
NRBC BLD AUTO-RTO: 0 /100 WBCS
PHOSPHATE SERPL-MCNC: 3.5 MG/DL (ref 2.3–4.1)
PLATELET # BLD AUTO: 107 THOUSANDS/UL (ref 149–390)
PMV BLD AUTO: 11.3 FL (ref 8.9–12.7)
POTASSIUM SERPL-SCNC: 4.3 MMOL/L (ref 3.5–5.3)
PROT SERPL-MCNC: 7.6 G/DL (ref 6.4–8.2)
RBC # BLD AUTO: 4.36 MILLION/UL (ref 3.88–5.62)
SODIUM SERPL-SCNC: 133 MMOL/L (ref 136–145)
WBC # BLD AUTO: 14.78 THOUSAND/UL (ref 4.31–10.16)

## 2020-02-20 PROCEDURE — 85025 COMPLETE CBC W/AUTO DIFF WBC: CPT | Performed by: NURSE PRACTITIONER

## 2020-02-20 PROCEDURE — 84100 ASSAY OF PHOSPHORUS: CPT | Performed by: NURSE PRACTITIONER

## 2020-02-20 PROCEDURE — 83735 ASSAY OF MAGNESIUM: CPT | Performed by: NURSE PRACTITIONER

## 2020-02-20 PROCEDURE — 97163 PT EVAL HIGH COMPLEX 45 MIN: CPT

## 2020-02-20 PROCEDURE — 97165 OT EVAL LOW COMPLEX 30 MIN: CPT

## 2020-02-20 PROCEDURE — 99232 SBSQ HOSP IP/OBS MODERATE 35: CPT | Performed by: FAMILY MEDICINE

## 2020-02-20 PROCEDURE — 80053 COMPREHEN METABOLIC PANEL: CPT | Performed by: NURSE PRACTITIONER

## 2020-02-20 PROCEDURE — 99222 1ST HOSP IP/OBS MODERATE 55: CPT | Performed by: ORTHOPAEDIC SURGERY

## 2020-02-20 RX ORDER — DILTIAZEM HYDROCHLORIDE 120 MG/1
120 CAPSULE, COATED, EXTENDED RELEASE ORAL DAILY
Status: DISCONTINUED | OUTPATIENT
Start: 2020-02-21 | End: 2020-02-25

## 2020-02-20 RX ORDER — FUROSEMIDE 20 MG/1
20 TABLET ORAL ONCE
Status: DISCONTINUED | OUTPATIENT
Start: 2020-02-20 | End: 2020-02-20

## 2020-02-20 RX ORDER — FUROSEMIDE 20 MG/1
20 TABLET ORAL ONCE
Status: COMPLETED | OUTPATIENT
Start: 2020-02-20 | End: 2020-02-20

## 2020-02-20 RX ORDER — FUROSEMIDE 10 MG/ML
20 INJECTION INTRAMUSCULAR; INTRAVENOUS ONCE
Status: DISCONTINUED | OUTPATIENT
Start: 2020-02-20 | End: 2020-02-20

## 2020-02-20 RX ORDER — ALBUMIN (HUMAN) 12.5 G/50ML
12.5 SOLUTION INTRAVENOUS ONCE
Status: DISCONTINUED | OUTPATIENT
Start: 2020-02-20 | End: 2020-03-10 | Stop reason: HOSPADM

## 2020-02-20 RX ADMIN — PANTOPRAZOLE SODIUM 40 MG: 40 TABLET, DELAYED RELEASE ORAL at 08:08

## 2020-02-20 RX ADMIN — FUROSEMIDE 20 MG: 20 TABLET ORAL at 22:25

## 2020-02-20 RX ADMIN — OXYCODONE HYDROCHLORIDE AND ACETAMINOPHEN 1000 MG: 500 TABLET ORAL at 08:08

## 2020-02-20 RX ADMIN — HYDROCODONE BITARTRATE AND ACETAMINOPHEN 2 TABLET: 5; 325 TABLET ORAL at 20:47

## 2020-02-20 RX ADMIN — DULOXETINE 30 MG: 30 CAPSULE, DELAYED RELEASE ORAL at 08:08

## 2020-02-20 RX ADMIN — HYDROCODONE BITARTRATE AND ACETAMINOPHEN 2 TABLET: 5; 325 TABLET ORAL at 14:22

## 2020-02-20 RX ADMIN — CEFAZOLIN SODIUM 2000 MG: 2 SOLUTION INTRAVENOUS at 08:17

## 2020-02-20 RX ADMIN — APIXABAN 5 MG: 5 TABLET, FILM COATED ORAL at 17:13

## 2020-02-20 RX ADMIN — APIXABAN 5 MG: 5 TABLET, FILM COATED ORAL at 08:08

## 2020-02-20 RX ADMIN — CEFAZOLIN SODIUM 2000 MG: 2 SOLUTION INTRAVENOUS at 16:58

## 2020-02-20 RX ADMIN — DILTIAZEM HYDROCHLORIDE 180 MG: 180 CAPSULE, COATED, EXTENDED RELEASE ORAL at 08:17

## 2020-02-20 RX ADMIN — MELATONIN 1000 UNITS: at 08:08

## 2020-02-20 RX ADMIN — HYDROCODONE BITARTRATE AND ACETAMINOPHEN 2 TABLET: 5; 325 TABLET ORAL at 08:07

## 2020-02-20 NOTE — OCCUPATIONAL THERAPY NOTE
Occupational Therapy Evaluation     Patient Name: Maia Lombardo  Today's Date: 2/20/2020  Problem List  Principal Problem: Foot pain, right  Active Problems:    Gastroesophageal reflux disease without esophagitis    Class 1 obesity due to excess calories with serious comorbidity and body mass index (BMI) of 34 0 to 34 9 in adult    Persistent atrial fibrillation    Chronic deep vein thrombosis (DVT) (HCC)    Acute kidney injury superimposed on CKD (HCC)    Hypertension    Hyperlipidemia    History of methicillin resistant staphylococcus aureus (MRSA)    Hyponatremia    Leukocytosis    Congestive heart failure (CHF) ([facility] [address] )    Past Medical History  Past Medical History:   Diagnosis Date    [facility]     Cardiac disease     Cellulitis     [facility]     Chronic pain     Chronic venous hypertension     with ulceration    GERD (gastroesophageal reflux disease)     History of methicillin resistant staphylococcus aureus (MRSA) 11/26/2019    negative nasal swab     Hyperlipidemia     Hypertension     Obesity     Pulmonary hypertension ([facility] [address] )     [facility]     V[facility] disorder [facility] )      Past Surgical History  Past Surgical History:   Procedure Laterality Date    ANTERIOR RELEASE VERTEBRAL BODY W/ POSTERIOR FUSION      APPENDECTOMY  1955    CATARACT EXTRACTION      DECOMPRESSION SPINE LUMBAR POSTERIOR      ELBOW SURGERY      KNEE SURGERY      NOSE SURGERY      turbinectomy    RETINAL DETACHMENT SURGERY      RHINOPLASTY      TONSILLECTOMY      VEIN LIGATION AND STRIPPING      saphenous vein long         02/20/20 1325   Note Type   Note type Eval only   Pain Assessment   Pain Assessment 0-10   Pain Score 7   Pain Location Leg; Shoulder   Pain Orientation Bilateral   Home Living   Type of [facility] to live on main level with bedroom/bathroom;Stairs to enter with rails   Bathroom Shower/Tub Tub/shower unit   Bathroom Toilet Standard   Bathroom Equipment Shower chair;Grab bars around toilet   Home Equipment Wheelchair-electric   Prior Function   Level of [address] with ADLs and functional mobility   Lives With [employer] Help From Family   ADL Assistance Independent   IADLs Independent   Comments Pt endorses that he was independent with ADLs and functional mobilty  Within home he relied on furniture walking and pushing a shopping cart  Outside of home pt relied on power chair  Pt reports that home is not accessible to use power chair within home  Pt does reports independently being able to transer to powerchair  ADL   Eating Assistance 7  Independent   Eating Deficit Setup   Grooming Assistance 7  Independent   Grooming Deficit Setup   UB Bathing Assistance 5  Supervision/Setup   LB Bathing Assistance 5  Supervision/Setup   UB Dressing Assistance 5  Supervision/Setup   LB Dressing Assistance 5  Supervision/Setup   Toileting Assistance  5  Supervision/Setup   Functional Assistance 5  Supervision/Setup   Bed Mobility   Additional Comments Pt received sitting at EOB  Transfers   Sit to Stand 5  Supervision   Stand to Sit 5  Supervision   Stand pivot 5  Supervision   Additional Comments to power chair   Balance   Static Sitting Normal   Dynamic Sitting Good   Static Standing Fair +   Dynamic Standing Fair +   Activity Tolerance   Activity Tolerance Patient tolerated treatment well   Medical Staff Made Aware PT Subha   RUE Assessment   RUE Assessment WFL   LUE Assessment   LUE Assessment X  (shoulder 0-25 degrees (baseline))   Cognition   Overall Cognitive Status WFL   Assessment   Limitation Decreased UE ROM; Decreased high-level ADLs; Decreased self-care trans;Decreased UE strength   Assessment Pt is a 69 y o  male seen for OT evaluation at [facility], admitted 2/19/2020 w/ Foot pain, right  OT completed brief review of pt's medical and social history   Comorbidities affecting pt's functional performance at time of assessment include: class 1 obesity, persistent a fib, HTN, LBP, ambulatory dysfunction, chronic Left shoulder pain, CHF, etc (see chart for full hx)  Prior to admission, pt was living with family in house with 4 steps to manage and was independent with ADLs  Within home he relied on furniture walking and pushing a shopping cart  Outside of home pt relied on power chair  Upon evaluation, pt presents to OT at functional baseline  Based on findings, pt is of low complexity  At this time, OT recommendations at time of discharge are home with family support  No further acute OT needs indicated at this time - Recommend pt continue to be OOB for meals, ambulation to/from BR, perform self care tasks, and mobility in hallway with nursing  D/C from OT caseload with above recommendations     Goals   Patient Goals Pt wishes to get home   Plan   OT Frequency Eval only  (Pt at baseline for ADLs and functional mobility)   Recommendation   OT Discharge Recommendation Home with family support         Omega Kaur, OTR/L

## 2020-02-20 NOTE — PROGRESS NOTES
Pastoral Care Progress Note    2020  Patient: Freddie Ron : 1945  Admission Date & Time: 2020 1318  MRN: [id number] [facility]: [id number]                     Chaplaincy Interventions Utilized:   Empowerment: Provided chaplaincy education    Exploration: Explored emotional needs & resources and Explored relational needs & resources        Relationship Building: Cultivated a relationship of care and support      Chaplaincy Outcomes Achieved:  Declined  support                   20 1300   Plan of Care   Care Plan Initiated Patient/family refused  involvement   Assessment Completed by: Unit visit

## 2020-02-20 NOTE — SOCIAL WORK
LOS: 1 day  PATIENT IS NOT A MEDICARE BUNDLE OR A 30 DAY READMISSION  Met with patient  Explained role of care management  Patient lives with son Neil Banerjee and 13year old grandson in a mobile home at [facility]  [address]  He is independent adl's, uses a commercial food cart with wheels to ambulate in the home and has a power chair for use outside the home  He drives and provides his own meals  DME - power chair  Past services - Femi Werner  He denies history of SNF, MH or D&A  His son Neil Banerjee has POA  He has an AD  Pharmacy of choice is [facility] in  [address] [place] [address]  He has a prescription plan is able to afford his medications  If help were needed at home, his son and grandson would help  He prefers to return home at discharge and feels that if his right foot/leg is improved, he will not need any services  Await PT/OT recommendation  Will follow and provide services as needed  CM reviewed d/c planning process including the following: identifying help at home, patient preference for d/c planning needs,  availability of treatment team to discuss questions or concerns patient and/or family may have regarding understanding medications and recognizing signs and symptoms once discharged  CM also encouraged patient to follow up with all recommended appointments after discharge  Patient advised of importance for patient and family to participate in managing patients medical well being

## 2020-02-20 NOTE — PROGRESS NOTES
02/20/20 1300   Plan of Care   Care Plan Initiated Patient/family refused  involvement   Assessment Completed by: Unit visit

## 2020-02-20 NOTE — NURSING NOTE
GERRI Grider made aware of patients low blood pressure with instruction to hold any antihypertensives and diuretics  [facility]   Isma Sosa RN

## 2020-02-20 NOTE — PLAN OF CARE
Problem: Potential for Falls  Goal: Patient will remain free of falls  Description  INTERVENTIONS:  - Assess patient frequently for physical needs  -  Identify cognitive and physical deficits and behaviors that affect risk of falls    -  [place] fall precautions as indicated by assessment   - Educate patient/family on patient safety including physical limitations  - Instruct patient to call for assistance with activity based on assessment  - Modify environment to reduce risk of injury  - Consider OT/PT consult to assist with strengthening/mobility  Outcome: Progressing     Problem: PAIN - ADULT  Goal: Verbalizes/displays adequate comfort level or baseline comfort level  Description  Interventions:  - Encourage patient to monitor pain and request assistance  - Assess pain using appropriate pain scale  - Administer analgesics based on type and severity of pain and evaluate response  - Implement non-pharmacological measures as appropriate and evaluate response  - Consider cultural and social influences on pain and pain management  - Notify physician/advanced practitioner if interventions unsuccessful or patient reports new pain  Outcome: Progressing     Problem: INFECTION - ADULT  Goal: Absence or prevention of progression during hospitalization  Description  INTERVENTIONS:  - Assess and monitor for signs and symptoms of infection  - Monitor lab/diagnostic results  - Monitor all insertion sites, i e  indwelling lines, tubes, and drains  - Monitor endotracheal if appropriate and nasal secretions for changes in amount and color  - [place] appropriate cooling/warming therapies per order  - Administer medications as ordered  - Instruct and encourage patient and family to use good hand hygiene technique  - Identify and instruct in appropriate isolation precautions for identified infection/condition  Outcome: Progressing  Goal: Absence of fever/infection during neutropenic period  Description  INTERVENTIONS:  - Monitor WBC    Outcome: Progressing     Problem: SAFETY ADULT  Goal: Maintain or return to baseline ADL function  Description  INTERVENTIONS:  -  Assess patient's ability to carry out ADLs; assess patient's baseline for ADL function and identify physical deficits which impact ability to perform ADLs (bathing, care of mouth/teeth, toileting, grooming, dressing, etc )  - Assess/evaluate cause of self-care deficits   - Assess range of motion  - Assess patient's mobility; develop plan if impaired  - Assess patient's need for assistive devices and provide as appropriate  - Encourage maximum independence but intervene and supervise when necessary  - Involve family in performance of ADLs  - Assess for home care needs following discharge   - Consider OT consult to assist with ADL evaluation and planning for discharge  - Provide patient education as appropriate  Outcome: Progressing  Goal: Maintain or return mobility status to optimal level  Description  INTERVENTIONS:  - Assess patient's baseline mobility status (ambulation, transfers, stairs, etc )    - Identify cognitive and physical deficits and behaviors that affect mobility  - Identify mobility aids required to assist with transfers and/or ambulation (gait belt, sit-to-stand, lift, walker, cane, etc )  - [place] fall precautions as indicated by assessment  - Record patient progress and toleration of activity level on Mobility SBAR; progress patient to next Phase/Stage  - Instruct patient to call for assistance with activity based on assessment  - Consider rehabilitation consult to assist with strengthening/weightbearing, etc   Outcome: Progressing     Problem: DISCHARGE PLANNING  Goal: Discharge to home or other facility with appropriate resources  Description  INTERVENTIONS:  - Identify barriers to discharge w/patient and caregiver  - Arrange for needed discharge resources and transportation as appropriate  - Identify discharge learning needs (meds, wound care, etc )  - Arrange for interpretive services to assist at discharge as needed  - Refer to Case Management Department for coordinating discharge planning if the patient needs post-hospital services based on physician/advanced practitioner order or complex needs related to functional status, cognitive ability, or social support system  Outcome: Progressing     Problem: Knowledge Deficit  Goal: Patient/family/caregiver demonstrates understanding of disease process, treatment plan, medications, and discharge instructions  Description  Complete learning assessment and assess knowledge base    Interventions:  - Provide teaching at level of understanding  - Provide teaching via preferred learning methods  Outcome: Progressing     Problem: CARDIOVASCULAR - ADULT  Goal: Maintains optimal cardiac output and hemodynamic stability  Description  INTERVENTIONS:  - Monitor I/O, vital signs and rhythm  - Monitor for S/S and trends of decreased cardiac output  - Administer and titrate ordered vasoactive medications to optimize hemodynamic stability  - Assess quality of pulses, skin color and temperature  - Assess for signs of decreased coronary artery perfusion  - Instruct patient to report change in severity of symptoms  Outcome: Progressing  Goal: Absence of cardiac dysrhythmias or at baseline rhythm  Description  INTERVENTIONS:  - Continuous cardiac monitoring, vital signs, obtain 12 lead EKG if ordered  - Administer antiarrhythmic and heart rate control medications as ordered  - Monitor electrolytes and administer replacement therapy as ordered  Outcome: Progressing     Problem: MUSCULOSKELETAL - ADULT  Goal: Maintain or return mobility to safest level of function  Description  INTERVENTIONS:  - Assess patient's ability to carry out ADLs; assess patient's baseline for ADL function and identify physical deficits which impact ability to perform ADLs (bathing, care of mouth/teeth, toileting, grooming, dressing, etc )  - Assess/evaluate cause of self-care deficits   - Assess range of motion  - Assess patient's mobility  - Assess patient's need for assistive devices and provide as appropriate  - Encourage maximum independence but intervene and supervise when necessary  - Involve family in performance of ADLs  - Assess for home care needs following discharge   - Consider OT consult to assist with ADL evaluation and planning for discharge  - Provide patient education as appropriate  Outcome: Progressing  Goal: Maintain proper alignment of affected body part  Description  INTERVENTIONS:  - Support, maintain and protect limb and body alignment  - Provide patient/ family with appropriate education  Outcome: Progressing

## 2020-02-20 NOTE — CONSULTS
Consultation - Orthopedics   Linwood Mazariegos 69 y o  male MRN: [id number]  Unit/Bed#: -01 Encounter: [id number]      Assessment/Plan     Assessment:  Right ankle and foot cellulitis, venous insufficiency    Plan:  Recommend continuing IV antibiotics for cellulitis  Recommend podiatry consult since they have been treating him for intermittent cellulitis over the past year    History of Present Illness   Physician Requesting Consult: Portia Canela,   Reason for Consult / Principal Problem:  Right ankle and foot erythema and pain  HPI: Hubert Koyanagi is a 69y o  year old male who presents with right ankle and foot erythema and pain for the past 2 days  Patient denies any injury  Patient has long history of issues with the right ankle and foot  He had a right ankle-foot fusion 20 years ago  He states that it took about 3 surgeries to fully fuse it  He was doing well until about a year ago when he started to have cellulitis of the foot and ankle  He states he has been following with the podiatrist Dr Eda Flynn who has been treating him with antibiotics intermittently for cellulitis  He denies any current open wounds, but states he has 1 area over the medial aspect of the foot that opens up occasionally and drains serosanguineous fluid  He also has a history of venous insufficiency and wears compression stockings daily  He denies any fevers or chills at home  The pain in the right ankle and foot became more severe any went to the emergency room yesterday  He was admitted to the medical service and Orthopedics was consulted  He states he does feel improvement since starting IV antibiotics as today  Inpatient consult to Orthopedic Surgery  Consult performed by: Kasandra Levi PA-C  Consult ordered by: NAVNEET Johnson          Review of Systems   Constitutional: Negative  HENT: Negative  Eyes: Negative  Respiratory: Negative  Cardiovascular: Negative  Gastrointestinal: Negative  Endocrine: Negative  Genitourinary: Negative  Musculoskeletal: Positive for arthralgias, gait problem and joint swelling  Skin: Positive for color change  Hematological: Negative  Psychiatric/Behavioral: Negative  Historical Information   Past Medical History:   Diagnosis Date    Atrial fibrillation ([place] [address] )     Cardiac disease     Cellulitis     [facility] failure[facility]     Chronic pain     Chronic venous hypertension     with ulceration    GERD (gastroesophageal reflux disease)     History of methicillin resistant staphylococcus aureus (MRSA) 11/26/2019    negative nasal swab     Hyperlipidemia     Hypertension     Obesity     Pulmonary hypertension ([place] [address] )     PVD (peripheral vascular disease) ([place] [address] )     Vascular disorder of extremity ([place] [address] )      Past Surgical History:   Procedure Laterality Date    ANTERIOR RELEASE VERTEBRAL BODY W/ POSTERIOR FUSION      APPENDECTOMY  1955    CATARACT EXTRACTION      DECOMPRESSION SPINE LUMBAR POSTERIOR      ELBOW SURGERY      KNEE SURGERY      NOSE SURGERY      turbinectomy    RETINAL DETACHMENT SURGERY      RHINOPLASTY      TONSILLECTOMY      VEIN LIGATION AND STRIPPING      saphenous vein long     Social History   Social History     Substance and Sexual Activity   Alcohol Use No    Frequency: Never    Binge frequency: Never     Social History     Substance and Sexual Activity   Drug Use No     Social History     Tobacco Use   Smoking Status Never Smoker   Smokeless Tobacco Never Used     Family History: non-contributory    Meds/Allergies   all current active meds have been reviewed  No Known Allergies    Objective   Vitals: Blood pressure 126/58, pulse 75, temperature 98 1 °F (36 7 °C), resp  rate 18, height 6' (1 829 m), weight 111 kg (244 lb 4 3 oz), SpO2 96 %  ,Body mass index is 33 13 kg/m²        Intake/Output Summary (Last 24 hours) at 2/20/2020 0936  Last data filed at 2/20/2020 0825  Gross per 24 hour   Intake 1050 ml   Output 1855 ml   Net -805 ml     I/O last 24 hours: In: 1050 [IV Piggyback:1050]  Out: 1855 [Urine:1855]    Invasive Devices     Peripheral Intravenous Line            Peripheral IV 02/19/20 Right Arm less than 1 day                Physical Exam   Constitutional: He is oriented to person, place, and time  He appears well-developed and well-nourished  No distress  HENT:   Head: Normocephalic and atraumatic  Mouth/Throat: Oropharynx is clear and moist    Eyes: Pupils are equal, round, and reactive to light  Conjunctivae and EOM are normal    Neck: Normal range of motion  Neck supple  Cardiovascular: Normal rate, regular rhythm and normal heart sounds  No murmur heard  Pulmonary/Chest: Effort normal and breath sounds normal  No respiratory distress  Abdominal: Soft  Bowel sounds are normal  There is no tenderness  Musculoskeletal: He exhibits tenderness  Neurological: He is alert and oriented to person, place, and time  Skin: Skin is warm and dry  There is erythema  Psychiatric: He has a normal mood and affect  Right Ankle Exam     Tenderness   Right ankle tenderness location: Diffuse tenderness over ankle and foot  Swelling: moderate    Range of Motion   Dorsiflexion: abnormal   Plantar flexion: abnormal   Eversion: abnormal   Inversion: abnormal     Other   Erythema: present  Scars: present  Sensation: normal  Pulse: present     Comments:  DP pulse 1+  Small scab over medial aspect of foot with no active drainage  Moving toes  Unable to move ankle due to fusion  Strengthen assessed  Calf soft and compressible            Lab Results: I have personally reviewed pertinent lab results  Imaging Studies: I have personally reviewed pertinent films in PACS  X-ray right ankle:  Hardware intact in ankle and foot  Most distal screw through tibia and fibula is broken, but not backed out

## 2020-02-20 NOTE — PHYSICAL THERAPY NOTE
PT eval     02/20/20 1320   Note Type   Note type Eval only   Pain Assessment   Pain Assessment 0-10   Pain Score 7   Pain Location Leg; Shoulder   Pain Orientation Bilateral;Left   Home Living   Type of [address] to live on main level with bedroom/bathroom   Home Equipment Electric scooter  (shopping cart in home to walk with)   Prior Function   Level of [place] Independent with ADLs and functional mobility;Modified independent with wheelchair   Lives With [employer] Help From Family   ADL Assistance Independent   IADLs Independent   Falls in the last 6 months 0   Vocational Retired   Comments drives   General   Additional Pertinent History adm with Rle reddness/cellulitis   Family/Caregiver Present No   Cognition   Overall Cognitive Status WFL   Arousal/Participation Alert   Orientation Level Oriented X4   Memory Within functional limits   Following Commands Follows all commands and directions without difficulty   RUE Assessment   RUE Assessment WFL   LUE Assessment   LUE Assessment   (shldr limited chronic)   RLE Assessment   RLE Assessment WFL  (dusky red some edema pt report improved)   LLE Assessment   LLE Assessment WFL  (dusky some edema)   Coordination   Movements are Fluid and Coordinated 1   Proprioception   RLE Proprioception Grossly intact   LLE Proprioception Grossly Intact   Transfers   Sit to Stand 5  Supervision   Additional items   (from elevated bed height,facingscooterusing armrests,turnsit)   Stand to Sit 7  Independent   Stand pivot 5  Supervision   Additional items   (from bed to scooter facing forward wb on armrests)   Ambulation/Elevation   Gait pattern   (scooter into BR)   Balance   Static Sitting Normal   Dynamic Sitting Good   Static Standing Fair +   Dynamic Standing Fair +   Ambulatory   (transfers only)   Endurance Deficit   Endurance Deficit No   Activity Tolerance   Activity Tolerance Patient tolerated treatment well   Assessment   Prognosis Good Assessment Pt presents as funcitonal with transfer adn scooter after admission for Rle celleulitis  Reports that pain and swelling are both improved over admission    Denies PT needs at this time   Funcitonal with current home set-up d/c PT   Barriers to Discharge   (medical status)   Goals   Patient Goals get better  go home   Plan   PT Frequency   (d/c PT)   Recommendation   Recommendation Home with family support   Equipment Recommended   (had own scooter etc)   PT - OK to Discharge Yes   Additional Comments   (with medical clearance)   Avel Escobar, PT

## 2020-02-20 NOTE — PLAN OF CARE
Problem: Potential for Falls  Goal: Patient will remain free of falls  Description  INTERVENTIONS:  - Assess patient frequently for physical needs  -  Identify cognitive and physical deficits and behaviors that affect risk of falls    -  [place] fall precautions as indicated by assessment   - Educate patient/family on patient safety including physical limitations  - Instruct patient to call for assistance with activity based on assessment  - Modify environment to reduce risk of injury  - Consider OT/PT consult to assist with strengthening/mobility  Outcome: Progressing     Problem: PAIN - ADULT  Goal: Verbalizes/displays adequate comfort level or baseline comfort level  Description  Interventions:  - Encourage patient to monitor pain and request assistance  - Assess pain using appropriate pain scale  - Administer analgesics based on type and severity of pain and evaluate response  - Implement non-pharmacological measures as appropriate and evaluate response  - Consider cultural and social influences on pain and pain management  - Notify physician/advanced practitioner if interventions unsuccessful or patient reports new pain  Outcome: Progressing     Problem: INFECTION - ADULT  Goal: Absence or prevention of progression during hospitalization  Description  INTERVENTIONS:  - Assess and monitor for signs and symptoms of infection  - Monitor lab/diagnostic results  - Monitor all insertion sites, i e  indwelling lines, tubes, and drains  - Monitor endotracheal if appropriate and nasal secretions for changes in amount and color  - [place] appropriate cooling/warming therapies per order  - Administer medications as ordered  - Instruct and encourage patient and family to use good hand hygiene technique  - Identify and instruct in appropriate isolation precautions for identified infection/condition  Outcome: Progressing  Goal: Absence of fever/infection during neutropenic period  Description  INTERVENTIONS:  - Monitor WBC    Outcome: Progressing     Problem: SAFETY ADULT  Goal: Maintain or return to baseline ADL function  Description  INTERVENTIONS:  -  Assess patient's ability to carry out ADLs; assess patient's baseline for ADL function and identify physical deficits which impact ability to perform ADLs (bathing, care of mouth/teeth, toileting, grooming, dressing, etc )  - Assess/evaluate cause of self-care deficits   - Assess range of motion  - Assess patient's mobility; develop plan if impaired  - Assess patient's need for assistive devices and provide as appropriate  - Encourage maximum independence but intervene and supervise when necessary  - Involve family in performance of ADLs  - Assess for home care needs following discharge   - Consider OT consult to assist with ADL evaluation and planning for discharge  - Provide patient education as appropriate  Outcome: Progressing  Goal: Maintain or return mobility status to optimal level  Description  INTERVENTIONS:  - Assess patient's baseline mobility status (ambulation, transfers, stairs, etc )    - Identify cognitive and physical deficits and behaviors that affect mobility  - Identify mobility aids required to assist with transfers and/or ambulation (gait belt, sit-to-stand, lift, walker, cane, etc )  - [place] fall precautions as indicated by assessment  - Record patient progress and toleration of activity level on Mobility SBAR; progress patient to next Phase/Stage  - Instruct patient to call for assistance with activity based on assessment  - Consider rehabilitation consult to assist with strengthening/weightbearing, etc   Outcome: Progressing     Problem: DISCHARGE PLANNING  Goal: Discharge to home or other facility with appropriate resources  Description  INTERVENTIONS:  - Identify barriers to discharge w/patient and caregiver  - Arrange for needed discharge resources and transportation as appropriate  - Identify discharge learning needs (meds, wound care, etc )  - Arrange for interpretive services to assist at discharge as needed  - Refer to Case Management Department for coordinating discharge planning if the patient needs post-hospital services based on physician/advanced practitioner order or complex needs related to functional status, cognitive ability, or social support system  Outcome: Progressing     Problem: Knowledge Deficit  Goal: Patient/family/caregiver demonstrates understanding of disease process, treatment plan, medications, and discharge instructions  Description  Complete learning assessment and assess knowledge base    Interventions:  - Provide teaching at level of understanding  - Provide teaching via preferred learning methods  Outcome: Progressing     Problem: CARDIOVASCULAR - ADULT  Goal: Maintains optimal cardiac output and hemodynamic stability  Description  INTERVENTIONS:  - Monitor I/O, vital signs and rhythm  - Monitor for S/S and trends of decreased cardiac output  - Administer and titrate ordered vasoactive medications to optimize hemodynamic stability  - Assess quality of pulses, skin color and temperature  - Assess for signs of decreased coronary artery perfusion  - Instruct patient to report change in severity of symptoms  Outcome: Progressing  Goal: Absence of cardiac dysrhythmias or at baseline rhythm  Description  INTERVENTIONS:  - Continuous cardiac monitoring, vital signs, obtain 12 lead EKG if ordered  - Administer antiarrhythmic and heart rate control medications as ordered  - Monitor electrolytes and administer replacement therapy as ordered  Outcome: Progressing     Problem: MUSCULOSKELETAL - ADULT  Goal: Maintain or return mobility to safest level of function  Description  INTERVENTIONS:  - Assess patient's ability to carry out ADLs; assess patient's baseline for ADL function and identify physical deficits which impact ability to perform ADLs (bathing, care of mouth/teeth, toileting, grooming, dressing, etc )  - Assess/evaluate cause of self-care deficits   - Assess range of motion  - Assess patient's mobility  - Assess patient's need for assistive devices and provide as appropriate  - Encourage maximum independence but intervene and supervise when necessary  - Involve family in performance of ADLs  - Assess for home care needs following discharge   - Consider OT consult to assist with ADL evaluation and planning for discharge  - Provide patient education as appropriate  Outcome: Progressing  Goal: Maintain proper alignment of affected body part  Description  INTERVENTIONS:  - Support, maintain and protect limb and body alignment  - Provide patient/ family with appropriate education  Outcome: Progressing

## 2020-02-20 NOTE — UTILIZATION REVIEW
Initial Clinical Review    Admission: Date/Time/Statement: Admission Orders (From admission, onward)     Ordered        02/19/20 1550  Inpatient Admission  Once                   Orders Placed This Encounter   Procedures    Inpatient Admission     Standing Status:   Standing     Number of Occurrences:   1     Order Specific Question:   Admitting Physician     Answer:   Arvin Sow [[id number]]     Order Specific Question:   Level of Care     Answer:   Med Surg [[id number]]     Order Specific Question:   Estimated length of stay     Answer:   More than 2 Midnights     Order Specific Question:   Certification     Answer:   I certify that inpatient services are medically necessary for this patient for a duration of greater than two midnights  See H&P and MD Progress Notes for additional information about the patient's course of treatment  ED Arrival Information     Expected Arrival Acuity Means of Arrival Escorted By Service Admission Type    - 2/19/2020 12:26 Urgent Wheelchair Self General Medicine Urgent    Arrival Complaint    rt foot infection        Chief Complaint   Patient presents with    Foot Pain     patient complaint of foot pain and discloration x 1 day     Assessment/Plan: This is a 69year old male from home to ED admitted to inpatient due to right foot pain likely due to soft tissue infection vs diabetic ulcers vs venous insufficiency/SAMY  He is post an ankle fusion with multiple surgeries  Presented due to right foot pain and discoloration for last day with more walking wearing boot and pins and needles sensation when removed  , redness started last night and increased today  On exam +1 dorsalis pedis and posterior tibial pulses  Photos below  Has discoloration and redness to right calf to foot  Wbc 19 26  Na 129  Bun 72  Creatinine 1 90 with baseline of 1 2 - 1 5 and given IVF bolus in ED  IV antibiotics, diuretics on hold  in progress  Orthopedics consulted                       On 2/20/2020 per orthopedics: right ankle and foot cellulitis, venous insuffiencey, recommend continuing IV antibiotics  On 2/20 per patient, somewhat improved  On exam has tenderness and erythema  Has diffuse tenderness over right ankle and foot, with moderate swelling, small scab over medical aspect of right foot without drainage, unable to move right ankle due to fusion  Antibiotics continue  Pain controlled  Creatinine improved to 1 64 and diuretics remain on hold         ED Triage Vitals   Temperature Pulse Respirations Blood Pressure SpO2   02/19/20 1241 02/19/20 1241 02/19/20 1241 02/19/20 1241 02/19/20 1241   97 8 °F (36 6 °C) 80 18 101/54 95 %      Temp Source Heart Rate Source Patient Position - Orthostatic VS BP Location FiO2 (%)   02/19/20 2320 02/19/20 1421 02/19/20 1421 02/19/20 1421 --   Oral Monitor Sitting Right arm       Pain Score       02/19/20 1241       7        Wt Readings from Last 1 Encounters:   02/20/20 111 kg (244 lb 4 3 oz)     Additional Vital Signs:  02/20/20 0700  98 1 °F (36 7 °C)  75  18  126/58    96 %  None (Room air)  Sitting   02/20/20 05:16:41    71  20      97 %       02/20/20 0514        96/54  67      Lying   02/20/20 03:30:14    71  20  87/52Abnormal   64  100 %       02/20/20 0226        87/48Abnormal            02/20/20 0100        86/47Abnormal            02/19/20 2320  98 1 °F (36 7 °C)  77  20  85/51Abnormal     96 %           Pertinent Labs/Diagnostic Test Results:   2/20/2020 Xray right ankle - No acute osseous abnormality   Postoperative changes status post hindfoot fusion,  Results from last 7 days   Lab Units 02/20/20  0519 02/19/20  1412   WBC Thousand/uL 14 78* 19 26*   HEMOGLOBIN g/dL 14 1 14 3   HEMATOCRIT % 42 0 42 6   PLATELETS Thousands/uL 107* 120*   NEUTROS ABS Thousands/µL 12 89*  --    BANDS PCT %  --  4     Results from last 7 days   Lab Units 02/20/20  0519 02/19/20  1412   SODIUM mmol/L 133* 129*   POTASSIUM mmol/L 4 3 4 2 CHLORIDE mmol/L 99* 95*   CO2 mmol/L 25 27   ANION GAP mmol/L 9 7   BUN mg/dL 69* 72*   CREATININE mg/dL 1 84* 1 90*   EGFR ml/min/1 73sq m 36 34   CALCIUM mg/dL 8 9 9 1   MAGNESIUM mg/dL 1 7  --    PHOSPHORUS mg/dL 3 5  --      Results from last 7 days   Lab Units 02/20/20  0519   AST U/L 32   ALT U/L 18   ALK PHOS U/L 82   TOTAL PROTEIN g/dL 7 6   ALBUMIN g/dL 2 9*   TOTAL BILIRUBIN mg/dL 1 10*     Results from last 7 days   Lab Units 02/20/20  0519 02/19/20  1412   GLUCOSE RANDOM mg/dL 70 89     Results from last 7 days   Lab Units 02/19/20  2122   CLARITY UA  Hazy   COLOR UA  Yellow   SPEC GRAV UA  1 010   PH UA  5 5   GLUCOSE UA mg/dl Negative   KETONES UA mg/dl Negative   BLOOD UA  Negative   PROTEIN UA mg/dl Negative   NITRITE UA  Negative   BILIRUBIN UA  Negative   UROBILINOGEN UA E U /dl 0 2   LEUKOCYTES UA  Small*   WBC UA /hpf 20-30*   RBC UA /hpf None Seen   BACTERIA UA /hpf Occasional   EPITHELIAL CELLS WET PREP /hpf None Seen     Results from last 7 days   Lab Units 02/19/20  1412   TOTAL COUNTED  100     ED Treatment:   Medication Administration from 02/19/2020 1225 to 02/19/2020 1811       Date/Time Order Dose Route Action Comments     02/19/2020 1550 sodium chloride 0 9 % bolus 1,000 mL 1,000 mL Intravenous New Bag      02/19/2020 1549 ceFAZolin (ANCEF) IVPB (premix) 2,000 mg 2,000 mg Intravenous New Bag      02/19/2020 1650 HYDROcodone-acetaminophen (NORCO) 5-325 mg per tablet 1 5 tablet 1 5 tablet Oral Given         Past Medical History:   Diagnosis Date    Atrial fibrillation (HCC)     Cardiac disease     Cellulitis     CHF (congestive heart failure) (HCC)     Chronic pain     Chronic venous hypertension     with ulceration    GERD (gastroesophageal reflux disease)     History of methicillin resistant staphylococcus aureus (MRSA) 11/26/2019    negative nasal swab     Hyperlipidemia     Hypertension     Obesity     Pulmonary hypertension ([address] )     PVD [facility]peripheral vascular disease) [facility])     Vascular disorder of extremity ([facility] )      Present on Admission:   Gastroesophageal reflux disease without esophagitis   Persistent atrial fibrillation   Chronic deep vein thrombosis (DVT) (HCC)   Acute kidney injury superimposed on CKD (HCC)   Hypertension   Hyperlipidemia   History of methicillin resistant staphylococcus aureus (MRSA)   Foot pain, right   Leukocytosis   Congestive heart failure (CHF) ([facility])      Admitting Diagnosis: Hyponatremia [E87 1]  Foot infection [L08 9]  SAMY (acute kidney injury) ([facility] ) [N17 9]  Cellulitis of right foot [L03 115]  Cellulitis of right lower extremity [L03 115]  Age/Sex: 69 y o  male  Admission Orders: 2/19/2020 1550 inpatient   Scheduled Medications:  Medications:  apixaban 5 mg Oral BID   vitamin C 1,000 mg Oral Daily   cefazolin 2,000 mg Intravenous Q8H   cholecalciferol 1,000 Units Oral Daily   diltiazem 180 mg Oral Daily   DULoxetine 30 mg Oral Daily   pantoprazole 40 mg Oral Daily     Continuous IV Infusions:  none     PRN Meds:  benzonatate 100 mg Oral TID PRN   HYDROcodone-acetaminophen - used x 1 on 2/19 and 2/20 2 tablet Oral Q4H PRN       IP CONSULT TO ORTHOPEDIC SURGERY  Fluid restriction  Urine culture      Network Utilization Review Department  [email or web address]  ATTENTION: Please call with any questions or concerns to [phone] and carefully listen to the prompts so that you are directed to the right person  All voicemails are confidential   Lucy Sharp all requests for admission clinical reviews, approved or denied determinations and any other requests to dedicated fax number below belonging to the campus where the patient is receiving treatment   List of dedicated fax numbers for the Facilities:  [address] [place] [address] DENIALS (Administrative/Medical Necessity) [phone]   [address] [place] [address] (Maternity/NICU/Pediatrics) [phone]   [place] [id number] [facility] [place] [address] [phone]   [facility] [phone]   [address] [phone]   [facility]  [phone]   [place] [address] [phone]   [address] [place] [address] [phone]

## 2020-02-21 LAB
ANION GAP SERPL CALCULATED.3IONS-SCNC: 10 MMOL/L (ref 4–13)
BACTERIA UR CULT: NORMAL
BUN SERPL-MCNC: 66 MG/DL (ref 5–25)
CALCIUM SERPL-MCNC: 9.2 MG/DL (ref 8.3–10.1)
CHLORIDE SERPL-SCNC: 98 MMOL/L (ref 100–108)
CO2 SERPL-SCNC: 24 MMOL/L (ref 21–32)
CREAT SERPL-MCNC: 1.76 MG/DL (ref 0.6–1.3)
ERYTHROCYTE [DISTWIDTH] IN BLOOD BY AUTOMATED COUNT: 15.8 % (ref 11.6–15.1)
GFR SERPL CREATININE-BSD FRML MDRD: 38 ML/MIN/1.73SQ M
GLUCOSE SERPL-MCNC: 84 MG/DL (ref 65–140)
HCT VFR BLD AUTO: 40.1 % (ref 36.5–49.3)
HGB BLD-MCNC: 13.5 G/DL (ref 12–17)
MCH RBC QN AUTO: 32.5 PG (ref 26.8–34.3)
MCHC RBC AUTO-ENTMCNC: 33.7 G/DL (ref 31.4–37.4)
MCV RBC AUTO: 96 FL (ref 82–98)
PLATELET # BLD AUTO: 95 THOUSANDS/UL (ref 149–390)
PMV BLD AUTO: 11.9 FL (ref 8.9–12.7)
POTASSIUM SERPL-SCNC: 4.3 MMOL/L (ref 3.5–5.3)
PROCALCITONIN SERPL-MCNC: 4.14 NG/ML
RBC # BLD AUTO: 4.16 MILLION/UL (ref 3.88–5.62)
SODIUM SERPL-SCNC: 132 MMOL/L (ref 136–145)
WBC # BLD AUTO: 11.1 THOUSAND/UL (ref 4.31–10.16)

## 2020-02-21 PROCEDURE — 85027 COMPLETE CBC AUTOMATED: CPT | Performed by: FAMILY MEDICINE

## 2020-02-21 PROCEDURE — 84145 PROCALCITONIN (PCT): CPT | Performed by: FAMILY MEDICINE

## 2020-02-21 PROCEDURE — 80048 BASIC METABOLIC PNL TOTAL CA: CPT | Performed by: FAMILY MEDICINE

## 2020-02-21 RX ADMIN — HYDROCODONE BITARTRATE AND ACETAMINOPHEN 2 TABLET: 5; 325 TABLET ORAL at 15:34

## 2020-02-21 RX ADMIN — OXYCODONE HYDROCHLORIDE AND ACETAMINOPHEN 1000 MG: 500 TABLET ORAL at 08:51

## 2020-02-21 RX ADMIN — CEFAZOLIN SODIUM 2000 MG: 2 SOLUTION INTRAVENOUS at 00:19

## 2020-02-21 RX ADMIN — CEFAZOLIN SODIUM 2000 MG: 2 SOLUTION INTRAVENOUS at 23:17

## 2020-02-21 RX ADMIN — DULOXETINE 30 MG: 30 CAPSULE, DELAYED RELEASE ORAL at 08:51

## 2020-02-21 RX ADMIN — APIXABAN 5 MG: 5 TABLET, FILM COATED ORAL at 08:51

## 2020-02-21 RX ADMIN — DILTIAZEM HYDROCHLORIDE 120 MG: 120 CAPSULE, COATED, EXTENDED RELEASE ORAL at 08:51

## 2020-02-21 RX ADMIN — HYDROCODONE BITARTRATE AND ACETAMINOPHEN 2 TABLET: 5; 325 TABLET ORAL at 06:49

## 2020-02-21 RX ADMIN — APIXABAN 5 MG: 5 TABLET, FILM COATED ORAL at 17:50

## 2020-02-21 RX ADMIN — CEFAZOLIN SODIUM 2000 MG: 2 SOLUTION INTRAVENOUS at 15:34

## 2020-02-21 RX ADMIN — PANTOPRAZOLE SODIUM 40 MG: 40 TABLET, DELAYED RELEASE ORAL at 08:51

## 2020-02-21 RX ADMIN — MELATONIN 1000 UNITS: at 08:51

## 2020-02-21 RX ADMIN — CEFAZOLIN SODIUM 2000 MG: 2 SOLUTION INTRAVENOUS at 08:51

## 2020-02-21 RX ADMIN — HYDROCODONE BITARTRATE AND ACETAMINOPHEN 2 TABLET: 5; 325 TABLET ORAL at 22:09

## 2020-02-21 NOTE — ASSESSMENT & PLAN NOTE
· Marked leukocytosis of 19 26 on admission  Afebrile, no tachycardia   Imaging neg for acute osseous abnormality  Postoperative changes status post hindfoot fusion     Antibiotics with Ancef IV    Orthopedic consult given extensive hardware history- do not recommend any surgical intervention at this time, recommend podiatry consult   Podiatry consult placed   Serial foot/leg exams   Ace wrap to help with compression   Lasix 20mg x 1 dose (had been holding due to concern for BP and kidney function)

## 2020-02-21 NOTE — ASSESSMENT & PLAN NOTE
Wt Readings from Last 3 Encounters:   02/20/20 111 kg (244 lb 4 3 oz)   02/04/20 108 kg (239 lb)   01/07/20 113 kg (249 lb)     · Managed on home on Bumex Monday Wednesday Friday but per patient he has not been taking this as it was discontinued by his Cardiologist and but does take spironolactone  · Echocardiogram reveals LVEF of 45%  Mild diffuse hypokinesis  Wall thickness markedly increased  RV Systolic function mildly reduced  Mild mitral valve regurgitation  Mild tricuspid valve regurgitation  · Daily weight  · Intake and output  · Cardiac diet, low-sodium, fluid restriction  · Decreased pt's Cardizem from 180mg to 120mg today in order for BP to be able to tolerate diuresis

## 2020-02-21 NOTE — ASSESSMENT & PLAN NOTE
· Present on admission with sodium of 129, most likely 2/2 bumex   · Improved to 133 this AM   · Repeat morning

## 2020-02-21 NOTE — PLAN OF CARE
Problem: Potential for Falls  Goal: Patient will remain free of falls  Description  INTERVENTIONS:  - Assess patient frequently for physical needs  -  Identify cognitive and physical deficits and behaviors that affect risk of falls    -  [place] fall precautions as indicated by assessment   - Educate patient/family on patient safety including physical limitations  - Instruct patient to call for assistance with activity based on assessment  - Modify environment to reduce risk of injury  - Consider OT/PT consult to assist with strengthening/mobility  Outcome: Progressing     Problem: PAIN - ADULT  Goal: Verbalizes/displays adequate comfort level or baseline comfort level  Description  Interventions:  - Encourage patient to monitor pain and request assistance  - Assess pain using appropriate pain scale  - Administer analgesics based on type and severity of pain and evaluate response  - Implement non-pharmacological measures as appropriate and evaluate response  - Consider cultural and social influences on pain and pain management  - Notify physician/advanced practitioner if interventions unsuccessful or patient reports new pain  Outcome: Progressing     Problem: INFECTION - ADULT  Goal: Absence or prevention of progression during hospitalization  Description  INTERVENTIONS:  - Assess and monitor for signs and symptoms of infection  - Monitor lab/diagnostic results  - Monitor all insertion sites, i e  indwelling lines, tubes, and drains  - Monitor endotracheal if appropriate and nasal secretions for changes in amount and color  - [place] appropriate cooling/warming therapies per order  - Administer medications as ordered  - Instruct and encourage patient and family to use good hand hygiene technique  - Identify and instruct in appropriate isolation precautions for identified infection/condition  Outcome: Progressing  Goal: Absence of fever/infection during neutropenic period  Description  INTERVENTIONS:  - Monitor WBC    Outcome: Progressing     Problem: SAFETY ADULT  Goal: Maintain or return to baseline ADL function  Description  INTERVENTIONS:  -  Assess patient's ability to carry out ADLs; assess patient's baseline for ADL function and identify physical deficits which impact ability to perform ADLs (bathing, care of mouth/teeth, toileting, grooming, dressing, etc )  - Assess/evaluate cause of self-care deficits   - Assess range of motion  - Assess patient's mobility; develop plan if impaired  - Assess patient's need for assistive devices and provide as appropriate  - Encourage maximum independence but intervene and supervise when necessary  - Involve family in performance of ADLs  - Assess for home care needs following discharge   - Consider OT consult to assist with ADL evaluation and planning for discharge  - Provide patient education as appropriate  Outcome: Progressing  Goal: Maintain or return mobility status to optimal level  Description  INTERVENTIONS:  - Assess patient's baseline mobility status (ambulation, transfers, stairs, etc )    - Identify cognitive and physical deficits and behaviors that affect mobility  - Identify mobility aids required to assist with transfers and/or ambulation (gait belt, sit-to-stand, lift, walker, cane, etc )  - [place] fall precautions as indicated by assessment  - Record patient progress and toleration of activity level on Mobility SBAR; progress patient to next Phase/Stage  - Instruct patient to call for assistance with activity based on assessment  - Consider rehabilitation consult to assist with strengthening/weightbearing, etc   Outcome: Progressing     Problem: DISCHARGE PLANNING  Goal: Discharge to home or other facility with appropriate resources  Description  INTERVENTIONS:  - Identify barriers to discharge w/patient and caregiver  - Arrange for needed discharge resources and transportation as appropriate  - Identify discharge learning needs (meds, wound care, etc )  - Arrange for interpretive services to assist at discharge as needed  - Refer to Case Management Department for coordinating discharge planning if the patient needs post-hospital services based on physician/advanced practitioner order or complex needs related to functional status, cognitive ability, or social support system  Outcome: Progressing     Problem: Knowledge Deficit  Goal: Patient/family/caregiver demonstrates understanding of disease process, treatment plan, medications, and discharge instructions  Description  Complete learning assessment and assess knowledge base    Interventions:  - Provide teaching at level of understanding  - Provide teaching via preferred learning methods  Outcome: Progressing     Problem: CARDIOVASCULAR - ADULT  Goal: Maintains optimal cardiac output and hemodynamic stability  Description  INTERVENTIONS:  - Monitor I/O, vital signs and rhythm  - Monitor for S/S and trends of decreased cardiac output  - Administer and titrate ordered vasoactive medications to optimize hemodynamic stability  - Assess quality of pulses, skin color and temperature  - Assess for signs of decreased coronary artery perfusion  - Instruct patient to report change in severity of symptoms  Outcome: Progressing  Goal: Absence of cardiac dysrhythmias or at baseline rhythm  Description  INTERVENTIONS:  - Continuous cardiac monitoring, vital signs, obtain 12 lead EKG if ordered  - Administer antiarrhythmic and heart rate control medications as ordered  - Monitor electrolytes and administer replacement therapy as ordered  Outcome: Progressing     Problem: MUSCULOSKELETAL - ADULT  Goal: Maintain or return mobility to safest level of function  Description  INTERVENTIONS:  - Assess patient's ability to carry out ADLs; assess patient's baseline for ADL function and identify physical deficits which impact ability to perform ADLs (bathing, care of mouth/teeth, toileting, grooming, dressing, etc )  - Assess/evaluate cause of self-care deficits   - Assess range of motion  - Assess patient's mobility  - Assess patient's need for assistive devices and provide as appropriate  - Encourage maximum independence but intervene and supervise when necessary  - Involve family in performance of ADLs  - Assess for home care needs following discharge   - Consider OT consult to assist with ADL evaluation and planning for discharge  - Provide patient education as appropriate  Outcome: Progressing  Goal: Maintain proper alignment of affected body part  Description  INTERVENTIONS:  - Support, maintain and protect limb and body alignment  - Provide patient/ family with appropriate education  Outcome: Progressing     Problem: Prexisting or High Potential for Compromised Skin Integrity  Goal: Skin integrity is maintained or improved  Description  INTERVENTIONS:  - Identify patients at risk for skin breakdown  - Assess and monitor skin integrity  - Assess and monitor nutrition and hydration status  - Monitor labs   - Assess for incontinence   - Turn and reposition patient  - Assist with mobility/ambulation  - Relieve pressure over bony prominences  - Avoid friction and shearing  - Provide appropriate hygiene as needed including keeping skin clean and dry  - Evaluate need for skin moisturizer/barrier cream  - Collaborate with interdisciplinary team   - Patient/family teaching  - Consider wound care consult   Outcome: Progressing

## 2020-02-21 NOTE — PROGRESS NOTES
Progress Note - Linwood Mazariegos 1945, 69 y o  male MRN: [id number]    Unit/Bed#: -01 Encounter: [id number]    Primary Care Provider: Abbe Patrick MD   Date and time admitted to hospital: 2/19/2020  1:18 PM        Congestive heart failure (CHF) [facility])  Assessment & Plan  Wt Readings from Last 3 Encounters:   02/20/20 111 kg (244 lb 4 3 oz)   02/04/20 108 kg (239 lb)   01/07/20 113 kg (249 lb)     · Managed on home on Bumex Monday Wednesday Friday but per patient he has not been taking this as it was discontinued by his Cardiologist and but does take spironolactone  · Echocardiogram reveals LVEF of 45%  Mild diffuse hypokinesis  Wall thickness markedly increased  RV Systolic function mildly reduced  Mild mitral valve regurgitation  Mild tricuspid valve regurgitation  · Daily weight  · Intake and output  · Cardiac diet, low-sodium, fluid restriction  · Decreased pt's Cardizem from 180mg to 120mg today in order for BP to be able to tolerate diuresis          Leukocytosis  Assessment & Plan  · Marked leukocytosis of 19 on admission improved to 14 78 this AM  · Antibiotic therapy as above  · Trend CBC    Hyponatremia  Assessment & Plan  · Present on admission with sodium of 129, most likely 2/2 bumex   · Improved to 133 this AM   · Repeat morning    History of methicillin resistant staphylococcus aureus (MRSA)  Assessment & Plan  · Questionable history of MRSA on review of chart; in review of records cannot find any noted history  · Patient denies any history that he knows of; will hold off on initiation of Vancomycin at this time     Hypertension  Assessment & Plan  · Blood pressure acceptable on admission with systolics in the 902W  · Continue to monitor with schedule vitals    Acute kidney injury superimposed on CKD ([facility] [address] )  Assessment & Plan  · Chronic kidney disease stage 3; baseline appears to be approximately 1 2-1 5; 1 84 this AM after gentle hydration  · Gentle IV fluids dc'ed due to concern for volume overload  Lasix 20mg x 1 dose ordered  · Repeat BMP in the morning  · Avoid nephrotoxins, hypotension, and NSAIDs    Chronic deep vein thrombosis (DVT) (HCC)  Assessment & Plan  · VAS lower limb venous duplex studies completed in November of 2019 revealed in no evidence of right lower limb thrombus in the common femoral artery  No gross evidence of acute DVT based on color-flow analysis the left lower limb  · See picture for current appearance  Persistent atrial fibrillation  Assessment & Plan  · Managed on diltiazem and Eliquis; rate controlled on admission  · Continue while inpatient    Class 1 obesity due to excess calories with serious comorbidity and body mass index (BMI) of 34 0 to 34 9 in adult  Assessment & Plan  · Lifestyle modifications recommended    Gastroesophageal reflux disease without esophagitis  Assessment & Plan  · Continue Protonix    * Cellulitis of right lower extremity  Assessment & Plan    · Marked leukocytosis of 19 26 on admission  Afebrile, no tachycardia   Imaging neg for acute osseous abnormality  Postoperative changes status post hindfoot fusion   Antibiotics with Ancef IV    Orthopedic consult given extensive hardware history- do not recommend any surgical intervention at this time, recommend podiatry consult   Podiatry consult placed   Serial foot/leg exams   Ace wrap to help with compression   Lasix 20mg x 1 dose (had been holding due to concern for BP and kidney function)          VTE Pharmacologic Prophylaxis:   Pharmacologic: Apixaban (Eliquis)  Mechanical VTE Prophylaxis in Place: No    Patient Centered Rounds: I have performed bedside rounds with nursing staff today  Discussions with Specialists or Other Care Team Provider: surg,pod    Education and Discussions with Family / Patient: pt    Time Spent for Care: 30 minutes  More than 50% of total time spent on counseling and coordination of care as described above      Current Length of Stay: 1 day(s)    Current Patient Status: Inpatient   Certification Statement: The patient will continue to require additional inpatient hospital stay due to plan as above    Discharge Plan: home once medically cleared    Code Status: Level 1 - Full Code      Subjective:   No acute events overnight  Pt states that the pain in his RLE has improved  Pt's concerned about the swelling in his RLE  Requesting his water pill  Explained that it was held due to his kidney function initially, but that we could proceed today  Objective:     Vitals:   Temp (24hrs), Av °F (36 7 °C), Min:97 6 °F (36 4 °C), Max:98 1 °F (36 7 °C)    Temp:  [97 6 °F (36 4 °C)-98 1 °F (36 7 °C)] 97 6 °F (36 4 °C)  HR:  [65-77] 67  Resp:  [18-20] 18  BP: ()/(47-66) 105/66  SpO2:  [96 %-100 %] 97 %  Body mass index is 33 13 kg/m²  Input and Output Summary (last 24 hours): Intake/Output Summary (Last 24 hours) at 2020  Last data filed at 2020 0825  Gross per 24 hour   Intake    Output 1075 ml   Net -1075 ml       Physical Exam:     Physical Exam   Constitutional: No distress  HENT:   Head: Normocephalic and atraumatic  Eyes: Conjunctivae are normal    Cardiovascular: Normal rate and regular rhythm  No murmur heard  Pulmonary/Chest: Effort normal and breath sounds normal  No respiratory distress  He has no wheezes  He has no rales  Abdominal: Soft  Bowel sounds are normal  He exhibits no distension  There is no tenderness  Musculoskeletal: He exhibits edema  He exhibits no tenderness  RLE +2 edema  R hindfoot fusion   Neurological: He is alert  Skin: Skin is warm and dry  He is not diaphoretic  RLE erythema extending from foot to knee  Minimal warmth  No open wounds  LLE chronic skin changes 2/2 PVD  No erythema or warmth   Psychiatric: He has a normal mood and affect  Nursing note and vitals reviewed        Additional Data:     Labs:    Results from last 7 days   Lab Units 20  6964 02/19/20  1412   WBC Thousand/uL 14 78* 19 26*   HEMOGLOBIN g/dL 14 1 14 3   HEMATOCRIT % 42 0 42 6   PLATELETS Thousands/uL 107* 120*   BANDS PCT %  --  4   NEUTROS PCT % 87*  --    LYMPHS PCT % 4*  --    LYMPHO PCT %  --  4*   MONOS PCT % 7  --    MONO PCT %  --  2*   EOS PCT % 1 0     Results from last 7 days   Lab Units 02/20/20  0519   SODIUM mmol/L 133*   POTASSIUM mmol/L 4 3   CHLORIDE mmol/L 99*   CO2 mmol/L 25   BUN mg/dL 69*   CREATININE mg/dL 1 84*   ANION GAP mmol/L 9   CALCIUM mg/dL 8 9   ALBUMIN g/dL 2 9*   TOTAL BILIRUBIN mg/dL 1 10*   ALK PHOS U/L 82   ALT U/L 18   AST U/L 32   GLUCOSE RANDOM mg/dL 70                           * I Have Reviewed All Lab Data Listed Above  * Additional Pertinent Lab Tests Reviewed: Vladimir Sawant Admission Reviewed    Imaging:    Imaging Reports Reviewed Today Include: R foot xr      Recent Cultures (last 7 days):           Last 24 Hours Medication List:     Current Facility-Administered Medications:  apixaban 5 mg Oral BID NAVNEET [place]    vitamin C 1,000 mg Oral Daily NAVNEET [place]    benzonatate 100 mg Oral TID PRN NAVNEET [place]    cefazolin 2,000 mg Intravenous Q8H NAVNEET [place] Last Rate: 2,000 mg (02/20/20 1658)   cholecalciferol 1,000 Units Oral Daily NAVNEET [place]    [START ON 2/21/2020] diltiazem 120 mg Oral Daily Misael Sher DO    DULoxetine 30 mg Oral Daily NAVNEET [place]    furosemide 20 mg Oral Once AVANI Gr DO    HYDROcodone-acetaminophen 2 tablet Oral Q4H PRN NAVNEET [place]    pantoprazole 40 mg Oral Daily NAVNEET Toney         Today, Patient Was Seen By: AVANI Gr DO    ** Please Note: Dictation voice to text software may have been used in the creation of this document   **

## 2020-02-21 NOTE — PROGRESS NOTES
Patient refused ace wrap on the legs  ' stating he preferred ' them loose'  Explained to the patient the importance of the ace wrap to help with the swelling  No success though

## 2020-02-21 NOTE — ASSESSMENT & PLAN NOTE
· Blood pressure acceptable on admission with systolics in the 737A  · Continue to monitor with schedule vitals

## 2020-02-21 NOTE — ASSESSMENT & PLAN NOTE
· Marked leukocytosis of 19 on admission improved to 14 78 this AM  · Antibiotic therapy as above  · Trend CBC

## 2020-02-21 NOTE — PROGRESS NOTES
Did make doctor Nelly Godinez aware of patient's request of 'water pill'  Explained to the patient that the aldactone was on hold because of his low blood pressure  Pt still insisted  Also did make doctor KARISSA MUNOZ aware of patient's low blood pressures

## 2020-02-21 NOTE — CONSULTS
Consult - Podiatry   Linwood Mazariegos 69 y o  male MRN: [id number]  Unit/Bed#: -01 Encounter: [id number]    Assessment/Plan   Cellulitis  Left foot/leg  Chronic Venous Insufficiency   - continue with current antibiotics, elevation and compression    - will follow-up with patient upon discharge, consider new compression stockings  - 19k WBC count has dropped quickly since admission  S/P pantalar arthrodesis right ankle   Displaced deep implant right foot   -   Prominent screw head right medial midfoot, eschar covering clinical prominence of screw head with no exposed hardware  -  Patient advised to avoid wearing shoes and to pad this area as previously discussed  -   Patient had trouble with recurring open wounds lateral right rearfoot within surgical scars from previous arthrodesis of the ankle  I suspect a component of chronic osteo may be present  This lateral area the rearfoot has healed well to this point and is no longer symptomatic  History of Present Illness     HPI:  Boston Carvalho is a 69 y o  male who presents with  painful red swollen right leg  Patient relates this starting after wearing tight boots that did not properly fit  Podiatry consult requested to evaluate Right lower extremity  Patient is known to me from previous ulcerative breakdown of the Lateral Rearfoot/Ankle last summer which took some time to close but has remained closed for several months  Inpatient consult to Podiatry  Consult performed by: Arden Lynn DPM  Consult ordered by: [employer], DO        Review of Systems   Constitutional: Negative  HENT: Negative  Eyes: Negative  Respiratory: Negative  Cardiovascular:  Significant for chronic AFib and congestive heart failure    Gastrointestinal: Negative      Musculoskeletal:  Painful right leg   Skin: chronic stasis changes   Neurological:  negative        Historical Information   Past Medical History:   Diagnosis Date    Atrial fibrillation [facility])     Cardiac disease     Cellulitis     CHF (congestive heart failure) (HCC)     Chronic pain     Chronic venous hypertension     with ulceration    GERD (gastroesophageal reflux disease)     History of methicillin resistant staphylococcus aureus (MRSA) 11/26/2019    negative nasal swab     Hyperlipidemia     Hypertension     Obesity     Pulmonary hypertension ([facility] )     PVD (peripheral vascular disease) ([facility] )     Vascular disorder of extremity ([place] [id number] )      Past Surgical History:   Procedure Laterality Date    ANTERIOR RELEASE VERTEBRAL BODY W/ POSTERIOR FUSION      APPENDECTOMY  1954    CATARACT EXTRACTION      DECOMPRESSION SPINE LUMBAR POSTERIOR      ELBOW SURGERY      KNEE SURGERY      NOSE SURGERY      turbinectomy    RETINAL DETACHMENT SURGERY      RHINOPLASTY      TONSILLECTOMY      VEIN LIGATION AND STRIPPING      saphenous vein long     Social History   Social History     Substance and Sexual Activity   Alcohol Use No    Frequency: Never    Binge frequency: Never     Social History     Substance and Sexual Activity   Drug Use No     Social History     Tobacco Use   Smoking Status Never Smoker   Smokeless Tobacco Never Used     Family History:   Family History   Problem Relation Age of Onset    Cancer Mother         cancer of uterus    Diabetes Sister     Mental illness Neg Hx         neg fam hx    Substance Abuse Neg Hx         neg fam hx       Meds/Allergies   Medications Prior to Admission   Medication    apixaban (ELIQUIS) 5 mg    Ascorbic Acid (VITAMIN C) 1000 MG tablet    bumetanide (BUMEX) 2 mg tablet    cholecalciferol (VITAMIN D3) 1,000 units tablet    diltiazem (CARDIZEM CD) 180 mg 24 hr capsule    DULoxetine (CYMBALTA) 30 mg delayed release capsule    HYDROcodone-acetaminophen (NORCO) 7 5-325 mg per tablet    losartan (COZAAR) 50 mg tablet    meloxicam (MOBIC) 15 mg tablet    pantoprazole (PROTONIX) 40 mg tablet    potassium chloride (K-DUR) 10 mEq tablet  spironolactone (ALDACTONE) 25 mg tablet    vitamin E, tocopherol, 400 units capsule    benzonatate (TESSALON PERLES) 100 mg capsule    sildenafil (VIAGRA) 100 mg tablet    sodium chloride (OCEAN) 0 65 % nasal spray     No Known Allergies    Objective   First Vitals:   Blood Pressure: 101/54 (02/19/20 1241)  Pulse: 80 (02/19/20 1241)  Temperature: 97 8 °F (36 6 °C) (02/19/20 1241)  Temp Source: Oral (02/19/20 2320)  Respirations: 18 (02/19/20 1241)  Height: 6' (182 9 cm) (02/19/20 1241)  Weight - Scale: 108 kg (238 lb) (02/19/20 1241)  SpO2: 95 % (02/19/20 1241)    Current Vitals:   Blood Pressure: 105/65 (02/21/20 1500)  Pulse: 75 (02/21/20 1500)  Temperature: 98 1 °F (36 7 °C) (02/21/20 1500)  Temp Source: Oral (02/21/20 1500)  Respirations: 18 (02/21/20 1500)  Height: 6' (182 9 cm) (02/19/20 1241)  Weight - Scale: 115 kg (254 lb 3 2 oz) (02/21/20 0600)  SpO2: 92 % (02/21/20 1500)        /65 (BP Location: Right arm)   Pulse 75   Temp 98 1 °F (36 7 °C) (Oral)   Resp 18   Ht 6' (1 829 m)   Wt 115 kg (254 lb 3 2 oz)   SpO2 92%   BMI 34 48 kg/m²     General Appearance:    Alert, cooperative, no distress   Head:    Normocephalic, without obvious abnormality, atraumatic   Eyes:    PERRL, conjunctiva/corneas clear, EOM's intact            Nose:   Moist mucous membranes, no drainage or sinus tenderness   Throat:   No tenderness, no exudates   Neck:   Supple, symmetrical, trachea midline, no JVD   Back:     Symmetric, no CVA tenderness   Lungs:     Respirations unlabored   Chest wall:    No tenderness or deformity   Heart:    Rate and rhythm noted on last vitals  Abdomen:     Soft, non-tender, bowel sounds active all four quadrants,     no masses, no organomegaly       Lower Ext/Ortho: absent range of motion right ankle status post arthrodesis pantalar, gait abnormality with limited ambulation utilizing power wheelchair    Both legs are warm to touch with the right being slightly greater, diminished edema over the past 24 hours with compression and IV antibiotics  Neuro/Vasc: CNII-XII intact  Normal strength  Sensation and reflexes:   Grossly intact  Pulses: Right: DP  0/4, PT  0/4, Left: DP  0/4, PT  0/4   Capillary Filling: 3 Sec, Edema:  Chronic +2 bilateral   8/2020 Arterial duplex showed triphasic waveforms of DP&PT bilateral   EMMANUEL 1 3 on the right, and 0 9 on the left   Skin: Texture, Tone and Turgor: Diminished, Digital Hair: none  Atrophic Changes:  moderate   Lesions:  none  Nail Pathology:  Severely dystrophic nails x10 consistent with mycosis, brittle nature, and moderate/severe subungual debris  Ulcers/Wounds:   No open wounds noted, thin stable eschar overlying the medial aspect of the rearfoot ( prominent screw head) no drainage or open exposed hardware noted  Lab Results:   CBC w/diff  Results from last 7 days   Lab Units 02/21/20  0520 02/20/20  0519   WBC Thousand/uL 11 10* 14 78*   HEMOGLOBIN g/dL 13 5 14 1   HEMATOCRIT % 40 1 42 0   PLATELETS Thousands/uL 95* 107*   NEUTROS PCT %  --  87*   LYMPHS PCT %  --  4*   MONOS PCT %  --  7   EOS PCT %  --  1     BMP  Results from last 7 days   Lab Units 02/21/20  0520   POTASSIUM mmol/L 4 3   CHLORIDE mmol/L 98*   CO2 mmol/L 24   BUN mg/dL 66*   CREATININE mg/dL 1 76*   CALCIUM mg/dL 9 2     CMP  Results from last 7 days   Lab Units 02/21/20  0520 02/20/20  0519   POTASSIUM mmol/L 4 3 4 3   CHLORIDE mmol/L 98* 99*   CO2 mmol/L 24 25   BUN mg/dL 66* 69*   CREATININE mg/dL 1 76* 1 84*   CALCIUM mg/dL 9 2 8 9   ALK PHOS U/L  --  82   ALT U/L  --  18   AST U/L  --  32     @Culture@  Lab Results   Component Value Date    URINECX <10,000 cfu/ml  02/19/2020         Imaging: I have personally reviewed pertinent films in PACS      EKG, Pathology, and Other Studies: I have personally reviewed pertinent reports        Code Status: Level 1 - Full Code      Please Note: Dictation voice to text software was used in the creation of this document         Cindi Olmstead, [email or web address]

## 2020-02-21 NOTE — ASSESSMENT & PLAN NOTE
Patient:   CHRISTOPHE PERALTA            MRN: [facility]-[id number]            FIN: [id number]               Age:   88 years     Sex:  MALE     :  32   Associated Diagnoses:   None   Author:   FABIANO CR      Chief Complaint:       History of present illness:    Mr. Christophe Peralta, a very pleasant 87YO,  M w PMH Of HTN, AAA s/p repair, Hep. C, hx of recurrent UTI on abx prophylaxis presents w/ several weeks of progressively worsening NUGENT and intermittent chest pain. He reports, over the past 2-3 weeks, he has noticed he becomes more exhausted doing typical activities he does w/ ease. 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 He mentioned his NUGENT as reached the point where he needs to stop and catch his breath after .5 blocks, which is a drastic decline from his baseline. In regards to the chest pain, he mentioend it is intermittent, generaly last for only a few seconds and occurs while at rest/exertion. The chest pain is non-reproducible, non-positional. Also complains of lightheadedness, denies any LOC, new headaches, dizziness.  And mentinoed intermittent b/l leg cramping, and genearl weakness/fatigue.      Denies any fevers, chills, abd. pain, dysuria, msk complaints, focal neuro deficits. No recent travle, recent illness, sick contacts, new  meds. Denies any hx smoking, EtOH abuse, IVDU.     Says 5-6 weeks ago received annual flu shot and since then has felt more fatigued than usual.     Regarding his other chronic medical conditions, his HTN is well managed, spke to patient's PCP and echoed this as well. Patient w/ AAA repair (attempted to look up records in [facility] and epic, no records to review ), and receiving aequate f/u. Patient is not any daily meds for hep. c.     In the ED, tachycardic, desat'in on RA into low 90s/high 80s, placed on NC and sat'ing in 90s. Lytes WNL, WBC:12.7,lipase: 63 Cr: wnl, Trops: negX2. ProBNP:>700 CXR  · Chronic kidney disease stage 3; baseline appears to be approximately 1 2-1 5; 1 84 this AM after gentle hydration  · Gentle IV fluids dc'ed due to concern for volume overload  Lasix 20mg x 1 dose ordered    · Repeat BMP in the morning  · Avoid nephrotoxins, hypotension, and NSAIDs unremarkable.     Review of Systems:  Constitutional: +Fatigue/weakness   Skin: No rash  Eyes: No recent vision problems or eye pain    ENT: No congestion, ear pain, or sore throat  Endocrine: No thyroid problems    Cardiovascular: No chest pain  Respiratory: +SOB  Gastrointestinal: No nausea, no vomiting or diarrhea  Musculoskeletal: No joint swelling    Neurologic: No headache  Hematologic: No unusual bruising or bleeding  Psychiatric: No psychiatric problems, hallucinations or depression    Past medical history:   BPH (benign prostatic hyperplasia)  HTN  Hepatitis c, chronic  Thrombocytopenia  Urinary bladder disorder    Past surgical history:   Ganglionectomy of peripheral nerve  Abdominal aortic aneurysm stenting  Foot repair  Knee arthroplasty    Family history:   No positive family history reported.      Social history:   Alcohol  Details: None  Exercise  Details: Excercise: Regularly.  Duration (mins): 45.  Times Per Week: 3-4 times/week.  Type: Swimming.  Home/Environment  Details: Alcohol Abuse in Household: No.  Substance Abuse in Household: No.  Smoker in Household: No.  Patient Lives With: Alone.  Ambulation: Independent.  Bathing: Independent.  Dressing: Independent.  Driving: Independent.  Eating: Independent.  Elimination: Independent.  Grooming: Independent.  Preparing Meal: Independent.  Taking Meds: Independent.  Toileting: Independent.  Transfers: Independent.  Sexual  Details: Sexual orientation: Straight or heterosexual.  Gender Identity: Identifies as male.  Gender on Ins: Male.  Preferred Pronouns: Male.  Substance Abuse  Details: None  Tobacco  Details: No, Former smoker; Comment(s): quit in 1980  Cultural/Restorationist Practices  Details: Restorationist or Cultural Practices While in Hospital: No.     Home Medications (5) Active  amLODIPine oral 5 mg tablet 5 mg = 1 tab, Oral, Daily  Centrum Silver oral tablet 1 tab, Oral, Daily  hydrochlorothiazide-losartan 25 mg-100 mg oral tablet 1 tab, Oral,  Daily  nitrofurantoin macrocrystals oral 50 mg capsule 50 mg = 1 cap, Oral, Q Bedtime  Bricelyn-3 oral capsule 1 cap, Oral, Daily  Medications (6) Active  Scheduled: (4)  AmLODIPine 5 mg tab  5 mg 1 tab, Oral, Daily  HydroCHLOROthiazide 25 mg tab  25 mg 1 tab, Oral, Daily  Losartan 100 mg tab  100 mg 1 tab, Oral, Daily  Nitrofurantoin 5 mg/mL oral susp syringe  50 mg 10 mL, Oral, Q Bedtime  Continuous: (0)  PRN: (2)  Acetaminophen 325 mg tab  650 mg 2 tab, Oral, Q4H  Senna-docusate sodium 8.6-50 mg tab  2 tab, Oral, Q Bedtime     Allergies (1) Active Reaction  NKA None Documented    Vitals between:   20-DEC-2018 02:01:19   TO   21-DEC-2018 02:01:19                   LAST RESULT MINIMUM MAXIMUM  Temperature 36.1 36.1 36.7  Heart Rate 87 76 104  Respiratory Rate 20 18 24  NISBP           128 111 129  NIDBP           80 64 86  NIMBP           96 78 96  SpO2                    96 93 96    Physical exam:  General: resting in bed, NAD  Chest: CTA w/ no crackles, wheezing, rhonchi  Cardiac: tachy, normal s1/2, no gallops, rubs  Abdomen: Normal BS, no TTP, no distension,   Musculoskeletal: no periph edema  Neurologic: AxO3, 5/5 Upper/Lower ext. strenght, sensation to light touch intact, heel to shin intact, CN2-12 intact    Labs:      Labs between:  20-DEC-2018 02:01 to 21-DEC-2018 02:01    CBC:                 WBC  HgB  Hct  Plt  MCV  RDW   20-DEC-2018 (H) 12.7  16.5  49.1  168  95.2  13.3     DIFF:                 Seg  Neutroph//ABS  Lymph//ABS  Mono//ABS  EOS/ABS   20-DEC-2018 NOT APPLICABLE  57 // 7.3 30 // 3.8 9 // (H) 1.2  3 // 0.4    BMP:                 Na  Cl  BUN  Glu   20-DEC-2018 137  99  (H) 22  (H) 109                              K  CO2  Cr  Ca                              4.2  27  0.80  9.6     CMP:                 AST  ALT  AlkPhos  Bili  Albumin   20-DEC-2018 (H) 38  30  73  0.5  4.2     Other Chem:             Mg  Phos  Triglycerides  GGTP  DirectBili                           2.1                             Radiology:   Result title:  XR CHEST 2V  Result status:  Final  Verified by:  TYLER ALMANZAR on 12/20/2018 7:01  IMPRESSION: Stable contours the cardiomediastinal silhouette.  Mild flattening of the hemidiaphragms suggesting possible hyperinflation.  This can be seen in COPD.  No large airspace opacity or pneumothorax.  Minimal blunting of both costophrenic angle suggests scarring/atelectasis.  No large pleural effusion.Some degenerative changes of the spine.  Calcifications of the aorta are noted.     Assessment/Plan:   Mr. Maged Dejesus, a very pleasant 87YO,  M w PMH Of HTN, AAA s/p repair, Hep. C presents w/ several weeks of progressively worsening NUGENT and intermittent chest pain.     #Chest pain/SOB/Fatigue:  - Several weeks of prog. worsening SOB  - On admission, tachy, tachpneic, desat'ing on RA, on NC sati'ing in 90s  - WBC:12.7, lipase: 63, Trops: negX2, ProBNP: ~700, ekg unremarkable  - CXR: unremarkable   - CT-PE: Final report: pending, prelim: No large PE observed    # HTN :   - Well controlled  - Continue hctz-losartan, amlodopine    #AAA s/p repair:  - Stable  - Last Aortic US: Relatively stable sized excluded infrarenal abdominal aortic aneurysm with stent graft in place, considering limitations.  No large endoleak, however accurate evaluation for endoleak as limited on present study.    #History of Recurrent UTI:  - Continue daily nitrofur.    # Hepatitis C :   - stable , not on medications    F: -   E: replete prn   N: Heart healthy diet    DVT prophylaxis - SCD erin, SQ    Notified PCP, Primary Care Physician      Physician Name:  ANTHONY MIMS  Specialty :  NONE    No Consulting Physicians.    Discussed w/ SMR    Fabiano Rodriguez MD   Prelim, PGY-1 Internal Medicine  Please Contact through [employer]                Electronically Signed On 12/21/2018 02:45  __________________________________________________   FABIANO CR      Electronically Signed On 12/21/2018  07:09  __________________________________________________   ANUPAMA ESTRADA      Electronically Signed On 12/21/2018 16:14  __________________________________________________   ANTOINETTE BERNARD                Patient  seen and examined  History and physical exam as documented above has been confirmed  All pertinent laboratory and diagnostic test reviewed  Plan formulated with the team and my recommendations incorporated.           Electronically Signed On 12/21/2018 07:09  __________________________________________________   ANUPAMA ESTRADA              Patient feels and looks better this am. Was successfully weaned off oxygen and saturating well on RA.  No other complaints for me.   ECHO was obtained, read pending. Will f/u on that.  Patient worked with PT this am. RN walked with patient through the entire unit and he was mildly dyspneic towards the end but was saturating in the 90s throughout.   VSS and labs insignificant.   Will likely discharge patient if ECHO WNL after d/w attending physician Dr. Anderson.      Antoinette Mckenzie MD  PGY-1  Please contact through perfect serve            Electronically Signed On 12/21/2018 16:14  __________________________________________________   ANTOINETTE BERNARD              Previous entries reviewed, pt examined.  Reports feeling about the same  No new chest pain but he feels that is is because he is not doing anything while in hospital  NUGENT persists, had relative hypoxemia that has been corrected  Leukocytosis upon admission has resolved  It seems more likely to be cardiac/GI pathology and less likely infectious process.  Will await echo report but may benefit from cardiology consult to determine need for cardiac w/u.  If his symptoms persist may consider GI w/u in future.  Case d/w team and patient  Agree with current management.            Electronically Signed On 12/21/2018 17:46  __________________________________________________   ANTHONY MIMS

## 2020-02-22 ENCOUNTER — APPOINTMENT (INPATIENT)
Dept: RADIOLOGY | Facility: HOSPITAL | Age: 75
DRG: 571 | End: 2020-02-22
Payer: COMMERCIAL

## 2020-02-22 LAB
ANION GAP SERPL CALCULATED.3IONS-SCNC: 11 MMOL/L (ref 4–13)
BUN SERPL-MCNC: 58 MG/DL (ref 5–25)
CALCIUM SERPL-MCNC: 9 MG/DL (ref 8.3–10.1)
CHLORIDE SERPL-SCNC: 95 MMOL/L (ref 100–108)
CO2 SERPL-SCNC: 24 MMOL/L (ref 21–32)
CREAT SERPL-MCNC: 1.52 MG/DL (ref 0.6–1.3)
ERYTHROCYTE [DISTWIDTH] IN BLOOD BY AUTOMATED COUNT: 15.6 % (ref 11.6–15.1)
GFR SERPL CREATININE-BSD FRML MDRD: 45 ML/MIN/1.73SQ M
GLUCOSE SERPL-MCNC: 98 MG/DL (ref 65–140)
HCT VFR BLD AUTO: 38.6 % (ref 36.5–49.3)
HGB BLD-MCNC: 13.1 G/DL (ref 12–17)
MCH RBC QN AUTO: 32.7 PG (ref 26.8–34.3)
MCHC RBC AUTO-ENTMCNC: 33.9 G/DL (ref 31.4–37.4)
MCV RBC AUTO: 96 FL (ref 82–98)
PLATELET # BLD AUTO: 121 THOUSANDS/UL (ref 149–390)
PMV BLD AUTO: 11.7 FL (ref 8.9–12.7)
POTASSIUM SERPL-SCNC: 4.2 MMOL/L (ref 3.5–5.3)
PROCALCITONIN SERPL-MCNC: 2.77 NG/ML
RBC # BLD AUTO: 4.01 MILLION/UL (ref 3.88–5.62)
SODIUM SERPL-SCNC: 130 MMOL/L (ref 136–145)
WBC # BLD AUTO: 14.06 THOUSAND/UL (ref 4.31–10.16)

## 2020-02-22 PROCEDURE — 84145 PROCALCITONIN (PCT): CPT | Performed by: FAMILY MEDICINE

## 2020-02-22 PROCEDURE — 71045 X-RAY EXAM CHEST 1 VIEW: CPT

## 2020-02-22 PROCEDURE — 85027 COMPLETE CBC AUTOMATED: CPT | Performed by: FAMILY MEDICINE

## 2020-02-22 PROCEDURE — 80048 BASIC METABOLIC PNL TOTAL CA: CPT | Performed by: FAMILY MEDICINE

## 2020-02-22 PROCEDURE — 99232 SBSQ HOSP IP/OBS MODERATE 35: CPT | Performed by: FAMILY MEDICINE

## 2020-02-22 RX ORDER — SENNOSIDES 8.6 MG
2 TABLET ORAL
Status: DISCONTINUED | OUTPATIENT
Start: 2020-02-22 | End: 2020-02-22

## 2020-02-22 RX ORDER — SENNOSIDES 8.6 MG
2 TABLET ORAL DAILY
Status: DISCONTINUED | OUTPATIENT
Start: 2020-02-22 | End: 2020-03-10 | Stop reason: HOSPADM

## 2020-02-22 RX ORDER — FUROSEMIDE 10 MG/ML
20 INJECTION INTRAMUSCULAR; INTRAVENOUS ONCE
Status: COMPLETED | OUTPATIENT
Start: 2020-02-22 | End: 2020-02-22

## 2020-02-22 RX ORDER — VANCOMYCIN HYDROCHLORIDE 1 G/200ML
10 INJECTION, SOLUTION INTRAVENOUS EVERY 12 HOURS
Status: DISCONTINUED | OUTPATIENT
Start: 2020-02-22 | End: 2020-02-23

## 2020-02-22 RX ORDER — MAGNESIUM HYDROXIDE/ALUMINUM HYDROXICE/SIMETHICONE 120; 1200; 1200 MG/30ML; MG/30ML; MG/30ML
30 SUSPENSION ORAL EVERY 4 HOURS PRN
Status: DISCONTINUED | OUTPATIENT
Start: 2020-02-22 | End: 2020-03-10 | Stop reason: HOSPADM

## 2020-02-22 RX ADMIN — HYDROCODONE BITARTRATE AND ACETAMINOPHEN 2 TABLET: 5; 325 TABLET ORAL at 18:12

## 2020-02-22 RX ADMIN — OXYCODONE HYDROCHLORIDE AND ACETAMINOPHEN 1000 MG: 500 TABLET ORAL at 08:20

## 2020-02-22 RX ADMIN — CEFAZOLIN SODIUM 2000 MG: 2 SOLUTION INTRAVENOUS at 08:20

## 2020-02-22 RX ADMIN — VANCOMYCIN HYDROCHLORIDE 1000 MG: 1 INJECTION, SOLUTION INTRAVENOUS at 18:12

## 2020-02-22 RX ADMIN — STANDARDIZED SENNA CONCENTRATE 17.2 MG: 8.6 TABLET ORAL at 18:41

## 2020-02-22 RX ADMIN — APIXABAN 5 MG: 5 TABLET, FILM COATED ORAL at 18:03

## 2020-02-22 RX ADMIN — FUROSEMIDE 20 MG: 10 INJECTION, SOLUTION INTRAMUSCULAR; INTRAVENOUS at 18:02

## 2020-02-22 RX ADMIN — PANTOPRAZOLE SODIUM 40 MG: 40 TABLET, DELAYED RELEASE ORAL at 08:20

## 2020-02-22 RX ADMIN — APIXABAN 5 MG: 5 TABLET, FILM COATED ORAL at 08:21

## 2020-02-22 RX ADMIN — MELATONIN 1000 UNITS: at 08:21

## 2020-02-22 RX ADMIN — HYDROCODONE BITARTRATE AND ACETAMINOPHEN 2 TABLET: 5; 325 TABLET ORAL at 23:19

## 2020-02-22 RX ADMIN — HYDROCODONE BITARTRATE AND ACETAMINOPHEN 2 TABLET: 5; 325 TABLET ORAL at 04:54

## 2020-02-22 RX ADMIN — DILTIAZEM HYDROCHLORIDE 120 MG: 120 CAPSULE, COATED, EXTENDED RELEASE ORAL at 08:21

## 2020-02-22 RX ADMIN — DULOXETINE 30 MG: 30 CAPSULE, DELAYED RELEASE ORAL at 08:20

## 2020-02-22 NOTE — PROGRESS NOTES
Vancomycin Assessment    Servando Se is a 69 y o  male who is currently receiving vancomycin 1750 mg q12h for skin-soft tissue infection     Relevant clinical data and objective history reviewed:  Creatinine   Date Value Ref Range Status   02/22/2020 1 52 (H) 0 60 - 1 30 mg/dL Final     Comment:     Standardized to IDMS reference method   02/21/2020 1 76 (H) 0 60 - 1 30 mg/dL Final     Comment:     Standardized to IDMS reference method   02/20/2020 1 84 (H) 0 60 - 1 30 mg/dL Final     Comment:     Standardized to IDMS reference method   12/17/2015 0 97 0 60 - 1 30 mg/dL Final     Comment:     Standardized to IDMS reference method   10/14/2014 1 06 0 60 - 1 30 mg/dL Final     Comment:     Standardized to IDMS reference method     /61 (BP Location: Right arm)   Pulse 60   Temp 97 5 °F (36 4 °C) (Oral)   Resp 18   Ht 6' (1 829 m)   Wt 116 kg (255 lb 11 7 oz)   SpO2 98%   BMI 34 68 kg/m²   I/O last 3 completed shifts: In: 1200 [P O :1200]  Out: 2585 [Urine:2585]  Lab Results   Component Value Date/Time    BUN 58 (H) 02/22/2020 05:55 AM    BUN 68 (H) 12/09/2019 02:15 PM    WBC 14 06 (H) 02/22/2020 05:55 AM    WBC 6 35 12/17/2015 11:30 AM    HGB 13 1 02/22/2020 05:55 AM    HGB 14 9 12/17/2015 11:30 AM    HCT 38 6 02/22/2020 05:55 AM    HCT 46 3 12/17/2015 11:30 AM    MCV 96 02/22/2020 05:55 AM    MCV 93 12/17/2015 11:30 AM     (L) 02/22/2020 05:55 AM     12/17/2015 11:30 AM     Temp Readings from Last 3 Encounters:   02/22/20 97 5 °F (36 4 °C) (Oral)   02/04/20 97 9 °F (36 6 °C) (Tympanic)   12/01/19 (!) 97 1 °F (36 2 °C) (Oral)     Vancomycin Days of Therapy: Day #1    Assessment/Plan  The patient is currently on vancomycin utilizing scheduled dosing  Baseline risks associated with therapy include: pre-existing renal impairment and advanced age    The patient was ordered 1750 mg q12h and did not receive a dose with the most recent vancomycin level being not at steady-state and sub-therapeutic based on a goal of 10-15 (mild infection/cellulitis); therefore, after clinical evaluation will be changed to 1000 mg q12h   Pharmacy will continue to follow closely for s/sx of nephrotoxicity, infusion reactions and appropriateness of therapy  BMP and CBC will be ordered per protocol  Plan for trough as patient approaches steady state, prior to the 5th  dose at approximately 1830 on 2/24/20  Pharmacy will continue to follow the patients culture results and clinical progress daily      Mundo Tiwari, Pharmacist

## 2020-02-22 NOTE — PLAN OF CARE
Problem: Potential for Falls  Goal: Patient will remain free of falls  Description  INTERVENTIONS:  - Assess patient frequently for physical needs  -  Identify cognitive and physical deficits and behaviors that affect risk of falls    -  [place] fall precautions as indicated by assessment   - Educate patient/family on patient safety including physical limitations  - Instruct patient to call for assistance with activity based on assessment  - Modify environment to reduce risk of injury  - Consider OT/PT consult to assist with strengthening/mobility  Outcome: Progressing     Problem: PAIN - ADULT  Goal: Verbalizes/displays adequate comfort level or baseline comfort level  Description  Interventions:  - Encourage patient to monitor pain and request assistance  - Assess pain using appropriate pain scale  - Administer analgesics based on type and severity of pain and evaluate response  - Implement non-pharmacological measures as appropriate and evaluate response  - Consider cultural and social influences on pain and pain management  - Notify physician/advanced practitioner if interventions unsuccessful or patient reports new pain  Outcome: Progressing     Problem: INFECTION - ADULT  Goal: Absence or prevention of progression during hospitalization  Description  INTERVENTIONS:  - Assess and monitor for signs and symptoms of infection  - Monitor lab/diagnostic results  - Monitor all insertion sites, i e  indwelling lines, tubes, and drains  - Monitor endotracheal if appropriate and nasal secretions for changes in amount and color  - [place] appropriate cooling/warming therapies per order  - Administer medications as ordered  - Instruct and encourage patient and family to use good hand hygiene technique  - Identify and instruct in appropriate isolation precautions for identified infection/condition  Outcome: Progressing  Goal: Absence of fever/infection during neutropenic period  Description  INTERVENTIONS:  - Monitor WBC    Outcome: Progressing     Problem: SAFETY ADULT  Goal: Maintain or return to baseline ADL function  Description  INTERVENTIONS:  -  Assess patient's ability to carry out ADLs; assess patient's baseline for ADL function and identify physical deficits which impact ability to perform ADLs (bathing, care of mouth/teeth, toileting, grooming, dressing, etc )  - Assess/evaluate cause of self-care deficits   - Assess range of motion  - Assess patient's mobility; develop plan if impaired  - Assess patient's need for assistive devices and provide as appropriate  - Encourage maximum independence but intervene and supervise when necessary  - Involve family in performance of ADLs  - Assess for home care needs following discharge   - Consider OT consult to assist with ADL evaluation and planning for discharge  - Provide patient education as appropriate  Outcome: Progressing  Goal: Maintain or return mobility status to optimal level  Description  INTERVENTIONS:  - Assess patient's baseline mobility status (ambulation, transfers, stairs, etc )    - Identify cognitive and physical deficits and behaviors that affect mobility  - Identify mobility aids required to assist with transfers and/or ambulation (gait belt, sit-to-stand, lift, walker, cane, etc )  - Brice fall precautions as indicated by assessment  - Record patient progress and toleration of activity level on Mobility SBAR; progress patient to next Phase/Stage  - Instruct patient to call for assistance with activity based on assessment  - Consider rehabilitation consult to assist with strengthening/weightbearing, etc   Outcome: Progressing     Problem: DISCHARGE PLANNING  Goal: Discharge to home or other facility with appropriate resources  Description  INTERVENTIONS:  - Identify barriers to discharge w/patient and caregiver  - Arrange for needed discharge resources and transportation as appropriate  - Identify discharge learning needs (meds, wound care, etc )  - Arrange for interpretive services to assist at discharge as needed  - Refer to Case Management Department for coordinating discharge planning if the patient needs post-hospital services based on physician/advanced practitioner order or complex needs related to functional status, cognitive ability, or social support system  Outcome: Progressing     Problem: Knowledge Deficit  Goal: Patient/family/caregiver demonstrates understanding of disease process, treatment plan, medications, and discharge instructions  Description  Complete learning assessment and assess knowledge base    Interventions:  - Provide teaching at level of understanding  - Provide teaching via preferred learning methods  Outcome: Progressing     Problem: CARDIOVASCULAR - ADULT  Goal: Maintains optimal cardiac output and hemodynamic stability  Description  INTERVENTIONS:  - Monitor I/O, vital signs and rhythm  - Monitor for S/S and trends of decreased cardiac output  - Administer and titrate ordered vasoactive medications to optimize hemodynamic stability  - Assess quality of pulses, skin color and temperature  - Assess for signs of decreased coronary artery perfusion  - Instruct patient to report change in severity of symptoms  Outcome: Progressing  Goal: Absence of cardiac dysrhythmias or at baseline rhythm  Description  INTERVENTIONS:  - Continuous cardiac monitoring, vital signs, obtain 12 lead EKG if ordered  - Administer antiarrhythmic and heart rate control medications as ordered  - Monitor electrolytes and administer replacement therapy as ordered  Outcome: Progressing     Problem: MUSCULOSKELETAL - ADULT  Goal: Maintain or return mobility to safest level of function  Description  INTERVENTIONS:  - Assess patient's ability to carry out ADLs; assess patient's baseline for ADL function and identify physical deficits which impact ability to perform ADLs (bathing, care of mouth/teeth, toileting, grooming, dressing, etc )  - Assess/evaluate cause of self-care deficits   - Assess range of motion  - Assess patient's mobility  - Assess patient's need for assistive devices and provide as appropriate  - Encourage maximum independence but intervene and supervise when necessary  - Involve family in performance of ADLs  - Assess for home care needs following discharge   - Consider OT consult to assist with ADL evaluation and planning for discharge  - Provide patient education as appropriate  Outcome: Progressing  Goal: Maintain proper alignment of affected body part  Description  INTERVENTIONS:  - Support, maintain and protect limb and body alignment  - Provide patient/ family with appropriate education  Outcome: Progressing     Problem: Prexisting or High Potential for Compromised Skin Integrity  Goal: Skin integrity is maintained or improved  Description  INTERVENTIONS:  - Identify patients at risk for skin breakdown  - Assess and monitor skin integrity  - Assess and monitor nutrition and hydration status  - Monitor labs   - Assess for incontinence   - Turn and reposition patient  - Assist with mobility/ambulation  - Relieve pressure over bony prominences  - Avoid friction and shearing  - Provide appropriate hygiene as needed including keeping skin clean and dry  - Evaluate need for skin moisturizer/barrier cream  - Collaborate with interdisciplinary team   - Patient/family teaching  - Consider wound care consult   Outcome: Progressing

## 2020-02-23 ENCOUNTER — APPOINTMENT (INPATIENT)
Dept: RADIOLOGY | Facility: HOSPITAL | Age: 75
DRG: 571 | End: 2020-02-23
Payer: COMMERCIAL

## 2020-02-23 LAB
ANION GAP SERPL CALCULATED.3IONS-SCNC: 7 MMOL/L (ref 4–13)
BUN SERPL-MCNC: 49 MG/DL (ref 5–25)
CALCIUM SERPL-MCNC: 8.7 MG/DL (ref 8.3–10.1)
CHLORIDE SERPL-SCNC: 98 MMOL/L (ref 100–108)
CO2 SERPL-SCNC: 25 MMOL/L (ref 21–32)
CREAT SERPL-MCNC: 1.34 MG/DL (ref 0.6–1.3)
ERYTHROCYTE [DISTWIDTH] IN BLOOD BY AUTOMATED COUNT: 15.5 % (ref 11.6–15.1)
GFR SERPL CREATININE-BSD FRML MDRD: 52 ML/MIN/1.73SQ M
GLUCOSE SERPL-MCNC: 86 MG/DL (ref 65–140)
HCT VFR BLD AUTO: 35.4 % (ref 36.5–49.3)
HGB BLD-MCNC: 12.1 G/DL (ref 12–17)
MCH RBC QN AUTO: 32.4 PG (ref 26.8–34.3)
MCHC RBC AUTO-ENTMCNC: 34.2 G/DL (ref 31.4–37.4)
MCV RBC AUTO: 95 FL (ref 82–98)
PLATELET # BLD AUTO: 115 THOUSANDS/UL (ref 149–390)
PMV BLD AUTO: 11.6 FL (ref 8.9–12.7)
POTASSIUM SERPL-SCNC: 4.4 MMOL/L (ref 3.5–5.3)
PROCALCITONIN SERPL-MCNC: 1.65 NG/ML
RBC # BLD AUTO: 3.73 MILLION/UL (ref 3.88–5.62)
SODIUM SERPL-SCNC: 130 MMOL/L (ref 136–145)
WBC # BLD AUTO: 13.6 THOUSAND/UL (ref 4.31–10.16)

## 2020-02-23 PROCEDURE — 73620 X-RAY EXAM OF FOOT: CPT

## 2020-02-23 PROCEDURE — 84145 PROCALCITONIN (PCT): CPT | Performed by: FAMILY MEDICINE

## 2020-02-23 PROCEDURE — 85027 COMPLETE CBC AUTOMATED: CPT | Performed by: FAMILY MEDICINE

## 2020-02-23 PROCEDURE — 99232 SBSQ HOSP IP/OBS MODERATE 35: CPT | Performed by: FAMILY MEDICINE

## 2020-02-23 PROCEDURE — 80048 BASIC METABOLIC PNL TOTAL CA: CPT | Performed by: FAMILY MEDICINE

## 2020-02-23 RX ORDER — VANCOMYCIN HYDROCHLORIDE 1 G/200ML
10 INJECTION, SOLUTION INTRAVENOUS EVERY 12 HOURS
Status: DISCONTINUED | OUTPATIENT
Start: 2020-02-23 | End: 2020-02-25

## 2020-02-23 RX ORDER — FUROSEMIDE 10 MG/ML
20 INJECTION INTRAMUSCULAR; INTRAVENOUS ONCE
Status: COMPLETED | OUTPATIENT
Start: 2020-02-23 | End: 2020-02-23

## 2020-02-23 RX ADMIN — STANDARDIZED SENNA CONCENTRATE 17.2 MG: 8.6 TABLET ORAL at 08:54

## 2020-02-23 RX ADMIN — VANCOMYCIN HYDROCHLORIDE 1000 MG: 1 INJECTION, SOLUTION INTRAVENOUS at 06:04

## 2020-02-23 RX ADMIN — HYDROCODONE BITARTRATE AND ACETAMINOPHEN 2 TABLET: 5; 325 TABLET ORAL at 05:10

## 2020-02-23 RX ADMIN — DULOXETINE 30 MG: 30 CAPSULE, DELAYED RELEASE ORAL at 08:54

## 2020-02-23 RX ADMIN — HYDROCODONE BITARTRATE AND ACETAMINOPHEN 2 TABLET: 5; 325 TABLET ORAL at 23:26

## 2020-02-23 RX ADMIN — HYDROCODONE BITARTRATE AND ACETAMINOPHEN 2 TABLET: 5; 325 TABLET ORAL at 11:19

## 2020-02-23 RX ADMIN — HYDROCODONE BITARTRATE AND ACETAMINOPHEN 2 TABLET: 5; 325 TABLET ORAL at 18:13

## 2020-02-23 RX ADMIN — DILTIAZEM HYDROCHLORIDE 120 MG: 120 CAPSULE, COATED, EXTENDED RELEASE ORAL at 08:53

## 2020-02-23 RX ADMIN — MELATONIN 1000 UNITS: at 08:53

## 2020-02-23 RX ADMIN — APIXABAN 5 MG: 5 TABLET, FILM COATED ORAL at 18:09

## 2020-02-23 RX ADMIN — FUROSEMIDE 20 MG: 10 INJECTION, SOLUTION INTRAMUSCULAR; INTRAVENOUS at 11:19

## 2020-02-23 RX ADMIN — APIXABAN 5 MG: 5 TABLET, FILM COATED ORAL at 08:53

## 2020-02-23 RX ADMIN — OXYCODONE HYDROCHLORIDE AND ACETAMINOPHEN 1000 MG: 500 TABLET ORAL at 08:53

## 2020-02-23 RX ADMIN — VANCOMYCIN HYDROCHLORIDE 1000 MG: 1 INJECTION, SOLUTION INTRAVENOUS at 21:52

## 2020-02-23 RX ADMIN — PANTOPRAZOLE SODIUM 40 MG: 40 TABLET, DELAYED RELEASE ORAL at 08:53

## 2020-02-23 NOTE — ASSESSMENT & PLAN NOTE
· Questionable history of MRSA on review of chart; in review of records cannot find any noted history  · Patient denies any history that he know of-  · Will now initiate vancomcyin due to concern for new LLE cellulitis while on ancef

## 2020-02-23 NOTE — PROGRESS NOTES
Progress Note - Linwood Mazariegos 1945, 69 y o  male MRN: [id number]    Unit/Bed#: -01 Encounter: [id number]    Primary Care Provider: Paula Dhaliwal MD   Date and time admitted to hospital: 2/19/2020  1:18 PM        Cellulitis of left lower extremity  Assessment & Plan  Pt now with LLE cellulitis on day 3 of hospital admission while being on ancef   -Will dc ancef and start vancomycin    * Cellulitis of right lower extremity  Assessment & Plan  Significantly improved   Imaging neg for acute osseous abnormality  Postoperative changes status post hindfoot fusion     Antibiotics with Ancef IV- now dc'ed and changed to vancomycin due to LLE celullitis   Orthopedic consult given extensive hardware history- do not recommend any surgical intervention at this time, recommend podiatry consult   Podiatry consult placed   Serial foot/leg exams   Ace wrap to help with compression   Lasix 20mg x 1 dose (had been holding due to concern for BP and kidney function)      Leukocytosis  Assessment & Plan  · Marked leukocytosis of 19 on admission improved to 14 78 this AM  · Antibiotic therapy as above  · Trend CBC    Hyponatremia  Assessment & Plan  · Present on admission with sodium of 129, most likely 2/2 bumex   · Improved to 133 this AM   · Repeat morning    History of methicillin resistant staphylococcus aureus (MRSA)  Assessment & Plan  · Questionable history of MRSA on review of chart; in review of records cannot find any noted history  · Patient denies any history that he know of-  · Will now initiate vancomcyin due to concern for new LLE cellulitis while on ancef    Hypertension  Assessment & Plan  · Blood pressure acceptable on admission with systolics in the 192Y  · Continue to monitor with schedule vitals    Acute kidney injury superimposed on CKD ([facility] [address] )  Assessment & Plan  · Chronic kidney disease stage 3; baseline appears to be approximately 1 2-1 5; 1 84 this AM after gentle hydration  · Gentle IV fluids dc'ed due to concern for volume overload  Lasix 20mg x 1 dose ordered  · Repeat BMP in the morning  · Avoid nephrotoxins, hypotension, and NSAIDs    Chronic deep vein thrombosis (DVT) (HCC)  Assessment & Plan  · VAS lower limb venous duplex studies completed in 2019 revealed in no evidence of right lower limb thrombus in the common femoral artery  No gross evidence of acute DVT based on color-flow analysis the left lower limb  · See picture for current appearance  Persistent atrial fibrillation  Assessment & Plan  · Managed on diltiazem and Eliquis; rate controlled on admission  · Continue while inpatient    Class 1 obesity due to excess calories with serious comorbidity and body mass index (BMI) of 34 0 to 34 9 in adult  Assessment & Plan  · Lifestyle modifications recommended    Gastroesophageal reflux disease without esophagitis  Assessment & Plan  · Continue Protonix      VTE Pharmacologic Prophylaxis:   Pharmacologic: Apixaban (Eliquis)  Mechanical VTE Prophylaxis in Place: No    Patient Centered Rounds: I have performed bedside rounds with nursing staff today  Discussions with Specialists or Other Care Team Provider: surg,pod    Education and Discussions with Family / Patient: pt    Time Spent for Care: 30 minutes  More than 50% of total time spent on counseling and coordination of care as described above  Current Length of Stay: 3 day(s)    Current Patient Status: Inpatient   Certification Statement: The patient will continue to require additional inpatient hospital stay due to plan as above    Discharge Plan: home once medically cleared    Code Status: Level 1 - Full Code      Subjective:   No acute events overnight  Pt's RLE is improving, however now pt is found to have red, warm, swollen LLE      Objective:     Vitals:   Temp (24hrs), Av 9 °F (36 6 °C), Min:97 5 °F (36 4 °C), Max:98 2 °F (36 8 °C)    Temp:  [97 5 °F (36 4 °C)-98 2 °F (36 8 °C)] 97 5 °F (36 4 °C)  HR:  Broderick Klinefelter 64  Resp:  [18] 18  BP: ()/(56-61) 97/58  SpO2:  [98 %] 98 %  Body mass index is 34 68 kg/m²  Input and Output Summary (last 24 hours): Intake/Output Summary (Last 24 hours) at 2/22/2020 2302  Last data filed at 2/22/2020 2227  Gross per 24 hour   Intake 480 ml   Output 485 ml   Net -5 ml       Physical Exam:     Physical Exam   Constitutional: No distress  HENT:   Head: Normocephalic and atraumatic  Eyes: Conjunctivae are normal    Cardiovascular: Normal rate and regular rhythm  No murmur heard  Pulmonary/Chest: Effort normal and breath sounds normal  No respiratory distress  He has no wheezes  He has no rales  Abdominal: Soft  Bowel sounds are normal  He exhibits no distension  There is no tenderness  Musculoskeletal: He exhibits edema  He exhibits no tenderness  RLE +2 edema  R hindfoot fusion   Neurological: He is alert  Skin: Skin is warm and dry  He is not diaphoretic  RLE ace wrap in place & removed for examination- erythema significantly resolved  No open wounds  LLE now shows extensive erythema, warmth, & edema extending from foot to knee  Psychiatric: He has a normal mood and affect  Nursing note and vitals reviewed  Additional Data:     Labs:    Results from last 7 days   Lab Units 02/22/20  0555  02/20/20  0519 02/19/20  1412   WBC Thousand/uL 14 06*   < > 14 78* 19 26*   HEMOGLOBIN g/dL 13 1   < > 14 1 14 3   HEMATOCRIT % 38 6   < > 42 0 42 6   PLATELETS Thousands/uL 121*   < > 107* 120*   BANDS PCT %  --   --   --  4   NEUTROS PCT %  --   --  87*  --    LYMPHS PCT %  --   --  4*  --    LYMPHO PCT %  --   --   --  4*   MONOS PCT %  --   --  7  --    MONO PCT %  --   --   --  2*   EOS PCT %  --   --  1 0    < > = values in this interval not displayed       Results from last 7 days   Lab Units 02/22/20  0555  02/20/20  0519   SODIUM mmol/L 130*   < > 133*   POTASSIUM mmol/L 4 2   < > 4 3   CHLORIDE mmol/L 95*   < > 99*   CO2 mmol/L 24   < > 25   BUN mg/dL 58* < > 69*   CREATININE mg/dL 1 52*   < > 1 84*   ANION GAP mmol/L 11   < > 9   CALCIUM mg/dL 9 0   < > 8 9   ALBUMIN g/dL  --   --  2 9*   TOTAL BILIRUBIN mg/dL  --   --  1 10*   ALK PHOS U/L  --   --  82   ALT U/L  --   --  18   AST U/L  --   --  32   GLUCOSE RANDOM mg/dL 98   < > 70    < > = values in this interval not displayed  Results from last 7 days   Lab Units 02/22/20  0555 02/21/20  0520   PROCALCITONIN ng/ml 2 77* 4 14*           * I Have Reviewed All Lab Data Listed Above  * Additional Pertinent Lab Tests Reviewed: Vladimir 67 Admission Reviewed    Imaging:    Imaging Today Include:CXR      Recent Cultures (last 7 days):     Results from last 7 days   Lab Units 02/19/20 2122   URINE CULTURE  <10,000 cfu/ml        Last 24 Hours Medication List:     Current Facility-Administered Medications:  albumin human 12 5 g Intravenous Once Cristobal Moss PA-C Last Rate: Stopped (02/20/20 2343)   aluminum-magnesium hydroxide-simethicone 30 mL Oral Q4H PRN Kristofer Gorman DO    apixaban 5 mg Oral BID NAVNEET [place]    vitamin C 1,000 mg Oral Daily NAVNEET [place]    benzonatate 100 mg Oral TID PRN NAVNEET Vasquez    cholecalciferol 1,000 Units Oral Daily NAVNEET [place]    diltiazem 120 mg Oral Daily Misael Sher DO    DULoxetine 30 mg Oral Daily NAVNEET Vasquez    HYDROcodone-acetaminophen 2 tablet Oral Q4H PRN NAVNEET [place]    pantoprazole 40 mg Oral Daily NAVNEET Vasquez    senna 2 tablet Oral Daily Misael Sher DO    vancomycin 10 mg/kg (Adjusted) Intravenous Q12H Kristofer Gorman DO Last Rate: 1,000 mg (02/22/20 1812)        Today, Patient Was Seen By: Kristofer Gorman DO    ** Please Note: Dictation voice to text software may have been used in the creation of this document   **

## 2020-02-23 NOTE — ASSESSMENT & PLAN NOTE
Significantly improved   Imaging neg for acute osseous abnormality  Postoperative changes status post hindfoot fusion     Antibiotics with Ancef IV- now dc'ed and changed to vancomycin due to LLE celullitis   Orthopedic consult given extensive hardware history- do not recommend any surgical intervention at this time, recommend podiatry consult   Podiatry consult placed   Serial foot/leg exams   Ace wrap to help with compression   Lasix 20mg x 1 dose (had been holding due to concern for BP and kidney function)

## 2020-02-23 NOTE — PROGRESS NOTES
Vancomycin IV Pharmacy-to-Dose Consultation    Jared Alberts is a 69 y o  male who is currently receiving Vancomycin IV with management by the Pharmacy Consult service  Assessment/Plan:  The patient was reviewed  Renal function is stable and no signs or symptoms of nephrotoxicity and/or infusion reactions were documented in the chart  Based on todays assessment, continue current vancomycin (day # 2) dosing of 1000 mg q12h, with a plan for trough to be drawn prior to 5th dose at 1830 on 2/24/20 (trough goal 10-15)  We will continue to follow the patients culture results and clinical progress daily      Jer Greene, Pharmacist

## 2020-02-23 NOTE — PROGRESS NOTES
Progress Note - Linwood Mazariegos 1945, 69 y o  male MRN: [id number]    Unit/Bed#: -01 Encounter: [id number]    Primary Care Provider: Zeke Negrete MD   Date and time admitted to hospital: 2/19/2020  1:18 PM        Cellulitis of left lower extremity  Assessment & Plan  Pt developed LLE cellulitis on day 3 of hospital admission while being on ancef   -started vancomycin and this AM, warmth, erythema has improved- will continue vancomycin  -continue gently compression with ace wrap  - LLE xray ordered/pending    * Cellulitis of right lower extremity  Assessment & Plan  Significantly improved   Imaging neg for acute osseous abnormality  Postoperative changes status post hindfoot fusion   Antibiotics with Ancef IV- now dc'ed and changed to vancomycin due to LLE celullitis   Orthopedic consult given extensive hardware history- do not recommend any surgical intervention at this time, recommend podiatry consult   Podiatry consulted (Dr Elizabeth Jackson) and following   Serial foot/leg exams   Ace wrap to help with compression      Congestive heart failure (CHF) ([facility] [address] )  Assessment & Plan  Wt Readings from Last 3 Encounters:   02/23/20 116 kg (255 lb 11 7 oz)   02/04/20 108 kg (239 lb)   01/07/20 113 kg (249 lb)     · Managed on home on Bumex Monday Wednesday Friday but per patient he has not been taking this as it was discontinued by his Cardiologist and but does take spironolactone  · Echocardiogram reveals LVEF of 45%  Mild diffuse hypokinesis  Wall thickness markedly increased  RV Systolic function mildly reduced  Mild mitral valve regurgitation  Mild tricuspid valve regurgitation  · Daily weight  · Intake and output  · Cardiac diet, low-sodium, fluid restriction  · Decreased pt's Cardizem from 180mg to 120mg today in order for BP to be able to tolerate diuresis    · Will give another dose of lasix 20mg IV today        Leukocytosis  Assessment & Plan  · Marked leukocytosis of 19 on admission improved to 13 60 this AM  · Antibiotic therapy as above  · Trend CBC    Hyponatremia  Assessment & Plan  · Present on admission with sodium of 129, most likely 2/2 bumex   · Improved to 133 this AM   · Repeat morning    History of methicillin resistant staphylococcus aureus (MRSA)  Assessment & Plan  · Questionable history of MRSA on review of chart; in review of records cannot find any noted history  · Patient denies any history that he know of-  · Continue vancomcyin due to concern for new LLE cellulitis while on ancef    Hypertension  Assessment & Plan  · Blood pressure acceptable on admission with systolics in the 201G  · Continue to monitor with schedule vitals    Acute kidney injury superimposed on CKD [facility])  Assessment & Plan  · Chronic kidney disease stage 3; baseline appears to be approximately 1 2-1 5;   · 1 34 this AM  · Avoid nephrotoxins, hypotension, and NSAIDs    Chronic deep vein thrombosis (DVT) (HCC)  Assessment & Plan  · VAS lower limb venous duplex studies completed in November of 2019 revealed in no evidence of right lower limb thrombus in the common femoral artery  No gross evidence of acute DVT based on color-flow analysis the left lower limb  · See picture for current appearance  Persistent atrial fibrillation  Assessment & Plan  · Managed on diltiazem and Eliquis; rate controlled on admission  · Continue while inpatient    Class 1 obesity due to excess calories with serious comorbidity and body mass index (BMI) of 34 0 to 34 9 in adult  Assessment & Plan  · Lifestyle modifications recommended    Gastroesophageal reflux disease without esophagitis  Assessment & Plan  · Continue Protonix    VTE Pharmacologic Prophylaxis:   Pharmacologic: Apixaban (Eliquis)  Mechanical VTE Prophylaxis in Place: No    Patient Centered Rounds: I have performed bedside rounds with nursing staff today  Education and Discussions with Family / Patient: pt and son    Time Spent for Care: 20 minutes    More than 50% of total time spent on counseling and coordination of care as described above  Current Length of Stay: 4 day(s)    Current Patient Status: Inpatient   Certification Statement: The patient will continue to require additional inpatient hospital stay due to  see plan as above    Discharge Plan: home pending PT/OT consult    Code Status: Level 1 - Full Code      Subjective:   No acute events overnight  Pt states that he's feeling better this AM  LLE improved, but still warm, erythematous    Objective:     Vitals:   Temp (24hrs), Av 9 °F (36 6 °C), Min:97 5 °F (36 4 °C), Max:98 2 °F (36 8 °C)    Temp:  [97 5 °F (36 4 °C)-98 2 °F (36 8 °C)] 98 1 °F (36 7 °C)  HR:  [60-78] 78  Resp:  [18-20] 18  BP: ()/(57-67) 110/57  SpO2:  [92 %-98 %] 96 %  Body mass index is 34 68 kg/m²  Input and Output Summary (last 24 hours): Intake/Output Summary (Last 24 hours) at 2020 0917  Last data filed at 2020 0554  Gross per 24 hour   Intake    Output 525 ml   Net -525 ml       Physical Exam:     Physical Exam   Constitutional: No distress  HENT:   Head: Normocephalic and atraumatic  Eyes: Conjunctivae are normal    Cardiovascular: Normal rate and regular rhythm  No murmur heard  Pulmonary/Chest: Effort normal and breath sounds normal  No respiratory distress  He has no wheezes  He has no rales  Abdominal: Soft  Bowel sounds are normal  He exhibits no distension  There is no tenderness  Musculoskeletal: He exhibits edema  He exhibits no tenderness  RLE +2 edema  R hindfoot fusion   Neurological: He is alert  Skin: Skin is warm and dry  He is not diaphoretic  RLE ace wrap in place & removed for examination- erythema significantly resolved  No open wounds  LLE with improved erythema, warmth, & edema extending from foot to knee  Psychiatric: He has a normal mood and affect  Nursing note and vitals reviewed          Additional Data:     Labs:    Results from last 7 days   Lab Units 02/23/20  0445  02/20/20  0519 02/19/20  1412   WBC Thousand/uL 13 60*   < > 14 78* 19 26*   HEMOGLOBIN g/dL 12 1   < > 14 1 14 3   HEMATOCRIT % 35 4*   < > 42 0 42 6   PLATELETS Thousands/uL 115*   < > 107* 120*   BANDS PCT %  --   --   --  4   NEUTROS PCT %  --   --  87*  --    LYMPHS PCT %  --   --  4*  --    LYMPHO PCT %  --   --   --  4*   MONOS PCT %  --   --  7  --    MONO PCT %  --   --   --  2*   EOS PCT %  --   --  1 0    < > = values in this interval not displayed  Results from last 7 days   Lab Units 02/23/20  0445  02/20/20  0519   SODIUM mmol/L 130*   < > 133*   POTASSIUM mmol/L 4 4   < > 4 3   CHLORIDE mmol/L 98*   < > 99*   CO2 mmol/L 25   < > 25   BUN mg/dL 49*   < > 69*   CREATININE mg/dL 1 34*   < > 1 84*   ANION GAP mmol/L 7   < > 9   CALCIUM mg/dL 8 7   < > 8 9   ALBUMIN g/dL  --   --  2 9*   TOTAL BILIRUBIN mg/dL  --   --  1 10*   ALK PHOS U/L  --   --  82   ALT U/L  --   --  18   AST U/L  --   --  32   GLUCOSE RANDOM mg/dL 86   < > 70    < > = values in this interval not displayed  Results from last 7 days   Lab Units 02/22/20  0555 02/21/20  0520   PROCALCITONIN ng/ml 2 77* 4 14*           * I Have Reviewed All Lab Data Listed Above  * Additional Pertinent Lab Tests Reviewed:  All Labs Within Last 24 Hours Reviewed    Imaging:    Imaging Reports Reviewed Today Include: CXR      Recent Cultures (last 7 days):     Results from last 7 days   Lab Units 02/19/20 2122   URINE CULTURE  <10,000 cfu/ml        Last 24 Hours Medication List:     Current Facility-Administered Medications:  albumin human 12 5 g Intravenous Once Silvia Sanderson PA-C Last Rate: Stopped (02/20/20 9523)   aluminum-magnesium hydroxide-simethicone 30 mL Oral Q4H MIGUELINA Brothers DO    apixaban 5 mg Oral BID NAVNEET [place]    vitamin C 1,000 mg Oral Daily NAVNEET [place]    benzonatate 100 mg Oral TID PRN Gini Bernheim, CRNP    cholecalciferol 1,000 Units Oral Daily Navin NAVNEET Patterson    diltiazem 120 mg Oral Daily Angela Brewer DO    DULoxetine 30 mg Oral Daily NAVNEET [place]    furosemide 20 mg Intravenous Once Angela Brewer DO    HYDROcodone-acetaminophen 2 tablet Oral Q4H PRN NAVNEET [place]    pantoprazole 40 mg Oral Daily NAVNEET [place]    senna 2 tablet Oral Daily Misael Sher DO    vancomycin 10 mg/kg (Adjusted) Intravenous Q12H nAgela Brewer DO Last Rate: 1,000 mg (02/23/20 0604)        Today, Patient Was Seen By: Angela Brewer DO    ** Please Note: Dictation voice to text software may have been used in the creation of this document   **

## 2020-02-23 NOTE — ASSESSMENT & PLAN NOTE
Wt Readings from Last 3 Encounters:   02/23/20 116 kg (255 lb 11 7 oz)   02/04/20 108 kg (239 lb)   01/07/20 113 kg (249 lb)     · Managed on home on Bumex Monday Wednesday Friday but per patient he has not been taking this as it was discontinued by his Cardiologist and but does take spironolactone  · Echocardiogram reveals LVEF of 45%  Mild diffuse hypokinesis  Wall thickness markedly increased  RV Systolic function mildly reduced  Mild mitral valve regurgitation  Mild tricuspid valve regurgitation  · Daily weight  · Intake and output  · Cardiac diet, low-sodium, fluid restriction  · Decreased pt's Cardizem from 180mg to 120mg today in order for BP to be able to tolerate diuresis    · Will give another dose of lasix 20mg IV today

## 2020-02-23 NOTE — ASSESSMENT & PLAN NOTE
· Chronic kidney disease stage 3; baseline appears to be approximately 1 2-1 5;   · 1 34 this AM  · Avoid nephrotoxins, hypotension, and NSAIDs

## 2020-02-23 NOTE — PLAN OF CARE
Problem: Potential for Falls  Goal: Patient will remain free of falls  Description  INTERVENTIONS:  - Assess patient frequently for physical needs  -  Identify cognitive and physical deficits and behaviors that affect risk of falls    -  [place] fall precautions as indicated by assessment   - Educate patient/family on patient safety including physical limitations  - Instruct patient to call for assistance with activity based on assessment  - Modify environment to reduce risk of injury  - Consider OT/PT consult to assist with strengthening/mobility  Outcome: Progressing     Problem: PAIN - ADULT  Goal: Verbalizes/displays adequate comfort level or baseline comfort level  Description  Interventions:  - Encourage patient to monitor pain and request assistance  - Assess pain using appropriate pain scale  - Administer analgesics based on type and severity of pain and evaluate response  - Implement non-pharmacological measures as appropriate and evaluate response  - Consider cultural and social influences on pain and pain management  - Notify physician/advanced practitioner if interventions unsuccessful or patient reports new pain  Outcome: Progressing     Problem: INFECTION - ADULT  Goal: Absence or prevention of progression during hospitalization  Description  INTERVENTIONS:  - Assess and monitor for signs and symptoms of infection  - Monitor lab/diagnostic results  - Monitor all insertion sites, i e  indwelling lines, tubes, and drains  - Monitor endotracheal if appropriate and nasal secretions for changes in amount and color  - [place] appropriate cooling/warming therapies per order  - Administer medications as ordered  - Instruct and encourage patient and family to use good hand hygiene technique  - Identify and instruct in appropriate isolation precautions for identified infection/condition  Outcome: Progressing  Goal: Absence of fever/infection during neutropenic period  Description  INTERVENTIONS:  - Monitor WBC    Outcome: Progressing     Problem: SAFETY ADULT  Goal: Maintain or return to baseline ADL function  Description  INTERVENTIONS:  -  Assess patient's ability to carry out ADLs; assess patient's baseline for ADL function and identify physical deficits which impact ability to perform ADLs (bathing, care of mouth/teeth, toileting, grooming, dressing, etc )  - Assess/evaluate cause of self-care deficits   - Assess range of motion  - Assess patient's mobility; develop plan if impaired  - Assess patient's need for assistive devices and provide as appropriate  - Encourage maximum independence but intervene and supervise when necessary  - Involve family in performance of ADLs  - Assess for home care needs following discharge   - Consider OT consult to assist with ADL evaluation and planning for discharge  - Provide patient education as appropriate  Outcome: Progressing  Goal: Maintain or return mobility status to optimal level  Description  INTERVENTIONS:  - Assess patient's baseline mobility status (ambulation, transfers, stairs, etc )    - Identify cognitive and physical deficits and behaviors that affect mobility  - Identify mobility aids required to assist with transfers and/or ambulation (gait belt, sit-to-stand, lift, walker, cane, etc )  - [place] fall precautions as indicated by assessment  - Record patient progress and toleration of activity level on Mobility SBAR; progress patient to next Phase/Stage  - Instruct patient to call for assistance with activity based on assessment  - Consider rehabilitation consult to assist with strengthening/weightbearing, etc   Outcome: Progressing     Problem: DISCHARGE PLANNING  Goal: Discharge to home or other facility with appropriate resources  Description  INTERVENTIONS:  - Identify barriers to discharge w/patient and caregiver  - Arrange for needed discharge resources and transportation as appropriate  - Identify discharge learning needs (meds, wound care, etc )  - Arrange for interpretive services to assist at discharge as needed  - Refer to Case Management Department for coordinating discharge planning if the patient needs post-hospital services based on physician/advanced practitioner order or complex needs related to functional status, cognitive ability, or social support system  Outcome: Progressing     Problem: Knowledge Deficit  Goal: Patient/family/caregiver demonstrates understanding of disease process, treatment plan, medications, and discharge instructions  Description  Complete learning assessment and assess knowledge base    Interventions:  - Provide teaching at level of understanding  - Provide teaching via preferred learning methods  Outcome: Progressing     Problem: CARDIOVASCULAR - ADULT  Goal: Maintains optimal cardiac output and hemodynamic stability  Description  INTERVENTIONS:  - Monitor I/O, vital signs and rhythm  - Monitor for S/S and trends of decreased cardiac output  - Administer and titrate ordered vasoactive medications to optimize hemodynamic stability  - Assess quality of pulses, skin color and temperature  - Assess for signs of decreased coronary artery perfusion  - Instruct patient to report change in severity of symptoms  Outcome: Progressing  Goal: Absence of cardiac dysrhythmias or at baseline rhythm  Description  INTERVENTIONS:  - Continuous cardiac monitoring, vital signs, obtain 12 lead EKG if ordered  - Administer antiarrhythmic and heart rate control medications as ordered  - Monitor electrolytes and administer replacement therapy as ordered  Outcome: Progressing     Problem: MUSCULOSKELETAL - ADULT  Goal: Maintain or return mobility to safest level of function  Description  INTERVENTIONS:  - Assess patient's ability to carry out ADLs; assess patient's baseline for ADL function and identify physical deficits which impact ability to perform ADLs (bathing, care of mouth/teeth, toileting, grooming, dressing, etc )  - Assess/evaluate cause of self-care deficits   - Assess range of motion  - Assess patient's mobility  - Assess patient's need for assistive devices and provide as appropriate  - Encourage maximum independence but intervene and supervise when necessary  - Involve family in performance of ADLs  - Assess for home care needs following discharge   - Consider OT consult to assist with ADL evaluation and planning for discharge  - Provide patient education as appropriate  Outcome: Progressing  Goal: Maintain proper alignment of affected body part  Description  INTERVENTIONS:  - Support, maintain and protect limb and body alignment  - Provide patient/ family with appropriate education  Outcome: Progressing     Problem: Prexisting or High Potential for Compromised Skin Integrity  Goal: Skin integrity is maintained or improved  Description  INTERVENTIONS:  - Identify patients at risk for skin breakdown  - Assess and monitor skin integrity  - Assess and monitor nutrition and hydration status  - Monitor labs   - Assess for incontinence   - Turn and reposition patient  - Assist with mobility/ambulation  - Relieve pressure over bony prominences  - Avoid friction and shearing  - Provide appropriate hygiene as needed including keeping skin clean and dry  - Evaluate need for skin moisturizer/barrier cream  - Collaborate with interdisciplinary team   - Patient/family teaching  - Consider wound care consult   Outcome: Progressing

## 2020-02-23 NOTE — ASSESSMENT & PLAN NOTE
· Blood pressure acceptable on admission with systolics in the 273V  · Continue to monitor with schedule vitals

## 2020-02-23 NOTE — ASSESSMENT & PLAN NOTE
Significantly improved   Imaging neg for acute osseous abnormality  Postoperative changes status post hindfoot fusion     Antibiotics with Ancef IV- now dc'ed and changed to vancomycin due to LLE celullitis   Orthopedic consult given extensive hardware history- do not recommend any surgical intervention at this time, recommend podiatry consult   Podiatry consulted (Dr Elizabeth Jackson) and following   Serial foot/leg exams   Ace wrap to help with compression

## 2020-02-23 NOTE — ASSESSMENT & PLAN NOTE
Pt now with LLE cellulitis on day 3 of hospital admission while being on ancef   -Will dc ancef and start vancomycin

## 2020-02-23 NOTE — ASSESSMENT & PLAN NOTE
· Chronic kidney disease stage 3; baseline appears to be approximately 1 2-1 5; 1 84 this AM after gentle hydration  · Gentle IV fluids dc'ed due to concern for volume overload  Lasix 20mg x 1 dose ordered    · Repeat BMP in the morning  · Avoid nephrotoxins, hypotension, and NSAIDs

## 2020-02-23 NOTE — ASSESSMENT & PLAN NOTE
Pre-screening Questions for Radiology Injections:    Injection to be done at which interventional clinic site? [facility]    Instruct patient to arrive as directed prior to the scheduled appointment time:    [place]: 30 minutes before      Procedure ordered by Yoshi    Procedure ordered? RT SI Joint Injection    What insurance would patient like us to bill for this procedure? [employer]      Worker's comp or MVA (motor vehicle accident) -Any injection DO NOT SCHEDULE and route to [place].      [employer] - For SI joint injections, DO NOT SCHEDULE and route Paige Schneider. [employer] NO PA REQUIRED EFFECTIVE 11/1/2017      [employer]- MBB's must be scheduled at LEAST two weeks apart      [employer] - Any injection besides hip/shoulder/knee joint DO NOT SCHEDULE and route to Paige Schneider. She will obtain PA and call pt back to schedule procedure or notify pt of denial.       [employer]-Route to Paige for review    Any chance of pregnancy? NO   If YES, do NOT schedule and route to RN pool    Is an  needed? No     Patient has a drive home? (mandatory) YES: informed    Is patient taking any blood thinners (plavix, coumadin, jantoven, warfarin, heparin, pradaxa or dabigatran )? No   If hold needed, do NOT schedule, route to RN pool     Is patient taking any aspirin products (includes Excedrin and Fiorinal)? No     If more than 325mg/day do NOT schedule; route to RN pool     For CERVICAL procedures, hold all aspirin products for 6 days.     Tell pt that if aspirin product is not held for 6 days, the procedure WILL BE cancelled.      Does the patient have a bleeding or clotting disorder? No     If YES, okay to schedule AND route to RN nurse pool    For any patients with platelet count <100, must be forwarded to provider    Is patient diabetic?  No  If YES, have them bring their glucometer.    Does patient have an active infection or treated for  · VAS lower limb venous duplex studies completed in November of 2019 revealed in no evidence of right lower limb thrombus in the common femoral artery  No gross evidence of acute DVT based on color-flow analysis the left lower limb  · See picture for current appearance  one within the past week? No     Is patient currently taking any antibiotics?  No     For patients on chronic, preventative, or prophylactic antibiotics, procedures may be scheduled.     For patients on antibiotics for active or recent infection:    Tika Bowie [place], [place]-antibiotic course must have been completed for 4 days    Is patient currently taking any steroid medications? (i.e. Prednisone, Medrol)  No     For patients on steroid medications:    [place] Bowie Burton, [place]-steroid course must have been completed for 4 days    Reviewed with patient:  If you are started on any steroids or antibiotics between now and your appointment, you must contact us because the procedure may need to be cancelled.  Yes    Is patient actively being treated for cancer or immunocompromised? No  If YES, do NOT schedule and route to RN pool     Are you able to get on and off an exam table with minimal or no assistance? Yes  If NO, do NOT schedule and route to RN pool    Are you able to roll over and lay on your stomach with minimal or no assistance? Yes  If NO, do NOT schedule and route to RN pool     Any allergies to contrast dye, iodine, shellfish, or numbing and steroid medications? No  If YES, route to RN pool AND add allergy information to appointment notes    Allergies: Review of patient's allergies indicates no known allergies.      Has the patient had a flu shot or any other vaccinations within 7 days before or after the procedure.  No     Does patient have an MRI/CT?  Not Applicable  (SI joint, hip injections, lumbar sympathetic blocks, and stellate ganglion blocks do not require an MRI)    Was the MRI done w/in the last 3 years?  NA    Was MRI done at [place]? No      If not, where was it done? N/A       If MRI was not done at [facility], [facility] or [facility] do NOT schedule and route to nursing.  If pt has an imaging disc, the injection may be scheduled but pt has to bring disc to  appt. If they show up w/out disc the injection cannot be done    Reminders (please tell patient if applicable):       Instructed pt to arrive 30 minutes early for IV start if this is for a cervical procedure, ALL sympathetic (stellate ganglion, hypogastric, or lumbar sympathetic block) and all sedation procedures (RFA, spinal cord stimulation trials).  Not Applicable   -IVs are not routinely placed for Dr. Balderas cervical cases   -Dr. Roberts: IVs for cervical ESIs and cervical TBDs (not CMBBs/facet inj)      If NPO for sedation, informed patient that it is okay to take medications with sips of water (except if they are to hold blood thinners).  Not Applicable   *DO take blood pressure medication if it is prescribed*      If this is for a cervical JUSTICE, informed patient that aspirin needs to be held for 6 days.   Not Applicable      For all patients not having spinal cord stimulator (SCS) trials or radiofrequency ablations (RFAs), informed patient:    IV sedation is not provided for this procedure.  If you feel that an oral anti-anxiety medication is needed, you can discuss this further with your referring provider or primary care provider.  The Pain Clinic provider will discuss specifics of what the procedure includes at your appointment.  Most procedures last 10-20 minutes.  We use numbing medications to help with any discomfort during the procedure.  Not Applicable      Do not schedule procedures requiring IV placement in the first appointment of the day or first appointment after lunch. Do NOT schedule at 0745, 0815 or 1245.       For patients 85 or older we recommend having an adult stay w/ them for the remainder of the day.       Does the patient have any questions?  Not Applicable  Eliana Kiser  [facility]

## 2020-02-23 NOTE — ASSESSMENT & PLAN NOTE
· Marked leukocytosis of 19 on admission improved to 13 60 this AM  · Antibiotic therapy as above  · Trend CBC

## 2020-02-23 NOTE — ASSESSMENT & PLAN NOTE
· Blood pressure acceptable on admission with systolics in the 414O  · Continue to monitor with schedule vitals

## 2020-02-23 NOTE — ASSESSMENT & PLAN NOTE
· Questionable history of MRSA on review of chart; in review of records cannot find any noted history  · Patient denies any history that he know of-  · Continue vancomcyin due to concern for new LLE cellulitis while on ancef

## 2020-02-23 NOTE — ASSESSMENT & PLAN NOTE
Pt developed LLE cellulitis on day 3 of hospital admission while being on ancef   -started vancomycin and this AM, warmth, erythema has improved- will continue vancomycin  -continue gently compression with ace wrap  - LLE xray ordered/pending

## 2020-02-24 LAB
ANION GAP SERPL CALCULATED.3IONS-SCNC: 7 MMOL/L (ref 4–13)
BUN SERPL-MCNC: 42 MG/DL (ref 5–25)
CALCIUM SERPL-MCNC: 8.8 MG/DL (ref 8.3–10.1)
CHLORIDE SERPL-SCNC: 99 MMOL/L (ref 100–108)
CO2 SERPL-SCNC: 25 MMOL/L (ref 21–32)
CREAT SERPL-MCNC: 1.24 MG/DL (ref 0.6–1.3)
ERYTHROCYTE [DISTWIDTH] IN BLOOD BY AUTOMATED COUNT: 15.6 % (ref 11.6–15.1)
GFR SERPL CREATININE-BSD FRML MDRD: 57 ML/MIN/1.73SQ M
GLUCOSE SERPL-MCNC: 91 MG/DL (ref 65–140)
HCT VFR BLD AUTO: 35.5 % (ref 36.5–49.3)
HGB BLD-MCNC: 12.1 G/DL (ref 12–17)
MCH RBC QN AUTO: 32.5 PG (ref 26.8–34.3)
MCHC RBC AUTO-ENTMCNC: 34.1 G/DL (ref 31.4–37.4)
MCV RBC AUTO: 95 FL (ref 82–98)
PLATELET # BLD AUTO: 129 THOUSANDS/UL (ref 149–390)
PMV BLD AUTO: 11.4 FL (ref 8.9–12.7)
POTASSIUM SERPL-SCNC: 4.2 MMOL/L (ref 3.5–5.3)
RBC # BLD AUTO: 3.72 MILLION/UL (ref 3.88–5.62)
SODIUM SERPL-SCNC: 131 MMOL/L (ref 136–145)
VANCOMYCIN TROUGH SERPL-MCNC: 18.8 UG/ML (ref 10–20)
WBC # BLD AUTO: 13.06 THOUSAND/UL (ref 4.31–10.16)

## 2020-02-24 PROCEDURE — 85027 COMPLETE CBC AUTOMATED: CPT | Performed by: FAMILY MEDICINE

## 2020-02-24 PROCEDURE — 80048 BASIC METABOLIC PNL TOTAL CA: CPT | Performed by: FAMILY MEDICINE

## 2020-02-24 PROCEDURE — 99233 SBSQ HOSP IP/OBS HIGH 50: CPT | Performed by: INTERNAL MEDICINE

## 2020-02-24 PROCEDURE — 80202 ASSAY OF VANCOMYCIN: CPT | Performed by: FAMILY MEDICINE

## 2020-02-24 RX ADMIN — DULOXETINE 30 MG: 30 CAPSULE, DELAYED RELEASE ORAL at 08:14

## 2020-02-24 RX ADMIN — VANCOMYCIN HYDROCHLORIDE 1000 MG: 1 INJECTION, SOLUTION INTRAVENOUS at 10:37

## 2020-02-24 RX ADMIN — HYDROCODONE BITARTRATE AND ACETAMINOPHEN 2 TABLET: 5; 325 TABLET ORAL at 12:25

## 2020-02-24 RX ADMIN — HYDROCODONE BITARTRATE AND ACETAMINOPHEN 2 TABLET: 5; 325 TABLET ORAL at 08:13

## 2020-02-24 RX ADMIN — STANDARDIZED SENNA CONCENTRATE 17.2 MG: 8.6 TABLET ORAL at 12:26

## 2020-02-24 RX ADMIN — OXYCODONE HYDROCHLORIDE AND ACETAMINOPHEN 1000 MG: 500 TABLET ORAL at 08:14

## 2020-02-24 RX ADMIN — HYDROCODONE BITARTRATE AND ACETAMINOPHEN 2 TABLET: 5; 325 TABLET ORAL at 16:29

## 2020-02-24 RX ADMIN — MELATONIN 1000 UNITS: at 08:14

## 2020-02-24 RX ADMIN — APIXABAN 5 MG: 5 TABLET, FILM COATED ORAL at 08:14

## 2020-02-24 RX ADMIN — APIXABAN 5 MG: 5 TABLET, FILM COATED ORAL at 17:23

## 2020-02-24 RX ADMIN — PANTOPRAZOLE SODIUM 40 MG: 40 TABLET, DELAYED RELEASE ORAL at 08:14

## 2020-02-24 NOTE — PROGRESS NOTES
Patient refused 1900 dose on 2/23/20 but nurse was able to give the dose at 2200 with patient cooperation  Vanco trough will now be due prior to 5th dose at 2130 on 2/24/20

## 2020-02-24 NOTE — NURSING NOTE
Noticed Pt refusal of vancomycin  Spoke with pt on importance of administration to current hospitalization, pt agreeable to administration pharmacy called with request of administration time adjustment and the next recommended troph drawl  Pharmacy confirmed time adjustment and troph to be drawn 2/24/20 at 2130  [facility]   Isha Macdonald RN

## 2020-02-24 NOTE — UTILIZATION REVIEW
Continued Stay Review    Date: 2/24/2020                         Current Patient Class:  Inpatient   Current Level of Care: med surg    HPI:74 y o  male initially admitted on  2/19/2020 inpatient  due to right foot pain likely due to soft tissue infection vs diabetic ulcers vs venous insufficiency/SAMY  He is post an ankle fusion with multiple surgeries    On 2/21/2020 per podiatry - has cellulitis left foot/leg with chronic venous insuffiencey  Has displaced deep implant right foot with prominent screw head right medial foot, no exposed hardware  Continue antibiotics  On 2/22/2020 RLE improving but now with red swollen LLE  On exam has RLE +2 edema, right hindfoot fusion, RLE erythema significantly resolved  LLE now extensive erythema, warmth and edema extending from foot to knee  Wbc 14 06  Ancef dc and vancomycin started  Lasix given  Assessment/Plan:  On 2/24/2020 patient now with LLE cellulitis which developed on day 3 of ancef  IV vanco continues  Erythema continues, Xray left foot shows cellulitis  Bilateral ace wraps continue  Pertinent Labs/Diagnostic Results:   2/24/2020 Xray left foot - Subtle soft tissue radiolucency between the first through fourth proximal phalanges may represent complicated cellulitis in the appropriate clinical context    No radiographic evidence of osteomyelitis    2/23/2020 CxR Right perihilar central vascular congestion  2/20/2020 Xray right ankle - No acute osseous abnormality   Postoperative changes status post hindfoot fusion    Results from last 7 days   Lab Units 02/24/20  0629 02/23/20  0445 02/22/20  0555 02/21/20  0520 02/20/20  0519 02/19/20  1412   WBC Thousand/uL 13 06* 13 60* 14 06* 11 10* 14 78* 19 26*   HEMOGLOBIN g/dL 12 1 12 1 13 1 13 5 14 1 14 3   HEMATOCRIT % 35 5* 35 4* 38 6 40 1 42 0 42 6   PLATELETS Thousands/uL 129* 115* 121* 95* 107* 120*   NEUTROS ABS Thousands/µL  --   --   --   --  12 89*  --    BANDS PCT %  --   --   --   --   --  4 Results from last 7 days   Lab Units 02/24/20  0629 02/23/20  0445 02/22/20  0555 02/21/20  0520 02/20/20  0519   SODIUM mmol/L 131* 130* 130* 132* 133*   POTASSIUM mmol/L 4 2 4 4 4 2 4 3 4 3   CHLORIDE mmol/L 99* 98* 95* 98* 99*   CO2 mmol/L 25 25 24 24 25   ANION GAP mmol/L 7 7 11 10 9   BUN mg/dL 42* 49* 58* 66* 69*   CREATININE mg/dL 1 24 1 34* 1 52* 1 76* 1 84*   EGFR ml/min/1 73sq m 57 52 45 38 36   CALCIUM mg/dL 8 8 8 7 9 0 9 2 8 9   MAGNESIUM mg/dL  --   --   --   --  1 7   PHOSPHORUS mg/dL  --   --   --   --  3 5     Results from last 7 days   Lab Units 02/20/20  0519   AST U/L 32   ALT U/L 18   ALK PHOS U/L 82   TOTAL PROTEIN g/dL 7 6   ALBUMIN g/dL 2 9*   TOTAL BILIRUBIN mg/dL 1 10*     Results from last 7 days   Lab Units 02/24/20  0629 02/23/20  0445 02/22/20  0555 02/21/20  0520 02/20/20  0519 02/19/20  1412   GLUCOSE RANDOM mg/dL 91 86 98 84 70 89     Results from last 7 days   Lab Units 02/23/20  0445 02/22/20  0555 02/21/20  0520   PROCALCITONIN ng/ml 1 65* 2 77* 4 14*     Results from last 7 days   Lab Units 02/19/20 2122   CLARITY UA  Hazy   COLOR UA  Yellow   SPEC GRAV UA  1 010   PH UA  5 5   GLUCOSE UA mg/dl Negative   KETONES UA mg/dl Negative   BLOOD UA  Negative   PROTEIN UA mg/dl Negative   NITRITE UA  Negative   BILIRUBIN UA  Negative   UROBILINOGEN UA E U /dl 0 2   LEUKOCYTES UA  Small*   WBC UA /hpf 20-30*   RBC UA /hpf None Seen   BACTERIA UA /hpf Occasional   EPITHELIAL CELLS WET PREP /hpf None Seen     Results from last 7 days   Lab Units 02/19/20 2122   URINE CULTURE  <10,000 cfu/ml      Results from last 7 days   Lab Units 02/19/20  1412   TOTAL COUNTED  100       Vital Signs:  02/24/20 0734  97 5 °F (36 4 °C)  81  18  102/46Abnormal     97 %  None (Room air)  Lying   02/23/20 2300  99 °F (37 2 °C)  72  17  122/59    97 %  None (Room air)  Lying   02/23/20 1533  98 3 °F (36 8 °C)  73  18  93/61    92 %  None (Room air)     02/23/20 06:40:39  98 1 °F (36 7 °C)  78  18 110/57  75  96 %       02/22/20 2319  98 2 °F (36 8 °C)  68  20  111/67    92 %  None (Room air)  Lying   02/22/20 2017    64    97/58           02/22/20 1500  97 5 °F (36 4 °C)  60  18  133/61  92  98 %          02/22 0701  02/23 0700 02/23 0701  02/24 0700 02/24 0701  02/25 0700   P  O  480 480 380   Total Intake(mL/kg) 480 (4 1) 480 (4 1) 380 (3 3)   Urine (mL/kg/hr) 525 (0 2) 450 (0 2) 350 (0 8)   Stool 0     Total Output 525 450 350   Net -45 +30 +30         Unmeasured Stool Occurrence 1 x         Medications:   Scheduled Medications:  Medications:  apixaban 5 mg Oral BID   vitamin C 1,000 mg Oral Daily   cholecalciferol 1,000 Units Oral Daily   diltiazem 120 mg Oral Daily   DULoxetine 30 mg Oral Daily   pantoprazole 40 mg Oral Daily   senna 2 tablet Oral Daily   vancomycin 10 mg/kg (Adjusted) Intravenous Q12H     Continuous IV Infusions: none     PRN Meds:  aluminum-magnesium hydroxide-simethicone 30 mL Oral Q4H PRN   benzonatate 100 mg Oral TID PRN   HYDROcodone-acetaminophen - used x 3 on 2/22, x 4 on 2/23 and x 1 on 2/24/2020 2 tablet Oral Q4H PRN       Discharge Plan:  tp be determined  Network Utilization Review Department  [email or web address]  ATTENTION: Please call with any questions or concerns to [phone] and carefully listen to the prompts so that you are directed to the right person  All voicemails are confidential   Nyasia Antony all requests for admission clinical reviews, approved or denied determinations and any other requests to dedicated fax number below belonging to the campus where the patient is receiving treatment   List of dedicated fax numbers for the Facilities:  [address] DENIALS (Administrative/Medical Necessity) [phone]   [address] (Maternity/NICU/Pediatrics) [phone]   Mathew Tompkins [address] [phone]   Clayton Nones [phone]     [address] [phone]   [facility]  [phone]   [address] [facility]  [phone]   [address] [place] [address] [phone]

## 2020-02-24 NOTE — PLAN OF CARE
Problem: Potential for Falls  Goal: Patient will remain free of falls  Description  INTERVENTIONS:  - Assess patient frequently for physical needs  -  Identify cognitive and physical deficits and behaviors that affect risk of falls    -  [place] fall precautions as indicated by assessment   - Educate patient/family on patient safety including physical limitations  - Instruct patient to call for assistance with activity based on assessment  - Modify environment to reduce risk of injury  - Consider OT/PT consult to assist with strengthening/mobility  Outcome: Progressing     Problem: PAIN - ADULT  Goal: Verbalizes/displays adequate comfort level or baseline comfort level  Description  Interventions:  - Encourage patient to monitor pain and request assistance  - Assess pain using appropriate pain scale  - Administer analgesics based on type and severity of pain and evaluate response  - Implement non-pharmacological measures as appropriate and evaluate response  - Consider cultural and social influences on pain and pain management  - Notify physician/advanced practitioner if interventions unsuccessful or patient reports new pain  Outcome: Progressing     Problem: INFECTION - ADULT  Goal: Absence or prevention of progression during hospitalization  Description  INTERVENTIONS:  - Assess and monitor for signs and symptoms of infection  - Monitor lab/diagnostic results  - Monitor all insertion sites, i e  indwelling lines, tubes, and drains  - Monitor endotracheal if appropriate and nasal secretions for changes in amount and color  - [place] appropriate cooling/warming therapies per order  - Administer medications as ordered  - Instruct and encourage patient and family to use good hand hygiene technique  - Identify and instruct in appropriate isolation precautions for identified infection/condition  Outcome: Progressing  Goal: Absence of fever/infection during neutropenic period  Description  INTERVENTIONS:  - Monitor WBC    Outcome: Progressing     Problem: SAFETY ADULT  Goal: Maintain or return to baseline ADL function  Description  INTERVENTIONS:  -  Assess patient's ability to carry out ADLs; assess patient's baseline for ADL function and identify physical deficits which impact ability to perform ADLs (bathing, care of mouth/teeth, toileting, grooming, dressing, etc )  - Assess/evaluate cause of self-care deficits   - Assess range of motion  - Assess patient's mobility; develop plan if impaired  - Assess patient's need for assistive devices and provide as appropriate  - Encourage maximum independence but intervene and supervise when necessary  - Involve family in performance of ADLs  - Assess for home care needs following discharge   - Consider OT consult to assist with ADL evaluation and planning for discharge  - Provide patient education as appropriate  Outcome: Progressing  Goal: Maintain or return mobility status to optimal level  Description  INTERVENTIONS:  - Assess patient's baseline mobility status (ambulation, transfers, stairs, etc )    - Identify cognitive and physical deficits and behaviors that affect mobility  - Identify mobility aids required to assist with transfers and/or ambulation (gait belt, sit-to-stand, lift, walker, cane, etc )  - [place] fall precautions as indicated by assessment  - Record patient progress and toleration of activity level on Mobility SBAR; progress patient to next Phase/Stage  - Instruct patient to call for assistance with activity based on assessment  - Consider rehabilitation consult to assist with strengthening/weightbearing, etc   Outcome: Progressing     Problem: DISCHARGE PLANNING  Goal: Discharge to home or other facility with appropriate resources  Description  INTERVENTIONS:  - Identify barriers to discharge w/patient and caregiver  - Arrange for needed discharge resources and transportation as appropriate  - Identify discharge learning needs (meds, wound care, etc )  - Arrange for interpretive services to assist at discharge as needed  - Refer to Case Management Department for coordinating discharge planning if the patient needs post-hospital services based on physician/advanced practitioner order or complex needs related to functional status, cognitive ability, or social support system  Outcome: Progressing     Problem: Knowledge Deficit  Goal: Patient/family/caregiver demonstrates understanding of disease process, treatment plan, medications, and discharge instructions  Description  Complete learning assessment and assess knowledge base    Interventions:  - Provide teaching at level of understanding  - Provide teaching via preferred learning methods  Outcome: Progressing     Problem: CARDIOVASCULAR - ADULT  Goal: Maintains optimal cardiac output and hemodynamic stability  Description  INTERVENTIONS:  - Monitor I/O, vital signs and rhythm  - Monitor for S/S and trends of decreased cardiac output  - Administer and titrate ordered vasoactive medications to optimize hemodynamic stability  - Assess quality of pulses, skin color and temperature  - Assess for signs of decreased coronary artery perfusion  - Instruct patient to report change in severity of symptoms  Outcome: Progressing  Goal: Absence of cardiac dysrhythmias or at baseline rhythm  Description  INTERVENTIONS:  - Continuous cardiac monitoring, vital signs, obtain 12 lead EKG if ordered  - Administer antiarrhythmic and heart rate control medications as ordered  - Monitor electrolytes and administer replacement therapy as ordered  Outcome: Progressing     Problem: MUSCULOSKELETAL - ADULT  Goal: Maintain or return mobility to safest level of function  Description  INTERVENTIONS:  - Assess patient's ability to carry out ADLs; assess patient's baseline for ADL function and identify physical deficits which impact ability to perform ADLs (bathing, care of mouth/teeth, toileting, grooming, dressing, etc )  - Assess/evaluate cause of self-care deficits   - Assess range of motion  - Assess patient's mobility  - Assess patient's need for assistive devices and provide as appropriate  - Encourage maximum independence but intervene and supervise when necessary  - Involve family in performance of ADLs  - Assess for home care needs following discharge   - Consider OT consult to assist with ADL evaluation and planning for discharge  - Provide patient education as appropriate  Outcome: Progressing  Goal: Maintain proper alignment of affected body part  Description  INTERVENTIONS:  - Support, maintain and protect limb and body alignment  - Provide patient/ family with appropriate education  Outcome: Progressing     Problem: Prexisting or High Potential for Compromised Skin Integrity  Goal: Skin integrity is maintained or improved  Description  INTERVENTIONS:  - Identify patients at risk for skin breakdown  - Assess and monitor skin integrity  - Assess and monitor nutrition and hydration status  - Monitor labs   - Assess for incontinence   - Turn and reposition patient  - Assist with mobility/ambulation  - Relieve pressure over bony prominences  - Avoid friction and shearing  - Provide appropriate hygiene as needed including keeping skin clean and dry  - Evaluate need for skin moisturizer/barrier cream  - Collaborate with interdisciplinary team   - Patient/family teaching  - Consider wound care consult   Outcome: Progressing

## 2020-02-24 NOTE — NURSING NOTE
Pt education given on PVD and leg elevation  Pt's legs elevated bilaterally with mild ace wrap bilaterally applied  [facility]   Talisha Onofre RN

## 2020-02-25 LAB
ALBUMIN SERPL BCP-MCNC: 2 G/DL (ref 3.5–5)
ALP SERPL-CCNC: 159 U/L (ref 46–116)
ALT SERPL W P-5'-P-CCNC: 8 U/L (ref 12–78)
ANION GAP SERPL CALCULATED.3IONS-SCNC: 10 MMOL/L (ref 4–13)
AST SERPL W P-5'-P-CCNC: 25 U/L (ref 5–45)
BASOPHILS # BLD MANUAL: 0 THOUSAND/UL (ref 0–0.1)
BASOPHILS NFR MAR MANUAL: 0 % (ref 0–1)
BILIRUB SERPL-MCNC: 1 MG/DL (ref 0.2–1)
BUN SERPL-MCNC: 43 MG/DL (ref 5–25)
CALCIUM SERPL-MCNC: 8.8 MG/DL (ref 8.3–10.1)
CHLORIDE SERPL-SCNC: 99 MMOL/L (ref 100–108)
CO2 SERPL-SCNC: 23 MMOL/L (ref 21–32)
CREAT SERPL-MCNC: 1.23 MG/DL (ref 0.6–1.3)
EOSINOPHIL # BLD MANUAL: 0.21 THOUSAND/UL (ref 0–0.4)
EOSINOPHIL NFR BLD MANUAL: 2 % (ref 0–6)
ERYTHROCYTE [DISTWIDTH] IN BLOOD BY AUTOMATED COUNT: 15.8 % (ref 11.6–15.1)
GFR SERPL CREATININE-BSD FRML MDRD: 58 ML/MIN/1.73SQ M
GLUCOSE SERPL-MCNC: 94 MG/DL (ref 65–140)
HCT VFR BLD AUTO: 34.8 % (ref 36.5–49.3)
HGB BLD-MCNC: 11.8 G/DL (ref 12–17)
LYMPHOCYTES # BLD AUTO: 0.95 THOUSAND/UL (ref 0.6–4.47)
LYMPHOCYTES # BLD AUTO: 9 % (ref 14–44)
MCH RBC QN AUTO: 32.2 PG (ref 26.8–34.3)
MCHC RBC AUTO-ENTMCNC: 33.9 G/DL (ref 31.4–37.4)
MCV RBC AUTO: 95 FL (ref 82–98)
MONOCYTES # BLD AUTO: 1.16 THOUSAND/UL (ref 0–1.22)
MONOCYTES NFR BLD: 11 % (ref 4–12)
NEUTROPHILS # BLD MANUAL: 8.21 THOUSAND/UL (ref 1.85–7.62)
NEUTS BAND NFR BLD MANUAL: 1 % (ref 0–8)
NEUTS SEG NFR BLD AUTO: 77 % (ref 43–75)
NRBC BLD AUTO-RTO: 0 /100 WBCS
PLATELET # BLD AUTO: 160 THOUSANDS/UL (ref 149–390)
PLATELET BLD QL SMEAR: ADEQUATE
PMV BLD AUTO: 11.3 FL (ref 8.9–12.7)
POTASSIUM SERPL-SCNC: 4.4 MMOL/L (ref 3.5–5.3)
PROT SERPL-MCNC: 6.8 G/DL (ref 6.4–8.2)
RBC # BLD AUTO: 3.67 MILLION/UL (ref 3.88–5.62)
RBC MORPH BLD: NORMAL
SODIUM SERPL-SCNC: 132 MMOL/L (ref 136–145)
TOTAL CELLS COUNTED SPEC: 100
WBC # BLD AUTO: 10.52 THOUSAND/UL (ref 4.31–10.16)

## 2020-02-25 PROCEDURE — 99233 SBSQ HOSP IP/OBS HIGH 50: CPT | Performed by: INTERNAL MEDICINE

## 2020-02-25 PROCEDURE — 80053 COMPREHEN METABOLIC PANEL: CPT | Performed by: INTERNAL MEDICINE

## 2020-02-25 PROCEDURE — 85027 COMPLETE CBC AUTOMATED: CPT | Performed by: INTERNAL MEDICINE

## 2020-02-25 PROCEDURE — 85007 BL SMEAR W/DIFF WBC COUNT: CPT | Performed by: INTERNAL MEDICINE

## 2020-02-25 RX ORDER — BUMETANIDE 1 MG/1
1 TABLET ORAL DAILY
Status: DISCONTINUED | OUTPATIENT
Start: 2020-02-25 | End: 2020-02-26

## 2020-02-25 RX ADMIN — APIXABAN 5 MG: 5 TABLET, FILM COATED ORAL at 08:23

## 2020-02-25 RX ADMIN — VANCOMYCIN HYDROCHLORIDE 750 MG: 750 INJECTION, SOLUTION INTRAVENOUS at 14:14

## 2020-02-25 RX ADMIN — DULOXETINE 30 MG: 30 CAPSULE, DELAYED RELEASE ORAL at 08:23

## 2020-02-25 RX ADMIN — HYDROCODONE BITARTRATE AND ACETAMINOPHEN 2 TABLET: 5; 325 TABLET ORAL at 20:40

## 2020-02-25 RX ADMIN — APIXABAN 5 MG: 5 TABLET, FILM COATED ORAL at 18:16

## 2020-02-25 RX ADMIN — PANTOPRAZOLE SODIUM 40 MG: 40 TABLET, DELAYED RELEASE ORAL at 08:23

## 2020-02-25 RX ADMIN — OXYCODONE HYDROCHLORIDE AND ACETAMINOPHEN 1000 MG: 500 TABLET ORAL at 08:23

## 2020-02-25 RX ADMIN — BUMETANIDE 1 MG: 1 TABLET ORAL at 11:22

## 2020-02-25 RX ADMIN — MELATONIN 1000 UNITS: at 08:23

## 2020-02-25 RX ADMIN — DILTIAZEM HYDROCHLORIDE 120 MG: 120 CAPSULE, COATED, EXTENDED RELEASE ORAL at 08:23

## 2020-02-25 RX ADMIN — VANCOMYCIN HYDROCHLORIDE 750 MG: 750 INJECTION, SOLUTION INTRAVENOUS at 03:23

## 2020-02-25 RX ADMIN — HYDROCODONE BITARTRATE AND ACETAMINOPHEN 2 TABLET: 5; 325 TABLET ORAL at 08:23

## 2020-02-25 RX ADMIN — HYDROCODONE BITARTRATE AND ACETAMINOPHEN 2 TABLET: 5; 325 TABLET ORAL at 03:22

## 2020-02-25 RX ADMIN — HYDROCODONE BITARTRATE AND ACETAMINOPHEN 2 TABLET: 5; 325 TABLET ORAL at 16:02

## 2020-02-25 NOTE — CONSULTS
Consultation - Infectious Disease   Linwood Mazariegos 69 y o  male MRN: [id number]  Unit/Bed#: -01 Encounter: [id number]      Assessment/Plan   1  Cellulitis LLE: strange that it developed during this hospitalization while on abx, though given chronic venous stasis he would be at risk for it  Given clinical improvement I would continue vancomycin  If temp, increasing WBC would add gram negative coverage  Unlikely to have DVT on Eliquis  D/W Dr Angelica Lin will follow    History of Present Illness   Physician Requesting Consult: Deyanira Moore MD  Reason for Consult / Principal Problem:  RLE cellulitis    HPI: Maddison Gutierrez is a 69y o  year old male with H/O AFib on anticoagulation, congestive heart failure hypertension chronic venous stasis, morbid obesity  Patient admitted with RLE cellulitis which rapidly improved on IV ancef  On hospital day # 3 he developed cellulitis of his LLE  His abx was changed to vancomycin and he has made some clinical improvement  He has no fevers or chills  No cultures were drawn  Patient states that the pain that he has is sig  improved  No headaches, no chest pains, no n/v/d  Consults    ROS: 12 systems reviewed, remainder is neg      Historical Information   Past Medical History:   Diagnosis Date    Atrial fibrillation ([facility] )     Cardiac disease     Cellulitis     CHF (congestive heart failure) (HCC)     Chronic pain     Chronic venous hypertension     with ulceration    GERD (gastroesophageal reflux disease)     History of methicillin resistant staphylococcus aureus (MRSA) 11/26/2019    negative nasal swab     Hyperlipidemia     Hypertension     Obesity     Pulmonary hypertension ([address]     PVD (peripheral vascular disease) ([facility]     Vascular disorder of extremity ([facility] )      Past Surgical History:   Procedure Laterality Date    ANTERIOR RELEASE VERTEBRAL BODY W/ POSTERIOR FUSION      APPENDECTOMY  1954    CATARACT EXTRACTION      DECOMPRESSION SPINE LUMBAR POSTERIOR      ELBOW SURGERY      KNEE SURGERY      NOSE SURGERY      turbinectomy    RETINAL DETACHMENT SURGERY      RHINOPLASTY      TONSILLECTOMY      VEIN LIGATION AND STRIPPING      saphenous vein long     Social History   Social History     Substance and Sexual Activity   Alcohol Use No    Frequency: Never    Binge frequency: Never     Social History     Substance and Sexual Activity   Drug Use No     Social History     Tobacco Use   Smoking Status Never Smoker   Smokeless Tobacco Never Used     Family History   Problem Relation Age of Onset    Cancer Mother         cancer of uterus    Diabetes Sister     Mental illness Neg Hx         neg fam hx    Substance Abuse Neg Hx         neg fam hx       Meds/Allergies   MEDS: reviewed      Current Facility-Administered Medications:     albumin human (FLEXBUMIN) 25 % injection 12 5 g, 12 5 g, Intravenous, Once, Barrington Kirkland PA-C, Stopped at 02/20/20 2343    aluminum-magnesium hydroxide-simethicone (MYLANTA) 200-200-20 mg/5 mL oral suspension 30 mL, 30 mL, Oral, Q4H PRN, Alex Valencia DO    apixaban (ELIQUIS) tablet 5 mg, 5 mg, Oral, BID, NAVNEET [place], 5 mg at 02/25/20 9312    ascorbic acid (VITAMIN C) tablet 1,000 mg, 1,000 mg, Oral, Daily, NAVNEET [place], 1,000 mg at 02/25/20 5998    benzonatate (TESSALON PERLES) capsule 100 mg, 100 mg, Oral, TID PRN, NAVNEET [place]    bumetanide (BUMEX) tablet 1 mg, 1 mg, Oral, Daily, Fenton Homans, MD, 1 mg at 02/25/20 1122    cholecalciferol (VITAMIN D3) tablet 1,000 Units, 1,000 Units, Oral, Daily, NAVNEET [place], 1,000 Units at 02/25/20 1283    DULoxetine (CYMBALTA) delayed release capsule 30 mg, 30 mg, Oral, Daily, NAVNEET [place], 30 mg at 02/25/20 3059    HYDROcodone-acetaminophen (NORCO) 5-325 mg per tablet 2 tablet, 2 tablet, Oral, Q4H PRN, NAVNEET [place], 2 tablet at 02/25/20 1602    metoprolol tartrate (LOPRESSOR) tablet 25 mg, 25 mg, Oral, Q12H [facility], Carlos Ashley MD    pantoprazole (PROTONIX) EC tablet 40 mg, 40 mg, Oral, Daily, NAVNEET [place], 40 mg at 02/25/20 8784    senna (SENOKOT) tablet 17 2 mg, 2 tablet, Oral, Daily, Fix That Bug DO Maile, 17 2 mg at 02/24/20 1226    vancomycin (VANCOCIN) IVPB (premix) 750 mg, 750 mg, Intravenous, Q12H, Carlos Ashley MD, Last Rate: 150 mL/hr at 02/25/20 1414, 750 mg at 02/25/20 1414    No Known Allergies      Intake/Output Summary (Last 24 hours) at 2/25/2020 1713  Last data filed at 2/25/2020 1537  Gross per 24 hour   Intake 540 ml   Output 200 ml   Net 340 ml       PE: NAD  VSS, Tmax: afebrile  HEENT: anicteric  NECK: supple  CARDIAC: irregular, no murmur  LUNGS: cta bilaterally  ABDOMEN: soft nt/nd + BS  EXTREMITIES: changes c/w chronic venous stasis bilaterally, with erythema, swelling and warmth on the L, pics reviewed  SKIN: no rashes  NEURO: grossly non-focal     Invasive Devices:   Peripheral IV 02/23/20 Left Forearm (Active)   Site Assessment Intact 2/25/2020  7:00 AM   Dressing Type Transparent 2/25/2020  7:00 AM   Line Status Flushed;Capped 2/25/2020  7:00 AM   Dressing Status Intact 2/25/2020  7:00 AM           Lab Results:   No results displayed because visit has over 200 results  Imaging Studies: I have personally reviewed pertinent reports  EKG, Pathology, and Other Studies: I have personally reviewed pertinent reports        Culture  No results found for: [facility]  Lab Results   Component Value Date    WOUNDCULT 3+ Growth of Serratia marcescens (A) 07/24/2019    WOUNDCULT 1+ Growth of  07/24/2019    WOUNDCULT (A) 01/02/2019     1+ Growth of Methicillin Resistant Staphylococcus aureus     Lab Results   Component Value Date    URINECX <10,000 cfu/ml  02/19/2020     No results found for: SPUTUMCULTUR    Principal Problem:    Cellulitis of right lower extremity  Active Problems:    Gastroesophageal reflux disease without esophagitis    Class 1 obesity due to excess calories with serious comorbidity and body mass index (BMI) of 34 0 to 34 9 in adult    Persistent atrial fibrillation    Cellulitis of left lower extremity    Chronic deep vein thrombosis (DVT) (HCC)    Acute kidney injury superimposed on CKD (HCC)    Chronic combined systolic and diastolic congestive heart failure (HCC)    Hypertension    History of methicillin resistant staphylococcus aureus (MRSA)    Hyponatremia

## 2020-02-25 NOTE — PLAN OF CARE
Problem: Potential for Falls  Goal: Patient will remain free of falls  Description  INTERVENTIONS:  - Assess patient frequently for physical needs  -  Identify cognitive and physical deficits and behaviors that affect risk of falls    -  [place] fall precautions as indicated by assessment   - Educate patient/family on patient safety including physical limitations  - Instruct patient to call for assistance with activity based on assessment  - Modify environment to reduce risk of injury  - Consider OT/PT consult to assist with strengthening/mobility  Outcome: Progressing     Problem: PAIN - ADULT  Goal: Verbalizes/displays adequate comfort level or baseline comfort level  Description  Interventions:  - Encourage patient to monitor pain and request assistance  - Assess pain using appropriate pain scale  - Administer analgesics based on type and severity of pain and evaluate response  - Implement non-pharmacological measures as appropriate and evaluate response  - Consider cultural and social influences on pain and pain management  - Notify physician/advanced practitioner if interventions unsuccessful or patient reports new pain  Outcome: Progressing     Problem: INFECTION - ADULT  Goal: Absence or prevention of progression during hospitalization  Description  INTERVENTIONS:  - Assess and monitor for signs and symptoms of infection  - Monitor lab/diagnostic results  - Monitor all insertion sites, i e  indwelling lines, tubes, and drains  - Monitor endotracheal if appropriate and nasal secretions for changes in amount and color  - [place] appropriate cooling/warming therapies per order  - Administer medications as ordered  - Instruct and encourage patient and family to use good hand hygiene technique  - Identify and instruct in appropriate isolation precautions for identified infection/condition  Outcome: Progressing  Goal: Absence of fever/infection during neutropenic period  Description  INTERVENTIONS:  - Monitor WBC    Outcome: Progressing     Problem: SAFETY ADULT  Goal: Maintain or return to baseline ADL function  Description  INTERVENTIONS:  -  Assess patient's ability to carry out ADLs; assess patient's baseline for ADL function and identify physical deficits which impact ability to perform ADLs (bathing, care of mouth/teeth, toileting, grooming, dressing, etc )  - Assess/evaluate cause of self-care deficits   - Assess range of motion  - Assess patient's mobility; develop plan if impaired  - Assess patient's need for assistive devices and provide as appropriate  - Encourage maximum independence but intervene and supervise when necessary  - Involve family in performance of ADLs  - Assess for home care needs following discharge   - Consider OT consult to assist with ADL evaluation and planning for discharge  - Provide patient education as appropriate  Outcome: Progressing  Goal: Maintain or return mobility status to optimal level  Description  INTERVENTIONS:  - Assess patient's baseline mobility status (ambulation, transfers, stairs, etc )    - Identify cognitive and physical deficits and behaviors that affect mobility  - Identify mobility aids required to assist with transfers and/or ambulation (gait belt, sit-to-stand, lift, walker, cane, etc )  - [place] fall precautions as indicated by assessment  - Record patient progress and toleration of activity level on Mobility SBAR; progress patient to next Phase/Stage  - Instruct patient to call for assistance with activity based on assessment  - Consider rehabilitation consult to assist with strengthening/weightbearing, etc   Outcome: Progressing     Problem: DISCHARGE PLANNING  Goal: Discharge to home or other facility with appropriate resources  Description  INTERVENTIONS:  - Identify barriers to discharge w/patient and caregiver  - Arrange for needed discharge resources and transportation as appropriate  - Identify discharge learning needs (meds, wound care, etc )  - Arrange for interpretive services to assist at discharge as needed  - Refer to Case Management Department for coordinating discharge planning if the patient needs post-hospital services based on physician/advanced practitioner order or complex needs related to functional status, cognitive ability, or social support system  Outcome: Progressing     Problem: Knowledge Deficit  Goal: Patient/family/caregiver demonstrates understanding of disease process, treatment plan, medications, and discharge instructions  Description  Complete learning assessment and assess knowledge base    Interventions:  - Provide teaching at level of understanding  - Provide teaching via preferred learning methods  Outcome: Progressing     Problem: CARDIOVASCULAR - ADULT  Goal: Maintains optimal cardiac output and hemodynamic stability  Description  INTERVENTIONS:  - Monitor I/O, vital signs and rhythm  - Monitor for S/S and trends of decreased cardiac output  - Administer and titrate ordered vasoactive medications to optimize hemodynamic stability  - Assess quality of pulses, skin color and temperature  - Assess for signs of decreased coronary artery perfusion  - Instruct patient to report change in severity of symptoms  Outcome: Progressing  Goal: Absence of cardiac dysrhythmias or at baseline rhythm  Description  INTERVENTIONS:  - Continuous cardiac monitoring, vital signs, obtain 12 lead EKG if ordered  - Administer antiarrhythmic and heart rate control medications as ordered  - Monitor electrolytes and administer replacement therapy as ordered  Outcome: Progressing     Problem: MUSCULOSKELETAL - ADULT  Goal: Maintain or return mobility to safest level of function  Description  INTERVENTIONS:  - Assess patient's ability to carry out ADLs; assess patient's baseline for ADL function and identify physical deficits which impact ability to perform ADLs (bathing, care of mouth/teeth, toileting, grooming, dressing, etc )  - Assess/evaluate cause of self-care deficits   - Assess range of motion  - Assess patient's mobility  - Assess patient's need for assistive devices and provide as appropriate  - Encourage maximum independence but intervene and supervise when necessary  - Involve family in performance of ADLs  - Assess for home care needs following discharge   - Consider OT consult to assist with ADL evaluation and planning for discharge  - Provide patient education as appropriate  Outcome: Progressing  Goal: Maintain proper alignment of affected body part  Description  INTERVENTIONS:  - Support, maintain and protect limb and body alignment  - Provide patient/ family with appropriate education  Outcome: Progressing     Problem: Prexisting or High Potential for Compromised Skin Integrity  Goal: Skin integrity is maintained or improved  Description  INTERVENTIONS:  - Identify patients at risk for skin breakdown  - Assess and monitor skin integrity  - Assess and monitor nutrition and hydration status  - Monitor labs   - Assess for incontinence   - Turn and reposition patient  - Assist with mobility/ambulation  - Relieve pressure over bony prominences  - Avoid friction and shearing  - Provide appropriate hygiene as needed including keeping skin clean and dry  - Evaluate need for skin moisturizer/barrier cream  - Collaborate with interdisciplinary team   - Patient/family teaching  - Consider wound care consult   Outcome: Progressing

## 2020-02-25 NOTE — ASSESSMENT & PLAN NOTE
Patient developed left lower extremity cellulitis on the 3rd day of his hospital stay  He was switched to IV vancomycin  On physical examination he still has significant left lower leg cellulitis without any abscess formation    Will continue IV vancomycin    I am also asking ID to see the patient    Will resume Bumex to decrease lower extremity swelling and will switch diltiazem to metoprolol decreased swelling in the lower extremities that may contribute to the cellulitis

## 2020-02-25 NOTE — ASSESSMENT & PLAN NOTE
Patient has chronic atrial fibrillation      Rates are controlled on diltiazem    Continue Eliquis for CVA prophylaxis

## 2020-02-25 NOTE — ASSESSMENT & PLAN NOTE
Patient acute kidney injury on admission with creatinine of 1 9    His renal function improved off of Bumex and spironolactone and his creatinine is 1 23 this morning    Will continue monitoring patient's renal function

## 2020-02-25 NOTE — PROGRESS NOTES
Progress Note - Linwood Mazariegos 1945, 69 y o  male MRN: [id number]    Unit/Bed#: -01 Encounter: [id number]    Primary Care Provider: Ta Mcadams MD   Date and time admitted to hospital: 2/19/2020  1:18 PM        * Cellulitis of right lower extremity  Assessment & Plan  Patient presented with right lower extremity cellulitis  He was placed on IV Ancef which resulted in significant improvement  Chronic combined systolic and diastolic congestive heart failure (HCC)  Assessment & Plan  Wt Readings from Last 3 Encounters:   02/24/20 116 kg (256 lb 9 9 oz)   02/04/20 108 kg (239 lb)   01/07/20 113 kg (249 lb)     Patient has chronic systolic/diastolic CHF with ejection fraction of 45%    Patient is on room air and lungs are clear to auscultation    Patient's systolic blood pressure is 100-120  Will consider resuming Bumex tomorrow      Cellulitis of left lower extremity  Assessment & Plan  Patient developed left lower extremity cellulitis on the 3rd day of his hospital stay  He was switched to IV vancomycin  On physical examination he still has significant left lower leg cellulitis without any abscess formation  Will continue IV vancomycin    Persistent atrial fibrillation  Assessment & Plan  Patient has chronic atrial fibrillation  Rates are controlled on diltiazem    Continue Eliquis for CVA prophylaxis    Acute kidney injury superimposed on CKD [facility])  Assessment & Plan  Patient acute kidney injury on admission with creatinine of 1 9    His renal function improved off of Bumex and spironolactone and his creatinine is 1 24 this morning    Will continue monitoring patient's renal function    Hyponatremia  Assessment & Plan  Patient presented with hyponatremia of 129    Sodium is 131 today  He does have CHF    Will consider resuming Bumex if blood pressure allows it tomorrow         VTE Prophylaxis: in place    Patient Centered Rounds: I rounded with patient's nurse    Current Length of Stay: 5 day(s)    Current Patient Status: Inpatient    Certification Statement: Pt requires additional inpatient hospital stay due to: see assessment and plan        Subjective:   Patient denies any chest pain, shortness breath, palpitations, abdominal pain, diarrhea, difficulty urinating, signs of bleeding  His left lower extremity swelling and warmth is about the same he claims  Patient's nurse reports that patient was not elevating his left leg as he was requested during the day  All other ROS are negative    Objective:     Vitals:   Temp (24hrs), Av °F (36 7 °C), Min:97 5 °F (36 4 °C), Max:99 °F (37 2 °C)    Temp:  [97 5 °F (36 4 °C)-99 °F (37 2 °C)] 97 5 °F (36 4 °C)  HR:  [72-82] 82  Resp:  [17-18] 18  BP: (102-128)/(46-69) 128/69  SpO2:  [97 %] 97 %  Body mass index is 34 8 kg/m²  Input and Output Summary (last 24 hours): Intake/Output Summary (Last 24 hours) at 2020  Last data filed at 2020 1500  Gross per 24 hour   Intake 860 ml   Output 350 ml   Net 510 ml       Physical Exam:     Physical Exam   Constitutional: He is oriented to person, place, and time  He appears well-developed  No distress  HENT:   Head: Normocephalic  Mouth/Throat: Oropharynx is clear and moist    Eyes: Conjunctivae are normal    Neck: Neck supple  No JVD present  Cardiovascular: Normal rate  Irregular irregular, controlled rate   Pulmonary/Chest: Effort normal  No respiratory distress  He has no wheezes  He has no rales  Abdominal: Soft  Bowel sounds are normal  There is no tenderness  Neurological: He is alert and oriented to person, place, and time  No cranial nerve deficit  Skin: Skin is warm and dry  No rash noted  Patient has extensive left lower leg cellulitis from his toes all the up to the left knee  I did not feel any abscess formation    Patient's right lower extremity is without warmth or erythema   Psychiatric: He has a normal mood and affect  Vitals reviewed            I personally reviewed labs and imaging reports for today  Last 24 Hours Medication List:     Current Facility-Administered Medications:  albumin human 12 5 g Intravenous Once Carole Dominguez PA-C Last Rate: Stopped (02/20/20 5993)   aluminum-magnesium hydroxide-simethicone 30 mL Oral Q4H PRN Azalecarmela Stair, DO    apixaban 5 mg Oral BID [place]    vitamin C 1,000 mg Oral Daily [place]    benzonatate 100 mg Oral TID PRN Colton Poot, CRNP    cholecalciferol 1,000 Units Oral Daily [place]    diltiazem 120 mg Oral Daily Misael Sher DO    DULoxetine 30 mg Oral Daily Colton Poot, CRNP    HYDROcodone-acetaminophen 2 tablet Oral Q4H PRN [place]    pantoprazole 40 mg Oral Daily Colton Poot, CRNP    senna 2 tablet Oral Daily Misael Sher DO    vancomycin 10 mg/kg (Adjusted) Intravenous Q12H Felicia Sher DO Last Rate: 1,000 mg (02/24/20 1037)          Today, Patient Was Seen By: Nany Saab MD    ** Please Note: Dictation voice to text software may have been used in the creation of this document   **

## 2020-02-25 NOTE — ASSESSMENT & PLAN NOTE
Wt Readings from Last 3 Encounters:   02/25/20 116 kg (256 lb 9 9 oz)   02/04/20 108 kg (239 lb)   01/07/20 113 kg (249 lb)     Patient has chronic systolic/diastolic CHF with ejection fraction of 45%    Patient is on room air and lungs are clear to auscultation    Patient's blood pressure is in the 100 teens now  Will resume Bumex 1 mg q a m      Will monitor electrolytes, renal function closely

## 2020-02-25 NOTE — PROGRESS NOTES
Vancomycin IV Pharmacy-to-Dose Consultation    Sona Cordero is a 69 y o  male who is currently receiving Vancomycin IV with management by the Pharmacy Consult service  Assessment/Plan:  The patient was reviewed  Renal function is stable and no signs or symptoms of nephrotoxicity and/or infusion reactions were documented in the chart  Based on todays assessment, continue current vancomycin (day # 3 dosing of 1000 mg q12h, with a plan for trough to be drawn at 0930 on 3/1/2020  We will continue to follow the patients culture results and clinical progress daily      Carolina Dominguez, Pharmacist

## 2020-02-25 NOTE — PROGRESS NOTES
02/25/20 1200   Plan of Care   Care Plan Initiated Other (Comment)  (Follow up attempt  Pt not interested)   Assessment Completed by: Unit visit; Other (Comment)  (Follow up due to LOS  )

## 2020-02-25 NOTE — ASSESSMENT & PLAN NOTE
Patient acute kidney injury on admission with creatinine of 1 9    His renal function improved off of Bumex and spironolactone and his creatinine is 1 24 this morning    Will continue monitoring patient's renal function

## 2020-02-25 NOTE — ASSESSMENT & PLAN NOTE
Patient has chronic atrial fibrillation  Rates are controlled on diltiazem    I am discontinuing diltiazem because of lower extremity edema and switching Lopressor 25 mg p o  B i d  For rate control      Continue Eliquis for CVA prophylaxis

## 2020-02-25 NOTE — PROGRESS NOTES
Vancomycin IV Pharmacy-to-Dose Consultation    Alfred Winn is a 69 y o  male who is currently receiving Vancomycin 750mg IV Q12H via management by the Pharmacy Consult service  The indication is skin/soft tissue infection with a goal Vancomycin trough of 10-15 as ordered by the prescriber  Assessment/Plan:  The patient was reviewed Renal function is stable and no signs or symptoms of nephrotoxicity and/or infusion reactions were documented in the chart  The need for dose reduction was noted and addressed overnight 2/24/20 into 2/25/20 as documented in the last Pharmacy progress note  Based on todays assessment, continue current regimen of Vancomycin 750mg IV Q12H (day # 4), with a previously ordered repeat trough to be drawn on 2/26/2020 @1430  Pharmacy will continue to follow the patients culture results and clinical progress daily      Erlinda Conti, Pharmacist

## 2020-02-25 NOTE — PROGRESS NOTES
Progress Note - Linwood Mazariegos 1945, 69 y o  male MRN: [id number]    Unit/Bed#: -01 Encounter: [id number]    Primary Care Provider: Lissett Kimbrough MD   Date and time admitted to hospital: 2/19/2020  1:18 PM        Chronic combined systolic and diastolic congestive heart failure (HCC)  Assessment & Plan  Wt Readings from Last 3 Encounters:   02/25/20 116 kg (256 lb 9 9 oz)   02/04/20 108 kg (239 lb)   01/07/20 113 kg (249 lb)     Patient has chronic systolic/diastolic CHF with ejection fraction of 45%    Patient is on room air and lungs are clear to auscultation    Patient's blood pressure is in the 100 teens now  Will resume Bumex 1 mg q a m  Will monitor electrolytes, renal function closely      Cellulitis of left lower extremity  Assessment & Plan  Patient developed left lower extremity cellulitis on the 3rd day of his hospital stay  He was switched to IV vancomycin  On physical examination he still has significant left lower leg cellulitis without any abscess formation  Will continue IV vancomycin    I am also asking ID to see the patient    Will resume Bumex to decrease lower extremity swelling and will switch diltiazem to metoprolol decreased swelling in the lower extremities that may contribute to the cellulitis    Persistent atrial fibrillation  Assessment & Plan  Patient has chronic atrial fibrillation  Rates are controlled on diltiazem    I am discontinuing diltiazem because of lower extremity edema and switching Lopressor 25 mg p o  B i d  For rate control      Continue Eliquis for CVA prophylaxis    Acute kidney injury superimposed on CKD [facility])  Assessment & Plan  Patient acute kidney injury on admission with creatinine of 1 9    His renal function improved off of Bumex and spironolactone and his creatinine is 1 23 this morning    Will continue monitoring patient's renal function        VTE Prophylaxis: in place    Patient Centered Rounds: I rounded with patient's nurse    Current Length of Stay: 6 day(s)    Current Patient Status: Inpatient    Certification Statement: Pt requires additional inpatient hospital stay due to: see assessment and plan        Subjective:   Patient denies chest pain, shortness of breath, palpitations, abdominal pain, signs of bleeding  Still has swelling of the lower extremities    All other ROS are negative    Objective:     Vitals:   Temp (24hrs), Av 7 °F (36 5 °C), Min:97 5 °F (36 4 °C), Max:97 9 °F (36 6 °C)    Temp:  [97 5 °F (36 4 °C)-97 9 °F (36 6 °C)] 97 8 °F (36 6 °C)  HR:  [82] 82  Resp:  [18-20] 20  BP: (111-128)/(60-69) 111/60  SpO2:  [96 %-98 %] 96 %  Body mass index is 34 8 kg/m²  Input and Output Summary (last 24 hours): Intake/Output Summary (Last 24 hours) at 2020 1112  Last data filed at 2020 0746  Gross per 24 hour   Intake 780 ml   Output    Net 780 ml       Physical Exam:     Physical Exam   Constitutional: He is oriented to person, place, and time  He appears well-developed  No distress  HENT:   Head: Normocephalic  Mouth/Throat: Oropharynx is clear and moist    Eyes: Conjunctivae are normal    Neck: Neck supple  Cardiovascular: Normal rate  Irregular irregular   Pulmonary/Chest: Effort normal  No respiratory distress  He has no wheezes  He has no rales  Abdominal: Soft  Bowel sounds are normal  There is no tenderness  Lymphadenopathy:     He has no cervical adenopathy  Neurological: He is alert and oriented to person, place, and time  No cranial nerve deficit  Skin: Skin is warm and dry  Patient still has significant left lower extremity cellulitis extending from the toes up to the knee    I saw no abscess formation   Vitals reviewed  I personally reviewed labs and imaging reports for today        Last 24 Hours Medication List:     Current Facility-Administered Medications:  albumin human 12 5 g Intravenous Once Wilber Liang PA-C Last Rate: Stopped (20 0560) aluminum-magnesium hydroxide-simethicone 30 mL Oral Q4H PRN Karly Hoyos DO    apixaban 5 mg Oral BID [place]    vitamin C 1,000 mg Oral Daily [place]    benzonatate 100 mg Oral TID PRN [place]    bumetanide 1 mg Oral Daily Nany Saab MD    cholecalciferol 1,000 Units Oral Daily [place]    DULoxetine 30 mg Oral Daily [place]    HYDROcodone-acetaminophen 2 tablet Oral Q4H PRN [place]    metoprolol tartrate 25 mg Oral Q12H [address] [place] [address] MD Nerissa    pantoprazole 40 mg Oral Daily [place]    senna 2 tablet Oral Daily Misael Sher DO    vancomycin 750 mg Intravenous Q12H Nany Saab MD Last Rate: 750 mg (02/25/20 0323)          Today, Patient Was Seen By: Nany Saab MD    ** Please Note: Dictation voice to text software may have been used in the creation of this document   **

## 2020-02-25 NOTE — PROGRESS NOTES
Vancomycin Assessment    Boston Carvalho is a 69 y o  male who is currently receiving vancomycin 1000mg IV q 12hr for skin-soft tissue infection     Relevant clinical data and objective history reviewed:  Creatinine   Date Value Ref Range Status   02/24/2020 1 24 0 60 - 1 30 mg/dL Final     Comment:     Standardized to IDMS reference method   02/23/2020 1 34 (H) 0 60 - 1 30 mg/dL Final     Comment:     Standardized to IDMS reference method   02/22/2020 1 52 (H) 0 60 - 1 30 mg/dL Final     Comment:     Standardized to IDMS reference method   12/17/2015 0 97 0 60 - 1 30 mg/dL Final     Comment:     Standardized to IDMS reference method   10/14/2014 1 06 0 60 - 1 30 mg/dL Final     Comment:     Standardized to IDMS reference method     /65 (BP Location: Left arm)   Pulse 82   Temp 97 9 °F (36 6 °C)   Resp 18   Ht 6' (1 829 m)   Wt 116 kg (256 lb 9 9 oz)   SpO2 98%   BMI 34 80 kg/m²   I/O last 3 completed shifts: In: 1340 [P O :1340]  Out: 800 [Urine:800]  Lab Results   Component Value Date/Time    BUN 42 (H) 02/24/2020 06:29 AM    BUN 68 (H) 12/09/2019 02:15 PM    WBC 13 06 (H) 02/24/2020 06:29 AM    WBC 6 35 12/17/2015 11:30 AM    HGB 12 1 02/24/2020 06:29 AM    HGB 14 9 12/17/2015 11:30 AM    HCT 35 5 (L) 02/24/2020 06:29 AM    HCT 46 3 12/17/2015 11:30 AM    MCV 95 02/24/2020 06:29 AM    MCV 93 12/17/2015 11:30 AM     (L) 02/24/2020 06:29 AM     12/17/2015 11:30 AM     Temp Readings from Last 3 Encounters:   02/24/20 97 9 °F (36 6 °C)   02/04/20 97 9 °F (36 6 °C) (Tympanic)   12/01/19 (!) 97 1 °F (36 2 °C) (Oral)     Vancomycin Days of Therapy: 4    Assessment/Plan  The patient is currently on vancomycin utilizing scheduled dosing  Baseline risks associated with therapy include: pre-existing renal impairment and advanced age    The patient is receiving 1000mg IV q 12hr with the most recent vancomycin level being at steady-state and supratherapeutic (18 8) based on a goal of 10-15 (mild infection/cellulitis) ; therefore, after clinical evaluation will be changed to 750mg IV Q 12hrs   Pharmacy will continue to follow closely for s/sx of nephrotoxicity, infusion reactions and appropriateness of therapy  BMP and CBC will be ordered per protocol  Plan for trough as patient approaches steady state, prior to the 4th  dose at approximately 1430 on 2/26/20  Pharmacy will continue to follow the patients culture results and clinical progress daily      Pamela Hicks, Pharmacist

## 2020-02-25 NOTE — ASSESSMENT & PLAN NOTE
Wt Readings from Last 3 Encounters:   02/24/20 116 kg (256 lb 9 9 oz)   02/04/20 108 kg (239 lb)   01/07/20 113 kg (249 lb)     Patient has chronic systolic/diastolic CHF with ejection fraction of 45%    Patient is on room air and lungs are clear to auscultation    Patient's systolic blood pressure is 100-120  Will consider resuming Bumex tomorrow

## 2020-02-25 NOTE — ASSESSMENT & PLAN NOTE
Patient presented with right lower extremity cellulitis  He was placed on IV Ancef which resulted in significant improvement

## 2020-02-25 NOTE — ASSESSMENT & PLAN NOTE
Patient presented with hyponatremia of 129    Sodium is 131 today  He does have CHF    Will consider resuming Bumex if blood pressure allows it tomorrow

## 2020-02-25 NOTE — PROGRESS NOTES
Pastoral Care Progress Note    2020  Patient: Cady Santiago : 1945  Admission Date & Time: 2020 1318  MRN: [id number] [facility]: [id number]                     Chaplaincy Interventions Utilized:   Empowerment: Encouraged focus on present            Relationship Building: Cultivated a relationship of care and support        Chaplaincy Outcomes Achieved:  Declined  support                   20 1200   Plan of Care   Care Plan Initiated Other (Comment)  (Follow up attempt  Pt not interested)   Assessment Completed by: Unit visit; Other (Comment)  (Follow up due to LOS  )

## 2020-02-25 NOTE — NURSING NOTE
Moy troph resulted at 18 8 this evening with a note from pharmacy for a therapeutic range of 10 to 15  [facility] [place] [facility] contacted with instructions to wait for a recalculated dose based on results  Will hold current dose until further notice   Raghavendra Salas RN

## 2020-02-25 NOTE — ASSESSMENT & PLAN NOTE
Patient developed left lower extremity cellulitis on the 3rd day of his hospital stay  He was switched to IV vancomycin  On physical examination he still has significant left lower leg cellulitis without any abscess formation    Will continue IV vancomycin

## 2020-02-26 ENCOUNTER — TELEPHONE (OUTPATIENT)
Dept: FAMILY MEDICINE CLINIC | Facility: HOSPITAL | Age: 75
End: 2020-02-26

## 2020-02-26 LAB
ANION GAP SERPL CALCULATED.3IONS-SCNC: 5 MMOL/L (ref 4–13)
BUN SERPL-MCNC: 42 MG/DL (ref 5–25)
CALCIUM SERPL-MCNC: 9.5 MG/DL (ref 8.3–10.1)
CHLORIDE SERPL-SCNC: 99 MMOL/L (ref 100–108)
CO2 SERPL-SCNC: 28 MMOL/L (ref 21–32)
CREAT SERPL-MCNC: 1.25 MG/DL (ref 0.6–1.3)
ERYTHROCYTE [DISTWIDTH] IN BLOOD BY AUTOMATED COUNT: 15.9 % (ref 11.6–15.1)
GFR SERPL CREATININE-BSD FRML MDRD: 57 ML/MIN/1.73SQ M
GLUCOSE SERPL-MCNC: 95 MG/DL (ref 65–140)
HCT VFR BLD AUTO: 39.3 % (ref 36.5–49.3)
HGB BLD-MCNC: 13.1 G/DL (ref 12–17)
MCH RBC QN AUTO: 32.2 PG (ref 26.8–34.3)
MCHC RBC AUTO-ENTMCNC: 33.3 G/DL (ref 31.4–37.4)
MCV RBC AUTO: 97 FL (ref 82–98)
PLATELET # BLD AUTO: 193 THOUSANDS/UL (ref 149–390)
PMV BLD AUTO: 10.6 FL (ref 8.9–12.7)
POTASSIUM SERPL-SCNC: 4.8 MMOL/L (ref 3.5–5.3)
RBC # BLD AUTO: 4.07 MILLION/UL (ref 3.88–5.62)
SODIUM SERPL-SCNC: 132 MMOL/L (ref 136–145)
VANCOMYCIN TROUGH SERPL-MCNC: 23.7 UG/ML (ref 10–20)
WBC # BLD AUTO: 11.15 THOUSAND/UL (ref 4.31–10.16)

## 2020-02-26 PROCEDURE — 80048 BASIC METABOLIC PNL TOTAL CA: CPT | Performed by: INTERNAL MEDICINE

## 2020-02-26 PROCEDURE — 99232 SBSQ HOSP IP/OBS MODERATE 35: CPT | Performed by: INTERNAL MEDICINE

## 2020-02-26 PROCEDURE — 80202 ASSAY OF VANCOMYCIN: CPT | Performed by: INTERNAL MEDICINE

## 2020-02-26 PROCEDURE — 85027 COMPLETE CBC AUTOMATED: CPT | Performed by: INTERNAL MEDICINE

## 2020-02-26 RX ORDER — BUMETANIDE 1 MG/1
2 TABLET ORAL EVERY MORNING
Status: DISCONTINUED | OUTPATIENT
Start: 2020-02-26 | End: 2020-03-02

## 2020-02-26 RX ADMIN — METOPROLOL TARTRATE 25 MG: 25 TABLET ORAL at 09:57

## 2020-02-26 RX ADMIN — STANDARDIZED SENNA CONCENTRATE 17.2 MG: 8.6 TABLET ORAL at 09:56

## 2020-02-26 RX ADMIN — APIXABAN 5 MG: 5 TABLET, FILM COATED ORAL at 09:56

## 2020-02-26 RX ADMIN — HYDROCODONE BITARTRATE AND ACETAMINOPHEN 2 TABLET: 5; 325 TABLET ORAL at 17:45

## 2020-02-26 RX ADMIN — APIXABAN 5 MG: 5 TABLET, FILM COATED ORAL at 17:42

## 2020-02-26 RX ADMIN — METOPROLOL TARTRATE 25 MG: 25 TABLET ORAL at 17:42

## 2020-02-26 RX ADMIN — OXYCODONE HYDROCHLORIDE AND ACETAMINOPHEN 1000 MG: 500 TABLET ORAL at 09:56

## 2020-02-26 RX ADMIN — PANTOPRAZOLE SODIUM 40 MG: 40 TABLET, DELAYED RELEASE ORAL at 09:56

## 2020-02-26 RX ADMIN — DULOXETINE 30 MG: 30 CAPSULE, DELAYED RELEASE ORAL at 09:56

## 2020-02-26 RX ADMIN — HYDROCODONE BITARTRATE AND ACETAMINOPHEN 2 TABLET: 5; 325 TABLET ORAL at 09:56

## 2020-02-26 RX ADMIN — MELATONIN 1000 UNITS: at 09:56

## 2020-02-26 RX ADMIN — VANCOMYCIN HYDROCHLORIDE 750 MG: 750 INJECTION, SOLUTION INTRAVENOUS at 03:16

## 2020-02-26 RX ADMIN — BUMETANIDE 2 MG: 1 TABLET ORAL at 12:49

## 2020-02-26 RX ADMIN — VANCOMYCIN HYDROCHLORIDE 750 MG: 750 INJECTION, SOLUTION INTRAVENOUS at 15:13

## 2020-02-26 NOTE — PHYSICAL THERAPY NOTE
PT cancel  PT order rec'd; pt had been evaluated previously this admission  No changes transfers to scooter and accesses BR ind  D/c PT no new needs

## 2020-02-26 NOTE — ASSESSMENT & PLAN NOTE
Patient has chronic atrial fibrillation      Rates are controlled on Lopressor 25 mg p o bid    Continue Eliquis for CVA prophylaxis

## 2020-02-26 NOTE — PLAN OF CARE
Problem: Potential for Falls  Goal: Patient will remain free of falls  Description  INTERVENTIONS:  - Assess patient frequently for physical needs  -  Identify cognitive and physical deficits and behaviors that affect risk of falls    -  [place] fall precautions as indicated by assessment   - Educate patient/family on patient safety including physical limitations  - Instruct patient to call for assistance with activity based on assessment  - Modify environment to reduce risk of injury  - Consider OT/PT consult to assist with strengthening/mobility  Outcome: Progressing     Problem: PAIN - ADULT  Goal: Verbalizes/displays adequate comfort level or baseline comfort level  Description  Interventions:  - Encourage patient to monitor pain and request assistance  - Assess pain using appropriate pain scale  - Administer analgesics based on type and severity of pain and evaluate response  - Implement non-pharmacological measures as appropriate and evaluate response  - Consider cultural and social influences on pain and pain management  - Notify physician/advanced practitioner if interventions unsuccessful or patient reports new pain  Outcome: Progressing     Problem: INFECTION - ADULT  Goal: Absence or prevention of progression during hospitalization  Description  INTERVENTIONS:  - Assess and monitor for signs and symptoms of infection  - Monitor lab/diagnostic results  - Monitor all insertion sites, i e  indwelling lines, tubes, and drains  - Monitor endotracheal if appropriate and nasal secretions for changes in amount and color  - [place] appropriate cooling/warming therapies per order  - Administer medications as ordered  - Instruct and encourage patient and family to use good hand hygiene technique  - Identify and instruct in appropriate isolation precautions for identified infection/condition  Outcome: Progressing  Goal: Absence of fever/infection during neutropenic period  Description  INTERVENTIONS:  - Monitor WBC    Outcome: Progressing     Problem: SAFETY ADULT  Goal: Maintain or return to baseline ADL function  Description  INTERVENTIONS:  -  Assess patient's ability to carry out ADLs; assess patient's baseline for ADL function and identify physical deficits which impact ability to perform ADLs (bathing, care of mouth/teeth, toileting, grooming, dressing, etc )  - Assess/evaluate cause of self-care deficits   - Assess range of motion  - Assess patient's mobility; develop plan if impaired  - Assess patient's need for assistive devices and provide as appropriate  - Encourage maximum independence but intervene and supervise when necessary  - Involve family in performance of ADLs  - Assess for home care needs following discharge   - Consider OT consult to assist with ADL evaluation and planning for discharge  - Provide patient education as appropriate  Outcome: Progressing  Goal: Maintain or return mobility status to optimal level  Description  INTERVENTIONS:  - Assess patient's baseline mobility status (ambulation, transfers, stairs, etc )    - Identify cognitive and physical deficits and behaviors that affect mobility  - Identify mobility aids required to assist with transfers and/or ambulation (gait belt, sit-to-stand, lift, walker, cane, etc )  - [place] fall precautions as indicated by assessment  - Record patient progress and toleration of activity level on Mobility SBAR; progress patient to next Phase/Stage  - Instruct patient to call for assistance with activity based on assessment  - Consider rehabilitation consult to assist with strengthening/weightbearing, etc   Outcome: Progressing     Problem: DISCHARGE PLANNING  Goal: Discharge to home or other facility with appropriate resources  Description  INTERVENTIONS:  - Identify barriers to discharge w/patient and caregiver  - Arrange for needed discharge resources and transportation as appropriate  - Identify discharge learning needs (meds, wound care, etc )  - Arrange for interpretive services to assist at discharge as needed  - Refer to Case Management Department for coordinating discharge planning if the patient needs post-hospital services based on physician/advanced practitioner order or complex needs related to functional status, cognitive ability, or social support system  Outcome: Progressing     Problem: Knowledge Deficit  Goal: Patient/family/caregiver demonstrates understanding of disease process, treatment plan, medications, and discharge instructions  Description  Complete learning assessment and assess knowledge base    Interventions:  - Provide teaching at level of understanding  - Provide teaching via preferred learning methods  Outcome: Progressing     Problem: CARDIOVASCULAR - ADULT  Goal: Maintains optimal cardiac output and hemodynamic stability  Description  INTERVENTIONS:  - Monitor I/O, vital signs and rhythm  - Monitor for S/S and trends of decreased cardiac output  - Administer and titrate ordered vasoactive medications to optimize hemodynamic stability  - Assess quality of pulses, skin color and temperature  - Assess for signs of decreased coronary artery perfusion  - Instruct patient to report change in severity of symptoms  Outcome: Progressing  Goal: Absence of cardiac dysrhythmias or at baseline rhythm  Description  INTERVENTIONS:  - Continuous cardiac monitoring, vital signs, obtain 12 lead EKG if ordered  - Administer antiarrhythmic and heart rate control medications as ordered  - Monitor electrolytes and administer replacement therapy as ordered  Outcome: Progressing     Problem: MUSCULOSKELETAL - ADULT  Goal: Maintain or return mobility to safest level of function  Description  INTERVENTIONS:  - Assess patient's ability to carry out ADLs; assess patient's baseline for ADL function and identify physical deficits which impact ability to perform ADLs (bathing, care of mouth/teeth, toileting, grooming, dressing, etc )  - Assess/evaluate cause of self-care deficits   - Assess range of motion  - Assess patient's mobility  - Assess patient's need for assistive devices and provide as appropriate  - Encourage maximum independence but intervene and supervise when necessary  - Involve family in performance of ADLs  - Assess for home care needs following discharge   - Consider OT consult to assist with ADL evaluation and planning for discharge  - Provide patient education as appropriate  Outcome: Progressing  Goal: Maintain proper alignment of affected body part  Description  INTERVENTIONS:  - Support, maintain and protect limb and body alignment  - Provide patient/ family with appropriate education  Outcome: Progressing     Problem: Prexisting or High Potential for Compromised Skin Integrity  Goal: Skin integrity is maintained or improved  Description  INTERVENTIONS:  - Identify patients at risk for skin breakdown  - Assess and monitor skin integrity  - Assess and monitor nutrition and hydration status  - Monitor labs   - Assess for incontinence   - Turn and reposition patient  - Assist with mobility/ambulation  - Relieve pressure over bony prominences  - Avoid friction and shearing  - Provide appropriate hygiene as needed including keeping skin clean and dry  - Evaluate need for skin moisturizer/barrier cream  - Collaborate with interdisciplinary team   - Patient/family teaching  - Consider wound care consult   Outcome: Progressing

## 2020-02-26 NOTE — SOCIAL WORK
CM met with pt at bedside and offered VNA services, which he declined  No further CM dc concerns/needs at the time

## 2020-02-26 NOTE — PROGRESS NOTES
Progress Note - Linwood Mazariegos 1945, 69 y o  male MRN: [id number]    Unit/Bed#: -01 Encounter: [id number]    Primary Care Provider: Catina Duran MD   Date and time admitted to hospital: 2020  1:18 PM        * Cellulitis of right lower extremity  Assessment & Plan  Patient presented with right lower extremity cellulitis  He was placed on IV Ancef which resulted in significant improvement  Chronic combined systolic and diastolic congestive heart failure (HCC)  Assessment & Plan  Wt Readings from Last 3 Encounters:   20 116 kg (256 lb 13 4 oz)   20 108 kg (239 lb)   20 113 kg (249 lb)     Patient has chronic systolic/diastolic CHF with ejection fraction of 45%    Patient is on room air and lungs are clear to auscultation    Patient's last blood pressure was 122 systolic  Will increase Bumex to 2 mg in the morning which is his home dosage  This will hopefully decrease lower extremity edema further  Cellulitis of left lower extremity  Assessment & Plan  Patient developed left lower extremity cellulitis on the 3rd day of his hospital stay  He was switched to IV vancomycin  Patient's left lower extremity cellulitis is slowly improving  Will continue IV vancomycin    Persistent atrial fibrillation  Assessment & Plan  Patient has chronic atrial fibrillation      Rates are controlled on Lopressor 25 mg p o bid    Continue Eliquis for CVA prophylaxis        VTE Prophylaxis: in place    Patient Centered Rounds: I rounded with patient's nurse    Current Length of Stay: 7 day(s)    Current Patient Status: Inpatient    Certification Statement: Pt requires additional inpatient hospital stay due to: see assessment and plan        Subjective:   Patient denies any chest pain, shortness of breath, palpitations, signs of bleeding, abdominal pain, diarrhea    His left leg is still painful    All other ROS are negative    Objective:     Vitals:   Temp (24hrs), Av 9 °F (36 6 °C), Min:97 7 °F (36 5 °C), Max:98 1 °F (36 7 °C)    Temp:  [97 7 °F (36 5 °C)-98 1 °F (36 7 °C)] 97 7 °F (36 5 °C)  HR:  [68-82] 82  Resp:  [18] 18  BP: (100-125)/(54-76) 125/69  SpO2:  [96 %-97 %] 97 %  Body mass index is 34 83 kg/m²  Input and Output Summary (last 24 hours): Intake/Output Summary (Last 24 hours) at 2/26/2020 0837  Last data filed at 2/26/2020 0720  Gross per 24 hour   Intake 480 ml   Output 1375 ml   Net -895 ml       Physical Exam:     Physical Exam   Constitutional: He is oriented to person, place, and time  He appears well-developed  No distress  HENT:   Head: Normocephalic  Mouth/Throat: Oropharynx is clear and moist    Eyes: Conjunctivae are normal    Neck: Neck supple  Cardiovascular: Normal rate  Irregular irregular, controlled rate   Pulmonary/Chest: Effort normal  He has no wheezes  He has no rales  Abdominal: Soft  Bowel sounds are normal  There is no tenderness  Neurological: He is alert and oriented to person, place, and time  No cranial nerve deficit  Skin: Skin is warm and dry  There is improvement in the warmth and redness of the left lower extremity  I did not find abscess formation   Vitals reviewed  I personally reviewed labs and imaging reports for today        Last 24 Hours Medication List:     Current Facility-Administered Medications:  albumin human 12 5 g Intravenous Once Ely Giraldo PA-C Last Rate: Stopped (02/20/20 4435)   aluminum-magnesium hydroxide-simethicone 30 mL Oral Q4H PRN Jasson Haddad DO    apixaban 5 mg Oral BID [place]ET [place]    vitamin C 1,000 mg Oral Daily NAVNEET [place]    benzonatate 100 mg Oral TID PRN NAVNEET [place]    bumetanide 2 mg Oral QAM Myrtle Rangel MD    cholecalciferol 1,000 Units Oral Daily NAVNEET [place]    DULoxetine 30 mg Oral Daily NAVNEET [place]    HYDROcodone-acetaminophen 2 tablet Oral Q4H PRN NAVNEET [place]    metoprolol tartrate 25 mg Oral Q12H Dyan Kovacs MD    pantoprazole 40 mg Oral Daily NAVNEET [place]    senna 2 tablet Oral Daily Misael Sher DO    vancomycin 750 mg Intravenous Q12H Nany Saab MD Last Rate: 750 mg (02/26/20 0316)          Today, Patient Was Seen By: Nany Saab MD    ** Please Note: Dictation voice to text software may have been used in the creation of this document   **

## 2020-02-26 NOTE — ASSESSMENT & PLAN NOTE
Wt Readings from Last 3 Encounters:   02/26/20 116 kg (256 lb 13 4 oz)   02/04/20 108 kg (239 lb)   01/07/20 113 kg (249 lb)     Patient has chronic systolic/diastolic CHF with ejection fraction of 45%    Patient is on room air and lungs are clear to auscultation    Patient's last blood pressure was 182 systolic  Will increase Bumex to 2 mg in the morning which is his home dosage  This will hopefully decrease lower extremity edema further

## 2020-02-26 NOTE — PROGRESS NOTES
Vancomycin Assessment    Sona Cordero is a 69 y o  male who is currently receiving vancomycin 750mg IV Q12H  for skin-soft tissue infection     Relevant clinical data and objective history reviewed:  Creatinine   Date Value Ref Range Status   02/26/2020 1 25 0 60 - 1 30 mg/dL Final     Comment:     Standardized to IDMS reference method   02/25/2020 1 23 0 60 - 1 30 mg/dL Final     Comment:     Standardized to IDMS reference method   02/24/2020 1 24 0 60 - 1 30 mg/dL Final     Comment:     Standardized to IDMS reference method   12/17/2015 0 97 0 60 - 1 30 mg/dL Final     Comment:     Standardized to IDMS reference method   10/14/2014 1 06 0 60 - 1 30 mg/dL Final     Comment:     Standardized to IDMS reference method     /62 (BP Location: Right arm)   Pulse 75   Temp 98 3 °F (36 8 °C) (Oral)   Resp 18   Ht 6' (1 829 m)   Wt 116 kg (256 lb 13 4 oz)   SpO2 96%   BMI 34 83 kg/m²   I/O last 3 completed shifts: In: 780 [P O :780]  Out: 975 [Urine:975]  Lab Results   Component Value Date/Time    BUN 42 (H) 02/26/2020 04:53 AM    BUN 68 (H) 12/09/2019 02:15 PM    WBC 11 15 (H) 02/26/2020 04:53 AM    WBC 6 35 12/17/2015 11:30 AM    HGB 13 1 02/26/2020 04:53 AM    HGB 14 9 12/17/2015 11:30 AM    HCT 39 3 02/26/2020 04:53 AM    HCT 46 3 12/17/2015 11:30 AM    MCV 97 02/26/2020 04:53 AM    MCV 93 12/17/2015 11:30 AM     02/26/2020 04:53 AM     12/17/2015 11:30 AM     Temp Readings from Last 3 Encounters:   02/26/20 98 3 °F (36 8 °C) (Oral)   02/04/20 97 9 °F (36 6 °C) (Tympanic)   12/01/19 (!) 97 1 °F (36 2 °C) (Oral)     Vancomycin Days of Therapy: 5    Assessment/Plan  The patient is currently on vancomycin utilizing scheduled dosing  Baseline risks associated with therapy include: advanced age    The patient is receiving 750mg IV Q12H  with the most recent vancomycin level being at steady-state and supratherapeutic based on a goal of 10-15 (mild infection/cellulitis) ; therefore, after clinical evaluation will be changed to 1250mg IV Q24H    Pharmacy will continue to follow closely for s/sx of nephrotoxicity, infusion reactions and appropriateness of therapy  BMP and CBC will be ordered per protocol  Plan for trough as patient approaches steady state, prior to the 4th  dose at approximately 0630 on 2/29/20   Pharmacy will continue to follow the patients culture results and clinical progress daily      Murali Bennett, Pharmacist

## 2020-02-26 NOTE — OCCUPATIONAL THERAPY NOTE
Occupational Therapy Cancellation Note        Patient Name: Hanna Kumar  Today's Date: 2/26/2020       OT orders received and chart reviewed  Pt previously evaluated on 2/20/20 and found to be at baseline for ADLs and functional mobility  No further OT needs identified at this time  Please re-consult OT if there is a change in functional status          Emeka Gill, OTR/L

## 2020-02-26 NOTE — PROGRESS NOTES
Linwood Mazariegos  69 y o   male  1945  mrn [id number]    Assessment/Plan:      1  Cellulitis LLE: strange that it developed during this hospitalization while on abx, though given chronic venous stasis he would be at risk for it  Given clinical improvement I would continue vancomycin  If temp, increasing WBC would add gram negative coverage  Unlikely to have DVT on Eliquis  - cont vancomycin    Subjective:  No fevers  Less pain        Objective:      Lungs: cta  Cor: rrr s1s2  Ext: decreased erythema and warmth of the LLE, RLE with chronic     Labs:  CBC w/diff  Recent Labs     02/25/20  0554 02/26/20  0453   WBC 10 52* 11 15*   HGB 11 8* 13 1   HCT 34 8* 39 3    193   LYMPHOPCT 9*  --    MONOPCT 11  --    EOSPCT 2  --      BMP  Recent Labs     02/26/20  0453   K 4 8   CL 99*   CO2 28   BUN 42*   CREATININE 1 25   CALCIUM 9 5     CMP  Recent Labs     02/25/20  0554 02/26/20  0453   K 4 4 4 8   CL 99* 99*   CO2 23 28   BUN 43* 42*   CREATININE 1 23 1 25   CALCIUM 8 8 9 5   ALKPHOS 159*  --    ALT 8*  --    AST 25  --         labrc    Cultures:  No results found for: BLOODCX  Lab Results   Component Value Date    WOUNDCULT 3+ Growth of Serratia marcescens (A) 07/24/2019    WOUNDCULT 1+ Growth of  07/24/2019    WOUNDCULT (A) 01/02/2019     1+ Growth of Methicillin Resistant Staphylococcus aureus     Lab Results   Component Value Date    URINECX <10,000 cfu/ml  02/19/2020     No results found for: SPUTUMCULTUR    MED:       Current Facility-Administered Medications:     albumin human (FLEXBUMIN) 25 % injection 12 5 g, 12 5 g, Intravenous, Once, Valentino Asher PA-C, Stopped at 02/20/20 2343    aluminum-magnesium hydroxide-simethicone (MYLANTA) 200-200-20 mg/5 mL oral suspension 30 mL, 30 mL, Oral, Q4H PRN, Larry Rodrigues,     apixaban (ELIQUIS) tablet 5 mg, 5 mg, Oral, BID, [place], 5 mg at 02/26/20 5886    ascorbic acid (VITAMIN C) tablet 1,000 mg, 1,000 mg, Oral, Daily, [place] NAVNEET [place], 1,000 mg at 02/26/20 0956    benzonatate (TESSALON PERLES) capsule 100 mg, 100 mg, Oral, TID PRN, NAVNEET [place]    bumetanide (BUMEX) tablet 2 mg, 2 mg, Oral, QAM, Nany Saab MD    cholecalciferol (VITAMIN D3) tablet 1,000 Units, 1,000 Units, Oral, Daily, [place]ET [place], 1,000 Units at 02/26/20 0956    DULoxetine (CYMBALTA) delayed release capsule 30 mg, 30 mg, Oral, Daily, NAVNEET [place], 30 mg at 02/26/20 0956    HYDROcodone-acetaminophen ([facility]) 5-325 mg per tablet 2 tablet, 2 tablet, Oral, Q4H PRN, NAVNEET [place], 2 tablet at 02/26/20 0956    metoprolol tartrate (LOPRESSOR) tablet 25 mg, 25 mg, Oral, Q12H [address], Nany Saab MD, 25 mg at 02/26/20 0957    pantoprazole (PROTONIX) EC tablet 40 mg, 40 mg, Oral, Daily, NAVNEET [place], 40 mg at 02/26/20 9337    senna (SENOKOT) tablet 17 2 mg, 2 tablet, Oral, Daily, [employer], DO, 17 2 mg at 02/26/20 2250    vancomycin (VANCOCIN) IVPB (premix) 750 mg, 750 mg, Intravenous, Q12H, Nany Saab MD, Last Rate: 150 mL/hr at 02/26/20 0316, 750 mg at 02/26/20 7433    Principal Problem:    Cellulitis of right lower extremity  Active Problems:    Gastroesophageal reflux disease without esophagitis    Class 1 obesity due to excess calories with serious comorbidity and body mass index (BMI) of 34 0 to 34 9 in adult    Persistent atrial fibrillation    Cellulitis of left lower extremity    Chronic deep vein thrombosis (DVT) (HCC)    Acute kidney injury superimposed on CKD (HCC)    Chronic combined systolic and diastolic congestive heart failure ([address] )    Hypertension    History of methicillin resistant staphylococcus aureus (MRSA)    Hyponatremia      Anita Murphy MD

## 2020-02-26 NOTE — ASSESSMENT & PLAN NOTE
Patient developed left lower extremity cellulitis on the 3rd day of his hospital stay  He was switched to IV vancomycin  Patient's left lower extremity cellulitis is slowly improving    Will continue IV vancomycin

## 2020-02-27 LAB
ANION GAP SERPL CALCULATED.3IONS-SCNC: 6 MMOL/L (ref 4–13)
BUN SERPL-MCNC: 41 MG/DL (ref 5–25)
CALCIUM SERPL-MCNC: 9.1 MG/DL (ref 8.3–10.1)
CHLORIDE SERPL-SCNC: 99 MMOL/L (ref 100–108)
CO2 SERPL-SCNC: 28 MMOL/L (ref 21–32)
CREAT SERPL-MCNC: 1.33 MG/DL (ref 0.6–1.3)
ERYTHROCYTE [DISTWIDTH] IN BLOOD BY AUTOMATED COUNT: 15.8 % (ref 11.6–15.1)
GFR SERPL CREATININE-BSD FRML MDRD: 53 ML/MIN/1.73SQ M
GLUCOSE SERPL-MCNC: 82 MG/DL (ref 65–140)
HCT VFR BLD AUTO: 38 % (ref 36.5–49.3)
HGB BLD-MCNC: 12.9 G/DL (ref 12–17)
MCH RBC QN AUTO: 32.7 PG (ref 26.8–34.3)
MCHC RBC AUTO-ENTMCNC: 33.9 G/DL (ref 31.4–37.4)
MCV RBC AUTO: 96 FL (ref 82–98)
PLATELET # BLD AUTO: 259 THOUSANDS/UL (ref 149–390)
PMV BLD AUTO: 11.1 FL (ref 8.9–12.7)
POTASSIUM SERPL-SCNC: 5.1 MMOL/L (ref 3.5–5.3)
RBC # BLD AUTO: 3.95 MILLION/UL (ref 3.88–5.62)
SODIUM SERPL-SCNC: 133 MMOL/L (ref 136–145)
WBC # BLD AUTO: 17.05 THOUSAND/UL (ref 4.31–10.16)

## 2020-02-27 PROCEDURE — 80048 BASIC METABOLIC PNL TOTAL CA: CPT | Performed by: INTERNAL MEDICINE

## 2020-02-27 PROCEDURE — 99232 SBSQ HOSP IP/OBS MODERATE 35: CPT | Performed by: INTERNAL MEDICINE

## 2020-02-27 PROCEDURE — 85027 COMPLETE CBC AUTOMATED: CPT | Performed by: INTERNAL MEDICINE

## 2020-02-27 RX ORDER — ECHINACEA PURPUREA EXTRACT 125 MG
2 TABLET ORAL
Status: DISCONTINUED | OUTPATIENT
Start: 2020-02-27 | End: 2020-03-10 | Stop reason: HOSPADM

## 2020-02-27 RX ADMIN — VANCOMYCIN HYDROCHLORIDE 1250 MG: 1 INJECTION, POWDER, LYOPHILIZED, FOR SOLUTION INTRAVENOUS at 06:49

## 2020-02-27 RX ADMIN — METOPROLOL TARTRATE 25 MG: 25 TABLET ORAL at 09:31

## 2020-02-27 RX ADMIN — OXYCODONE HYDROCHLORIDE AND ACETAMINOPHEN 1000 MG: 500 TABLET ORAL at 09:31

## 2020-02-27 RX ADMIN — APIXABAN 5 MG: 5 TABLET, FILM COATED ORAL at 18:09

## 2020-02-27 RX ADMIN — MELATONIN 1000 UNITS: at 09:31

## 2020-02-27 RX ADMIN — HYDROCODONE BITARTRATE AND ACETAMINOPHEN 2 TABLET: 5; 325 TABLET ORAL at 18:11

## 2020-02-27 RX ADMIN — BUMETANIDE 2 MG: 1 TABLET ORAL at 09:32

## 2020-02-27 RX ADMIN — DULOXETINE 30 MG: 30 CAPSULE, DELAYED RELEASE ORAL at 09:31

## 2020-02-27 RX ADMIN — PANTOPRAZOLE SODIUM 40 MG: 40 TABLET, DELAYED RELEASE ORAL at 09:31

## 2020-02-27 RX ADMIN — HYDROCODONE BITARTRATE AND ACETAMINOPHEN 2 TABLET: 5; 325 TABLET ORAL at 14:12

## 2020-02-27 RX ADMIN — HYDROCODONE BITARTRATE AND ACETAMINOPHEN 2 TABLET: 5; 325 TABLET ORAL at 06:19

## 2020-02-27 RX ADMIN — APIXABAN 5 MG: 5 TABLET, FILM COATED ORAL at 09:31

## 2020-02-27 NOTE — UTILIZATION REVIEW
Continued Stay Review    Date: 2/26/2020                          Current Patient Class: inpatient  Current Level of Care: med surg    HPI:74 y o  male initially admitted on   2/19/2020 inpatient  due to right foot pain likely due to soft tissue infection vs diabetic ulcers vs venous insufficiency/SAMY  Joe Starks is post an ankle fusion with multiple surgeries    On 2/25 per ID - has cellulitis of LLE which developed while on antibiotics during hospitalization  Recommend continued vancomycin  Assessment/Plan:  On 2/26/2200 has less pain  On exam has decreased erythema and warmth of LLE, RLE with chronic changes  Wbc to 11 15  Continue vancomycin  On 2/27/2020 today wbc has risen to 17 05  On exam with decreased swelling in right leg and foot  Vancomycin to continue per ID    Pertinent Labs/Diagnostic Results: 2/24/2020 Xray left foot - Subtle soft tissue radiolucency between the first through fourth proximal phalanges may represent complicated cellulitis in the appropriate clinical context    No radiographic evidence of osteomyelitis     2/23/2020 CxR Right perihilar central vascular congestion  2/20/2020 Xray right ankle - No acute osseous abnormality   Postoperative changes status post hindfoot fusion  Results from last 7 days   Lab Units 02/27/20  0645 02/26/20  0453 02/25/20  0554 02/24/20  0629 02/23/20  0445   WBC Thousand/uL 17 05* 11 15* 10 52* 13 06* 13 60*   HEMOGLOBIN g/dL 12 9 13 1 11 8* 12 1 12 1   HEMATOCRIT % 38 0 39 3 34 8* 35 5* 35 4*   PLATELETS Thousands/uL 259 193 160 129* 115*   BANDS PCT %  --   --  1  --   --          Results from last 7 days   Lab Units 02/27/20  0645 02/26/20  0453 02/25/20  0554 02/24/20  0629 02/23/20  0445   SODIUM mmol/L 133* 132* 132* 131* 130*   POTASSIUM mmol/L 5 1 4 8 4 4 4 2 4 4   CHLORIDE mmol/L 99* 99* 99* 99* 98*   CO2 mmol/L 28 28 23 25 25   ANION GAP mmol/L 6 5 10 7 7   BUN mg/dL 41* 42* 43* 42* 49*   CREATININE mg/dL 1 33* 1 25 1 23 1 24 1 34*   EGFR ml/min/1 73sq m 53 57 58 57 52   CALCIUM mg/dL 9 1 9 5 8 8 8 8 8 7     Results from last 7 days   Lab Units 02/25/20  0554   AST U/L 25   ALT U/L 8*   ALK PHOS U/L 159*   TOTAL PROTEIN g/dL 6 8   ALBUMIN g/dL 2 0*   TOTAL BILIRUBIN mg/dL 1 00     Results from last 7 days   Lab Units 02/27/20  0645 02/26/20  0453 02/25/20  0554 02/24/20  0629 02/23/20  0445 02/22/20  0555 02/21/20  0520   GLUCOSE RANDOM mg/dL 82 95 94 91 86 98 84     Results from last 7 days   Lab Units 02/23/20  0445 02/22/20  0555 02/21/20  0520   PROCALCITONIN ng/ml 1 65* 2 77* 4 14*     Results from last 7 days   Lab Units 02/25/20  0554   TOTAL COUNTED  100       Vital Signs:   02/27/20 0710  98 °F (36 7 °C)  72  18  117/60  97 %  None (Room air)  Sitting   02/26/20 2221  97 4 °F (36 3 °C)Abnormal   78  18  101/79  98 %  None (Room air           02/25 0701  02/26 0700 02/26 0701  02/27 0700 02/27 0701  02/28 0700   P  O  780 960    Total Intake(mL/kg) 780 (6 7) 960 (8 2)    Urine (mL/kg/hr) 975 (0 4) 3600 (1 3) 740 (0 8)   Total Output 975 3600 740   Net -195 -2640 -740         Medications:   Scheduled Medications:  Medications:  apixaban 5 mg Oral BID   vitamin C 1,000 mg Oral Daily   bumetanide 2 mg Oral QAM   cholecalciferol 1,000 Units Oral Daily   DULoxetine 30 mg Oral Daily   metoprolol tartrate 25 mg Oral Q12H FRANKIE   pantoprazole 40 mg Oral Daily   senna 2 tablet Oral Daily   vancomycin 1,250 mg Intravenous Q24H     Continuous IV Infusions:     PRN Meds:  aluminum-magnesium hydroxide-simethicone 30 mL Oral Q4H PRN   benzonatate 100 mg Oral TID PRN   HYDROcodone-acetaminophen - used x 2  2 tablet Oral Q4H PRN       Discharge Plan: To be determined  Declines VNA    Network Utilization Review Department  [email or web address]  ATTENTION: Please call with any questions or concerns to [phone] and carefully listen to the prompts so that you are directed to the right person   All voicemails are confidential   Bryce jackson requests for admission clinical reviews, approved or denied determinations and any other requests to dedicated fax number below belonging to the campus where the patient is receiving treatment   List of dedicated fax numbers for the Facilities:  [address] [place] [address] DENIALS (Administrative/Medical Necessity) [phone]   [address]  (Maternity/NICU/Pediatrics) [phone]   Alicja Nevarez [phone]   Bassam Garcia [phone]   Star Mauro [phone]   [facility] [phone]   [address] [place] [address] [phone]   [facility]  [phone]   [address] [facility]  [address] [facility] [address] [facility] [phone]

## 2020-02-27 NOTE — ASSESSMENT & PLAN NOTE
Patient developed left lower extremity cellulitis on the 3rd day of his hospital stay  He was switched to IV vancomycin  Patient's left lower extremity cellulitis is slowly improving now developed purulent superficial material on the back of the foot    Will continue IV vancomycin and will ask Podiatry see the patient tomorrow

## 2020-02-27 NOTE — PROGRESS NOTES
Vancomycin IV Pharmacy-to-Dose Consultation    Bita Frye is a 69 y o  male who is currently receiving Vancomycin IV with management by the Pharmacy Consult service  Assessment/Plan:  The patient was reviewed  Renal function is stable and no signs or symptoms of nephrotoxicity and/or infusion reactions were documented in the chart  Based on todays assessment, continue current vancomycin (day # 6) dosing of 1250mg Q24H, with a plan for trough to be drawn at 0630 on 2/29/20  We will continue to follow the patients culture results and clinical progress daily      Nitin Akbar, Pharmacist

## 2020-02-27 NOTE — PROGRESS NOTES
Progress Note - Linwood Mazariegos 1945, 69 y o  male MRN: [id number]    Unit/Bed#: -01 Encounter: [id number]    Primary Care Provider: Teresa Wilkinson MD   Date and time admitted to hospital: 2020  1:18 PM        Chronic combined systolic and diastolic congestive heart failure (HCC)  Assessment & Plan  Wt Readings from Last 3 Encounters:   20 117 kg (258 lb 2 5 oz)   20 108 kg (239 lb)   20 113 kg (249 lb)     Patient has chronic systolic/diastolic CHF with ejection fraction of 45%    Patient is on room air and lungs are clear to auscultation    Will continue Bumex 2 mg in the morning and will monitor patient's electrolytes and renal function      Cellulitis of left lower extremity  Assessment & Plan  Patient developed left lower extremity cellulitis on the 3rd day of his hospital stay  He was switched to IV vancomycin  Patient's left lower extremity cellulitis is slowly improving now developed purulent superficial material on the back of the foot  Will continue IV vancomycin and will ask Podiatry see the patient tomorrow    Persistent atrial fibrillation  Assessment & Plan  Patient has chronic atrial fibrillation      Rates are controlled on Lopressor 25 mg p o bid    Continue Eliquis for CVA prophylaxis        VTE Prophylaxis: in place    Patient Centered Rounds: I rounded with patient's nurse    Current Length of Stay: 8 day(s)    Current Patient Status: Inpatient    Certification Statement: Pt requires additional inpatient hospital stay due to: see assessment and plan        Subjective:   Patient denies chest pain, palpitations, shortness breath, signs of bleeding, abdominal pain, diarrhea    All other ROS are negative    Objective:     Vitals:   Temp (24hrs), Av 9 °F (36 6 °C), Min:97 4 °F (36 3 °C), Max:98 2 °F (36 8 °C)    Temp:  [97 4 °F (36 3 °C)-98 2 °F (36 8 °C)] 98 2 °F (36 8 °C)  HR:  [72-78] 77  Resp:  [18] 18  BP: (101-117)/(60-79) 109/64  SpO2:  [97 %-99 %] 99 %  Body mass index is 35 01 kg/m²  Input and Output Summary (last 24 hours): Intake/Output Summary (Last 24 hours) at 2/27/2020 1852  Last data filed at 2/27/2020 1416  Gross per 24 hour   Intake 480 ml   Output 2690 ml   Net -2210 ml       Physical Exam:     Physical Exam   Constitutional: He is oriented to person, place, and time  He appears well-developed  No distress  HENT:   Head: Normocephalic  Eyes: Conjunctivae are normal    Neck: Neck supple  Cardiovascular: Normal rate  Irregular irregular, controlled rates   Pulmonary/Chest: Effort normal  No respiratory distress  He has no wheezes  He has no rales  Abdominal: Soft  Bowel sounds are normal    Neurological: He is alert and oriented to person, place, and time  No cranial nerve deficit  Skin: Skin is warm and dry  Left thorax is present with purulent pustules on the back of the foot coalescing  Please review the image I took   Psychiatric: He has a normal mood and affect  Vitals reviewed  I personally reviewed labs and imaging reports for today        Last 24 Hours Medication List:     Current Facility-Administered Medications:  albumin human 12 5 g Intravenous Once Marileelorenza Villavicencio PA-C Last Rate: Stopped (02/20/20 4923)   aluminum-magnesium hydroxide-simethicone 30 mL Oral Q4H PRN Erna Raines,     apixaban 5 mg Oral BID NAVNEET [place]    vitamin C 1,000 mg Oral Daily NAVNEET [place]    benzonatate 100 mg Oral TID PRN NAVNEET [place]    bumetanide 2 mg Oral JULIA Bullock MD    cholecalciferol 1,000 Units Oral Daily NAVNEET [place]    DULoxetine 30 mg Oral Daily NAVNEET [place]    HYDROcodone-acetaminophen 2 tablet Oral Q4H PRN NAVNEET [place]    metoprolol tartrate 25 mg Oral Q12H Marcos Guerra MD    pantoprazole 40 mg Oral Daily NAVNEET [place]    senna 2 tablet Oral Daily Misael Sher DO    sodium chloride 2 spray Each Nare Q1H PRN Jes Luciano MD Marcos    vancomycin 1,250 mg Intravenous Q24H Mindy Arellano MD Last Rate: 1,250 mg (02/27/20 9556)          Today, Patient Was Seen By: Mindy Arellano MD    ** Please Note: Dictation voice to text software may have been used in the creation of this document   **

## 2020-02-27 NOTE — PLAN OF CARE
Problem: Potential for Falls  Goal: Patient will remain free of falls  Description  INTERVENTIONS:  - Assess patient frequently for physical needs  -  Identify cognitive and physical deficits and behaviors that affect risk of falls    -  Lissie fall precautions as indicated by assessment   - Educate patient/family on patient safety including physical limitations  - Instruct patient to call for assistance with activity based on assessment  - Modify environment to reduce risk of injury  - Consider OT/PT consult to assist with strengthening/mobility  Outcome: Progressing     Problem: PAIN - ADULT  Goal: Verbalizes/displays adequate comfort level or baseline comfort level  Description  Interventions:  - Encourage patient to monitor pain and request assistance  - Assess pain using appropriate pain scale  - Administer analgesics based on type and severity of pain and evaluate response  - Implement non-pharmacological measures as appropriate and evaluate response  - Consider cultural and social influences on pain and pain management  - Notify physician/advanced practitioner if interventions unsuccessful or patient reports new pain  Outcome: Progressing     Problem: INFECTION - ADULT  Goal: Absence or prevention of progression during hospitalization  Description  INTERVENTIONS:  - Assess and monitor for signs and symptoms of infection  - Monitor lab/diagnostic results  - Monitor all insertion sites, i e  indwelling lines, tubes, and drains  - Monitor endotracheal if appropriate and nasal secretions for changes in amount and color  - Lissie appropriate cooling/warming therapies per order  - Administer medications as ordered  - Instruct and encourage patient and family to use good hand hygiene technique  - Identify and instruct in appropriate isolation precautions for identified infection/condition  Outcome: Progressing  Goal: Absence of fever/infection during neutropenic period  Description  INTERVENTIONS:  - Monitor WBC    Outcome: Progressing     Problem: SAFETY ADULT  Goal: Maintain or return to baseline ADL function  Description  INTERVENTIONS:  -  Assess patient's ability to carry out ADLs; assess patient's baseline for ADL function and identify physical deficits which impact ability to perform ADLs (bathing, care of mouth/teeth, toileting, grooming, dressing, etc )  - Assess/evaluate cause of self-care deficits   - Assess range of motion  - Assess patient's mobility; develop plan if impaired  - Assess patient's need for assistive devices and provide as appropriate  - Encourage maximum independence but intervene and supervise when necessary  - Involve family in performance of ADLs  - Assess for home care needs following discharge   - Consider OT consult to assist with ADL evaluation and planning for discharge  - Provide patient education as appropriate  Outcome: Progressing  Goal: Maintain or return mobility status to optimal level  Description  INTERVENTIONS:  - Assess patient's baseline mobility status (ambulation, transfers, stairs, etc )    - Identify cognitive and physical deficits and behaviors that affect mobility  - Identify mobility aids required to assist with transfers and/or ambulation (gait belt, sit-to-stand, lift, walker, cane, etc )  - Poston fall precautions as indicated by assessment  - Record patient progress and toleration of activity level on Mobility SBAR; progress patient to next Phase/Stage  - Instruct patient to call for assistance with activity based on assessment  - Consider rehabilitation consult to assist with strengthening/weightbearing, etc   Outcome: Progressing     Problem: DISCHARGE PLANNING  Goal: Discharge to home or other facility with appropriate resources  Description  INTERVENTIONS:  - Identify barriers to discharge w/patient and caregiver  - Arrange for needed discharge resources and transportation as appropriate  - Identify discharge learning needs (meds, wound care, etc )  - Arrange for interpretive services to assist at discharge as needed  - Refer to Case Management Department for coordinating discharge planning if the patient needs post-hospital services based on physician/advanced practitioner order or complex needs related to functional status, cognitive ability, or social support system  Outcome: Progressing     Problem: Knowledge Deficit  Goal: Patient/family/caregiver demonstrates understanding of disease process, treatment plan, medications, and discharge instructions  Description  Complete learning assessment and assess knowledge base    Interventions:  - Provide teaching at level of understanding  - Provide teaching via preferred learning methods  Outcome: Progressing     Problem: CARDIOVASCULAR - ADULT  Goal: Maintains optimal cardiac output and hemodynamic stability  Description  INTERVENTIONS:  - Monitor I/O, vital signs and rhythm  - Monitor for S/S and trends of decreased cardiac output  - Administer and titrate ordered vasoactive medications to optimize hemodynamic stability  - Assess quality of pulses, skin color and temperature  - Assess for signs of decreased coronary artery perfusion  - Instruct patient to report change in severity of symptoms  Outcome: Progressing  Goal: Absence of cardiac dysrhythmias or at baseline rhythm  Description  INTERVENTIONS:  - Continuous cardiac monitoring, vital signs, obtain 12 lead EKG if ordered  - Administer antiarrhythmic and heart rate control medications as ordered  - Monitor electrolytes and administer replacement therapy as ordered  Outcome: Progressing     Problem: MUSCULOSKELETAL - ADULT  Goal: Maintain or return mobility to safest level of function  Description  INTERVENTIONS:  - Assess patient's ability to carry out ADLs; assess patient's baseline for ADL function and identify physical deficits which impact ability to perform ADLs (bathing, care of mouth/teeth, toileting, grooming, dressing, etc )  - Assess/evaluate cause of self-care deficits   - Assess range of motion  - Assess patient's mobility  - Assess patient's need for assistive devices and provide as appropriate  - Encourage maximum independence but intervene and supervise when necessary  - Involve family in performance of ADLs  - Assess for home care needs following discharge   - Consider OT consult to assist with ADL evaluation and planning for discharge  - Provide patient education as appropriate  Outcome: Progressing  Goal: Maintain proper alignment of affected body part  Description  INTERVENTIONS:  - Support, maintain and protect limb and body alignment  - Provide patient/ family with appropriate education  Outcome: Progressing     Problem: Prexisting or High Potential for Compromised Skin Integrity  Goal: Skin integrity is maintained or improved  Description  INTERVENTIONS:  - Identify patients at risk for skin breakdown  - Assess and monitor skin integrity  - Assess and monitor nutrition and hydration status  - Monitor labs   - Assess for incontinence   - Turn and reposition patient  - Assist with mobility/ambulation  - Relieve pressure over bony prominences  - Avoid friction and shearing  - Provide appropriate hygiene as needed including keeping skin clean and dry  - Evaluate need for skin moisturizer/barrier cream  - Collaborate with interdisciplinary team   - Patient/family teaching  - Consider wound care consult   Outcome: Progressing

## 2020-02-27 NOTE — TELEPHONE ENCOUNTER
ON return call to Steffi Luo this is no longer needed, pt was not admitted  If pt needs services he will need to call for himself   CR

## 2020-02-27 NOTE — ASSESSMENT & PLAN NOTE
Wt Readings from Last 3 Encounters:   02/27/20 117 kg (258 lb 2 5 oz)   02/04/20 108 kg (239 lb)   01/07/20 113 kg (249 lb)     Patient has chronic systolic/diastolic CHF with ejection fraction of 45%    Patient is on room air and lungs are clear to auscultation    Will continue Bumex 2 mg in the morning and will monitor patient's electrolytes and renal function

## 2020-02-28 LAB
ANION GAP SERPL CALCULATED.3IONS-SCNC: 10 MMOL/L (ref 4–13)
ANION GAP SERPL CALCULATED.3IONS-SCNC: 10 MMOL/L (ref 4–13)
BUN SERPL-MCNC: 31 MG/DL (ref 5–25)
BUN SERPL-MCNC: 40 MG/DL (ref 5–25)
CALCIUM SERPL-MCNC: 6.5 MG/DL (ref 8.3–10.1)
CALCIUM SERPL-MCNC: 8.8 MG/DL (ref 8.3–10.1)
CHLORIDE SERPL-SCNC: 107 MMOL/L (ref 100–108)
CHLORIDE SERPL-SCNC: 98 MMOL/L (ref 100–108)
CO2 SERPL-SCNC: 21 MMOL/L (ref 21–32)
CO2 SERPL-SCNC: 27 MMOL/L (ref 21–32)
CREAT SERPL-MCNC: 1.24 MG/DL (ref 0.6–1.3)
CREAT SERPL-MCNC: 1.25 MG/DL (ref 0.6–1.3)
ERYTHROCYTE [DISTWIDTH] IN BLOOD BY AUTOMATED COUNT: 15.7 % (ref 11.6–15.1)
ERYTHROCYTE [DISTWIDTH] IN BLOOD BY AUTOMATED COUNT: 15.8 % (ref 11.6–15.1)
GFR SERPL CREATININE-BSD FRML MDRD: 57 ML/MIN/1.73SQ M
GFR SERPL CREATININE-BSD FRML MDRD: 57 ML/MIN/1.73SQ M
GLUCOSE SERPL-MCNC: 66 MG/DL (ref 65–140)
GLUCOSE SERPL-MCNC: 90 MG/DL (ref 65–140)
HCT VFR BLD AUTO: 23.3 % (ref 36.5–49.3)
HCT VFR BLD AUTO: 36.8 % (ref 36.5–49.3)
HGB BLD-MCNC: 12.1 G/DL (ref 12–17)
HGB BLD-MCNC: 7.7 G/DL (ref 12–17)
MCH RBC QN AUTO: 31.8 PG (ref 26.8–34.3)
MCH RBC QN AUTO: 32.9 PG (ref 26.8–34.3)
MCHC RBC AUTO-ENTMCNC: 32.9 G/DL (ref 31.4–37.4)
MCHC RBC AUTO-ENTMCNC: 33 G/DL (ref 31.4–37.4)
MCV RBC AUTO: 100 FL (ref 82–98)
MCV RBC AUTO: 97 FL (ref 82–98)
PLATELET # BLD AUTO: 163 THOUSANDS/UL (ref 149–390)
PLATELET # BLD AUTO: 244 THOUSANDS/UL (ref 149–390)
PMV BLD AUTO: 10.2 FL (ref 8.9–12.7)
PMV BLD AUTO: 10.2 FL (ref 8.9–12.7)
POTASSIUM SERPL-SCNC: 2.9 MMOL/L (ref 3.5–5.3)
POTASSIUM SERPL-SCNC: 4.1 MMOL/L (ref 3.5–5.3)
RBC # BLD AUTO: 2.34 MILLION/UL (ref 3.88–5.62)
RBC # BLD AUTO: 3.8 MILLION/UL (ref 3.88–5.62)
SODIUM SERPL-SCNC: 135 MMOL/L (ref 136–145)
SODIUM SERPL-SCNC: 138 MMOL/L (ref 136–145)
WBC # BLD AUTO: 11.5 THOUSAND/UL (ref 4.31–10.16)
WBC # BLD AUTO: 13.87 THOUSAND/UL (ref 4.31–10.16)

## 2020-02-28 PROCEDURE — 99232 SBSQ HOSP IP/OBS MODERATE 35: CPT | Performed by: INTERNAL MEDICINE

## 2020-02-28 PROCEDURE — 80048 BASIC METABOLIC PNL TOTAL CA: CPT | Performed by: INTERNAL MEDICINE

## 2020-02-28 PROCEDURE — 85027 COMPLETE CBC AUTOMATED: CPT | Performed by: INTERNAL MEDICINE

## 2020-02-28 RX ORDER — FLUCONAZOLE 100 MG/1
200 TABLET ORAL DAILY
Status: DISCONTINUED | OUTPATIENT
Start: 2020-02-28 | End: 2020-03-08

## 2020-02-28 RX ADMIN — HYDROCODONE BITARTRATE AND ACETAMINOPHEN 2 TABLET: 5; 325 TABLET ORAL at 19:55

## 2020-02-28 RX ADMIN — DULOXETINE 30 MG: 30 CAPSULE, DELAYED RELEASE ORAL at 09:01

## 2020-02-28 RX ADMIN — OXYCODONE HYDROCHLORIDE AND ACETAMINOPHEN 1000 MG: 500 TABLET ORAL at 09:01

## 2020-02-28 RX ADMIN — APIXABAN 5 MG: 5 TABLET, FILM COATED ORAL at 09:01

## 2020-02-28 RX ADMIN — HYDROCODONE BITARTRATE AND ACETAMINOPHEN 2 TABLET: 5; 325 TABLET ORAL at 06:22

## 2020-02-28 RX ADMIN — HYDROCODONE BITARTRATE AND ACETAMINOPHEN 2 TABLET: 5; 325 TABLET ORAL at 10:55

## 2020-02-28 RX ADMIN — SALINE NASAL SPRAY 2 SPRAY: 1.5 SOLUTION NASAL at 09:03

## 2020-02-28 RX ADMIN — BUMETANIDE 2 MG: 1 TABLET ORAL at 09:03

## 2020-02-28 RX ADMIN — APIXABAN 5 MG: 5 TABLET, FILM COATED ORAL at 18:10

## 2020-02-28 RX ADMIN — STANDARDIZED SENNA CONCENTRATE 17.2 MG: 8.6 TABLET ORAL at 09:01

## 2020-02-28 RX ADMIN — HYDROCODONE BITARTRATE AND ACETAMINOPHEN 2 TABLET: 5; 325 TABLET ORAL at 15:34

## 2020-02-28 RX ADMIN — MELATONIN 1000 UNITS: at 09:01

## 2020-02-28 RX ADMIN — VANCOMYCIN HYDROCHLORIDE 1250 MG: 1 INJECTION, POWDER, LYOPHILIZED, FOR SOLUTION INTRAVENOUS at 06:27

## 2020-02-28 RX ADMIN — PANTOPRAZOLE SODIUM 40 MG: 40 TABLET, DELAYED RELEASE ORAL at 09:01

## 2020-02-28 RX ADMIN — METOPROLOL TARTRATE 25 MG: 25 TABLET ORAL at 18:10

## 2020-02-28 RX ADMIN — METOPROLOL TARTRATE 25 MG: 25 TABLET ORAL at 09:01

## 2020-02-28 RX ADMIN — FLUCONAZOLE 200 MG: 100 TABLET ORAL at 15:29

## 2020-02-28 NOTE — ASSESSMENT & PLAN NOTE
Wt Readings from Last 3 Encounters:   02/28/20 118 kg (259 lb 4 2 oz)   02/04/20 108 kg (239 lb)   01/07/20 113 kg (249 lb)     Patient has chronic systolic/diastolic CHF with ejection fraction of 45%    Patient is on room air and lungs are clear to auscultation    Will continue Bumex 2 mg in the morning and will monitor patient's electrolytes and renal function

## 2020-02-28 NOTE — PROGRESS NOTES
Progress Note - Podiatry  Linwood Mazariegos 69 y o  male MRN: [id number]  Unit/Bed#: -01 Encounter: [id number]    Assessment/Plan:    Cellulitis right leg   -satisfactorily resolved   -continue with elevation, compression stockings or 6 in Ace wrap daily  Cellulitis left leg/Chronic venous insufficiency Bilateral   -continue with current IV antibiotics   -elevation, compression stockings and Ace wraps  Tinea pedis-chronic   -Betadine to dry interspaces left foot daily   -alginate rope woven and interdigitally to dry interspaces    -Diflucan daily x1 week  CHF, chronic AFib   -managed per Internal Medicine and Cardiology    Subjective/Objective   Chief Complaint:   Chief Complaint   Patient presents with    Foot Pain     patient complaint of foot pain and discloration x 1 day       Subjective:  78-year-old white male seen for consultation 1 week ago for cellulitis of his right leg  Right leg cellulitis resolved satisfactorily  No open wounds were noted at that point but since then he has developed increased swelling and pain involving his left leg and foot  Denies any injury  Has not been wearing his compression stocking on his legs since he has been hospitalized and has been using an Ace wrap which he does not like  Blood pressure 91/68, pulse 102, temperature 98 4 °F (36 9 °C), temperature source Oral, resp  rate 17, height 6' (1 829 m), weight 118 kg (259 lb 4 2 oz), SpO2 94 %  ,Body mass index is 35 16 kg/m²      Invasive Devices     Peripheral Intravenous Line            Peripheral IV 02/27/20 Right Forearm 1 day                Physical Exam:   General appearance: alert, cooperative and no distress  Neuro/Vascular: Sensation and reflexes:   Grossly intact  Pulses: Right: DP  0/4, PT  0/4, Left: DP  0/4, PT  0/4   Capillary Filling: 3 Sec, Edema:  Chronic +2 bilateral, current +1 on the right and +3 on the left   8/2020 Arterial duplex showed triphasic waveforms of DP&PT bilateral   EMMANUEL 1 3 on the right, and 0 9 on the left  Extremity:    Left foot:  Interdigital maceration and scaling noted of the left foot all 4 interspaces consistent with chronic tinea, this potentially may have been the source for the cellulitis that he experienced in his left leg  No active drainage noted though some maceration of the interspaces especially the 3rd and 4th spaces is noted  Ulcers/Wounds:   No open wounds noted, thin stable eschar overlying the medial aspect of the rearfoot ( prominent screw head) no drainage or open exposed hardware noted  Labs and other studies:   CBC w/diff  Results from last 7 days   Lab Units 02/28/20  0900  02/25/20  0554   WBC Thousand/uL 13 87*   < > 10 52*   HEMOGLOBIN g/dL 12 1   < > 11 8*   HEMATOCRIT % 36 8   < > 34 8*   PLATELETS Thousands/uL 244   < > 160   LYMPHO PCT %  --   --  9*   MONO PCT %  --   --  11   EOS PCT %  --   --  2    < > = values in this interval not displayed  BMP  Results from last 7 days   Lab Units 02/28/20  0900   POTASSIUM mmol/L 4 1   CHLORIDE mmol/L 98*   CO2 mmol/L 27   BUN mg/dL 40*   CREATININE mg/dL 1 25   CALCIUM mg/dL 8 8     CMP  Results from last 7 days   Lab Units 02/28/20  0900  02/25/20  0554   POTASSIUM mmol/L 4 1   < > 4 4   CHLORIDE mmol/L 98*   < > 99*   CO2 mmol/L 27   < > 23   BUN mg/dL 40*   < > 43*   CREATININE mg/dL 1 25   < > 1 23   CALCIUM mg/dL 8 8   < > 8 8   ALK PHOS U/L  --   --  159*   ALT U/L  --   --  8*   AST U/L  --   --  25    < > = values in this interval not displayed         @Culture@  Lab Results   Component Value Date    URINECX <10,000 cfu/ml  02/19/2020         Current Facility-Administered Medications:     albumin human (FLEXBUMIN) 25 % injection 12 5 g, 12 5 g, Intravenous, Once, Tanya Gosselin, PA-C, Stopped at 02/20/20 4160    aluminum-magnesium hydroxide-simethicone (MYLANTA) 200-200-20 mg/5 mL oral suspension 30 mL, 30 mL, Oral, Q4H PRN, Ledy Rodas DO    apixaban (ELIQUIS) tablet 5 mg, 5 mg, Oral, BID, NAVNEET [place], 5 mg at 02/28/20 0901    ascorbic acid (VITAMIN C) tablet 1,000 mg, 1,000 mg, Oral, Daily, NAVNEET [place], 1,000 mg at 02/28/20 0901    benzonatate (TESSALON PERLES) capsule 100 mg, 100 mg, Oral, TID PRN, NAVNEET [place]    bumetanide (BUMEX) tablet 2 mg, 2 mg, Oral, QAM, Dylon Sanz MD, 2 mg at 02/28/20 0903    cholecalciferol (VITAMIN D3) tablet 1,000 Units, 1,000 Units, Oral, Daily, NAVNEET [place], 1,000 Units at 02/28/20 0901    DULoxetine (CYMBALTA) delayed release capsule 30 mg, 30 mg, Oral, Daily, NAVNEET [place], 30 mg at 02/28/20 0901    HYDROcodone-acetaminophen ([facility]) 5-325 mg per tablet 2 tablet, 2 tablet, Oral, Q4H PRN, NAVNEET [place], 2 tablet at 02/28/20 1055    metoprolol tartrate (LOPRESSOR) tablet 25 mg, 25 mg, Oral, Q12H [address], Dylon Sanz MD, 25 mg at 02/28/20 0901    pantoprazole (PROTONIX) EC tablet 40 mg, 40 mg, Oral, Daily, NAVNEET [place], 40 mg at 02/28/20 0901    senna (SENOKOT) tablet 17 2 mg, 2 tablet, Oral, Daily, Rapid Pathogen Screening, DO, 17 2 mg at 02/28/20 0901    sodium chloride (OCEAN) 0 65 % nasal spray 2 spray, 2 spray, Each Nare, Q1H PRN, Dylon Sanz MD, 2 spray at 02/28/20 0903    vancomycin (VANCOCIN) 1,250 mg in sodium chloride 0 9 % 250 mL IVPB, 1,250 mg, Intravenous, Q24H, Dylon Sanz MD, Last Rate: 166 7 mL/hr at 02/28/20 0627, 1,250 mg at 02/28/20 1802    Imaging: I have personally reviewed pertinent films in PACS  EKG, Pathology, and Other Studies: I have personally reviewed pertinent reports    VTE Pharmacologic Prophylaxis:  Eliquis  VTE Mechanical Prophylaxis: reason for no mechanical VTE prophylaxis Patient refuses    Cameron Chavez DPM

## 2020-02-28 NOTE — PROGRESS NOTES
Vancomycin IV Pharmacy-to-Dose Consultation    Magdalene Melton is a 69 y o  male who is currently receiving Vancomycin IV with management by the Pharmacy Consult service  Assessment/Plan:  The patient was reviewed  Renal function is stable and no signs or symptoms of nephrotoxicity and/or infusion reactions were documented in the chart  Based on todays assessment, continue current vancomycin (day # 7) dosing of 1250mg IV Q24H, with a plan for trough to be drawn at 0630 on 02/29/20  We will continue to follow the patients culture results and clinical progress daily      Qiana Bar, Pharmacist

## 2020-02-28 NOTE — ASSESSMENT & PLAN NOTE
Patient developed left lower extremity cellulitis on the 3rd day of his hospital stay  He was switched to IV vancomycin  Patient's left lower extremity cellulitis is slowly improving     Will continue IV vancomycin the weekend  I discussed the case with Podiatry:  Maci salazaris could be a source for cellulitis    Will treat with Diflucan for a week

## 2020-02-28 NOTE — PROGRESS NOTES
Progress Note - Linwood Mazariegos 1945, 69 y o  male MRN: [id number]    Unit/Bed#: -01 Encounter: [id number]    Primary Care Provider: Teresa Wilkinson MD   Date and time admitted to hospital: 2020  1:18 PM        Chronic combined systolic and diastolic congestive heart failure (HCC)  Assessment & Plan  Wt Readings from Last 3 Encounters:   20 118 kg (259 lb 4 2 oz)   20 108 kg (239 lb)   20 113 kg (249 lb)     Patient has chronic systolic/diastolic CHF with ejection fraction of 45%    Patient is on room air and lungs are clear to auscultation    Will continue Bumex 2 mg in the morning and will monitor patient's electrolytes and renal function      Cellulitis of left lower extremity  Assessment & Plan  Patient developed left lower extremity cellulitis on the 3rd day of his hospital stay  He was switched to IV vancomycin  Patient's left lower extremity cellulitis is slowly improving     Will continue IV vancomycin the weekend  I discussed the case with Podiatry:  Nolia Needy pedis could be a source for cellulitis  Will treat with Diflucan for a week    Persistent atrial fibrillation  Assessment & Plan  Patient has chronic atrial fibrillation      Rates are controlled on Lopressor 25 mg p o bid    Continue Eliquis for CVA prophylaxis        VTE Prophylaxis: in place    Patient Centered Rounds: I rounded with patient's nurse    Current Length of Stay: 9 day(s)    Current Patient Status: Inpatient    Certification Statement: Pt requires additional inpatient hospital stay due to: see assessment and plan        Subjective:   Patient denies chest pain, palpitations, shortness of breath, abdominal pain, nausea    All other ROS are negative    Objective:     Vitals:   Temp (24hrs), Av 7 °F (36 5 °C), Min:97 3 °F (36 3 °C), Max:98 4 °F (36 9 °C)    Temp:  [97 3 °F (36 3 °C)-98 4 °F (36 9 °C)] 97 3 °F (36 3 °C)  HR:  [] 82  Resp:  [17-19] 19  BP: ()/(68-75) 122/75  SpO2:  [94 %-98 %] 94 %  Body mass index is 35 16 kg/m²  Input and Output Summary (last 24 hours): Intake/Output Summary (Last 24 hours) at 2/28/2020 1703  Last data filed at 2/28/2020 1350  Gross per 24 hour   Intake 660 ml   Output 1575 ml   Net -915 ml       Physical Exam:     Physical Exam   Constitutional: He is oriented to person, place, and time  He appears well-developed  HENT:   Head: Normocephalic  Cardiovascular: Normal rate  Irregular irregular, controlled rate   Pulmonary/Chest: Effort normal and breath sounds normal  He has no rales  Abdominal: Soft  Bowel sounds are normal  There is no tenderness  Neurological: He is alert and oriented to person, place, and time  Skin:   Left lower extremity cellulitis is improving, please review image I took           I personally reviewed labs and imaging reports for today        Last 24 Hours Medication List:     Current Facility-Administered Medications:  albumin human 12 5 g Intravenous Once Pelon Chowdary PA-C Last Rate: Stopped (02/20/20 1736)   aluminum-magnesium hydroxide-simethicone 30 mL Oral Q4H PRN Jessica Garcia DO    apixaban 5 mg Oral BID NAVNEET [place]    vitamin C 1,000 mg Oral Daily NAVNEET [place]    benzonatate 100 mg Oral TID PRN NAVNEET [place]    bumetanide 2 mg Oral QAM Ernesto Miller MD    cholecalciferol 1,000 Units Oral Daily NAVNEET [place]    DULoxetine 30 mg Oral Daily NAVNEET [place]    fluconazole 200 mg Oral Daily Ernesto Miller MD    HYDROcodone-acetaminophen 2 tablet Oral Q4H PRN NAVNEET [place]    metoprolol tartrate 25 mg Oral Q12H Katherene Duverney, MD    pantoprazole 40 mg Oral Daily NAVNEET [place]    senna 2 tablet Oral Daily Misael Shre DO    sodium chloride 2 spray Each Nare Q1H PRN Ernesto Miller MD    vancomycin 1,250 mg Intravenous Q24H Ernesto Miller MD Last Rate: 1,250 mg (02/28/20 2092)          Today, Patient Was Seen By: Ernesto Miller MD    ** Please Note: Dictation voice to text software may have been used in the creation of this document   **

## 2020-02-28 NOTE — PLAN OF CARE
Problem: Potential for Falls  Goal: Patient will remain free of falls  Description  INTERVENTIONS:  - Assess patient frequently for physical needs  -  Identify cognitive and physical deficits and behaviors that affect risk of falls    -  [place] fall precautions as indicated by assessment   - Educate patient/family on patient safety including physical limitations  - Instruct patient to call for assistance with activity based on assessment  - Modify environment to reduce risk of injury  - Consider OT/PT consult to assist with strengthening/mobility  Outcome: Progressing     Problem: PAIN - ADULT  Goal: Verbalizes/displays adequate comfort level or baseline comfort level  Description  Interventions:  - Encourage patient to monitor pain and request assistance  - Assess pain using appropriate pain scale  - Administer analgesics based on type and severity of pain and evaluate response  - Implement non-pharmacological measures as appropriate and evaluate response  - Consider cultural and social influences on pain and pain management  - Notify physician/advanced practitioner if interventions unsuccessful or patient reports new pain  Outcome: Progressing     Problem: INFECTION - ADULT  Goal: Absence or prevention of progression during hospitalization  Description  INTERVENTIONS:  - Assess and monitor for signs and symptoms of infection  - Monitor lab/diagnostic results  - Monitor all insertion sites, i e  indwelling lines, tubes, and drains  - Monitor endotracheal if appropriate and nasal secretions for changes in amount and color  - [place] appropriate cooling/warming therapies per order  - Administer medications as ordered  - Instruct and encourage patient and family to use good hand hygiene technique  - Identify and instruct in appropriate isolation precautions for identified infection/condition  Outcome: Progressing  Goal: Absence of fever/infection during neutropenic period  Description  INTERVENTIONS:  - Monitor WBC    Outcome: Progressing     Problem: SAFETY ADULT  Goal: Maintain or return to baseline ADL function  Description  INTERVENTIONS:  -  Assess patient's ability to carry out ADLs; assess patient's baseline for ADL function and identify physical deficits which impact ability to perform ADLs (bathing, care of mouth/teeth, toileting, grooming, dressing, etc )  - Assess/evaluate cause of self-care deficits   - Assess range of motion  - Assess patient's mobility; develop plan if impaired  - Assess patient's need for assistive devices and provide as appropriate  - Encourage maximum independence but intervene and supervise when necessary  - Involve family in performance of ADLs  - Assess for home care needs following discharge   - Consider OT consult to assist with ADL evaluation and planning for discharge  - Provide patient education as appropriate  Outcome: Progressing  Goal: Maintain or return mobility status to optimal level  Description  INTERVENTIONS:  - Assess patient's baseline mobility status (ambulation, transfers, stairs, etc )    - Identify cognitive and physical deficits and behaviors that affect mobility  - Identify mobility aids required to assist with transfers and/or ambulation (gait belt, sit-to-stand, lift, walker, cane, etc )  - [place] fall precautions as indicated by assessment  - Record patient progress and toleration of activity level on Mobility SBAR; progress patient to next Phase/Stage  - Instruct patient to call for assistance with activity based on assessment  - Consider rehabilitation consult to assist with strengthening/weightbearing, etc   Outcome: Progressing     Problem: DISCHARGE PLANNING  Goal: Discharge to home or other facility with appropriate resources  Description  INTERVENTIONS:  - Identify barriers to discharge w/patient and caregiver  - Arrange for needed discharge resources and transportation as appropriate  - Identify discharge learning needs (meds, wound care, etc )  - Arrange for interpretive services to assist at discharge as needed  - Refer to Case Management Department for coordinating discharge planning if the patient needs post-hospital services based on physician/advanced practitioner order or complex needs related to functional status, cognitive ability, or social support system  Outcome: Progressing     Problem: Knowledge Deficit  Goal: Patient/family/caregiver demonstrates understanding of disease process, treatment plan, medications, and discharge instructions  Description  Complete learning assessment and assess knowledge base    Interventions:  - Provide teaching at level of understanding  - Provide teaching via preferred learning methods  Outcome: Progressing     Problem: CARDIOVASCULAR - ADULT  Goal: Maintains optimal cardiac output and hemodynamic stability  Description  INTERVENTIONS:  - Monitor I/O, vital signs and rhythm  - Monitor for S/S and trends of decreased cardiac output  - Administer and titrate ordered vasoactive medications to optimize hemodynamic stability  - Assess quality of pulses, skin color and temperature  - Assess for signs of decreased coronary artery perfusion  - Instruct patient to report change in severity of symptoms  Outcome: Progressing  Goal: Absence of cardiac dysrhythmias or at baseline rhythm  Description  INTERVENTIONS:  - Continuous cardiac monitoring, vital signs, obtain 12 lead EKG if ordered  - Administer antiarrhythmic and heart rate control medications as ordered  - Monitor electrolytes and administer replacement therapy as ordered  Outcome: Progressing     Problem: MUSCULOSKELETAL - ADULT  Goal: Maintain or return mobility to safest level of function  Description  INTERVENTIONS:  - Assess patient's ability to carry out ADLs; assess patient's baseline for ADL function and identify physical deficits which impact ability to perform ADLs (bathing, care of mouth/teeth, toileting, grooming, dressing, etc )  - Assess/evaluate cause of self-care deficits   - Assess range of motion  - Assess patient's mobility  - Assess patient's need for assistive devices and provide as appropriate  - Encourage maximum independence but intervene and supervise when necessary  - Involve family in performance of ADLs  - Assess for home care needs following discharge   - Consider OT consult to assist with ADL evaluation and planning for discharge  - Provide patient education as appropriate  Outcome: Progressing  Goal: Maintain proper alignment of affected body part  Description  INTERVENTIONS:  - Support, maintain and protect limb and body alignment  - Provide patient/ family with appropriate education  Outcome: Progressing     Problem: Prexisting or High Potential for Compromised Skin Integrity  Goal: Skin integrity is maintained or improved  Description  INTERVENTIONS:  - Identify patients at risk for skin breakdown  - Assess and monitor skin integrity  - Assess and monitor nutrition and hydration status  - Monitor labs   - Assess for incontinence   - Turn and reposition patient  - Assist with mobility/ambulation  - Relieve pressure over bony prominences  - Avoid friction and shearing  - Provide appropriate hygiene as needed including keeping skin clean and dry  - Evaluate need for skin moisturizer/barrier cream  - Collaborate with interdisciplinary team   - Patient/family teaching  - Consider wound care consult   Outcome: Progressing     Problem: Nutrition/Hydration-ADULT  Goal: Nutrient/Hydration intake appropriate for improving, restoring or maintaining nutritional needs  Description  Monitor and assess patient's nutrition/hydration status for malnutrition  Collaborate with interdisciplinary team and initiate plan and interventions as ordered  Monitor patient's weight and dietary intake as ordered or per policy  Utilize nutrition screening tool and intervene as necessary   Determine patient's food preferences and provide high-protein, high-caloric foods as appropriate       INTERVENTIONS:  - Monitor oral intake, urinary output, labs, and treatment plans  - Assess nutrition and hydration status and recommend course of action  - Evaluate amount of meals eaten  - Assist patient with eating if necessary   - Allow adequate time for meals  - Recommend/ encourage appropriate diets, oral nutritional supplements, and vitamin/mineral supplements  - Order, calculate, and assess calorie counts as needed  - Recommend, monitor, and adjust tube feedings and TPN/PPN based on assessed needs  - Assess need for intravenous fluids  - Provide specific nutrition/hydration education as appropriate  - Include patient/family/caregiver in decisions related to nutrition  Outcome: Progressing

## 2020-02-28 NOTE — PROGRESS NOTES
Ric Mendieta  69 y o   male  1945  mrn [id number]    Assessment/Plan:    1  Cellulitis LLE: strange that it developed during this hospitalization while on abx, though given chronic venous stasis he would be at risk for it   Given clinical improvement I would continue vancomycin through the weekend  Increased WBC coming down  Will follow  Subjective:  No fevers, feeling well  No headaches, chest pains, abd pains, n/v/d        Objective:    Lungs: cta bilaterally  Cor: rrr s1s2  Abd: soft nt/nd + BS  Ext: decreased swelling and erythema of LLE and L foot, bullous have opened, no purulence    Labs:  CBC w/diff  Recent Labs     02/28/20  0900   WBC 13 87*   HGB 12 1   HCT 36 8        BMP  Recent Labs     02/28/20  0900   K 4 1   CL 98*   CO2 27   BUN 40*   CREATININE 1 25   CALCIUM 8 8     CMP  Recent Labs     02/28/20  0900   K 4 1   CL 98*   CO2 27   BUN 40*   CREATININE 1 25   CALCIUM 8 8        labrc    Cultures:  No results found for: BLOODCX  Lab Results   Component Value Date    WOUNDCULT 3+ Growth of Serratia marcescens (A) 07/24/2019    WOUNDCULT 1+ Growth of  07/24/2019    WOUNDCULT (A) 01/02/2019     1+ Growth of Methicillin Resistant Staphylococcus aureus     Lab Results   Component Value Date    URINECX <10,000 cfu/ml  02/19/2020     No results found for: SPUTUMCULTUR    MED: reviewed      Current Facility-Administered Medications:     albumin human (FLEXBUMIN) 25 % injection 12 5 g, 12 5 g, Intravenous, Once, Radhika Tang PA-C, Stopped at 02/20/20 2343    aluminum-magnesium hydroxide-simethicone (MYLANTA) 200-200-20 mg/5 mL oral suspension 30 mL, 30 mL, Oral, Q4H PRN, Ron Sanchez DO    apixaban (ELIQUIS) tablet 5 mg, 5 mg, Oral, BID, NAVNEET [place], 5 mg at 02/28/20 0901    ascorbic acid (VITAMIN C) tablet 1,000 mg, 1,000 mg, Oral, Daily, NAVNEET [place], 1,000 mg at 02/28/20 0901    benzonatate (TESSALON PERLES) capsule 100 mg, 100 mg, Oral, TID PRN, NAVNEET Martinez    bumetanide (BUMEX) tablet 2 mg, 2 mg, Oral, QAM, Kem Messer MD, 2 mg at 02/28/20 0903    cholecalciferol (VITAMIN D3) tablet 1,000 Units, 1,000 Units, Oral, Daily, NAVNEET [place], 1,000 Units at 02/28/20 0901    DULoxetine (CYMBALTA) delayed release capsule 30 mg, 30 mg, Oral, Daily, NAVNEET [place], 30 mg at 02/28/20 0901    HYDROcodone-acetaminophen (NORCO) 5-325 mg per tablet 2 tablet, 2 tablet, Oral, Q4H PRN, NAVNEET [place], 2 tablet at 02/28/20 0622    metoprolol tartrate (LOPRESSOR) tablet 25 mg, 25 mg, Oral, Q12H [address], Kem Messer MD, 25 mg at 02/28/20 0901    pantoprazole (PROTONIX) EC tablet 40 mg, 40 mg, Oral, Daily, NAVNEET [place], 40 mg at 02/28/20 0901    senna (SENOKOT) tablet 17 2 mg, 2 tablet, Oral, Daily, [employer], DO, 17 2 mg at 02/28/20 0901    sodium chloride (OCEAN) 0 65 % nasal spray 2 spray, 2 spray, Each [place]are, Q1H PRN, Kem Messer MD, 2 spray at 02/28/20 0903    vancomycin (VANCOCIN) 1,250 mg in sodium chloride 0 9 % 250 mL IVPB, 1,250 mg, Intravenous, Q24H, Kem Messer MD, Last Rate: 166 7 mL/hr at 02/28/20 0627, 1,250 mg at 02/28/20 4368    Principal Problem:    Cellulitis of right lower extremity  Active Problems:    Gastroesophageal reflux disease without esophagitis    Class 1 obesity due to excess calories with serious comorbidity and body mass index (BMI) of 34 0 to 34 9 in adult    Persistent atrial fibrillation    Cellulitis of left lower extremity    Chronic deep vein thrombosis (DVT) (HCC)    Acute kidney injury superimposed on CKD (HCC)    Chronic combined systolic and diastolic congestive heart failure ([facility] [address] )    Hypertension    History of methicillin resistant staphylococcus aureus (MRSA)    Hyponatremia      Erik Tucker MD

## 2020-02-29 PROBLEM — B37.0 ORAL CANDIDIASIS: Status: ACTIVE | Noted: 2020-02-29

## 2020-02-29 LAB
ANION GAP SERPL CALCULATED.3IONS-SCNC: 6 MMOL/L (ref 4–13)
BUN SERPL-MCNC: 39 MG/DL (ref 5–25)
CALCIUM SERPL-MCNC: 8.6 MG/DL (ref 8.3–10.1)
CHLORIDE SERPL-SCNC: 96 MMOL/L (ref 100–108)
CO2 SERPL-SCNC: 29 MMOL/L (ref 21–32)
CREAT SERPL-MCNC: 1.26 MG/DL (ref 0.6–1.3)
GFR SERPL CREATININE-BSD FRML MDRD: 56 ML/MIN/1.73SQ M
GLUCOSE SERPL-MCNC: 87 MG/DL (ref 65–140)
POTASSIUM SERPL-SCNC: 5.1 MMOL/L (ref 3.5–5.3)
SODIUM SERPL-SCNC: 131 MMOL/L (ref 136–145)
VANCOMYCIN TROUGH SERPL-MCNC: 21.6 UG/ML (ref 10–20)

## 2020-02-29 PROCEDURE — 80048 BASIC METABOLIC PNL TOTAL CA: CPT | Performed by: INTERNAL MEDICINE

## 2020-02-29 PROCEDURE — 80202 ASSAY OF VANCOMYCIN: CPT | Performed by: INTERNAL MEDICINE

## 2020-02-29 PROCEDURE — 99232 SBSQ HOSP IP/OBS MODERATE 35: CPT | Performed by: INTERNAL MEDICINE

## 2020-02-29 RX ORDER — VANCOMYCIN HYDROCHLORIDE 1 G/200ML
1000 INJECTION, SOLUTION INTRAVENOUS EVERY 24 HOURS
Status: DISCONTINUED | OUTPATIENT
Start: 2020-02-29 | End: 2020-02-29

## 2020-02-29 RX ADMIN — OXYCODONE HYDROCHLORIDE AND ACETAMINOPHEN 1000 MG: 500 TABLET ORAL at 09:00

## 2020-02-29 RX ADMIN — FLUCONAZOLE 200 MG: 100 TABLET ORAL at 09:00

## 2020-02-29 RX ADMIN — HYDROCODONE BITARTRATE AND ACETAMINOPHEN 2 TABLET: 5; 325 TABLET ORAL at 18:17

## 2020-02-29 RX ADMIN — BUMETANIDE 2 MG: 1 TABLET ORAL at 09:00

## 2020-02-29 RX ADMIN — HYDROCODONE BITARTRATE AND ACETAMINOPHEN 2 TABLET: 5; 325 TABLET ORAL at 04:53

## 2020-02-29 RX ADMIN — HYDROCODONE BITARTRATE AND ACETAMINOPHEN 2 TABLET: 5; 325 TABLET ORAL at 13:19

## 2020-02-29 RX ADMIN — HYDROCODONE BITARTRATE AND ACETAMINOPHEN 2 TABLET: 5; 325 TABLET ORAL at 08:59

## 2020-02-29 RX ADMIN — NYSTATIN 500000 UNITS: 100000 SUSPENSION ORAL at 20:41

## 2020-02-29 RX ADMIN — PANTOPRAZOLE SODIUM 40 MG: 40 TABLET, DELAYED RELEASE ORAL at 08:59

## 2020-02-29 RX ADMIN — APIXABAN 5 MG: 5 TABLET, FILM COATED ORAL at 18:17

## 2020-02-29 RX ADMIN — HYDROCODONE BITARTRATE AND ACETAMINOPHEN 2 TABLET: 5; 325 TABLET ORAL at 22:31

## 2020-02-29 RX ADMIN — NYSTATIN 500000 UNITS: 100000 SUSPENSION ORAL at 13:20

## 2020-02-29 RX ADMIN — DULOXETINE 30 MG: 30 CAPSULE, DELAYED RELEASE ORAL at 09:00

## 2020-02-29 RX ADMIN — APIXABAN 5 MG: 5 TABLET, FILM COATED ORAL at 09:00

## 2020-02-29 RX ADMIN — MELATONIN 1000 UNITS: at 08:59

## 2020-02-29 NOTE — PLAN OF CARE
Problem: Potential for Falls  Goal: Patient will remain free of falls  Description  INTERVENTIONS:  - Assess patient frequently for physical needs  -  Identify cognitive and physical deficits and behaviors that affect risk of falls    -  [place] fall precautions as indicated by assessment   - Educate patient/family on patient safety including physical limitations  - Instruct patient to call for assistance with activity based on assessment  - Modify environment to reduce risk of injury  - Consider OT/PT consult to assist with strengthening/mobility  Outcome: Progressing     Problem: PAIN - ADULT  Goal: Verbalizes/displays adequate comfort level or baseline comfort level  Description  Interventions:  - Encourage patient to monitor pain and request assistance  - Assess pain using appropriate pain scale  - Administer analgesics based on type and severity of pain and evaluate response  - Implement non-pharmacological measures as appropriate and evaluate response  - Consider cultural and social influences on pain and pain management  - Notify physician/advanced practitioner if interventions unsuccessful or patient reports new pain  Outcome: Progressing     Problem: INFECTION - ADULT  Goal: Absence or prevention of progression during hospitalization  Description  INTERVENTIONS:  - Assess and monitor for signs and symptoms of infection  - Monitor lab/diagnostic results  - Monitor all insertion sites, i e  indwelling lines, tubes, and drains  - Monitor endotracheal if appropriate and nasal secretions for changes in amount and color  - [place] appropriate cooling/warming therapies per order  - Administer medications as ordered  - Instruct and encourage patient and family to use good hand hygiene technique  - Identify and instruct in appropriate isolation precautions for identified infection/condition  Outcome: Progressing  Goal: Absence of fever/infection during neutropenic period  Description  INTERVENTIONS:  - Monitor WBC    Outcome: Progressing     Problem: SAFETY ADULT  Goal: Maintain or return to baseline ADL function  Description  INTERVENTIONS:  -  Assess patient's ability to carry out ADLs; assess patient's baseline for ADL function and identify physical deficits which impact ability to perform ADLs (bathing, care of mouth/teeth, toileting, grooming, dressing, etc )  - Assess/evaluate cause of self-care deficits   - Assess range of motion  - Assess patient's mobility; develop plan if impaired  - Assess patient's need for assistive devices and provide as appropriate  - Encourage maximum independence but intervene and supervise when necessary  - Involve family in performance of ADLs  - Assess for home care needs following discharge   - Consider OT consult to assist with ADL evaluation and planning for discharge  - Provide patient education as appropriate  Outcome: Progressing  Goal: Maintain or return mobility status to optimal level  Description  INTERVENTIONS:  - Assess patient's baseline mobility status (ambulation, transfers, stairs, etc )    - Identify cognitive and physical deficits and behaviors that affect mobility  - Identify mobility aids required to assist with transfers and/or ambulation (gait belt, sit-to-stand, lift, walker, cane, etc )  - [place] fall precautions as indicated by assessment  - Record patient progress and toleration of activity level on Mobility SBAR; progress patient to next Phase/Stage  - Instruct patient to call for assistance with activity based on assessment  - Consider rehabilitation consult to assist with strengthening/weightbearing, etc   Outcome: Progressing     Problem: DISCHARGE PLANNING  Goal: Discharge to home or other facility with appropriate resources  Description  INTERVENTIONS:  - Identify barriers to discharge w/patient and caregiver  - Arrange for needed discharge resources and transportation as appropriate  - Identify discharge learning needs (meds, wound care, etc )  - Arrange for interpretive services to assist at discharge as needed  - Refer to Case Management Department for coordinating discharge planning if the patient needs post-hospital services based on physician/advanced practitioner order or complex needs related to functional status, cognitive ability, or social support system  Outcome: Progressing     Problem: Knowledge Deficit  Goal: Patient/family/caregiver demonstrates understanding of disease process, treatment plan, medications, and discharge instructions  Description  Complete learning assessment and assess knowledge base    Interventions:  - Provide teaching at level of understanding  - Provide teaching via preferred learning methods  Outcome: Progressing     Problem: CARDIOVASCULAR - ADULT  Goal: Maintains optimal cardiac output and hemodynamic stability  Description  INTERVENTIONS:  - Monitor I/O, vital signs and rhythm  - Monitor for S/S and trends of decreased cardiac output  - Administer and titrate ordered vasoactive medications to optimize hemodynamic stability  - Assess quality of pulses, skin color and temperature  - Assess for signs of decreased coronary artery perfusion  - Instruct patient to report change in severity of symptoms  Outcome: Progressing  Goal: Absence of cardiac dysrhythmias or at baseline rhythm  Description  INTERVENTIONS:  - Continuous cardiac monitoring, vital signs, obtain 12 lead EKG if ordered  - Administer antiarrhythmic and heart rate control medications as ordered  - Monitor electrolytes and administer replacement therapy as ordered  Outcome: Progressing     Problem: MUSCULOSKELETAL - ADULT  Goal: Maintain or return mobility to safest level of function  Description  INTERVENTIONS:  - Assess patient's ability to carry out ADLs; assess patient's baseline for ADL function and identify physical deficits which impact ability to perform ADLs (bathing, care of mouth/teeth, toileting, grooming, dressing, etc )  - Assess/evaluate cause of self-care deficits   - Assess range of motion  - Assess patient's mobility  - Assess patient's need for assistive devices and provide as appropriate  - Encourage maximum independence but intervene and supervise when necessary  - Involve family in performance of ADLs  - Assess for home care needs following discharge   - Consider OT consult to assist with ADL evaluation and planning for discharge  - Provide patient education as appropriate  Outcome: Progressing  Goal: Maintain proper alignment of affected body part  Description  INTERVENTIONS:  - Support, maintain and protect limb and body alignment  - Provide patient/ family with appropriate education  Outcome: Progressing     Problem: Prexisting or High Potential for Compromised Skin Integrity  Goal: Skin integrity is maintained or improved  Description  INTERVENTIONS:  - Identify patients at risk for skin breakdown  - Assess and monitor skin integrity  - Assess and monitor nutrition and hydration status  - Monitor labs   - Assess for incontinence   - Turn and reposition patient  - Assist with mobility/ambulation  - Relieve pressure over bony prominences  - Avoid friction and shearing  - Provide appropriate hygiene as needed including keeping skin clean and dry  - Evaluate need for skin moisturizer/barrier cream  - Collaborate with interdisciplinary team   - Patient/family teaching  - Consider wound care consult   Outcome: Progressing     Problem: Nutrition/Hydration-ADULT  Goal: Nutrient/Hydration intake appropriate for improving, restoring or maintaining nutritional needs  Description  Monitor and assess patient's nutrition/hydration status for malnutrition  Collaborate with interdisciplinary team and initiate plan and interventions as ordered  Monitor patient's weight and dietary intake as ordered or per policy  Utilize nutrition screening tool and intervene as necessary   Determine patient's food preferences and provide high-protein, high-caloric foods as appropriate       INTERVENTIONS:  - Monitor oral intake, urinary output, labs, and treatment plans  - Assess nutrition and hydration status and recommend course of action  - Evaluate amount of meals eaten  - Assist patient with eating if necessary   - Allow adequate time for meals  - Recommend/ encourage appropriate diets, oral nutritional supplements, and vitamin/mineral supplements  - Order, calculate, and assess calorie counts as needed  - Recommend, monitor, and adjust tube feedings and TPN/PPN based on assessed needs  - Assess need for intravenous fluids  - Provide specific nutrition/hydration education as appropriate  - Include patient/family/caregiver in decisions related to nutrition  Outcome: Progressing

## 2020-02-29 NOTE — PROGRESS NOTES
Progress Note - Linwood Mazariegos 1945, 69 y o  male MRN: [id number]    Unit/Bed#: -01 Encounter: [id number]    Primary Care Provider: Daniela Jerez MD   Date and time admitted to hospital: 2020  1:18 PM        Chronic combined systolic and diastolic congestive heart failure (HCC)  Assessment & Plan  Wt Readings from Last 3 Encounters:   20 113 kg (250 lb)   20 108 kg (239 lb)   20 113 kg (249 lb)     Patient has chronic systolic/diastolic CHF with ejection fraction of 45%    Patient is on room air and lungs are clear to auscultation    Will continue Bumex 2 mg in the morning    Cellulitis of left lower extremity  Assessment & Plan  Patient developed left lower extremity cellulitis on the 3rd day of his hospital stay  He was switched to IV vancomycin from IV cefazolin  Patient's left lower extremity cellulitis is not improved today compared to yesterday    Tenia pedis that patient has could be a source for cellulitis  Will treat with Diflucan for a week as discussed with podiatry    Persistent atrial fibrillation  Assessment & Plan  Patient has chronic atrial fibrillation  Rates are controlled on Lopressor 25 mg p o bid    Continue Eliquis for CVA prophylaxis    Hyponatremia  Assessment & Plan  Patient presented with hyponatremia of 129  Sodium is 131 today  Hyponatremia is likely due to CHF  Continue monitoring sodium            VTE Prophylaxis: in place    Patient Centered Rounds: I rounded with patient's nurse    Current Length of Stay: 10 day(s)    Current Patient Status: Inpatient    Certification Statement: Pt requires additional inpatient hospital stay due to: see assessment and plan        Subjective:   Patient complains of left lower leg pain    Denies any chest pain, palpitations, shortness of breath, abdominal pain, signs of GI or  bleeding    All other ROS are negative    Objective:     Vitals:   Temp (24hrs), Av °F (36 7 °C), Min:97 3 °F (36 3 °C), Max:98 4 °F (36 9 °C)    Temp:  [97 3 °F (36 3 °C)-98 4 °F (36 9 °C)] 98 2 °F (36 8 °C)  HR:  [78-86] 78  Resp:  [17-19] 17  BP: (106-122)/(62-75) 107/62  SpO2:  [94 %-95 %] 95 %  Body mass index is 33 91 kg/m²  Input and Output Summary (last 24 hours): Intake/Output Summary (Last 24 hours) at 2/29/2020 1333  Last data filed at 2/29/2020 1321  Gross per 24 hour   Intake 420 ml   Output 225 ml   Net 195 ml       Physical Exam:     Physical Exam   Constitutional: He is oriented to person, place, and time  He appears well-developed  HENT:   Head: Normocephalic  Cardiovascular: Normal rate  Irregular irregular, controlled rate   Pulmonary/Chest: Effort normal and breath sounds normal  He has no wheezes  He has no rales  Abdominal: Soft  Bowel sounds are normal  There is no tenderness  Neurological: He is alert and oriented to person, place, and time  Skin:   Left lower leg cellulitis is about the same as yesterday, I see no abscess formation           I personally reviewed labs and imaging reports for today        Last 24 Hours Medication List:     Current Facility-Administered Medications:  albumin human 12 5 g Intravenous Once Todd Boland PA-C Last Rate: Stopped (02/20/20 5859)   aluminum-magnesium hydroxide-simethicone 30 mL Oral Q4H PRN Samuel Cornell DO    apixaban 5 mg Oral BID NAVNEET [place]    vitamin C 1,000 mg Oral Daily NAVNEET [place]    benzonatate 100 mg Oral TID PRN NAVNEET [place]    bumetanide 2 mg Oral QAM Mindy Arellano MD    cholecalciferol 1,000 Units Oral Daily NAVNEET [place]    DULoxetine 30 mg Oral Daily NAVNEET [place]    fluconazole 200 mg Oral Daily Mindy Arellano MD    HYDROcodone-acetaminophen 2 tablet Oral Q4H PRN NAVNEET [place]    metoprolol tartrate 25 mg Oral Q12H [address] Mindy Arellano MD    nystatin 500,000 Units Swish & Swallow 4x Daily Tena Maldonado MD    pantoprazole 40 mg Oral Daily Kati baez, NAVNEET    senna 2 tablet Oral Daily Abdiaziz Sher DO    sodium chloride 2 spray Each Nare Q1H PRN Rena Ramires MD    [START ON 3/1/2020] vancomycin 750 mg Intravenous Q24H Aristides Merino DO           Today, Patient Was Seen By: Rena Ramires MD    ** Please Note: Dictation voice to text software may have been used in the creation of this document   **

## 2020-02-29 NOTE — ASSESSMENT & PLAN NOTE
Wt Readings from Last 3 Encounters:   02/29/20 113 kg (250 lb)   02/04/20 108 kg (239 lb)   01/07/20 113 kg (249 lb)     Patient has chronic systolic/diastolic CHF with ejection fraction of 45%    Patient is on room air and lungs are clear to auscultation    Will continue Bumex 2 mg in the morning

## 2020-02-29 NOTE — PROGRESS NOTES
Attempted to lower pt bed to lowest position  Pt demands that bed be raised up off the floor to keep level with him wheelchair for easier transfer  Explained risks associated with bed being above the lowest position  Pt still refused       2200 situation resolved placed bed in lowest position

## 2020-02-29 NOTE — ASSESSMENT & PLAN NOTE
Patient presented with hyponatremia of 129    Sodium is 131 today  Hyponatremia is likely due to CHF  Continue monitoring sodium

## 2020-02-29 NOTE — PROGRESS NOTES
Vancomycin Assessment    Ava Jones is a 69 y o  male who is currently receiving vancomycin 1250 mg q24h for skin-soft tissue infection     Relevant clinical data and objective history reviewed:  Creatinine   Date Value Ref Range Status   02/29/2020 1 26 0 60 - 1 30 mg/dL Final     Comment:     Standardized to IDMS reference method   02/28/2020 1 25 0 60 - 1 30 mg/dL Final     Comment:     Standardized to IDMS reference method   02/28/2020 1 24 0 60 - 1 30 mg/dL Final     Comment:     Standardized to IDMS reference method   12/17/2015 0 97 0 60 - 1 30 mg/dL Final     Comment:     Standardized to IDMS reference method   10/14/2014 1 06 0 60 - 1 30 mg/dL Final     Comment:     Standardized to IDMS reference method     /62 (BP Location: Left arm)   Pulse 78   Temp 98 2 °F (36 8 °C) (Oral)   Resp 17   Ht 6' (1 829 m)   Wt 113 kg (250 lb)   SpO2 95%   BMI 33 91 kg/m²   I/O last 3 completed shifts: In: 660 [P O :660]  Out: 1575 [Urine:1575]  Lab Results   Component Value Date/Time    BUN 39 (H) 02/29/2020 07:11 AM    BUN 68 (H) 12/09/2019 02:15 PM    WBC 13 87 (H) 02/28/2020 09:00 AM    WBC 6 35 12/17/2015 11:30 AM    HGB 12 1 02/28/2020 09:00 AM    HGB 14 9 12/17/2015 11:30 AM    HCT 36 8 02/28/2020 09:00 AM    HCT 46 3 12/17/2015 11:30 AM    MCV 97 02/28/2020 09:00 AM    MCV 93 12/17/2015 11:30 AM     02/28/2020 09:00 AM     12/17/2015 11:30 AM     Temp Readings from Last 3 Encounters:   02/29/20 98 2 °F (36 8 °C) (Oral)   02/04/20 97 9 °F (36 6 °C) (Tympanic)   12/01/19 (!) 97 1 °F (36 2 °C) (Oral)     Vancomycin Days of Therapy: 8    Assessment/Plan  The patient is currently on vancomycin utilizing scheduled dosing  Baseline risks associated with therapy include: pre-existing renal impairment and concomitant nephrotoxic medications    The patient is receiving 1250 mg q24h with the most recent vancomycin level being 21 6  and supratherapeutic based on a goal of 10-15 (mild infection/cellulitis) ; therefore, after clinical evaluation will be changed to 750 mg q24   Pharmacy will continue to follow closely for s/sx of nephrotoxicity, infusion reactions and appropriateness of therapy  BMP and CBC will be ordered per protocol  Plan for trough as patient approaches steady state, prior to the 4th  dose at approximately 1200 at 03/03/20   Pharmacy will continue to follow the patients culture results and clinical progress daily      Arash Chand, Pharmacist

## 2020-02-29 NOTE — PROGRESS NOTES
Jo-Ann Harvey  69 y o   male  1945  mrn [id number]    Assessment/Plan:  1  Cellulitis LLE/Leukocytosis: No fever but WBC count remains mildly elevated  Strange that the cellulitis developed during this hospitalization while on abx, though given chronic venous stasis he would be at risk for it   Given clinical improvement, Pt will cont tx with vancomycin through the weekend       A  Cont Vanco  B  Will follow WBC count  C   Cont local care of feet as per Dr Poli Hopkins       Subjective: Feels OK but remains weak    Objective:  Tmax: 98 4  Lungs: Clear anteriorly  Abd: +BS, soft, nontender  Ext: +Intact dressings on both legs    Labs:  CBC w/diff  Recent Labs     02/28/20  0900   WBC 13 87*   HGB 12 1   HCT 36 8        BMP  Recent Labs     02/29/20  0711   K 5 1   CL 96*   CO2 29   BUN 39*   CREATININE 1 26   CALCIUM 8 6     CMP  Recent Labs     02/29/20  0711   K 5 1   CL 96*   CO2 29   BUN 39*   CREATININE 1 26   CALCIUM 8 6        labrc    Cultures:  No results found for: BLOODCX  Lab Results   Component Value Date    WOUNDCULT 3+ Growth of Serratia marcescens (A) 07/24/2019    WOUNDCULT 1+ Growth of  07/24/2019    WOUNDCULT (A) 01/02/2019     1+ Growth of Methicillin Resistant Staphylococcus aureus     Lab Results   Component Value Date    URINECX <10,000 cfu/ml  02/19/2020     No results found for: SPUTUMCULTUR    MED:  Vanco      Current Facility-Administered Medications:     albumin human (FLEXBUMIN) 25 % injection 12 5 g, 12 5 g, Intravenous, Once, Ed KEISHA Harvey, Stopped at 02/20/20 2343    aluminum-magnesium hydroxide-simethicone (MYLANTA) 200-200-20 mg/5 mL oral suspension 30 mL, 30 mL, Oral, Q4H PRN, Neida Harm, DO    apixaban (ELIQUIS) tablet 5 mg, 5 mg, Oral, BID, NAVNEET [place], 5 mg at 02/29/20 0900    ascorbic acid (VITAMIN C) tablet 1,000 mg, 1,000 mg, Oral, Daily, NAVNEET [place], 1,000 mg at 02/29/20 0900    benzonatate (TESSALON PERLES) capsule 100 mg, 100 mg, Oral, TID PRN, ArlenexKRISTYNP    bumetanide (BUMEX) tablet 2 mg, 2 mg, Oral, QAM, Deyanira Moore MD, 2 mg at 02/29/20 0900    cholecalciferol (VITAMIN D3) tablet 1,000 Units, 1,000 Units, Oral, Daily, NAVNEET [place], 1,000 Units at 02/29/20 0859    DULoxetine (CYMBALTA) delayed release capsule 30 mg, 30 mg, Oral, Daily, NAVNEET [place], 30 mg at 02/29/20 0900    fluconazole (DIFLUCAN) tablet 200 mg, 200 mg, Oral, Daily, Deyanira Moore MD, 200 mg at 02/29/20 0900    HYDROcodone-acetaminophen (NORCO) 5-325 mg per tablet 2 tablet, 2 tablet, Oral, Q4H PRN, NAVNEET [place], 2 tablet at 02/29/20 0859    metoprolol tartrate (LOPRESSOR) tablet 25 mg, 25 mg, Oral, Q12H [address], Deyanira Moore MD, Stopped at 02/29/20 0900    nystatin (MYCOSTATIN) oral suspension 500,000 Units, 500,000 Units, Swish & Swallow, 4x Daily, Stacey He MD    pantoprazole (PROTONIX) EC tablet 40 mg, 40 mg, Oral, Daily, NAVNEET [place], 40 mg at 02/29/20 8629    senna (SENOKOT) tablet 17 2 mg, 2 tablet, Oral, Daily, Helen Marin DO, 17 2 mg at 02/28/20 0901    sodium chloride (OCEAN) 0 65 % nasal spray 2 spray, 2 spray, Each Nare, Q1H PRN, Deyanira Moore MD, 2 spray at 02/28/20 0903    [START ON 3/1/2020] vancomycin (VANCOCIN) IVPB (premix) 750 mg, 750 mg, Intravenous, Q24H, Helen Marin DO    Principal Problem:    Cellulitis of right lower extremity  Active Problems:    Gastroesophageal reflux disease without esophagitis    Class 1 obesity due to excess calories with serious comorbidity and body mass index (BMI) of 34 0 to 34 9 in adult    Persistent atrial fibrillation    Cellulitis of left lower extremity    Chronic deep vein thrombosis (DVT) (HCC)    Acute kidney injury superimposed on CKD (HCC)    Chronic combined systolic and diastolic congestive heart failure (HCC)    Hypertension    History of methicillin resistant staphylococcus aureus (MRSA)    Hyponatremia    Oral candidiasis      Enrique Baca MD

## 2020-02-29 NOTE — PROGRESS NOTES
Placed prevlon boots BLE d/t pt developing boggy red heels  Pt took off and refused to put them back on  Pt states they were painful and shifted        Pt now with R heel on the mattress and L heel on pillow

## 2020-02-29 NOTE — ASSESSMENT & PLAN NOTE
Patient developed left lower extremity cellulitis on the 3rd day of his hospital stay  He was switched to IV vancomycin from IV cefazolin  Patient's left lower extremity cellulitis is not improved today compared to yesterday    Tenia pedis that patient has could be a source for cellulitis    Will treat with Diflucan for a week as discussed with podiatry

## 2020-03-01 PROCEDURE — 99232 SBSQ HOSP IP/OBS MODERATE 35: CPT | Performed by: INTERNAL MEDICINE

## 2020-03-01 RX ADMIN — METOPROLOL TARTRATE 25 MG: 25 TABLET ORAL at 08:58

## 2020-03-01 RX ADMIN — HYDROCODONE BITARTRATE AND ACETAMINOPHEN 2 TABLET: 5; 325 TABLET ORAL at 19:36

## 2020-03-01 RX ADMIN — BUMETANIDE 2 MG: 1 TABLET ORAL at 08:57

## 2020-03-01 RX ADMIN — VANCOMYCIN HYDROCHLORIDE 750 MG: 750 INJECTION, SOLUTION INTRAVENOUS at 01:07

## 2020-03-01 RX ADMIN — APIXABAN 5 MG: 5 TABLET, FILM COATED ORAL at 08:57

## 2020-03-01 RX ADMIN — METOPROLOL TARTRATE 25 MG: 25 TABLET ORAL at 17:01

## 2020-03-01 RX ADMIN — DULOXETINE 30 MG: 30 CAPSULE, DELAYED RELEASE ORAL at 08:57

## 2020-03-01 RX ADMIN — PANTOPRAZOLE SODIUM 40 MG: 40 TABLET, DELAYED RELEASE ORAL at 08:57

## 2020-03-01 RX ADMIN — HYDROCODONE BITARTRATE AND ACETAMINOPHEN 2 TABLET: 5; 325 TABLET ORAL at 15:41

## 2020-03-01 RX ADMIN — FLUCONAZOLE 200 MG: 100 TABLET ORAL at 08:57

## 2020-03-01 RX ADMIN — MELATONIN 1000 UNITS: at 08:57

## 2020-03-01 RX ADMIN — NYSTATIN 500000 UNITS: 100000 SUSPENSION ORAL at 21:40

## 2020-03-01 RX ADMIN — APIXABAN 5 MG: 5 TABLET, FILM COATED ORAL at 17:01

## 2020-03-01 RX ADMIN — OXYCODONE HYDROCHLORIDE AND ACETAMINOPHEN 1000 MG: 500 TABLET ORAL at 08:57

## 2020-03-01 RX ADMIN — HYDROCODONE BITARTRATE AND ACETAMINOPHEN 2 TABLET: 5; 325 TABLET ORAL at 08:57

## 2020-03-01 RX ADMIN — HYDROCODONE BITARTRATE AND ACETAMINOPHEN 2 TABLET: 5; 325 TABLET ORAL at 23:23

## 2020-03-01 RX ADMIN — NYSTATIN 500000 UNITS: 100000 SUSPENSION ORAL at 17:01

## 2020-03-01 NOTE — PLAN OF CARE
Problem: Potential for Falls  Goal: Patient will remain free of falls  Description  INTERVENTIONS:  - Assess patient frequently for physical needs  -  Identify cognitive and physical deficits and behaviors that affect risk of falls    -  [place] fall precautions as indicated by assessment   - Educate patient/family on patient safety including physical limitations  - Instruct patient to call for assistance with activity based on assessment  - Modify environment to reduce risk of injury  - Consider OT/PT consult to assist with strengthening/mobility  Outcome: Progressing     Problem: PAIN - ADULT  Goal: Verbalizes/displays adequate comfort level or baseline comfort level  Description  Interventions:  - Encourage patient to monitor pain and request assistance  - Assess pain using appropriate pain scale  - Administer analgesics based on type and severity of pain and evaluate response  - Implement non-pharmacological measures as appropriate and evaluate response  - Consider cultural and social influences on pain and pain management  - Notify physician/advanced practitioner if interventions unsuccessful or patient reports new pain  Outcome: Progressing     Problem: INFECTION - ADULT  Goal: Absence or prevention of progression during hospitalization  Description  INTERVENTIONS:  - Assess and monitor for signs and symptoms of infection  - Monitor lab/diagnostic results  - Monitor all insertion sites, i e  indwelling lines, tubes, and drains  - Monitor endotracheal if appropriate and nasal secretions for changes in amount and color  - [place] appropriate cooling/warming therapies per order  - Administer medications as ordered  - Instruct and encourage patient and family to use good hand hygiene technique  - Identify and instruct in appropriate isolation precautions for identified infection/condition  Outcome: Progressing  Goal: Absence of fever/infection during neutropenic period  Description  INTERVENTIONS:  - Monitor WBC    Outcome: Progressing     Problem: SAFETY ADULT  Goal: Maintain or return to baseline ADL function  Description  INTERVENTIONS:  -  Assess patient's ability to carry out ADLs; assess patient's baseline for ADL function and identify physical deficits which impact ability to perform ADLs (bathing, care of mouth/teeth, toileting, grooming, dressing, etc )  - Assess/evaluate cause of self-care deficits   - Assess range of motion  - Assess patient's mobility; develop plan if impaired  - Assess patient's need for assistive devices and provide as appropriate  - Encourage maximum independence but intervene and supervise when necessary  - Involve family in performance of ADLs  - Assess for home care needs following discharge   - Consider OT consult to assist with ADL evaluation and planning for discharge  - Provide patient education as appropriate  Outcome: Progressing  Goal: Maintain or return mobility status to optimal level  Description  INTERVENTIONS:  - Assess patient's baseline mobility status (ambulation, transfers, stairs, etc )    - Identify cognitive and physical deficits and behaviors that affect mobility  - Identify mobility aids required to assist with transfers and/or ambulation (gait belt, sit-to-stand, lift, walker, cane, etc )  - [place] fall precautions as indicated by assessment  - Record patient progress and toleration of activity level on Mobility SBAR; progress patient to next Phase/Stage  - Instruct patient to call for assistance with activity based on assessment  - Consider rehabilitation consult to assist with strengthening/weightbearing, etc   Outcome: Progressing     Problem: DISCHARGE PLANNING  Goal: Discharge to home or other facility with appropriate resources  Description  INTERVENTIONS:  - Identify barriers to discharge w/patient and caregiver  - Arrange for needed discharge resources and transportation as appropriate  - Identify discharge learning needs (meds, wound care, etc )  - Arrange for interpretive services to assist at discharge as needed  - Refer to Case Management Department for coordinating discharge planning if the patient needs post-hospital services based on physician/advanced practitioner order or complex needs related to functional status, cognitive ability, or social support system  Outcome: Progressing     Problem: Knowledge Deficit  Goal: Patient/family/caregiver demonstrates understanding of disease process, treatment plan, medications, and discharge instructions  Description  Complete learning assessment and assess knowledge base    Interventions:  - Provide teaching at level of understanding  - Provide teaching via preferred learning methods  Outcome: Progressing     Problem: CARDIOVASCULAR - ADULT  Goal: Maintains optimal cardiac output and hemodynamic stability  Description  INTERVENTIONS:  - Monitor I/O, vital signs and rhythm  - Monitor for S/S and trends of decreased cardiac output  - Administer and titrate ordered vasoactive medications to optimize hemodynamic stability  - Assess quality of pulses, skin color and temperature  - Assess for signs of decreased coronary artery perfusion  - Instruct patient to report change in severity of symptoms  Outcome: Progressing  Goal: Absence of cardiac dysrhythmias or at baseline rhythm  Description  INTERVENTIONS:  - Continuous cardiac monitoring, vital signs, obtain 12 lead EKG if ordered  - Administer antiarrhythmic and heart rate control medications as ordered  - Monitor electrolytes and administer replacement therapy as ordered  Outcome: Progressing     Problem: MUSCULOSKELETAL - ADULT  Goal: Maintain or return mobility to safest level of function  Description  INTERVENTIONS:  - Assess patient's ability to carry out ADLs; assess patient's baseline for ADL function and identify physical deficits which impact ability to perform ADLs (bathing, care of mouth/teeth, toileting, grooming, dressing, etc )  - Assess/evaluate cause of self-care deficits   - Assess range of motion  - Assess patient's mobility  - Assess patient's need for assistive devices and provide as appropriate  - Encourage maximum independence but intervene and supervise when necessary  - Involve family in performance of ADLs  - Assess for home care needs following discharge   - Consider OT consult to assist with ADL evaluation and planning for discharge  - Provide patient education as appropriate  Outcome: Progressing  Goal: Maintain proper alignment of affected body part  Description  INTERVENTIONS:  - Support, maintain and protect limb and body alignment  - Provide patient/ family with appropriate education  Outcome: Progressing     Problem: Prexisting or High Potential for Compromised Skin Integrity  Goal: Skin integrity is maintained or improved  Description  INTERVENTIONS:  - Identify patients at risk for skin breakdown  - Assess and monitor skin integrity  - Assess and monitor nutrition and hydration status  - Monitor labs   - Assess for incontinence   - Turn and reposition patient  - Assist with mobility/ambulation  - Relieve pressure over bony prominences  - Avoid friction and shearing  - Provide appropriate hygiene as needed including keeping skin clean and dry  - Evaluate need for skin moisturizer/barrier cream  - Collaborate with interdisciplinary team   - Patient/family teaching  - Consider wound care consult   Outcome: Progressing     Problem: Nutrition/Hydration-ADULT  Goal: Nutrient/Hydration intake appropriate for improving, restoring or maintaining nutritional needs  Description  Monitor and assess patient's nutrition/hydration status for malnutrition  Collaborate with interdisciplinary team and initiate plan and interventions as ordered  Monitor patient's weight and dietary intake as ordered or per policy  Utilize nutrition screening tool and intervene as necessary   Determine patient's food preferences and provide high-protein, high-caloric foods as appropriate       INTERVENTIONS:  - Monitor oral intake, urinary output, labs, and treatment plans  - Assess nutrition and hydration status and recommend course of action  - Evaluate amount of meals eaten  - Assist patient with eating if necessary   - Allow adequate time for meals  - Recommend/ encourage appropriate diets, oral nutritional supplements, and vitamin/mineral supplements  - Order, calculate, and assess calorie counts as needed  - Recommend, monitor, and adjust tube feedings and TPN/PPN based on assessed needs  - Assess need for intravenous fluids  - Provide specific nutrition/hydration education as appropriate  - Include patient/family/caregiver in decisions related to nutrition  Outcome: Progressing

## 2020-03-01 NOTE — PROGRESS NOTES
Vancomycin IV Pharmacy-to-Dose Consultation    Sona Cordero is a 69 y o  male who is currently receiving Vancomycin 750mg IV Q24H (Day 9) via management by the Pharmacy Consult service  The indication is skin/soft tissue infection with a goal Vancomycin trough of 10-15 as ordered by the prescriber  Assessment/Plan:  The patient was reviewed  Renal function continues to change as noted with a fluctuating SCr ranging between 1 24 - 1 33 in the past few days  Improvement is noted since early admission (SCr ranging between 1 34-1  9) No signs or symptoms of nephrotoxicity and/or infusion reactions were documented in the chart since the last Pharmacy progress note  The first dose of Vancomycin 750mg IV Q24H was given today (3/1/2020 @0107)  As such, the plan will be to continue Vancomycin 750mg IV Q24H  However the timing of the repeat trough will be changed to allow for administration of 3 doses at current regimen  A new trough order will be entered to be drawn 3/4/2020 @0030 (prior to the 4th dose)  Pharmacy will continue to follow the patients culture results and clinical progress daily      Ingrid Ruiz, Pharmacist

## 2020-03-01 NOTE — PROGRESS NOTES
Progress Note - Linwood Mazariegos 1945, 69 y o  male MRN: [id number]    Unit/Bed#: -01 Encounter: [id number]    Primary Care Provider: Mickey Shaw MD   Date and time admitted to hospital: 2/19/2020  1:18 PM        Chronic combined systolic and diastolic congestive heart failure ([address] )  Assessment & Plan  Wt Readings from Last 3 Encounters:   03/01/20 114 kg (251 lb 5 2 oz)   02/04/20 108 kg (239 lb)   01/07/20 113 kg (249 lb)     Patient has chronic systolic/diastolic CHF with ejection fraction of 45%    Patient is on room air and lungs are clear to auscultation    Will continue Bumex 2 mg in the morning    Cellulitis of left lower extremity  Assessment & Plan  Patient developed left lower extremity cellulitis on the 3rd day of his hospital stay  He was switched to IV vancomycin from IV cefazolin  Continue IV vancomycin on to left lower extremity cellulitis is resolved    Tenia pedis that patient has could be a source for cellulitis  Will treat with Diflucan for a week     I discussed the case with patient's son at the bedside    Persistent atrial fibrillation  Assessment & Plan  Patient has chronic atrial fibrillation  Rates are controlled on Lopressor 25 mg p o bid    Continue Eliquis for CVA prophylaxis    Patient denies signs of bleeding    Acute kidney injury superimposed on CKD [facility])  Assessment & Plan  Patient acute kidney injury on admission with creatinine of 1 9    Bumex and spironolactone were held on admission    I resumed Bumex, renal function is stable at 1 26 today    Will continue monitoring patient's renal function        VTE Prophylaxis: in place    Patient Centered Rounds: I rounded with patient's nurse    Current Length of Stay: 11 day(s)    Current Patient Status: Inpatient    Certification Statement: Pt requires additional inpatient hospital stay due to: see assessment and plan        Subjective:   Patient denies chest pain, palpitations, shortness of breath, abdominal pain, diarrhea, signs of bleeding    All other ROS are negative    Objective:     Vitals:   Temp (24hrs), Av 1 °F (36 7 °C), Min:97 9 °F (36 6 °C), Max:98 3 °F (36 8 °C)    Temp:  [97 9 °F (36 6 °C)-98 3 °F (36 8 °C)] 97 9 °F (36 6 °C)  HR:  [83-94] 94  Resp:  [17-18] 17  BP: (100-118)/(56-63) 111/63  SpO2:  [93 %-97 %] 97 %  Body mass index is 34 09 kg/m²  Input and Output Summary (last 24 hours): Intake/Output Summary (Last 24 hours) at 3/1/2020 1344  Last data filed at 3/1/2020 1201  Gross per 24 hour   Intake 1260 ml   Output 1276 ml   Net -16 ml       Physical Exam:     Physical Exam   Constitutional: He is oriented to person, place, and time  He appears well-developed  No distress  HENT:   Head: Normocephalic  Mouth/Throat: Oropharynx is clear and moist    Eyes: Conjunctivae are normal    Neck: Neck supple  Cardiovascular: Normal rate  Irregular irregular, controlled rate   Pulmonary/Chest: Effort normal  No respiratory distress  He has no wheezes  He has no rales  Abdominal: Soft  Bowel sounds are normal  There is no tenderness  Neurological: He is alert and oriented to person, place, and time  No cranial nerve deficit  Skin: Skin is warm and dry  Both lower legs are Ace wrapped   Vitals reviewed  I personally reviewed labs and imaging reports for today        Last 24 Hours Medication List:     Current Facility-Administered Medications:  albumin human 12 5 g Intravenous Once Jamia KEISHA Woodard Last Rate: Stopped (20 0423)   aluminum-magnesium hydroxide-simethicone 30 mL Oral Q4H PRN Clair Isbell DO    apixaban 5 mg Oral BID NAVNEET [place]    vitamin C 1,000 mg Oral Daily NAVNEET [place]    benzonatate 100 mg Oral TID PRN NAVNEET [place]    bumetanide 2 mg Oral QAM Washington Mora MD    cholecalciferol 1,000 Units Oral Daily NAVNEET [place]    DULoxetine 30 mg Oral Daily NAVNEET [place]    fluconazole 200 mg Oral Daily Rena Ramires MD    HYDROcodone-acetaminophen 2 tablet Oral Q4H PRN NAVNEET [place]    metoprolol tartrate 25 mg Oral Q12H [facility] Rena Ramires MD    nystatin 500,000 Units Swish & Swallow 4x Daily Clarita Sherwood MD    pantoprazole 40 mg Oral Daily NAVNEET [place]    senna 2 tablet Oral Daily Misael Sher DO    sodium chloride 2 spray Each Nare Q1H PRN Rena Ramires MD    vancomycin 750 mg Intravenous Q24H Aristides Merino DO Last Rate: 750 mg (03/01/20 0107)          Today, Patient Was Seen By: Rena Ramires MD    ** Please Note: Dictation voice to text software may have been used in the creation of this document   **

## 2020-03-01 NOTE — ASSESSMENT & PLAN NOTE
Patient acute kidney injury on admission with creatinine of 1 9    Bumex and spironolactone were held on admission    I resumed Bumex, renal function is stable at 1 26 today    Will continue monitoring patient's renal function

## 2020-03-01 NOTE — ASSESSMENT & PLAN NOTE
Patient has chronic atrial fibrillation      Rates are controlled on Lopressor 25 mg p o bid    Continue Eliquis for CVA prophylaxis    Patient denies signs of bleeding

## 2020-03-01 NOTE — ASSESSMENT & PLAN NOTE
Patient developed left lower extremity cellulitis on the 3rd day of his hospital stay  He was switched to IV vancomycin from IV cefazolin  Continue IV vancomycin on to left lower extremity cellulitis is resolved    Tenia pedis that patient has could be a source for cellulitis    Will treat with Diflucan for a week     I discussed the case with patient's son at the bedside

## 2020-03-01 NOTE — ASSESSMENT & PLAN NOTE
Wt Readings from Last 3 Encounters:   03/01/20 114 kg (251 lb 5 2 oz)   02/04/20 108 kg (239 lb)   01/07/20 113 kg (249 lb)     Patient has chronic systolic/diastolic CHF with ejection fraction of 45%    Patient is on room air and lungs are clear to auscultation    Will continue Bumex 2 mg in the morning

## 2020-03-02 LAB
ANION GAP SERPL CALCULATED.3IONS-SCNC: 6 MMOL/L (ref 4–13)
BASOPHILS # BLD MANUAL: 0 THOUSAND/UL (ref 0–0.1)
BASOPHILS NFR MAR MANUAL: 0 % (ref 0–1)
BUN SERPL-MCNC: 43 MG/DL (ref 5–25)
CALCIUM SERPL-MCNC: 8.7 MG/DL (ref 8.3–10.1)
CHLORIDE SERPL-SCNC: 96 MMOL/L (ref 100–108)
CO2 SERPL-SCNC: 28 MMOL/L (ref 21–32)
CREAT SERPL-MCNC: 1.58 MG/DL (ref 0.6–1.3)
EOSINOPHIL # BLD MANUAL: 0 THOUSAND/UL (ref 0–0.4)
EOSINOPHIL NFR BLD MANUAL: 0 % (ref 0–6)
ERYTHROCYTE [DISTWIDTH] IN BLOOD BY AUTOMATED COUNT: 15.5 % (ref 11.6–15.1)
GFR SERPL CREATININE-BSD FRML MDRD: 43 ML/MIN/1.73SQ M
GLUCOSE SERPL-MCNC: 98 MG/DL (ref 65–140)
HCT VFR BLD AUTO: 34.2 % (ref 36.5–49.3)
HGB BLD-MCNC: 11.3 G/DL (ref 12–17)
LYMPHOCYTES # BLD AUTO: 1.32 THOUSAND/UL (ref 0.6–4.47)
LYMPHOCYTES # BLD AUTO: 7 % (ref 14–44)
MCH RBC QN AUTO: 32 PG (ref 26.8–34.3)
MCHC RBC AUTO-ENTMCNC: 33 G/DL (ref 31.4–37.4)
MCV RBC AUTO: 97 FL (ref 82–98)
MONOCYTES # BLD AUTO: 2.26 THOUSAND/UL (ref 0–1.22)
MONOCYTES NFR BLD: 12 % (ref 4–12)
NEUTROPHILS # BLD MANUAL: 15.25 THOUSAND/UL (ref 1.85–7.62)
NEUTS BAND NFR BLD MANUAL: 3 % (ref 0–8)
NEUTS SEG NFR BLD AUTO: 78 % (ref 43–75)
NRBC BLD AUTO-RTO: 0 /100 WBCS
PLATELET # BLD AUTO: 258 THOUSANDS/UL (ref 149–390)
PLATELET BLD QL SMEAR: ADEQUATE
PMV BLD AUTO: 10 FL (ref 8.9–12.7)
POTASSIUM SERPL-SCNC: 4.1 MMOL/L (ref 3.5–5.3)
RBC # BLD AUTO: 3.53 MILLION/UL (ref 3.88–5.62)
RBC MORPH BLD: NORMAL
SODIUM SERPL-SCNC: 130 MMOL/L (ref 136–145)
TOTAL CELLS COUNTED SPEC: 100
WBC # BLD AUTO: 18.83 THOUSAND/UL (ref 4.31–10.16)

## 2020-03-02 PROCEDURE — 87070 CULTURE OTHR SPECIMN AEROBIC: CPT | Performed by: PHYSICIAN ASSISTANT

## 2020-03-02 PROCEDURE — 85027 COMPLETE CBC AUTOMATED: CPT | Performed by: INTERNAL MEDICINE

## 2020-03-02 PROCEDURE — 87205 SMEAR GRAM STAIN: CPT | Performed by: PHYSICIAN ASSISTANT

## 2020-03-02 PROCEDURE — 87077 CULTURE AEROBIC IDENTIFY: CPT | Performed by: PHYSICIAN ASSISTANT

## 2020-03-02 PROCEDURE — 80048 BASIC METABOLIC PNL TOTAL CA: CPT | Performed by: INTERNAL MEDICINE

## 2020-03-02 PROCEDURE — 99232 SBSQ HOSP IP/OBS MODERATE 35: CPT | Performed by: INTERNAL MEDICINE

## 2020-03-02 PROCEDURE — 87186 SC STD MICRODIL/AGAR DIL: CPT | Performed by: PHYSICIAN ASSISTANT

## 2020-03-02 PROCEDURE — 85007 BL SMEAR W/DIFF WBC COUNT: CPT | Performed by: INTERNAL MEDICINE

## 2020-03-02 RX ADMIN — NYSTATIN 500000 UNITS: 100000 SUSPENSION ORAL at 05:52

## 2020-03-02 RX ADMIN — VANCOMYCIN HYDROCHLORIDE 750 MG: 750 INJECTION, SOLUTION INTRAVENOUS at 00:24

## 2020-03-02 RX ADMIN — APIXABAN 5 MG: 5 TABLET, FILM COATED ORAL at 08:39

## 2020-03-02 RX ADMIN — HYDROCODONE BITARTRATE AND ACETAMINOPHEN 2 TABLET: 5; 325 TABLET ORAL at 05:20

## 2020-03-02 RX ADMIN — BUMETANIDE 2 MG: 1 TABLET ORAL at 08:39

## 2020-03-02 RX ADMIN — HYDROCODONE BITARTRATE AND ACETAMINOPHEN 2 TABLET: 5; 325 TABLET ORAL at 17:29

## 2020-03-02 RX ADMIN — PANTOPRAZOLE SODIUM 40 MG: 40 TABLET, DELAYED RELEASE ORAL at 08:39

## 2020-03-02 RX ADMIN — APIXABAN 5 MG: 5 TABLET, FILM COATED ORAL at 17:29

## 2020-03-02 RX ADMIN — OXYCODONE HYDROCHLORIDE AND ACETAMINOPHEN 1000 MG: 500 TABLET ORAL at 08:38

## 2020-03-02 RX ADMIN — HYDROCODONE BITARTRATE AND ACETAMINOPHEN 2 TABLET: 5; 325 TABLET ORAL at 10:27

## 2020-03-02 RX ADMIN — DULOXETINE 30 MG: 30 CAPSULE, DELAYED RELEASE ORAL at 08:38

## 2020-03-02 RX ADMIN — FLUCONAZOLE 200 MG: 100 TABLET ORAL at 08:39

## 2020-03-02 RX ADMIN — MELATONIN 1000 UNITS: at 08:39

## 2020-03-02 NOTE — PROGRESS NOTES
Vancomycin IV Pharmacy-to-Dose Consultation  Donna Roth is a 69 y o  male who is currently receiving Vancomycin 750mg IV Q24H (Day 10) via management by the Pharmacy Consult service  The indication is skin/soft tissue infection with a goal Vancomycin trough of 10-15 as ordered by the prescriber  Assessment/Plan:  The patient was reviewed  Renal function continues to change as noted with a fluctuating SCr ranging between 1 24 - 1 33 in the past few days, and SCr = 1 58 today  Overall improvement is noted since early admission (SCr ranging between 1 34-1  9) No signs or symptoms of infusion reactions were documented in the chart since the last Pharmacy progress note  Based on todays assessment, monitor renal function/Scr and continue current regimen of Vancomycin 750mg IV Q24H (Day 10) and await trough results ordered for 3/4/2020 @0030 (prior to the 4th dose)  Pharmacy will continue to follow the patients culture results and clinical progress daily      Kyle Pak, Pharmacist

## 2020-03-02 NOTE — PLAN OF CARE
Problem: Potential for Falls  Goal: Patient will remain free of falls  Description  INTERVENTIONS:  - Assess patient frequently for physical needs  -  Identify cognitive and physical deficits and behaviors that affect risk of falls    -  [place] fall precautions as indicated by assessment   - Educate patient/family on patient safety including physical limitations  - Instruct patient to call for assistance with activity based on assessment  - Modify environment to reduce risk of injury  - Consider OT/PT consult to assist with strengthening/mobility  Outcome: Progressing     Problem: PAIN - ADULT  Goal: Verbalizes/displays adequate comfort level or baseline comfort level  Description  Interventions:  - Encourage patient to monitor pain and request assistance  - Assess pain using appropriate pain scale  - Administer analgesics based on type and severity of pain and evaluate response  - Implement non-pharmacological measures as appropriate and evaluate response  - Consider cultural and social influences on pain and pain management  - Notify physician/advanced practitioner if interventions unsuccessful or patient reports new pain  Outcome: Progressing     Problem: INFECTION - ADULT  Goal: Absence or prevention of progression during hospitalization  Description  INTERVENTIONS:  - Assess and monitor for signs and symptoms of infection  - Monitor lab/diagnostic results  - Monitor all insertion sites, i e  indwelling lines, tubes, and drains  - Monitor endotracheal if appropriate and nasal secretions for changes in amount and color  - [place] appropriate cooling/warming therapies per order  - Administer medications as ordered  - Instruct and encourage patient and family to use good hand hygiene technique  - Identify and instruct in appropriate isolation precautions for identified infection/condition  Outcome: Progressing  Goal: Absence of fever/infection during neutropenic period  Description  INTERVENTIONS:  - Monitor WBC    Outcome: Progressing     Problem: SAFETY ADULT  Goal: Maintain or return to baseline ADL function  Description  INTERVENTIONS:  -  Assess patient's ability to carry out ADLs; assess patient's baseline for ADL function and identify physical deficits which impact ability to perform ADLs (bathing, care of mouth/teeth, toileting, grooming, dressing, etc )  - Assess/evaluate cause of self-care deficits   - Assess range of motion  - Assess patient's mobility; develop plan if impaired  - Assess patient's need for assistive devices and provide as appropriate  - Encourage maximum independence but intervene and supervise when necessary  - Involve family in performance of ADLs  - Assess for home care needs following discharge   - Consider OT consult to assist with ADL evaluation and planning for discharge  - Provide patient education as appropriate  Outcome: Progressing  Goal: Maintain or return mobility status to optimal level  Description  INTERVENTIONS:  - Assess patient's baseline mobility status (ambulation, transfers, stairs, etc )    - Identify cognitive and physical deficits and behaviors that affect mobility  - Identify mobility aids required to assist with transfers and/or ambulation (gait belt, sit-to-stand, lift, walker, cane, etc )  - [place] fall precautions as indicated by assessment  - Record patient progress and toleration of activity level on Mobility SBAR; progress patient to next Phase/Stage  - Instruct patient to call for assistance with activity based on assessment  - Consider rehabilitation consult to assist with strengthening/weightbearing, etc   Outcome: Progressing     Problem: DISCHARGE PLANNING  Goal: Discharge to home or other facility with appropriate resources  Description  INTERVENTIONS:  - Identify barriers to discharge w/patient and caregiver  - Arrange for needed discharge resources and transportation as appropriate  - Identify discharge learning needs (meds, wound care, etc )  - Arrange for interpretive services to assist at discharge as needed  - Refer to Case Management Department for coordinating discharge planning if the patient needs post-hospital services based on physician/advanced practitioner order or complex needs related to functional status, cognitive ability, or social support system  Outcome: Progressing     Problem: Knowledge Deficit  Goal: Patient/family/caregiver demonstrates understanding of disease process, treatment plan, medications, and discharge instructions  Description  Complete learning assessment and assess knowledge base    Interventions:  - Provide teaching at level of understanding  - Provide teaching via preferred learning methods  Outcome: Progressing     Problem: CARDIOVASCULAR - ADULT  Goal: Maintains optimal cardiac output and hemodynamic stability  Description  INTERVENTIONS:  - Monitor I/O, vital signs and rhythm  - Monitor for S/S and trends of decreased cardiac output  - Administer and titrate ordered vasoactive medications to optimize hemodynamic stability  - Assess quality of pulses, skin color and temperature  - Assess for signs of decreased coronary artery perfusion  - Instruct patient to report change in severity of symptoms  Outcome: Progressing  Goal: Absence of cardiac dysrhythmias or at baseline rhythm  Description  INTERVENTIONS:  - Continuous cardiac monitoring, vital signs, obtain 12 lead EKG if ordered  - Administer antiarrhythmic and heart rate control medications as ordered  - Monitor electrolytes and administer replacement therapy as ordered  Outcome: Progressing     Problem: MUSCULOSKELETAL - ADULT  Goal: Maintain or return mobility to safest level of function  Description  INTERVENTIONS:  - Assess patient's ability to carry out ADLs; assess patient's baseline for ADL function and identify physical deficits which impact ability to perform ADLs (bathing, care of mouth/teeth, toileting, grooming, dressing, etc )  - Assess/evaluate cause of self-care deficits   - Assess range of motion  - Assess patient's mobility  - Assess patient's need for assistive devices and provide as appropriate  - Encourage maximum independence but intervene and supervise when necessary  - Involve family in performance of ADLs  - Assess for home care needs following discharge   - Consider OT consult to assist with ADL evaluation and planning for discharge  - Provide patient education as appropriate  Outcome: Progressing  Goal: Maintain proper alignment of affected body part  Description  INTERVENTIONS:  - Support, maintain and protect limb and body alignment  - Provide patient/ family with appropriate education  Outcome: Progressing     Problem: Prexisting or High Potential for Compromised Skin Integrity  Goal: Skin integrity is maintained or improved  Description  INTERVENTIONS:  - Identify patients at risk for skin breakdown  - Assess and monitor skin integrity  - Assess and monitor nutrition and hydration status  - Monitor labs   - Assess for incontinence   - Turn and reposition patient  - Assist with mobility/ambulation  - Relieve pressure over bony prominences  - Avoid friction and shearing  - Provide appropriate hygiene as needed including keeping skin clean and dry  - Evaluate need for skin moisturizer/barrier cream  - Collaborate with interdisciplinary team   - Patient/family teaching  - Consider wound care consult   Outcome: Progressing     Problem: Nutrition/Hydration-ADULT  Goal: Nutrient/Hydration intake appropriate for improving, restoring or maintaining nutritional needs  Description  Monitor and assess patient's nutrition/hydration status for malnutrition  Collaborate with interdisciplinary team and initiate plan and interventions as ordered  Monitor patient's weight and dietary intake as ordered or per policy  Utilize nutrition screening tool and intervene as necessary   Determine patient's food preferences and provide high-protein, high-caloric foods as appropriate       INTERVENTIONS:  - Monitor oral intake, urinary output, labs, and treatment plans  - Assess nutrition and hydration status and recommend course of action  - Evaluate amount of meals eaten  - Assist patient with eating if necessary   - Allow adequate time for meals  - Recommend/ encourage appropriate diets, oral nutritional supplements, and vitamin/mineral supplements  - Order, calculate, and assess calorie counts as needed  - Recommend, monitor, and adjust tube feedings and TPN/PPN based on assessed needs  - Assess need for intravenous fluids  - Provide specific nutrition/hydration education as appropriate  - Include patient/family/caregiver in decisions related to nutrition  Outcome: Progressing

## 2020-03-02 NOTE — ASSESSMENT & PLAN NOTE
Patient developed left lower extremity cellulitis   Was switched from cefazolin to vancomycin  Continue IV vancomycin   Id on board  Tenia pedis that patient has could be a source for cellulitis    Will treat with Diflucan for a week

## 2020-03-02 NOTE — PROGRESS NOTES
Progress Note - Linwood Mazariegos 1945, 69 y o  male MRN: [id number]    Unit/Bed#: -01 Encounter: [id number]    Primary Care Provider: Soo Mckay MD   Date and time admitted to hospital: 2/19/2020  1:18 PM        Hyponatremia  Assessment & Plan  Patient presented with hyponatremia of 129  Sodium is 130  Hyponatremia is likely due to CHF  Continue monitoring sodium        History of methicillin resistant staphylococcus aureus (MRSA)  Assessment & Plan  · Questionable history of MRSA on review of chart; in review of records cannot find any noted history  · Patient denies any history that he know of-  · Continue vancomcyin due to concern for new LLE cellulitis while on ancef    Hypertension  Assessment & Plan  · Blood pressure acceptable on admission with systolics in the 417B  · Continue to monitor with schedule vitals    Chronic combined systolic and diastolic congestive heart failure (HCC)  Assessment & Plan  Wt Readings from Last 3 Encounters:   03/02/20 114 kg (251 lb 1 7 oz)   02/04/20 108 kg (239 lb)   01/07/20 113 kg (249 lb)     Patient has chronic systolic/diastolic CHF with ejection fraction of 45%  Patient is on room air and lungs are clear to auscultation  Continue on Bumex  Daily weight and I&Os  Monitor renal function in a m  Acute kidney injury superimposed on CKD [facility])  Assessment & Plan  Patient acute kidney injury on admission with creatinine of 1 9  Creatinine this morning is 1 5  Bumex and spironolactone were held on admission  Patient was restarted on Bumex  Monitor renal function in am while diuresing    Chronic deep vein thrombosis (DVT) ([facility])  Assessment & Plan  · VAS lower limb venous duplex studies completed in November of 2019 revealed in no evidence of right lower limb thrombus in the common femoral artery  No gross evidence of acute DVT based on color-flow analysis the left lower limb  · See picture for current appearance       Cellulitis of left lower extremity  Assessment & Plan  Patient developed left lower extremity cellulitis   Was switched from cefazolin to vancomycin  Continue IV vancomycin   Id on board  Tenia pedis that patient has could be a source for cellulitis  Will treat with Diflucan for a week       Persistent atrial fibrillation  Assessment & Plan  Patient has chronic atrial fibrillation  Rates are controlled on Lopressor 25 mg p o bid  Continue Eliquis for CVA prophylaxis  Patient denies signs of bleeding    Class 1 obesity due to excess calories with serious comorbidity and body mass index (BMI) of 34 0 to 34 9 in adult  Assessment & Plan  · Lifestyle modifications recommended    Gastroesophageal reflux disease without esophagitis  Assessment & Plan  · Continue Protonix    * Cellulitis of right lower extremity  Assessment & Plan  Patient presented with right lower extremity cellulitis  He was placed on IV Ancef which resulted in significant improvement  Labs & Imaging: I have personally reviewed pertinent reports  VTE Pharmacologic Prophylaxis:  Eliquis  VTE Mechanical Prophylaxis: sequential compression device    Code Status:   Level 1 - Full Code    Patient Centered Rounds: I have performed bedside rounds with nursing staff today  Discussions with Specialists or Other Care Team Provider:     Current Length of Stay: 12 day(s)    Current Patient Status: Inpatient   Certification Statement: The patient will continue to require additional inpatient hospital stay due to see my assessment and plan  Subjective:   Patient is seen and examined at bedside  States the lower extremities improving  Denies any other complaint  Afebrile  All other ROS are negative  Objective:    Vitals: Blood pressure 98/54, pulse 90, temperature 97 8 °F (36 6 °C), temperature source Oral, resp  rate 20, height 6' (1 829 m), weight 114 kg (251 lb 1 7 oz), SpO2 96 %  ,Body mass index is 34 06 kg/m²    SPO2 RA Rest      ED to Hosp-Admission (Current) from 2/19/2020 in [address] Med Surg Unit   SpO2  96 %   SpO2 Activity  At Rest   O2 Device  None (Room air)   O2 Flow Rate          I&O:     Intake/Output Summary (Last 24 hours) at 3/2/2020 1117  Last data filed at 3/2/2020 1036  Gross per 24 hour   Intake 717 ml   Output 575 ml   Net 142 ml       Physical Exam:    General- Alert, sitting in the bed  Not in any acute distress  HEENT- SERGIO, EOM intact  Neck- Supple, No JVD  CVS- irregular, S1 and S2 normal   Chest- Bilateral Air entry, No rhochi, crackles or wheezing present  Abdomen- soft, nontender, not distended, no guarding or rigidity, BS+  Extremities-  No pedal edema, No calf tenderness  Both lower legs in Ace wrapped  CNS-   Alert, awake and orientedx3  No focal deficits present      Invasive Devices     Peripheral Intravenous Line            Peripheral IV 03/01/20 Left Hand less than 1 day                      Social History  reviewed  Family History   Problem Relation Age of Onset    Cancer Mother         cancer of uterus    Diabetes Sister     Mental illness Neg Hx         neg fam hx    Substance Abuse Neg Hx         neg fam hx    reviewed    Meds:  Current Facility-Administered Medications   Medication Dose Route Frequency Provider Last Rate Last Dose    albumin human (FLEXBUMIN) 25 % injection 12 5 g  12 5 g Intravenous Once Gian Castellon PA-C   Stopped at 02/20/20 2343    aluminum-magnesium hydroxide-simethicone (MYLANTA) 200-200-20 mg/5 mL oral suspension 30 mL  30 mL Oral Q4H PRN Nikolay MandrilDO        apixaban (ELIQUIS) tablet 5 mg  5 mg Oral BID NAVNEET [place]   5 mg at 03/02/20 0839    ascorbic acid (VITAMIN C) tablet 1,000 mg  1,000 mg Oral Daily NAVNEET [place]   1,000 mg at 03/02/20 2590    benzonatate (TESSALON PERLES) capsule 100 mg  100 mg Oral TID PRN NAVNEET [place]        cholecalciferol (VITAMIN D3) tablet 1,000 Units  1,000 Units Oral Daily Elba NAVNEET Patterson   1,000 Units at 03/02/20 0839    DULoxetine (CYMBALTA) delayed release capsule 30 mg  30 mg Oral Daily NAVNEET [place]   30 mg at 03/02/20 9653    fluconazole (DIFLUCAN) tablet 200 mg  200 mg Oral Daily Estefania Limon MD   200 mg at 03/02/20 0839    HYDROcodone-acetaminophen (NORCO) 5-325 mg per tablet 2 tablet  2 tablet Oral Q4H PRN NAVNEET Toney   2 tablet at 03/02/20 1027    metoprolol tartrate (LOPRESSOR) tablet 25 mg  25 mg Oral Q12H Raji Williamson MD   Stopped at 03/02/20 5239    nystatin (MYCOSTATIN) oral suspension 500,000 Units  500,000 Units Swish & Swallow 4x Daily Lisestt Kimbrough MD   500,000 Units at 03/02/20 0552    pantoprazole (PROTONIX) EC tablet 40 mg  40 mg Oral Daily NAVNEET [place]   40 mg at 03/02/20 4290    senna (SENOKOT) tablet 17 2 mg  2 tablet Oral Daily Neldon Pinnel Schwartzr, DO   17 2 mg at 02/28/20 0901    sodium chloride (OCEAN) 0 65 % nasal spray 2 spray  2 spray Each Nare Q1H PRN Estefania Limon MD   2 spray at 02/28/20 0903    vancomycin (VANCOCIN) IVPB (premix) 750 mg  750 mg Intravenous Q24H Tempie Osier,  mL/hr at 03/02/20 0024 750 mg at 03/02/20 0024      Medications Prior to Admission   Medication    apixaban (ELIQUIS) 5 mg    Ascorbic Acid (VITAMIN C) 1000 MG tablet    bumetanide (BUMEX) 2 mg tablet    cholecalciferol (VITAMIN D3) 1,000 units tablet    diltiazem (CARDIZEM CD) 180 mg 24 hr capsule    DULoxetine (CYMBALTA) 30 mg delayed release capsule    HYDROcodone-acetaminophen (NORCO) 7 5-325 mg per tablet    losartan (COZAAR) 50 mg tablet    meloxicam (MOBIC) 15 mg tablet    pantoprazole (PROTONIX) 40 mg tablet    potassium chloride (K-DUR) 10 mEq tablet    spironolactone (ALDACTONE) 25 mg tablet    vitamin E, tocopherol, 400 units capsule    benzonatate (TESSALON PERLES) 100 mg capsule    sildenafil (VIAGRA) 100 mg tablet    sodium chloride (OCEAN) 0 65 % nasal spray       Labs:  Results from last 7 days   Lab Units 03/02/20  0516 02/28/20  0900 02/28/20  0655  02/25/20  0554   WBC Thousand/uL 18 83* 13 87* 11 50*   < > 10 52*   HEMOGLOBIN g/dL 11 3* 12 1 7 7*   < > 11 8*   HEMATOCRIT % 34 2* 36 8 23 3*   < > 34 8*   PLATELETS Thousands/uL 258 244 163   < > 160   LYMPHO PCT % 7*  --   --   --  9*   MONO PCT % 12  --   --   --  11   EOS PCT % 0  --   --   --  2    < > = values in this interval not displayed  Results from last 7 days   Lab Units 03/02/20  0516 02/29/20  0711 02/28/20  0900  02/25/20  0554   POTASSIUM mmol/L 4 1 5 1 4 1   < > 4 4   CHLORIDE mmol/L 96* 96* 98*   < > 99*   CO2 mmol/L 28 29 27   < > 23   BUN mg/dL 43* 39* 40*   < > 43*   CREATININE mg/dL 1 58* 1 26 1 25   < > 1 23   CALCIUM mg/dL 8 7 8 6 8 8   < > 8 8   ALK PHOS U/L  --   --   --   --  159*   ALT U/L  --   --   --   --  8*   AST U/L  --   --   --   --  25    < > = values in this interval not displayed  Lab Results   Component Value Date    TROPONINI 0 10 (H) 11/24/2019    TROPONINI 0 11 (H) 11/24/2019    TROPONINI 0 09 (H) 11/24/2019         Lab Results   Component Value Date    URINECX <10,000 cfu/ml  02/19/2020    WOUNDCULT 3+ Growth of Serratia marcescens (A) 07/24/2019    WOUNDCULT 1+ Growth of  07/24/2019    WOUNDCULT (A) 01/02/2019     1+ Growth of Methicillin Resistant Staphylococcus aureus         Imaging:  Results for orders placed during the hospital encounter of 02/19/20   XR chest portable    Narrative CHEST     INDICATION:   r/o PNA  COMPARISON:  11/24/2019    EXAM PERFORMED/VIEWS:  XR CHEST PORTABLE      FINDINGS:    Stable cardiomegaly  Central vascular congestion mainly in the right perihilar region  No effusions  No pneumothorax  No pneumonia  Osseous structures appear within normal limits for patient age  Impression Right perihilar central vascular congestion          Workstation performed: [id number]       Results for orders placed during the hospital encounter of 11/18/19   XR chest pa & lateral    Narrative CHEST     INDICATION:   R06 02: Shortness of breath  COMPARISON:  5/13/2019    EXAM PERFORMED/VIEWS:  XR CHEST PA & LATERAL      FINDINGS:    Stable cardiomegaly is noted  Mild pulmonary vascular congestion is noted  Small bilateral pleural effusions are present  Osseous structures appear within normal limits for patient age  Impression Mild pulmonary vascular congestion and small bilateral pleural effusions  Workstation performed: [id number]         Last 24 Hours Medication List:     Current Facility-Administered Medications:  albumin human 12 5 g Intravenous Once Cristobal Moss PA-C Last Rate: Stopped (02/20/20 8613)   aluminum-magnesium hydroxide-simethicone 30 mL Oral Q4H PRN Kristofer Gorman DO    apixaban 5 mg Oral BID NAVNEET [place]    vitamin C 1,000 mg Oral Daily NAVNEET [place]    benzonatate 100 mg Oral TID PRN NAVNEET [place]    cholecalciferol 1,000 Units Oral Daily NAVNEET [place]    DULoxetine 30 mg Oral Daily NAVNEET [place]    fluconazole 200 mg Oral Daily Dylon Sanz MD    HYDROcodone-acetaminophen 2 tablet Oral Q4H PRN NAVNEET [place]    metoprolol tartrate 25 mg Oral Q12H [address] Dylon Sanz MD    nystatin 500,000 Units Swish & Swallow 4x Daily Sarah Becker MD    pantoprazole 40 mg Oral Daily NAVNEET [place]    senna 2 tablet Oral Daily Misael Sher DO    sodium chloride 2 spray Each Nare Q1H PRN Dylon Sanz MD    vancomycin 750 mg Intravenous Q24H Dimas Sher DO Last Rate: 750 mg (03/02/20 0024)        Today, Patient Was Seen By: Pollo Prieto MD    ** Please Note: Dictation voice to text software may have been used in the creation of this document   **

## 2020-03-02 NOTE — UTILIZATION REVIEW
Continued Stay Review    Date: 3/2/2020                       Current Patient Class:  IP  Current Level of Care: Med/Surg    HPI:74 y o  male initially admitted on 2/19 with cellulitis of L foot/leg with chronic venous insufficiency, s/p pantalar arthrodesis R ankle  Hypokalemia and chronic systolic/diastolic CHF with EF of 15%    Assessment/Plan:  Abx for LLE cellulitis switched from cefazolin to Vanco, continue vanco IV  ID following  Diflucan for tenia pedis x 1 wk  Continue po meds  Creatinine now 1 5, restarted on bumix  continue to monitor BMP  Daily wts, I/O's  Sodium now 130, continue to monitor  Wound care to L foot every other day: Soap & water with betadine, maxorb  Continue low sodium 2 gm diet, 1800 ml fluid restriction      Pertinent Labs/Diagnostic Results:   Results from last 7 days   Lab Units 03/02/20  0516 02/28/20  0900 02/28/20  0655 02/27/20  0645 02/26/20  0453 02/25/20  0554   WBC Thousand/uL 18 83* 13 87* 11 50* 17 05* 11 15* 10 52*   HEMOGLOBIN g/dL 11 3* 12 1 7 7* 12 9 13 1 11 8*   HEMATOCRIT % 34 2* 36 8 23 3* 38 0 39 3 34 8*   PLATELETS Thousands/uL 258 244 163 259 193 160   BANDS PCT % 3  --   --   --   --  1     Results from last 7 days   Lab Units 03/02/20  0516 02/29/20  0711 02/28/20  0900 02/28/20  0655 02/27/20  0645   SODIUM mmol/L 130* 131* 135* 138 133*   POTASSIUM mmol/L 4 1 5 1 4 1 2 9* 5 1   CHLORIDE mmol/L 96* 96* 98* 107 99*   CO2 mmol/L 28 29 27 21 28   ANION GAP mmol/L 6 6 10 10 6   BUN mg/dL 43* 39* 40* 31* 41*   CREATININE mg/dL 1 58* 1 26 1 25 1 24 1 33*   EGFR ml/min/1 73sq m 43 56 57 57 53   CALCIUM mg/dL 8 7 8 6 8 8 6 5* 9 1     Results from last 7 days   Lab Units 02/25/20  0554   AST U/L 25   ALT U/L 8*   ALK PHOS U/L 159*   TOTAL PROTEIN g/dL 6 8   ALBUMIN g/dL 2 0*   TOTAL BILIRUBIN mg/dL 1 00     Results from last 7 days   Lab Units 03/02/20  0516 02/29/20  0711 02/28/20  0900 02/28/20  0655 02/27/20  0645 02/26/20  0453 02/25/20  0554   GLUCOSE RANDOM mg/dL 98 87 90 66 82 95 94     Results from last 7 days   Lab Units 03/02/20  0516 02/25/20  0554   TOTAL COUNTED  100 100       Vital Signs:   Date/Time  Temp  Pulse  Resp  BP  MAP (mmHg)  SpO2  O2 Device   03/02/20 0846              None (Room air)   03/02/20 0819  97 8 °F (36 6 °C)  90  20  98/54  71  96 %     03/01/20 23:44:06  99 °F (37 2 °C)  97  20  179/124Abnormal     97 %  None (Room air)   03/01/20 2334              None (Room air)   03/01/20 1701    96    117/67         03/01/20 1541  97 9 °F (36 6 °C)  97  18  101/63    97 %  None (Room air)   03/01/20 0900              None (Room air)   03/01/20 0716  97 9 °F (36 6 °C)  94  17  111/63    97 %  None (Room air)   02/29/20 2323  98 3 °F (36 8 °C)  83  18  118/58  76  97 %  None (Room air)   02/29/20 2149              None (Room air)   02/29/20 1500    90  18  100/56    93 %  None (Room air)   02/29/20 0905              None (Room air)   02/29/20 0818  98 2 °F (36 8 °C)  78  17  107/62  81           Medications:   Scheduled Medications:  Medications:  albumin human 12 5 g Intravenous Once   apixaban 5 mg Oral BID   vitamin C 1,000 mg Oral Daily   cholecalciferol 1,000 Units Oral Daily   DULoxetine 30 mg Oral Daily   fluconazole 200 mg Oral Daily   metoprolol tartrate 25 mg Oral Q12H FRANKIE   nystatin 500,000 Units Swish & Swallow 4x Daily   pantoprazole 40 mg Oral Daily   senna 2 tablet Oral Daily   vancomycin 750 mg Intravenous Q24H        PRN Meds:  aluminum-magnesium hydroxide-simethicone 30 mL Oral Q4H PRN   benzonatate 100 mg Oral TID PRN   HYDROcodone-acetaminophen 2 tablet Oral Q4H PRN  3/1 x4, 3/2 x2   sodium chloride 2 spray Each Nare Q1H PRN       Discharge Plan: TBD    Network Utilization Review Department  [email or web address]  ATTENTION: Please call with any questions or concerns to [phone] and carefully listen to the prompts so that you are directed to the right person   All voicemails are confidential   Annemarie Coleman all requests for admission clinical reviews, approved or denied determinations and any other requests to dedicated fax number below belonging to the campus where the patient is receiving treatment   List of dedicated fax numbers for the Facilities:  [address] [place] [address] DENIALS (Administrative/Medical Necessity) [phone]   [address]  (Maternity/NICU/Pediatrics) [phone]   Alicja Nevarez [phone]   Bassam Garcia [phone]   Star Mauro [phone]   [facility] [phone]   [address] [place] [address] [phone]   [facility]  [phone]   [address] [facility]  [facility] [address] [facility] [phone]

## 2020-03-02 NOTE — ASSESSMENT & PLAN NOTE
· Blood pressure acceptable on admission with systolics in the 200I  · Continue to monitor with schedule vitals

## 2020-03-02 NOTE — ASSESSMENT & PLAN NOTE
Wt Readings from Last 3 Encounters:   03/02/20 114 kg (251 lb 1 7 oz)   02/04/20 108 kg (239 lb)   01/07/20 113 kg (249 lb)     Patient has chronic systolic/diastolic CHF with ejection fraction of 45%  Patient is on room air and lungs are clear to auscultation  Continue on Bumex  Daily weight and I&Os  Monitor renal function in a m

## 2020-03-02 NOTE — ASSESSMENT & PLAN NOTE
Patient acute kidney injury on admission with creatinine of 1 9  Creatinine this morning is 1 5  Bumex and spironolactone were held on admission  Patient was restarted on Bumex    Monitor renal function in am while diuresing

## 2020-03-02 NOTE — ASSESSMENT & PLAN NOTE
Patient presented with hyponatremia of 129    Sodium is 130  Hyponatremia is likely due to CHF  Continue monitoring sodium

## 2020-03-03 LAB
ANION GAP SERPL CALCULATED.3IONS-SCNC: 7 MMOL/L (ref 4–13)
BASOPHILS # BLD AUTO: 0.08 THOUSANDS/ΜL (ref 0–0.1)
BASOPHILS NFR BLD AUTO: 1 % (ref 0–1)
BUN SERPL-MCNC: 41 MG/DL (ref 5–25)
CALCIUM SERPL-MCNC: 8.8 MG/DL (ref 8.3–10.1)
CHLORIDE SERPL-SCNC: 96 MMOL/L (ref 100–108)
CO2 SERPL-SCNC: 29 MMOL/L (ref 21–32)
CREAT SERPL-MCNC: 1.44 MG/DL (ref 0.6–1.3)
EOSINOPHIL # BLD AUTO: 0.08 THOUSAND/ΜL (ref 0–0.61)
EOSINOPHIL NFR BLD AUTO: 1 % (ref 0–6)
ERYTHROCYTE [DISTWIDTH] IN BLOOD BY AUTOMATED COUNT: 15.5 % (ref 11.6–15.1)
GFR SERPL CREATININE-BSD FRML MDRD: 48 ML/MIN/1.73SQ M
GLUCOSE SERPL-MCNC: 82 MG/DL (ref 65–140)
HCT VFR BLD AUTO: 34.2 % (ref 36.5–49.3)
HGB BLD-MCNC: 11.4 G/DL (ref 12–17)
IMM GRANULOCYTES # BLD AUTO: 0.31 THOUSAND/UL (ref 0–0.2)
IMM GRANULOCYTES NFR BLD AUTO: 2 % (ref 0–2)
LYMPHOCYTES # BLD AUTO: 1.24 THOUSANDS/ΜL (ref 0.6–4.47)
LYMPHOCYTES NFR BLD AUTO: 9 % (ref 14–44)
MAGNESIUM SERPL-MCNC: 1.7 MG/DL (ref 1.6–2.6)
MCH RBC QN AUTO: 32.3 PG (ref 26.8–34.3)
MCHC RBC AUTO-ENTMCNC: 33.3 G/DL (ref 31.4–37.4)
MCV RBC AUTO: 97 FL (ref 82–98)
MONOCYTES # BLD AUTO: 1.43 THOUSAND/ΜL (ref 0.17–1.22)
MONOCYTES NFR BLD AUTO: 10 % (ref 4–12)
NEUTROPHILS # BLD AUTO: 10.58 THOUSANDS/ΜL (ref 1.85–7.62)
NEUTS SEG NFR BLD AUTO: 77 % (ref 43–75)
NRBC BLD AUTO-RTO: 0 /100 WBCS
PLATELET # BLD AUTO: 288 THOUSANDS/UL (ref 149–390)
PMV BLD AUTO: 10.3 FL (ref 8.9–12.7)
POTASSIUM SERPL-SCNC: 3.8 MMOL/L (ref 3.5–5.3)
RBC # BLD AUTO: 3.53 MILLION/UL (ref 3.88–5.62)
SODIUM SERPL-SCNC: 132 MMOL/L (ref 136–145)
WBC # BLD AUTO: 13.72 THOUSAND/UL (ref 4.31–10.16)

## 2020-03-03 PROCEDURE — 83735 ASSAY OF MAGNESIUM: CPT | Performed by: INTERNAL MEDICINE

## 2020-03-03 PROCEDURE — 99232 SBSQ HOSP IP/OBS MODERATE 35: CPT | Performed by: INTERNAL MEDICINE

## 2020-03-03 PROCEDURE — 85025 COMPLETE CBC W/AUTO DIFF WBC: CPT | Performed by: INTERNAL MEDICINE

## 2020-03-03 PROCEDURE — 80048 BASIC METABOLIC PNL TOTAL CA: CPT | Performed by: INTERNAL MEDICINE

## 2020-03-03 RX ORDER — HEPARIN SODIUM 5000 [USP'U]/ML
5000 INJECTION, SOLUTION INTRAVENOUS; SUBCUTANEOUS EVERY 8 HOURS SCHEDULED
Status: DISCONTINUED | OUTPATIENT
Start: 2020-03-04 | End: 2020-03-04

## 2020-03-03 RX ORDER — BUMETANIDE 1 MG/1
1 TABLET ORAL DAILY
Status: DISCONTINUED | OUTPATIENT
Start: 2020-03-04 | End: 2020-03-10 | Stop reason: HOSPADM

## 2020-03-03 RX ADMIN — FLUCONAZOLE 200 MG: 100 TABLET ORAL at 08:33

## 2020-03-03 RX ADMIN — STANDARDIZED SENNA CONCENTRATE 17.2 MG: 8.6 TABLET ORAL at 08:33

## 2020-03-03 RX ADMIN — HYDROCODONE BITARTRATE AND ACETAMINOPHEN 2 TABLET: 5; 325 TABLET ORAL at 08:32

## 2020-03-03 RX ADMIN — APIXABAN 5 MG: 5 TABLET, FILM COATED ORAL at 17:38

## 2020-03-03 RX ADMIN — MELATONIN 1000 UNITS: at 08:34

## 2020-03-03 RX ADMIN — VANCOMYCIN HYDROCHLORIDE 750 MG: 750 INJECTION, SOLUTION INTRAVENOUS at 01:56

## 2020-03-03 RX ADMIN — OXYCODONE HYDROCHLORIDE AND ACETAMINOPHEN 1000 MG: 500 TABLET ORAL at 08:34

## 2020-03-03 RX ADMIN — HYDROCODONE BITARTRATE AND ACETAMINOPHEN 2 TABLET: 5; 325 TABLET ORAL at 17:38

## 2020-03-03 RX ADMIN — METOPROLOL TARTRATE 25 MG: 25 TABLET ORAL at 17:38

## 2020-03-03 RX ADMIN — NYSTATIN 500000 UNITS: 100000 SUSPENSION ORAL at 17:39

## 2020-03-03 RX ADMIN — PANTOPRAZOLE SODIUM 40 MG: 40 TABLET, DELAYED RELEASE ORAL at 08:33

## 2020-03-03 RX ADMIN — METOPROLOL TARTRATE 25 MG: 25 TABLET ORAL at 08:33

## 2020-03-03 RX ADMIN — DULOXETINE 30 MG: 30 CAPSULE, DELAYED RELEASE ORAL at 08:33

## 2020-03-03 RX ADMIN — APIXABAN 5 MG: 5 TABLET, FILM COATED ORAL at 08:33

## 2020-03-03 RX ADMIN — HYDROCODONE BITARTRATE AND ACETAMINOPHEN 2 TABLET: 5; 325 TABLET ORAL at 21:39

## 2020-03-03 RX ADMIN — HYDROCODONE BITARTRATE AND ACETAMINOPHEN 2 TABLET: 5; 325 TABLET ORAL at 12:41

## 2020-03-03 NOTE — ASSESSMENT & PLAN NOTE
· Blood pressure acceptable on admission with systolics in the 618S  · Continue to monitor with schedule vitals  · On Lopressor and Bumex

## 2020-03-03 NOTE — ASSESSMENT & PLAN NOTE
Patient acute kidney injury on admission with creatinine of 1 9  Creatinine this morning is 1 44  Bumex and spironolactone were held on admission  Bumex was held this morning  Restart in a m    Monitor renal function in am while diuresing

## 2020-03-03 NOTE — ASSESSMENT & PLAN NOTE
Wt Readings from Last 3 Encounters:   03/03/20 114 kg (251 lb 12 3 oz)   02/04/20 108 kg (239 lb)   01/07/20 113 kg (249 lb)     Patient has chronic systolic/diastolic CHF with ejection fraction of 45%  Patient is on room air and lungs are clear to auscultation  Continue on Bumex  Daily weight and I&Os  Monitor renal function in a m

## 2020-03-03 NOTE — ASSESSMENT & PLAN NOTE
Patient presented with hyponatremia of 129    Sodium is 132  Hyponatremia is likely due to CHF  Continue monitoring sodium

## 2020-03-03 NOTE — PLAN OF CARE
Problem: Potential for Falls  Goal: Patient will remain free of falls  Description  INTERVENTIONS:  - Assess patient frequently for physical needs  -  Identify cognitive and physical deficits and behaviors that affect risk of falls    -  [place] fall precautions as indicated by assessment   - Educate patient/family on patient safety including physical limitations  - Instruct patient to call for assistance with activity based on assessment  - Modify environment to reduce risk of injury  - Consider OT/PT consult to assist with strengthening/mobility  Outcome: Progressing     Problem: PAIN - ADULT  Goal: Verbalizes/displays adequate comfort level or baseline comfort level  Description  Interventions:  - Encourage patient to monitor pain and request assistance  - Assess pain using appropriate pain scale  - Administer analgesics based on type and severity of pain and evaluate response  - Implement non-pharmacological measures as appropriate and evaluate response  - Consider cultural and social influences on pain and pain management  - Notify physician/advanced practitioner if interventions unsuccessful or patient reports new pain  Outcome: Progressing     Problem: INFECTION - ADULT  Goal: Absence or prevention of progression during hospitalization  Description  INTERVENTIONS:  - Assess and monitor for signs and symptoms of infection  - Monitor lab/diagnostic results  - Monitor all insertion sites, i e  indwelling lines, tubes, and drains  - Monitor endotracheal if appropriate and nasal secretions for changes in amount and color  - [place] appropriate cooling/warming therapies per order  - Administer medications as ordered  - Instruct and encourage patient and family to use good hand hygiene technique  - Identify and instruct in appropriate isolation precautions for identified infection/condition  Outcome: Progressing  Goal: Absence of fever/infection during neutropenic period  Description  INTERVENTIONS:  - Monitor WBC    Outcome: Progressing     Problem: SAFETY ADULT  Goal: Maintain or return to baseline ADL function  Description  INTERVENTIONS:  -  Assess patient's ability to carry out ADLs; assess patient's baseline for ADL function and identify physical deficits which impact ability to perform ADLs (bathing, care of mouth/teeth, toileting, grooming, dressing, etc )  - Assess/evaluate cause of self-care deficits   - Assess range of motion  - Assess patient's mobility; develop plan if impaired  - Assess patient's need for assistive devices and provide as appropriate  - Encourage maximum independence but intervene and supervise when necessary  - Involve family in performance of ADLs  - Assess for home care needs following discharge   - Consider OT consult to assist with ADL evaluation and planning for discharge  - Provide patient education as appropriate  Outcome: Progressing  Goal: Maintain or return mobility status to optimal level  Description  INTERVENTIONS:  - Assess patient's baseline mobility status (ambulation, transfers, stairs, etc )    - Identify cognitive and physical deficits and behaviors that affect mobility  - Identify mobility aids required to assist with transfers and/or ambulation (gait belt, sit-to-stand, lift, walker, cane, etc )  - [place] fall precautions as indicated by assessment  - Record patient progress and toleration of activity level on Mobility SBAR; progress patient to next Phase/Stage  - Instruct patient to call for assistance with activity based on assessment  - Consider rehabilitation consult to assist with strengthening/weightbearing, etc   Outcome: Progressing     Problem: DISCHARGE PLANNING  Goal: Discharge to home or other facility with appropriate resources  Description  INTERVENTIONS:  - Identify barriers to discharge w/patient and caregiver  - Arrange for needed discharge resources and transportation as appropriate  - Identify discharge learning needs (meds, wound care, etc )  - Arrange for interpretive services to assist at discharge as needed  - Refer to Case Management Department for coordinating discharge planning if the patient needs post-hospital services based on physician/advanced practitioner order or complex needs related to functional status, cognitive ability, or social support system  Outcome: Progressing     Problem: Knowledge Deficit  Goal: Patient/family/caregiver demonstrates understanding of disease process, treatment plan, medications, and discharge instructions  Description  Complete learning assessment and assess knowledge base    Interventions:  - Provide teaching at level of understanding  - Provide teaching via preferred learning methods  Outcome: Progressing     Problem: CARDIOVASCULAR - ADULT  Goal: Maintains optimal cardiac output and hemodynamic stability  Description  INTERVENTIONS:  - Monitor I/O, vital signs and rhythm  - Monitor for S/S and trends of decreased cardiac output  - Administer and titrate ordered vasoactive medications to optimize hemodynamic stability  - Assess quality of pulses, skin color and temperature  - Assess for signs of decreased coronary artery perfusion  - Instruct patient to report change in severity of symptoms  Outcome: Progressing  Goal: Absence of cardiac dysrhythmias or at baseline rhythm  Description  INTERVENTIONS:  - Continuous cardiac monitoring, vital signs, obtain 12 lead EKG if ordered  - Administer antiarrhythmic and heart rate control medications as ordered  - Monitor electrolytes and administer replacement therapy as ordered  Outcome: Progressing     Problem: MUSCULOSKELETAL - ADULT  Goal: Maintain or return mobility to safest level of function  Description  INTERVENTIONS:  - Assess patient's ability to carry out ADLs; assess patient's baseline for ADL function and identify physical deficits which impact ability to perform ADLs (bathing, care of mouth/teeth, toileting, grooming, dressing, etc )  - Assess/evaluate cause of self-care deficits   - Assess range of motion  - Assess patient's mobility  - Assess patient's need for assistive devices and provide as appropriate  - Encourage maximum independence but intervene and supervise when necessary  - Involve family in performance of ADLs  - Assess for home care needs following discharge   - Consider OT consult to assist with ADL evaluation and planning for discharge  - Provide patient education as appropriate  Outcome: Progressing  Goal: Maintain proper alignment of affected body part  Description  INTERVENTIONS:  - Support, maintain and protect limb and body alignment  - Provide patient/ family with appropriate education  Outcome: Progressing     Problem: Prexisting or High Potential for Compromised Skin Integrity  Goal: Skin integrity is maintained or improved  Description  INTERVENTIONS:  - Identify patients at risk for skin breakdown  - Assess and monitor skin integrity  - Assess and monitor nutrition and hydration status  - Monitor labs   - Assess for incontinence   - Turn and reposition patient  - Assist with mobility/ambulation  - Relieve pressure over bony prominences  - Avoid friction and shearing  - Provide appropriate hygiene as needed including keeping skin clean and dry  - Evaluate need for skin moisturizer/barrier cream  - Collaborate with interdisciplinary team   - Patient/family teaching  - Consider wound care consult   Outcome: Progressing     Problem: Nutrition/Hydration-ADULT  Goal: Nutrient/Hydration intake appropriate for improving, restoring or maintaining nutritional needs  Description  Monitor and assess patient's nutrition/hydration status for malnutrition  Collaborate with interdisciplinary team and initiate plan and interventions as ordered  Monitor patient's weight and dietary intake as ordered or per policy  Utilize nutrition screening tool and intervene as necessary   Determine patient's food preferences and provide high-protein, high-caloric foods as appropriate       INTERVENTIONS:  - Monitor oral intake, urinary output, labs, and treatment plans  - Assess nutrition and hydration status and recommend course of action  - Evaluate amount of meals eaten  - Assist patient with eating if necessary   - Allow adequate time for meals  - Recommend/ encourage appropriate diets, oral nutritional supplements, and vitamin/mineral supplements  - Order, calculate, and assess calorie counts as needed  - Recommend, monitor, and adjust tube feedings and TPN/PPN based on assessed needs  - Assess need for intravenous fluids  - Provide specific nutrition/hydration education as appropriate  - Include patient/family/caregiver in decisions related to nutrition  Outcome: Progressing

## 2020-03-03 NOTE — PROGRESS NOTES
Vancomycin IV Pharmacy-to-Dose Consultation    Chuy Greenfield is a 69 y o  male who is currently receiving Vancomycin IV with management by the Pharmacy Consult service  Assessment/Plan:  The patient was reviewed  Renal function is stable and no signs or symptoms of nephrotoxicity and/or infusion reactions were documented in the chart  Based on todays assessment, continue current vancomycin (day # 11) dosing of 750mg IV Q24H, with a plan for trough to be drawn at [facility] on 03/04/20  We will continue to follow the patients culture results and clinical progress daily      Winston Lira, Pharmacist

## 2020-03-03 NOTE — ASSESSMENT & PLAN NOTE
· Questionable history of MRSA on review of chart; in review of records cannot find any noted history  · Continue vancomcyin due to concern for new LLE cellulitis while on Ancef

## 2020-03-03 NOTE — NURSING NOTE
During assessment of Pt's LLE, purulent discharge noted on gauze under ace wrap  Pt site irrigated with NS and presents beefy red (image taken in media), GERRI Woodruff verbally agreed to wound culture order due to noted +MRSA cultures in pt's wounds in the past  New bandages placed as well as ace wrap compression  ZOE Hunter RN

## 2020-03-03 NOTE — PROGRESS NOTES
Progress Note - Linwood Mazariegos 1945, 69 y o  male MRN: [id number]    Unit/Bed#: -01 Encounter: [id number]    Primary Care Provider: Ta Mcadams MD   Date and time admitted to hospital: 2/19/2020  1:18 PM        Oral candidiasis  Assessment & Plan  On Diflucan    Hyponatremia  Assessment & Plan  Patient presented with hyponatremia of 129  Sodium is 132  Hyponatremia is likely due to CHF  Continue monitoring sodium        History of methicillin resistant staphylococcus aureus (MRSA)  Assessment & Plan  · Questionable history of MRSA on review of chart; in review of records cannot find any noted history  · Continue vancomcyin due to concern for new LLE cellulitis while on Ancef  Hypertension  Assessment & Plan  · Blood pressure acceptable on admission with systolics in the 602A  · Continue to monitor with schedule vitals  · On Lopressor and Bumex    Chronic combined systolic and diastolic congestive heart failure (HCC)  Assessment & Plan  Wt Readings from Last 3 Encounters:   03/03/20 114 kg (251 lb 12 3 oz)   02/04/20 108 kg (239 lb)   01/07/20 113 kg (249 lb)     Patient has chronic systolic/diastolic CHF with ejection fraction of 45%  Patient is on room air and lungs are clear to auscultation  Continue on Bumex  Daily weight and I&Os  Monitor renal function in a m  Acute kidney injury superimposed on CKD [facility])  Assessment & Plan  Patient acute kidney injury on admission with creatinine of 1 9  Creatinine this morning is 1 44  Bumex and spironolactone were held on admission  Bumex was held this morning  Restart in a m  Monitor renal function in am while diuresing    Chronic deep vein thrombosis (DVT) (HCC)  Assessment & Plan  · VAS lower limb venous duplex studies completed in November of 2019 revealed in no evidence of right lower limb thrombus in the common femoral artery  No gross evidence of acute DVT based on color-flow analysis the left lower limb  · See picture for current appearance  Cellulitis of left lower extremity  Assessment & Plan  Patient developed left lower extremity cellulitis   Was switched from cefazolin to vancomycin  Continue IV vancomycin   Infectious Disease on board  Podiatry ordered MRI of left forefoot  Tenia pedis that patient has could be a source for cellulitis  Continue on Diflucan      Persistent atrial fibrillation  Assessment & Plan  Patient has chronic atrial fibrillation  Rates are controlled on Lopressor 25 mg p o bid  Continue Eliquis for CVA prophylaxis  Patient denies signs of bleeding    Class 1 obesity due to excess calories with serious comorbidity and body mass index (BMI) of 34 0 to 34 9 in adult  Assessment & Plan  · Lifestyle modifications recommended    Gastroesophageal reflux disease without esophagitis  Assessment & Plan  · Continue Protonix    * Cellulitis of right lower extremity  Assessment & Plan  Patient presented with right lower extremity cellulitis  He was placed on IV Ancef which resulted in significant improvement  Labs & Imaging: I have personally reviewed pertinent reports  VTE Pharmacologic Prophylaxis:  Eliquis  VTE Mechanical Prophylaxis: sequential compression device    Code Status:   Level 1 - Full Code    Patient Centered Rounds: I have performed bedside rounds with nursing staff today  Discussions with Specialists or Other Care Team Provider: ID    Education and Discussions with Family / Patient:     Current Length of Stay: 13 day(s)    Current Patient Status: Inpatient   Certification Statement: The patient will continue to require additional inpatient hospital stay due to see my assessment and plan  Subjective:   Patient is seen and examined at bedside  Pain is well controlled  Denies any new complaints  Afebrile  All other ROS are negative  Objective:    Vitals: Blood pressure 116/78, pulse 104, temperature 98 6 °F (37 °C), temperature source Oral, resp   rate 18, height 6' (1 829 m), weight 114 kg (251 lb 12 3 oz), SpO2 94 %  ,Body mass index is 34 15 kg/m²  SPO2 RA Rest      ED to Hosp-Admission (Current) from 2/19/2020 in [address] [facility]   SpO2  94 %   SpO2 Activity  At Rest   O2 Device  None (Room air)   O2 Flow Rate          I&O:     Intake/Output Summary (Last 24 hours) at 3/3/2020 1439  Last data filed at 3/3/2020 1201  Gross per 24 hour   Intake 240 ml   Output 325 ml   Net -85 ml       Physical Exam:    General- Alert, lying comfortably in bed  Not in any acute distress  HEENT- SERGIO, EOM intact  Neck- Supple, No JVD  CVS- regular, S1 and S2 normal   Chest- Bilateral Air entry, No rhochi, crackles or wheezing present  Abdomen- soft, nontender, not distended, no guarding or rigidity, BS+  Extremities-  No pedal edema, No calf tenderness  Left lower extremity-erythema, warmth and some swelling on forefoot  CNS-   Alert, awake and orientedx3  No focal deficits present      Invasive Devices     Peripheral Intravenous Line            Peripheral IV 03/01/20 Left Hand 1 day                      Social History  reviewed  Family History   Problem Relation Age of Onset    Cancer Mother         cancer of uterus    Diabetes Sister     Mental illness Neg Hx         neg fam hx    Substance Abuse Neg Hx         neg fam hx    reviewed    Meds:  Current Facility-Administered Medications   Medication Dose Route Frequency Provider Last Rate Last Dose    albumin human (FLEXBUMIN) 25 % injection 12 5 g  12 5 g Intravenous Once Wilber Liang PA-C   Stopped at 02/20/20 9653    aluminum-magnesium hydroxide-simethicone (MYLANTA) 200-200-20 mg/5 mL oral suspension 30 mL  30 mL Oral Q4H PRN Lou Feeling, DO        apixaban (ELIQUIS) tablet 5 mg  5 mg Oral BID NAVNEET [place]   5 mg at 03/03/20 9446    ascorbic acid (VITAMIN C) tablet 1,000 mg  1,000 mg Oral Daily NAVNEET [place]   1,000 mg at 03/03/20 0834    benzonatate (TESSALON PERLES) capsule 100 mg  100 mg Oral TID PRN NAVNEET Toney        [START ON 3/4/2020] bumetanide (BUMEX) tablet 1 mg  1 mg Oral Daily Zainab Ansari MD        cholecalciferol (VITAMIN D3) tablet 1,000 Units  1,000 Units Oral Daily NAVNEET [place]   1,000 Units at 03/03/20 0834    DULoxetine (CYMBALTA) delayed release capsule 30 mg  30 mg Oral Daily NAVNEET [place]   30 mg at 03/03/20 2977    fluconazole (DIFLUCAN) tablet 200 mg  200 mg Oral Daily Norma Rodriguez MD   200 mg at 03/03/20 0833    HYDROcodone-acetaminophen (NORCO) 5-325 mg per tablet 2 tablet  2 tablet Oral Q4H PRN NAVNEET Toney   2 tablet at 03/03/20 1241    metoprolol tartrate (LOPRESSOR) tablet 25 mg  25 mg Oral Q12H Anna Bales MD   25 mg at 03/03/20 3602    nystatin (MYCOSTATIN) oral suspension 500,000 Units  500,000 Units Swish & Swallow 4x Daily Daniela Jerez MD   500,000 Units at 03/02/20 0552    pantoprazole (PROTONIX) EC tablet 40 mg  40 mg Oral Daily [place]ET [place]   40 mg at 03/03/20 6022    senna (SENOKOT) tablet 17 2 mg  2 tablet Oral Daily Sidney Sher, DO   17 2 mg at 03/03/20 7143    sodium chloride (OCEAN) 0 65 % nasal spray 2 spray  2 spray Each Nare Q1H PRN Norma Rodriguez MD   2 spray at 02/28/20 0903    vancomycin (VANCOCIN) IVPB (premix) 750 mg  750 mg Intravenous Q24H Génesis Fowler  mL/hr at 03/03/20 0156 750 mg at 03/03/20 0156      Medications Prior to Admission   Medication    apixaban (ELIQUIS) 5 mg    Ascorbic Acid (VITAMIN C) 1000 MG tablet    bumetanide (BUMEX) 2 mg tablet    cholecalciferol (VITAMIN D3) 1,000 units tablet    diltiazem (CARDIZEM CD) 180 mg 24 hr capsule    DULoxetine (CYMBALTA) 30 mg delayed release capsule    HYDROcodone-acetaminophen (NORCO) 7 5-325 mg per tablet    losartan (COZAAR) 50 mg tablet    meloxicam (MOBIC) 15 mg tablet    pantoprazole (PROTONIX) 40 mg tablet    potassium chloride (K-DUR) 10 mEq tablet    spironolactone (ALDACTONE) 25 mg tablet    vitamin E, tocopherol, 400 units capsule    benzonatate (TESSALON PERLES) 100 mg capsule    sildenafil (VIAGRA) 100 mg tablet    sodium chloride (OCEAN) 0 65 % nasal spray       Labs:  Results from last 7 days   Lab Units 03/03/20  0558 03/02/20  0516 02/28/20  0900   WBC Thousand/uL 13 72* 18 83* 13 87*   HEMOGLOBIN g/dL 11 4* 11 3* 12 1   HEMATOCRIT % 34 2* 34 2* 36 8   PLATELETS Thousands/uL 288 258 244   NEUTROS PCT % 77*  --   --    LYMPHS PCT % 9*  --   --    LYMPHO PCT %  --  7*  --    MONOS PCT % 10  --   --    MONO PCT %  --  12  --    EOS PCT % 1 0  --      Results from last 7 days   Lab Units 03/03/20  0558 03/02/20  0516 02/29/20  0711   POTASSIUM mmol/L 3 8 4 1 5 1   CHLORIDE mmol/L 96* 96* 96*   CO2 mmol/L 29 28 29   BUN mg/dL 41* 43* 39*   CREATININE mg/dL 1 44* 1 58* 1 26   CALCIUM mg/dL 8 8 8 7 8 6     Lab Results   Component Value Date    TROPONINI 0 10 (H) 11/24/2019    TROPONINI 0 11 (H) 11/24/2019    TROPONINI 0 09 (H) 11/24/2019         Lab Results   Component Value Date    URINECX <10,000 cfu/ml  02/19/2020    WOUNDCULT 3+ Growth of Serratia marcescens (A) 07/24/2019    WOUNDCULT 1+ Growth of  07/24/2019    WOUNDCULT (A) 01/02/2019     1+ Growth of Methicillin Resistant Staphylococcus aureus         Imaging:  Results for orders placed during the hospital encounter of 02/19/20   XR chest portable    Narrative CHEST     INDICATION:   r/o PNA  COMPARISON:  11/24/2019    EXAM PERFORMED/VIEWS:  XR CHEST PORTABLE      FINDINGS:    Stable cardiomegaly  Central vascular congestion mainly in the right perihilar region  No effusions  No pneumothorax  No pneumonia  Osseous structures appear within normal limits for patient age  Impression Right perihilar central vascular congestion          Workstation performed: [id number]       Results for orders placed during the hospital encounter of 11/18/19   XR chest pa & lateral    Narrative CHEST     INDICATION:   R06 02: Shortness of breath  COMPARISON:  5/13/2019    EXAM PERFORMED/VIEWS:  XR CHEST PA & LATERAL      FINDINGS:    Stable cardiomegaly is noted  Mild pulmonary vascular congestion is noted  Small bilateral pleural effusions are present  Osseous structures appear within normal limits for patient age  Impression Mild pulmonary vascular congestion and small bilateral pleural effusions  Workstation performed: [id number]         Last 24 Hours Medication List:     Current Facility-Administered Medications:  albumin human 12 5 g Intravenous Once Deisy Cowart PA-C Last Rate: Stopped (02/20/20 3053)   aluminum-magnesium hydroxide-simethicone 30 mL Oral Q4H PRN Salvador Jo DO    apixaban 5 mg Oral BID [place]    vitamin C 1,000 mg Oral Daily [place]    benzonatate 100 mg Oral TID PRN [place]    [START ON 3/4/2020] bumetanide 1 mg Oral Daily Danielle Woodard MD    cholecalciferol 1,000 Units Oral Daily [place]    DULoxetine 30 mg Oral Daily [place]    fluconazole 200 mg Oral Daily Terrance Malin MD    HYDROcodone-acetaminophen 2 tablet Oral Q4H PRN [place]    metoprolol tartrate 25 mg Oral Q12H [address] Terrance Malin MD    nystatin 500,000 Units Swish & Swallow 4x Daily Fer Reich MD    pantoprazole 40 mg Oral Daily [place]    senna 2 tablet Oral Daily Misael Sher DO    sodium chloride 2 spray Each Nare Q1H PRN Terrance Malin MD    vancomycin 750 mg Intravenous Q24H Johann Sher DO Last Rate: 750 mg (03/03/20 0156)        Today, Patient Was Seen By: Danielle Woodard MD    ** Please Note: Dictation voice to text software may have been used in the creation of this document   **

## 2020-03-03 NOTE — ASSESSMENT & PLAN NOTE
Patient developed left lower extremity cellulitis   Was switched from cefazolin to vancomycin  Continue IV vancomycin   Infectious Disease on board  Podiatry ordered MRI of left forefoot  Tenia pedis that patient has could be a source for cellulitis    Continue on Diflucan

## 2020-03-03 NOTE — PROGRESS NOTES
Progress Note - Podiatry  Linwood Mazariegos 69 y o  male MRN: [id number]  Unit/Bed#: -01 Encounter: [id number]    Assessment/Plan:  1  Abscess left forefoot   -purulence expressed from dorsal forefoot, wound culture obtained earlier    -will schedule for incision and drainage in the OR Saturday  -MRI ordered to evaluate extent abscess for preop planning    -will hold Eliquis, started on heparin 5000 Q8hr, 48 hours without Eliquis puts us on Friday  -My [facility] ambulatory training precludes me from scheduling case for Friday, leaving us Saturday morning  2  Cellulitis left leg              -continue with current IV antibiotics, Infectious Disease note appreciated  3  Chronic venous insufficiency Bilateral   -elevation, compression stockings and Ace wraps  4  Tinea pedis-chronic              -Betadine to dry interspaces left foot daily              -alginate rope woven and interdigitally to dry interspaces               -Diflucan daily x1 week  5  CHF, chronic AFib              -managed per Internal Medicine and Cardiology    Subjective/Objective   Chief Complaint:   Chief Complaint   Patient presents with    Foot Pain     patient complaint of foot pain and discloration x 1 day       Subjective:  67-year-old white male seen at bedside, nursing concern with purulent drainage off the top of his foot  Swelling has failed to come down on his foot though the cellulitis of his leg has improved  Relates continued pain in foot  Blood pressure 116/73, pulse 70, temperature (!) 97 4 °F (36 3 °C), temperature source Oral, resp  rate 19, height 6' (1 829 m), weight 114 kg (251 lb 12 3 oz), SpO2 94 %  ,Body mass index is 34 15 kg/m²      Invasive Devices     Peripheral Intravenous Line            Peripheral IV 03/01/20 Left Hand 1 day                Physical Exam:   General appearance: alert, cooperative and no distress  Neuro/Vascular: Pulses: Right: DP  0/4, PT  0/4, Left: DP  0/4, PT  0/4   Capillary Filling: 3 Sec, Edema:  Chronic +2 bilateral   8/2020 Arterial duplex: showed triphasic waveforms of DP&PT bilateral   EMMANUEL 1 3 on the right, and 0 9 on the left  Epicritic sensations grossly intact  Extremity:  Cellulitis appears to have resolved completely on the right lower extremity  Left leg is dramatically improved with reduced swelling except for the forefoot  Erythema and calor noted of the foot  Superficial ulcerative breakdown evaluated overlying 3/4 metatarsals, yellow brown purulence expressed with palpation              Labs and other studies:   CBC w/diff  Results from last 7 days   Lab Units 03/03/20  0558   WBC Thousand/uL 13 72*   HEMOGLOBIN g/dL 11 4*   HEMATOCRIT % 34 2*   PLATELETS Thousands/uL 288   NEUTROS PCT % 77*   LYMPHS PCT % 9*   MONOS PCT % 10   EOS PCT % 1     BMP  Results from last 7 days   Lab Units 03/03/20  0558   POTASSIUM mmol/L 3 8   CHLORIDE mmol/L 96*   CO2 mmol/L 29   BUN mg/dL 41*   CREATININE mg/dL 1 44*   CALCIUM mg/dL 8 8     CMP  Results from last 7 days   Lab Units 03/03/20  0558   POTASSIUM mmol/L 3 8   CHLORIDE mmol/L 96*   CO2 mmol/L 29   BUN mg/dL 41*   CREATININE mg/dL 1 44*   CALCIUM mg/dL 8 8       @Culture@  Lab Results   Component Value Date    URINECX <10,000 cfu/ml  02/19/2020         Current Facility-Administered Medications:     albumin human (FLEXBUMIN) 25 % injection 12 5 g, 12 5 g, Intravenous, Once, Cale Torrez PA-C, Stopped at 02/20/20 2343    aluminum-magnesium hydroxide-simethicone (MYLANTA) 200-200-20 mg/5 mL oral suspension 30 mL, 30 mL, Oral, Q4H PRN, Pineda Taylor,     apixaban (ELIQUIS) tablet 5 mg, 5 mg, Oral, BID, [place], 5 mg at 03/03/20 1738    ascorbic acid (VITAMIN C) tablet 1,000 mg, 1,000 mg, Oral, Daily, [place]NEET [place], 1,000 mg at 03/03/20 0834    benzonatate (TESSALON PERLES) capsule 100 mg, 100 mg, Oral, TID PRN, NAVNEET [place]    [START ON 3/4/2020] bumetanide (BUMEX) tablet 1 mg, 1 mg, Oral, Daily, Ronny Montenegro MD    cholecalciferol (VITAMIN D3) tablet 1,000 Units, 1,000 Units, Oral, Daily, [place], 1,000 Units at 03/03/20 0834    DULoxetine (CYMBALTA) delayed release capsule 30 mg, 30 mg, Oral, Daily, [place], 30 mg at 03/03/20 4947    fluconazole (DIFLUCAN) tablet 200 mg, 200 mg, Oral, Daily, Sangeeta Bullock MD, 200 mg at 03/03/20 0833    HYDROcodone-acetaminophen (NORCO) 5-325 mg per tablet 2 tablet, 2 tablet, Oral, Q4H PRN, [place], 2 tablet at 03/03/20 1738    metoprolol tartrate (LOPRESSOR) tablet 25 mg, 25 mg, Oral, Q12H [address], Sangeeta Bullock MD, 25 mg at 03/03/20 1738    nystatin (MYCOSTATIN) oral suspension 500,000 Units, 500,000 Units, Swish & Swallow, 4x Daily, Enrique Villagran MD, 500,000 Units at 03/03/20 1739    pantoprazole (PROTONIX) EC tablet 40 mg, 40 mg, Oral, Daily, [place], 40 mg at 03/03/20 3242    senna (SENOKOT) tablet 17 2 mg, 2 tablet, Oral, Daily, [employer], DO, 17 2 mg at 03/03/20 6019    sodium chloride (OCEAN) 0 65 % nasal spray 2 spray, 2 spray, Each Nare, Q1H PRN, Sangeeta Bullock MD, 2 spray at 02/28/20 0903    vancomycin (VANCOCIN) IVPB (premix) 750 mg, 750 mg, Intravenous, Q24H, Misael Sher DO, Last Rate: 150 mL/hr at 03/03/20 0156, 750 mg at 03/03/20 0156    Imaging: I have personally reviewed pertinent films in PACS  EKG, Pathology, and Other Studies: I have personally reviewed pertinent reports    VTE Pharmacologic Prophylaxis: Heparin  VTE Mechanical Prophylaxis: sequential compression device    Fabricio Cr DPM

## 2020-03-04 ENCOUNTER — APPOINTMENT (INPATIENT)
Dept: MRI IMAGING | Facility: HOSPITAL | Age: 75
DRG: 571 | End: 2020-03-04
Payer: COMMERCIAL

## 2020-03-04 PROBLEM — L02.612 ABSCESS OF LEFT FOOT: Status: ACTIVE | Noted: 2020-02-19

## 2020-03-04 LAB
ANION GAP SERPL CALCULATED.3IONS-SCNC: 8 MMOL/L (ref 4–13)
BASOPHILS # BLD AUTO: 0.1 THOUSANDS/ΜL (ref 0–0.1)
BASOPHILS NFR BLD AUTO: 1 % (ref 0–1)
BUN SERPL-MCNC: 41 MG/DL (ref 5–25)
CALCIUM SERPL-MCNC: 8.8 MG/DL (ref 8.3–10.1)
CHLORIDE SERPL-SCNC: 96 MMOL/L (ref 100–108)
CO2 SERPL-SCNC: 28 MMOL/L (ref 21–32)
CREAT SERPL-MCNC: 1.48 MG/DL (ref 0.6–1.3)
EOSINOPHIL # BLD AUTO: 0.04 THOUSAND/ΜL (ref 0–0.61)
EOSINOPHIL NFR BLD AUTO: 0 % (ref 0–6)
ERYTHROCYTE [DISTWIDTH] IN BLOOD BY AUTOMATED COUNT: 15.6 % (ref 11.6–15.1)
GFR SERPL CREATININE-BSD FRML MDRD: 46 ML/MIN/1.73SQ M
GLUCOSE SERPL-MCNC: 80 MG/DL (ref 65–140)
HCT VFR BLD AUTO: 32.5 % (ref 36.5–49.3)
HGB BLD-MCNC: 10.8 G/DL (ref 12–17)
IMM GRANULOCYTES # BLD AUTO: 0.22 THOUSAND/UL (ref 0–0.2)
IMM GRANULOCYTES NFR BLD AUTO: 2 % (ref 0–2)
LYMPHOCYTES # BLD AUTO: 1.1 THOUSANDS/ΜL (ref 0.6–4.47)
LYMPHOCYTES NFR BLD AUTO: 8 % (ref 14–44)
MCH RBC QN AUTO: 32.7 PG (ref 26.8–34.3)
MCHC RBC AUTO-ENTMCNC: 33.2 G/DL (ref 31.4–37.4)
MCV RBC AUTO: 99 FL (ref 82–98)
MONOCYTES # BLD AUTO: 1.14 THOUSAND/ΜL (ref 0.17–1.22)
MONOCYTES NFR BLD AUTO: 8 % (ref 4–12)
NEUTROPHILS # BLD AUTO: 11.74 THOUSANDS/ΜL (ref 1.85–7.62)
NEUTS SEG NFR BLD AUTO: 81 % (ref 43–75)
NRBC BLD AUTO-RTO: 0 /100 WBCS
PLATELET # BLD AUTO: 304 THOUSANDS/UL (ref 149–390)
PMV BLD AUTO: 10 FL (ref 8.9–12.7)
POTASSIUM SERPL-SCNC: 3.5 MMOL/L (ref 3.5–5.3)
RBC # BLD AUTO: 3.3 MILLION/UL (ref 3.88–5.62)
SODIUM SERPL-SCNC: 132 MMOL/L (ref 136–145)
VANCOMYCIN TROUGH SERPL-MCNC: 17.5 UG/ML (ref 10–20)
WBC # BLD AUTO: 14.34 THOUSAND/UL (ref 4.31–10.16)

## 2020-03-04 PROCEDURE — 85025 COMPLETE CBC W/AUTO DIFF WBC: CPT | Performed by: INTERNAL MEDICINE

## 2020-03-04 PROCEDURE — 73720 MRI LWR EXTREMITY W/O&W/DYE: CPT

## 2020-03-04 PROCEDURE — 80048 BASIC METABOLIC PNL TOTAL CA: CPT | Performed by: INTERNAL MEDICINE

## 2020-03-04 PROCEDURE — 99232 SBSQ HOSP IP/OBS MODERATE 35: CPT | Performed by: INTERNAL MEDICINE

## 2020-03-04 PROCEDURE — 80202 ASSAY OF VANCOMYCIN: CPT | Performed by: FAMILY MEDICINE

## 2020-03-04 PROCEDURE — A9585 GADOBUTROL INJECTION: HCPCS | Performed by: PODIATRIST

## 2020-03-04 RX ORDER — POTASSIUM CHLORIDE 20 MEQ/1
40 TABLET, EXTENDED RELEASE ORAL ONCE
Status: COMPLETED | OUTPATIENT
Start: 2020-03-04 | End: 2020-03-04

## 2020-03-04 RX ORDER — CEFEPIME HYDROCHLORIDE 2 G/1
2000 INJECTION, POWDER, FOR SOLUTION INTRAVENOUS EVERY 12 HOURS
Status: DISCONTINUED | OUTPATIENT
Start: 2020-03-04 | End: 2020-03-04 | Stop reason: SDUPTHER

## 2020-03-04 RX ORDER — CEFEPIME HYDROCHLORIDE 2 G/50ML
2000 INJECTION, SOLUTION INTRAVENOUS EVERY 12 HOURS
Status: DISCONTINUED | OUTPATIENT
Start: 2020-03-04 | End: 2020-03-10 | Stop reason: HOSPADM

## 2020-03-04 RX ORDER — HEPARIN SODIUM 5000 [USP'U]/ML
5000 INJECTION, SOLUTION INTRAVENOUS; SUBCUTANEOUS EVERY 8 HOURS SCHEDULED
Status: COMPLETED | OUTPATIENT
Start: 2020-03-04 | End: 2020-03-05

## 2020-03-04 RX ADMIN — NYSTATIN 500000 UNITS: 100000 SUSPENSION ORAL at 19:53

## 2020-03-04 RX ADMIN — POTASSIUM CHLORIDE 40 MEQ: 1500 TABLET, EXTENDED RELEASE ORAL at 13:34

## 2020-03-04 RX ADMIN — HEPARIN SODIUM 5000 UNITS: 5000 INJECTION, SOLUTION INTRAVENOUS; SUBCUTANEOUS at 22:01

## 2020-03-04 RX ADMIN — HYDROCODONE BITARTRATE AND ACETAMINOPHEN 2 TABLET: 5; 325 TABLET ORAL at 07:16

## 2020-03-04 RX ADMIN — Medication 500 MG: at 04:52

## 2020-03-04 RX ADMIN — PANTOPRAZOLE SODIUM 40 MG: 40 TABLET, DELAYED RELEASE ORAL at 08:55

## 2020-03-04 RX ADMIN — MELATONIN 1000 UNITS: at 08:55

## 2020-03-04 RX ADMIN — HEPARIN SODIUM 5000 UNITS: 5000 INJECTION, SOLUTION INTRAVENOUS; SUBCUTANEOUS at 05:05

## 2020-03-04 RX ADMIN — GADOBUTROL 10 ML: 604.72 INJECTION INTRAVENOUS at 11:03

## 2020-03-04 RX ADMIN — OXYCODONE HYDROCHLORIDE AND ACETAMINOPHEN 1000 MG: 500 TABLET ORAL at 08:55

## 2020-03-04 RX ADMIN — FLUCONAZOLE 200 MG: 100 TABLET ORAL at 08:56

## 2020-03-04 RX ADMIN — CEFEPIME HYDROCHLORIDE 2000 MG: 2 INJECTION, SOLUTION INTRAVENOUS at 20:07

## 2020-03-04 RX ADMIN — HEPARIN SODIUM 5000 UNITS: 5000 INJECTION, SOLUTION INTRAVENOUS; SUBCUTANEOUS at 13:34

## 2020-03-04 RX ADMIN — DULOXETINE 30 MG: 30 CAPSULE, DELAYED RELEASE ORAL at 08:55

## 2020-03-04 RX ADMIN — HYDROCODONE BITARTRATE AND ACETAMINOPHEN 2 TABLET: 5; 325 TABLET ORAL at 13:34

## 2020-03-04 RX ADMIN — METOPROLOL TARTRATE 25 MG: 25 TABLET ORAL at 19:52

## 2020-03-04 RX ADMIN — NYSTATIN 500000 UNITS: 100000 SUSPENSION ORAL at 11:39

## 2020-03-04 RX ADMIN — CEFEPIME HYDROCHLORIDE 2000 MG: 2 INJECTION, SOLUTION INTRAVENOUS at 08:57

## 2020-03-04 NOTE — PROGRESS NOTES
Progress Note - Podiatry  Linwood Mazariegos 69 y o  male MRN: [id number]  Unit/Bed#: -01 Encounter: [id number]    Assessment/Plan:  1  Abscess left forefoot   -purulence expressed from dorsal forefoot, wound culture obtained earlier    -To OR for incision and drainage tomorrow night 3/5/2020 at 6:00 p m    -MRI completed shows no osteomyelitis or significant fluid accumulation    -stop heparin minimum 8 hours prior to surgery tomorrow night   2  Cellulitis left leg              -continue with current IV antibiotics per Infectious Disease  3  Chronic venous insufficiency Bilateral   -elevation, compression stockings and Ace wraps   -may wear his own compression stocking on the right leg  4  Tinea pedis-chronic              -Betadine to dry interspaces left foot daily              -alginate rope woven and interdigitally to dry interspaces               -Diflucan daily x1 week  5  CHF, chronic AFib              -managed per Internal Medicine and Cardiology    Subjective/Objective   Chief Complaint:   Chief Complaint   Patient presents with    Foot Pain     patient complaint of foot pain and discloration x 1 day       Subjective:  71-year-old white male seen at bedside, relates foot was bleeding last night after walking on it  Subsequently he fell and needed assistance to get up after using bathroom  Cellulitis of his leg has improved  Relates continued pain in foot  Blood pressure 101/65, pulse 87, temperature 98 °F (36 7 °C), temperature source Oral, resp  rate 18, height 6' (1 829 m), weight 115 kg (252 lb 6 8 oz), SpO2 97 %  ,Body mass index is 34 24 kg/m²      Invasive Devices     Peripheral Intravenous Line            Peripheral IV 03/01/20 Left Hand 2 days                Physical Exam:   General appearance: alert, cooperative and no distress  Neuro/Vascular: Pulses: Right: DP  0/4, PT  0/4, Left: DP  0/4, PT  0/4   Capillary Filling: 3 Sec, Edema:  Chronic +2 bilateral   8/2020 Arterial duplex: showed triphasic waveforms of DP&PT bilateral   EMMANUEL 1 3 on the right, and 0 9 on the left  Epicritic sensations grossly intact  Extremity:  Cellulitis appears to have resolved completely on the right lower extremity  Left leg still warm with reduced swelling except for the forefoot  Erythema and calor noted of the forefoot  Ulcerative breakdown  overlying 3/4 metatarsals, pain with palpation, yellow brown purulence expressed with palpation along with bleeding after ambulating on it last night              Labs and other studies:   CBC w/diff  Results from last 7 days   Lab Units 03/04/20  0520   WBC Thousand/uL 14 34*   HEMOGLOBIN g/dL 10 8*   HEMATOCRIT % 32 5*   PLATELETS Thousands/uL 304   NEUTROS PCT % 81*   LYMPHS PCT % 8*   MONOS PCT % 8   EOS PCT % 0     BMP  Results from last 7 days   Lab Units 03/04/20  0520   POTASSIUM mmol/L 3 5   CHLORIDE mmol/L 96*   CO2 mmol/L 28   BUN mg/dL 41*   CREATININE mg/dL 1 48*   CALCIUM mg/dL 8 8     CMP  Results from last 7 days   Lab Units 03/04/20  0520   POTASSIUM mmol/L 3 5   CHLORIDE mmol/L 96*   CO2 mmol/L 28   BUN mg/dL 41*   CREATININE mg/dL 1 48*   CALCIUM mg/dL 8 8       @Culture@  Lab Results   Component Value Date    URINECX <10,000 cfu/ml  02/19/2020         Current Facility-Administered Medications:     albumin human (FLEXBUMIN) 25 % injection 12 5 g, 12 5 g, Intravenous, Once, Ofe Kessler PA-C, Stopped at 02/20/20 2343    aluminum-magnesium hydroxide-simethicone (MYLANTA) 200-200-20 mg/5 mL oral suspension 30 mL, 30 mL, Oral, Q4H PRN, Jennifer Granados DO    ascorbic acid (VITAMIN C) tablet 1,000 mg, 1,000 mg, Oral, Daily, [place] [place], 1,000 mg at 03/04/20 0855    benzonatate (TESSALON PERLES) capsule 100 mg, 100 mg, Oral, TID PRN, [place] [place]    bumetanide (BUMEX) tablet 1 mg, 1 mg, Oral, Daily, Kj Tineo MD    cefepime (MAXIPIME) IVPB (premix) 2,000 mg, 2,000 mg, Intravenous, Q12H, Elkin Mahmood MD, Last Rate: 100 mL/hr at 03/04/20 0857, 2,000 mg at 03/04/20 0857    cholecalciferol (VITAMIN D3) tablet 1,000 Units, 1,000 Units, Oral, Daily, [employer] [place], 1,000 Units at 03/04/20 0855    DULoxetine (CYMBALTA) delayed release capsule 30 mg, 30 mg, Oral, Daily, [employer] [place], 30 mg at 03/04/20 0855    fluconazole (DIFLUCAN) tablet 200 mg, 200 mg, Oral, Daily, Geovany Martinez MD, 200 mg at 03/04/20 0856    Gadobutrol injection (SINGLE-DOSE) SOLN 10 mL, 10 mL, Intravenous, Once in imaging, Regino Arnett DPM    heparin (porcine) subcutaneous injection 5,000 Units, 5,000 Units, Subcutaneous, Q8H [facility], Regino Arnett DPM, 5,000 Units at 03/04/20 1334    HYDROcodone-acetaminophen (NORCO) 5-325 mg per tablet 2 tablet, 2 tablet, Oral, Q4H PRN, [employer] [place], 2 tablet at 03/04/20 1334    metoprolol tartrate (LOPRESSOR) tablet 25 mg, 25 mg, Oral, Q12H [facility], Geovany Martinez MD, 25 mg at 03/03/20 1738    nystatin (MYCOSTATIN) oral suspension 500,000 Units, 500,000 Units, Swish & Swallow, 4x Daily, Rocco Ac MD, 500,000 Units at 03/04/20 1139    pantoprazole (PROTONIX) EC tablet 40 mg, 40 mg, Oral, Daily, [employer] [place], 40 mg at 03/04/20 0855    senna (SENOKOT) tablet 17 2 mg, 2 tablet, Oral, Daily, [employer], DO, 17 2 mg at 03/03/20 9894    sodium chloride (OCEAN) 0 65 % nasal spray 2 spray, 2 spray, Each Nare, Q1H PRN, Geovany Martinez MD, 2 spray at 02/28/20 0903    vancomycin (VANCOCIN) 500 mg in sodium chloride 0 9 % 100 mL IVPB, 500 mg, Intravenous, Q24H, Mary Grace Jenkins MD, Last Rate: 200 mL/hr at 03/04/20 0452, 500 mg at 03/04/20 4056    Imaging: I have personally reviewed pertinent films in PACS  EKG, Pathology, and Other Studies: I have personally reviewed pertinent reports    VTE Pharmacologic Prophylaxis: Heparin  VTE Mechanical Prophylaxis: sequential compression device    Regino Arnett DPM

## 2020-03-04 NOTE — ASSESSMENT & PLAN NOTE
Wt Readings from Last 3 Encounters:   03/04/20 115 kg (252 lb 6 8 oz)   02/04/20 108 kg (239 lb)   01/07/20 113 kg (249 lb)     Patient has chronic systolic/diastolic CHF with ejection fraction of 45%  Patient is on room air and lungs are clear to auscultation  Continue on Bumex  Daily weight and I&Os  Monitor renal function in a m

## 2020-03-04 NOTE — ASSESSMENT & PLAN NOTE
· Questionable history of MRSA on review of chart; in review of records cannot find any noted history  · On IV antibiotics

## 2020-03-04 NOTE — ASSESSMENT & PLAN NOTE
Patient acute kidney injury on admission with creatinine of 1 9  Creatinine this morning is 1 48  Continue Bumex  Monitor renal function in am while diuresing

## 2020-03-04 NOTE — ASSESSMENT & PLAN NOTE
Patient has chronic atrial fibrillation  Rates are controlled on Lopressor 25 mg p o bid  Eliquis is held    Patient denies signs of bleeding

## 2020-03-04 NOTE — ASSESSMENT & PLAN NOTE
· Blood pressure acceptable on admission with systolics in the 745G  · Continue to monitor with schedule vitals  · On Lopressor and Bumex

## 2020-03-04 NOTE — PROGRESS NOTES
Progress Note - Linwood Mazariegos 1945, 69 y o  male MRN: [id number]    Unit/Bed#: -01 Encounter: [id number]    Primary Care Provider: Clarita Sherwood MD   Date and time admitted to hospital: 2/19/2020  1:18 PM        Oral candidiasis  Assessment & Plan  On Diflucan    Hyponatremia  Assessment & Plan  Patient presented with hyponatremia of 129  Sodium is 132  Continue monitoring sodium        History of methicillin resistant staphylococcus aureus (MRSA)  Assessment & Plan  · Questionable history of MRSA on review of chart; in review of records cannot find any noted history  · On IV antibiotics    Hypertension  Assessment & Plan  · Blood pressure acceptable on admission with systolics in the 056O  · Continue to monitor with schedule vitals  · On Lopressor and Bumex    Chronic combined systolic and diastolic congestive heart failure (HCC)  Assessment & Plan  Wt Readings from Last 3 Encounters:   03/04/20 115 kg (252 lb 6 8 oz)   02/04/20 108 kg (239 lb)   01/07/20 113 kg (249 lb)     Patient has chronic systolic/diastolic CHF with ejection fraction of 45%  Patient is on room air and lungs are clear to auscultation  Continue on Bumex  Daily weight and I&Os  Monitor renal function in a m  Acute kidney injury superimposed on CKD [facility])  Assessment & Plan  Patient acute kidney injury on admission with creatinine of 1 9  Creatinine this morning is 1 48  Continue Bumex  Monitor renal function in am while diuresing    Chronic deep vein thrombosis (DVT) (HCC)  Assessment & Plan  · VAS lower limb venous duplex studies completed in November of 2019 revealed in no evidence of right lower limb thrombus in the common femoral artery  No gross evidence of acute DVT based on color-flow analysis the left lower limb  · See picture for current appearance  Cellulitis of left lower extremity  Assessment & Plan  Patient developed left lower extremity cellulitis/left foot abscess  Continue IV vancomycin    Id follow-up noted patient  Started on cefepime and Flagyl  Follow-up culture results  Podiatry ordered MRI of left forefoot which was done this morning  Awaiting results  Tenia pedis that patient has could be a source for cellulitis  Continue on Diflucan      Persistent atrial fibrillation  Assessment & Plan  Patient has chronic atrial fibrillation  Rates are controlled on Lopressor 25 mg p o bid  Eliquis is held  Patient denies signs of bleeding    Class 1 obesity due to excess calories with serious comorbidity and body mass index (BMI) of 34 0 to 34 9 in adult  Assessment & Plan  · Lifestyle modifications recommended    Gastroesophageal reflux disease without esophagitis  Assessment & Plan  · Continue Protonix    * Cellulitis of right lower extremity  Assessment & Plan  Patient presented with right lower extremity cellulitis  On IV antibiotics          Labs & Imaging: I have personally reviewed pertinent reports  VTE Pharmacologic Prophylaxis:  Heparin  VTE Mechanical Prophylaxis: sequential compression device    Code Status:   Level 1 - Full Code    Patient Centered Rounds: I have performed bedside rounds with nursing staff today  Discussions with Specialists or Other Care Team Provider:  Podiatry    Current Length of Stay: 14 day(s)    Current Patient Status: Inpatient   Certification Statement: The patient will continue to require additional inpatient hospital stay due to see my assessment and plan  Subjective:   Patient is seen and examined at bedside  Complains of left lower extremity pain which is well controlled with pain medication  No other complaints  Afebrile  All other ROS are negative  Objective:    Vitals: Blood pressure 101/65, pulse 87, temperature 98 °F (36 7 °C), temperature source Oral, resp  rate 18, height 6' (1 829 m), weight 115 kg (252 lb 6 8 oz), SpO2 97 %  ,Body mass index is 34 24 kg/m²    SPO2 RA Rest      ED to Hosp-Admission (Current) from 2/19/2020 in [facility]   SpO2  97 %   SpO2 Activity  At Rest   O2 Device  None (Room air)   O2 Flow Rate          I&O:     Intake/Output Summary (Last 24 hours) at 3/4/2020 1312  Last data filed at 3/4/2020 0658  Gross per 24 hour   Intake    Output 600 ml   Net -600 ml       Physical Exam:    General- Alert, lying comfortably in bed  Not in any acute distress  HEENT- SERGIO, EOM intact  Neck- Supple, No JVD  CVS- regular, S1 and S2 normal   Chest- Bilateral Air entry, No rhochi, crackles or wheezing present  Abdomen- soft, nontender, not distended, no guarding or rigidity, BS+  Extremities-  No pedal edema, No calf tenderness  Left lower extremity-erythema, warmth and swelling of the forefoot  CNS-   Alert, awake and orientedx3  No focal deficits present      Invasive Devices     Peripheral Intravenous Line            Peripheral IV 03/01/20 Left Hand 2 days                      Social History  reviewed  Family History   Problem Relation Age of Onset    Cancer Mother         cancer of uterus    Diabetes Sister     Mental illness Neg Hx         neg fam hx    Substance Abuse Neg Hx         neg fam hx    reviewed    Meds:  Current Facility-Administered Medications   Medication Dose Route Frequency Provider Last Rate Last Dose    albumin human (FLEXBUMIN) 25 % injection 12 5 g  12 5 g Intravenous Once Sparkle Ceballos PA-C   Stopped at 02/20/20 2343    aluminum-magnesium hydroxide-simethicone (MYLANTA) 200-200-20 mg/5 mL oral suspension 30 mL  30 mL Oral Q4H PRN Shadi Toledo DO        ascorbic acid (VITAMIN C) tablet 1,000 mg  1,000 mg Oral Daily NAVNEET [place]   1,000 mg at 03/04/20 0855    benzonatate (TESSALON PERLES) capsule 100 mg  100 mg Oral TID PRN NAVNEET [place]        bumetanide (BUMEX) tablet 1 mg  1 mg Oral Daily Noble Pratt MD        cefepime (MAXIPIME) IVPB (premix) 2,000 mg  2,000 mg Intravenous Q12H Lucian Sultana  mL/hr at 03/04/20 0857 2,000 mg at 03/04/20 0857    cholecalciferol (VITAMIN D3) tablet 1,000 Units  1,000 Units Oral Daily NAVNEET [place]   1,000 Units at 03/04/20 0855    DULoxetine (CYMBALTA) delayed release capsule 30 mg  30 mg Oral Daily NAVNEET [place]   30 mg at 03/04/20 0855    fluconazole (DIFLUCAN) tablet 200 mg  200 mg Oral Daily Pamela Miller MD   200 mg at 03/04/20 0856    heparin (porcine) subcutaneous injection 5,000 Units  5,000 Units Subcutaneous [facility] Daniele Benavides, DPM   5,000 Units at 03/04/20 0505    HYDROcodone-acetaminophen ([facility]) 5-325 mg per tablet 2 tablet  2 tablet Oral Q4H PRN NAVNEET Jimenez   2 tablet at 03/04/20 0716    metoprolol tartrate (LOPRESSOR) tablet 25 mg  25 mg Oral Q12H [address] Pamela Miller MD   25 mg at 03/03/20 1738    nystatin (MYCOSTATIN) oral suspension 500,000 Units  500,000 Units Swish & Swallow 4x Daily Timmy Gifford MD   500,000 Units at 03/04/20 1139    pantoprazole (PROTONIX) EC tablet 40 mg  40 mg Oral Daily NAVNEET [place]   40 mg at 03/04/20 0855    potassium chloride (K-DUR,KLOR-CON) CR tablet 40 mEq  40 mEq Oral Once Lorraine Baez MD        senna (SENOKOT) tablet 17 2 mg  2 tablet Oral Daily Cindy Sher DO   17 2 mg at 03/03/20 5745    sodium chloride (OCEAN) 0 65 % nasal spray 2 spray  2 spray Each Nare Q1H PRN Pamela Miller MD   2 spray at 02/28/20 0903    vancomycin (VANCOCIN) 500 mg in sodium chloride 0 9 % 100 mL IVPB  500 mg Intravenous Q24H Estevanjared Presley  mL/hr at 03/04/20 0452 500 mg at 03/04/20 0452      Medications Prior to Admission   Medication    apixaban (ELIQUIS) 5 mg    Ascorbic Acid (VITAMIN C) 1000 MG tablet    bumetanide (BUMEX) 2 mg tablet    cholecalciferol (VITAMIN D3) 1,000 units tablet    diltiazem (CARDIZEM CD) 180 mg 24 hr capsule    DULoxetine (CYMBALTA) 30 mg delayed release capsule    HYDROcodone-acetaminophen (NORCO) 7 5-325 mg per tablet    losartan (COZAAR) 50 mg tablet    meloxicam (MOBIC) 15 mg tablet    pantoprazole (PROTONIX) 40 mg tablet    potassium chloride (K-DUR) 10 mEq tablet    spironolactone (ALDACTONE) 25 mg tablet    vitamin E, tocopherol, 400 units capsule    benzonatate (TESSALON PERLES) 100 mg capsule    sildenafil (VIAGRA) 100 mg tablet    sodium chloride (OCEAN) 0 65 % nasal spray       Labs:  Results from last 7 days   Lab Units 03/04/20  0520 03/03/20  0558 03/02/20  0516   WBC Thousand/uL 14 34* 13 72* 18 83*   HEMOGLOBIN g/dL 10 8* 11 4* 11 3*   HEMATOCRIT % 32 5* 34 2* 34 2*   PLATELETS Thousands/uL 304 288 258   NEUTROS PCT % 81* 77*  --    LYMPHS PCT % 8* 9*  --    LYMPHO PCT %  --   --  7*   MONOS PCT % 8 10  --    MONO PCT %  --   --  12   EOS PCT % 0 1 0     Results from last 7 days   Lab Units 03/04/20  0520 03/03/20  0558 03/02/20  0516   POTASSIUM mmol/L 3 5 3 8 4 1   CHLORIDE mmol/L 96* 96* 96*   CO2 mmol/L 28 29 28   BUN mg/dL 41* 41* 43*   CREATININE mg/dL 1 48* 1 44* 1 58*   CALCIUM mg/dL 8 8 8 8 8 7     Lab Results   Component Value Date    TROPONINI 0 10 (H) 11/24/2019    TROPONINI 0 11 (H) 11/24/2019    TROPONINI 0 09 (H) 11/24/2019         Lab Results   Component Value Date    URINECX <10,000 cfu/ml  02/19/2020    WOUNDCULT 4+ Growth of Gram Negative Leo (A) 03/02/2020    WOUNDCULT 3+ Growth of Serratia marcescens (A) 07/24/2019    WOUNDCULT 1+ Growth of  07/24/2019         Imaging:  Results for orders placed during the hospital encounter of 02/19/20   XR chest portable    Narrative CHEST     INDICATION:   r/o PNA  COMPARISON:  11/24/2019    EXAM PERFORMED/VIEWS:  XR CHEST PORTABLE      FINDINGS:    Stable cardiomegaly  Central vascular congestion mainly in the right perihilar region  No effusions  No pneumothorax  No pneumonia  Osseous structures appear within normal limits for patient age  Impression Right perihilar central vascular congestion          Workstation performed: [id number]       Results for orders placed during the hospital encounter of 11/18/19   XR chest pa & lateral    Narrative CHEST     INDICATION:   R06 02: Shortness of breath  COMPARISON:  5/13/2019    EXAM PERFORMED/VIEWS:  XR CHEST PA & LATERAL      FINDINGS:    Stable cardiomegaly is noted  Mild pulmonary vascular congestion is noted  Small bilateral pleural effusions are present  Osseous structures appear within normal limits for patient age  Impression Mild pulmonary vascular congestion and small bilateral pleural effusions          Workstation performed: [id number]         Last 24 Hours Medication List:     Current Facility-Administered Medications:  albumin human 12 5 g Intravenous Once Ofe Kessler PA-C Last Rate: Stopped (02/20/20 3472)   aluminum-magnesium hydroxide-simethicone 30 mL Oral Q4H PRN Jennifer Granados DO    vitamin C 1,000 mg Oral Daily NAVNEET [place]    benzonatate 100 mg Oral TID PRN NAVNEET [place]    bumetanide 1 mg Oral Daily Kj Tineo MD    cefepime 2,000 mg Intravenous Q12H Elkin Mahmood MD Last Rate: 2,000 mg (03/04/20 3020)   cholecalciferol 1,000 Units Oral Daily NAVNEET [place]    DULoxetine 30 mg Oral Daily NAVNEET [place]    fluconazole 200 mg Oral Daily Bob Sears MD    heparin (porcine) 5,000 Units Subcutaneous [facility] Judy Win DPM    HYDROcodone-acetaminophen 2 tablet Oral Q4H PRN NAVNEET [place]    metoprolol tartrate 25 mg Oral Q12H [address] Bob Sears MD    nystatin 500,000 Units Swish & Swallow 4x Daily Wilbert Sims MD    pantoprazole 40 mg Oral Daily NAVNEET [place]    potassium chloride 40 mEq Oral Once Kj Tineo MD    senna 2 tablet Oral Daily Kike Sher DO    sodium chloride 2 spray Each Nare Q1H PRN Bob Sears MD    vancomycin 500 mg Intravenous Q24H Elkin Mahmood MD Last Rate: 500 mg (03/04/20 9652)        Today, Patient Was Seen By: Kj Tineo MD    ** Please Note: Dictation voice to text software may have been used in the creation of this document   **

## 2020-03-04 NOTE — PROGRESS NOTES
Vancomycin Assessment    Ric Mendieta is a 69 y o  male who is currently receiving vancomycin vancomycin 750mg q24h for skin-soft tissue infection     Relevant clinical data and objective history reviewed:  Creatinine   Date Value Ref Range Status   03/03/2020 1 44 (H) 0 60 - 1 30 mg/dL Final     Comment:     Standardized to IDMS reference method   03/02/2020 1 58 (H) 0 60 - 1 30 mg/dL Final     Comment:     Standardized to IDMS reference method   02/29/2020 1 26 0 60 - 1 30 mg/dL Final     Comment:     Standardized to IDMS reference method   12/17/2015 0 97 0 60 - 1 30 mg/dL Final     Comment:     Standardized to IDMS reference method   10/14/2014 1 06 0 60 - 1 30 mg/dL Final     Comment:     Standardized to IDMS reference method     /78 (BP Location: Right arm)   Pulse 82   Temp (!) 97 3 °F (36 3 °C)   Resp 20   Ht 6' (1 829 m)   Wt 114 kg (251 lb 12 3 oz)   SpO2 96%   BMI 34 15 kg/m²   I/O last 3 completed shifts: In: 720 [P O :720]  Out: 700 [Urine:700]  Lab Results   Component Value Date/Time    BUN 41 (H) 03/03/2020 05:58 AM    BUN 68 (H) 12/09/2019 02:15 PM    WBC 13 72 (H) 03/03/2020 05:58 AM    WBC 6 35 12/17/2015 11:30 AM    HGB 11 4 (L) 03/03/2020 05:58 AM    HGB 14 9 12/17/2015 11:30 AM    HCT 34 2 (L) 03/03/2020 05:58 AM    HCT 46 3 12/17/2015 11:30 AM    MCV 97 03/03/2020 05:58 AM    MCV 93 12/17/2015 11:30 AM     03/03/2020 05:58 AM     12/17/2015 11:30 AM     Temp Readings from Last 3 Encounters:   03/04/20 (!) 97 3 °F (36 3 °C)   02/04/20 97 9 °F (36 6 °C) (Tympanic)   12/01/19 (!) 97 1 °F (36 2 °C) (Oral)     Vancomycin Days of Therapy: 12    Assessment/Plan  The patient is currently on vancomycin utilizing scheduled dosing  Baseline risks associated with therapy include: pre-existing renal impairment and advanced age    The patient is receiving vancomycin 750mg q24h with the most recent vancomycin level =17 5, being not at steady-state and supratherapeutic based on a goal of 10-15 (mild infection/cellulitis) ; therefore, after clinical evaluation will be changed to vancomycin 500mg q24h   Pharmacy will continue to follow closely for s/sx of nephrotoxicity, infusion reactions and appropriateness of therapy  BMP and CBC will be ordered per protocol  Plan for trough as patient approaches steady state, prior to the 4th  dose at approximately 0330 3/7  Pharmacy will continue to follow the patients culture results and clinical progress daily      Rachael Little, Pharmacist

## 2020-03-04 NOTE — PLAN OF CARE
Problem: Potential for Falls  Goal: Patient will remain free of falls  Description  INTERVENTIONS:  - Assess patient frequently for physical needs  -  Identify cognitive and physical deficits and behaviors that affect risk of falls    -  [place] fall precautions as indicated by assessment   - Educate patient/family on patient safety including physical limitations  - Instruct patient to call for assistance with activity based on assessment  - Modify environment to reduce risk of injury  - Consider OT/PT consult to assist with strengthening/mobility  Outcome: Progressing     Problem: PAIN - ADULT  Goal: Verbalizes/displays adequate comfort level or baseline comfort level  Description  Interventions:  - Encourage patient to monitor pain and request assistance  - Assess pain using appropriate pain scale  - Administer analgesics based on type and severity of pain and evaluate response  - Implement non-pharmacological measures as appropriate and evaluate response  - Consider cultural and social influences on pain and pain management  - Notify physician/advanced practitioner if interventions unsuccessful or patient reports new pain  Outcome: Progressing     Problem: INFECTION - ADULT  Goal: Absence or prevention of progression during hospitalization  Description  INTERVENTIONS:  - Assess and monitor for signs and symptoms of infection  - Monitor lab/diagnostic results  - Monitor all insertion sites, i e  indwelling lines, tubes, and drains  - Monitor endotracheal if appropriate and nasal secretions for changes in amount and color  - [place] appropriate cooling/warming therapies per order  - Administer medications as ordered  - Instruct and encourage patient and family to use good hand hygiene technique  - Identify and instruct in appropriate isolation precautions for identified infection/condition  Outcome: Progressing  Goal: Absence of fever/infection during neutropenic period  Description  INTERVENTIONS:  - Monitor WBC    Outcome: Progressing     Problem: SAFETY ADULT  Goal: Maintain or return to baseline ADL function  Description  INTERVENTIONS:  -  Assess patient's ability to carry out ADLs; assess patient's baseline for ADL function and identify physical deficits which impact ability to perform ADLs (bathing, care of mouth/teeth, toileting, grooming, dressing, etc )  - Assess/evaluate cause of self-care deficits   - Assess range of motion  - Assess patient's mobility; develop plan if impaired  - Assess patient's need for assistive devices and provide as appropriate  - Encourage maximum independence but intervene and supervise when necessary  - Involve family in performance of ADLs  - Assess for home care needs following discharge   - Consider OT consult to assist with ADL evaluation and planning for discharge  - Provide patient education as appropriate  Outcome: Progressing  Goal: Maintain or return mobility status to optimal level  Description  INTERVENTIONS:  - Assess patient's baseline mobility status (ambulation, transfers, stairs, etc )    - Identify cognitive and physical deficits and behaviors that affect mobility  - Identify mobility aids required to assist with transfers and/or ambulation (gait belt, sit-to-stand, lift, walker, cane, etc )  - [place] fall precautions as indicated by assessment  - Record patient progress and toleration of activity level on Mobility SBAR; progress patient to next Phase/Stage  - Instruct patient to call for assistance with activity based on assessment  - Consider rehabilitation consult to assist with strengthening/weightbearing, etc   Outcome: Progressing     Problem: DISCHARGE PLANNING  Goal: Discharge to home or other facility with appropriate resources  Description  INTERVENTIONS:  - Identify barriers to discharge w/patient and caregiver  - Arrange for needed discharge resources and transportation as appropriate  - Identify discharge learning needs (meds, wound care, etc )  - Arrange for interpretive services to assist at discharge as needed  - Refer to Case Management Department for coordinating discharge planning if the patient needs post-hospital services based on physician/advanced practitioner order or complex needs related to functional status, cognitive ability, or social support system  Outcome: Progressing     Problem: Knowledge Deficit  Goal: Patient/family/caregiver demonstrates understanding of disease process, treatment plan, medications, and discharge instructions  Description  Complete learning assessment and assess knowledge base    Interventions:  - Provide teaching at level of understanding  - Provide teaching via preferred learning methods  Outcome: Progressing     Problem: CARDIOVASCULAR - ADULT  Goal: Maintains optimal cardiac output and hemodynamic stability  Description  INTERVENTIONS:  - Monitor I/O, vital signs and rhythm  - Monitor for S/S and trends of decreased cardiac output  - Administer and titrate ordered vasoactive medications to optimize hemodynamic stability  - Assess quality of pulses, skin color and temperature  - Assess for signs of decreased coronary artery perfusion  - Instruct patient to report change in severity of symptoms  Outcome: Progressing  Goal: Absence of cardiac dysrhythmias or at baseline rhythm  Description  INTERVENTIONS:  - Continuous cardiac monitoring, vital signs, obtain 12 lead EKG if ordered  - Administer antiarrhythmic and heart rate control medications as ordered  - Monitor electrolytes and administer replacement therapy as ordered  Outcome: Progressing     Problem: MUSCULOSKELETAL - ADULT  Goal: Maintain or return mobility to safest level of function  Description  INTERVENTIONS:  - Assess patient's ability to carry out ADLs; assess patient's baseline for ADL function and identify physical deficits which impact ability to perform ADLs (bathing, care of mouth/teeth, toileting, grooming, dressing, etc )  - Assess/evaluate cause of self-care deficits   - Assess range of motion  - Assess patient's mobility  - Assess patient's need for assistive devices and provide as appropriate  - Encourage maximum independence but intervene and supervise when necessary  - Involve family in performance of ADLs  - Assess for home care needs following discharge   - Consider OT consult to assist with ADL evaluation and planning for discharge  - Provide patient education as appropriate  Outcome: Progressing  Goal: Maintain proper alignment of affected body part  Description  INTERVENTIONS:  - Support, maintain and protect limb and body alignment  - Provide patient/ family with appropriate education  Outcome: Progressing     Problem: Prexisting or High Potential for Compromised Skin Integrity  Goal: Skin integrity is maintained or improved  Description  INTERVENTIONS:  - Identify patients at risk for skin breakdown  - Assess and monitor skin integrity  - Assess and monitor nutrition and hydration status  - Monitor labs   - Assess for incontinence   - Turn and reposition patient  - Assist with mobility/ambulation  - Relieve pressure over bony prominences  - Avoid friction and shearing  - Provide appropriate hygiene as needed including keeping skin clean and dry  - Evaluate need for skin moisturizer/barrier cream  - Collaborate with interdisciplinary team   - Patient/family teaching  - Consider wound care consult   Outcome: Progressing     Problem: Nutrition/Hydration-ADULT  Goal: Nutrient/Hydration intake appropriate for improving, restoring or maintaining nutritional needs  Description  Monitor and assess patient's nutrition/hydration status for malnutrition  Collaborate with interdisciplinary team and initiate plan and interventions as ordered  Monitor patient's weight and dietary intake as ordered or per policy  Utilize nutrition screening tool and intervene as necessary   Determine patient's food preferences and provide high-protein, high-caloric foods as appropriate       INTERVENTIONS:  - Monitor oral intake, urinary output, labs, and treatment plans  - Assess nutrition and hydration status and recommend course of action  - Evaluate amount of meals eaten  - Assist patient with eating if necessary   - Allow adequate time for meals  - Recommend/ encourage appropriate diets, oral nutritional supplements, and vitamin/mineral supplements  - Order, calculate, and assess calorie counts as needed  - Recommend, monitor, and adjust tube feedings and TPN/PPN based on assessed needs  - Assess need for intravenous fluids  - Provide specific nutrition/hydration education as appropriate  - Include patient/family/caregiver in decisions related to nutrition  Outcome: Progressing

## 2020-03-04 NOTE — ASSESSMENT & PLAN NOTE
Patient developed left lower extremity cellulitis/left foot abscess  Continue IV vancomycin  Id follow-up noted patient  Started on cefepime and Flagyl  Follow-up culture results  Podiatry ordered MRI of left forefoot which was done this morning  Awaiting results  Tenia pedis that patient has could be a source for cellulitis    Continue on Diflucan

## 2020-03-04 NOTE — PROGRESS NOTES
Joe Duarte  69 y o   male  1945  mrn [id number]    Assessment/Plan:    1  Cellulitis LLE: strange that it occured during this hospitalization while on abx, though given chronic venous stasis he would be at risk for it   Initial clinical improvement but now purulence on forefoot, ? Abscess  GS with gram negative rods  - add cefepime/flagyl  - cont vancomycin   - await MRI      Subjective:  No fevers  Podiatry able to express pus from dorsum of foot yesterday for MRI  No fevers, but WBC remains elevated  For MRI  Culture taken, gs with gram negative rods        Objective:    Gen: nad  Eyes: anicteric  Cor: rrr s1s2  Lungs: cta bilaterally  Abd: soft nt/nd + BS  Ext: bilateral LE edema, improved, Feet dressed    Labs:  CBC w/diff  Recent Labs     03/04/20  0520   WBC 14 34*   HGB 10 8*   HCT 32 5*      NEUTOPHILPCT 81*   LYMPHOPCT 8*   MONOPCT 8   EOSPCT 0     BMP  Recent Labs     03/04/20  0520   K 3 5   CL 96*   CO2 28   BUN 41*   CREATININE 1 48*   CALCIUM 8 8     CMP  Recent Labs     03/04/20  0520   K 3 5   CL 96*   CO2 28   BUN 41*   CREATININE 1 48*   CALCIUM 8 8        labrc    Cultures:  No results found for: BLOODCX  Lab Results   Component Value Date    WOUNDCULT 3+ Growth of Serratia marcescens (A) 07/24/2019    WOUNDCULT 1+ Growth of  07/24/2019    WOUNDCULT (A) 01/02/2019     1+ Growth of Methicillin Resistant Staphylococcus aureus     Lab Results   Component Value Date    URINECX <10,000 cfu/ml  02/19/2020     No results found for: SPUTUMCULTUR    MED: reviewed      Current Facility-Administered Medications:     albumin human (FLEXBUMIN) 25 % injection 12 5 g, 12 5 g, Intravenous, Once, Marilyn Sun PA-C, Stopped at 02/20/20 1004    aluminum-magnesium hydroxide-simethicone (MYLANTA) 200-200-20 mg/5 mL oral suspension 30 mL, 30 mL, Oral, Q4H PRN, Aristides Merino DO    ascorbic acid (VITAMIN C) tablet 1,000 mg, 1,000 mg, Oral, Daily, NAVNEET [place], 1,000 mg at 03/03/20 0834    benzonatate (TESSALON PERLES) capsule 100 mg, 100 mg, Oral, TID PRN, NAVNEET [place]    bumetanide (BUMEX) tablet 1 mg, 1 mg, Oral, Daily, Almaz Frank MD    cholecalciferol (VITAMIN D3) tablet 1,000 Units, 1,000 Units, Oral, Daily, NAVNEET [place], 1,000 Units at 03/03/20 0834    DULoxetine (CYMBALTA) delayed release capsule 30 mg, 30 mg, Oral, Daily, NAVNEET [place], 30 mg at 03/03/20 7663    fluconazole (DIFLUCAN) tablet 200 mg, 200 mg, Oral, Daily, Neftali Fragoso MD, 200 mg at 03/03/20 6961    heparin (porcine) subcutaneous injection 5,000 Units, 5,000 Units, Subcutaneous, Q8H [address], IVELISSE MercadoM, 5,000 Units at 03/04/20 0505    HYDROcodone-acetaminophen (NORCO) 5-325 mg per tablet 2 tablet, 2 tablet, Oral, Q4H PRN, NAVNEET [place], 2 tablet at 03/04/20 0716    metoprolol tartrate (LOPRESSOR) tablet 25 mg, 25 mg, Oral, Q12H [address], Neftali Fragoso MD, 25 mg at 03/03/20 1738    nystatin (MYCOSTATIN) oral suspension 500,000 Units, 500,000 Units, Swish & Swallow, 4x Daily, Teresa Wilkinson MD, 500,000 Units at 03/03/20 1739    pantoprazole (PROTONIX) EC tablet 40 mg, 40 mg, Oral, Daily, NAVNEET [place], 40 mg at 03/03/20 1708    senna (SENOKOT) tablet 17 2 mg, 2 tablet, Oral, Daily, Trellig[place], DO, 17 2 mg at 03/03/20 4673    sodium chloride (OCEAN) 0 65 % nasal spray 2 spray, 2 spray, Each Nare, Q1H PRN, Neftali Fragoso MD, 2 spray at 02/28/20 0903    vancomycin (VANCOCIN) 500 mg in sodium chloride 0 9 % 100 mL IVPB, 500 mg, Intravenous, Q24H, Martha Herrera MD, Last Rate: 200 mL/hr at 03/04/20 0452, 500 mg at 03/04/20 1891    Principal Problem:    Cellulitis of right lower extremity  Active Problems:    Gastroesophageal reflux disease without esophagitis    Class 1 obesity due to excess calories with serious comorbidity and body mass index (BMI) of 34 0 to 34 9 in adult    Persistent atrial fibrillation    Cellulitis of left lower extremity    Chronic deep vein thrombosis (DVT) (HCC)    Acute kidney injury superimposed on CKD (HCC)    Chronic combined systolic and diastolic congestive heart failure (HCC)    Hypertension    History of methicillin resistant staphylococcus aureus (MRSA)    Hyponatremia    Oral candidiasis      Estevan Presley MD

## 2020-03-05 ENCOUNTER — ANESTHESIA EVENT (INPATIENT)
Dept: PERIOP | Facility: HOSPITAL | Age: 75
DRG: 571 | End: 2020-03-05
Payer: COMMERCIAL

## 2020-03-05 ENCOUNTER — ANESTHESIA (INPATIENT)
Dept: PERIOP | Facility: HOSPITAL | Age: 75
DRG: 571 | End: 2020-03-05
Payer: COMMERCIAL

## 2020-03-05 LAB
ALBUMIN SERPL BCP-MCNC: 1.9 G/DL (ref 3.5–5)
ALP SERPL-CCNC: 116 U/L (ref 46–116)
ALT SERPL W P-5'-P-CCNC: 9 U/L (ref 12–78)
ANION GAP SERPL CALCULATED.3IONS-SCNC: 4 MMOL/L (ref 4–13)
AST SERPL W P-5'-P-CCNC: 29 U/L (ref 5–45)
BASOPHILS # BLD AUTO: 0.09 THOUSANDS/ΜL (ref 0–0.1)
BASOPHILS NFR BLD AUTO: 1 % (ref 0–1)
BILIRUB SERPL-MCNC: 0.9 MG/DL (ref 0.2–1)
BUN SERPL-MCNC: 42 MG/DL (ref 5–25)
CALCIUM SERPL-MCNC: 8.9 MG/DL (ref 8.3–10.1)
CHLORIDE SERPL-SCNC: 98 MMOL/L (ref 100–108)
CO2 SERPL-SCNC: 29 MMOL/L (ref 21–32)
CREAT SERPL-MCNC: 1.44 MG/DL (ref 0.6–1.3)
EOSINOPHIL # BLD AUTO: 0.12 THOUSAND/ΜL (ref 0–0.61)
EOSINOPHIL NFR BLD AUTO: 1 % (ref 0–6)
ERYTHROCYTE [DISTWIDTH] IN BLOOD BY AUTOMATED COUNT: 15.7 % (ref 11.6–15.1)
GFR SERPL CREATININE-BSD FRML MDRD: 48 ML/MIN/1.73SQ M
GLUCOSE SERPL-MCNC: 100 MG/DL (ref 65–140)
HCT VFR BLD AUTO: 33.4 % (ref 36.5–49.3)
HGB BLD-MCNC: 10.9 G/DL (ref 12–17)
IMM GRANULOCYTES # BLD AUTO: 0.18 THOUSAND/UL (ref 0–0.2)
IMM GRANULOCYTES NFR BLD AUTO: 2 % (ref 0–2)
LYMPHOCYTES # BLD AUTO: 0.97 THOUSANDS/ΜL (ref 0.6–4.47)
LYMPHOCYTES NFR BLD AUTO: 8 % (ref 14–44)
MAGNESIUM SERPL-MCNC: 1.9 MG/DL (ref 1.6–2.6)
MCH RBC QN AUTO: 32.4 PG (ref 26.8–34.3)
MCHC RBC AUTO-ENTMCNC: 32.6 G/DL (ref 31.4–37.4)
MCV RBC AUTO: 99 FL (ref 82–98)
MONOCYTES # BLD AUTO: 1.31 THOUSAND/ΜL (ref 0.17–1.22)
MONOCYTES NFR BLD AUTO: 11 % (ref 4–12)
NEUTROPHILS # BLD AUTO: 9.33 THOUSANDS/ΜL (ref 1.85–7.62)
NEUTS SEG NFR BLD AUTO: 77 % (ref 43–75)
NRBC BLD AUTO-RTO: 0 /100 WBCS
PLATELET # BLD AUTO: 325 THOUSANDS/UL (ref 149–390)
PMV BLD AUTO: 10.2 FL (ref 8.9–12.7)
POTASSIUM SERPL-SCNC: 4.4 MMOL/L (ref 3.5–5.3)
PROT SERPL-MCNC: 8 G/DL (ref 6.4–8.2)
RBC # BLD AUTO: 3.36 MILLION/UL (ref 3.88–5.62)
SODIUM SERPL-SCNC: 131 MMOL/L (ref 136–145)
WBC # BLD AUTO: 12 THOUSAND/UL (ref 4.31–10.16)

## 2020-03-05 PROCEDURE — 87075 CULTR BACTERIA EXCEPT BLOOD: CPT | Performed by: PODIATRIST

## 2020-03-05 PROCEDURE — 83735 ASSAY OF MAGNESIUM: CPT | Performed by: INTERNAL MEDICINE

## 2020-03-05 PROCEDURE — 87077 CULTURE AEROBIC IDENTIFY: CPT | Performed by: PODIATRIST

## 2020-03-05 PROCEDURE — 87070 CULTURE OTHR SPECIMN AEROBIC: CPT | Performed by: PODIATRIST

## 2020-03-05 PROCEDURE — 85025 COMPLETE CBC W/AUTO DIFF WBC: CPT | Performed by: INTERNAL MEDICINE

## 2020-03-05 PROCEDURE — 80053 COMPREHEN METABOLIC PANEL: CPT | Performed by: INTERNAL MEDICINE

## 2020-03-05 PROCEDURE — 87186 SC STD MICRODIL/AGAR DIL: CPT | Performed by: PODIATRIST

## 2020-03-05 PROCEDURE — 0JBR0ZZ EXCISION OF LEFT FOOT SUBCUTANEOUS TISSUE AND FASCIA, OPEN APPROACH: ICD-10-PCS | Performed by: PODIATRIST

## 2020-03-05 PROCEDURE — 87205 SMEAR GRAM STAIN: CPT | Performed by: PODIATRIST

## 2020-03-05 PROCEDURE — 99232 SBSQ HOSP IP/OBS MODERATE 35: CPT | Performed by: INTERNAL MEDICINE

## 2020-03-05 RX ORDER — SODIUM CHLORIDE, SODIUM LACTATE, POTASSIUM CHLORIDE, CALCIUM CHLORIDE 600; 310; 30; 20 MG/100ML; MG/100ML; MG/100ML; MG/100ML
INJECTION, SOLUTION INTRAVENOUS CONTINUOUS PRN
Status: DISCONTINUED | OUTPATIENT
Start: 2020-03-05 | End: 2020-03-05 | Stop reason: SURG

## 2020-03-05 RX ADMIN — Medication 500 MG: at 04:08

## 2020-03-05 RX ADMIN — DULOXETINE 30 MG: 30 CAPSULE, DELAYED RELEASE ORAL at 10:53

## 2020-03-05 RX ADMIN — BUMETANIDE 1 MG: 1 TABLET ORAL at 11:04

## 2020-03-05 RX ADMIN — CEFEPIME HYDROCHLORIDE 2000 MG: 2 INJECTION, SOLUTION INTRAVENOUS at 23:03

## 2020-03-05 RX ADMIN — METOPROLOL TARTRATE 25 MG: 25 TABLET ORAL at 11:06

## 2020-03-05 RX ADMIN — NYSTATIN 500000 UNITS: 100000 SUSPENSION ORAL at 10:52

## 2020-03-05 RX ADMIN — FLUCONAZOLE 200 MG: 100 TABLET ORAL at 10:54

## 2020-03-05 RX ADMIN — BENZONATATE 100 MG: 100 CAPSULE ORAL at 10:53

## 2020-03-05 RX ADMIN — CEFEPIME HYDROCHLORIDE 2000 MG: 2 INJECTION, SOLUTION INTRAVENOUS at 10:56

## 2020-03-05 RX ADMIN — MELATONIN 1000 UNITS: at 10:53

## 2020-03-05 RX ADMIN — SODIUM CHLORIDE, SODIUM LACTATE, POTASSIUM CHLORIDE, AND CALCIUM CHLORIDE: .6; .31; .03; .02 INJECTION, SOLUTION INTRAVENOUS at 19:38

## 2020-03-05 RX ADMIN — HYDROCODONE BITARTRATE AND ACETAMINOPHEN 2 TABLET: 5; 325 TABLET ORAL at 11:13

## 2020-03-05 RX ADMIN — HEPARIN SODIUM 5000 UNITS: 5000 INJECTION, SOLUTION INTRAVENOUS; SUBCUTANEOUS at 05:33

## 2020-03-05 RX ADMIN — NYSTATIN 500000 UNITS: 100000 SUSPENSION ORAL at 17:45

## 2020-03-05 RX ADMIN — HYDROCODONE BITARTRATE AND ACETAMINOPHEN 2 TABLET: 5; 325 TABLET ORAL at 02:37

## 2020-03-05 RX ADMIN — PANTOPRAZOLE SODIUM 40 MG: 40 TABLET, DELAYED RELEASE ORAL at 10:53

## 2020-03-05 NOTE — ASSESSMENT & PLAN NOTE
· Blood pressure acceptable on admission with systolics in the 835P  · Continue to monitor with schedule vitals  · On Lopressor and Bumex

## 2020-03-05 NOTE — PROGRESS NOTES
[facility]  Progress Note - [place]  1945, 69 y o  male MRN: [id number]  Unit/Bed#: -01 Encounter: [id number]  Primary Care Provider: Tena Maldonado MD   Date and time admitted to hospital: 2/19/2020  1:18 PM    Abscess of left foot  Assessment & Plan  · Per Podiatry  · Plan for drainage this evening, 3/6    Oral candidiasis  Assessment & Plan  · On Diflucan    Hyponatremia  Assessment & Plan  · Patient presented with hyponatremia of 129  · Sodium is 131  · Continue monitoring sodium        History of methicillin resistant staphylococcus aureus (MRSA)  Assessment & Plan  · Questionable history of MRSA on review of chart; in review of records cannot find any noted history  · On IV antibiotics    Hypertension  Assessment & Plan  · Blood pressure acceptable on admission with systolics in the 940W  · Continue to monitor with schedule vitals  · On Lopressor and Bumex    Chronic combined systolic and diastolic congestive heart failure (HCC)  Assessment & Plan  Wt Readings from Last 3 Encounters:   03/05/20 115 kg (254 lb 6 6 oz)   02/04/20 108 kg (239 lb)   01/07/20 113 kg (249 lb)     · Patient has chronic systolic/diastolic CHF with ejection fraction of 45%  · Patient is on room air and lungs are clear to auscultation  · Continue on Bumex  · Daily weight and I&Os  · Monitor renal function in a m  Acute kidney injury superimposed on CKD [facility])  Assessment & Plan  · Patient acute kidney injury on admission with creatinine of 1 9  · Creatinine this morning is 1 44  · Continue Bumex  · Monitor renal function in am while diuresing    Chronic deep vein thrombosis (DVT) (HCC)  Assessment & Plan  · VAS lower limb venous duplex studies completed in November of 2019 revealed in no evidence of right lower limb thrombus in the common femoral artery  No gross evidence of acute DVT based on color-flow analysis the left lower limb      Cellulitis of left lower extremity  Assessment & Plan  · Patient developed left lower extremity cellulitis/left foot abscess  · Continue IV vancomycin  · ID follow-up noted patient  Started on cefepime and Flagyl  · Follow-up culture results- gram-negative rods  · Podiatry ordered MRI of left forefoot negative for osteomyelitis or fluid collection  · Podiatry following, planning on draining area tonight  · Tenia pedis that patient has could be a source for cellulitis  Continue on Diflucan      Persistent atrial fibrillation  Assessment & Plan  · Patient has chronic atrial fibrillation  · Rates are controlled on Lopressor 25 mg p o bid  · Eliquis is held  · Patient denies signs of bleeding    Class 1 obesity due to excess calories with serious comorbidity and body mass index (BMI) of 34 0 to 34 9 in adult  Assessment & Plan  · Lifestyle modifications recommended    Gastroesophageal reflux disease without esophagitis  Assessment & Plan  · Continue Protonix    * Cellulitis of right lower extremity  Assessment & Plan  · Patient presented with right lower extremity cellulitis  · On IV antibiotics        VTE Pharmacologic Prophylaxis:   Pharmacologic: Heparin  Mechanical VTE Prophylaxis in Place: No    Patient Centered Rounds: I have performed bedside rounds with nursing staff today  Discussions with Specialists or Other Care Team Provider: Discussed with attending  Appreciate input from Podiatry note  Anticipate drainage tonight  Education and Discussions with Family / Patient: Discussed with patient at bedside  Answered all questions to the best of my ability  Time Spent for Care: 20 minutes  More than 50% of total time spent on counseling and coordination of care as described above  Current Length of Stay: 15 day(s)    Current Patient Status: Inpatient   Certification Statement: The patient will continue to require additional inpatient hospital stay due to Pending I&D    Discharge Plan: Patient comes from home with family   Plan to return pending resolution/improvement of cellulitis  Code Status: Level 1 - Full Code      Subjective:   Patient reports he is "terrible" because he is still in the hospital   Where a plan for further drainage of lower extremity this evening  Objective:     Vitals:   Temp (24hrs), Av °F (36 7 °C), Min:97 6 °F (36 4 °C), Max:98 2 °F (36 8 °C)    Temp:  [97 6 °F (36 4 °C)-98 2 °F (36 8 °C)] 98 2 °F (36 8 °C)  HR:  [78-98] 98  Resp:  [18-20] 18  BP: (110-123)/(60-72) 110/72  SpO2:  [96 %-98 %] 98 %  Body mass index is 34 5 kg/m²  Input and Output Summary (last 24 hours): Intake/Output Summary (Last 24 hours) at 3/5/2020 0913  Last data filed at 3/5/2020 0739  Gross per 24 hour   Intake 1020 ml   Output 1150 ml   Net -130 ml       Physical Exam:     Physical Exam   Constitutional: He appears well-developed and well-nourished  No distress  HENT:   Head: Normocephalic and atraumatic  Eyes: Conjunctivae are normal  No scleral icterus  Cardiovascular: Normal rate  An irregularly irregular rhythm present  No murmur heard  Pulmonary/Chest: Effort normal and breath sounds normal  No respiratory distress  He has no wheezes  He has no rales  Abdominal: Soft  He exhibits no distension  There is no tenderness  Musculoskeletal: He exhibits no edema  Feet:   Left Foot:   Skin Integrity: Positive for erythema (With dressing over area of drainage on dorsal area of foot)  Neurological: He is alert  Skin: Skin is warm and dry  There is erythema (LLE)  Psychiatric: He has a normal mood and affect  Nursing note and vitals reviewed        Additional Data:     Labs:    Results from last 7 days   Lab Units 20  0526  20  0516   WBC Thousand/uL 12 00*   < > 18 83*   HEMOGLOBIN g/dL 10 9*   < > 11 3*   HEMATOCRIT % 33 4*   < > 34 2*   PLATELETS Thousands/uL 325   < > 258   BANDS PCT %  --   --  3   NEUTROS PCT % 77*   < >  --    LYMPHS PCT % 8*   < >  --    LYMPHO PCT %  --   --  7*   MONOS PCT % 11   < > --    MONO PCT %  --   --  12   EOS PCT % 1   < > 0    < > = values in this interval not displayed  Results from last 7 days   Lab Units 03/05/20  0526   SODIUM mmol/L 131*   POTASSIUM mmol/L 4 4   CHLORIDE mmol/L 98*   CO2 mmol/L 29   BUN mg/dL 42*   CREATININE mg/dL 1 44*   ANION GAP mmol/L 4   CALCIUM mg/dL 8 9   ALBUMIN g/dL 1 9*   TOTAL BILIRUBIN mg/dL 0 90   ALK PHOS U/L 116   ALT U/L 9*   AST U/L 29   GLUCOSE RANDOM mg/dL 100                           * I Have Reviewed All Lab Data Listed Above  * Additional Pertinent Lab Tests Reviewed: All Labs Within Last 24 Hours Reviewed    Imaging:    Imaging Reports Reviewed Today Include:   · MRI 3/4:  1  Findings consistent with dorsal cellulitis  No focal fluid collection      2  No evidence of osteomyelitis      3   Age-indeterminate (non-acute) nondisplaced fracture of the great toe proximal phalanx      Imaging Personally Reviewed by Myself Includes:  None    Recent Cultures (last 7 days):     Results from last 7 days   Lab Units 03/02/20  6836   GRAM STAIN RESULT  1+ Disintegrating polys*  3+ Gram negative rods*   WOUND CULTURE  4+ Growth of Gram Negative Leo*       Last 24 Hours Medication List:     Current Facility-Administered Medications:  albumin human 12 5 g Intravenous Once Tristin Ascencio PA-C Last Rate: Stopped (02/20/20 5526)   aluminum-magnesium hydroxide-simethicone 30 mL Oral Q4H PRN Liset Sher DO    vitamin C 1,000 mg Oral Daily NAVNEET [place]    bacitracin 100,000 units and neomycin-polymyxin B () 1 mL in sodium chloride 0 9% 1000 mL irrigation bag  Irrigation Once Jamie Arellano DPM    benzonatate 100 mg Oral TID PRN NAVNEET [place]    bumetanide 1 mg Oral Daily Varun Lennon MD    cefepime 2,000 mg Intravenous Q12H Sri West MD Last Rate: 2,000 mg (03/04/20 2007)   cholecalciferol 1,000 Units Oral Daily NAVNEET [place]    DULoxetine 30 mg Oral Daily NAVNEET [place] fluconazole 200 mg Oral Daily Celine Dockery MD    Gadobutrol 10 mL Intravenous Once in imaging Maxcine Meo, DPM    HYDROcodone-acetaminophen 2 tablet Oral Q4H PRN NAVNEET [place]    metoprolol tartrate 25 mg Oral Q12H [address], MD    nystatin 500,000 Units Swish & Swallow 4x Daily Keri Crane MD    pantoprazole 40 mg Oral Daily NAVNEET [place]    senna 2 tablet Oral Daily Misael Sher DO    sodium chloride 2 spray Each Nare Q1H PRN Celine Dockery MD    vancomycin 500 mg Intravenous Q24H [place] MD Last Rate: 500 mg (03/05/20 0408)        Today, Patient Was Seen By: Layo Llanos PA-C    ** Please Note: Dictation voice to text software may have been used in the creation of this document   **

## 2020-03-05 NOTE — PROGRESS NOTES
Vancomycin IV Pharmacy-to-Dose Consultation    Ml Zavala is a 69 y o  male who is currently receiving Vancomycin IV with management by the Pharmacy Consult service  Assessment/Plan:  The patient was reviewed  Renal function is stable and no signs or symptoms of nephrotoxicity and/or infusion reactions were documented in the chart  Based on todays assessment, continue current vancomycin (day # 13) dosing of 500mg IV Q24H, with a plan for trough to be drawn at 0330 on 3/7/20  We will continue to follow the patients culture results and clinical progress daily      Sheryle Ill, Pharmacist

## 2020-03-05 NOTE — PLAN OF CARE
Problem: Potential for Falls  Goal: Patient will remain free of falls  Description  INTERVENTIONS:  - Assess patient frequently for physical needs  -  Identify cognitive and physical deficits and behaviors that affect risk of falls    -  [place] fall precautions as indicated by assessment   - Educate patient/family on patient safety including physical limitations  - Instruct patient to call for assistance with activity based on assessment  - Modify environment to reduce risk of injury  - Consider OT/PT consult to assist with strengthening/mobility  Outcome: Progressing     Problem: PAIN - ADULT  Goal: Verbalizes/displays adequate comfort level or baseline comfort level  Description  Interventions:  - Encourage patient to monitor pain and request assistance  - Assess pain using appropriate pain scale  - Administer analgesics based on type and severity of pain and evaluate response  - Implement non-pharmacological measures as appropriate and evaluate response  - Consider cultural and social influences on pain and pain management  - Notify physician/advanced practitioner if interventions unsuccessful or patient reports new pain  Outcome: Progressing     Problem: INFECTION - ADULT  Goal: Absence or prevention of progression during hospitalization  Description  INTERVENTIONS:  - Assess and monitor for signs and symptoms of infection  - Monitor lab/diagnostic results  - Monitor all insertion sites, i e  indwelling lines, tubes, and drains  - Monitor endotracheal if appropriate and nasal secretions for changes in amount and color  - [place] appropriate cooling/warming therapies per order  - Administer medications as ordered  - Instruct and encourage patient and family to use good hand hygiene technique  - Identify and instruct in appropriate isolation precautions for identified infection/condition  Outcome: Progressing  Goal: Absence of fever/infection during neutropenic period  Description  INTERVENTIONS:  - Monitor WBC    Outcome: Progressing     Problem: SAFETY ADULT  Goal: Maintain or return to baseline ADL function  Description  INTERVENTIONS:  -  Assess patient's ability to carry out ADLs; assess patient's baseline for ADL function and identify physical deficits which impact ability to perform ADLs (bathing, care of mouth/teeth, toileting, grooming, dressing, etc )  - Assess/evaluate cause of self-care deficits   - Assess range of motion  - Assess patient's mobility; develop plan if impaired  - Assess patient's need for assistive devices and provide as appropriate  - Encourage maximum independence but intervene and supervise when necessary  - Involve family in performance of ADLs  - Assess for home care needs following discharge   - Consider OT consult to assist with ADL evaluation and planning for discharge  - Provide patient education as appropriate  Outcome: Progressing  Goal: Maintain or return mobility status to optimal level  Description  INTERVENTIONS:  - Assess patient's baseline mobility status (ambulation, transfers, stairs, etc )    - Identify cognitive and physical deficits and behaviors that affect mobility  - Identify mobility aids required to assist with transfers and/or ambulation (gait belt, sit-to-stand, lift, walker, cane, etc )  - [place] fall precautions as indicated by assessment  - Record patient progress and toleration of activity level on Mobility SBAR; progress patient to next Phase/Stage  - Instruct patient to call for assistance with activity based on assessment  - Consider rehabilitation consult to assist with strengthening/weightbearing, etc   Outcome: Progressing     Problem: DISCHARGE PLANNING  Goal: Discharge to home or other facility with appropriate resources  Description  INTERVENTIONS:  - Identify barriers to discharge w/patient and caregiver  - Arrange for needed discharge resources and transportation as appropriate  - Identify discharge learning needs (meds, wound care, etc )  - Arrange for interpretive services to assist at discharge as needed  - Refer to Case Management Department for coordinating discharge planning if the patient needs post-hospital services based on physician/advanced practitioner order or complex needs related to functional status, cognitive ability, or social support system  Outcome: Progressing     Problem: Knowledge Deficit  Goal: Patient/family/caregiver demonstrates understanding of disease process, treatment plan, medications, and discharge instructions  Description  Complete learning assessment and assess knowledge base    Interventions:  - Provide teaching at level of understanding  - Provide teaching via preferred learning methods  Outcome: Progressing     Problem: CARDIOVASCULAR - ADULT  Goal: Maintains optimal cardiac output and hemodynamic stability  Description  INTERVENTIONS:  - Monitor I/O, vital signs and rhythm  - Monitor for S/S and trends of decreased cardiac output  - Administer and titrate ordered vasoactive medications to optimize hemodynamic stability  - Assess quality of pulses, skin color and temperature  - Assess for signs of decreased coronary artery perfusion  - Instruct patient to report change in severity of symptoms  Outcome: Progressing  Goal: Absence of cardiac dysrhythmias or at baseline rhythm  Description  INTERVENTIONS:  - Continuous cardiac monitoring, vital signs, obtain 12 lead EKG if ordered  - Administer antiarrhythmic and heart rate control medications as ordered  - Monitor electrolytes and administer replacement therapy as ordered  Outcome: Progressing     Problem: MUSCULOSKELETAL - ADULT  Goal: Maintain or return mobility to safest level of function  Description  INTERVENTIONS:  - Assess patient's ability to carry out ADLs; assess patient's baseline for ADL function and identify physical deficits which impact ability to perform ADLs (bathing, care of mouth/teeth, toileting, grooming, dressing, etc )  - Assess/evaluate cause of self-care deficits   - Assess range of motion  - Assess patient's mobility  - Assess patient's need for assistive devices and provide as appropriate  - Encourage maximum independence but intervene and supervise when necessary  - Involve family in performance of ADLs  - Assess for home care needs following discharge   - Consider OT consult to assist with ADL evaluation and planning for discharge  - Provide patient education as appropriate  Outcome: Progressing  Goal: Maintain proper alignment of affected body part  Description  INTERVENTIONS:  - Support, maintain and protect limb and body alignment  - Provide patient/ family with appropriate education  Outcome: Progressing     Problem: Prexisting or High Potential for Compromised Skin Integrity  Goal: Skin integrity is maintained or improved  Description  INTERVENTIONS:  - Identify patients at risk for skin breakdown  - Assess and monitor skin integrity  - Assess and monitor nutrition and hydration status  - Monitor labs   - Assess for incontinence   - Turn and reposition patient  - Assist with mobility/ambulation  - Relieve pressure over bony prominences  - Avoid friction and shearing  - Provide appropriate hygiene as needed including keeping skin clean and dry  - Evaluate need for skin moisturizer/barrier cream  - Collaborate with interdisciplinary team   - Patient/family teaching  - Consider wound care consult   Outcome: Progressing     Problem: Nutrition/Hydration-ADULT  Goal: Nutrient/Hydration intake appropriate for improving, restoring or maintaining nutritional needs  Description  Monitor and assess patient's nutrition/hydration status for malnutrition  Collaborate with interdisciplinary team and initiate plan and interventions as ordered  Monitor patient's weight and dietary intake as ordered or per policy  Utilize nutrition screening tool and intervene as necessary   Determine patient's food preferences and provide high-protein, high-caloric foods as appropriate       INTERVENTIONS:  - Monitor oral intake, urinary output, labs, and treatment plans  - Assess nutrition and hydration status and recommend course of action  - Evaluate amount of meals eaten  - Assist patient with eating if necessary   - Allow adequate time for meals  - Recommend/ encourage appropriate diets, oral nutritional supplements, and vitamin/mineral supplements  - Order, calculate, and assess calorie counts as needed  - Recommend, monitor, and adjust tube feedings and TPN/PPN based on assessed needs  - Assess need for intravenous fluids  - Provide specific nutrition/hydration education as appropriate  - Include patient/family/caregiver in decisions related to nutrition  Outcome: Progressing

## 2020-03-05 NOTE — ASSESSMENT & PLAN NOTE
· Patient acute kidney injury on admission with creatinine of 1 9  · Creatinine this morning is 1 44  · Continue Bumex  · Monitor renal function in am while diuresing

## 2020-03-05 NOTE — ASSESSMENT & PLAN NOTE
Wt Readings from Last 3 Encounters:   03/05/20 115 kg (254 lb 6 6 oz)   02/04/20 108 kg (239 lb)   01/07/20 113 kg (249 lb)     · Patient has chronic systolic/diastolic CHF with ejection fraction of 45%  · Patient is on room air and lungs are clear to auscultation  · Continue on Bumex  · Daily weight and I&Os  · Monitor renal function in a m

## 2020-03-05 NOTE — ASSESSMENT & PLAN NOTE
· VAS lower limb venous duplex studies completed in November of 2019 revealed in no evidence of right lower limb thrombus in the common femoral artery  No gross evidence of acute DVT based on color-flow analysis the left lower limb

## 2020-03-05 NOTE — ASSESSMENT & PLAN NOTE
· Patient developed left lower extremity cellulitis/left foot abscess  · Continue IV vancomycin  · ID follow-up noted patient  Started on cefepime and Flagyl  · Follow-up culture results- gram-negative rods  · Podiatry ordered MRI of left forefoot negative for osteomyelitis or fluid collection  · Podiatry following, planning on draining area tonight  · Tenia pedis that patient has could be a source for cellulitis    Continue on Diflucan

## 2020-03-05 NOTE — ASSESSMENT & PLAN NOTE
· Patient has chronic atrial fibrillation  · Rates are controlled on Lopressor 25 mg p o bid  · Eliquis is held    · Patient denies signs of bleeding

## 2020-03-06 PROBLEM — E87.1 HYPONATREMIA: Status: RESOLVED | Noted: 2020-02-19 | Resolved: 2020-03-06

## 2020-03-06 LAB
ANION GAP SERPL CALCULATED.3IONS-SCNC: 5 MMOL/L (ref 4–13)
BACTERIA WND AEROBE CULT: ABNORMAL
BASOPHILS # BLD AUTO: 0.08 THOUSANDS/ΜL (ref 0–0.1)
BASOPHILS NFR BLD AUTO: 1 % (ref 0–1)
BUN SERPL-MCNC: 36 MG/DL (ref 5–25)
CALCIUM SERPL-MCNC: 9.2 MG/DL (ref 8.3–10.1)
CHLORIDE SERPL-SCNC: 102 MMOL/L (ref 100–108)
CO2 SERPL-SCNC: 30 MMOL/L (ref 21–32)
CREAT SERPL-MCNC: 1.34 MG/DL (ref 0.6–1.3)
EOSINOPHIL # BLD AUTO: 0.17 THOUSAND/ΜL (ref 0–0.61)
EOSINOPHIL NFR BLD AUTO: 2 % (ref 0–6)
ERYTHROCYTE [DISTWIDTH] IN BLOOD BY AUTOMATED COUNT: 15.9 % (ref 11.6–15.1)
GFR SERPL CREATININE-BSD FRML MDRD: 52 ML/MIN/1.73SQ M
GLUCOSE SERPL-MCNC: 82 MG/DL (ref 65–140)
GRAM STN SPEC: ABNORMAL
GRAM STN SPEC: ABNORMAL
HCT VFR BLD AUTO: 35.7 % (ref 36.5–49.3)
HGB BLD-MCNC: 11.3 G/DL (ref 12–17)
IMM GRANULOCYTES # BLD AUTO: 0.14 THOUSAND/UL (ref 0–0.2)
IMM GRANULOCYTES NFR BLD AUTO: 1 % (ref 0–2)
LYMPHOCYTES # BLD AUTO: 0.92 THOUSANDS/ΜL (ref 0.6–4.47)
LYMPHOCYTES NFR BLD AUTO: 9 % (ref 14–44)
MCH RBC QN AUTO: 31.5 PG (ref 26.8–34.3)
MCHC RBC AUTO-ENTMCNC: 31.7 G/DL (ref 31.4–37.4)
MCV RBC AUTO: 99 FL (ref 82–98)
MONOCYTES # BLD AUTO: 1.19 THOUSAND/ΜL (ref 0.17–1.22)
MONOCYTES NFR BLD AUTO: 11 % (ref 4–12)
NEUTROPHILS # BLD AUTO: 8.24 THOUSANDS/ΜL (ref 1.85–7.62)
NEUTS SEG NFR BLD AUTO: 76 % (ref 43–75)
NRBC BLD AUTO-RTO: 0 /100 WBCS
PLATELET # BLD AUTO: 325 THOUSANDS/UL (ref 149–390)
PMV BLD AUTO: 10.2 FL (ref 8.9–12.7)
POTASSIUM SERPL-SCNC: 4.5 MMOL/L (ref 3.5–5.3)
RBC # BLD AUTO: 3.59 MILLION/UL (ref 3.88–5.62)
SODIUM SERPL-SCNC: 137 MMOL/L (ref 136–145)
WBC # BLD AUTO: 10.74 THOUSAND/UL (ref 4.31–10.16)

## 2020-03-06 PROCEDURE — 80048 BASIC METABOLIC PNL TOTAL CA: CPT | Performed by: STUDENT IN AN ORGANIZED HEALTH CARE EDUCATION/TRAINING PROGRAM

## 2020-03-06 PROCEDURE — 99232 SBSQ HOSP IP/OBS MODERATE 35: CPT | Performed by: INTERNAL MEDICINE

## 2020-03-06 PROCEDURE — 85025 COMPLETE CBC W/AUTO DIFF WBC: CPT | Performed by: STUDENT IN AN ORGANIZED HEALTH CARE EDUCATION/TRAINING PROGRAM

## 2020-03-06 RX ORDER — HYDROMORPHONE HCL/PF 1 MG/ML
0.5 SYRINGE (ML) INJECTION ONCE
Status: COMPLETED | OUTPATIENT
Start: 2020-03-06 | End: 2020-03-06

## 2020-03-06 RX ORDER — HEPARIN SODIUM 5000 [USP'U]/ML
5000 INJECTION, SOLUTION INTRAVENOUS; SUBCUTANEOUS EVERY 8 HOURS SCHEDULED
Status: COMPLETED | OUTPATIENT
Start: 2020-03-06 | End: 2020-03-06

## 2020-03-06 RX ADMIN — HEPARIN SODIUM 5000 UNITS: 5000 INJECTION, SOLUTION INTRAVENOUS; SUBCUTANEOUS at 16:35

## 2020-03-06 RX ADMIN — DULOXETINE 30 MG: 30 CAPSULE, DELAYED RELEASE ORAL at 09:20

## 2020-03-06 RX ADMIN — OXYCODONE HYDROCHLORIDE AND ACETAMINOPHEN 1000 MG: 500 TABLET ORAL at 09:20

## 2020-03-06 RX ADMIN — HYDROCODONE BITARTRATE AND ACETAMINOPHEN 2 TABLET: 5; 325 TABLET ORAL at 22:43

## 2020-03-06 RX ADMIN — HYDROMORPHONE HYDROCHLORIDE 0.5 MG: 1 INJECTION, SOLUTION INTRAMUSCULAR; INTRAVENOUS; SUBCUTANEOUS at 09:40

## 2020-03-06 RX ADMIN — FLUCONAZOLE 200 MG: 100 TABLET ORAL at 09:20

## 2020-03-06 RX ADMIN — Medication 500 MG: at 05:03

## 2020-03-06 RX ADMIN — HEPARIN SODIUM 5000 UNITS: 5000 INJECTION, SOLUTION INTRAVENOUS; SUBCUTANEOUS at 22:41

## 2020-03-06 RX ADMIN — NYSTATIN 500000 UNITS: 100000 SUSPENSION ORAL at 09:20

## 2020-03-06 RX ADMIN — CEFEPIME HYDROCHLORIDE 2000 MG: 2 INJECTION, SOLUTION INTRAVENOUS at 20:15

## 2020-03-06 RX ADMIN — APIXABAN 5 MG: 5 TABLET, FILM COATED ORAL at 18:51

## 2020-03-06 RX ADMIN — METRONIDAZOLE 500 MG: 500 INJECTION, SOLUTION INTRAVENOUS at 16:27

## 2020-03-06 RX ADMIN — HYDROCODONE BITARTRATE AND ACETAMINOPHEN 2 TABLET: 5; 325 TABLET ORAL at 05:37

## 2020-03-06 RX ADMIN — HYDROCODONE BITARTRATE AND ACETAMINOPHEN 2 TABLET: 5; 325 TABLET ORAL at 16:28

## 2020-03-06 RX ADMIN — METRONIDAZOLE 500 MG: 500 INJECTION, SOLUTION INTRAVENOUS at 09:19

## 2020-03-06 RX ADMIN — MELATONIN 1000 UNITS: at 09:20

## 2020-03-06 RX ADMIN — CEFEPIME HYDROCHLORIDE 2000 MG: 2 INJECTION, SOLUTION INTRAVENOUS at 10:14

## 2020-03-06 RX ADMIN — PANTOPRAZOLE SODIUM 40 MG: 40 TABLET, DELAYED RELEASE ORAL at 09:20

## 2020-03-06 NOTE — OP NOTE
OPERATIVE REPORT  PATIENT NAME: Javy Elliott    :  1945  MRN: [id number]  Pt Location: [facility] OR ROOM 02    SURGERY DATE: 3/5/2020    Surgeon(s) and Role:     * Cindi Olmstead DPM - Primary     * Oscar Encarnacion DPM - Assisting    Preop Diagnosis:  Cellulitis of left lower extremity [L03 116]  Abscess of left foot [L02 612]    Post-Op Diagnosis Codes:     * Cellulitis of left lower extremity [L03 116]     * Abscess of left foot [L02 612]    Procedure(s) (LRB):  INCISION AND DRAINAGE (I&D) EXTREMITY (Left)    Specimen(s):  ID Type Source Tests Collected by Time Destination   A :  Wound Foot, Left ANAEROBIC CULTURE AND GRAM STAIN, WOUND CULTURE [facility] 3/5/2020 1959        Estimated Blood Loss:   Minimal    Drains:  * No LDAs found *    Anesthesia Type:   IV Sedation with Anesthesia    Operative Indications:  Cellulitis of left lower extremity [L03 116]  Abscess of left foot [L02 612]    Operative Findings:  Consistent with diagnosis: copious amounts of purulence and fat necrosis along the dorsal left foot subcutaneous plain  Post-debridement wound measures approximately 11 0x3 5x1 0cm  Complications:   None    Procedure and Technique:    The patient was brought into the operating room and placed on the operating room table in the supine position  A time out was performed to confirm the correct patient, procedure and site with all parties in agreement  An ankle block was performed consisting of 20 ml of 1% Lidocaine and 0 5% Bupivacaine in a 1:1 mixture  The foot was then scrubbed, prepped and draped in the usual aseptic manner  Attention was directed to the dorsum of the left foot with noted small ulcerations and purulent drainage  Using a hemostat the ulcerations were probed and noted to be connecting  Using a #15 blade incisions were made through skin and subcutaneous layers to deep fascia connecting the small ulcerations   There was noted to be copious amounts of purulence and fat necrosis along the dorsal left foot subcutaneous plain  The purulence was cultures for aerobic and anaerobic  All necrotic, devitalized skin and soft tissue was debrided with a rongeur and tissue nipper  There was found to be purulence and necrotic tissue extending into the 2nd interspace which was subsequently decompressed  The wound was flushed with copious amounts of nss  Post-debridement wound measures approximately 11 0x3 5x1 0cm with exposed tendons  Tendons remaining appear healthy  The wound edges appeared intact to the underlying tissue and with mild amount of bleeding  The wound was dressed with iodoform packing and dsd  Dr Shefali Ramirez was present for the entire procedure and participated in all key surgical elements      Patient Disposition:  PACU     SIGNATURE: Devyn Lewis DPM  DATE: March 5, 2020  TIME: 8:25 PM

## 2020-03-06 NOTE — ASSESSMENT & PLAN NOTE
· Per Podiatry  · POD 1 for left foot I&D    MRI was negative for fluid collection  · Further plan per podiatry

## 2020-03-06 NOTE — PROGRESS NOTES
Vancomycin IV Pharmacy-to-Dose Consultation    Ava Jones is a 69 y o  male who is currently receiving Vancomycin IV with management by the Pharmacy Consult service  Assessment/Plan:  The patient was reviewed  Renal function is stable and no signs or symptoms of nephrotoxicity and/or infusion reactions were documented in the chart  Based on todays assessment, continue current vancomycin (day # 14) dosing of Vancomycin 500 mg IV Q24H, with a plan for trough to be drawn at 0330 on 03/07/20  We will continue to follow the patients culture results and clinical progress daily      Leopold Kanaris, Pharmacist

## 2020-03-06 NOTE — PROGRESS NOTES
Jefferson Gerald  69 y o   male  1945  mrn [id number]    Assessment/Plan:  1  Cellulitis LLE: strange that it occured during this hospitalization while on abx, though given chronic venous stasis he would be at risk for it   Initial clinical improvement but now purulence on forefoot, ? Abscess  GS with gram negative rods, MRI negative for abscess, but I and D'd yesterday by podiatry        - add cefepime/flagyl  - cont vancomycin   - local wound care  - await OR cultures  - anticipate home on oral abx MRI negative for osteo    Subjective:    No fevers  Podiatry I and D'd wound yesterday        Objective:    Gen: nad  Eyes: anicteric  Cor: rrr s1s2  Lungs: CTA bilaterally  Abd: soft nt/nd +BS  Ext: R foot dressed    Labs:  CBC w/diff  Recent Labs     03/06/20  0547   WBC 10 74*   HGB 11 3*   HCT 35 7*      NEUTOPHILPCT 76*   LYMPHOPCT 9*   MONOPCT 11   EOSPCT 2     BMP  Recent Labs     03/06/20  0547   K 4 5      CO2 30   BUN 36*   CREATININE 1 34*   CALCIUM 9 2     CMP  Recent Labs     03/05/20  0526 03/06/20  0547   K 4 4 4 5   CL 98* 102   CO2 29 30   BUN 42* 36*   CREATININE 1 44* 1 34*   CALCIUM 8 9 9 2   ALKPHOS 116  --    ALT 9*  --    AST 29  --         labrc    Cultures:  No results found for: BLOODCX  Lab Results   Component Value Date    WOUNDCULT 4+ Growth of Serratia marcescens (A) 03/02/2020    WOUNDCULT 3+ Growth of Serratia marcescens (A) 07/24/2019    WOUNDCULT 1+ Growth of  07/24/2019    WOUNDCULT (A) 01/02/2019     1+ Growth of Methicillin Resistant Staphylococcus aureus     Lab Results   Component Value Date    URINECX <10,000 cfu/ml  02/19/2020     No results found for: SPUTUMCULTUR    MED:  reviewed      Current Facility-Administered Medications:     albumin human (FLEXBUMIN) 25 % injection 12 5 g, 12 5 g, Intravenous, Once, Princess Sommers DPM, Stopped at 02/20/20 7873    aluminum-magnesium hydroxide-simethicone (MYLANTA) 200-200-20 mg/5 mL oral suspension 30 mL, 30 mL, Oral, Q4H PRN, Firman Bumps, DPM    ascorbic acid (VITAMIN C) tablet 1,000 mg, 1,000 mg, Oral, Daily, Firman Bumps, DPM, 1,000 mg at 03/04/20 0855    bacitracin 100,000 Units, neomycin-polymyxin B (NEOSPORIN-) 1 mL in sodium chloride 0 9 % 1,000 mL irrigation bag, , Irrigation, Once, Firman Bumps, DPM    benzonatate (TESSALON PERLES) capsule 100 mg, 100 mg, Oral, TID PRN, Firman Bumps, DPM, 100 mg at 03/05/20 1053    bumetanide (BUMEX) tablet 1 mg, 1 mg, Oral, Daily, Firman Bumps, DPM, 1 mg at 03/05/20 1104    cefepime (MAXIPIME) IVPB (premix) 2,000 mg, 2,000 mg, Intravenous, Q12H, Firman Bumps, DPM, Last Rate: 100 mL/hr at 03/05/20 2303, 2,000 mg at 03/05/20 2303    cholecalciferol (VITAMIN D3) tablet 1,000 Units, 1,000 Units, Oral, Daily, Firman Bumps, DPM, 1,000 Units at 03/05/20 1053    DULoxetine (CYMBALTA) delayed release capsule 30 mg, 30 mg, Oral, Daily, Firman Bumps, DPM, 30 mg at 03/05/20 1053    fluconazole (DIFLUCAN) tablet 200 mg, 200 mg, Oral, Daily, Firman Bumps, DPM, 200 mg at 03/05/20 1054    Gadobutrol injection (SINGLE-DOSE) SOLN 10 mL, 10 mL, Intravenous, Once in imaging, Firman Bumps, DPM    HYDROcodone-acetaminophen (NORCO) 5-325 mg per tablet 2 tablet, 2 tablet, Oral, Q4H PRN, Firman Bumps, DPM, 2 tablet at 03/06/20 0537    metoprolol tartrate (LOPRESSOR) tablet 25 mg, 25 mg, Oral, Q12H [address], Firman Bumps, DPM, 25 mg at 03/05/20 1106    nystatin (MYCOSTATIN) oral suspension 500,000 Units, 500,000 Units, Swish & Swallow, 4x Daily, Firman Bumps, DPM, 500,000 Units at 03/05/20 1745    pantoprazole (PROTONIX) EC tablet 40 mg, 40 mg, Oral, Daily, Firman Bumps, DPM, 40 mg at 03/05/20 1053    senna (SENOKOT) tablet 17 2 mg, 2 tablet, Oral, Daily, Firman Bumps, DPM, 17 2 mg at 03/03/20 0833    sodium chloride (OCEAN) 0 65 % nasal spray 2 spray, 2 spray, Each Nare, Q1H PRN, Firman Bumps, DPM, 2 spray at 02/28/20 0903    vancomycin (VANCOCIN) 500 mg in sodium chloride 0 9 % 100 mL IVPB, 500 mg, Intravenous, Q24H, Manmalathi McGee, DPM, Last Rate: 200 mL/hr at 03/06/20 0503, 500 mg at 03/06/20 9590    Principal Problem:    Cellulitis of right lower extremity  Active Problems:    Gastroesophageal reflux disease without esophagitis    Class 1 obesity due to excess calories with serious comorbidity and body mass index (BMI) of 34 0 to 34 9 in adult    Persistent atrial fibrillation    Cellulitis of left lower extremity    Chronic deep vein thrombosis (DVT) (HCC)    Acute kidney injury superimposed on CKD (HCC)    Chronic combined systolic and diastolic congestive heart failure ([facility] [address] )    Hypertension    History of methicillin resistant staphylococcus aureus (MRSA)    Hyponatremia    Oral candidiasis    Abscess of left foot      Anita Murphy MD

## 2020-03-06 NOTE — ANESTHESIA PREPROCEDURE EVALUATION
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, wants no sedation if possible)     Cardiovascular  EKG reviewed, Hyperlipidemia, Hypertension , Dysrhythmias , atrial fibrillation, CHF ,   Pulmonary hypertension Pulmonary       GI/Hepatic    GERD well controlled,             Endo/Other     GYN       Hematology   Musculoskeletal       Neurology   Psychology           Physical Exam    Airway    Mallampati score: II  TM Distance: >3 FB  Neck ROM: full     Dental   No notable dental hx     Cardiovascular  Rhythm: regular,     Pulmonary  Pulmonary exam normal     Other Findings        Anesthesia Plan  ASA Score- 3     Anesthesia Type- IV sedation with anesthesia with ASA Monitors  Additional Monitors:   Airway Plan:     Comment: Wants no sedation unless necessary,   Plan Factors-    Induction-     Postoperative Plan-     Informed Consent- Anesthetic plan and risks discussed with patient

## 2020-03-06 NOTE — PROGRESS NOTES
Progress Note - Podiatry  Linwood Mazariegos 69 y o  male MRN: [id number]  Unit/Bed#: -01 Encounter: [id number]    Assessment/Plan:  1  Abscess left forefoot   -PO Day #1 S/P I&D LFT deep abscess   -Wound Irrigated with 200cc NSS, packed with 2ft of 1/2" Iodaform Gauze    - Anticipate D/C Monday possibly to SNF  2  Cellulitis left leg   - resolving satisfactorily status post incision and              -continue with current IV antibiotics per Infectious Disease   -right leg has resolved completely since admission  3  Chronic venous insufficiency Bilateral   -elevation, compression stockings and Ace wraps   -may wear his own compression stocking on the right leg  4  Tinea pedis-chronic              -Betadine to dry interspaces left foot daily              -alginate rope woven and interdigitally to dry interspaces               -Diflucan daily x1 week  5  CHF, chronic AFib              -managed per Internal Medicine and Cardiology    Subjective/Objective   Chief Complaint:   Chief Complaint   Patient presents with    Foot Pain     patient complaint of foot pain and discloration x 1 day       Subjective:  61-year-old white male seen today resting bed  Relates pain 24 hours  Managed current pain meds  Denies any fever or shortness of breath  Blood pressure 107/71, pulse 96, temperature 98 °F (36 7 °C), temperature source Oral, resp  rate 18, height 6' (1 829 m), weight 115 kg (252 lb 6 8 oz), SpO2 97 %  ,Body mass index is 34 24 kg/m²      Invasive Devices     Peripheral Intravenous Line            Peripheral IV 03/05/20 Right Hand less than 1 day                Physical Exam:   General appearance: alert, cooperative and no distress  Neuro/Vascular: Pulses: Right: DP  0/4, PT  0/4, Left: DP  0/4, PT  0/4   Capillary Filling: 3 Sec, Edema:  Chronic +2 bilateral   8/2020 Arterial duplex: showed triphasic waveforms of DP&PT bilateral   EMMANUEL 1 3 on the right, and 0 9 on the left,  Epicritic sensations grossly intact  Extremity:  Surgical dressing intact with only mild strike through drainage noted  Bloody in nature  After removal packing wound was noted to be approximately 50% red and 50% yellow  No active necrosis noted  Small venous bleeder noted distally at medial base of the 3rd toe  Diminished calor and erythema of the forefoot                Labs and other studies:   CBC w/diff  Results from last 7 days   Lab Units 03/06/20  0547   WBC Thousand/uL 10 74*   HEMOGLOBIN g/dL 11 3*   HEMATOCRIT % 35 7*   PLATELETS Thousands/uL 325   NEUTROS PCT % 76*   LYMPHS PCT % 9*   MONOS PCT % 11   EOS PCT % 2     BMP  Results from last 7 days   Lab Units 03/06/20  0547   POTASSIUM mmol/L 4 5   CHLORIDE mmol/L 102   CO2 mmol/L 30   BUN mg/dL 36*   CREATININE mg/dL 1 34*   CALCIUM mg/dL 9 2     CMP  Results from last 7 days   Lab Units 03/06/20  0547 03/05/20  0526   POTASSIUM mmol/L 4 5 4 4   CHLORIDE mmol/L 102 98*   CO2 mmol/L 30 29   BUN mg/dL 36* 42*   CREATININE mg/dL 1 34* 1 44*   CALCIUM mg/dL 9 2 8 9   ALK PHOS U/L  --  116   ALT U/L  --  9*   AST U/L  --  29       @Culture@  Lab Results   Component Value Date    URINECX <10,000 cfu/ml  02/19/2020         Current Facility-Administered Medications:     albumin human (FLEXBUMIN) 25 % injection 12 5 g, 12 5 g, Intravenous, Once, Rhoda Axe, DPM, Stopped at 02/20/20 2343    aluminum-magnesium hydroxide-simethicone (MYLANTA) 200-200-20 mg/5 mL oral suspension 30 mL, 30 mL, Oral, Q4H PRN, Rhoda Axe, DPM    apixaban (ELIQUIS) tablet 5 mg, 5 mg, Oral, BID, Martínez Purchase, DPM    ascorbic acid (VITAMIN C) tablet 1,000 mg, 1,000 mg, Oral, Daily, Rhoda Axe, DPM, 1,000 mg at 03/06/20 0920    benzonatate (TESSALON PERLES) capsule 100 mg, 100 mg, Oral, TID PRN, Rhoda Axe, DPM, 100 mg at 03/05/20 1053    bumetanide (BUMEX) tablet 1 mg, 1 mg, Oral, Daily, Rhoda Spears DPM, Stopped at 03/06/20 0920    cefepime (MAXIPIME) IVPB (premix) 2,000 mg, 2,000 mg, Intravenous, Q12H, Nyla Ogles, DPM, Last Rate: 100 mL/hr at 03/06/20 1014, 2,000 mg at 03/06/20 1014    cholecalciferol (VITAMIN D3) tablet 1,000 Units, 1,000 Units, Oral, Daily, Nyla Ogles, DPM, 1,000 Units at 03/06/20 0920    DULoxetine (CYMBALTA) delayed release capsule 30 mg, 30 mg, Oral, Daily, Nyla Ogles, DPM, 30 mg at 03/06/20 0920    fluconazole (DIFLUCAN) tablet 200 mg, 200 mg, Oral, Daily, Nyla Ogles, DPM, 200 mg at 03/06/20 0920    Gadobutrol injection (SINGLE-DOSE) SOLN 10 mL, 10 mL, Intravenous, Once in imaging, Nyla Ogles, DPM    heparin (porcine) subcutaneous injection 5,000 Units, 5,000 Units, Subcutaneous, Q8H [facility], DPM    HYDROcodone-acetaminophen (NORCO) 5-325 mg per tablet 2 tablet, 2 tablet, Oral, Q4H PRN, Nyla Ogles, DPM, 2 tablet at 03/06/20 0537    metoprolol tartrate (LOPRESSOR) tablet 25 mg, 25 mg, Oral, Q12H [facility], Nyla Ogles, DPM, Stopped at 03/06/20 0920    metroNIDAZOLE (FLAGYL) IVPB (premix) 500 mg, 500 mg, Intravenous, Q8H, Jakob Bejarano MD, Last Rate: 200 mL/hr at 03/06/20 0919, 500 mg at 03/06/20 0919    nystatin (MYCOSTATIN) oral suspension 500,000 Units, 500,000 Units, Swish & Swallow, 4x Daily, Nyla Ogles, DPM, 500,000 Units at 03/06/20 0920    pantoprazole (PROTONIX) EC tablet 40 mg, 40 mg, Oral, Daily, Nyla Ogles, DPM, 40 mg at 03/06/20 0920    senna (SENOKOT) tablet 17 2 mg, 2 tablet, Oral, Daily, Nyla Ogles, DPM, 17 2 mg at 03/03/20 4660    sodium chloride (OCEAN) 0 65 % nasal spray 2 spray, 2 spray, Each Nare, Q1H PRN, Nyla Ogles, DPM, 2 spray at 02/28/20 0903    vancomycin (VANCOCIN) 500 mg in sodium chloride 0 9 % 100 mL IVPB, 500 mg, Intravenous, Q24H, Nyla Ogles, DPM, Last Rate: 200 mL/hr at 03/06/20 0503, 500 mg at 03/06/20 0503    Imaging: I have personally reviewed pertinent films in PACS  EKG, Pathology, and Other Studies: I have personally reviewed pertinent reports    VTE Pharmacologic Prophylaxis: Heparin  VTE Mechanical Prophylaxis: sequential compression device    Michael Vargas DPM

## 2020-03-06 NOTE — ASSESSMENT & PLAN NOTE
· Patient presented with right lower extremity cellulitis - improving  · On IV antibiotics  · ID on board - appreciate recommendations

## 2020-03-06 NOTE — PLAN OF CARE
Problem: Potential for Falls  Goal: Patient will remain free of falls  Description  INTERVENTIONS:  - Assess patient frequently for physical needs  -  Identify cognitive and physical deficits and behaviors that affect risk of falls    -  [place] fall precautions as indicated by assessment   - Educate patient/family on patient safety including physical limitations  - Instruct patient to call for assistance with activity based on assessment  - Modify environment to reduce risk of injury  - Consider OT/PT consult to assist with strengthening/mobility  3/6/2020 0937 by Jenise Dominguez RN  Outcome: Progressing  3/6/2020 0928 by Jenise Dominguez RN  Outcome: Progressing     Problem: PAIN - ADULT  Goal: Verbalizes/displays adequate comfort level or baseline comfort level  Description  Interventions:  - Encourage patient to monitor pain and request assistance  - Assess pain using appropriate pain scale  - Administer analgesics based on type and severity of pain and evaluate response  - Implement non-pharmacological measures as appropriate and evaluate response  - Consider cultural and social influences on pain and pain management  - Notify physician/advanced practitioner if interventions unsuccessful or patient reports new pain  3/6/2020 0937 by Jenise Dominguez RN  Outcome: Progressing  3/6/2020 0928 by Jenise Dominguez RN  Outcome: Progressing     Problem: INFECTION - ADULT  Goal: Absence or prevention of progression during hospitalization  Description  INTERVENTIONS:  - Assess and monitor for signs and symptoms of infection  - Monitor lab/diagnostic results  - Monitor all insertion sites, i e  indwelling lines, tubes, and drains  - Monitor endotracheal if appropriate and nasal secretions for changes in amount and color  - [place] appropriate cooling/warming therapies per order  - Administer medications as ordered  - Instruct and encourage patient and family to use good hand hygiene technique  - Identify and instruct in appropriate isolation precautions for identified infection/condition  3/6/2020 0937 by Mari Mcknight RN  Outcome: Progressing  3/6/2020 0928 by Mari Mcknight RN  Outcome: Progressing  Goal: Absence of fever/infection during neutropenic period  Description  INTERVENTIONS:  - Monitor WBC    3/6/2020 0937 by Mari Mcknight RN  Outcome: Progressing  3/6/2020 0928 by Mari Mcknight RN  Outcome: Progressing     Problem: SAFETY ADULT  Goal: Maintain or return to baseline ADL function  Description  INTERVENTIONS:  -  Assess patient's ability to carry out ADLs; assess patient's baseline for ADL function and identify physical deficits which impact ability to perform ADLs (bathing, care of mouth/teeth, toileting, grooming, dressing, etc )  - Assess/evaluate cause of self-care deficits   - Assess range of motion  - Assess patient's mobility; develop plan if impaired  - Assess patient's need for assistive devices and provide as appropriate  - Encourage maximum independence but intervene and supervise when necessary  - Involve family in performance of ADLs  - Assess for home care needs following discharge   - Consider OT consult to assist with ADL evaluation and planning for discharge  - Provide patient education as appropriate  3/6/2020 0937 by Mari Mcknight RN  Outcome: Progressing  3/6/2020 0928 by Mari Mcknight RN  Outcome: Progressing  Goal: Maintain or return mobility status to optimal level  Description  INTERVENTIONS:  - Assess patient's baseline mobility status (ambulation, transfers, stairs, etc )    - Identify cognitive and physical deficits and behaviors that affect mobility  - Identify mobility aids required to assist with transfers and/or ambulation (gait belt, sit-to-stand, lift, walker, cane, etc )  - [place] fall precautions as indicated by assessment  - Record patient progress and toleration of activity level on Mobility SBAR; progress patient to next Phase/Stage  - Instruct patient to call for assistance with activity based on assessment  - Consider rehabilitation consult to assist with strengthening/weightbearing, etc   3/6/2020 0937 by Marisol Strong RN  Outcome: Progressing  3/6/2020 0928 by Marisol Strong RN  Outcome: Progressing     Problem: DISCHARGE PLANNING  Goal: Discharge to home or other facility with appropriate resources  Description  INTERVENTIONS:  - Identify barriers to discharge w/patient and caregiver  - Arrange for needed discharge resources and transportation as appropriate  - Identify discharge learning needs (meds, wound care, etc )  - Arrange for interpretive services to assist at discharge as needed  - Refer to Case Management Department for coordinating discharge planning if the patient needs post-hospital services based on physician/advanced practitioner order or complex needs related to functional status, cognitive ability, or social support system  3/6/2020 0937 by Marisol Strong RN  Outcome: Progressing  3/6/2020 0928 by Marisol Strong RN  Outcome: Progressing     Problem: Knowledge Deficit  Goal: Patient/family/caregiver demonstrates understanding of disease process, treatment plan, medications, and discharge instructions  Description  Complete learning assessment and assess knowledge base    Interventions:  - Provide teaching at level of understanding  - Provide teaching via preferred learning methods  3/6/2020 0937 by Marisol Strong RN  Outcome: Progressing  3/6/2020 0928 by Marisol Strong RN  Outcome: Progressing     Problem: CARDIOVASCULAR - ADULT  Goal: Maintains optimal cardiac output and hemodynamic stability  Description  INTERVENTIONS:  - Monitor I/O, vital signs and rhythm  - Monitor for S/S and trends of decreased cardiac output  - Administer and titrate ordered vasoactive medications to optimize hemodynamic stability  - Assess quality of pulses, skin color and temperature  - Assess for signs of decreased coronary artery perfusion  - Instruct patient to report change in severity of symptoms  3/6/2020 0937 by Carver Gowers, RN  Outcome: Progressing  3/6/2020 0928 by Carver Gowers, RN  Outcome: Progressing  Goal: Absence of cardiac dysrhythmias or at baseline rhythm  Description  INTERVENTIONS:  - Continuous cardiac monitoring, vital signs, obtain 12 lead EKG if ordered  - Administer antiarrhythmic and heart rate control medications as ordered  - Monitor electrolytes and administer replacement therapy as ordered  3/6/2020 0937 by Carver Gowers, RN  Outcome: Progressing  3/6/2020 0928 by Carver Gowers, RN  Outcome: Progressing     Problem: MUSCULOSKELETAL - ADULT  Goal: Maintain or return mobility to safest level of function  Description  INTERVENTIONS:  - Assess patient's ability to carry out ADLs; assess patient's baseline for ADL function and identify physical deficits which impact ability to perform ADLs (bathing, care of mouth/teeth, toileting, grooming, dressing, etc )  - Assess/evaluate cause of self-care deficits   - Assess range of motion  - Assess patient's mobility  - Assess patient's need for assistive devices and provide as appropriate  - Encourage maximum independence but intervene and supervise when necessary  - Involve family in performance of ADLs  - Assess for home care needs following discharge   - Consider OT consult to assist with ADL evaluation and planning for discharge  - Provide patient education as appropriate  3/6/2020 0937 by Carver Gowers, RN  Outcome: Progressing  3/6/2020 0928 by Carver Gowers, RN  Outcome: Progressing  Goal: Maintain proper alignment of affected body part  Description  INTERVENTIONS:  - Support, maintain and protect limb and body alignment  - Provide patient/ family with appropriate education  3/6/2020 0937 by Carver Gowers, RN  Outcome: Progressing  3/6/2020 0928 by Carver Gowers, RN  Outcome: Progressing     Problem: Prexisting or High Potential for Compromised Skin Integrity  Goal: Skin integrity is maintained or improved  Description  INTERVENTIONS:  - Identify patients at risk for skin breakdown  - Assess and monitor skin integrity  - Assess and monitor nutrition and hydration status  - Monitor labs   - Assess for incontinence   - Turn and reposition patient  - Assist with mobility/ambulation  - Relieve pressure over bony prominences  - Avoid friction and shearing  - Provide appropriate hygiene as needed including keeping skin clean and dry  - Evaluate need for skin moisturizer/barrier cream  - Collaborate with interdisciplinary team   - Patient/family teaching  - Consider wound care consult   3/6/2020 0937 by Enrique Cano RN  Outcome: Progressing  3/6/2020 0928 by Enrique Cano RN  Outcome: Progressing     Problem: Nutrition/Hydration-ADULT  Goal: Nutrient/Hydration intake appropriate for improving, restoring or maintaining nutritional needs  Description  Monitor and assess patient's nutrition/hydration status for malnutrition  Collaborate with interdisciplinary team and initiate plan and interventions as ordered  Monitor patient's weight and dietary intake as ordered or per policy  Utilize nutrition screening tool and intervene as necessary  Determine patient's food preferences and provide high-protein, high-caloric foods as appropriate       INTERVENTIONS:  - Monitor oral intake, urinary output, labs, and treatment plans  - Assess nutrition and hydration status and recommend course of action  - Evaluate amount of meals eaten  - Assist patient with eating if necessary   - Allow adequate time for meals  - Recommend/ encourage appropriate diets, oral nutritional supplements, and vitamin/mineral supplements  - Order, calculate, and assess calorie counts as needed  - Recommend, monitor, and adjust tube feedings and TPN/PPN based on assessed needs  - Assess need for intravenous fluids  - Provide specific nutrition/hydration education as appropriate  - Include patient/family/caregiver in decisions related to nutrition  3/6/2020 0937 by Ed Ortiz, RN  Outcome: Progressing  3/6/2020 0928 by Ed Ortiz, RN  Outcome: Progressing

## 2020-03-06 NOTE — PROGRESS NOTES
Progress Note - Linwood Mazariegos 1945, 69 y o  male MRN: [id number]    Unit/Bed#: -01 Encounter: [id number]    Primary Care Provider: Sarah Becker MD   Date and time admitted to hospital: 2/19/2020  1:18 PM    * Cellulitis of right lower extremity  Assessment & Plan  · Patient presented with right lower extremity cellulitis - improving  · On IV antibiotics  · ID on board - appreciate recommendations      Abscess of left foot  Assessment & Plan  · Per Podiatry  · POD 1 for left foot I&D  MRI was negative for fluid collection  · Further plan per podiatry    Oral candidiasis  Assessment & Plan  · On Diflucan  History of methicillin resistant staphylococcus aureus (MRSA)  Assessment & Plan  · Questionable history of MRSA on review of chart; in review of records cannot find any noted history  · On IV antibiotics  Hypertension  Assessment & Plan  · Blood pressure acceptable on admission with systolics in the 465U  · Continue to monitor with schedule vitals  · On Lopressor and Bumex  Chronic combined systolic and diastolic congestive heart failure (HCC)  Assessment & Plan  Wt Readings from Last 3 Encounters:   03/06/20 115 kg (252 lb 6 8 oz)   02/04/20 108 kg (239 lb)   01/07/20 113 kg (249 lb)     · Patient has chronic systolic/diastolic CHF with ejection fraction of 45%  · Appears euvolemic  · Patient is on room air and lungs are clear to auscultation  · Continue on Bumex  · Daily weight and I&Os  · Monitor renal function in a m      Acute kidney injury superimposed on CKD [facility])  Assessment & Plan  · Patient acute kidney injury on admission with creatinine of 1 9  · Baseline creatinine appears to be 1 2-1 4  · Creatinine this morning is 1 34  · Continue Bumex  · Monitor renal function in am while diuresing    Chronic deep vein thrombosis (DVT) (HCC)  Assessment & Plan  · VAS lower limb venous duplex studies completed in November of 2019 revealed in no evidence of right lower limb thrombus in the common femoral artery  No gross evidence of acute DVT based on color-flow analysis the left lower limb    Cellulitis of left lower extremity  Assessment & Plan  · Patient developed left lower extremity cellulitis/left foot abscess  · Continue IV vancomycin  · ID follow-up noted patient  Started on cefepime and Flagyl  · Follow-up culture results- gram-negative rods  · Podiatry ordered MRI of left forefoot negative for osteomyelitis or fluid collection  · Podiatry following, POD 1 of left lower extremity I&D  · Tenia pedis that patient has could be a source for cellulitis  Continue on Diflucan      Persistent atrial fibrillation  Assessment & Plan  · Patient has chronic atrial fibrillation  · Rates are controlled on Lopressor 25 mg p o bid  · Eliquis was held pre-op per podiatry - defer re-initiation of anticoagulation per podiatry should further procedures be planned  · Patient denies signs of bleeding    Class 1 obesity due to excess calories with serious comorbidity and body mass index (BMI) of 34 0 to 34 9 in adult  Assessment & Plan  · Lifestyle modifications recommended  Gastroesophageal reflux disease without esophagitis  Assessment & Plan  · Continue Protonix    Hyponatremiaresolved as of 3/6/2020  Assessment & Plan  · Patient presented with hyponatremia of 129  · Sodium is 137  · Resolved        VTE Pharmacologic Prophylaxis:   Pharmacologic: Apixaban (Eliquis)  Mechanical VTE Prophylaxis in Place: Yes    Patient Centered Rounds: I have performed bedside rounds with nursing staff today  Discussions with Specialists or Other Care Team Provider: Nursing, Attending    Education and Discussions with Family / Patient: Discussed with patient directly at bedside  Time Spent for Care: 20 minutes  More than 50% of total time spent on counseling and coordination of care as described above      Current Length of Stay: 16 day(s)    Current Patient Status: Inpatient   Certification Statement: The patient will continue to require additional inpatient hospital stay due to IV antibiotics, podiatry evaluation    Discharge Plan: Discharge pending as noted above  Code Status: Level 1 - Full Code      Subjective:   "I'm fine "    Patient largely refusing to answer most questions this morning  Reports left foot pain  No overnight events  Objective:     Vitals:   Temp (24hrs), Av °F (36 7 °C), Min:97 9 °F (36 6 °C), Max:98 2 °F (36 8 °C)    Temp:  [97 9 °F (36 6 °C)-98 2 °F (36 8 °C)] 98 °F (36 7 °C)  HR:  [60-88] 71  Resp:  [18] 18  BP: ()/(60-80) 105/60  SpO2:  [91 %-98 %] 96 %  Body mass index is 34 24 kg/m²  Input and Output Summary (last 24 hours): Intake/Output Summary (Last 24 hours) at 3/6/2020 1323  Last data filed at 3/6/2020 1315  Gross per 24 hour   Intake 980 ml   Output 1300 ml   Net -320 ml       Physical Exam:     Physical Exam   Constitutional: He is oriented to person, place, and time  Vital signs are normal  He appears well-developed  Appears comfortable, no acute distress   HENT:   Head: Normocephalic  Eyes: Pupils are equal, round, and reactive to light  Conjunctivae and EOM are normal  No scleral icterus  Neck: Normal range of motion  Cardiovascular: Normal rate and normal heart sounds  An irregularly irregular rhythm present  No murmur heard  Pulmonary/Chest: Effort normal and breath sounds normal  No respiratory distress  He has no wheezes  He has no rhonchi  He has no rales  Abdominal: Soft  Bowel sounds are normal  There is no tenderness  There is no rigidity, no rebound and no guarding  Musculoskeletal: He exhibits no edema, tenderness or deformity  Left lower extremity dressing c/d/i   Neurological: He is alert and oriented to person, place, and time  Skin: Skin is warm and dry  Psychiatric: His affect is blunt  He is withdrawn  Nursing note and vitals reviewed          Additional Data:     Labs:    Results from last 7 days   Lab Units 20  6589 03/02/20  0516   WBC Thousand/uL 10 74*   < > 18 83*   HEMOGLOBIN g/dL 11 3*   < > 11 3*   HEMATOCRIT % 35 7*   < > 34 2*   PLATELETS Thousands/uL 325   < > 258   BANDS PCT %  --   --  3   NEUTROS PCT % 76*   < >  --    LYMPHS PCT % 9*   < >  --    LYMPHO PCT %  --   --  7*   MONOS PCT % 11   < >  --    MONO PCT %  --   --  12   EOS PCT % 2   < > 0    < > = values in this interval not displayed  Results from last 7 days   Lab Units 03/06/20  0547 03/05/20  0526   SODIUM mmol/L 137 131*   POTASSIUM mmol/L 4 5 4 4   CHLORIDE mmol/L 102 98*   CO2 mmol/L 30 29   BUN mg/dL 36* 42*   CREATININE mg/dL 1 34* 1 44*   ANION GAP mmol/L 5 4   CALCIUM mg/dL 9 2 8 9   ALBUMIN g/dL  --  1 9*   TOTAL BILIRUBIN mg/dL  --  0 90   ALK PHOS U/L  --  116   ALT U/L  --  9*   AST U/L  --  29   GLUCOSE RANDOM mg/dL 82 100                           * I Have Reviewed All Lab Data Listed Above  * Additional Pertinent Lab Tests Reviewed:  All Labs Within Last 24 Hours Reviewed    Imaging:    Imaging Reports Reviewed Today Include:   Imaging Personally Reviewed by Myself Includes:      Recent Cultures (last 7 days):     Results from last 7 days   Lab Units 03/05/20 1959 03/02/20 2356   GRAM STAIN RESULT  1+ Polys  No bacteria seen 1+ Disintegrating polys*  3+ Gram negative rods*   WOUND CULTURE   --  4+ Growth of Serratia marcescens*       Last 24 Hours Medication List:     Current Facility-Administered Medications:  albumin human 12 5 g Intravenous Once Clare Flower DPM Last Rate: Stopped (02/20/20 9673)   aluminum-magnesium hydroxide-simethicone 30 mL Oral Q4H PRN Clare Flower DPM    vitamin C 1,000 mg Oral Daily Flory Jimenez DPM    bacitracin 100,000 units and neomycin-polymyxin B () 1 mL in sodium chloride 0 9% 1000 mL irrigation bag  Irrigation Once Clare Flower DPM    benzonatate 100 mg Oral TID PRN Clare Flower DPANTONIO    bumetanide 1 mg Oral Daily Uldeven Flower DPM    cefepime 2,000 mg Intravenous Q12H Clare Flower, DPM Last Rate: 2,000 mg (03/06/20 1014)   cholecalciferol 1,000 Units Oral Daily Salgado Rinne, DPM    DULoxetine 30 mg Oral Daily Salgado Rinne, DPM    fluconazole 200 mg Oral Daily Salgado Rinne, DPM    Gadobutrol 10 mL Intravenous Once in imaging Salgado Rinne, DPM    HYDROcodone-acetaminophen 2 tablet Oral Q4H PRN Salgado Rinne, DPM    metoprolol tartrate 25 mg Oral Q12H [address] Salgado Rinne, DPM    metroNIDAZOLE 500 mg Intravenous Q8H Jannie Nicholas MD Last Rate: 500 mg (03/06/20 0919)   nystatin 500,000 Units Swish & Swallow 4x Daily Salgado Rinne, DPM    pantoprazole 40 mg Oral Daily Salgado Rinne, DPM    senna 2 tablet Oral Daily Salgado Rinne, DPM    sodium chloride 2 spray Each Nare Q1H PRN Salgado Rinne, DPM    vancomycin 500 mg Intravenous Q24H Salgado Rinne, DPM Last Rate: 500 mg (03/06/20 0503)        Today, Patient Was Seen By: Zay Preston PA-C    ** Please Note: Dictation voice to text software may have been used in the creation of this document   **

## 2020-03-06 NOTE — ASSESSMENT & PLAN NOTE
· Blood pressure acceptable on admission with systolics in the 416V  · Continue to monitor with schedule vitals  · On Lopressor and Bumex

## 2020-03-06 NOTE — PLAN OF CARE
Problem: Potential for Falls  Goal: Patient will remain free of falls  Description  INTERVENTIONS:  - Assess patient frequently for physical needs  -  Identify cognitive and physical deficits and behaviors that affect risk of falls    -  [place] fall precautions as indicated by assessment   - Educate patient/family on patient safety including physical limitations  - Instruct patient to call for assistance with activity based on assessment  - Modify environment to reduce risk of injury  - Consider OT/PT consult to assist with strengthening/mobility  Outcome: Progressing     Problem: PAIN - ADULT  Goal: Verbalizes/displays adequate comfort level or baseline comfort level  Description  Interventions:  - Encourage patient to monitor pain and request assistance  - Assess pain using appropriate pain scale  - Administer analgesics based on type and severity of pain and evaluate response  - Implement non-pharmacological measures as appropriate and evaluate response  - Consider cultural and social influences on pain and pain management  - Notify physician/advanced practitioner if interventions unsuccessful or patient reports new pain  Outcome: Progressing     Problem: INFECTION - ADULT  Goal: Absence or prevention of progression during hospitalization  Description  INTERVENTIONS:  - Assess and monitor for signs and symptoms of infection  - Monitor lab/diagnostic results  - Monitor all insertion sites, i e  indwelling lines, tubes, and drains  - Monitor endotracheal if appropriate and nasal secretions for changes in amount and color  - [place] appropriate cooling/warming therapies per order  - Administer medications as ordered  - Instruct and encourage patient and family to use good hand hygiene technique  - Identify and instruct in appropriate isolation precautions for identified infection/condition  Outcome: Progressing  Goal: Absence of fever/infection during neutropenic period  Description  INTERVENTIONS:  - Monitor WBC    Outcome: Progressing     Problem: SAFETY ADULT  Goal: Maintain or return to baseline ADL function  Description  INTERVENTIONS:  -  Assess patient's ability to carry out ADLs; assess patient's baseline for ADL function and identify physical deficits which impact ability to perform ADLs (bathing, care of mouth/teeth, toileting, grooming, dressing, etc )  - Assess/evaluate cause of self-care deficits   - Assess range of motion  - Assess patient's mobility; develop plan if impaired  - Assess patient's need for assistive devices and provide as appropriate  - Encourage maximum independence but intervene and supervise when necessary  - Involve family in performance of ADLs  - Assess for home care needs following discharge   - Consider OT consult to assist with ADL evaluation and planning for discharge  - Provide patient education as appropriate  Outcome: Progressing  Goal: Maintain or return mobility status to optimal level  Description  INTERVENTIONS:  - Assess patient's baseline mobility status (ambulation, transfers, stairs, etc )    - Identify cognitive and physical deficits and behaviors that affect mobility  - Identify mobility aids required to assist with transfers and/or ambulation (gait belt, sit-to-stand, lift, walker, cane, etc )  - [place] fall precautions as indicated by assessment  - Record patient progress and toleration of activity level on Mobility SBAR; progress patient to next Phase/Stage  - Instruct patient to call for assistance with activity based on assessment  - Consider rehabilitation consult to assist with strengthening/weightbearing, etc   Outcome: Progressing     Problem: DISCHARGE PLANNING  Goal: Discharge to home or other facility with appropriate resources  Description  INTERVENTIONS:  - Identify barriers to discharge w/patient and caregiver  - Arrange for needed discharge resources and transportation as appropriate  - Identify discharge learning needs (meds, wound care, etc )  - Arrange for interpretive services to assist at discharge as needed  - Refer to Case Management Department for coordinating discharge planning if the patient needs post-hospital services based on physician/advanced practitioner order or complex needs related to functional status, cognitive ability, or social support system  Outcome: Progressing     Problem: Knowledge Deficit  Goal: Patient/family/caregiver demonstrates understanding of disease process, treatment plan, medications, and discharge instructions  Description  Complete learning assessment and assess knowledge base    Interventions:  - Provide teaching at level of understanding  - Provide teaching via preferred learning methods  Outcome: Progressing     Problem: CARDIOVASCULAR - ADULT  Goal: Maintains optimal cardiac output and hemodynamic stability  Description  INTERVENTIONS:  - Monitor I/O, vital signs and rhythm  - Monitor for S/S and trends of decreased cardiac output  - Administer and titrate ordered vasoactive medications to optimize hemodynamic stability  - Assess quality of pulses, skin color and temperature  - Assess for signs of decreased coronary artery perfusion  - Instruct patient to report change in severity of symptoms  Outcome: Progressing  Goal: Absence of cardiac dysrhythmias or at baseline rhythm  Description  INTERVENTIONS:  - Continuous cardiac monitoring, vital signs, obtain 12 lead EKG if ordered  - Administer antiarrhythmic and heart rate control medications as ordered  - Monitor electrolytes and administer replacement therapy as ordered  Outcome: Progressing     Problem: MUSCULOSKELETAL - ADULT  Goal: Maintain or return mobility to safest level of function  Description  INTERVENTIONS:  - Assess patient's ability to carry out ADLs; assess patient's baseline for ADL function and identify physical deficits which impact ability to perform ADLs (bathing, care of mouth/teeth, toileting, grooming, dressing, etc )  - Assess/evaluate cause of self-care deficits   - Assess range of motion  - Assess patient's mobility  - Assess patient's need for assistive devices and provide as appropriate  - Encourage maximum independence but intervene and supervise when necessary  - Involve family in performance of ADLs  - Assess for home care needs following discharge   - Consider OT consult to assist with ADL evaluation and planning for discharge  - Provide patient education as appropriate  Outcome: Progressing  Goal: Maintain proper alignment of affected body part  Description  INTERVENTIONS:  - Support, maintain and protect limb and body alignment  - Provide patient/ family with appropriate education  Outcome: Progressing     Problem: Prexisting or High Potential for Compromised Skin Integrity  Goal: Skin integrity is maintained or improved  Description  INTERVENTIONS:  - Identify patients at risk for skin breakdown  - Assess and monitor skin integrity  - Assess and monitor nutrition and hydration status  - Monitor labs   - Assess for incontinence   - Turn and reposition patient  - Assist with mobility/ambulation  - Relieve pressure over bony prominences  - Avoid friction and shearing  - Provide appropriate hygiene as needed including keeping skin clean and dry  - Evaluate need for skin moisturizer/barrier cream  - Collaborate with interdisciplinary team   - Patient/family teaching  - Consider wound care consult   Outcome: Progressing     Problem: Nutrition/Hydration-ADULT  Goal: Nutrient/Hydration intake appropriate for improving, restoring or maintaining nutritional needs  Description  Monitor and assess patient's nutrition/hydration status for malnutrition  Collaborate with interdisciplinary team and initiate plan and interventions as ordered  Monitor patient's weight and dietary intake as ordered or per policy  Utilize nutrition screening tool and intervene as necessary   Determine patient's food preferences and provide high-protein, high-caloric foods as appropriate       INTERVENTIONS:  - Monitor oral intake, urinary output, labs, and treatment plans  - Assess nutrition and hydration status and recommend course of action  - Evaluate amount of meals eaten  - Assist patient with eating if necessary   - Allow adequate time for meals  - Recommend/ encourage appropriate diets, oral nutritional supplements, and vitamin/mineral supplements  - Order, calculate, and assess calorie counts as needed  - Recommend, monitor, and adjust tube feedings and TPN/PPN based on assessed needs  - Assess need for intravenous fluids  - Provide specific nutrition/hydration education as appropriate  - Include patient/family/caregiver in decisions related to nutrition  Outcome: Progressing

## 2020-03-06 NOTE — ASSESSMENT & PLAN NOTE
· Patient developed left lower extremity cellulitis/left foot abscess  · Continue IV vancomycin  · ID follow-up noted patient  Started on cefepime and Flagyl  · Follow-up culture results- gram-negative rods  · Podiatry ordered MRI of left forefoot negative for osteomyelitis or fluid collection  · Podiatry following, POD 1 of left lower extremity I&D  · Tenia pedis that patient has could be a source for cellulitis    Continue on Diflucan

## 2020-03-06 NOTE — ASSESSMENT & PLAN NOTE
Wt Readings from Last 3 Encounters:   03/06/20 115 kg (252 lb 6 8 oz)   02/04/20 108 kg (239 lb)   01/07/20 113 kg (249 lb)     · Patient has chronic systolic/diastolic CHF with ejection fraction of 45%  · Appears euvolemic  · Patient is on room air and lungs are clear to auscultation  · Continue on Bumex  · Daily weight and I&Os  · Monitor renal function in a m

## 2020-03-06 NOTE — ASSESSMENT & PLAN NOTE
· Patient acute kidney injury on admission with creatinine of 1 9  · Baseline creatinine appears to be 1 2-1 4  · Creatinine this morning is 1 34  · Continue Bumex  · Monitor renal function in am while diuresing

## 2020-03-07 LAB
ANION GAP SERPL CALCULATED.3IONS-SCNC: 7 MMOL/L (ref 4–13)
BASOPHILS # BLD AUTO: 0.06 THOUSANDS/ΜL (ref 0–0.1)
BASOPHILS NFR BLD AUTO: 1 % (ref 0–1)
BUN SERPL-MCNC: 32 MG/DL (ref 5–25)
CALCIUM SERPL-MCNC: 8.7 MG/DL (ref 8.3–10.1)
CHLORIDE SERPL-SCNC: 99 MMOL/L (ref 100–108)
CO2 SERPL-SCNC: 29 MMOL/L (ref 21–32)
CREAT SERPL-MCNC: 1.27 MG/DL (ref 0.6–1.3)
EOSINOPHIL # BLD AUTO: 0.23 THOUSAND/ΜL (ref 0–0.61)
EOSINOPHIL NFR BLD AUTO: 2 % (ref 0–6)
ERYTHROCYTE [DISTWIDTH] IN BLOOD BY AUTOMATED COUNT: 15.8 % (ref 11.6–15.1)
GFR SERPL CREATININE-BSD FRML MDRD: 56 ML/MIN/1.73SQ M
GLUCOSE SERPL-MCNC: 82 MG/DL (ref 65–140)
HCT VFR BLD AUTO: 34.7 % (ref 36.5–49.3)
HGB BLD-MCNC: 11.2 G/DL (ref 12–17)
IMM GRANULOCYTES # BLD AUTO: 0.11 THOUSAND/UL (ref 0–0.2)
IMM GRANULOCYTES NFR BLD AUTO: 1 % (ref 0–2)
LYMPHOCYTES # BLD AUTO: 0.88 THOUSANDS/ΜL (ref 0.6–4.47)
LYMPHOCYTES NFR BLD AUTO: 9 % (ref 14–44)
MCH RBC QN AUTO: 32.1 PG (ref 26.8–34.3)
MCHC RBC AUTO-ENTMCNC: 32.3 G/DL (ref 31.4–37.4)
MCV RBC AUTO: 99 FL (ref 82–98)
MONOCYTES # BLD AUTO: 1.03 THOUSAND/ΜL (ref 0.17–1.22)
MONOCYTES NFR BLD AUTO: 11 % (ref 4–12)
NEUTROPHILS # BLD AUTO: 7.26 THOUSANDS/ΜL (ref 1.85–7.62)
NEUTS SEG NFR BLD AUTO: 76 % (ref 43–75)
NRBC BLD AUTO-RTO: 0 /100 WBCS
PLATELET # BLD AUTO: 309 THOUSANDS/UL (ref 149–390)
PMV BLD AUTO: 9.4 FL (ref 8.9–12.7)
POTASSIUM SERPL-SCNC: 4.1 MMOL/L (ref 3.5–5.3)
RBC # BLD AUTO: 3.49 MILLION/UL (ref 3.88–5.62)
SODIUM SERPL-SCNC: 135 MMOL/L (ref 136–145)
VANCOMYCIN TROUGH SERPL-MCNC: 12.4 UG/ML (ref 10–20)
WBC # BLD AUTO: 9.57 THOUSAND/UL (ref 4.31–10.16)

## 2020-03-07 PROCEDURE — 85025 COMPLETE CBC W/AUTO DIFF WBC: CPT | Performed by: PHYSICIAN ASSISTANT

## 2020-03-07 PROCEDURE — 80202 ASSAY OF VANCOMYCIN: CPT | Performed by: STUDENT IN AN ORGANIZED HEALTH CARE EDUCATION/TRAINING PROGRAM

## 2020-03-07 PROCEDURE — 80048 BASIC METABOLIC PNL TOTAL CA: CPT | Performed by: PHYSICIAN ASSISTANT

## 2020-03-07 PROCEDURE — 99232 SBSQ HOSP IP/OBS MODERATE 35: CPT | Performed by: INTERNAL MEDICINE

## 2020-03-07 RX ADMIN — CEFEPIME HYDROCHLORIDE 2000 MG: 2 INJECTION, SOLUTION INTRAVENOUS at 19:36

## 2020-03-07 RX ADMIN — APIXABAN 5 MG: 5 TABLET, FILM COATED ORAL at 18:20

## 2020-03-07 RX ADMIN — HYDROCODONE BITARTRATE AND ACETAMINOPHEN 2 TABLET: 5; 325 TABLET ORAL at 15:33

## 2020-03-07 RX ADMIN — NYSTATIN 500000 UNITS: 100000 SUSPENSION ORAL at 18:19

## 2020-03-07 RX ADMIN — Medication 500 MG: at 03:49

## 2020-03-07 RX ADMIN — METRONIDAZOLE 500 MG: 500 INJECTION, SOLUTION INTRAVENOUS at 15:35

## 2020-03-07 RX ADMIN — NYSTATIN 500000 UNITS: 100000 SUSPENSION ORAL at 09:10

## 2020-03-07 RX ADMIN — METRONIDAZOLE 500 MG: 500 INJECTION, SOLUTION INTRAVENOUS at 08:58

## 2020-03-07 RX ADMIN — DULOXETINE 30 MG: 30 CAPSULE, DELAYED RELEASE ORAL at 08:55

## 2020-03-07 RX ADMIN — METOPROLOL TARTRATE 25 MG: 25 TABLET ORAL at 08:55

## 2020-03-07 RX ADMIN — CEFEPIME HYDROCHLORIDE 2000 MG: 2 INJECTION, SOLUTION INTRAVENOUS at 08:58

## 2020-03-07 RX ADMIN — METRONIDAZOLE 500 MG: 500 INJECTION, SOLUTION INTRAVENOUS at 00:39

## 2020-03-07 RX ADMIN — MELATONIN 1000 UNITS: at 08:56

## 2020-03-07 RX ADMIN — HYDROCODONE BITARTRATE AND ACETAMINOPHEN 2 TABLET: 5; 325 TABLET ORAL at 22:14

## 2020-03-07 RX ADMIN — NYSTATIN 500000 UNITS: 100000 SUSPENSION ORAL at 22:14

## 2020-03-07 RX ADMIN — APIXABAN 5 MG: 5 TABLET, FILM COATED ORAL at 08:55

## 2020-03-07 RX ADMIN — OXYCODONE HYDROCHLORIDE AND ACETAMINOPHEN 1000 MG: 500 TABLET ORAL at 08:56

## 2020-03-07 RX ADMIN — FLUCONAZOLE 200 MG: 100 TABLET ORAL at 08:56

## 2020-03-07 RX ADMIN — BUMETANIDE 1 MG: 1 TABLET ORAL at 09:09

## 2020-03-07 RX ADMIN — NYSTATIN 500000 UNITS: 100000 SUSPENSION ORAL at 13:03

## 2020-03-07 RX ADMIN — HYDROCODONE BITARTRATE AND ACETAMINOPHEN 2 TABLET: 5; 325 TABLET ORAL at 04:12

## 2020-03-07 RX ADMIN — METOPROLOL TARTRATE 25 MG: 25 TABLET ORAL at 18:20

## 2020-03-07 RX ADMIN — PANTOPRAZOLE SODIUM 40 MG: 40 TABLET, DELAYED RELEASE ORAL at 08:55

## 2020-03-07 RX ADMIN — STANDARDIZED SENNA CONCENTRATE 17.2 MG: 8.6 TABLET ORAL at 08:55

## 2020-03-07 NOTE — PROGRESS NOTES
Progress Note - Linwood Mazariegos 1945, 69 y o  male MRN: [id number]    Unit/Bed#: -01 Encounter: [id number]    Primary Care Provider: Stephie Torres MD   Date and time admitted to hospital: 2/19/2020  1:18 PM        Abscess of left foot  Assessment & Plan  · Per Podiatry  · POD 1 for left foot I&D  · Further plan per podiatry  · Wound care and dressing changes per Podiatry  · On IV antibiotics    Oral candidiasis  Assessment & Plan  · On Diflucan  History of methicillin resistant staphylococcus aureus (MRSA)  Assessment & Plan  · Questionable history of MRSA on review of chart; in review of records cannot find any noted history  · On IV antibiotics  Hypertension  Assessment & Plan  · Blood pressure acceptable on admission with systolics in the 981Z  · Continue to monitor with schedule vitals  · On Lopressor and Bumex  Chronic combined systolic and diastolic congestive heart failure (HCC)  Assessment & Plan  Wt Readings from Last 3 Encounters:   03/07/20 113 kg (250 lb)   02/04/20 108 kg (239 lb)   01/07/20 113 kg (249 lb)     · Patient has chronic systolic/diastolic CHF with ejection fraction of 45%  · Appears euvolemic  · Patient is on room air and lungs are clear to auscultation  · Continue on Bumex  · Daily weight and I&Os  · Monitor renal function in a m  Acute kidney injury superimposed on CKD [facility])  Assessment & Plan  · Patient acute kidney injury on admission with creatinine of 1 9  · Baseline creatinine appears to be 1 2-1 4  · Creatinine this morning is 1 27  · Continue Bumex  · Monitor renal function in am while diuresing    Chronic deep vein thrombosis (DVT) (HCC)  Assessment & Plan  · VAS lower limb venous duplex studies completed in November of 2019 revealed in no evidence of right lower limb thrombus in the common femoral artery    No gross evidence of acute DVT based on color-flow analysis the left lower limb    Cellulitis of left lower extremity  Assessment & Plan  · Patient developed left lower extremity cellulitis/left foot abscess  · Continue IV vancomycin, cefepime and Flagyl  · ID follow-up noted patient  · Follow-up culture results- gram-negative rods and serratia  · Podiatry ordered MRI of left forefoot negative for osteomyelitis or fluid collection  · Podiatry following, I&D of left lower extremity I&D  · Tenia pedis that patient has could be a source for cellulitis  Continue on Diflucan      Persistent atrial fibrillation  Assessment & Plan  · Patient has chronic atrial fibrillation  · Rates are controlled on Lopressor 25 mg p o bid  · Patient restarted on Eliquis      Class 1 obesity due to excess calories with serious comorbidity and body mass index (BMI) of 34 0 to 34 9 in adult  Assessment & Plan  · Lifestyle modifications recommended  Gastroesophageal reflux disease without esophagitis  Assessment & Plan  · Continue Protonix    * Cellulitis of right lower extremity  Assessment & Plan  · Patient presented with right lower extremity cellulitis - improving  · On IV antibiotics  · ID on board - appreciate recommendations          Labs & Imaging: I have personally reviewed pertinent reports  VTE Pharmacologic Prophylaxis:  Eliquis  VTE Mechanical Prophylaxis: sequential compression device    Code Status:   Level 1 - Full Code    Patient Centered Rounds: I have performed bedside rounds with nursing staff today  Discussions with Specialists or Other Care Team Provider:  Podiatry    Education and Discussions with Family / Patient:     Current Length of Stay: 17 day(s)    Current Patient Status: Inpatient   Certification Statement: The patient will continue to require additional inpatient hospital stay due to see my assessment and plan  Subjective:   Patient is seen and examined at bedside  Denies any new complaint  Pain is well controlled  Afebrile  All other ROS are negative      Objective:    Vitals: Blood pressure 112/76, pulse 95, temperature 97 6 °F (36 4 °C), temperature source Oral, resp  rate 18, height 6' (1 829 m), weight 113 kg (250 lb), SpO2 97 %  ,Body mass index is 33 91 kg/m²  SPO2 RA Rest      ED to Hosp-Admission (Current) from 2/19/2020 in [address] [facility]   SpO2  97 %   SpO2 Activity  At Rest   O2 Device  None (Room air)   O2 Flow Rate          I&O:     Intake/Output Summary (Last 24 hours) at 3/7/2020 1438  Last data filed at 3/7/2020 1301  Gross per 24 hour   Intake    Output 1225 ml   Net -1225 ml       Physical Exam:    General- Alert, lying comfortably in bed  Not in any acute distress  HEENT- SERGIO, EOM intact  Neck- Supple, No JVD  CVS- regular, S1 and S2 normal   Chest- Bilateral Air entry, No rhochi, crackles or wheezing present  Abdomen- soft, nontender, not distended, no guarding or rigidity, BS+  Extremities- left foot in a dressing which is C/D/I  CNS-   Alert, awake and orientedx3  No focal deficits present      Invasive Devices     Peripheral Intravenous Line            Peripheral IV 03/05/20 Right Hand 1 day                      Social History  reviewed  Family History   Problem Relation Age of Onset    Cancer Mother         cancer of uterus    Diabetes Sister     Mental illness Neg Hx         neg fam hx    Substance Abuse Neg Hx         neg fam hx    reviewed    Meds:  Current Facility-Administered Medications   Medication Dose Route Frequency Provider Last Rate Last Dose    albumin human (FLEXBUMIN) 25 % injection 12 5 g  12 5 g Intravenous Once Mozella Blend, DPM   Stopped at 02/20/20 2343    aluminum-magnesium hydroxide-simethicone (MYLANTA) 200-200-20 mg/5 mL oral suspension 30 mL  30 mL Oral Q4H PRN Mozella Blend, DPM        apixaban (ELIQUIS) tablet 5 mg  5 mg Oral BID Levonia Chance, DPM   5 mg at 03/07/20 0855    ascorbic acid (VITAMIN C) tablet 1,000 mg  1,000 mg Oral Daily Mozella Blend, DPM   1,000 mg at 03/07/20 0856    benzonatate (TESSALON PERLES) capsule 100 mg  100 mg Oral TID PRN Unknown Bryan, DPM   100 mg at 03/05/20 1053    bumetanide (BUMEX) tablet 1 mg  1 mg Oral Daily Unknown Byran, DPM   1 mg at 03/07/20 9048    cefepime (MAXIPIME) IVPB (premix) 2,000 mg  2,000 mg Intravenous Q12H Unknown Bryan,  mL/hr at 03/07/20 0858 2,000 mg at 03/07/20 0858    cholecalciferol (VITAMIN D3) tablet 1,000 Units  1,000 Units Oral Daily Unknown Bryan, DPM   1,000 Units at 03/07/20 0856    DULoxetine (CYMBALTA) delayed release capsule 30 mg  30 mg Oral Daily Unknown Bryan, DPM   30 mg at 03/07/20 0855    fluconazole (DIFLUCAN) tablet 200 mg  200 mg Oral Daily Unknown Bryan, DPM   200 mg at 03/07/20 0856    Gadobutrol injection (SINGLE-DOSE) SOLN 10 mL  10 mL Intravenous Once in imaging Unknown Bryan, DPM        HYDROcodone-acetaminophen (NORCO) 5-325 mg per tablet 2 tablet  2 tablet Oral Q4H PRN Unknown Bryan, DPM   2 tablet at 03/07/20 0412    metoprolol tartrate (LOPRESSOR) tablet 25 mg  25 mg Oral Q12H [address] Unknown Bryan, DPM   25 mg at 03/07/20 0855    metroNIDAZOLE (FLAGYL) IVPB (premix) 500 mg  500 mg Intravenous Q8H Dori Miller  mL/hr at 03/07/20 0858 500 mg at 03/07/20 0858    nystatin (MYCOSTATIN) oral suspension 500,000 Units  500,000 Units Swish & Swallow 4x Daily Unknown Bryan, DPM   500,000 Units at 03/07/20 1303    pantoprazole (PROTONIX) EC tablet 40 mg  40 mg Oral Daily Unknown Bryan, DPM   40 mg at 03/07/20 0855    senna (SENOKOT) tablet 17 2 mg  2 tablet Oral Daily Unknown Bryan, DPM   17 2 mg at 03/07/20 0855    sodium chloride (OCEAN) 0 65 % nasal spray 2 spray  2 spray Each Nare Q1H PRN Unknown Bryan, DPM   2 spray at 02/28/20 0903    vancomycin (VANCOCIN) 500 mg in sodium chloride 0 9 % 100 mL IVPB  500 mg Intravenous Q24H Unknown Bryan,  mL/hr at 03/07/20 0349 500 mg at 03/07/20 0349      Medications Prior to Admission   Medication    apixaban (ELIQUIS) 5 mg    Ascorbic Acid (VITAMIN C) 1000 MG tablet    bumetanide (BUMEX) 2 mg tablet    cholecalciferol (VITAMIN D3) 1,000 units tablet    diltiazem (CARDIZEM CD) 180 mg 24 hr capsule    DULoxetine (CYMBALTA) 30 mg delayed release capsule    HYDROcodone-acetaminophen (NORCO) 7 5-325 mg per tablet    losartan (COZAAR) 50 mg tablet    meloxicam (MOBIC) 15 mg tablet    pantoprazole (PROTONIX) 40 mg tablet    potassium chloride (K-DUR) 10 mEq tablet    spironolactone (ALDACTONE) 25 mg tablet    vitamin E, tocopherol, 400 units capsule    benzonatate (TESSALON PERLES) 100 mg capsule    sildenafil (VIAGRA) 100 mg tablet    sodium chloride (OCEAN) 0 65 % nasal spray       Labs:  Results from last 7 days   Lab Units 03/07/20  0333 03/06/20  0547 03/05/20  0526   WBC Thousand/uL 9 57 10 74* 12 00*   HEMOGLOBIN g/dL 11 2* 11 3* 10 9*   HEMATOCRIT % 34 7* 35 7* 33 4*   PLATELETS Thousands/uL 309 325 325   NEUTROS PCT % 76* 76* 77*   LYMPHS PCT % 9* 9* 8*   MONOS PCT % 11 11 11   EOS PCT % 2 2 1     Results from last 7 days   Lab Units 03/07/20  0333 03/06/20  0547 03/05/20  0526   POTASSIUM mmol/L 4 1 4 5 4 4   CHLORIDE mmol/L 99* 102 98*   CO2 mmol/L 29 30 29   BUN mg/dL 32* 36* 42*   CREATININE mg/dL 1 27 1 34* 1 44*   CALCIUM mg/dL 8 7 9 2 8 9   ALK PHOS U/L  --   --  116   ALT U/L  --   --  9*   AST U/L  --   --  29     Lab Results   Component Value Date    TROPONINI 0 10 (H) 11/24/2019    TROPONINI 0 11 (H) 11/24/2019    TROPONINI 0 09 (H) 11/24/2019         Lab Results   Component Value Date    URINECX <10,000 cfu/ml  02/19/2020    WOUNDCULT 4+ Growth of Serratia marcescens (A) 03/02/2020    WOUNDCULT 3+ Growth of Serratia marcescens (A) 07/24/2019    WOUNDCULT 1+ Growth of  07/24/2019         Imaging:  Results for orders placed during the hospital encounter of 02/19/20   XR chest portable    Narrative CHEST     INDICATION:   r/o PNA  COMPARISON:  11/24/2019    EXAM PERFORMED/VIEWS:  XR CHEST PORTABLE      FINDINGS:    Stable cardiomegaly      Central vascular congestion mainly in the right perihilar region  No effusions  No pneumothorax  No pneumonia  Osseous structures appear within normal limits for patient age  Impression Right perihilar central vascular congestion  Workstation performed: [id number]       Results for orders placed during the hospital encounter of 11/18/19   XR chest pa & lateral    Narrative CHEST     INDICATION:   R06 02: Shortness of breath  COMPARISON:  5/13/2019    EXAM PERFORMED/VIEWS:  XR CHEST PA & LATERAL      FINDINGS:    Stable cardiomegaly is noted  Mild pulmonary vascular congestion is noted  Small bilateral pleural effusions are present  Osseous structures appear within normal limits for patient age  Impression Mild pulmonary vascular congestion and small bilateral pleural effusions          Workstation performed: [id number]         Last 24 Hours Medication List:     Current Facility-Administered Medications:  albumin human 12 5 g Intravenous Once Bud Ranks, DPM Last Rate: Stopped (02/20/20 7432)   aluminum-magnesium hydroxide-simethicone 30 mL Oral Q4H PRN Bud Ranks, DPM    apixaban 5 mg Oral BID Demian Sidhu, DPM    vitamin C 1,000 mg Oral Daily Bud Ranks, DPM    benzonatate 100 mg Oral TID PRN Bud Ranks, DPM    bumetanide 1 mg Oral Daily Bud Ranks, DPM    cefepime 2,000 mg Intravenous Q12H Bud Ranks, DPM Last Rate: 2,000 mg (03/07/20 0858)   cholecalciferol 1,000 Units Oral Daily Bud Ranks, DPM    DULoxetine 30 mg Oral Daily Bud Ranks, DPM    fluconazole 200 mg Oral Daily Bud Ranks, DPM    Gadobutrol 10 mL Intravenous Once in imaging Bud Ranks, DPM    HYDROcodone-acetaminophen 2 tablet Oral Q4H PRN Bud Ranks, DPM    metoprolol tartrate 25 mg Oral Q12H [facility] [place] Ranks, DPM    metroNIDAZOLE 500 mg Intravenous Q8H Dorothea Harrell MD Last Rate: 500 mg (03/07/20 0858)   nystatin 500,000 Units Swish & Swallow 4x Daily [place] Ranks, DPM    pantoprazole 40 mg Oral Daily Bud Ranks, DPM senna 2 tablet Oral Daily Bud Ranks, DPM    sodium chloride 2 spray Each Nare Q1H PRN Preston Ranks, DPM    vancomycin 500 mg Intravenous Q24H Bud Ranks, DPM Last Rate: 500 mg (03/07/20 0349)        Today, Patient Was Seen By: Queen Hermila MD    ** Please Note: Dictation voice to text software may have been used in the creation of this document   **

## 2020-03-07 NOTE — ASSESSMENT & PLAN NOTE
· Blood pressure acceptable on admission with systolics in the 965P  · Continue to monitor with schedule vitals  · On Lopressor and Bumex

## 2020-03-07 NOTE — ASSESSMENT & PLAN NOTE
· Patient has chronic atrial fibrillation    · Rates are controlled on Lopressor 25 mg p o bid  · Patient restarted on Eliquis

## 2020-03-07 NOTE — ASSESSMENT & PLAN NOTE
Wt Readings from Last 3 Encounters:   03/07/20 113 kg (250 lb)   02/04/20 108 kg (239 lb)   01/07/20 113 kg (249 lb)     · Patient has chronic systolic/diastolic CHF with ejection fraction of 45%  · Appears euvolemic  · Patient is on room air and lungs are clear to auscultation  · Continue on Bumex  · Daily weight and I&Os  · Monitor renal function in a m

## 2020-03-07 NOTE — PROGRESS NOTES
Vancomycin IV Pharmacy-to-Dose Consultation    Faizan Cruz is a 69 y o  male who is currently receiving Vancomycin IV with management by the Pharmacy Consult service  Assessment/Plan:  The patient was reviewed  Renal function is stable and no signs or symptoms of nephrotoxicity and/or infusion reactions were documented in the chart  Trough taken earlier this morning, with result of 12 4  Based on todays assessment and goal trough of 10-15, continue current vancomycin (day # 15) dosing of 500mg q24h, with a plan for trough to be drawn at 0330 on 3/14/20  We will continue to follow the patients culture results and clinical progress daily      Maximino Richard, Pharmacist

## 2020-03-07 NOTE — ASSESSMENT & PLAN NOTE
· Per Podiatry  · POD 1 for left foot I&D     · Further plan per podiatry  · Wound care and dressing changes per Podiatry  · On IV antibiotics

## 2020-03-07 NOTE — ASSESSMENT & PLAN NOTE
· Patient developed left lower extremity cellulitis/left foot abscess  · Continue IV vancomycin, cefepime and Flagyl  · ID follow-up noted patient  · Follow-up culture results- gram-negative rods and serratia  · Podiatry ordered MRI of left forefoot negative for osteomyelitis or fluid collection  · Podiatry following, I&D of left lower extremity I&D  · Tenia pedis that patient has could be a source for cellulitis    Continue on Diflucan

## 2020-03-07 NOTE — PROGRESS NOTES
Progress Note - Podiatry  Linwood Mazariegos 69 y o  male MRN: [id number]  Unit/Bed#: -01 Encounter: [id number]    Assessment/Plan:  1  Abscess left forefoot   -PO Day #2 S/P I&D LFT deep abscess(10-12cc purulence noted with I&D)   -wound packing removed, irrigated with 200cc NSS, Maxorb AG dressing applied   - Anticipate D/C Monday possibly to SNF or home with VNA on appropriate po antibiotics   - OR cultures still pending  2  Cellulitis left leg   - resolving satisfactorily status post incision and              -continue with current IV antibiotics per Infectious Disease   -right leg has resolved completely since admission  3  Chronic venous insufficiency Bilateral   -elevation, compression stockings and Ace wraps   -may wear his own compression stocking on the right leg  4  Tinea pedis-chronic              -Betadine to dry interspaces left foot daily              -alginate rope woven and interdigitally to dry interspaces               -Diflucan daily x1 week  5  CHF, chronic AFib              -managed per Internal Medicine and Cardiology    Subjective/Objective   Chief Complaint:   Chief Complaint   Patient presents with    Foot Pain     patient complaint of foot pain and discloration x 1 day       Subjective:  76-year-old white male seen today resting bed  Relates pain along the lateral plantar midfoot region  Managed current pain meds  Denies any fever or shortness of breath  Blood pressure 112/76, pulse 95, temperature 97 6 °F (36 4 °C), temperature source Oral, resp  rate 18, height 6' (1 829 m), weight 113 kg (250 lb), SpO2 97 %  ,Body mass index is 33 91 kg/m²      Invasive Devices     Peripheral Intravenous Line            Peripheral IV 03/05/20 Right Hand 1 day                Physical Exam:   General appearance: alert, cooperative and no distress  Neuro/Vascular: Pulses: Right: DP  0/4, PT  0/4, Left: DP  0/4, PT  0/4   Capillary Filling: 3 Sec, Edema:  Chronic +2 bilateral   8/2020 Arterial duplex: showed triphasic waveforms of DP&PT bilateral   EMMANUEL 1 3 on the right, and 0 9 on the left,  Epicritic sensations grossly intact  Extremity:  Surgical dressing intact with only mild strike through drainage noted  Bloody in nature  After removal packing wound was noted to be approximately 50% red and 50% yellow  No active necrosis noted  No active bleeding noted today  No increased necrosis noted  Diminished calor and erythema of the forefoot  Labs and other studies:   CBC w/diff  Results from last 7 days   Lab Units 03/07/20  0333   WBC Thousand/uL 9 57   HEMOGLOBIN g/dL 11 2*   HEMATOCRIT % 34 7*   PLATELETS Thousands/uL 309   NEUTROS PCT % 76*   LYMPHS PCT % 9*   MONOS PCT % 11   EOS PCT % 2     BMP  Results from last 7 days   Lab Units 03/07/20  0333   POTASSIUM mmol/L 4 1   CHLORIDE mmol/L 99*   CO2 mmol/L 29   BUN mg/dL 32*   CREATININE mg/dL 1 27   CALCIUM mg/dL 8 7     CMP  Results from last 7 days   Lab Units 03/07/20  0333  03/05/20  0526   POTASSIUM mmol/L 4 1   < > 4 4   CHLORIDE mmol/L 99*   < > 98*   CO2 mmol/L 29   < > 29   BUN mg/dL 32*   < > 42*   CREATININE mg/dL 1 27   < > 1 44*   CALCIUM mg/dL 8 7   < > 8 9   ALK PHOS U/L  --   --  116   ALT U/L  --   --  9*   AST U/L  --   --  29    < > = values in this interval not displayed         @Culture@  Lab Results   Component Value Date    URINECX <10,000 cfu/ml  02/19/2020         Current Facility-Administered Medications:     albumin human (FLEXBUMIN) 25 % injection 12 5 g, 12 5 g, Intravenous, Once, Deri Green, DPM, Stopped at 02/20/20 2343    aluminum-magnesium hydroxide-simethicone (MYLANTA) 200-200-20 mg/5 mL oral suspension 30 mL, 30 mL, Oral, Q4H PRN, Deri Green, DPM    apixaban (ELIQUIS) tablet 5 mg, 5 mg, Oral, BID, Daniella Paige, DPM, 5 mg at 03/07/20 0855    ascorbic acid (VITAMIN C) tablet 1,000 mg, 1,000 mg, Oral, Daily, Deri Green, DPM, 1,000 mg at 03/07/20 0856    benzonatate (TESSALON PERLES) capsule 100 mg, 100 mg, Oral, TID PRN, Nyla Ogles, DPM, 100 mg at 03/05/20 1053    bumetanide (BUMEX) tablet 1 mg, 1 mg, Oral, Daily, Nyla Ogles, DPM, 1 mg at 03/07/20 0909    cefepime (MAXIPIME) IVPB (premix) 2,000 mg, 2,000 mg, Intravenous, Q12H, Nyla Ogles, DPM, Last Rate: 100 mL/hr at 03/07/20 0858, 2,000 mg at 03/07/20 0858    cholecalciferol (VITAMIN D3) tablet 1,000 Units, 1,000 Units, Oral, Daily, Nyla Ogles, DPM, 1,000 Units at 03/07/20 0856    DULoxetine (CYMBALTA) delayed release capsule 30 mg, 30 mg, Oral, Daily, Nyla Ogles, DPM, 30 mg at 03/07/20 0855    fluconazole (DIFLUCAN) tablet 200 mg, 200 mg, Oral, Daily, Nyla Ogles, DPM, 200 mg at 03/07/20 0856    Gadobutrol injection (SINGLE-DOSE) SOLN 10 mL, 10 mL, Intravenous, Once in imaging, Nyla Ogles, DPM    HYDROcodone-acetaminophen (NORCO) 5-325 mg per tablet 2 tablet, 2 tablet, Oral, Q4H PRN, Nyla Ogles, DPM, 2 tablet at 03/07/20 0412    metoprolol tartrate (LOPRESSOR) tablet 25 mg, 25 mg, Oral, Q12H [facility], Nyla Ogles, DPM, 25 mg at 03/07/20 0855    metroNIDAZOLE (FLAGYL) IVPB (premix) 500 mg, 500 mg, Intravenous, Q8H, Jakob Bejarano MD, Last Rate: 200 mL/hr at 03/07/20 0858, 500 mg at 03/07/20 0858    nystatin (MYCOSTATIN) oral suspension 500,000 Units, 500,000 Units, Swish & Swallow, 4x Daily, Nyla Ogles, DPM, 500,000 Units at 03/07/20 1303    pantoprazole (PROTONIX) EC tablet 40 mg, 40 mg, Oral, Daily, Nyla Ogles, DPM, 40 mg at 03/07/20 0855    senna (SENOKOT) tablet 17 2 mg, 2 tablet, Oral, Daily, Nyla Ogles, DPM, 17 2 mg at 03/07/20 0855    sodium chloride (OCEAN) 0 65 % nasal spray 2 spray, 2 spray, Each Nare, Q1H PRN, Nyla Ogles, DPM, 2 spray at 02/28/20 0903    vancomycin (VANCOCIN) 500 mg in sodium chloride 0 9 % 100 mL IVPB, 500 mg, Intravenous, Q24H, Nyla Ogles, DPM, Last Rate: 200 mL/hr at 03/07/20 0349, 500 mg at 03/07/20 0349    Imaging: I have personally reviewed pertinent films in PACS  EKG, Pathology, and Other Studies: I have personally reviewed pertinent reports    VTE Pharmacologic Prophylaxis: Heparin  VTE Mechanical Prophylaxis: sequential compression device    Michael Vargas DPM

## 2020-03-07 NOTE — UTILIZATION REVIEW
Continued Stay Review    Date: 3/3-3/7/20                          Current Patient Class: inpatient  Current Level of Care: med surg    HPI:74 y o  male initially admitted on 2/19 as inpatient due to R foot pain and soft tissue infection vs  Diabetic ulcer vs  Venous insufficiency/SAMY, s/p ankle fusion and multiple surgeries  On 2/25, determined to have LLE cellulitis, which developed while on antbx during this hospitalization  IV antbx in progress with wound care to L foot  Assessment/Plan:   3/3: hyponatremic, although improving, likely from CHF; podiatry following and obtained wound culture from L dorsal forefoot  Purulence expressed  I&D to be done 3/7  IV antbx continue  Cellulitis is improving, but L foot swelling & pain is not improving  Has ulcerative breakdown overlying 3/4 metatarsals with yellow brown drainage  3/4: gram stain showing gm neg rods  Adding cefepime/flagyl, continue vanco   ID and podiatry following  Will now go to OR tomorrow for I&D  No osteo on MRI  Diuresis continues  3/5: to OR in the evening for L foot I&D  Operative Findings:Consistent with diagnosis: copious amounts of purulence and fat necrosis along the dorsal left foot subcutaneous plain  Post-debridement wound measures approximately 11 0x3 5x1 0cm       3/6: IV antbx continue, POD#1, wound irrigated  Packed with iodaform gauze  LLE cellulitis is resolving  Anticipating placement when ready for discharge  ID and podiatry following  3/7: POD#2, packing removed, irrigated, maxorb AG dressing applied  Dressing with mild strikethrough bloody drainage  wound appears red and yellow  Decreased calor and erythema of forefoot  Await OR culture results  Plan to keep in house for another 1-2 days  Will need either SNF placement or home with VNA  Pertinent Labs/Diagnostic Results:   3/4 MRI L forefoot: 1  Findings consistent with dorsal cellulitis    No focal fluid collection    2   No evidence of osteomyelitis    3  Age-indeterminate (non-acute) nondisplaced fracture of the great toe proximal phalanx  Results from last 7 days   Lab Units 03/07/20  0333 03/06/20  0547 03/05/20  0526 03/04/20  0520 03/03/20  0558  03/02/20  0516   WBC Thousand/uL 9 57 10 74* 12 00* 14 34* 13 72*  --  18 83*   HEMOGLOBIN g/dL 11 2* 11 3* 10 9* 10 8* 11 4*  --  11 3*   HEMATOCRIT % 34 7* 35 7* 33 4* 32 5* 34 2*  --  34 2*   PLATELETS Thousands/uL 309 325 325 304 288  --  258   NEUTROS ABS Thousands/µL 7 26 8 24* 9 33* 11 74* 10 58*   < >  --    BANDS PCT %  --   --   --   --   --   --  3    < > = values in this interval not displayed           Results from last 7 days   Lab Units 03/07/20  0333 03/06/20  0547 03/05/20  0526 03/04/20  0520 03/03/20  0558   SODIUM mmol/L 135* 137 131* 132* 132*   POTASSIUM mmol/L 4 1 4 5 4 4 3 5 3 8   CHLORIDE mmol/L 99* 102 98* 96* 96*   CO2 mmol/L 29 30 29 28 29   ANION GAP mmol/L 7 5 4 8 7   BUN mg/dL 32* 36* 42* 41* 41*   CREATININE mg/dL 1 27 1 34* 1 44* 1 48* 1 44*   EGFR ml/min/1 73sq m 56 52 48 46 48   CALCIUM mg/dL 8 7 9 2 8 9 8 8 8 8   MAGNESIUM mg/dL  --   --  1 9  --  1 7     Results from last 7 days   Lab Units 03/05/20  0526   AST U/L 29   ALT U/L 9*   ALK PHOS U/L 116   TOTAL PROTEIN g/dL 8 0   ALBUMIN g/dL 1 9*   TOTAL BILIRUBIN mg/dL 0 90         Results from last 7 days   Lab Units 03/07/20  0333 03/06/20  0547 03/05/20  0526 03/04/20  0520 03/03/20  0558 03/02/20  0516   GLUCOSE RANDOM mg/dL 82 82 100 80 82 98     Results from last 7 days   Lab Units 03/05/20 1959 03/02/20  2356   GRAM STAIN RESULT  1+ Polys  No bacteria seen 1+ Disintegrating polys*  3+ Gram negative rods*   WOUND CULTURE   --  4+ Growth of Serratia marcescens*             Vital Signs:   Date/Time  Temp Pulse  Resp  BP  SpO2    03/07/20 0900         97 %    03/07/20 0745  97 6 °F (36 4 °C) 95  18  112/76  97 %    03/06/20 2205  98 2 °F (36 8 °C) 109Abnormal   18  98/70  97 %    03/06/20 1858   100    109/65   03/06/20 1451   96  18  107/71  97 %    03/06/20 0740  98 °F (36 7 °C) 71  18  105/60  96 %    03/05/20 2300  97 9 °F (36 6 °C) 79  18  102/66  91 %    03/05/20 2200  98 2 °F (36 8 °C) 82  18  112/76  98 %    03/05/20 2054  97 9 °F (36 6 °C) 88  18  113/80  95 %    03/05/20 2030  98 1 °F (36 7 °C) 84  18  129/77  98 %    03/05/20 1753       102/70      03/05/20 1523  98 °F (36 7 °C) 60  18  98/75  98 %    03/05/20 1106   119Abnormal     108/68            Medications:   Scheduled Medications:    Medications:  albumin human 12 5 g Intravenous Once   apixaban 5 mg Oral BID   vitamin C 1,000 mg Oral Daily   bumetanide 1 mg Oral Daily   cefepime 2,000 mg Intravenous Q12H   cholecalciferol 1,000 Units Oral Daily   DULoxetine 30 mg Oral Daily   fluconazole 200 mg Oral Daily   metoprolol tartrate 25 mg Oral Q12H FRANKIE   metroNIDAZOLE 500 mg Intravenous Q8H   nystatin 500,000 Units Swish & Swallow 4x Daily   pantoprazole 40 mg Oral Daily   senna 2 tablet Oral Daily   vancomycin 500 mg Intravenous Q24H     LR d/c on 3/5     PRN Meds:    aluminum-magnesium hydroxide-simethicone 30 mL Oral Q4H PRN   benzonatate 100 mg Oral TID PRN   Gadobutrol 10 mL Intravenous Once in imaging   HYDROcodone-acetaminophen 2 tablet Oral Q4H PRN using 2-3 doses daily   sodium chloride 2 spray Each Nare Q1H PRN       Discharge Plan: TBD    Network Utilization Review Department  [email or web address]  ATTENTION: Please call with any questions or concerns to [phone] and carefully listen to the prompts so that you are directed to the right person  All voicemails are confidential   Ashish Marquez all requests for admission clinical reviews, approved or denied determinations and any other requests to dedicated fax number below belonging to the campus where the patient is receiving treatment   List of dedicated fax numbers for the Facilities:  FACILITY NAME UR FAX NUMBER   ADMISSION DENIALS (Administrative/Medical Necessity) [address] [facility] (Maternity/NICU/Pediatrics) [phone]   Henry Quintanilla [phone]   Tuyet Brooks [phone]   Oscar Starkey [phone]   [address] [phone]   [facility]  [phone]   [address] [place] [address], [facility]  [address] [place] [address] [phone]

## 2020-03-07 NOTE — ASSESSMENT & PLAN NOTE
· Patient acute kidney injury on admission with creatinine of 1 9  · Baseline creatinine appears to be 1 2-1 4  · Creatinine this morning is 1 27  · Continue Bumex  · Monitor renal function in am while diuresing

## 2020-03-07 NOTE — PLAN OF CARE
Problem: Potential for Falls  Goal: Patient will remain free of falls  Description  INTERVENTIONS:  - Assess patient frequently for physical needs  -  Identify cognitive and physical deficits and behaviors that affect risk of falls    -  [place] fall precautions as indicated by assessment   - Educate patient/family on patient safety including physical limitations  - Instruct patient to call for assistance with activity based on assessment  - Modify environment to reduce risk of injury  - Consider OT/PT consult to assist with strengthening/mobility  Outcome: Progressing

## 2020-03-08 PROBLEM — N17.9 ACUTE KIDNEY INJURY SUPERIMPOSED ON CKD (HCC): Status: RESOLVED | Noted: 2019-12-01 | Resolved: 2020-03-08

## 2020-03-08 PROBLEM — N18.9 ACUTE KIDNEY INJURY SUPERIMPOSED ON CKD (HCC): Status: RESOLVED | Noted: 2019-12-01 | Resolved: 2020-03-08

## 2020-03-08 LAB
ANION GAP SERPL CALCULATED.3IONS-SCNC: 7 MMOL/L (ref 4–13)
BACTERIA SPEC ANAEROBE CULT: NORMAL
BACTERIA WND AEROBE CULT: ABNORMAL
BASOPHILS # BLD AUTO: 0.06 THOUSANDS/ΜL (ref 0–0.1)
BASOPHILS NFR BLD AUTO: 1 % (ref 0–1)
BUN SERPL-MCNC: 30 MG/DL (ref 5–25)
CALCIUM SERPL-MCNC: 8.7 MG/DL (ref 8.3–10.1)
CHLORIDE SERPL-SCNC: 100 MMOL/L (ref 100–108)
CO2 SERPL-SCNC: 28 MMOL/L (ref 21–32)
CREAT SERPL-MCNC: 1.3 MG/DL (ref 0.6–1.3)
EOSINOPHIL # BLD AUTO: 0.23 THOUSAND/ΜL (ref 0–0.61)
EOSINOPHIL NFR BLD AUTO: 3 % (ref 0–6)
ERYTHROCYTE [DISTWIDTH] IN BLOOD BY AUTOMATED COUNT: 15.7 % (ref 11.6–15.1)
GFR SERPL CREATININE-BSD FRML MDRD: 54 ML/MIN/1.73SQ M
GLUCOSE SERPL-MCNC: 83 MG/DL (ref 65–140)
GRAM STN SPEC: ABNORMAL
GRAM STN SPEC: ABNORMAL
HCT VFR BLD AUTO: 33.6 % (ref 36.5–49.3)
HGB BLD-MCNC: 10.8 G/DL (ref 12–17)
IMM GRANULOCYTES # BLD AUTO: 0.1 THOUSAND/UL (ref 0–0.2)
IMM GRANULOCYTES NFR BLD AUTO: 1 % (ref 0–2)
LYMPHOCYTES # BLD AUTO: 0.69 THOUSANDS/ΜL (ref 0.6–4.47)
LYMPHOCYTES NFR BLD AUTO: 7 % (ref 14–44)
MCH RBC QN AUTO: 32.1 PG (ref 26.8–34.3)
MCHC RBC AUTO-ENTMCNC: 32.1 G/DL (ref 31.4–37.4)
MCV RBC AUTO: 100 FL (ref 82–98)
MONOCYTES # BLD AUTO: 1.24 THOUSAND/ΜL (ref 0.17–1.22)
MONOCYTES NFR BLD AUTO: 13 % (ref 4–12)
NEUTROPHILS # BLD AUTO: 7.06 THOUSANDS/ΜL (ref 1.85–7.62)
NEUTS SEG NFR BLD AUTO: 75 % (ref 43–75)
NRBC BLD AUTO-RTO: 0 /100 WBCS
PLATELET # BLD AUTO: 286 THOUSANDS/UL (ref 149–390)
PMV BLD AUTO: 9.8 FL (ref 8.9–12.7)
POTASSIUM SERPL-SCNC: 4 MMOL/L (ref 3.5–5.3)
RBC # BLD AUTO: 3.36 MILLION/UL (ref 3.88–5.62)
SODIUM SERPL-SCNC: 135 MMOL/L (ref 136–145)
WBC # BLD AUTO: 9.38 THOUSAND/UL (ref 4.31–10.16)

## 2020-03-08 PROCEDURE — 80048 BASIC METABOLIC PNL TOTAL CA: CPT | Performed by: INTERNAL MEDICINE

## 2020-03-08 PROCEDURE — 85025 COMPLETE CBC W/AUTO DIFF WBC: CPT | Performed by: INTERNAL MEDICINE

## 2020-03-08 PROCEDURE — 99232 SBSQ HOSP IP/OBS MODERATE 35: CPT | Performed by: INTERNAL MEDICINE

## 2020-03-08 RX ADMIN — HYDROCODONE BITARTRATE AND ACETAMINOPHEN 2 TABLET: 5; 325 TABLET ORAL at 08:57

## 2020-03-08 RX ADMIN — BUMETANIDE 1 MG: 1 TABLET ORAL at 08:57

## 2020-03-08 RX ADMIN — METRONIDAZOLE 500 MG: 500 INJECTION, SOLUTION INTRAVENOUS at 17:46

## 2020-03-08 RX ADMIN — CEFEPIME HYDROCHLORIDE 2000 MG: 2 INJECTION, SOLUTION INTRAVENOUS at 09:01

## 2020-03-08 RX ADMIN — PANTOPRAZOLE SODIUM 40 MG: 40 TABLET, DELAYED RELEASE ORAL at 08:56

## 2020-03-08 RX ADMIN — MELATONIN 1000 UNITS: at 08:56

## 2020-03-08 RX ADMIN — OXYCODONE HYDROCHLORIDE AND ACETAMINOPHEN 1000 MG: 500 TABLET ORAL at 08:56

## 2020-03-08 RX ADMIN — Medication 500 MG: at 04:39

## 2020-03-08 RX ADMIN — FLUCONAZOLE 200 MG: 100 TABLET ORAL at 08:56

## 2020-03-08 RX ADMIN — DULOXETINE 30 MG: 30 CAPSULE, DELAYED RELEASE ORAL at 08:56

## 2020-03-08 RX ADMIN — NYSTATIN 500000 UNITS: 100000 SUSPENSION ORAL at 08:57

## 2020-03-08 RX ADMIN — BENZONATATE 100 MG: 100 CAPSULE ORAL at 17:46

## 2020-03-08 RX ADMIN — METOPROLOL TARTRATE 25 MG: 25 TABLET ORAL at 17:46

## 2020-03-08 RX ADMIN — APIXABAN 5 MG: 5 TABLET, FILM COATED ORAL at 08:56

## 2020-03-08 RX ADMIN — APIXABAN 5 MG: 5 TABLET, FILM COATED ORAL at 17:46

## 2020-03-08 RX ADMIN — METRONIDAZOLE 500 MG: 500 INJECTION, SOLUTION INTRAVENOUS at 09:02

## 2020-03-08 RX ADMIN — CEFEPIME HYDROCHLORIDE 2000 MG: 2 INJECTION, SOLUTION INTRAVENOUS at 21:20

## 2020-03-08 RX ADMIN — METRONIDAZOLE 500 MG: 500 INJECTION, SOLUTION INTRAVENOUS at 01:19

## 2020-03-08 RX ADMIN — STANDARDIZED SENNA CONCENTRATE 17.2 MG: 8.6 TABLET ORAL at 08:56

## 2020-03-08 NOTE — ASSESSMENT & PLAN NOTE
· Patient has chronic atrial fibrillation    · Rates are controlled on Lopressor 25 mg p o bid  · Patient restarted on Eliquis 3/6/20

## 2020-03-08 NOTE — ASSESSMENT & PLAN NOTE
· VAS lower limb venous duplex studies completed in November of 2019 revealed in no evidence of right lower limb thrombus in the common femoral artery    No gross evidence of acute DVT based on color-flow analysis the left lower limb  · Anticoagulated on eliquis

## 2020-03-08 NOTE — PROGRESS NOTES
Progress Note - Linwood Mazariegos 1945, 69 y o  male MRN: [id number]    Unit/Bed#: -01 Encounter: [id number]    Primary Care Provider: Bebo García MD   Date and time admitted to hospital: 2/19/2020  1:18 PM    * Cellulitis of right lower extremity  Assessment & Plan  · Patient presented with right lower extremity cellulitis - improving  · On IV antibiotics  · ID on board - appreciate recommendations      Cellulitis of left lower extremity  Assessment & Plan  · Patient developed left lower extremity cellulitis/left foot abscess  · Continue IV vancomycin, cefepime and Flagyl - await final ID recommendations for oral on discharge  · ID follow-up noted patient  · Follow-up culture results- gram-negative rods and serratia  · Podiatry ordered MRI of left forefoot negative for osteomyelitis or fluid collection  · Podiatry following, I&D of left lower extremity  · Tenia pedis that patient has could be a source for cellulitis  Completed course of Diflucan      Abscess of left foot  Assessment & Plan  · Per Podiatry  · POD 3 for left foot I&D  · Further plan per podiatry  · Wound care and dressing changes per Podiatry  · On IV antibiotics    Oral candidiasis  Assessment & Plan  · Completed treatment of Diflucan    History of methicillin resistant staphylococcus aureus (MRSA)  Assessment & Plan  · Questionable history of MRSA on review of chart; in review of records cannot find any noted history  · On IV antibiotics    Hypertension  Assessment & Plan  · Blood pressure stable  · Continue to monitor with schedule vitals  · On Lopressor and Bumex    Chronic combined systolic and diastolic congestive heart failure (HCC)  Assessment & Plan  Wt Readings from Last 3 Encounters:   03/08/20 114 kg (251 lb 12 3 oz)   02/04/20 108 kg (239 lb)   01/07/20 113 kg (249 lb)     · Patient has chronic systolic/diastolic CHF with ejection fraction of 45%  · Appears euvolemic    · Patient is on room air and lungs are clear to auscultation  · Continue on Bumex  · Daily weight and I&Os  · Monitor renal function in a m  Chronic deep vein thrombosis (DVT) (HCC)  Assessment & Plan  · VAS lower limb venous duplex studies completed in November of 2019 revealed in no evidence of right lower limb thrombus in the common femoral artery  No gross evidence of acute DVT based on color-flow analysis the left lower limb  · Anticoagulated on eliquis    Persistent atrial fibrillation  Assessment & Plan  · Patient has chronic atrial fibrillation  · Rates are controlled on Lopressor 25 mg p o bid  · Patient restarted on Eliquis 3/6/20      Class 1 obesity due to excess calories with serious comorbidity and body mass index (BMI) of 34 0 to 34 9 in adult  Assessment & Plan  · Lifestyle modifications recommended    Gastroesophageal reflux disease without esophagitis  Assessment & Plan  · Continue Protonix  Acute kidney injury superimposed on CKD (HCC)resolved as of 3/8/2020  Assessment & Plan  · Patient acute kidney injury on admission with creatinine of 1 9  · Baseline creatinine appears to be 1 2-1 4  · Creatinine this morning is 1 30 - baseline  · Continue Bumex  · SAMY resolved    VTE Pharmacologic Prophylaxis:   Pharmacologic: Apixaban (Eliquis)  Mechanical VTE Prophylaxis in Place: No    Patient Centered Rounds: I have performed bedside rounds with nursing staff today  Discussions with Specialists or Other Care Team Provider: Nursing, Attending    Education and Discussions with Family / Patient: Discussed with patient directly at bedside  Time Spent for Care: 30 minutes  More than 50% of total time spent on counseling and coordination of care as described above      Current Length of Stay: 18 day(s)    Current Patient Status: Inpatient   Certification Statement: The patient will continue to require additional inpatient hospital stay due to IV antibiotics, final podiatry/ID recommendations    Discharge Plan: Possible discharge tomorrow to home following further podiatry/ID evaluation and recommendations    Code Status: Level 1 - Full Code      Subjective:   "I'm fine "    Patient states his pain is well controlled  Denies chest pain/palpitations, shortness of breath, nausea/vomiting, abdominal pain  Objective:     Vitals:   Temp (24hrs), Av 7 °F (36 5 °C), Min:97 5 °F (36 4 °C), Max:97 9 °F (36 6 °C)    Temp:  [97 5 °F (36 4 °C)-97 9 °F (36 6 °C)] 97 5 °F (36 4 °C)  HR:  [83-97] 86  Resp:  [16-18] 16  BP: (103-113)/(67-77) 105/77  SpO2:  [90 %-99 %] 94 %  Body mass index is 34 15 kg/m²  Input and Output Summary (last 24 hours): Intake/Output Summary (Last 24 hours) at 3/8/2020 1036  Last data filed at 3/7/2020 2201  Gross per 24 hour   Intake    Output 1250 ml   Net -1250 ml       Physical Exam:     Physical Exam   Constitutional: He is oriented to person, place, and time  Vital signs are normal  He appears well-developed  Appears comfortable, no acute distress   HENT:   Head: Normocephalic  Eyes: Pupils are equal, round, and reactive to light  Conjunctivae and EOM are normal  No scleral icterus  Neck: Normal range of motion  Cardiovascular: Normal rate and normal heart sounds  An irregularly irregular rhythm present  No murmur heard  Pulmonary/Chest: Effort normal and breath sounds normal  No respiratory distress  He has no wheezes  He has no rhonchi  He has no rales  Abdominal: Soft  Bowel sounds are normal  There is no tenderness  There is no rigidity, no rebound and no guarding  Musculoskeletal: He exhibits deformity  He exhibits no edema or tenderness  Left lower extremity dressing clean, dry, intact  Able to move upper and lower extremities bilaterally  Neurological: He is alert and oriented to person, place, and time  Skin: Skin is warm and dry  Psychiatric: His affect is blunt  Nursing note and vitals reviewed          Additional Data:     Labs:    Results from last 7 days   Lab Units 20  7121 03/02/20  0516   WBC Thousand/uL 9 38   < > 18 83*   HEMOGLOBIN g/dL 10 8*   < > 11 3*   HEMATOCRIT % 33 6*   < > 34 2*   PLATELETS Thousands/uL 286   < > 258   BANDS PCT %  --   --  3   NEUTROS PCT % 75   < >  --    LYMPHS PCT % 7*   < >  --    LYMPHO PCT %  --   --  7*   MONOS PCT % 13*   < >  --    MONO PCT %  --   --  12   EOS PCT % 3   < > 0    < > = values in this interval not displayed  Results from last 7 days   Lab Units 03/08/20  0523  03/05/20  0526   SODIUM mmol/L 135*   < > 131*   POTASSIUM mmol/L 4 0   < > 4 4   CHLORIDE mmol/L 100   < > 98*   CO2 mmol/L 28   < > 29   BUN mg/dL 30*   < > 42*   CREATININE mg/dL 1 30   < > 1 44*   ANION GAP mmol/L 7   < > 4   CALCIUM mg/dL 8 7   < > 8 9   ALBUMIN g/dL  --   --  1 9*   TOTAL BILIRUBIN mg/dL  --   --  0 90   ALK PHOS U/L  --   --  116   ALT U/L  --   --  9*   AST U/L  --   --  29   GLUCOSE RANDOM mg/dL 83   < > 100    < > = values in this interval not displayed  * I Have Reviewed All Lab Data Listed Above  * Additional Pertinent Lab Tests Reviewed:  All Labs Within Last 24 Hours Reviewed    Imaging:    Imaging Reports Reviewed Today Include:   Imaging Personally Reviewed by Myself Includes:      Recent Cultures (last 7 days):     Results from last 7 days   Lab Units 03/05/20 1959 03/02/20 2356   GRAM STAIN RESULT  1+ Polys  No bacteria seen 1+ Disintegrating polys*  3+ Gram negative rods*   WOUND CULTURE  2+ Growth of Gram Negative Leo* 4+ Growth of Serratia marcescens*       Last 24 Hours Medication List:     Current Facility-Administered Medications:  albumin human 12 5 g Intravenous Once Alcira Anes, DPM Last Rate: Stopped (02/20/20 9827)   aluminum-magnesium hydroxide-simethicone 30 mL Oral Q4H PRN Alcira Anes, DPM    apixaban 5 mg Oral BID Byron Carlos Alberto, DPM    vitamin C 1,000 mg Oral Daily Alcira Anes, DPM    benzonatate 100 mg Oral TID PRN Alcira Anes, DPM    bumetanide 1 mg Oral Daily Alcira Anes, DPM cefepime 2,000 mg Intravenous Q12H Gwendalyn Pearson, DPM Last Rate: 2,000 mg (03/08/20 0901)   cholecalciferol 1,000 Units Oral Daily Gwendalyn Pearson, DPM    DULoxetine 30 mg Oral Daily Gwendalyn Pearson, DPM    Gadobutrol 10 mL Intravenous Once in imaging Gwendalyn Pearson, DPM    HYDROcodone-acetaminophen 2 tablet Oral Q4H PRN Gwendalyn Pearson, DPM    metoprolol tartrate 25 mg Oral Q12H [address] Gwendalyn Pearson, DPM    metroNIDAZOLE 500 mg Intravenous Q8H Elkin Mahmood MD Last Rate: 500 mg (03/08/20 0902)   nystatin 500,000 Units Swish & Swallow 4x Daily Gwendalyn Pearson, DPM    pantoprazole 40 mg Oral Daily Gwendalyn Pearson, DPM    senna 2 tablet Oral Daily Gwendalyn Pearson, DPM    sodium chloride 2 spray Each Nare Q1H PRN Gwendalyn Pearson, DPM    vancomycin 500 mg Intravenous Q24H Gwendalyn Pearson, DPM Last Rate: 500 mg (03/08/20 0439)        Today, Patient Was Seen By: Desi Mccracken PA-C    ** Please Note: Dictation voice to text software may have been used in the creation of this document   **

## 2020-03-08 NOTE — ASSESSMENT & PLAN NOTE
· Patient acute kidney injury on admission with creatinine of 1 9  · Baseline creatinine appears to be 1 2-1 4  · Creatinine this morning is 1 30 - baseline  · Continue Bumex  · SAMY resolved

## 2020-03-08 NOTE — PROGRESS NOTES
Vancomycin IV Pharmacy-to-Dose Consultation    Sona Cordero is a 69 y o  male who is currently receiving Vancomycin IV with management by the Pharmacy Consult service  Assessment/Plan:  The patient was reviewed  Renal function is stable and no signs or symptoms of nephrotoxicity and/or infusion reactions were documented in the chart  Based on todays assessment, continue current vancomycin (day # 16) dosing of 500mg q24h , with a plan for trough to be drawn at 0330 on 3/14/20  We will continue to follow the patients culture results and clinical progress daily      Patti Sears, Pharmacist

## 2020-03-08 NOTE — ASSESSMENT & PLAN NOTE
Wt Readings from Last 3 Encounters:   03/08/20 114 kg (251 lb 12 3 oz)   02/04/20 108 kg (239 lb)   01/07/20 113 kg (249 lb)     · Patient has chronic systolic/diastolic CHF with ejection fraction of 45%  · Appears euvolemic  · Patient is on room air and lungs are clear to auscultation  · Continue on Bumex  · Daily weight and I&Os  · Monitor renal function in a m

## 2020-03-08 NOTE — PROGRESS NOTES
Progress Note - Podiatry  Linwood Mazariegos 69 y o  male MRN: [id number]  Unit/Bed#: -01 Encounter: [id number]    Assessment/Plan:  1  Abscess left forefoot   -PO Day #3 S/P I&D LFT deep abscess   -dressing intact with only spot strike through drainage distally  Change scheduled for tomorrow  - Anticipate D/C Monday or Tue to home with VNA on appropriate po antibiotics per ID    - OR cultures still pending, wound culture from 3/2/2020 grew Serratia marcescens  2  Cellulitis left leg   - resolving satisfactorily status post incision and              -continue with current IV antibiotics per Infectious Disease   -right leg has resolved completely since admission  3  Chronic venous insufficiency Bilateral   -elevation, compression stockings and Ace wraps   -may wear his own compression stocking on the right leg  4  Tinea pedis-chronic              -Betadine to dry interspaces left foot daily              -alginate rope woven and interdigitally to dry interspaces               -Diflucan daily x1 week  5  CHF, chronic AFib              -managed per Internal Medicine and Cardiology    Subjective/Objective   Chief Complaint:   Chief Complaint   Patient presents with    Foot Pain     patient complaint of foot pain and discloration x 1 day       Subjective:  52-year-old white male seen today resting bed  Relates pain along the lateral plantar midfoot region has improved significantly since yesterday  Denies any fever or shortness of breath  Blood pressure 105/77, pulse 86, temperature 97 5 °F (36 4 °C), temperature source Oral, resp  rate 16, height 6' (1 829 m), weight 114 kg (251 lb 12 3 oz), SpO2 94 %  ,Body mass index is 34 15 kg/m²      Invasive Devices     Peripheral Intravenous Line            Peripheral IV 03/05/20 Right Hand 2 days                Physical Exam:   General appearance: alert, cooperative and no distress  Neuro/Vascular: Pulses: Right: DP  0/4, PT  0/4, Left: DP  0/4, PT  0/4   Capillary Filling: 3 Sec, Edema:  Chronic +2 bilateral   8/2020 Arterial duplex: showed triphasic waveforms of DP&PT bilateral   EMMANUEL 1 3 on the right, and 0 9 on the left,  Epicritic sensations grossly intact  Extremity:  Surgical dressing intact with only small area strike through drainage noted distally  Relates no significant pain from foot this morning  No active bleeding noted today  Diminished calor, edema, and erythema noted  Labs and other studies:   CBC w/diff  Results from last 7 days   Lab Units 03/08/20  0523   WBC Thousand/uL 9 38   HEMOGLOBIN g/dL 10 8*   HEMATOCRIT % 33 6*   PLATELETS Thousands/uL 286   NEUTROS PCT % 75   LYMPHS PCT % 7*   MONOS PCT % 13*   EOS PCT % 3     BMP  Results from last 7 days   Lab Units 03/08/20  0523   POTASSIUM mmol/L 4 0   CHLORIDE mmol/L 100   CO2 mmol/L 28   BUN mg/dL 30*   CREATININE mg/dL 1 30   CALCIUM mg/dL 8 7     CMP  Results from last 7 days   Lab Units 03/08/20  0523  03/05/20  0526   POTASSIUM mmol/L 4 0   < > 4 4   CHLORIDE mmol/L 100   < > 98*   CO2 mmol/L 28   < > 29   BUN mg/dL 30*   < > 42*   CREATININE mg/dL 1 30   < > 1 44*   CALCIUM mg/dL 8 7   < > 8 9   ALK PHOS U/L  --   --  116   ALT U/L  --   --  9*   AST U/L  --   --  29    < > = values in this interval not displayed         @Culture@  Lab Results   Component Value Date    URINECX <10,000 cfu/ml  02/19/2020         Current Facility-Administered Medications:     albumin human (FLEXBUMIN) 25 % injection 12 5 g, 12 5 g, Intravenous, Once, Mariah Lucia DPM, Stopped at 02/20/20 2063    aluminum-magnesium hydroxide-simethicone (MYLANTA) 200-200-20 mg/5 mL oral suspension 30 mL, 30 mL, Oral, Q4H PRN, Mariah Lucia DPM    apixaban (ELIQUIS) tablet 5 mg, 5 mg, Oral, BID, IVELISSE ÁlvarezM, 5 mg at 03/08/20 0856    ascorbic acid (VITAMIN C) tablet 1,000 mg, 1,000 mg, Oral, Daily, Mariah Lucia DPM, 1,000 mg at 03/08/20 0856    benzonatate (TESSALON PERLES) capsule 100 mg, 100 mg, Oral, TID PRN, Oneyda Orwell, DPM, 100 mg at 03/05/20 1053    bumetanide (BUMEX) tablet 1 mg, 1 mg, Oral, Daily, Oneyda Orwell, DPM, 1 mg at 03/08/20 0857    cefepime (MAXIPIME) IVPB (premix) 2,000 mg, 2,000 mg, Intravenous, Q12H, Oneyda Orwell, DPM, Last Rate: 100 mL/hr at 03/08/20 0901, 2,000 mg at 03/08/20 0901    cholecalciferol (VITAMIN D3) tablet 1,000 Units, 1,000 Units, Oral, Daily, Oneyda Orwell, DPM, 1,000 Units at 03/08/20 0856    DULoxetine (CYMBALTA) delayed release capsule 30 mg, 30 mg, Oral, Daily, Oneyda Orwell, DPM, 30 mg at 03/08/20 0856    Gadobutrol injection (SINGLE-DOSE) SOLN 10 mL, 10 mL, Intravenous, Once in imaging, Oneyda Orwell, DPM    HYDROcodone-acetaminophen (NORCO) 5-325 mg per tablet 2 tablet, 2 tablet, Oral, Q4H PRN, Oneyda Orwell, DPM, 2 tablet at 03/08/20 0857    metoprolol tartrate (LOPRESSOR) tablet 25 mg, 25 mg, Oral, Q12H [address], Oneyda Orwell, DPM, 25 mg at 03/07/20 1820    metroNIDAZOLE (FLAGYL) IVPB (premix) 500 mg, 500 mg, Intravenous, Q8H, Ash Wright MD, Last Rate: 200 mL/hr at 03/08/20 0902, 500 mg at 03/08/20 0902    nystatin (MYCOSTATIN) oral suspension 500,000 Units, 500,000 Units, Swish & Swallow, 4x Daily, Oneyda Orwell, DPM, 500,000 Units at 03/08/20 0857    pantoprazole (PROTONIX) EC tablet 40 mg, 40 mg, Oral, Daily, Oneyda Orwell, DPM, 40 mg at 03/08/20 0856    senna (SENOKOT) tablet 17 2 mg, 2 tablet, Oral, Daily, Oneyda Orwell, DPM, 17 2 mg at 03/08/20 0856    sodium chloride (OCEAN) 0 65 % nasal spray 2 spray, 2 spray, Each Nare, Q1H PRN, Oneyda Akhtar DPM, 2 spray at 02/28/20 0903    vancomycin (VANCOCIN) 500 mg in sodium chloride 0 9 % 100 mL IVPB, 500 mg, Intravenous, Q24H, Oneyda Akhtar DPM, Last Rate: 200 mL/hr at 03/08/20 0439, 500 mg at 03/08/20 0439    Imaging: I have personally reviewed pertinent films in PACS  EKG, Pathology, and Other Studies: I have personally reviewed pertinent reports    VTE Pharmacologic Prophylaxis: Heparin  VTE Mechanical Prophylaxis: sequential compression device    Cindi Olmstead DPM

## 2020-03-08 NOTE — ASSESSMENT & PLAN NOTE
· Patient developed left lower extremity cellulitis/left foot abscess  · Continue IV vancomycin, cefepime and Flagyl - await final ID recommendations for oral on discharge  · ID follow-up noted patient  · Follow-up culture results- gram-negative rods and serratia  · Podiatry ordered MRI of left forefoot negative for osteomyelitis or fluid collection  · Podiatry following, I&D of left lower extremity  · Tenia pedis that patient has could be a source for cellulitis    Completed course of Diflucan

## 2020-03-08 NOTE — ASSESSMENT & PLAN NOTE
· Per Podiatry  · POD 3 for left foot I&D     · Further plan per podiatry  · Wound care and dressing changes per Podiatry  · On IV antibiotics

## 2020-03-09 LAB
ANION GAP SERPL CALCULATED.3IONS-SCNC: 8 MMOL/L (ref 4–13)
BASOPHILS # BLD AUTO: 0.07 THOUSANDS/ΜL (ref 0–0.1)
BASOPHILS NFR BLD AUTO: 1 % (ref 0–1)
BUN SERPL-MCNC: 27 MG/DL (ref 5–25)
CALCIUM SERPL-MCNC: 8.9 MG/DL (ref 8.3–10.1)
CHLORIDE SERPL-SCNC: 98 MMOL/L (ref 100–108)
CO2 SERPL-SCNC: 29 MMOL/L (ref 21–32)
CREAT SERPL-MCNC: 1.43 MG/DL (ref 0.6–1.3)
EOSINOPHIL # BLD AUTO: 0.26 THOUSAND/ΜL (ref 0–0.61)
EOSINOPHIL NFR BLD AUTO: 3 % (ref 0–6)
ERYTHROCYTE [DISTWIDTH] IN BLOOD BY AUTOMATED COUNT: 16 % (ref 11.6–15.1)
GFR SERPL CREATININE-BSD FRML MDRD: 48 ML/MIN/1.73SQ M
GLUCOSE SERPL-MCNC: 80 MG/DL (ref 65–140)
HCT VFR BLD AUTO: 36.2 % (ref 36.5–49.3)
HGB BLD-MCNC: 11.6 G/DL (ref 12–17)
IMM GRANULOCYTES # BLD AUTO: 0.16 THOUSAND/UL (ref 0–0.2)
IMM GRANULOCYTES NFR BLD AUTO: 2 % (ref 0–2)
LYMPHOCYTES # BLD AUTO: 0.78 THOUSANDS/ΜL (ref 0.6–4.47)
LYMPHOCYTES NFR BLD AUTO: 10 % (ref 14–44)
MCH RBC QN AUTO: 32.4 PG (ref 26.8–34.3)
MCHC RBC AUTO-ENTMCNC: 32 G/DL (ref 31.4–37.4)
MCV RBC AUTO: 101 FL (ref 82–98)
MONOCYTES # BLD AUTO: 1.35 THOUSAND/ΜL (ref 0.17–1.22)
MONOCYTES NFR BLD AUTO: 17 % (ref 4–12)
NEUTROPHILS # BLD AUTO: 5.54 THOUSANDS/ΜL (ref 1.85–7.62)
NEUTS SEG NFR BLD AUTO: 67 % (ref 43–75)
NRBC BLD AUTO-RTO: 0 /100 WBCS
PLATELET # BLD AUTO: 313 THOUSANDS/UL (ref 149–390)
PMV BLD AUTO: 10.2 FL (ref 8.9–12.7)
POTASSIUM SERPL-SCNC: 3.8 MMOL/L (ref 3.5–5.3)
RBC # BLD AUTO: 3.58 MILLION/UL (ref 3.88–5.62)
SODIUM SERPL-SCNC: 135 MMOL/L (ref 136–145)
WBC # BLD AUTO: 8.16 THOUSAND/UL (ref 4.31–10.16)

## 2020-03-09 PROCEDURE — 85025 COMPLETE CBC W/AUTO DIFF WBC: CPT | Performed by: PHYSICIAN ASSISTANT

## 2020-03-09 PROCEDURE — 80048 BASIC METABOLIC PNL TOTAL CA: CPT | Performed by: PHYSICIAN ASSISTANT

## 2020-03-09 PROCEDURE — 99232 SBSQ HOSP IP/OBS MODERATE 35: CPT | Performed by: PHYSICIAN ASSISTANT

## 2020-03-09 RX ADMIN — STANDARDIZED SENNA CONCENTRATE 17.2 MG: 8.6 TABLET ORAL at 08:45

## 2020-03-09 RX ADMIN — NYSTATIN 500000 UNITS: 100000 SUSPENSION ORAL at 08:46

## 2020-03-09 RX ADMIN — BUMETANIDE 1 MG: 1 TABLET ORAL at 08:46

## 2020-03-09 RX ADMIN — METOPROLOL TARTRATE 25 MG: 25 TABLET ORAL at 17:23

## 2020-03-09 RX ADMIN — METRONIDAZOLE 500 MG: 500 INJECTION, SOLUTION INTRAVENOUS at 17:26

## 2020-03-09 RX ADMIN — HYDROCODONE BITARTRATE AND ACETAMINOPHEN 2 TABLET: 5; 325 TABLET ORAL at 04:26

## 2020-03-09 RX ADMIN — Medication 500 MG: at 04:15

## 2020-03-09 RX ADMIN — APIXABAN 5 MG: 5 TABLET, FILM COATED ORAL at 17:23

## 2020-03-09 RX ADMIN — CEFEPIME HYDROCHLORIDE 2000 MG: 2 INJECTION, SOLUTION INTRAVENOUS at 08:45

## 2020-03-09 RX ADMIN — CEFEPIME HYDROCHLORIDE 2000 MG: 2 INJECTION, SOLUTION INTRAVENOUS at 21:19

## 2020-03-09 RX ADMIN — HYDROCODONE BITARTRATE AND ACETAMINOPHEN 2 TABLET: 5; 325 TABLET ORAL at 17:23

## 2020-03-09 RX ADMIN — MELATONIN 1000 UNITS: at 08:45

## 2020-03-09 RX ADMIN — DULOXETINE 30 MG: 30 CAPSULE, DELAYED RELEASE ORAL at 08:45

## 2020-03-09 RX ADMIN — METRONIDAZOLE 500 MG: 500 INJECTION, SOLUTION INTRAVENOUS at 08:45

## 2020-03-09 RX ADMIN — APIXABAN 5 MG: 5 TABLET, FILM COATED ORAL at 08:46

## 2020-03-09 RX ADMIN — HYDROCODONE BITARTRATE AND ACETAMINOPHEN 2 TABLET: 5; 325 TABLET ORAL at 08:51

## 2020-03-09 RX ADMIN — METRONIDAZOLE 500 MG: 500 INJECTION, SOLUTION INTRAVENOUS at 01:44

## 2020-03-09 RX ADMIN — OXYCODONE HYDROCHLORIDE AND ACETAMINOPHEN 1000 MG: 500 TABLET ORAL at 08:45

## 2020-03-09 RX ADMIN — HYDROCODONE BITARTRATE AND ACETAMINOPHEN 2 TABLET: 5; 325 TABLET ORAL at 21:16

## 2020-03-09 RX ADMIN — PANTOPRAZOLE SODIUM 40 MG: 40 TABLET, DELAYED RELEASE ORAL at 08:45

## 2020-03-09 RX ADMIN — NYSTATIN 500000 UNITS: 100000 SUSPENSION ORAL at 17:25

## 2020-03-09 NOTE — PROGRESS NOTES
[facility]  Progress Note - Linwood Mazariegos 1945, 69 y o  male MRN: [id number]  Unit/Bed#: -01 Encounter: [id number]  Primary Care Provider: Mickey Shaw MD   Date and time admitted to hospital: 2/19/2020  1:18 PM    Abscess of left foot  Assessment & Plan  · Per Podiatry  · POD 4 for left foot I&D  · Further plan per podiatry  · Wound care and dressing changes per Podiatry  · On IV antibiotics    Oral candidiasis  Assessment & Plan  · Completed treatment of Diflucan    History of methicillin resistant staphylococcus aureus (MRSA)  Assessment & Plan  · Questionable history of MRSA on review of chart; in review of records cannot find any noted history  · On IV antibiotics- transition to p o  For discharge pending ID recs    Hypertension  Assessment & Plan  · Blood pressure stable  · Continue to monitor with schedule vitals  · On Lopressor and Bumex    Chronic combined systolic and diastolic congestive heart failure (HCC)  Assessment & Plan  Wt Readings from Last 3 Encounters:   03/09/20 113 kg (250 lb)   02/04/20 108 kg (239 lb)   01/07/20 113 kg (249 lb)     · Patient has chronic systolic/diastolic CHF with ejection fraction of 45%  · Appears euvolemic  · Patient is on room air and lungs are clear to auscultation  · Continue on Bumex  · Daily weight and I&Os  · Monitor renal function    Chronic deep vein thrombosis (DVT) ([facility] [address] )  Assessment & Plan  · VAS lower limb venous duplex studies completed in November of 2019 revealed in no evidence of right lower limb thrombus in the common femoral artery  No gross evidence of acute DVT based on color-flow analysis the left lower limb  · Anticoagulated on eliquis    Cellulitis of left lower extremity  Assessment & Plan  · Patient developed left lower extremity cellulitis/left foot abscess  · Continue IV vancomycin, cefepime and Flagyl - await final ID recommendations for oral on discharge  · ID follow-up noted patient    · Follow-up culture results- gram-negative rods and serratia  · Podiatry ordered MRI of left forefoot negative for osteomyelitis or fluid collection  · Podiatry following, I&D of left lower extremity  · Tenia pedis that patient has could be a source for cellulitis  Completed course of Diflucan      Persistent atrial fibrillation  Assessment & Plan  · Patient has chronic atrial fibrillation  · Rates are controlled on Lopressor 25 mg p o bid  · Patient restarted on Eliquis 3/6/20      Class 1 obesity due to excess calories with serious comorbidity and body mass index (BMI) of 34 0 to 34 9 in adult  Assessment & Plan  · Lifestyle modifications recommended    Gastroesophageal reflux disease without esophagitis  Assessment & Plan  · Continue Protonix  * Cellulitis of right lower extremity  Assessment & Plan  · Patient presented with right lower extremity cellulitis - improving  · On IV antibiotics  · ID on board - appreciate recommendations        VTE Pharmacologic Prophylaxis:   Pharmacologic: Apixaban (Eliquis)  Mechanical VTE Prophylaxis in Place: No    Patient Centered Rounds: I have performed bedside rounds with nursing staff today  Discussions with Specialists or Other Care Team Provider: None    Education and Discussions with Family / Patient: Discussed with patient at bedside, answered all questions to the best of my ability  Time Spent for Care: 30 minutes  More than 50% of total time spent on counseling and coordination of care as described above  Current Length of Stay: 19 day(s)    Current Patient Status: Inpatient   Certification Statement: The patient will continue to require additional inpatient hospital stay due to Final ID and Podiatry recommendations  Discharge Plan: Patient comes from home  Code Status: Level 1 - Full Code      Subjective:   Patient feels ok today  Reports continued pain in his foot  He is eager for discharge home  Agreeable to home health services      Objective:     Vitals:   Temp (24hrs), Av 5 °F (36 4 °C), Min:97 1 °F (36 2 °C), Max:97 9 °F (36 6 °C)    Temp:  [97 1 °F (36 2 °C)-97 9 °F (36 6 °C)] 97 5 °F (36 4 °C)  HR:  [82-98] 82  Resp:  [18] 18  BP: (101-118)/(59-72) 110/59  SpO2:  [97 %-100 %] 100 %  Body mass index is 33 91 kg/m²  Input and Output Summary (last 24 hours): Intake/Output Summary (Last 24 hours) at 3/9/2020 1203  Last data filed at 3/9/2020 0520  Gross per 24 hour   Intake 260 ml   Output 850 ml   Net -590 ml       Physical Exam:     Physical Exam   Constitutional: He appears well-developed and well-nourished  No distress  HENT:   Head: Normocephalic and atraumatic  Eyes: Conjunctivae are normal  No scleral icterus  Cardiovascular: Normal rate  An irregularly irregular rhythm present  No murmur heard  Pulmonary/Chest: Effort normal and breath sounds normal  No respiratory distress  He has no wheezes  He has no rales  Abdominal: Soft  He exhibits no distension  There is no tenderness  Musculoskeletal: He exhibits no edema  Neurological: He is alert  Skin: Skin is warm and dry  There is erythema (LLE, improving)  Chronic LE skin changes b/l   Psychiatric: He has a normal mood and affect  Nursing note and vitals reviewed        Additional Data:     Labs:    Results from last 7 days   Lab Units 20  0507   WBC Thousand/uL 8 16   HEMOGLOBIN g/dL 11 6*   HEMATOCRIT % 36 2*   PLATELETS Thousands/uL 313   NEUTROS PCT % 67   LYMPHS PCT % 10*   MONOS PCT % 17*   EOS PCT % 3     Results from last 7 days   Lab Units 20  0507  20  0526   SODIUM mmol/L 135*   < > 131*   POTASSIUM mmol/L 3 8   < > 4 4   CHLORIDE mmol/L 98*   < > 98*   CO2 mmol/L 29   < > 29   BUN mg/dL 27*   < > 42*   CREATININE mg/dL 1 43*   < > 1 44*   ANION GAP mmol/L 8   < > 4   CALCIUM mg/dL 8 9   < > 8 9   ALBUMIN g/dL  --   --  1 9*   TOTAL BILIRUBIN mg/dL  --   --  0 90   ALK PHOS U/L  --   --  116   ALT U/L  --   --  9*   AST U/L  --   --  29   GLUCOSE RANDOM mg/dL 80   < > 100    < > = values in this interval not displayed  * I Have Reviewed All Lab Data Listed Above  * Additional Pertinent Lab Tests Reviewed:  All Labs Within Last 24 Hours Reviewed    Imaging:    Imaging Reports Reviewed Today Include: None  Imaging Personally Reviewed by Myself Includes:  None    Recent Cultures (last 7 days):     Results from last 7 days   Lab Units 03/05/20 1959 03/02/20  2356   GRAM STAIN RESULT  1+ Polys  No bacteria seen 1+ Disintegrating polys*  3+ Gram negative rods*   WOUND CULTURE  2+ Growth of Serratia marcescens* 4+ Growth of Serratia marcescens*       Last 24 Hours Medication List:     Current Facility-Administered Medications:  albumin human 12 5 g Intravenous Once Dorthey Bigger, DPM Last Rate: Stopped (02/20/20 8270)   aluminum-magnesium hydroxide-simethicone 30 mL Oral Q4H PRN Dorthey Bigger, DPM    apixaban 5 mg Oral BID Tana Spicer, DPM    vitamin C 1,000 mg Oral Daily Dorthey Bigger, DPM    benzonatate 100 mg Oral TID PRN Dorthey Bigger, DPM    bumetanide 1 mg Oral Daily Dorthey Bigger, DPM    cefepime 2,000 mg Intravenous Q12H Dorthey Bigger, DPM Last Rate: 2,000 mg (03/09/20 0845)   cholecalciferol 1,000 Units Oral Daily Dorthey Bigger, DPM    DULoxetine 30 mg Oral Daily Dorthey Bigger, DPM    Gadobutrol 10 mL Intravenous Once in imaging Dorthey Bigger, DPM    HYDROcodone-acetaminophen 2 tablet Oral Q4H PRN Dorthey Bigger, DPM    metoprolol tartrate 25 mg Oral Q12H [facility] Dorthey Bigger, DPM    metroNIDAZOLE 500 mg Intravenous Q8H Blanka Yancey MD Last Rate: 500 mg (03/09/20 0845)   nystatin 500,000 Units Swish & Swallow 4x Daily Dorthey Bigger, DPM    pantoprazole 40 mg Oral Daily Dorthey Bigger, DPM    senna 2 tablet Oral Daily Dorthey Bigger, DPM    sodium chloride 2 spray Each Nare Q1H PRN Dorthey Bigger, DPM    vancomycin 500 mg Intravenous Q24H Dorthey Bigger, DPM Last Rate: Stopped (03/09/20 0520)        Today, Patient Was Seen By: Sharad Stahl KEISHA Monson    ** Please Note: Dictation voice to text software may have been used in the creation of this document   **

## 2020-03-09 NOTE — PROGRESS NOTES
[facility] [place]  69 y o   male  1945  mrn [id number]    Assessment/Plan: 1  Cellulitis LLE: strange that it occured during this hospitalization while on abx, though given chronic venous stasis he would be at risk for it   Initial clinical improvement but now purulence on forefoot s/p I and D, culture + Serratia      - ok for d/c on po cefdinir 300 mg qid for another 6 days       Subjective:  No fevers  Feeling well  Podiatry input noted, wound healing        Objective:    Leg dressed    Labs:  CBC w/diff  Recent Labs     03/09/20  0507   WBC 8 16   HGB 11 6*   HCT 36 2*      NEUTOPHILPCT 67   LYMPHOPCT 10*   MONOPCT 17*   EOSPCT 3     BMP  Recent Labs     03/09/20  0507   K 3 8   CL 98*   CO2 29   BUN 27*   CREATININE 1 43*   CALCIUM 8 9     CMP  Recent Labs     03/09/20  0507   K 3 8   CL 98*   CO2 29   BUN 27*   CREATININE 1 43*   CALCIUM 8 9        labrc    Cultures:  No results found for: BLOODCX  Lab Results   Component Value Date    WOUNDCULT 2+ Growth of Serratia marcescens (A) 03/05/2020    WOUNDCULT 4+ Growth of Serratia marcescens (A) 03/02/2020    WOUNDCULT 3+ Growth of Serratia marcescens (A) 07/24/2019    WOUNDCULT 1+ Growth of  07/24/2019    WOUNDCULT (A) 01/02/2019     1+ Growth of Methicillin Resistant Staphylococcus aureus     Lab Results   Component Value Date    URINECX <10,000 cfu/ml  02/19/2020     No results found for: SPUTUMCULTUR    MED: reviewed      Current Facility-Administered Medications:     albumin human (FLEXBUMIN) 25 % injection 12 5 g, 12 5 g, Intravenous, Once, Belita Lewis, DPM, Stopped at 02/20/20 2343    aluminum-magnesium hydroxide-simethicone (MYLANTA) 200-200-20 mg/5 mL oral suspension 30 mL, 30 mL, Oral, Q4H PRN, Belita Lewis, DPM    apixaban (ELIQUIS) tablet 5 mg, 5 mg, Oral, BID, Jamie Osier, DPM, 5 mg at 03/09/20 0846    ascorbic acid (VITAMIN C) tablet 1,000 mg, 1,000 mg, Oral, Daily, Devyn Lewis DPM, 1,000 mg at 03/09/20 8045    benzonatate (TESSALON PERLES) capsule 100 mg, 100 mg, Oral, TID PRN, Firman Bumps, DPM, 100 mg at 03/08/20 1746    bumetanide (BUMEX) tablet 1 mg, 1 mg, Oral, Daily, Firman Bumps, DPM, 1 mg at 03/09/20 0846    cefepime (MAXIPIME) IVPB (premix) 2,000 mg, 2,000 mg, Intravenous, Q12H, Firman Bumps, DPM, Last Rate: 100 mL/hr at 03/09/20 0845, 2,000 mg at 03/09/20 0845    cholecalciferol (VITAMIN D3) tablet 1,000 Units, 1,000 Units, Oral, Daily, Firman Bumps, DPM, 1,000 Units at 03/09/20 0845    DULoxetine (CYMBALTA) delayed release capsule 30 mg, 30 mg, Oral, Daily, Firman Bumps, DPM, 30 mg at 03/09/20 0845    Gadobutrol injection (SINGLE-DOSE) SOLN 10 mL, 10 mL, Intravenous, Once in imaging, Firman Bumps, DPM    HYDROcodone-acetaminophen (NORCO) 5-325 mg per tablet 2 tablet, 2 tablet, Oral, Q4H PRN, Firman Bumps, DPM, 2 tablet at 03/09/20 0851    metoprolol tartrate (LOPRESSOR) tablet 25 mg, 25 mg, Oral, Q12H [address], Firman Bumps, DPM, 25 mg at 03/08/20 1746    metroNIDAZOLE (FLAGYL) IVPB (premix) 500 mg, 500 mg, Intravenous, Q8H, Estevan Presley MD, Last Rate: 200 mL/hr at 03/09/20 0845, 500 mg at 03/09/20 0845    nystatin (MYCOSTATIN) oral suspension 500,000 Units, 500,000 Units, Swish & Swallow, 4x Daily, Firman Bumps, DPM, 500,000 Units at 03/09/20 0846    pantoprazole (PROTONIX) EC tablet 40 mg, 40 mg, Oral, Daily, Firman Bumps, DPM, 40 mg at 03/09/20 0845    senna (SENOKOT) tablet 17 2 mg, 2 tablet, Oral, Daily, Firman Bumps, DPM, 17 2 mg at 03/09/20 0845    sodium chloride (OCEAN) 0 65 % nasal spray 2 spray, 2 spray, Each Nare, Q1H PRN, Firman Bumps, DPM, 2 spray at 02/28/20 0903    vancomycin (VANCOCIN) 500 mg in sodium chloride 0 9 % 100 mL IVPB, 500 mg, Intravenous, Q24H, Firman Bumps, DPM, Stopped at 03/09/20 0520    Principal Problem:    Cellulitis of right lower extremity  Active Problems:    Gastroesophageal reflux disease without esophagitis    Class 1 obesity due to excess calories with serious comorbidity and body mass index (BMI) of 34 0 to 34 9 in adult    Persistent atrial fibrillation    Cellulitis of left lower extremity    Chronic deep vein thrombosis (DVT) (HCC)    Chronic combined systolic and diastolic congestive heart failure ([facility] [address] )    Hypertension    History of methicillin resistant staphylococcus aureus (MRSA)    Oral candidiasis    Abscess of left foot      Cathie Encarnacion MD

## 2020-03-09 NOTE — PROGRESS NOTES
Vancomycin IV Pharmacy-to-Dose Consultation    Ava Jones is a 69 y o  male who is currently receiving Vancomycin IV with management by the Pharmacy Consult service  Assessment/Plan:  The patient was reviewed  Renal function is stable and no signs or symptoms of nephrotoxicity and/or infusion reactions were documented in the chart  Based on todays assessment, continue current vancomycin (day # 17) dosing of 500mg IV Q24H, with a plan for trough to be drawn at 0330 on 3/14/20  We will continue to follow the patients culture results and clinical progress daily      Aidan Morgan, Pharmacist

## 2020-03-09 NOTE — DISCHARGE SUMMARY
[address] [facility]  Discharge- Donna Roth 1945, 69 y o  male MRN: [id number]  Unit/Bed#: -01 Encounter: [id number]  Primary Care Provider: Wilbert Sims MD   Date and time admitted to hospital: 2/19/2020  1:18 PM    No new Assessment & Plan notes have been filed under this hospital service since the last note was generated  Service: Hospitalist    Discharging Physician / Practitioner: Jocelyn Malone PA-C  PCP: Wilbert Sims MD  Admission Date:   Admission Orders (From admission, onward)     Ordered        02/19/20 1550  Inpatient Admission  Once                   Discharge Date: 03/10/20    Resolved Problems  Date Reviewed: 3/10/2020          Resolved    Acute kidney injury superimposed on CKD ([facility] [address] ) 3/8/2020     Resolved by  Desi Mccracken PA-C    Hyponatremia 3/6/2020     Resolved by  Desi Mccracken PA-C          Consultations During Hospital Stay:  · Infectious Disease  · Podiatry  · Orthopedics    Procedures Performed:   · I&D 3/5    Significant Findings / Test Results:   · X-ray right ankle 2/19:  No acute osseous abnormality  Postoperative changes status post hindfoot fusion  · CXR 2/22:  Right perihilar central vascular congestion  · X-ray left foot 2/23: Subtle soft tissue radiolucency between the first through fourth proximal phalanges may represent complicated cellulitis in the appropriate clinical context  No radiographic evidence of osteomyelitis  · MRI L foot 3/4:  · Findings consistent with dorsal cellulitis  No focal fluid collection  · No evidence of osteomyelitis  · Age-indeterminate non acute nondisplaced fracture of great toe proximal phalanx  · Culture negative  · Wound Culture positive for Serratia from 3/2 and 3/5  · Anaerobic culture negative from 3/5    Incidental Findings:   · None    Test Results Pending at Discharge (will require follow up):    · None     Outpatient Tests Requested:  · None    Complications:  Patient initially presented right lower extremity cellulitis subsequently developed left lower extremity cellulitis with abscess  Reason for Admission:  Foot pain    Hospital Course:     Ronen Mosquera is a 69 y o  male patient with past medical history of chronic DVT, GERD, CHF, diabetes, hyperlipidemia, hypertension CKD, persistent AFib on Eliquis, history of MRSA who originally presented to the hospital on 2/19/2020 due to foot pain  Patient follows with Dr Demetrius Toney outpatient  He is supposed to wear a boot and has had multiple foot surgeries  He reports doing more walking than usual yesterday and having pain  Patient was found to have leukocytosis and SAMY on admission  Likely secondary to soft tissue infection  Imaging findings as above  Admitted for IV antibiotics  Orthopedics was consulted given history of extensive hardware but felt there was no intervention appropriate  Podiatry consulted for inpatient management  Patient does have difficulty with recurrent open wounds of right rear foot  Vancomycin started on 02/22 given patient history of MRSA  Patient was given fluids for SAMY superimposed on CKD however given concern for overload these were discontinued and Lasix was given  Both Bumex and spironolactone were held on admission also due to SAMY  Bumex alone was resumed once creatinine stabilized  BMP was closely monitored  Cardizem dose was also lowered for patient to be able to tolerate diuresis  On 2/25 patient developed LLE Cellulitis despite being on IV antibiotics  Infectious Disease consulted  Vancomycin continued  Purulent drainage developed on 02/27 and Podiatry was asked to evaluate  Concern over tinea pedis as source for cellulitis  Patient was started on Diflucan 2/28  Local wound care performed by Podiatry  Oral candidiasis was noted on 03/03 for which patient continue Diflucan and started on oral rinses     Left forefoot continued to develop purulent discharge and patient was scheduled for incision and drainage by Podiatry  Eliquis was held in anticipation for procedure  Cefepime and Flagyl was added 3/4 by infectious disease  MRI results as above  Wound culture results as above, antibiotics adjusted accordingly  Abscess successfully drained and local wound care provided by Podiatry  Infectious Disease okmikey with discharge on Omnicef for 5 additional days  Initially patient did refuse inpatient rehab services been on day of discharge (3/9) patient changes mind and decided he wanted to go to inpatient rehab  Referrals were made and patient was able to be placed on 03/10  Local wound care should continue to be performed, with dressing changes every other day  Inpatient medications to be continued  Patient to complete course of PO antibiotics for an additional 5 days  Patient is to have outpatient follow-up with wound care weekly  Please see above list of diagnoses and related plan for additional information  Condition at Discharge: stable     Discharge Day Visit / Exam:     * Please refer to separate progress note for these details *    Discussion with Family:  Discussed care plan with patient at bedside  Answered all questions to the best of my ability  Discharge instructions/Information to patient and family:   See after visit summary for information provided to patient and family  Provisions for Follow-Up Care:  See after visit summary for information related to follow-up care and any pertinent home health orders  Disposition:     [address] [facility] (see below)    For Discharges to [facility]:   · [facility]     Planned Readmission: None     Discharge Statement:  I spent 75 minutes discharging the patient  This time was spent on the day of discharge  I had direct contact with the patient on the day of discharge   Greater than 50% of the total time was spent examining patient, answering all patient questions, arranging and discussing plan of care with patient as well as directly providing post-discharge instructions  Additional time then spent on discharge activities  Discharge Medications:  See after visit summary for reconciled discharge medications provided to patient and family        ** Please Note: This note has been constructed using a voice recognition system **

## 2020-03-09 NOTE — PROGRESS NOTES
Progress Note - Podiatry  Linwood Mazariegos 69 y o  male MRN: [id number]  Unit/Bed#: -01 Encounter: [id number]    Assessment/Plan:  1  Abscess left forefoot   -PO Day #4 S/P I&D LFT deep abscess   -dressing intact with only spot strike through drainage distally  -okay for discharge to home with VNA on appropriate po antibiotics per ID    - Wound culture from 3/2/2020 grew Serratia marcescens  2  Cellulitis left leg   - has resolved satisfactorily since I & D                -continue with current IV antibiotics per Infectious Disease   -right leg has resolved completely since admission  3  Chronic venous insufficiency Bilateral   -elevation, compression stockings and Ace wraps   -may wear his own compression stocking on the right leg   -must elevate his legs as much as possible when at home  4  Tinea pedis-chronic              -recommend lamb's wool interdigitally to dry interspaces               -Diflucan to be discontinued upon discharge  5  CHF, chronic AFib              -managed per Internal Medicine and Cardiology    Subjective/Objective   Chief Complaint:   Chief Complaint   Patient presents with    Foot Pain     patient complaint of foot pain and discloration x 1 day       Subjective:  20-year-old white male seen today resting bed  Relates no pain along the lateral plantar midfoot region or from surgical site  Denies any fever or shortness of breath  Blood pressure 110/59, pulse 82, temperature 97 5 °F (36 4 °C), temperature source Oral, resp  rate 18, height 6' (1 829 m), weight 113 kg (250 lb), SpO2 100 %  ,Body mass index is 33 91 kg/m²      Invasive Devices     Peripheral Intravenous Line            Peripheral IV 03/05/20 Right Hand 3 days                Physical Exam:   General appearance: alert, cooperative and no distress  Neuro/Vascular: Pulses: Right: DP  0/4, PT  0/4, Left: DP  0/4, PT  0/4   Capillary Filling: 3 Sec, Edema:  Chronic +2 bilateral   8/2020 Arterial duplex: showed triphasic waveforms of DP&PT bilateral   EMMANUEL 1 3 on the right, and 0 9 on the left,  Epicritic sensations grossly intact  Extremity:  Surgical dressing intact with only small area strike through drainage noted distally  A wound granulating and is approximately 50% red, 50% yellow, no active necrosis or purulence noted      Diminished calor, edema, and erythema noted  Clinically foot has progressed satisfactorily  Labs and other studies:   CBC w/diff  Results from last 7 days   Lab Units 03/09/20  0507   WBC Thousand/uL 8 16   HEMOGLOBIN g/dL 11 6*   HEMATOCRIT % 36 2*   PLATELETS Thousands/uL 313   NEUTROS PCT % 67   LYMPHS PCT % 10*   MONOS PCT % 17*   EOS PCT % 3     BMP  Results from last 7 days   Lab Units 03/09/20  0507   POTASSIUM mmol/L 3 8   CHLORIDE mmol/L 98*   CO2 mmol/L 29   BUN mg/dL 27*   CREATININE mg/dL 1 43*   CALCIUM mg/dL 8 9     CMP  Results from last 7 days   Lab Units 03/09/20  0507  03/05/20  0526   POTASSIUM mmol/L 3 8   < > 4 4   CHLORIDE mmol/L 98*   < > 98*   CO2 mmol/L 29   < > 29   BUN mg/dL 27*   < > 42*   CREATININE mg/dL 1 43*   < > 1 44*   CALCIUM mg/dL 8 9   < > 8 9   ALK PHOS U/L  --   --  116   ALT U/L  --   --  9*   AST U/L  --   --  29    < > = values in this interval not displayed         @Culture@  Lab Results   Component Value Date    URINECX <10,000 cfu/ml  02/19/2020         Current Facility-Administered Medications:     albumin human (FLEXBUMIN) 25 % injection 12 5 g, 12 5 g, Intravenous, Once, Ulysess Mundo, DPM, Stopped at 02/20/20 2343    aluminum-magnesium hydroxide-simethicone (MYLANTA) 200-200-20 mg/5 mL oral suspension 30 mL, 30 mL, Oral, Q4H PRN, Ulysess Mundo, DPM    apixaban (ELIQUIS) tablet 5 mg, 5 mg, Oral, BID, Maxcine Meo, DPM, 5 mg at 03/09/20 0846    ascorbic acid (VITAMIN C) tablet 1,000 mg, 1,000 mg, Oral, Daily, Ulysess Mundo, DPM, 1,000 mg at 03/09/20 0845    benzonatate (TESSALON PERLES) capsule 100 mg, 100 mg, Oral, TID PRN, Rachel De La Cruz Gombosi, DPM, 100 mg at 03/08/20 1746    bumetanide (BUMEX) tablet 1 mg, 1 mg, Oral, Daily, Bud Ranks, DPM, 1 mg at 03/09/20 0846    cefepime (MAXIPIME) IVPB (premix) 2,000 mg, 2,000 mg, Intravenous, Q12H, Bud Ranks, DPM, Last Rate: 100 mL/hr at 03/09/20 0845, 2,000 mg at 03/09/20 0845    cholecalciferol (VITAMIN D3) tablet 1,000 Units, 1,000 Units, Oral, Daily, Bud Ranks, DPM, 1,000 Units at 03/09/20 0845    DULoxetine (CYMBALTA) delayed release capsule 30 mg, 30 mg, Oral, Daily, Bud Ranks, DPM, 30 mg at 03/09/20 0845    Gadobutrol injection (SINGLE-DOSE) SOLN 10 mL, 10 mL, Intravenous, Once in imaging, Bud Ranks, DPM    HYDROcodone-acetaminophen (NORCO) 5-325 mg per tablet 2 tablet, 2 tablet, Oral, Q4H PRN, Bud Ranks, DPM, 2 tablet at 03/09/20 0851    metoprolol tartrate (LOPRESSOR) tablet 25 mg, 25 mg, Oral, Q12H [facility], Harvard Ranks, DPM, 25 mg at 03/08/20 1746    metroNIDAZOLE (FLAGYL) IVPB (premix) 500 mg, 500 mg, Intravenous, Q8H, Dorothea Harrell MD, Last Rate: 200 mL/hr at 03/09/20 0845, 500 mg at 03/09/20 0845    nystatin (MYCOSTATIN) oral suspension 500,000 Units, 500,000 Units, Swish & Swallow, 4x Daily, Bud Ranks, DPM, 500,000 Units at 03/09/20 0846    pantoprazole (PROTONIX) EC tablet 40 mg, 40 mg, Oral, Daily, Bud Ranks, DPM, 40 mg at 03/09/20 0845    senna (SENOKOT) tablet 17 2 mg, 2 tablet, Oral, Daily, Bud Ranks, DPM, 17 2 mg at 03/09/20 0845    sodium chloride (OCEAN) 0 65 % nasal spray 2 spray, 2 spray, Each Nare, Q1H PRN, Bud Ranks, DPM, 2 spray at 02/28/20 0903    vancomycin (VANCOCIN) 500 mg in sodium chloride 0 9 % 100 mL IVPB, 500 mg, Intravenous, Q24H, Bud Ranks, DPM, Stopped at 03/09/20 8319    Imaging: I have personally reviewed pertinent films in PACS  EKG, Pathology, and Other Studies: I have personally reviewed pertinent reports    VTE Pharmacologic Prophylaxis:  Eliquis  VTE Mechanical Prophylaxis: sequential compression device    Regino Arnett, IVELISSEM

## 2020-03-09 NOTE — ASSESSMENT & PLAN NOTE
· Questionable history of MRSA on review of chart; in review of records cannot find any noted history  · On IV antibiotics- transition to p o   For discharge pending ID recs

## 2020-03-09 NOTE — NURSING NOTE
Pt slept lightly during approx half of hrly rounds overnight; states PRN pain pills moderately effective for left foot discomfort

## 2020-03-09 NOTE — ASSESSMENT & PLAN NOTE
· Patient developed left lower extremity cellulitis/left foot abscess  · Continue IV vancomycin, cefepime and Flagyl - ID recommending Omnicef 300mg BID to complete abx course on 3/16  · ID follow-up noted patient  · Follow-up culture results- gram-negative rods and serratia  · Podiatry ordered MRI of left forefoot negative for osteomyelitis or fluid collection  · Podiatry following, I&D of left lower extremity  · Tenia pedis that patient has could be a source for cellulitis    Completed course of Diflucan

## 2020-03-09 NOTE — SOCIAL WORK
Patient medically cleared for discharge  After lengthy discussion with patient, he has decided that he should go to rehab before returning home alone  Given list and freedom of choice  He would prefer [facility]  Referral via [facility] to [facility]  Await bed availability  Will need auth

## 2020-03-09 NOTE — SOCIAL WORK
LOS: 19 days  Continuing to follow patient  Patient for probable discharge today  Met with patient to discuss need for VNA for dressing changes  He is now agreeable to VNA  Given list and freedom of choice  He does not have a preference and is agreeable to [facility]  Referral via [facility] to [facility]  Patient states he may need a BSC  He will let me know after checking with his son

## 2020-03-09 NOTE — ASSESSMENT & PLAN NOTE
Wt Readings from Last 3 Encounters:   03/09/20 113 kg (250 lb)   02/04/20 108 kg (239 lb)   01/07/20 113 kg (249 lb)     · Patient has chronic systolic/diastolic CHF with ejection fraction of 45%  · Appears euvolemic  · Patient is on room air and lungs are clear to auscultation  · Continue on Bumex    · Daily weight and I&Os  · Monitor renal function

## 2020-03-09 NOTE — DISCHARGE INSTR - AVS FIRST PAGE
Wound Care Orders:  Left foot, irrigate wound with normal saline removing all old alginate, apply new silver alginate, ABD, Kerlix, and Ace wrap  Must keep legs elevated as much as possible  Foam dressing applied to the plantar lateral midfoot to pad this area which became irritated from wrapping foot  Should follow up at wound Care in 1 week  Change dressing every other day      Dear Lois Escalante,     It was our pleasure to care for you here at [facility]  It is our hope that we were always able to exceed the expected standards for your care during your stay  You were hospitalized due to Cellulitis  You were cared for on the 3rd floor by Corey Cason PA-C under the service of Geoffrey Rosenthal, DO with the [facility] who covers for your primary care physician (PCP), Catina Duran MD, while you were hospitalized  If you have any questions or concerns related to this hospitalization, you may contact us at [phone]  For follow up as well as any medication refills, we recommend that you follow up with your primary care physician  A registered nurse will reach out to you by phone within a few days after your discharge to answer any additional questions that you may have after going home  However, at this time we provide for you here, the most important instructions / recommendations at discharge:     · Notable Medication Adjustments -   · Metoprolol twice daily instead of prior to admission cardiac medications  · Testing Required after Discharge -   · None  · Important follow up information -   · Physical and Occupational therapy  Follow up with Primary care provider and Cardiology after discharge  Weekly follow up with wound care  · Other Instructions -   · Wound care dressing changes every other day     · Please review this entire after visit summary as additional general instructions including medication list, appointments, activity, diet, any pertinent wound care, and other additional recommendations from your care team that may be provided for you        Sincerely,     Jackie Croft PA-C

## 2020-03-09 NOTE — ASSESSMENT & PLAN NOTE
· Per Podiatry  · POD 4 for left foot I&D     · Further plan per podiatry  · Wound care and dressing changes per Podiatry  · On IV antibiotics

## 2020-03-10 VITALS
TEMPERATURE: 97.8 F | DIASTOLIC BLOOD PRESSURE: 71 MMHG | WEIGHT: 251.54 LBS | OXYGEN SATURATION: 98 % | RESPIRATION RATE: 18 BRPM | BODY MASS INDEX: 34.07 KG/M2 | HEART RATE: 87 BPM | HEIGHT: 72 IN | SYSTOLIC BLOOD PRESSURE: 115 MMHG

## 2020-03-10 PROBLEM — K59.00 CONSTIPATION: Status: ACTIVE | Noted: 2020-03-10

## 2020-03-10 PROCEDURE — 99239 HOSP IP/OBS DSCHRG MGMT >30: CPT | Performed by: INTERNAL MEDICINE

## 2020-03-10 PROCEDURE — 97163 PT EVAL HIGH COMPLEX 45 MIN: CPT

## 2020-03-10 PROCEDURE — 97167 OT EVAL HIGH COMPLEX 60 MIN: CPT

## 2020-03-10 RX ORDER — CEFDINIR 300 MG/1
300 CAPSULE ORAL EVERY 12 HOURS SCHEDULED
Qty: 10 CAPSULE | Refills: 0 | Status: SHIPPED | OUTPATIENT
Start: 2020-03-10 | End: 2020-03-15

## 2020-03-10 RX ORDER — SENNOSIDES 8.6 MG
2 TABLET ORAL DAILY
Qty: 120 EACH | Refills: 0 | Status: SHIPPED | OUTPATIENT
Start: 2020-03-11 | End: 2020-07-17 | Stop reason: HOSPADM

## 2020-03-10 RX ORDER — HYDROCODONE BITARTRATE AND ACETAMINOPHEN 5; 325 MG/1; MG/1
1-2 TABLET ORAL EVERY 4 HOURS PRN
Qty: 10 TABLET | Refills: 0 | Status: SHIPPED | OUTPATIENT
Start: 2020-03-10 | End: 2020-03-13

## 2020-03-10 RX ADMIN — METRONIDAZOLE 500 MG: 500 INJECTION, SOLUTION INTRAVENOUS at 00:27

## 2020-03-10 RX ADMIN — HYDROCODONE BITARTRATE AND ACETAMINOPHEN 2 TABLET: 5; 325 TABLET ORAL at 08:18

## 2020-03-10 RX ADMIN — DULOXETINE 30 MG: 30 CAPSULE, DELAYED RELEASE ORAL at 08:18

## 2020-03-10 RX ADMIN — MELATONIN 1000 UNITS: at 08:18

## 2020-03-10 RX ADMIN — OXYCODONE HYDROCHLORIDE AND ACETAMINOPHEN 1000 MG: 500 TABLET ORAL at 08:18

## 2020-03-10 RX ADMIN — METRONIDAZOLE 500 MG: 500 INJECTION, SOLUTION INTRAVENOUS at 08:22

## 2020-03-10 RX ADMIN — HYDROCODONE BITARTRATE AND ACETAMINOPHEN 2 TABLET: 5; 325 TABLET ORAL at 01:19

## 2020-03-10 RX ADMIN — STANDARDIZED SENNA CONCENTRATE 17.2 MG: 8.6 TABLET ORAL at 08:18

## 2020-03-10 RX ADMIN — CEFEPIME HYDROCHLORIDE 2000 MG: 2 INJECTION, SOLUTION INTRAVENOUS at 08:22

## 2020-03-10 RX ADMIN — BUMETANIDE 1 MG: 1 TABLET ORAL at 08:19

## 2020-03-10 RX ADMIN — Medication 500 MG: at 04:11

## 2020-03-10 RX ADMIN — METOPROLOL TARTRATE 25 MG: 25 TABLET ORAL at 08:18

## 2020-03-10 RX ADMIN — APIXABAN 5 MG: 5 TABLET, FILM COATED ORAL at 08:18

## 2020-03-10 RX ADMIN — PANTOPRAZOLE SODIUM 40 MG: 40 TABLET, DELAYED RELEASE ORAL at 08:18

## 2020-03-10 NOTE — SOCIAL WORK
Received call from [address]  at [place]  Patient approved for 3 days Subacute Level 1 at [facility]  Auth # [id number]   3/10/2020 - 3/13/2020  NRD 3/13  Called and notified Brenna at [facility] of same  [facility] to transport patient at 1300 today via w/c [employer] to [facility]  Brenna at [facility] aware  Patient states he will notify his son of his discharge and disposition

## 2020-03-10 NOTE — PROGRESS NOTES
[facility] Internal Medicine  Progress Note - Linwood  1945, 69 y o  male MRN: [id number]  Unit/Bed#: -01 Encounter: [id number]  Primary Care Provider: Enrique Villagran MD   Date and time admitted to hospital: 2/19/2020  1:18 PM    Cellulitis of left lower extremity  Assessment & Plan  · Patient developed left lower extremity cellulitis/left foot abscess  · Continue IV vancomycin, cefepime and Flagyl - ID recommending Omnicef 300mg BID to complete abx course on 3/16  · ID follow-up noted patient  · Follow-up culture results- gram-negative rods and serratia  · Podiatry ordered MRI of left forefoot negative for osteomyelitis or fluid collection  · Podiatry following, I&D of left lower extremity  · Tenia pedis that patient has could be a source for cellulitis  Completed course of Diflucan      Abscess of left foot  Assessment & Plan  · Per Podiatry  · POD 4 for left foot I&D  · Further plan per podiatry  · Wound care and dressing changes per Podiatry  · On IV antibiotics    Oral candidiasis  Assessment & Plan  · Completed treatment of Diflucan    History of methicillin resistant staphylococcus aureus (MRSA)  Assessment & Plan  · Questionable history of MRSA on review of chart; in review of records cannot find any noted history  · On IV antibiotics- transition to p o  For discharge pending ID recs    Hypertension  Assessment & Plan  · Blood pressure stable  · Continue to monitor with schedule vitals  · On Lopressor and Bumex    Chronic combined systolic and diastolic congestive heart failure (HCC)  Assessment & Plan  Wt Readings from Last 3 Encounters:   03/09/20 113 kg (250 lb)   02/04/20 108 kg (239 lb)   01/07/20 113 kg (249 lb)     · Patient has chronic systolic/diastolic CHF with ejection fraction of 45%  · Appears euvolemic  · Patient is on room air and lungs are clear to auscultation  · Continue on Bumex    · Daily weight and I&Os  · Monitor renal function    Chronic deep vein thrombosis (DVT) [facility])  Assessment & Plan  · VAS lower limb venous duplex studies completed in November of 2019 revealed in no evidence of right lower limb thrombus in the common femoral artery  No gross evidence of acute DVT based on color-flow analysis the left lower limb  · Anticoagulated on eliquis    Persistent atrial fibrillation  Assessment & Plan  · Patient has chronic atrial fibrillation  · Rates are controlled on Lopressor 25 mg p o bid  · Patient restarted on Eliquis 3/6/20      Class 1 obesity due to excess calories with serious comorbidity and body mass index (BMI) of 34 0 to 34 9 in adult  Assessment & Plan  · Lifestyle modifications recommended    Gastroesophageal reflux disease without esophagitis  Assessment & Plan  · Continue Protonix  * Cellulitis of right lower extremity  Assessment & Plan  · Patient presented with right lower extremity cellulitis - improving  · On IV antibiotics  · ID on board - appreciate recommendations        VTE Pharmacologic Prophylaxis:   Pharmacologic: Apixaban (Eliquis)  Mechanical VTE Prophylaxis in Place: No    Patient Centered Rounds: I have performed bedside rounds with nursing staff today  Discussions with Specialists or Other Care Team Provider: Discussed with CM and PT, will await recommendations/rehab placement  Education and Discussions with Family / Patient: Discussed care plan with patient at bedside  Answered all questions to the best of my ability  Time Spent for Care: 30 minutes  More than 50% of total time spent on counseling and coordination of care as described above  Current Length of Stay: 20 day(s)    Current Patient Status: Inpatient   Certification Statement: The patient will continue to require additional inpatient hospital stay due to Discharge planning    Discharge Plan: Patient comes from home, initially refused rehab, now willing to go to rehab  Await final PT recommendations and approval from facility    Medically stable for discharge  Code Status: Level 1 - Full Code      Subjective:   Patient reports he feels well today  Denies pain or any other complaints  Dislikes hospital food  Objective:     Vitals:   Temp (24hrs), Av °F (36 7 °C), Min:97 8 °F (36 6 °C), Max:98 2 °F (36 8 °C)    Temp:  [97 8 °F (36 6 °C)-98 2 °F (36 8 °C)] 97 8 °F (36 6 °C)  HR:  [] 87  Resp:  [18-20] 18  BP: (112-122)/(71-78) 115/71  SpO2:  [96 %-98 %] 98 %  Body mass index is 34 12 kg/m²  Input and Output Summary (last 24 hours): Intake/Output Summary (Last 24 hours) at 3/10/2020 1012  Last data filed at 3/10/2020 0700  Gross per 24 hour   Intake 640 ml   Output 600 ml   Net 40 ml       Physical Exam:     Physical Exam   Constitutional: He appears well-developed and well-nourished  No distress  HENT:   Head: Normocephalic and atraumatic  Eyes: Conjunctivae are normal  No scleral icterus  Cardiovascular: Normal rate  An irregularly irregular rhythm present  No murmur heard  Pulmonary/Chest: Effort normal and breath sounds normal  No respiratory distress  He has no wheezes  He has no rales  Abdominal: Soft  He exhibits no distension  There is no tenderness  Musculoskeletal: He exhibits no edema  LLE dressing clean, dry, intact   Neurological: He is alert  Skin: Skin is warm and dry  No erythema  Chronic vascular changes of LE b/l   Psychiatric: He has a normal mood and affect  Nursing note and vitals reviewed        Additional Data:     Labs:    Results from last 7 days   Lab Units 20  0507   WBC Thousand/uL 8 16   HEMOGLOBIN g/dL 11 6*   HEMATOCRIT % 36 2*   PLATELETS Thousands/uL 313   NEUTROS PCT % 67   LYMPHS PCT % 10*   MONOS PCT % 17*   EOS PCT % 3     Results from last 7 days   Lab Units 20  0507  20  0526   SODIUM mmol/L 135*   < > 131*   POTASSIUM mmol/L 3 8   < > 4 4   CHLORIDE mmol/L 98*   < > 98*   CO2 mmol/L 29   < > 29   BUN mg/dL 27*   < > 42*   CREATININE mg/dL 1 43*   < > 1 44* ANION GAP mmol/L 8   < > 4   CALCIUM mg/dL 8 9   < > 8 9   ALBUMIN g/dL  --   --  1 9*   TOTAL BILIRUBIN mg/dL  --   --  0 90   ALK PHOS U/L  --   --  116   ALT U/L  --   --  9*   AST U/L  --   --  29   GLUCOSE RANDOM mg/dL 80   < > 100    < > = values in this interval not displayed  * I Have Reviewed All Lab Data Listed Above  * Additional Pertinent Lab Tests Reviewed:  All Labs Within Last 24 Hours Reviewed    Imaging:    Imaging Reports Reviewed Today Include: None  Imaging Personally Reviewed by Myself Includes:  None    Recent Cultures (last 7 days):     Results from last 7 days   Lab Units 03/05/20 1959   GRAM STAIN RESULT  1+ Polys  No bacteria seen   WOUND CULTURE  2+ Growth of Serratia marcescens*       Last 24 Hours Medication List:     Current Facility-Administered Medications:  albumin human 12 5 g Intravenous Once Mariah Lucia, DPM Last Rate: Stopped (02/20/20 8609)   aluminum-magnesium hydroxide-simethicone 30 mL Oral Q4H PRN Mariah Lucia, DPM    apixaban 5 mg Oral BID Tana Spicer, DPM    vitamin C 1,000 mg Oral Daily Mariah Lucia, DPM    benzonatate 100 mg Oral TID PRN Mariah Lucia, DPM    bumetanide 1 mg Oral Daily Mariah Lucia, DPM    cefepime 2,000 mg Intravenous Q12H Mariah Lucia, DPM Last Rate: 2,000 mg (03/10/20 6808)   cholecalciferol 1,000 Units Oral Daily Mariah Lucia, DPM    DULoxetine 30 mg Oral Daily Mariah Lucia, DPM    Gadobutrol 10 mL Intravenous Once in imaging Mariah Lucia, DPM    HYDROcodone-acetaminophen 2 tablet Oral Q4H PRN Mariah Lucia, DPM    metoprolol tartrate 25 mg Oral Q12H [facility] Mariah Lucia DPM    metroNIDAZOLE 500 mg Intravenous Q8H Blanka Yancey MD Last Rate: 500 mg (03/10/20 0823)   nystatin 500,000 Units Swish & Swallow 4x Daily Mariah Lucia, DPM    pantoprazole 40 mg Oral Daily Mariah Lucia, DPM    senna 2 tablet Oral Daily Mariah Lucia, DPM    sodium chloride 2 spray Each Nare Q1H PRN Mariah Lucia, DPM    vancomycin 500 mg Intravenous Q24H Walter Bhatt DPANTONIO Last Rate: 500 mg (03/10/20 0411)        Today, Patient Was Seen By: Tobias Welsh PA-C    ** Please Note: Dictation voice to text software may have been used in the creation of this document   **

## 2020-03-10 NOTE — OCCUPATIONAL THERAPY NOTE
Occupational Therapy Evaluation     Patient Name: Magdalene Melton  Today's Date: 3/10/2020  Problem List  Principal Problem:    Cellulitis of right lower extremity  Active Problems:    Gastroesophageal reflux disease without esophagitis    Class 1 obesity due to excess calories with serious comorbidity and body mass index (BMI) of 34 0 to 34 9 in adult    Persistent atrial fibrillation    Cellulitis of left lower extremity    Chronic deep vein thrombosis (DVT) (HCC)    Chronic combined systolic and diastolic congestive heart failure (HCC)    Hypertension    History of methicillin resistant staphylococcus aureus (MRSA)    Oral candidiasis    Abscess of left foot    Past Medical History  Past Medical History:   Diagnosis Date    Atrial fibrillation [facility] )     Cardiac disease     Cellulitis     [facility]congest[facility] failure) [facility])     Chronic pain     Chronic venous hypertension     with ulceration    GERD (gastroesophageal reflux disease)     History of methicillin resistant staphylococcus aureus (MRSA) 11/26/2019    negative nasal swab     Hyperlipidemia     Hypertension     Obesity     [facility]     PVD [facility]peripheral vascular disease[facility]     Vascular disorder of extremity ([facility] )      Past Surgical History  Past Surgical History:   Procedure Laterality Date    ANTERIOR RELEASE VERTEBRAL BODY W/ POSTERIOR FUSION      APPENDECTOMY  1954    CATARACT EXTRACTION      DECOMPRESSION SPINE LUMBAR POSTERIOR      ELBOW SURGERY      INCISION AND DRAINAGE OF WOUND Left 3/5/2020    Procedure: INCISION AND DRAINAGE (I&D) EXTREMITY;  Surgeon: Martínez Juarez DPM;  Location:  MAIN OR;  Service: Podiatry    KNEE SURGERY      NOSE SURGERY      turbinectomy    RETINAL DETACHMENT SURGERY      RHINOPLASTY      TONSILLECTOMY      VEIN LIGATION AND STRIPPING      saphenous vein long         03/10/20 0921   Note Type   Note type Eval only   Restrictions/Precautions   Weight Bearing Precautions Per Order Yes   LLE Weight Bearing Per Order PWB   Other Precautions Fall Risk;Pain   Pain Assessment   Pain Assessment Villarreal-Baker FACES   Villarreal-Baker FACES Pain Rating 4   Pain Type Surgical pain   Pain Location Foot   Pain Orientation Left   Effect of Pain on Daily Activities transfers, positioning   Home Living   Type of [address] to live on main level with bedroom/bathroom   Bathroom Shower/Tub Tub/shower unit   Bathroom Toilet Standard   Bathroom Equipment Shower chair   Home Equipment Wheelchair-electric   Prior Function   Level of [place] Modified independent with wheelchair;Needs assistance with ADLs and functional mobility   Lives With [facility] Help From Family   ADL Assistance Independent   IADLs Needs assistance   Falls in the last 6 months 1 to 4   Vocational Retired   Comments Pt endorses that he was independent with ADLs and functional mobilty  Within home he relied on furniture walking and pushing a shopping cart  Outside of home pt relied on power chair  Pt reports that home is not accessible to use power chair within home  Pt does reports independently being able to transer to powerchair  Psychosocial   Psychosocial (WDL) WDL   Patient Behaviors/Mood Irritable   Subjective   Subjective Pt states "I feel weak"   ADL   Eating Assistance 7  Independent   Eating Deficit Setup   Grooming Assistance 7  Independent   Grooming Deficit Setup   UB Bathing Assistance 4  Minimal Assistance   LB Bathing Assistance 3  Moderate Assistance   [address] 4  Minimal Assistance   LB Dressing Assistance 3  Moderate Assistance   Toileting Assistance  5  Supervision/Setup   Bed Mobility   Supine to Sit 4  Minimal assistance   Additional items Assist x 1;Verbal cues; Bedrails;HOB elevated   Transfers   Sit to Stand 3  Moderate assistance   Additional items Assist x 2;Verbal cues; Bedrails;Armrests  (Pt unable to come to full stand)   Stand to Sit 3  Moderate assistance   Additional items Assist x 2;Bedrails;Armrests  (Pt unable to come to full stand)   Stand pivot 3  Moderate assistance   Additional items Assist x 2;Verbal cues;Armrests; Bedrails  (Pt performed squat pivot to powerchair)   Balance   Static Sitting Normal   Dynamic Sitting Good   Static Standing Poor +   Dynamic Standing Poor +   Activity Tolerance   Activity Tolerance Patient limited by fatigue   Medical Staff Made Aware PT Subha   Nurse Made Aware RN Manish Welch   RUPALMER Assessment   RUE Assessment WFL   LUE Assessment   LUE Assessment X  ((shoulder 0-25 degrees (baseline))   Cognition   Overall Cognitive Status WFL   Arousal/Participation Alert   Attention Within functional limits   Orientation Level Oriented X4   Memory Within functional limits   Following Commands Follows one step commands with increased time or repetition   Assessment   Limitation Decreased ADL status; Decreased UE ROM; Decreased UE strength;Decreased endurance;Decreased self-care trans;Decreased high-level ADLs   Prognosis Fair   Assessment Pt is a 69 y o  male seen for OT evaluation at [facility], admitted 2/19/2020 w/ Cellulitis of right lower extremity  Pt now s/p I&D of left deep abscess  OT completed extensive review of pt's medical and social history  Comorbidities affecting pt's functional performance at time of assessment include: cellulitis of RLE, obesity, persistent A-fib, chronic deep vein thrombosis, abscess of left foot, HTN, LBP ambulatory dysfunction, and chronic left shoulder pain  Personal factors affecting pt at time of IE include:difficulty performing ADLS, difficulty performing IADLS  and decreased functional mobility  Prior to admission, pt was living with son in house with 1st floor set-up and was modified independent with ADLs and relied on a electric scooter for mobility   Pt performs UB ADLs with set-up to min A and LB ADLs with mod assist  Upon evaluation, pt presents to OT below baseline due to the following performance deficits: weakness, decreased ROM, decreased strength, decreased balance and decreased tolerance  Pt to benefit from continued skilled OT tx while in the hospital to address deficits as defined above and maximize level of functional independence w ADL's and functional mobility  Occupational Performance areas to address include: bathing/shower, toilet hygiene, health maintenance, functional mobility and community mobility  Based on findings, pt is of high complexity  At this time, OT recommendations at time of discharge are short term rehab  Goals   Patient Goals Pt wishes to regain strength   Plan   Treatment Interventions Functional transfer training;ADL retraining; Endurance training;Patient/family training;Equipment evaluation/education; Compensatory technique education;Continued evaluation;UE strengthening/ROM   Goal Expiration Date 03/20/20   OT Frequency 3-5x/wk   Recommendation   OT Discharge Recommendation Short Term Rehab         Pt will achieve the following goals within 10 days  *Pt will complete UB bathing and dressing with mod I     *Pt will complete LB bathing and dressing with mod I      *Pt will complete toileting (hygiene and clothing management) with mod I  using DME as needed  *Pt will complete bed mobility with mod I, with bed flat and no side rail to prep for purposeful tasks    *Pt will perform functional transfers with on/off all surfaces with mod I using DME as needed w/ G balance/safety  *Pt will increase standing tolerance to 5 minutes in order to complete sinkside ADL task  *Pt will demonstrate increased activity tolerance in order to complete ADL routine  *Pt will participate in UE therapeutic exercise in order to maximize strength for ADL transfers        LAUREEN Nassar/BETH

## 2020-03-10 NOTE — PLAN OF CARE
Problem: Potential for Falls  Goal: Patient will remain free of falls  Description  INTERVENTIONS:  - Assess patient frequently for physical needs  -  Identify cognitive and physical deficits and behaviors that affect risk of falls    -  [place] fall precautions as indicated by assessment   - Educate patient/family on patient safety including physical limitations  - Instruct patient to call for assistance with activity based on assessment  - Modify environment to reduce risk of injury  - Consider OT/PT consult to assist with strengthening/mobility  Outcome: Progressing

## 2020-03-10 NOTE — SOCIAL WORK
LOS: 20 days  Continuing to follow patient  Patient is medically cleared for discharge  Bed is available at [facility]  Called [facility] to request auth for SNF  Clinical faxed  Await authorization

## 2020-03-10 NOTE — PLAN OF CARE
Problem: OCCUPATIONAL THERAPY ADULT  Goal: Performs self-care activities at highest level of function for planned discharge setting  See evaluation for individualized goals  Description  Treatment Interventions: Functional transfer training, ADL retraining, Endurance training, Patient/family training, Equipment evaluation/education, Compensatory technique education, Continued evaluation, UE strengthening/ROM          See flowsheet documentation for full assessment, interventions and recommendations  Note:   Limitation: Decreased ADL status, Decreased UE ROM, Decreased UE strength, Decreased endurance, Decreased self-care trans, Decreased high-level ADLs  Prognosis: Fair  Assessment: Pt is a 69 y o  male seen for OT evaluation at [place] [address], admitted 2/19/2020 w/ Cellulitis of right lower extremity  Pt now s/p I&D of left deep abscess  OT completed extensive review of pt's medical and social history  Comorbidities affecting pt's functional performance at time of assessment include: cellulitis of RLE, obesity, persistent A-fib, chronic deep vein thrombosis, abscess of left foot, HTN, LBP ambulatory dysfunction, and chronic left shoulder pain  Personal factors affecting pt at time of IE include:difficulty performing ADLS, difficulty performing IADLS  and decreased functional mobility  Prior to admission, pt was living with son in house with 1st floor set-up and was modified independent with ADLs and relied on a electric scooter for mobility  Pt performs UB ADLs with set-up to min A and LB ADLs with mod assist  Upon evaluation, pt presents to OT below baseline due to the following performance deficits: weakness, decreased ROM, decreased strength, decreased balance and decreased tolerance  Pt to benefit from continued skilled OT tx while in the hospital to address deficits as defined above and maximize level of functional independence w ADL's and functional mobility   Occupational Performance areas to address include: bathing/shower, toilet hygiene, health maintenance, functional mobility and community mobility  Based on findings, pt is of high complexity  At this time, OT recommendations at time of discharge are short term rehab       OT Discharge Recommendation: Short Term Rehab

## 2020-03-10 NOTE — PROGRESS NOTES
Vancomycin IV Pharmacy-to-Dose Consultation    Jo-Ann Harvey is a 69 y o  male who is currently receiving Vancomycin IV with management by the Pharmacy Consult service  Assessment/Plan:  The patient was reviewed  Renal function is stable and no signs or symptoms of nephrotoxicity and/or infusion reactions were documented in the chart  Based on todays assessment, continue current vancomycin (day # 18) dosing of  500 mg Q24H, with a plan for trough to be drawn at 03:30 on 03/14  We will continue to follow the patients culture results and clinical progress daily      Annabel Stevens, Pharmacist

## 2020-03-10 NOTE — PHYSICAL THERAPY NOTE
PT eval   03/10/20 0920   Note Type   Note type Eval only   Pain Assessment   Pain Assessment Villarreal-Baker FACES   Villarreal-Baker FACES Pain Rating 4   Pain Type Surgical pain   Pain Location Foot   Pain Orientation Left   Home Living   Type of [address] to live on main level with bedroom/bathroom   Home Equipment Electric scooter  (shopping cart, RW,)   Prior Function   Level of [place] Modified independent with wheelchair;Needs assistance with IADLs   Lives With [employer] Help From Family   ADL Assistance Independent   IADLs Needs assistance   Falls in the last 6 months 1 to 4   Vocational Retired   Restrictions/Precautions   [place] Bearing Precautions Per Order Yes   LLE [place] Bearing Per Order PWB   General   Additional Pertinent History adm for cellulitis   Family/Caregiver Present No   Cognition   Overall Cognitive Status WFL   Arousal/Participation Alert   Orientation Level Oriented X4   Memory Within functional limits   Following Commands Follows one step commands with increased time or repetition   RUE Assessment   RUE Assessment   (grossly functional some limit shldrs)   LUE Assessment   LUE Assessment   (grossly funcitonal some limit shoulders)   RLE Assessment   RLE Assessment WFL  (strength 3+/5)   LLE Assessment   LLE Assessment WFL  (strength 3+/5)   Coordination   Movements are Fluid and Coordinated 0   Coordination and Movement Description some shaking with mobility   Proprioception   RLE Proprioception Grossly intact   LLE Proprioception Grossly Intact   Bed Mobility   Rolling R 7  Independent   Rolling L 7  Independent   Supine to Sit 4  Minimal assistance   Additional items Assist x 1; Impulsive; Bedrails   Sit to Supine 4  Minimal assistance   Additional items Assist x 1;LE management; Bedrails; Increased time required   Transfers   Sit to Stand 3  Moderate assistance   Additional items Assist x 2   Stand to Sit 4  Minimal assistance   Additional items Assist x 2   Sit pivot 3  Moderate assistance   Additional items Assist x 2; Increased time required;Verbal cues;Armrests; Bedrails   Ambulation/Elevation   Gait pattern   (transfers only)   Balance   Static Sitting Normal   Dynamic Sitting Fair   Static Standing Poor   Endurance Deficit   Endurance Deficit Yes   Endurance Deficit Description tires quickly   Activity Tolerance   Activity Tolerance Patient limited by fatigue   Medical Staff Made Aware OT Makayla   Nurse Made Aware Yes RN Cari   Assessment   Prognosis Good   Problem List Decreased strength;Decreased endurance; Impaired balance;Decreased mobility; Decreased coordination;Decreased safety awareness;Decreased skin integrity   Assessment Pt presents with B les weakenss and impaired trnasfers and standing blaance, tolerance after prolonged hospitalization for Ble cellulitis adn I+D L foot  Essentially non ambulatory at presents  Can benefit form skilled PT services to regain safe ind functional mobility  recommend short term in-pt rehab atd/c to regain safe ind funcitonal mboility for home d/c  Will follow see goals  Barriers to Discharge   (medical clearance)   Goals   Patient Goals get better, get strength back   STG Expiration Date 03/20/20   Short Term Goal #1 1) improve strerngth B les 1/2 grade 2) safe ind stand pivot transfers with rw pwb lle, 3) improve standing tolerance to 3 min 4) safe ind abm with rw 25' level   Plan   Treatment/Interventions ADL retraining;Functional transfer training;LE strengthening/ROM; Therapeutic exercise; Endurance training;Patient/family training;Equipment eval/education;Gait training;Spoke to nursing;Spoke to case management;Spoke to MD;OT   PT Frequency 5x/wk   Recommendation   Recommendation Short-term skilled PT   Equipment Recommended Walker; Wheelchair   PT - OK to Discharge Yes   Additional Comments   (to STR with medical clearance)   Yanick Quarles, PT

## 2020-03-13 NOTE — UTILIZATION REVIEW
Notification of Discharge  This is a Notification of Discharge from our facility [address]  Please be advised that this patient has been discharge from our facility  Below you will find the admission and discharge date and time including the patients disposition  PRESENTATION DATE: 2/19/2020  1:18 PM  OBS ADMISSION DATE:   IP ADMISSION DATE: 2/19/20 1550   DISCHARGE DATE: 3/10/2020  1:00 PM  DISPOSITION: Non [facility] Non [facility]   Admission Orders listed below:  Admission Orders (From admission, onward)     Ordered        02/19/20 1550  Inpatient Admission  Once                   Please contact the UR Department if additional information is required to close this patient's authorization/case  [facility] Utilization Review Department  Main: [phone] x carefully listen to the prompts  All voicemails are confidential   [email or web address]  Send all requests for admission clinical reviews, approved or denied determinations and any other requests to dedicated fax number below belonging to the campus where the patient is receiving treatment   List of dedicated fax numbers:  [address] DENIALS (Administrative/Medical Necessity) [phone]   [address] (Maternity/NICU/Pediatrics) [phone]   Nargis Courser [phone]   Jose Sole [phone]   Frankie Grit [phone]   [address] [phone]   [facility]  [phone]   [address] [place] [address]  2401 [facility] 1000 [facility] [phone]

## 2020-03-14 ENCOUNTER — TELEPHONE (OUTPATIENT)
Dept: OTHER | Facility: OTHER | Age: 75
End: 2020-03-14

## 2020-03-30 ENCOUNTER — TRANSITIONAL CARE MANAGEMENT (OUTPATIENT)
Dept: FAMILY MEDICINE CLINIC | Facility: HOSPITAL | Age: 75
End: 2020-03-30

## 2020-03-31 ENCOUNTER — TELEPHONE (OUTPATIENT)
Dept: FAMILY MEDICINE CLINIC | Facility: HOSPITAL | Age: 75
End: 2020-03-31

## 2020-03-31 DIAGNOSIS — M54.40 LOW BACK PAIN WITH SCIATICA, SCIATICA LATERALITY UNSPECIFIED, UNSPECIFIED BACK PAIN LATERALITY, UNSPECIFIED CHRONICITY: ICD-10-CM

## 2020-03-31 DIAGNOSIS — L02.612 ABSCESS OF LEFT FOOT: ICD-10-CM

## 2020-03-31 DIAGNOSIS — R26.2 AMBULATORY DYSFUNCTION: Primary | ICD-10-CM

## 2020-04-03 ENCOUNTER — TELEPHONE (OUTPATIENT)
Dept: FAMILY MEDICINE CLINIC | Facility: HOSPITAL | Age: 75
End: 2020-04-03

## 2020-04-03 ENCOUNTER — TELEMEDICINE (OUTPATIENT)
Dept: FAMILY MEDICINE CLINIC | Facility: HOSPITAL | Age: 75
End: 2020-04-03
Payer: COMMERCIAL

## 2020-04-03 DIAGNOSIS — L02.612 ABSCESS OF LEFT FOOT: Primary | ICD-10-CM

## 2020-04-03 DIAGNOSIS — I10 ESSENTIAL HYPERTENSION: ICD-10-CM

## 2020-04-03 DIAGNOSIS — G89.29 CHRONIC LEFT SHOULDER PAIN: ICD-10-CM

## 2020-04-03 DIAGNOSIS — I50.42 CHRONIC COMBINED SYSTOLIC AND DIASTOLIC CONGESTIVE HEART FAILURE (HCC): ICD-10-CM

## 2020-04-03 DIAGNOSIS — I48.19 PERSISTENT ATRIAL FIBRILLATION (HCC): ICD-10-CM

## 2020-04-03 DIAGNOSIS — M25.512 CHRONIC LEFT SHOULDER PAIN: ICD-10-CM

## 2020-04-03 PROBLEM — M54.40 LOW BACK PAIN WITH SCIATICA: Status: RESOLVED | Noted: 2018-08-22 | Resolved: 2020-04-03

## 2020-04-03 PROBLEM — K59.00 CONSTIPATION: Status: RESOLVED | Noted: 2020-03-10 | Resolved: 2020-04-03

## 2020-04-03 PROBLEM — B37.0 ORAL CANDIDIASIS: Status: RESOLVED | Noted: 2020-02-29 | Resolved: 2020-04-03

## 2020-04-03 PROBLEM — L03.116 CELLULITIS OF LEFT LOWER EXTREMITY: Status: RESOLVED | Noted: 2019-01-02 | Resolved: 2020-04-03

## 2020-04-03 PROBLEM — L03.115 CELLULITIS OF RIGHT LOWER EXTREMITY: Status: RESOLVED | Noted: 2020-02-19 | Resolved: 2020-04-03

## 2020-04-03 PROCEDURE — G2012 BRIEF CHECK IN BY MD/QHP: HCPCS | Performed by: INTERNAL MEDICINE

## 2020-04-13 ENCOUNTER — TELEPHONE (OUTPATIENT)
Dept: FAMILY MEDICINE CLINIC | Facility: HOSPITAL | Age: 75
End: 2020-04-13

## 2020-04-13 DIAGNOSIS — N30.00 ACUTE CYSTITIS WITHOUT HEMATURIA: Primary | ICD-10-CM

## 2020-04-13 RX ORDER — CEPHALEXIN 500 MG/1
500 CAPSULE ORAL EVERY 12 HOURS SCHEDULED
Qty: 14 CAPSULE | Refills: 0 | Status: SHIPPED | OUTPATIENT
Start: 2020-04-13 | End: 2020-04-20

## 2020-05-04 ENCOUNTER — TELEPHONE (OUTPATIENT)
Dept: FAMILY MEDICINE CLINIC | Facility: HOSPITAL | Age: 75
End: 2020-05-04

## 2020-05-07 DIAGNOSIS — K21.9 GASTROESOPHAGEAL REFLUX DISEASE WITHOUT ESOPHAGITIS: ICD-10-CM

## 2020-05-07 RX ORDER — PANTOPRAZOLE SODIUM 40 MG/1
40 TABLET, DELAYED RELEASE ORAL DAILY
Qty: 90 TABLET | Refills: 3 | Status: SHIPPED | OUTPATIENT
Start: 2020-05-07 | End: 2020-08-14 | Stop reason: HOSPADM

## 2020-05-21 DIAGNOSIS — F32.1 MODERATE MAJOR DEPRESSION, SINGLE EPISODE (HCC): ICD-10-CM

## 2020-05-21 RX ORDER — DULOXETIN HYDROCHLORIDE 30 MG/1
30 CAPSULE, DELAYED RELEASE ORAL DAILY
Qty: 30 CAPSULE | Refills: 5 | Status: SHIPPED | OUTPATIENT
Start: 2020-05-21 | End: 2020-08-14 | Stop reason: HOSPADM

## 2020-06-10 ENCOUNTER — TELEPHONE (OUTPATIENT)
Dept: FAMILY MEDICINE CLINIC | Facility: HOSPITAL | Age: 75
End: 2020-06-10

## 2020-06-11 ENCOUNTER — TELEPHONE (OUTPATIENT)
Dept: FAMILY MEDICINE CLINIC | Facility: HOSPITAL | Age: 75
End: 2020-06-11

## 2020-06-11 ENCOUNTER — APPOINTMENT (EMERGENCY)
Dept: CT IMAGING | Facility: HOSPITAL | Age: 75
DRG: 862 | End: 2020-06-11
Payer: COMMERCIAL

## 2020-06-11 ENCOUNTER — APPOINTMENT (EMERGENCY)
Dept: RADIOLOGY | Facility: HOSPITAL | Age: 75
DRG: 862 | End: 2020-06-11
Payer: COMMERCIAL

## 2020-06-11 ENCOUNTER — HOSPITAL ENCOUNTER (INPATIENT)
Facility: HOSPITAL | Age: 75
LOS: 7 days | Discharge: HOME HEALTH | DRG: 862 | End: 2020-06-18
Attending: EMERGENCY MEDICINE | Admitting: INTERNAL MEDICINE
Payer: COMMERCIAL

## 2020-06-11 DIAGNOSIS — N17.9 ACUTE KIDNEY INJURY SUPERIMPOSED ON CKD (HCC): ICD-10-CM

## 2020-06-11 DIAGNOSIS — M25.512 CHRONIC LEFT SHOULDER PAIN: ICD-10-CM

## 2020-06-11 DIAGNOSIS — G93.40 ACUTE ENCEPHALOPATHY: Primary | ICD-10-CM

## 2020-06-11 DIAGNOSIS — R77.8 ELEVATED TROPONIN: ICD-10-CM

## 2020-06-11 DIAGNOSIS — R65.20 SEVERE SEPSIS (HCC): ICD-10-CM

## 2020-06-11 DIAGNOSIS — L03.116 CELLULITIS OF LEFT LOWER EXTREMITY: ICD-10-CM

## 2020-06-11 DIAGNOSIS — L97.909 ATHEROSCLEROSIS OF ARTERY OF EXTREMITY WITH ULCERATION (HCC): ICD-10-CM

## 2020-06-11 DIAGNOSIS — A41.9 SEVERE SEPSIS (HCC): ICD-10-CM

## 2020-06-11 DIAGNOSIS — S91.302A OPEN WOUND OF LEFT FOOT, INITIAL ENCOUNTER: ICD-10-CM

## 2020-06-11 DIAGNOSIS — I70.299 ATHEROSCLEROSIS OF ARTERY OF EXTREMITY WITH ULCERATION (HCC): ICD-10-CM

## 2020-06-11 DIAGNOSIS — G89.29 CHRONIC LEFT SHOULDER PAIN: ICD-10-CM

## 2020-06-11 DIAGNOSIS — N17.9 AKI (ACUTE KIDNEY INJURY) (HCC): ICD-10-CM

## 2020-06-11 DIAGNOSIS — R94.31 PROLONGED Q-T INTERVAL ON ECG: ICD-10-CM

## 2020-06-11 DIAGNOSIS — N18.9 ACUTE KIDNEY INJURY SUPERIMPOSED ON CKD (HCC): ICD-10-CM

## 2020-06-11 DIAGNOSIS — I50.42 CHRONIC COMBINED SYSTOLIC AND DIASTOLIC CONGESTIVE HEART FAILURE (HCC): ICD-10-CM

## 2020-06-11 PROBLEM — G93.41 ACUTE METABOLIC ENCEPHALOPATHY: Status: ACTIVE | Noted: 2020-06-11

## 2020-06-11 LAB
ALBUMIN SERPL BCP-MCNC: 3 G/DL (ref 3.5–5)
ALP SERPL-CCNC: 89 U/L (ref 46–116)
ALT SERPL W P-5'-P-CCNC: 16 U/L (ref 12–78)
ANION GAP SERPL CALCULATED.3IONS-SCNC: 14 MMOL/L (ref 4–13)
APAP SERPL-MCNC: <2 UG/ML (ref 10–20)
APTT PPP: 41 SECONDS (ref 23–37)
AST SERPL W P-5'-P-CCNC: 45 U/L (ref 5–45)
BASOPHILS # BLD AUTO: 0.04 THOUSANDS/ΜL (ref 0–0.1)
BASOPHILS NFR BLD AUTO: 1 % (ref 0–1)
BILIRUB SERPL-MCNC: 1.9 MG/DL (ref 0.2–1)
BUN SERPL-MCNC: 55 MG/DL (ref 5–25)
CALCIUM SERPL-MCNC: 9.2 MG/DL (ref 8.3–10.1)
CHLORIDE SERPL-SCNC: 100 MMOL/L (ref 100–108)
CO2 SERPL-SCNC: 22 MMOL/L (ref 21–32)
CREAT SERPL-MCNC: 3.24 MG/DL (ref 0.6–1.3)
CRP SERPL QL: 20.8 MG/L
EOSINOPHIL # BLD AUTO: 0.04 THOUSAND/ΜL (ref 0–0.61)
EOSINOPHIL NFR BLD AUTO: 1 % (ref 0–6)
ERYTHROCYTE [DISTWIDTH] IN BLOOD BY AUTOMATED COUNT: 18.5 % (ref 11.6–15.1)
ERYTHROCYTE [SEDIMENTATION RATE] IN BLOOD: 5 MM/HOUR (ref 0–10)
ETHANOL SERPL-MCNC: <3 MG/DL (ref 0–3)
GFR SERPL CREATININE-BSD FRML MDRD: 18 ML/MIN/1.73SQ M
GLUCOSE SERPL-MCNC: 114 MG/DL (ref 65–140)
GLUCOSE SERPL-MCNC: 131 MG/DL (ref 65–140)
HCT VFR BLD AUTO: 43.6 % (ref 36.5–49.3)
HGB BLD-MCNC: 14.2 G/DL (ref 12–17)
IMM GRANULOCYTES # BLD AUTO: 0.02 THOUSAND/UL (ref 0–0.2)
IMM GRANULOCYTES NFR BLD AUTO: 0 % (ref 0–2)
INR PPP: 2.66 (ref 0.84–1.19)
LACTATE SERPL-SCNC: 4.2 MMOL/L (ref 0.5–2)
LACTATE SERPL-SCNC: 4.7 MMOL/L (ref 0.5–2)
LYMPHOCYTES # BLD AUTO: 1.12 THOUSANDS/ΜL (ref 0.6–4.47)
LYMPHOCYTES NFR BLD AUTO: 19 % (ref 14–44)
MCH RBC QN AUTO: 31.2 PG (ref 26.8–34.3)
MCHC RBC AUTO-ENTMCNC: 32.6 G/DL (ref 31.4–37.4)
MCV RBC AUTO: 96 FL (ref 82–98)
MONOCYTES # BLD AUTO: 0.49 THOUSAND/ΜL (ref 0.17–1.22)
MONOCYTES NFR BLD AUTO: 8 % (ref 4–12)
NEUTROPHILS # BLD AUTO: 4.2 THOUSANDS/ΜL (ref 1.85–7.62)
NEUTS SEG NFR BLD AUTO: 71 % (ref 43–75)
NRBC BLD AUTO-RTO: 0 /100 WBCS
PLATELET # BLD AUTO: 146 THOUSANDS/UL (ref 149–390)
PMV BLD AUTO: 12.1 FL (ref 8.9–12.7)
POTASSIUM SERPL-SCNC: 4.2 MMOL/L (ref 3.5–5.3)
PROT SERPL-MCNC: 8.1 G/DL (ref 6.4–8.2)
PROTHROMBIN TIME: 28 SECONDS (ref 11.6–14.5)
RBC # BLD AUTO: 4.55 MILLION/UL (ref 3.88–5.62)
SALICYLATES SERPL-MCNC: <3 MG/DL (ref 3–20)
SARS-COV-2 RNA RESP QL NAA+PROBE: NEGATIVE
SODIUM SERPL-SCNC: 136 MMOL/L (ref 136–145)
TROPONIN I SERPL-MCNC: 0.14 NG/ML
WBC # BLD AUTO: 5.91 THOUSAND/UL (ref 4.31–10.16)

## 2020-06-11 PROCEDURE — 71045 X-RAY EXAM CHEST 1 VIEW: CPT

## 2020-06-11 PROCEDURE — 94760 N-INVAS EAR/PLS OXIMETRY 1: CPT

## 2020-06-11 PROCEDURE — 87635 SARS-COV-2 COVID-19 AMP PRB: CPT | Performed by: EMERGENCY MEDICINE

## 2020-06-11 PROCEDURE — 85730 THROMBOPLASTIN TIME PARTIAL: CPT | Performed by: EMERGENCY MEDICINE

## 2020-06-11 PROCEDURE — 85025 COMPLETE CBC W/AUTO DIFF WBC: CPT | Performed by: EMERGENCY MEDICINE

## 2020-06-11 PROCEDURE — 80053 COMPREHEN METABOLIC PANEL: CPT | Performed by: EMERGENCY MEDICINE

## 2020-06-11 PROCEDURE — 82948 REAGENT STRIP/BLOOD GLUCOSE: CPT

## 2020-06-11 PROCEDURE — 87040 BLOOD CULTURE FOR BACTERIA: CPT | Performed by: EMERGENCY MEDICINE

## 2020-06-11 PROCEDURE — 83605 ASSAY OF LACTIC ACID: CPT | Performed by: INTERNAL MEDICINE

## 2020-06-11 PROCEDURE — 80320 DRUG SCREEN QUANTALCOHOLS: CPT | Performed by: EMERGENCY MEDICINE

## 2020-06-11 PROCEDURE — 85610 PROTHROMBIN TIME: CPT | Performed by: EMERGENCY MEDICINE

## 2020-06-11 PROCEDURE — 83605 ASSAY OF LACTIC ACID: CPT | Performed by: EMERGENCY MEDICINE

## 2020-06-11 PROCEDURE — 93005 ELECTROCARDIOGRAM TRACING: CPT

## 2020-06-11 PROCEDURE — 70450 CT HEAD/BRAIN W/O DYE: CPT

## 2020-06-11 PROCEDURE — 36415 COLL VENOUS BLD VENIPUNCTURE: CPT | Performed by: EMERGENCY MEDICINE

## 2020-06-11 PROCEDURE — 99285 EMERGENCY DEPT VISIT HI MDM: CPT

## 2020-06-11 PROCEDURE — 80329 ANALGESICS NON-OPIOID 1 OR 2: CPT | Performed by: EMERGENCY MEDICINE

## 2020-06-11 PROCEDURE — 96375 TX/PRO/DX INJ NEW DRUG ADDON: CPT

## 2020-06-11 PROCEDURE — 99285 EMERGENCY DEPT VISIT HI MDM: CPT | Performed by: EMERGENCY MEDICINE

## 2020-06-11 PROCEDURE — 94664 DEMO&/EVAL PT USE INHALER: CPT

## 2020-06-11 PROCEDURE — 96365 THER/PROPH/DIAG IV INF INIT: CPT

## 2020-06-11 PROCEDURE — 99223 1ST HOSP IP/OBS HIGH 75: CPT | Performed by: INTERNAL MEDICINE

## 2020-06-11 PROCEDURE — 84484 ASSAY OF TROPONIN QUANT: CPT | Performed by: INTERNAL MEDICINE

## 2020-06-11 PROCEDURE — 86140 C-REACTIVE PROTEIN: CPT | Performed by: INTERNAL MEDICINE

## 2020-06-11 PROCEDURE — 85652 RBC SED RATE AUTOMATED: CPT | Performed by: INTERNAL MEDICINE

## 2020-06-11 RX ORDER — DULOXETIN HYDROCHLORIDE 30 MG/1
30 CAPSULE, DELAYED RELEASE ORAL DAILY
Status: DISCONTINUED | OUTPATIENT
Start: 2020-06-12 | End: 2020-06-18 | Stop reason: HOSPADM

## 2020-06-11 RX ORDER — ONDANSETRON 2 MG/ML
4 INJECTION INTRAMUSCULAR; INTRAVENOUS EVERY 6 HOURS PRN
Status: DISCONTINUED | OUTPATIENT
Start: 2020-06-11 | End: 2020-06-18 | Stop reason: HOSPADM

## 2020-06-11 RX ORDER — SENNOSIDES 8.6 MG
2 TABLET ORAL DAILY
Status: DISCONTINUED | OUTPATIENT
Start: 2020-06-12 | End: 2020-06-18 | Stop reason: HOSPADM

## 2020-06-11 RX ORDER — HYDROCODONE BITARTRATE AND ACETAMINOPHEN 5; 325 MG/1; MG/1
1 TABLET ORAL EVERY 6 HOURS PRN
Status: DISCONTINUED | OUTPATIENT
Start: 2020-06-11 | End: 2020-06-16

## 2020-06-11 RX ORDER — ACETAMINOPHEN 325 MG/1
650 TABLET ORAL EVERY 6 HOURS PRN
Status: DISCONTINUED | OUTPATIENT
Start: 2020-06-11 | End: 2020-06-18 | Stop reason: HOSPADM

## 2020-06-11 RX ORDER — CEFEPIME HYDROCHLORIDE 1 G/50ML
1000 INJECTION, SOLUTION INTRAVENOUS ONCE
Status: COMPLETED | OUTPATIENT
Start: 2020-06-11 | End: 2020-06-11

## 2020-06-11 RX ORDER — MELATONIN
1000 DAILY
Status: DISCONTINUED | OUTPATIENT
Start: 2020-06-12 | End: 2020-06-18 | Stop reason: HOSPADM

## 2020-06-11 RX ORDER — SODIUM CHLORIDE 9 MG/ML
75 INJECTION, SOLUTION INTRAVENOUS CONTINUOUS
Status: DISCONTINUED | OUTPATIENT
Start: 2020-06-11 | End: 2020-06-12

## 2020-06-11 RX ORDER — CEFEPIME HYDROCHLORIDE 1 G/50ML
1000 INJECTION, SOLUTION INTRAVENOUS EVERY 12 HOURS
Status: DISCONTINUED | OUTPATIENT
Start: 2020-06-12 | End: 2020-06-16

## 2020-06-11 RX ORDER — ASCORBIC ACID 500 MG
1000 TABLET ORAL DAILY
Status: DISCONTINUED | OUTPATIENT
Start: 2020-06-12 | End: 2020-06-18 | Stop reason: HOSPADM

## 2020-06-11 RX ORDER — ECHINACEA PURPUREA EXTRACT 125 MG
1 TABLET ORAL
Status: DISCONTINUED | OUTPATIENT
Start: 2020-06-11 | End: 2020-06-18 | Stop reason: HOSPADM

## 2020-06-11 RX ORDER — PANTOPRAZOLE SODIUM 40 MG/1
40 TABLET, DELAYED RELEASE ORAL DAILY
Status: DISCONTINUED | OUTPATIENT
Start: 2020-06-12 | End: 2020-06-12

## 2020-06-11 RX ADMIN — VANCOMYCIN HYDROCHLORIDE 1750 MG: 1 INJECTION, POWDER, LYOPHILIZED, FOR SOLUTION INTRAVENOUS at 16:37

## 2020-06-11 RX ADMIN — APIXABAN 5 MG: 5 TABLET, FILM COATED ORAL at 20:36

## 2020-06-11 RX ADMIN — CEFEPIME HYDROCHLORIDE 1000 MG: 1 INJECTION, SOLUTION INTRAVENOUS at 16:16

## 2020-06-11 RX ADMIN — SODIUM CHLORIDE 2500 ML: 0.9 INJECTION, SOLUTION INTRAVENOUS at 16:16

## 2020-06-11 RX ADMIN — SODIUM CHLORIDE 75 ML/HR: 0.9 INJECTION, SOLUTION INTRAVENOUS at 19:51

## 2020-06-11 RX ADMIN — METOPROLOL TARTRATE 25 MG: 25 TABLET, FILM COATED ORAL at 20:36

## 2020-06-11 RX ADMIN — SODIUM CHLORIDE, SODIUM LACTATE, POTASSIUM CHLORIDE, AND CALCIUM CHLORIDE 1000 ML: .6; .31; .03; .02 INJECTION, SOLUTION INTRAVENOUS at 21:48

## 2020-06-12 ENCOUNTER — APPOINTMENT (INPATIENT)
Dept: RADIOLOGY | Facility: HOSPITAL | Age: 75
DRG: 862 | End: 2020-06-12
Payer: COMMERCIAL

## 2020-06-12 ENCOUNTER — APPOINTMENT (INPATIENT)
Dept: NON INVASIVE DIAGNOSTICS | Facility: HOSPITAL | Age: 75
DRG: 862 | End: 2020-06-12
Payer: COMMERCIAL

## 2020-06-12 ENCOUNTER — APPOINTMENT (INPATIENT)
Dept: ULTRASOUND IMAGING | Facility: HOSPITAL | Age: 75
DRG: 862 | End: 2020-06-12
Payer: COMMERCIAL

## 2020-06-12 PROBLEM — E87.2 LACTIC ACIDOSIS: Status: ACTIVE | Noted: 2020-06-12

## 2020-06-12 LAB
ALBUMIN SERPL BCP-MCNC: 3 G/DL (ref 3.5–5)
ALP SERPL-CCNC: 82 U/L (ref 46–116)
ALT SERPL W P-5'-P-CCNC: 19 U/L (ref 12–78)
AMPHETAMINES SERPL QL SCN: NEGATIVE
ANION GAP SERPL CALCULATED.3IONS-SCNC: 12 MMOL/L (ref 4–13)
AST SERPL W P-5'-P-CCNC: 57 U/L (ref 5–45)
ATRIAL RATE: 110 BPM
ATRIAL RATE: 98 BPM
BACTERIA UR QL AUTO: NORMAL /HPF
BARBITURATES UR QL: NEGATIVE
BASOPHILS # BLD AUTO: 0.07 THOUSANDS/ΜL (ref 0–0.1)
BASOPHILS NFR BLD AUTO: 1 % (ref 0–1)
BENZODIAZ UR QL: NEGATIVE
BILIRUB SERPL-MCNC: 2.5 MG/DL (ref 0.2–1)
BILIRUB UR QL STRIP: NEGATIVE
BUN SERPL-MCNC: 58 MG/DL (ref 5–25)
CALCIUM SERPL-MCNC: 9 MG/DL (ref 8.3–10.1)
CHLORIDE SERPL-SCNC: 102 MMOL/L (ref 100–108)
CK MB SERPL-MCNC: 13.3 NG/ML (ref 0–5)
CK MB SERPL-MCNC: 2.7 % (ref 0–2.5)
CK SERPL-CCNC: 492 U/L (ref 39–308)
CLARITY UR: CLEAR
CO2 SERPL-SCNC: 24 MMOL/L (ref 21–32)
COCAINE UR QL: NEGATIVE
COLOR UR: YELLOW
CREAT SERPL-MCNC: 3.1 MG/DL (ref 0.6–1.3)
EOSINOPHIL # BLD AUTO: 0.14 THOUSAND/ΜL (ref 0–0.61)
EOSINOPHIL NFR BLD AUTO: 2 % (ref 0–6)
ERYTHROCYTE [DISTWIDTH] IN BLOOD BY AUTOMATED COUNT: 18.6 % (ref 11.6–15.1)
GFR SERPL CREATININE-BSD FRML MDRD: 19 ML/MIN/1.73SQ M
GLUCOSE SERPL-MCNC: 73 MG/DL (ref 65–140)
GLUCOSE UR STRIP-MCNC: NEGATIVE MG/DL
HCT VFR BLD AUTO: 45.9 % (ref 36.5–49.3)
HGB BLD-MCNC: 15.2 G/DL (ref 12–17)
HGB UR QL STRIP.AUTO: NEGATIVE
IMM GRANULOCYTES # BLD AUTO: 0.02 THOUSAND/UL (ref 0–0.2)
IMM GRANULOCYTES NFR BLD AUTO: 0 % (ref 0–2)
INR PPP: 3.07 (ref 0.84–1.19)
KETONES UR STRIP-MCNC: NEGATIVE MG/DL
LACTATE SERPL-SCNC: 3.6 MMOL/L (ref 0.5–2)
LACTATE SERPL-SCNC: 3.7 MMOL/L (ref 0.5–2)
LACTATE SERPL-SCNC: 4.5 MMOL/L (ref 0.5–2)
LEUKOCYTE ESTERASE UR QL STRIP: NEGATIVE
LYMPHOCYTES # BLD AUTO: 1.66 THOUSANDS/ΜL (ref 0.6–4.47)
LYMPHOCYTES NFR BLD AUTO: 24 % (ref 14–44)
MAGNESIUM SERPL-MCNC: 1.5 MG/DL (ref 1.6–2.6)
MCH RBC QN AUTO: 31.5 PG (ref 26.8–34.3)
MCHC RBC AUTO-ENTMCNC: 33.1 G/DL (ref 31.4–37.4)
MCV RBC AUTO: 95 FL (ref 82–98)
METHADONE UR QL: NEGATIVE
MONOCYTES # BLD AUTO: 0.83 THOUSAND/ΜL (ref 0.17–1.22)
MONOCYTES NFR BLD AUTO: 12 % (ref 4–12)
NEUTROPHILS # BLD AUTO: 4.28 THOUSANDS/ΜL (ref 1.85–7.62)
NEUTS SEG NFR BLD AUTO: 61 % (ref 43–75)
NITRITE UR QL STRIP: NEGATIVE
NON-SQ EPI CELLS URNS QL MICRO: NORMAL /HPF
NRBC BLD AUTO-RTO: 0 /100 WBCS
OPIATES UR QL SCN: POSITIVE
PCP UR QL: NEGATIVE
PH UR STRIP.AUTO: 5 [PH]
PHOSPHATE SERPL-MCNC: 4.4 MG/DL (ref 2.3–4.1)
PLATELET # BLD AUTO: 145 THOUSANDS/UL (ref 149–390)
PMV BLD AUTO: 12.3 FL (ref 8.9–12.7)
POTASSIUM SERPL-SCNC: 3.9 MMOL/L (ref 3.5–5.3)
PROT SERPL-MCNC: 8.1 G/DL (ref 6.4–8.2)
PROT UR STRIP-MCNC: NEGATIVE MG/DL
PROTHROMBIN TIME: 31.4 SECONDS (ref 11.6–14.5)
QRS AXIS: -69 DEGREES
QRS AXIS: -74 DEGREES
QRSD INTERVAL: 116 MS
QRSD INTERVAL: 116 MS
QT INTERVAL: 420 MS
QT INTERVAL: 450 MS
QTC INTERVAL: 531 MS
QTC INTERVAL: 533 MS
RBC # BLD AUTO: 4.82 MILLION/UL (ref 3.88–5.62)
RBC #/AREA URNS AUTO: NORMAL /HPF
SODIUM SERPL-SCNC: 138 MMOL/L (ref 136–145)
SP GR UR STRIP.AUTO: 1.01 (ref 1–1.03)
T WAVE AXIS: 102 DEGREES
T WAVE AXIS: 105 DEGREES
THC UR QL: NEGATIVE
TROPONIN I SERPL-MCNC: 0.18 NG/ML
TROPONIN I SERPL-MCNC: 0.2 NG/ML
TSH SERPL DL<=0.05 MIU/L-ACNC: 5.22 UIU/ML (ref 0.36–3.74)
UROBILINOGEN UR QL STRIP.AUTO: 0.2 E.U./DL
VENTRICULAR RATE: 84 BPM
VENTRICULAR RATE: 97 BPM
WBC # BLD AUTO: 7 THOUSAND/UL (ref 4.31–10.16)
WBC #/AREA URNS AUTO: NORMAL /HPF

## 2020-06-12 PROCEDURE — 97163 PT EVAL HIGH COMPLEX 45 MIN: CPT

## 2020-06-12 PROCEDURE — 84100 ASSAY OF PHOSPHORUS: CPT | Performed by: INTERNAL MEDICINE

## 2020-06-12 PROCEDURE — 97167 OT EVAL HIGH COMPLEX 60 MIN: CPT

## 2020-06-12 PROCEDURE — 99222 1ST HOSP IP/OBS MODERATE 55: CPT | Performed by: PHYSICIAN ASSISTANT

## 2020-06-12 PROCEDURE — 81001 URINALYSIS AUTO W/SCOPE: CPT | Performed by: NURSE PRACTITIONER

## 2020-06-12 PROCEDURE — 83605 ASSAY OF LACTIC ACID: CPT | Performed by: NURSE PRACTITIONER

## 2020-06-12 PROCEDURE — 73630 X-RAY EXAM OF FOOT: CPT

## 2020-06-12 PROCEDURE — 99223 1ST HOSP IP/OBS HIGH 75: CPT | Performed by: INTERNAL MEDICINE

## 2020-06-12 PROCEDURE — 85025 COMPLETE CBC W/AUTO DIFF WBC: CPT | Performed by: INTERNAL MEDICINE

## 2020-06-12 PROCEDURE — 76770 US EXAM ABDO BACK WALL COMP: CPT

## 2020-06-12 PROCEDURE — 80053 COMPREHEN METABOLIC PANEL: CPT | Performed by: INTERNAL MEDICINE

## 2020-06-12 PROCEDURE — 82553 CREATINE MB FRACTION: CPT | Performed by: NURSE PRACTITIONER

## 2020-06-12 PROCEDURE — 80307 DRUG TEST PRSMV CHEM ANLYZR: CPT | Performed by: INTERNAL MEDICINE

## 2020-06-12 PROCEDURE — 84443 ASSAY THYROID STIM HORMONE: CPT | Performed by: INTERNAL MEDICINE

## 2020-06-12 PROCEDURE — 83735 ASSAY OF MAGNESIUM: CPT | Performed by: INTERNAL MEDICINE

## 2020-06-12 PROCEDURE — 85610 PROTHROMBIN TIME: CPT | Performed by: INTERNAL MEDICINE

## 2020-06-12 PROCEDURE — 87205 SMEAR GRAM STAIN: CPT | Performed by: NURSE PRACTITIONER

## 2020-06-12 PROCEDURE — 93922 UPR/L XTREMITY ART 2 LEVELS: CPT | Performed by: SURGERY

## 2020-06-12 PROCEDURE — 93925 LOWER EXTREMITY STUDY: CPT | Performed by: SURGERY

## 2020-06-12 PROCEDURE — 93923 UPR/LXTR ART STDY 3+ LVLS: CPT

## 2020-06-12 PROCEDURE — 84484 ASSAY OF TROPONIN QUANT: CPT | Performed by: NURSE PRACTITIONER

## 2020-06-12 PROCEDURE — 99232 SBSQ HOSP IP/OBS MODERATE 35: CPT | Performed by: INTERNAL MEDICINE

## 2020-06-12 PROCEDURE — 93005 ELECTROCARDIOGRAM TRACING: CPT

## 2020-06-12 PROCEDURE — 82550 ASSAY OF CK (CPK): CPT | Performed by: NURSE PRACTITIONER

## 2020-06-12 PROCEDURE — 93925 LOWER EXTREMITY STUDY: CPT

## 2020-06-12 PROCEDURE — 93010 ELECTROCARDIOGRAM REPORT: CPT | Performed by: INTERNAL MEDICINE

## 2020-06-12 RX ORDER — FAMOTIDINE 20 MG/1
20 TABLET, FILM COATED ORAL DAILY
Status: DISCONTINUED | OUTPATIENT
Start: 2020-06-12 | End: 2020-06-18 | Stop reason: HOSPADM

## 2020-06-12 RX ORDER — SODIUM CHLORIDE 9 MG/ML
50 INJECTION, SOLUTION INTRAVENOUS CONTINUOUS
Status: DISCONTINUED | OUTPATIENT
Start: 2020-06-12 | End: 2020-06-14

## 2020-06-12 RX ORDER — MAGNESIUM SULFATE HEPTAHYDRATE 40 MG/ML
2 INJECTION, SOLUTION INTRAVENOUS ONCE
Status: COMPLETED | OUTPATIENT
Start: 2020-06-12 | End: 2020-06-12

## 2020-06-12 RX ADMIN — SODIUM CHLORIDE 75 ML/HR: 0.9 INJECTION, SOLUTION INTRAVENOUS at 12:30

## 2020-06-12 RX ADMIN — CEFEPIME HYDROCHLORIDE 1000 MG: 1 INJECTION, SOLUTION INTRAVENOUS at 16:25

## 2020-06-12 RX ADMIN — METOPROLOL TARTRATE 25 MG: 25 TABLET, FILM COATED ORAL at 09:15

## 2020-06-12 RX ADMIN — CEFEPIME HYDROCHLORIDE 1000 MG: 1 INJECTION, SOLUTION INTRAVENOUS at 05:06

## 2020-06-12 RX ADMIN — APIXABAN 2.5 MG: 2.5 TABLET, FILM COATED ORAL at 19:39

## 2020-06-12 RX ADMIN — SENNOSIDES 17.2 MG: 8.6 TABLET, FILM COATED ORAL at 09:15

## 2020-06-12 RX ADMIN — MAGNESIUM SULFATE HEPTAHYDRATE 2 G: 40 INJECTION, SOLUTION INTRAVENOUS at 10:45

## 2020-06-12 RX ADMIN — VANCOMYCIN HYDROCHLORIDE 1250 MG: 1 INJECTION, POWDER, LYOPHILIZED, FOR SOLUTION INTRAVENOUS at 16:45

## 2020-06-12 RX ADMIN — OXYCODONE HYDROCHLORIDE AND ACETAMINOPHEN 1000 MG: 500 TABLET ORAL at 09:15

## 2020-06-12 RX ADMIN — SODIUM CHLORIDE, SODIUM LACTATE, POTASSIUM CHLORIDE, AND CALCIUM CHLORIDE 500 ML: .6; .31; .03; .02 INJECTION, SOLUTION INTRAVENOUS at 01:38

## 2020-06-12 RX ADMIN — FAMOTIDINE 20 MG: 20 TABLET ORAL at 16:32

## 2020-06-12 RX ADMIN — SODIUM CHLORIDE 75 ML/HR: 0.9 INJECTION, SOLUTION INTRAVENOUS at 16:24

## 2020-06-12 RX ADMIN — PANTOPRAZOLE SODIUM 40 MG: 40 TABLET, DELAYED RELEASE ORAL at 09:15

## 2020-06-12 RX ADMIN — APIXABAN 5 MG: 5 TABLET, FILM COATED ORAL at 09:16

## 2020-06-12 RX ADMIN — METOPROLOL TARTRATE 25 MG: 25 TABLET, FILM COATED ORAL at 21:24

## 2020-06-12 RX ADMIN — DULOXETINE 30 MG: 30 CAPSULE, DELAYED RELEASE ORAL at 09:14

## 2020-06-12 RX ADMIN — MELATONIN 1000 UNITS: at 09:15

## 2020-06-13 ENCOUNTER — APPOINTMENT (INPATIENT)
Dept: MRI IMAGING | Facility: HOSPITAL | Age: 75
DRG: 862 | End: 2020-06-13
Payer: COMMERCIAL

## 2020-06-13 ENCOUNTER — APPOINTMENT (INPATIENT)
Dept: RADIOLOGY | Facility: HOSPITAL | Age: 75
DRG: 862 | End: 2020-06-13
Payer: COMMERCIAL

## 2020-06-13 LAB
ANION GAP SERPL CALCULATED.3IONS-SCNC: 11 MMOL/L (ref 4–13)
BASOPHILS # BLD AUTO: 0.08 THOUSANDS/ΜL (ref 0–0.1)
BASOPHILS NFR BLD AUTO: 1 % (ref 0–1)
BUN SERPL-MCNC: 56 MG/DL (ref 5–25)
C3 SERPL-MCNC: 65.4 MG/DL (ref 90–180)
C4 SERPL-MCNC: 11 MG/DL (ref 10–40)
CALCIUM SERPL-MCNC: 8.8 MG/DL (ref 8.3–10.1)
CHLORIDE SERPL-SCNC: 101 MMOL/L (ref 100–108)
CK MB SERPL-MCNC: 12.8 NG/ML (ref 0–5)
CK MB SERPL-MCNC: 3 % (ref 0–2.5)
CK SERPL-CCNC: 431 U/L (ref 39–308)
CO2 SERPL-SCNC: 23 MMOL/L (ref 21–32)
CREAT SERPL-MCNC: 3.08 MG/DL (ref 0.6–1.3)
EOSINOPHIL # BLD AUTO: 0.22 THOUSAND/ΜL (ref 0–0.61)
EOSINOPHIL NFR BLD AUTO: 4 % (ref 0–6)
EOSINOPHIL NFR URNS MANUAL: 0 %
ERYTHROCYTE [DISTWIDTH] IN BLOOD BY AUTOMATED COUNT: 19.3 % (ref 11.6–15.1)
GFR SERPL CREATININE-BSD FRML MDRD: 19 ML/MIN/1.73SQ M
GLUCOSE SERPL-MCNC: 63 MG/DL (ref 65–140)
GLUCOSE SERPL-MCNC: 68 MG/DL (ref 65–140)
GLUCOSE SERPL-MCNC: 80 MG/DL (ref 65–140)
GLUCOSE SERPL-MCNC: 99 MG/DL (ref 65–140)
HCT VFR BLD AUTO: 45.9 % (ref 36.5–49.3)
HGB BLD-MCNC: 15.2 G/DL (ref 12–17)
IMM GRANULOCYTES # BLD AUTO: 0.02 THOUSAND/UL (ref 0–0.2)
IMM GRANULOCYTES NFR BLD AUTO: 0 % (ref 0–2)
INR PPP: 2.92 (ref 0.84–1.19)
LYMPHOCYTES # BLD AUTO: 1.75 THOUSANDS/ΜL (ref 0.6–4.47)
LYMPHOCYTES NFR BLD AUTO: 29 % (ref 14–44)
MAGNESIUM SERPL-MCNC: 1.9 MG/DL (ref 1.6–2.6)
MCH RBC QN AUTO: 31.7 PG (ref 26.8–34.3)
MCHC RBC AUTO-ENTMCNC: 33.1 G/DL (ref 31.4–37.4)
MCV RBC AUTO: 96 FL (ref 82–98)
MONOCYTES # BLD AUTO: 0.77 THOUSAND/ΜL (ref 0.17–1.22)
MONOCYTES NFR BLD AUTO: 13 % (ref 4–12)
NEUTROPHILS # BLD AUTO: 3.25 THOUSANDS/ΜL (ref 1.85–7.62)
NEUTS SEG NFR BLD AUTO: 53 % (ref 43–75)
NRBC BLD AUTO-RTO: 0 /100 WBCS
PHOSPHATE SERPL-MCNC: 4.6 MG/DL (ref 2.3–4.1)
PLATELET # BLD AUTO: 137 THOUSANDS/UL (ref 149–390)
PMV BLD AUTO: 11.7 FL (ref 8.9–12.7)
POTASSIUM SERPL-SCNC: 4.1 MMOL/L (ref 3.5–5.3)
PROTHROMBIN TIME: 30.2 SECONDS (ref 11.6–14.5)
RBC # BLD AUTO: 4.79 MILLION/UL (ref 3.88–5.62)
SODIUM SERPL-SCNC: 135 MMOL/L (ref 136–145)
WBC # BLD AUTO: 6.09 THOUSAND/UL (ref 4.31–10.16)

## 2020-06-13 PROCEDURE — 99233 SBSQ HOSP IP/OBS HIGH 50: CPT | Performed by: INTERNAL MEDICINE

## 2020-06-13 PROCEDURE — 73600 X-RAY EXAM OF ANKLE: CPT

## 2020-06-13 PROCEDURE — 82550 ASSAY OF CK (CPK): CPT | Performed by: NURSE PRACTITIONER

## 2020-06-13 PROCEDURE — 82948 REAGENT STRIP/BLOOD GLUCOSE: CPT

## 2020-06-13 PROCEDURE — 99232 SBSQ HOSP IP/OBS MODERATE 35: CPT | Performed by: INTERNAL MEDICINE

## 2020-06-13 PROCEDURE — 82553 CREATINE MB FRACTION: CPT | Performed by: NURSE PRACTITIONER

## 2020-06-13 PROCEDURE — 73718 MRI LOWER EXTREMITY W/O DYE: CPT

## 2020-06-13 PROCEDURE — 85610 PROTHROMBIN TIME: CPT | Performed by: INTERNAL MEDICINE

## 2020-06-13 PROCEDURE — 85025 COMPLETE CBC W/AUTO DIFF WBC: CPT | Performed by: INTERNAL MEDICINE

## 2020-06-13 PROCEDURE — 83735 ASSAY OF MAGNESIUM: CPT | Performed by: NURSE PRACTITIONER

## 2020-06-13 PROCEDURE — 86160 COMPLEMENT ANTIGEN: CPT | Performed by: NURSE PRACTITIONER

## 2020-06-13 PROCEDURE — 84100 ASSAY OF PHOSPHORUS: CPT | Performed by: NURSE PRACTITIONER

## 2020-06-13 PROCEDURE — 80048 BASIC METABOLIC PNL TOTAL CA: CPT | Performed by: NURSE PRACTITIONER

## 2020-06-13 RX ORDER — DEXTROSE MONOHYDRATE 25 G/50ML
25 INJECTION, SOLUTION INTRAVENOUS ONCE
Status: COMPLETED | OUTPATIENT
Start: 2020-06-13 | End: 2020-06-13

## 2020-06-13 RX ORDER — TAMSULOSIN HYDROCHLORIDE 0.4 MG/1
0.4 CAPSULE ORAL
Status: DISCONTINUED | OUTPATIENT
Start: 2020-06-13 | End: 2020-06-18 | Stop reason: HOSPADM

## 2020-06-13 RX ADMIN — MELATONIN 1000 UNITS: at 09:44

## 2020-06-13 RX ADMIN — APIXABAN 2.5 MG: 2.5 TABLET, FILM COATED ORAL at 09:44

## 2020-06-13 RX ADMIN — OXYCODONE HYDROCHLORIDE AND ACETAMINOPHEN 1000 MG: 500 TABLET ORAL at 09:43

## 2020-06-13 RX ADMIN — METOPROLOL TARTRATE 25 MG: 25 TABLET, FILM COATED ORAL at 22:48

## 2020-06-13 RX ADMIN — TAMSULOSIN HYDROCHLORIDE 0.4 MG: 0.4 CAPSULE ORAL at 16:34

## 2020-06-13 RX ADMIN — VANCOMYCIN HYDROCHLORIDE 1250 MG: 1 INJECTION, POWDER, LYOPHILIZED, FOR SOLUTION INTRAVENOUS at 17:06

## 2020-06-13 RX ADMIN — DULOXETINE 30 MG: 30 CAPSULE, DELAYED RELEASE ORAL at 09:43

## 2020-06-13 RX ADMIN — DEXTROSE MONOHYDRATE 25 ML: 25 INJECTION, SOLUTION INTRAVENOUS at 15:20

## 2020-06-13 RX ADMIN — CEFEPIME HYDROCHLORIDE 1000 MG: 1 INJECTION, SOLUTION INTRAVENOUS at 16:30

## 2020-06-13 RX ADMIN — SENNOSIDES 17.2 MG: 8.6 TABLET, FILM COATED ORAL at 09:43

## 2020-06-13 RX ADMIN — CEFEPIME HYDROCHLORIDE 1000 MG: 1 INJECTION, SOLUTION INTRAVENOUS at 04:17

## 2020-06-13 RX ADMIN — COLLAGENASE SANTYL 1 APPLICATION: 250 OINTMENT TOPICAL at 09:47

## 2020-06-13 RX ADMIN — FAMOTIDINE 20 MG: 20 TABLET ORAL at 09:43

## 2020-06-13 RX ADMIN — SODIUM CHLORIDE 75 ML/HR: 0.9 INJECTION, SOLUTION INTRAVENOUS at 05:13

## 2020-06-13 RX ADMIN — METOPROLOL TARTRATE 25 MG: 25 TABLET, FILM COATED ORAL at 09:43

## 2020-06-13 RX ADMIN — APIXABAN 2.5 MG: 2.5 TABLET, FILM COATED ORAL at 20:38

## 2020-06-14 ENCOUNTER — APPOINTMENT (INPATIENT)
Dept: RADIOLOGY | Facility: HOSPITAL | Age: 75
DRG: 862 | End: 2020-06-14
Payer: COMMERCIAL

## 2020-06-14 LAB
ALBUMIN SERPL BCP-MCNC: 2.6 G/DL (ref 3.5–5)
ALP SERPL-CCNC: 78 U/L (ref 46–116)
ALT SERPL W P-5'-P-CCNC: 25 U/L (ref 12–78)
ANION GAP SERPL CALCULATED.3IONS-SCNC: 12 MMOL/L (ref 4–13)
AST SERPL W P-5'-P-CCNC: 61 U/L (ref 5–45)
BASOPHILS # BLD AUTO: 0.06 THOUSANDS/ΜL (ref 0–0.1)
BASOPHILS NFR BLD AUTO: 1 % (ref 0–1)
BILIRUB SERPL-MCNC: 2 MG/DL (ref 0.2–1)
BUN SERPL-MCNC: 59 MG/DL (ref 5–25)
CALCIUM SERPL-MCNC: 8.6 MG/DL (ref 8.3–10.1)
CHLORIDE SERPL-SCNC: 103 MMOL/L (ref 100–108)
CK MB SERPL-MCNC: 11.1 NG/ML (ref 0–5)
CK MB SERPL-MCNC: 3.5 % (ref 0–2.5)
CK SERPL-CCNC: 319 U/L (ref 39–308)
CO2 SERPL-SCNC: 23 MMOL/L (ref 21–32)
CREAT SERPL-MCNC: 3.11 MG/DL (ref 0.6–1.3)
EOSINOPHIL # BLD AUTO: 0.27 THOUSAND/ΜL (ref 0–0.61)
EOSINOPHIL NFR BLD AUTO: 4 % (ref 0–6)
ERYTHROCYTE [DISTWIDTH] IN BLOOD BY AUTOMATED COUNT: 19 % (ref 11.6–15.1)
GFR SERPL CREATININE-BSD FRML MDRD: 19 ML/MIN/1.73SQ M
GLUCOSE SERPL-MCNC: 90 MG/DL (ref 65–140)
HCT VFR BLD AUTO: 44 % (ref 36.5–49.3)
HGB BLD-MCNC: 14.6 G/DL (ref 12–17)
IMM GRANULOCYTES # BLD AUTO: 0.01 THOUSAND/UL (ref 0–0.2)
IMM GRANULOCYTES NFR BLD AUTO: 0 % (ref 0–2)
INR PPP: 2.62 (ref 0.84–1.19)
LYMPHOCYTES # BLD AUTO: 1.24 THOUSANDS/ΜL (ref 0.6–4.47)
LYMPHOCYTES NFR BLD AUTO: 20 % (ref 14–44)
MCH RBC QN AUTO: 31.5 PG (ref 26.8–34.3)
MCHC RBC AUTO-ENTMCNC: 33.2 G/DL (ref 31.4–37.4)
MCV RBC AUTO: 95 FL (ref 82–98)
MONOCYTES # BLD AUTO: 0.66 THOUSAND/ΜL (ref 0.17–1.22)
MONOCYTES NFR BLD AUTO: 11 % (ref 4–12)
NEUTROPHILS # BLD AUTO: 3.83 THOUSANDS/ΜL (ref 1.85–7.62)
NEUTS SEG NFR BLD AUTO: 64 % (ref 43–75)
NRBC BLD AUTO-RTO: 0 /100 WBCS
PLATELET # BLD AUTO: 117 THOUSANDS/UL (ref 149–390)
PMV BLD AUTO: 12.5 FL (ref 8.9–12.7)
POTASSIUM SERPL-SCNC: 3.6 MMOL/L (ref 3.5–5.3)
PROT SERPL-MCNC: 7.4 G/DL (ref 6.4–8.2)
PROTHROMBIN TIME: 27.7 SECONDS (ref 11.6–14.5)
RBC # BLD AUTO: 4.64 MILLION/UL (ref 3.88–5.62)
SODIUM SERPL-SCNC: 138 MMOL/L (ref 136–145)
WBC # BLD AUTO: 6.07 THOUSAND/UL (ref 4.31–10.16)

## 2020-06-14 PROCEDURE — 85610 PROTHROMBIN TIME: CPT | Performed by: INTERNAL MEDICINE

## 2020-06-14 PROCEDURE — 80053 COMPREHEN METABOLIC PANEL: CPT | Performed by: INTERNAL MEDICINE

## 2020-06-14 PROCEDURE — 99233 SBSQ HOSP IP/OBS HIGH 50: CPT | Performed by: INTERNAL MEDICINE

## 2020-06-14 PROCEDURE — 82553 CREATINE MB FRACTION: CPT | Performed by: INTERNAL MEDICINE

## 2020-06-14 PROCEDURE — 99232 SBSQ HOSP IP/OBS MODERATE 35: CPT | Performed by: INTERNAL MEDICINE

## 2020-06-14 PROCEDURE — 87040 BLOOD CULTURE FOR BACTERIA: CPT | Performed by: INTERNAL MEDICINE

## 2020-06-14 PROCEDURE — 85025 COMPLETE CBC W/AUTO DIFF WBC: CPT | Performed by: INTERNAL MEDICINE

## 2020-06-14 PROCEDURE — 82550 ASSAY OF CK (CPK): CPT | Performed by: INTERNAL MEDICINE

## 2020-06-14 PROCEDURE — 71046 X-RAY EXAM CHEST 2 VIEWS: CPT

## 2020-06-14 RX ADMIN — APIXABAN 2.5 MG: 2.5 TABLET, FILM COATED ORAL at 09:00

## 2020-06-14 RX ADMIN — SODIUM CHLORIDE 50 ML/HR: 0.9 INJECTION, SOLUTION INTRAVENOUS at 15:29

## 2020-06-14 RX ADMIN — OXYCODONE HYDROCHLORIDE AND ACETAMINOPHEN 1000 MG: 500 TABLET ORAL at 09:00

## 2020-06-14 RX ADMIN — CEFEPIME HYDROCHLORIDE 1000 MG: 1 INJECTION, SOLUTION INTRAVENOUS at 15:37

## 2020-06-14 RX ADMIN — CEFEPIME HYDROCHLORIDE 1000 MG: 1 INJECTION, SOLUTION INTRAVENOUS at 04:43

## 2020-06-14 RX ADMIN — APIXABAN 2.5 MG: 2.5 TABLET, FILM COATED ORAL at 17:54

## 2020-06-14 RX ADMIN — MELATONIN 1000 UNITS: at 09:00

## 2020-06-14 RX ADMIN — SENNOSIDES 17.2 MG: 8.6 TABLET, FILM COATED ORAL at 09:02

## 2020-06-14 RX ADMIN — FAMOTIDINE 20 MG: 20 TABLET ORAL at 09:01

## 2020-06-14 RX ADMIN — VANCOMYCIN HYDROCHLORIDE 1250 MG: 1 INJECTION, POWDER, LYOPHILIZED, FOR SOLUTION INTRAVENOUS at 16:19

## 2020-06-14 RX ADMIN — COLLAGENASE SANTYL: 250 OINTMENT TOPICAL at 09:01

## 2020-06-14 RX ADMIN — TAMSULOSIN HYDROCHLORIDE 0.4 MG: 0.4 CAPSULE ORAL at 15:39

## 2020-06-14 RX ADMIN — METOPROLOL TARTRATE 25 MG: 25 TABLET, FILM COATED ORAL at 09:01

## 2020-06-14 RX ADMIN — DULOXETINE 30 MG: 30 CAPSULE, DELAYED RELEASE ORAL at 09:01

## 2020-06-15 LAB
ANION GAP SERPL CALCULATED.3IONS-SCNC: 12 MMOL/L (ref 4–13)
BUN SERPL-MCNC: 58 MG/DL (ref 5–25)
CALCIUM SERPL-MCNC: 8.6 MG/DL (ref 8.3–10.1)
CHLORIDE SERPL-SCNC: 106 MMOL/L (ref 100–108)
CO2 SERPL-SCNC: 22 MMOL/L (ref 21–32)
CREAT SERPL-MCNC: 2.92 MG/DL (ref 0.6–1.3)
GFR SERPL CREATININE-BSD FRML MDRD: 20 ML/MIN/1.73SQ M
GLUCOSE SERPL-MCNC: 95 MG/DL (ref 65–140)
MAGNESIUM SERPL-MCNC: 1.8 MG/DL (ref 1.6–2.6)
POTASSIUM SERPL-SCNC: 4 MMOL/L (ref 3.5–5.3)
SODIUM SERPL-SCNC: 140 MMOL/L (ref 136–145)

## 2020-06-15 PROCEDURE — 99233 SBSQ HOSP IP/OBS HIGH 50: CPT | Performed by: INTERNAL MEDICINE

## 2020-06-15 PROCEDURE — 83735 ASSAY OF MAGNESIUM: CPT | Performed by: INTERNAL MEDICINE

## 2020-06-15 PROCEDURE — 80048 BASIC METABOLIC PNL TOTAL CA: CPT | Performed by: INTERNAL MEDICINE

## 2020-06-15 RX ORDER — NYSTATIN 100000 [USP'U]/G
POWDER TOPICAL 2 TIMES DAILY
Status: DISCONTINUED | OUTPATIENT
Start: 2020-06-15 | End: 2020-06-18 | Stop reason: HOSPADM

## 2020-06-15 RX ORDER — BUMETANIDE 1 MG/1
1 TABLET ORAL DAILY
Status: DISCONTINUED | OUTPATIENT
Start: 2020-06-15 | End: 2020-06-16

## 2020-06-15 RX ADMIN — APIXABAN 2.5 MG: 2.5 TABLET, FILM COATED ORAL at 17:09

## 2020-06-15 RX ADMIN — CEFEPIME HYDROCHLORIDE 1000 MG: 1 INJECTION, SOLUTION INTRAVENOUS at 17:08

## 2020-06-15 RX ADMIN — COLLAGENASE SANTYL: 250 OINTMENT TOPICAL at 10:22

## 2020-06-15 RX ADMIN — OXYCODONE HYDROCHLORIDE AND ACETAMINOPHEN 1000 MG: 500 TABLET ORAL at 10:07

## 2020-06-15 RX ADMIN — CEFEPIME HYDROCHLORIDE 1000 MG: 1 INJECTION, SOLUTION INTRAVENOUS at 05:25

## 2020-06-15 RX ADMIN — METOPROLOL TARTRATE 25 MG: 25 TABLET, FILM COATED ORAL at 20:31

## 2020-06-15 RX ADMIN — TAMSULOSIN HYDROCHLORIDE 0.4 MG: 0.4 CAPSULE ORAL at 17:09

## 2020-06-15 RX ADMIN — MELATONIN 1000 UNITS: at 10:21

## 2020-06-15 RX ADMIN — NYSTATIN: 100000 POWDER TOPICAL at 17:09

## 2020-06-15 RX ADMIN — DULOXETINE 30 MG: 30 CAPSULE, DELAYED RELEASE ORAL at 10:07

## 2020-06-15 RX ADMIN — FAMOTIDINE 20 MG: 20 TABLET ORAL at 10:21

## 2020-06-15 RX ADMIN — APIXABAN 2.5 MG: 2.5 TABLET, FILM COATED ORAL at 10:21

## 2020-06-15 RX ADMIN — BUMETANIDE 1 MG: 1 TABLET ORAL at 13:02

## 2020-06-15 RX ADMIN — SENNOSIDES 17.2 MG: 8.6 TABLET, FILM COATED ORAL at 10:21

## 2020-06-16 LAB
ANION GAP SERPL CALCULATED.3IONS-SCNC: 10 MMOL/L (ref 4–13)
BACTERIA BLD CULT: ABNORMAL
BACTERIA BLD CULT: NORMAL
BUN SERPL-MCNC: 55 MG/DL (ref 5–25)
CALCIUM SERPL-MCNC: 8.5 MG/DL (ref 8.3–10.1)
CHLORIDE SERPL-SCNC: 105 MMOL/L (ref 100–108)
CO2 SERPL-SCNC: 23 MMOL/L (ref 21–32)
CREAT SERPL-MCNC: 2.4 MG/DL (ref 0.6–1.3)
ERYTHROCYTE [DISTWIDTH] IN BLOOD BY AUTOMATED COUNT: 18.6 % (ref 11.6–15.1)
GFR SERPL CREATININE-BSD FRML MDRD: 26 ML/MIN/1.73SQ M
GLUCOSE SERPL-MCNC: 86 MG/DL (ref 65–140)
GRAM STN SPEC: ABNORMAL
HCT VFR BLD AUTO: 38.8 % (ref 36.5–49.3)
HGB BLD-MCNC: 12.9 G/DL (ref 12–17)
MCH RBC QN AUTO: 31.9 PG (ref 26.8–34.3)
MCHC RBC AUTO-ENTMCNC: 33.2 G/DL (ref 31.4–37.4)
MCV RBC AUTO: 96 FL (ref 82–98)
PLATELET # BLD AUTO: 97 THOUSANDS/UL (ref 149–390)
PMV BLD AUTO: 12.4 FL (ref 8.9–12.7)
POTASSIUM SERPL-SCNC: 4 MMOL/L (ref 3.5–5.3)
RBC # BLD AUTO: 4.05 MILLION/UL (ref 3.88–5.62)
SODIUM SERPL-SCNC: 138 MMOL/L (ref 136–145)
VANCOMYCIN SERPL-MCNC: 25.6 UG/ML
WBC # BLD AUTO: 5.5 THOUSAND/UL (ref 4.31–10.16)

## 2020-06-16 PROCEDURE — 99233 SBSQ HOSP IP/OBS HIGH 50: CPT | Performed by: INTERNAL MEDICINE

## 2020-06-16 PROCEDURE — 80202 ASSAY OF VANCOMYCIN: CPT | Performed by: INTERNAL MEDICINE

## 2020-06-16 PROCEDURE — 80048 BASIC METABOLIC PNL TOTAL CA: CPT | Performed by: INTERNAL MEDICINE

## 2020-06-16 PROCEDURE — 85027 COMPLETE CBC AUTOMATED: CPT | Performed by: INTERNAL MEDICINE

## 2020-06-16 RX ORDER — BUMETANIDE 1 MG/1
1 TABLET ORAL EVERY OTHER DAY
Status: DISCONTINUED | OUTPATIENT
Start: 2020-06-18 | End: 2020-06-16

## 2020-06-16 RX ORDER — MIDODRINE HYDROCHLORIDE 5 MG/1
2.5 TABLET ORAL
Status: DISCONTINUED | OUTPATIENT
Start: 2020-06-16 | End: 2020-06-18 | Stop reason: HOSPADM

## 2020-06-16 RX ORDER — BUMETANIDE 1 MG/1
0.5 TABLET ORAL DAILY
Status: DISCONTINUED | OUTPATIENT
Start: 2020-06-16 | End: 2020-06-18 | Stop reason: HOSPADM

## 2020-06-16 RX ORDER — HYDROCODONE BITARTRATE AND ACETAMINOPHEN 5; 325 MG/1; MG/1
1 TABLET ORAL EVERY 12 HOURS PRN
Status: DISCONTINUED | OUTPATIENT
Start: 2020-06-16 | End: 2020-06-18 | Stop reason: HOSPADM

## 2020-06-16 RX ORDER — BUMETANIDE 1 MG/1
2 TABLET ORAL EVERY OTHER DAY
Status: DISCONTINUED | OUTPATIENT
Start: 2020-06-17 | End: 2020-06-16

## 2020-06-16 RX ADMIN — MIDODRINE HYDROCHLORIDE 2.5 MG: 5 TABLET ORAL at 17:08

## 2020-06-16 RX ADMIN — MELATONIN 1000 UNITS: at 10:00

## 2020-06-16 RX ADMIN — APIXABAN 2.5 MG: 2.5 TABLET, FILM COATED ORAL at 09:56

## 2020-06-16 RX ADMIN — FAMOTIDINE 20 MG: 20 TABLET ORAL at 10:01

## 2020-06-16 RX ADMIN — HYDROCODONE BITARTRATE AND ACETAMINOPHEN 1 TABLET: 5; 325 TABLET ORAL at 03:53

## 2020-06-16 RX ADMIN — DICLOFENAC SODIUM 2 G: 10 GEL TOPICAL at 22:10

## 2020-06-16 RX ADMIN — CEFEPIME HYDROCHLORIDE 1000 MG: 1 INJECTION, SOLUTION INTRAVENOUS at 03:57

## 2020-06-16 RX ADMIN — DICLOFENAC SODIUM 2 G: 10 GEL TOPICAL at 15:53

## 2020-06-16 RX ADMIN — METOPROLOL TARTRATE 12.5 MG: 25 TABLET ORAL at 22:08

## 2020-06-16 RX ADMIN — OXYCODONE HYDROCHLORIDE AND ACETAMINOPHEN 1000 MG: 500 TABLET ORAL at 09:56

## 2020-06-16 RX ADMIN — NYSTATIN: 100000 POWDER TOPICAL at 10:01

## 2020-06-16 RX ADMIN — NYSTATIN 1 APPLICATION: 100000 POWDER TOPICAL at 17:11

## 2020-06-16 RX ADMIN — APIXABAN 2.5 MG: 2.5 TABLET, FILM COATED ORAL at 17:08

## 2020-06-16 RX ADMIN — COLLAGENASE SANTYL: 250 OINTMENT TOPICAL at 10:00

## 2020-06-16 RX ADMIN — DULOXETINE 30 MG: 30 CAPSULE, DELAYED RELEASE ORAL at 10:00

## 2020-06-16 RX ADMIN — TAMSULOSIN HYDROCHLORIDE 0.4 MG: 0.4 CAPSULE ORAL at 17:07

## 2020-06-16 RX ADMIN — SENNOSIDES 17.2 MG: 8.6 TABLET, FILM COATED ORAL at 10:01

## 2020-06-17 LAB
ANION GAP SERPL CALCULATED.3IONS-SCNC: 11 MMOL/L (ref 4–13)
BUN SERPL-MCNC: 60 MG/DL (ref 5–25)
CALCIUM SERPL-MCNC: 8.6 MG/DL (ref 8.3–10.1)
CHLORIDE SERPL-SCNC: 105 MMOL/L (ref 100–108)
CO2 SERPL-SCNC: 24 MMOL/L (ref 21–32)
CREAT SERPL-MCNC: 2.57 MG/DL (ref 0.6–1.3)
GFR SERPL CREATININE-BSD FRML MDRD: 24 ML/MIN/1.73SQ M
GLUCOSE SERPL-MCNC: 114 MG/DL (ref 65–140)
GLUCOSE SERPL-MCNC: 77 MG/DL (ref 65–140)
GLUCOSE SERPL-MCNC: 83 MG/DL (ref 65–140)
GLUCOSE SERPL-MCNC: 90 MG/DL (ref 65–140)
POTASSIUM SERPL-SCNC: 4.4 MMOL/L (ref 3.5–5.3)
SODIUM SERPL-SCNC: 140 MMOL/L (ref 136–145)

## 2020-06-17 PROCEDURE — 80048 BASIC METABOLIC PNL TOTAL CA: CPT | Performed by: INTERNAL MEDICINE

## 2020-06-17 PROCEDURE — 82948 REAGENT STRIP/BLOOD GLUCOSE: CPT

## 2020-06-17 PROCEDURE — 97530 THERAPEUTIC ACTIVITIES: CPT

## 2020-06-17 PROCEDURE — 99232 SBSQ HOSP IP/OBS MODERATE 35: CPT | Performed by: INTERNAL MEDICINE

## 2020-06-17 PROCEDURE — 99233 SBSQ HOSP IP/OBS HIGH 50: CPT | Performed by: INTERNAL MEDICINE

## 2020-06-17 RX ADMIN — MIDODRINE HYDROCHLORIDE 2.5 MG: 5 TABLET ORAL at 05:33

## 2020-06-17 RX ADMIN — APIXABAN 2.5 MG: 2.5 TABLET, FILM COATED ORAL at 08:14

## 2020-06-17 RX ADMIN — SENNOSIDES 17.2 MG: 8.6 TABLET, FILM COATED ORAL at 08:17

## 2020-06-17 RX ADMIN — NYSTATIN: 100000 POWDER TOPICAL at 08:17

## 2020-06-17 RX ADMIN — MIDODRINE HYDROCHLORIDE 2.5 MG: 5 TABLET ORAL at 16:28

## 2020-06-17 RX ADMIN — MELATONIN 1000 UNITS: at 08:14

## 2020-06-17 RX ADMIN — COLLAGENASE SANTYL: 250 OINTMENT TOPICAL at 08:17

## 2020-06-17 RX ADMIN — BUMETANIDE 0.5 MG: 1 TABLET ORAL at 08:14

## 2020-06-17 RX ADMIN — TAMSULOSIN HYDROCHLORIDE 0.4 MG: 0.4 CAPSULE ORAL at 16:28

## 2020-06-17 RX ADMIN — DULOXETINE 30 MG: 30 CAPSULE, DELAYED RELEASE ORAL at 08:18

## 2020-06-17 RX ADMIN — OXYCODONE HYDROCHLORIDE AND ACETAMINOPHEN 1000 MG: 500 TABLET ORAL at 08:15

## 2020-06-17 RX ADMIN — DICLOFENAC SODIUM 2 G: 10 GEL TOPICAL at 12:28

## 2020-06-17 RX ADMIN — METOPROLOL TARTRATE 12.5 MG: 25 TABLET ORAL at 08:17

## 2020-06-17 RX ADMIN — DICLOFENAC SODIUM 2 G: 10 GEL TOPICAL at 08:16

## 2020-06-17 RX ADMIN — DICLOFENAC SODIUM 2 G: 10 GEL TOPICAL at 19:00

## 2020-06-17 RX ADMIN — METOPROLOL TARTRATE 12.5 MG: 25 TABLET ORAL at 20:28

## 2020-06-17 RX ADMIN — NYSTATIN: 100000 POWDER TOPICAL at 20:28

## 2020-06-17 RX ADMIN — FAMOTIDINE 20 MG: 20 TABLET ORAL at 08:15

## 2020-06-17 RX ADMIN — MIDODRINE HYDROCHLORIDE 2.5 MG: 5 TABLET ORAL at 12:28

## 2020-06-17 RX ADMIN — APIXABAN 2.5 MG: 2.5 TABLET, FILM COATED ORAL at 19:00

## 2020-06-18 ENCOUNTER — TELEPHONE (OUTPATIENT)
Dept: NEPHROLOGY | Facility: CLINIC | Age: 75
End: 2020-06-18

## 2020-06-18 VITALS
WEIGHT: 279.1 LBS | HEART RATE: 76 BPM | OXYGEN SATURATION: 98 % | SYSTOLIC BLOOD PRESSURE: 113 MMHG | TEMPERATURE: 96.5 F | BODY MASS INDEX: 37.85 KG/M2 | DIASTOLIC BLOOD PRESSURE: 77 MMHG | RESPIRATION RATE: 18 BRPM

## 2020-06-18 LAB
ANION GAP SERPL CALCULATED.3IONS-SCNC: 7 MMOL/L (ref 4–13)
BUN SERPL-MCNC: 60 MG/DL (ref 5–25)
CALCIUM SERPL-MCNC: 9 MG/DL (ref 8.3–10.1)
CHLORIDE SERPL-SCNC: 104 MMOL/L (ref 100–108)
CO2 SERPL-SCNC: 26 MMOL/L (ref 21–32)
CREAT SERPL-MCNC: 2.37 MG/DL (ref 0.6–1.3)
ERYTHROCYTE [DISTWIDTH] IN BLOOD BY AUTOMATED COUNT: 19.3 % (ref 11.6–15.1)
GFR SERPL CREATININE-BSD FRML MDRD: 26 ML/MIN/1.73SQ M
GLUCOSE SERPL-MCNC: 78 MG/DL (ref 65–140)
HCT VFR BLD AUTO: 39.9 % (ref 36.5–49.3)
HGB BLD-MCNC: 13.3 G/DL (ref 12–17)
MCH RBC QN AUTO: 31.7 PG (ref 26.8–34.3)
MCHC RBC AUTO-ENTMCNC: 33.3 G/DL (ref 31.4–37.4)
MCV RBC AUTO: 95 FL (ref 82–98)
PLATELET # BLD AUTO: 90 THOUSANDS/UL (ref 149–390)
PMV BLD AUTO: 12.3 FL (ref 8.9–12.7)
POTASSIUM SERPL-SCNC: 4.5 MMOL/L (ref 3.5–5.3)
RBC # BLD AUTO: 4.19 MILLION/UL (ref 3.88–5.62)
SODIUM SERPL-SCNC: 137 MMOL/L (ref 136–145)
WBC # BLD AUTO: 5.78 THOUSAND/UL (ref 4.31–10.16)

## 2020-06-18 PROCEDURE — 99232 SBSQ HOSP IP/OBS MODERATE 35: CPT | Performed by: INTERNAL MEDICINE

## 2020-06-18 PROCEDURE — 99239 HOSP IP/OBS DSCHRG MGMT >30: CPT | Performed by: INTERNAL MEDICINE

## 2020-06-18 PROCEDURE — 80048 BASIC METABOLIC PNL TOTAL CA: CPT | Performed by: INTERNAL MEDICINE

## 2020-06-18 PROCEDURE — 85027 COMPLETE CBC AUTOMATED: CPT | Performed by: INTERNAL MEDICINE

## 2020-06-18 RX ORDER — BUMETANIDE 1 MG/1
1 TABLET ORAL DAILY
Qty: 30 TABLET | Refills: 0 | Status: SHIPPED | OUTPATIENT
Start: 2020-06-19 | End: 2020-07-17 | Stop reason: HOSPADM

## 2020-06-18 RX ORDER — HYDROCODONE BITARTRATE AND ACETAMINOPHEN 5; 325 MG/1; MG/1
1 TABLET ORAL EVERY 12 HOURS PRN
Qty: 8 TABLET | Refills: 0 | Status: SHIPPED | OUTPATIENT
Start: 2020-06-18 | End: 2020-06-22

## 2020-06-18 RX ORDER — MIDODRINE HYDROCHLORIDE 5 MG/1
5 TABLET ORAL
Qty: 90 TABLET | Refills: 0 | Status: SHIPPED | OUTPATIENT
Start: 2020-06-18 | End: 2020-08-14 | Stop reason: HOSPADM

## 2020-06-18 RX ADMIN — DICLOFENAC SODIUM 2 G: 10 GEL TOPICAL at 11:42

## 2020-06-18 RX ADMIN — SENNOSIDES 17.2 MG: 8.6 TABLET, FILM COATED ORAL at 08:22

## 2020-06-18 RX ADMIN — MIDODRINE HYDROCHLORIDE 2.5 MG: 5 TABLET ORAL at 16:23

## 2020-06-18 RX ADMIN — DICLOFENAC SODIUM 2 G: 10 GEL TOPICAL at 08:24

## 2020-06-18 RX ADMIN — FAMOTIDINE 20 MG: 20 TABLET ORAL at 08:21

## 2020-06-18 RX ADMIN — OXYCODONE HYDROCHLORIDE AND ACETAMINOPHEN 1000 MG: 500 TABLET ORAL at 08:21

## 2020-06-18 RX ADMIN — BUMETANIDE 0.5 MG: 1 TABLET ORAL at 08:22

## 2020-06-18 RX ADMIN — COLLAGENASE SANTYL: 250 OINTMENT TOPICAL at 08:24

## 2020-06-18 RX ADMIN — TAMSULOSIN HYDROCHLORIDE 0.4 MG: 0.4 CAPSULE ORAL at 16:23

## 2020-06-18 RX ADMIN — NYSTATIN: 100000 POWDER TOPICAL at 08:24

## 2020-06-18 RX ADMIN — DULOXETINE 30 MG: 30 CAPSULE, DELAYED RELEASE ORAL at 08:21

## 2020-06-18 RX ADMIN — HYDROCODONE BITARTRATE AND ACETAMINOPHEN 1 TABLET: 5; 325 TABLET ORAL at 16:23

## 2020-06-18 RX ADMIN — MIDODRINE HYDROCHLORIDE 2.5 MG: 5 TABLET ORAL at 11:42

## 2020-06-18 RX ADMIN — MELATONIN 1000 UNITS: at 08:22

## 2020-06-18 RX ADMIN — APIXABAN 2.5 MG: 2.5 TABLET, FILM COATED ORAL at 08:21

## 2020-06-18 RX ADMIN — MIDODRINE HYDROCHLORIDE 2.5 MG: 5 TABLET ORAL at 06:49

## 2020-06-19 ENCOUNTER — TRANSITIONAL CARE MANAGEMENT (OUTPATIENT)
Dept: FAMILY MEDICINE CLINIC | Facility: HOSPITAL | Age: 75
End: 2020-06-19

## 2020-06-19 LAB
BACTERIA BLD CULT: NORMAL
BACTERIA BLD CULT: NORMAL

## 2020-06-22 ENCOUNTER — TELEPHONE (OUTPATIENT)
Dept: NEPHROLOGY | Facility: CLINIC | Age: 75
End: 2020-06-22

## 2020-06-24 ENCOUNTER — TELEPHONE (OUTPATIENT)
Dept: FAMILY MEDICINE CLINIC | Facility: HOSPITAL | Age: 75
End: 2020-06-24

## 2020-06-25 ENCOUNTER — TELEPHONE (OUTPATIENT)
Dept: FAMILY MEDICINE CLINIC | Facility: HOSPITAL | Age: 75
End: 2020-06-25

## 2020-06-25 ENCOUNTER — HOSPITAL ENCOUNTER (INPATIENT)
Facility: HOSPITAL | Age: 75
LOS: 6 days | Discharge: HOME HEALTH | DRG: 264 | End: 2020-07-01
Attending: EMERGENCY MEDICINE | Admitting: INTERNAL MEDICINE
Payer: COMMERCIAL

## 2020-06-25 ENCOUNTER — APPOINTMENT (EMERGENCY)
Dept: CT IMAGING | Facility: HOSPITAL | Age: 75
DRG: 264 | End: 2020-06-25
Payer: COMMERCIAL

## 2020-06-25 ENCOUNTER — APPOINTMENT (EMERGENCY)
Dept: RADIOLOGY | Facility: HOSPITAL | Age: 75
DRG: 264 | End: 2020-06-25
Payer: COMMERCIAL

## 2020-06-25 DIAGNOSIS — R60.0 BILATERAL LOWER EXTREMITY EDEMA: ICD-10-CM

## 2020-06-25 DIAGNOSIS — R79.89 ELEVATED LACTIC ACID LEVEL: ICD-10-CM

## 2020-06-25 DIAGNOSIS — L97.522 CHRONIC FOOT ULCER, LEFT, WITH FAT LAYER EXPOSED (HCC): ICD-10-CM

## 2020-06-25 DIAGNOSIS — L03.116 CELLULITIS OF LEFT LOWER EXTREMITY: Primary | ICD-10-CM

## 2020-06-25 DIAGNOSIS — N18.9 ACUTE KIDNEY INJURY SUPERIMPOSED ON CKD (HCC): ICD-10-CM

## 2020-06-25 DIAGNOSIS — N17.9 ACUTE KIDNEY INJURY SUPERIMPOSED ON CKD (HCC): ICD-10-CM

## 2020-06-25 DIAGNOSIS — I50.9 ACUTE CHF (CONGESTIVE HEART FAILURE) (HCC): ICD-10-CM

## 2020-06-25 LAB
ALBUMIN SERPL BCP-MCNC: 3 G/DL (ref 3.5–5)
ALP SERPL-CCNC: 83 U/L (ref 46–116)
ALT SERPL W P-5'-P-CCNC: 23 U/L (ref 12–78)
AMMONIA PLAS-SCNC: 45 UMOL/L (ref 11–35)
ANION GAP SERPL CALCULATED.3IONS-SCNC: 11 MMOL/L (ref 4–13)
APTT PPP: 42 SECONDS (ref 23–37)
AST SERPL W P-5'-P-CCNC: 49 U/L (ref 5–45)
BASOPHILS # BLD AUTO: 0.06 THOUSANDS/ΜL (ref 0–0.1)
BASOPHILS NFR BLD AUTO: 1 % (ref 0–1)
BILIRUB SERPL-MCNC: 2 MG/DL (ref 0.2–1)
BUN SERPL-MCNC: 63 MG/DL (ref 5–25)
CALCIUM SERPL-MCNC: 9.3 MG/DL (ref 8.3–10.1)
CHLORIDE SERPL-SCNC: 101 MMOL/L (ref 100–108)
CO2 SERPL-SCNC: 24 MMOL/L (ref 21–32)
CREAT SERPL-MCNC: 2.29 MG/DL (ref 0.6–1.3)
EOSINOPHIL # BLD AUTO: 0.14 THOUSAND/ΜL (ref 0–0.61)
EOSINOPHIL NFR BLD AUTO: 2 % (ref 0–6)
ERYTHROCYTE [DISTWIDTH] IN BLOOD BY AUTOMATED COUNT: 19.2 % (ref 11.6–15.1)
GFR SERPL CREATININE-BSD FRML MDRD: 27 ML/MIN/1.73SQ M
GLUCOSE SERPL-MCNC: 88 MG/DL (ref 65–140)
HCT VFR BLD AUTO: 40.1 % (ref 36.5–49.3)
HGB BLD-MCNC: 13.5 G/DL (ref 12–17)
IMM GRANULOCYTES # BLD AUTO: 0.01 THOUSAND/UL (ref 0–0.2)
IMM GRANULOCYTES NFR BLD AUTO: 0 % (ref 0–2)
INR PPP: 2.09 (ref 0.84–1.19)
LACTATE SERPL-SCNC: 2.2 MMOL/L (ref 0.5–2)
LIPASE SERPL-CCNC: 93 U/L (ref 73–393)
LYMPHOCYTES # BLD AUTO: 1.32 THOUSANDS/ΜL (ref 0.6–4.47)
LYMPHOCYTES NFR BLD AUTO: 20 % (ref 14–44)
MAGNESIUM SERPL-MCNC: 1.7 MG/DL (ref 1.6–2.6)
MCH RBC QN AUTO: 32.2 PG (ref 26.8–34.3)
MCHC RBC AUTO-ENTMCNC: 33.7 G/DL (ref 31.4–37.4)
MCV RBC AUTO: 96 FL (ref 82–98)
MONOCYTES # BLD AUTO: 1.14 THOUSAND/ΜL (ref 0.17–1.22)
MONOCYTES NFR BLD AUTO: 18 % (ref 4–12)
NEUTROPHILS # BLD AUTO: 3.84 THOUSANDS/ΜL (ref 1.85–7.62)
NEUTS SEG NFR BLD AUTO: 59 % (ref 43–75)
NRBC BLD AUTO-RTO: 0 /100 WBCS
NT-PROBNP SERPL-MCNC: ABNORMAL PG/ML
PHOSPHATE SERPL-MCNC: 3.6 MG/DL (ref 2.3–4.1)
PLATELET # BLD AUTO: 118 THOUSANDS/UL (ref 149–390)
PMV BLD AUTO: 12.4 FL (ref 8.9–12.7)
POTASSIUM SERPL-SCNC: 3.7 MMOL/L (ref 3.5–5.3)
PROT SERPL-MCNC: 8.1 G/DL (ref 6.4–8.2)
PROTHROMBIN TIME: 23.2 SECONDS (ref 11.6–14.5)
RBC # BLD AUTO: 4.19 MILLION/UL (ref 3.88–5.62)
SODIUM SERPL-SCNC: 136 MMOL/L (ref 136–145)
TROPONIN I SERPL-MCNC: 0.15 NG/ML
WBC # BLD AUTO: 6.51 THOUSAND/UL (ref 4.31–10.16)

## 2020-06-25 PROCEDURE — 83605 ASSAY OF LACTIC ACID: CPT | Performed by: PHYSICIAN ASSISTANT

## 2020-06-25 PROCEDURE — 85025 COMPLETE CBC W/AUTO DIFF WBC: CPT | Performed by: PHYSICIAN ASSISTANT

## 2020-06-25 PROCEDURE — 99223 1ST HOSP IP/OBS HIGH 75: CPT | Performed by: PHYSICIAN ASSISTANT

## 2020-06-25 PROCEDURE — 80053 COMPREHEN METABOLIC PANEL: CPT | Performed by: PHYSICIAN ASSISTANT

## 2020-06-25 PROCEDURE — 83735 ASSAY OF MAGNESIUM: CPT | Performed by: PHYSICIAN ASSISTANT

## 2020-06-25 PROCEDURE — 71045 X-RAY EXAM CHEST 1 VIEW: CPT

## 2020-06-25 PROCEDURE — 36415 COLL VENOUS BLD VENIPUNCTURE: CPT | Performed by: PHYSICIAN ASSISTANT

## 2020-06-25 PROCEDURE — 83690 ASSAY OF LIPASE: CPT | Performed by: PHYSICIAN ASSISTANT

## 2020-06-25 PROCEDURE — 70450 CT HEAD/BRAIN W/O DYE: CPT

## 2020-06-25 PROCEDURE — 85730 THROMBOPLASTIN TIME PARTIAL: CPT | Performed by: PHYSICIAN ASSISTANT

## 2020-06-25 PROCEDURE — 84484 ASSAY OF TROPONIN QUANT: CPT | Performed by: PHYSICIAN ASSISTANT

## 2020-06-25 PROCEDURE — 84100 ASSAY OF PHOSPHORUS: CPT | Performed by: PHYSICIAN ASSISTANT

## 2020-06-25 PROCEDURE — 83880 ASSAY OF NATRIURETIC PEPTIDE: CPT | Performed by: PHYSICIAN ASSISTANT

## 2020-06-25 PROCEDURE — 87040 BLOOD CULTURE FOR BACTERIA: CPT | Performed by: PHYSICIAN ASSISTANT

## 2020-06-25 PROCEDURE — 82140 ASSAY OF AMMONIA: CPT | Performed by: PHYSICIAN ASSISTANT

## 2020-06-25 PROCEDURE — 99285 EMERGENCY DEPT VISIT HI MDM: CPT

## 2020-06-25 PROCEDURE — 85610 PROTHROMBIN TIME: CPT | Performed by: PHYSICIAN ASSISTANT

## 2020-06-25 PROCEDURE — 84145 PROCALCITONIN (PCT): CPT | Performed by: PHYSICIAN ASSISTANT

## 2020-06-25 PROCEDURE — 99285 EMERGENCY DEPT VISIT HI MDM: CPT | Performed by: PHYSICIAN ASSISTANT

## 2020-06-25 RX ORDER — DULOXETIN HYDROCHLORIDE 30 MG/1
30 CAPSULE, DELAYED RELEASE ORAL DAILY
Status: DISCONTINUED | OUTPATIENT
Start: 2020-06-26 | End: 2020-07-01 | Stop reason: HOSPADM

## 2020-06-25 RX ORDER — FUROSEMIDE 10 MG/ML
40 INJECTION INTRAMUSCULAR; INTRAVENOUS ONCE
Status: COMPLETED | OUTPATIENT
Start: 2020-06-25 | End: 2020-06-25

## 2020-06-25 RX ORDER — MIDODRINE HYDROCHLORIDE 5 MG/1
5 TABLET ORAL
Status: DISCONTINUED | OUTPATIENT
Start: 2020-06-26 | End: 2020-06-26

## 2020-06-25 RX ORDER — SENNOSIDES 8.6 MG
2 TABLET ORAL DAILY
Status: DISCONTINUED | OUTPATIENT
Start: 2020-06-26 | End: 2020-07-01 | Stop reason: HOSPADM

## 2020-06-25 RX ORDER — CEFAZOLIN SODIUM 2 G/50ML
2000 SOLUTION INTRAVENOUS EVERY 8 HOURS
Status: DISCONTINUED | OUTPATIENT
Start: 2020-06-25 | End: 2020-07-01 | Stop reason: HOSPADM

## 2020-06-25 RX ORDER — ONDANSETRON 2 MG/ML
4 INJECTION INTRAMUSCULAR; INTRAVENOUS EVERY 6 HOURS PRN
Status: DISCONTINUED | OUTPATIENT
Start: 2020-06-25 | End: 2020-07-01 | Stop reason: HOSPADM

## 2020-06-25 RX ORDER — FUROSEMIDE 10 MG/ML
40 INJECTION INTRAMUSCULAR; INTRAVENOUS
Status: DISCONTINUED | OUTPATIENT
Start: 2020-06-26 | End: 2020-06-26

## 2020-06-25 RX ORDER — ASCORBIC ACID 500 MG
1000 TABLET ORAL 2 TIMES DAILY
Status: DISCONTINUED | OUTPATIENT
Start: 2020-06-25 | End: 2020-07-01 | Stop reason: HOSPADM

## 2020-06-25 RX ORDER — ACETAMINOPHEN 325 MG/1
650 TABLET ORAL EVERY 6 HOURS PRN
Status: DISCONTINUED | OUTPATIENT
Start: 2020-06-25 | End: 2020-07-01 | Stop reason: HOSPADM

## 2020-06-25 RX ORDER — PANTOPRAZOLE SODIUM 40 MG/1
40 TABLET, DELAYED RELEASE ORAL DAILY
Status: DISCONTINUED | OUTPATIENT
Start: 2020-06-26 | End: 2020-07-01 | Stop reason: HOSPADM

## 2020-06-25 RX ORDER — MELATONIN
1000 DAILY
Status: DISCONTINUED | OUTPATIENT
Start: 2020-06-26 | End: 2020-07-01 | Stop reason: HOSPADM

## 2020-06-25 RX ADMIN — THIAMINE HYDROCHLORIDE 100 MG: 100 INJECTION, SOLUTION INTRAMUSCULAR; INTRAVENOUS at 22:48

## 2020-06-25 RX ADMIN — CEFAZOLIN SODIUM 2000 MG: 2 SOLUTION INTRAVENOUS at 23:41

## 2020-06-25 RX ADMIN — METOPROLOL TARTRATE 25 MG: 25 TABLET, FILM COATED ORAL at 22:51

## 2020-06-25 RX ADMIN — OXYCODONE HYDROCHLORIDE AND ACETAMINOPHEN 1000 MG: 500 TABLET ORAL at 22:51

## 2020-06-25 RX ADMIN — FUROSEMIDE 40 MG: 10 INJECTION, SOLUTION INTRAMUSCULAR; INTRAVENOUS at 20:08

## 2020-06-25 RX ADMIN — APIXABAN 5 MG: 5 TABLET, FILM COATED ORAL at 22:51

## 2020-06-25 NOTE — ED PROVIDER NOTES
History  Chief Complaint   Patient presents with    Leg Swelling     patient presents to the ED with fluid retention, sent from PCP      71-year-old male with past history of AFib, CKD, cellulitis with chronic wounds of the bilateral lower extremities, CHF, GERD, history of MRSA, hyperlipidemia, hypertension, pulmonary hypertension, PVD presenting for evaluation of worsening swelling in bilateral lower extremities  Patient presents with his son who is a poor historian  Son states that he received a call from the home nurse who advised him to go to the ED for evaluation  Son thinks that patient is becoming increasingly lethargic and confused than in the same sentence states that he is confused at baseline  Son reports that he is having increasing lower extremity swelling over the past few days  Unsure of any further complaints given pt baseline confusion  Prior to Admission Medications   Prescriptions Last Dose Informant Patient Reported? Taking?    Ascorbic Acid (VITAMIN C) 1000 MG tablet  Self Yes No   Sig: Take 1,000 mg by mouth 2 (two) times a day    DULoxetine (CYMBALTA) 30 mg delayed release capsule   No No   Sig: Take 1 capsule (30 mg total) by mouth daily   apixaban (ELIQUIS) 5 mg  Self Yes No   Sig: Take 5 mg by mouth 2 (two) times a day    bumetanide (BUMEX) 1 mg tablet   No No   Sig: Take 1 tablet (1 mg total) by mouth daily   cholecalciferol (VITAMIN D3) 1,000 units tablet  Self Yes No   Sig: Take 1,000 Units by mouth daily   metoprolol tartrate (LOPRESSOR) 25 mg tablet   No No   Sig: Take 1 tablet (25 mg total) by mouth every 12 (twelve) hours   midodrine (PROAMATINE) 5 mg tablet   No No   Sig: Take 1 tablet (5 mg total) by mouth 3 (three) times a day before meals   pantoprazole (PROTONIX) 40 mg tablet   No No   Sig: Take 1 tablet (40 mg total) by mouth daily   senna (SENOKOT) 8 6 mg   No No   Sig: Take 2 tablets (17 2 mg total) by mouth daily   sodium chloride (OCEAN) 0 65 % nasal spray Self Yes No   Si spray into each nostril as needed for congestion      Facility-Administered Medications: None       Past Medical History:   Diagnosis Date    Atrial fibrillation (HCC)     Cardiac disease     Cellulitis     CHF (congestive heart failure) (HCC)     Chronic pain     Chronic venous hypertension     with ulceration    GERD (gastroesophageal reflux disease)     History of methicillin resistant staphylococcus aureus (MRSA) 2019    negative nasal swab     Hyperlipidemia     Hypertension     Obesity     Pulmonary hypertension ([facility] )     PVD (peripheral vascular disease) ([place] [address] )     Vascular disorder of extremity ([place] [address] )        Past Surgical History:   Procedure Laterality Date    ANTERIOR RELEASE VERTEBRAL BODY W/ POSTERIOR FUSION      APPENDECTOMY      CATARACT EXTRACTION      DECOMPRESSION SPINE LUMBAR POSTERIOR      ELBOW SURGERY      FOOT/ANKLE ARTHRODESIS Right      complications postoperatively with bone healing and infection    INCISION AND DRAINAGE OF WOUND Left 3/5/2020    Procedure: INCISION AND DRAINAGE (I&D) EXTREMITY;  Surgeon: Sabrina Shoemaker DPM;  Location:  MAIN OR;  Service: Podiatry    KNEE SURGERY      NOSE SURGERY      turbinectomy    RETINAL DETACHMENT SURGERY      RHINOPLASTY      TONSILLECTOMY      VEIN LIGATION AND STRIPPING      saphenous vein long       Family History   Problem Relation Age of Onset    Cancer Mother         cancer of uterus    Diabetes Sister     Mental illness Neg Hx         neg fam hx    Substance Abuse Neg Hx         neg fam hx     I have reviewed and agree with the history as documented  E-Cigarette/Vaping    E-Cigarette Use Never User      E-Cigarette/Vaping Substances    Nicotine No     Flavoring No      Social History     Tobacco Use    Smoking status: Never Smoker    Smokeless tobacco: Never Used   Substance Use Topics    Alcohol use: No     Frequency: Never     Binge frequency: Never    Drug use:  No Review of Systems   Unable to perform ROS: Other       Physical Exam  Physical Exam   Constitutional: He is oriented to person, place, and time  He appears well-nourished  No distress  overweight   HENT:   Head: Normocephalic and atraumatic  Right Ear: External ear normal    Left Ear: External ear normal    Eyes: Conjunctivae are normal    EOM grossly intact   Neck: Normal range of motion  Neck supple  No JVD present  Cardiovascular: Normal rate  Pulmonary/Chest: Effort normal  No stridor  No respiratory distress  He has no wheezes  Abdominal: Soft  He exhibits no distension  There is no tenderness  Musculoskeletal:        Legs:  FROM, steady gait, cap refill brisk, strength and sensation grossly intact throughout   Lymphadenopathy:     He has no cervical adenopathy  Neurological: He is alert and oriented to person, place, and time  Skin: Skin is warm and dry  Capillary refill takes less than 2 seconds  He is not diaphoretic  Psychiatric: He has a normal mood and affect  His behavior is normal    Nursing note and vitals reviewed        Vital Signs  ED Triage Vitals   Temperature Pulse Respirations Blood Pressure SpO2   06/25/20 1709 06/25/20 1709 06/25/20 1709 06/25/20 1709 06/25/20 1709   97 7 °F (36 5 °C) 65 20 127/78 93 %      Temp Source Heart Rate Source Patient Position - Orthostatic VS BP Location FiO2 (%)   06/25/20 1709 06/25/20 1830 06/25/20 1709 06/25/20 1709 --   Temporal Monitor Sitting Right arm       Pain Score       06/25/20 1709       Worst Possible Pain           Vitals:    06/25/20 1709 06/25/20 1830 06/25/20 1958 06/25/20 2009   BP: 127/78 111/72 127/73 124/66   Pulse: 65 85 87 74   Patient Position - Orthostatic VS: Sitting Lying Sitting Sitting         Visual Acuity      ED Medications  Medications   thiamine (VITAMIN B1) 100 mg in sodium chloride 0 9 % 50 mL IVPB (has no administration in time range)   furosemide (LASIX) injection 40 mg (40 mg Intravenous Given 6/25/20 2008)       Diagnostic Studies  Results Reviewed     Procedure Component Value Units Date/Time    Procalcitonin [[id number]]     Lab Status:  No result Specimen:  Blood     Troponin I [[id number]]     Lab Status:  No result Specimen:  Blood     Lactic acid [[id number]]  (Abnormal) Collected:  06/25/20 1842    Lab Status:  Final result Specimen:  Blood from Arm, Left Updated:  06/25/20 1931     LACTIC ACID 2 2 mmol/L     Narrative:       Result may be elevated if tourniquet was used during collection      Lactic acid 2 Hours [[id number]]     Lab Status:  No result Specimen:  Blood     Ammonia [[id number]]  (Abnormal) Collected:  06/25/20 1842    Lab Status:  Final result Specimen:  Blood from Arm, Left Updated:  06/25/20 1929     Ammonia 45 umol/L     APTT [[id number]]  (Abnormal) Collected:  06/25/20 1842    Lab Status:  Final result Specimen:  Blood from Arm, Left Updated:  06/25/20 1924     PTT 42 seconds     Protime-INR [[id number]]  (Abnormal) Collected:  06/25/20 1842    Lab Status:  Final result Specimen:  Blood from Arm, Left Updated:  06/25/20 1924     Protime 23 2 seconds      INR 2 09    NT-BNP PRO [[id number]]  (Abnormal) Collected:  06/25/20 1842    Lab Status:  Final result Specimen:  Blood from Arm, Left Updated:  06/25/20 1918     NT-proBNP 34,232 pg/mL     Lipase [[id number]]  (Normal) Collected:  06/25/20 1842    Lab Status:  Final result Specimen:  Blood from Arm, Left Updated:  06/25/20 1918     Lipase 93 u/L     Phosphorus [[id number]]  (Normal) Collected:  06/25/20 1842    Lab Status:  Final result Specimen:  Blood from Arm, Left Updated:  06/25/20 1918     Phosphorus 3 6 mg/dL     Magnesium [[id number]]  (Normal) Collected:  06/25/20 1842    Lab Status:  Final result Specimen:  Blood from Arm, Left Updated:  06/25/20 1918     Magnesium 1 7 mg/dL     Troponin I [[id number]]  (Abnormal) Collected:  06/25/20 1842    Lab Status:  Final result Specimen:  Blood from Arm, Left Updated:  06/25/20 1915     Troponin I 0 15 ng/mL     Comprehensive metabolic panel [[id number]]  (Abnormal) Collected:  06/25/20 1842    Lab Status:  Final result Specimen:  Blood from Arm, Left Updated:  06/25/20 1911     Sodium 136 mmol/L      Potassium 3 7 mmol/L      Chloride 101 mmol/L      CO2 24 mmol/L      ANION GAP 11 mmol/L      BUN 63 mg/dL      Creatinine 2 29 mg/dL      Glucose 88 mg/dL      Calcium 9 3 mg/dL      AST 49 U/L      ALT 23 U/L      Alkaline Phosphatase 83 U/L      Total Protein 8 1 g/dL      Albumin 3 0 g/dL      Total Bilirubin 2 00 mg/dL      eGFR 27 ml/min/1 73sq m     Narrative:       National Kidney Disease Foundation guidelines for Chronic Kidney Disease (CKD):     Stage 1 with normal or high GFR (GFR > 90 mL/min/1 73 square meters)    Stage 2 Mild CKD (GFR = 60-89 mL/min/1 73 square meters)    Stage 3A Moderate CKD (GFR = 45-59 mL/min/1 73 square meters)    Stage 3B Moderate CKD (GFR = 30-44 mL/min/1 73 square meters)    Stage 4 Severe CKD (GFR = 15-29 mL/min/1 73 square meters)    Stage 5 End Stage CKD (GFR <15 mL/min/1 73 square meters)  Note: GFR calculation is accurate only with a steady state creatinine    CBC and differential [[id number]]  (Abnormal) Collected:  06/25/20 1842    Lab Status:  Final result Specimen:  Blood from Arm, Left Updated:  06/25/20 1853     WBC 6 51 Thousand/uL      RBC 4 19 Million/uL      Hemoglobin 13 5 g/dL      Hematocrit 40 1 %      MCV 96 fL      MCH 32 2 pg      MCHC 33 7 g/dL      RDW 19 2 %      MPV 12 4 fL      Platelets 928 Thousands/uL      nRBC 0 /100 WBCs      Neutrophils Relative 59 %      Immat GRANS % 0 %      Lymphocytes Relative 20 %      Monocytes Relative 18 %      Eosinophils Relative 2 %      Basophils Relative 1 %      Neutrophils Absolute 3 84 Thousands/µL      Immature Grans Absolute 0 01 Thousand/uL      Lymphocytes Absolute 1 32 Thousands/µL      Monocytes Absolute 1 14 Thousand/µL      Eosinophils Absolute 0 14 Thousand/µL      Basophils Absolute 0 06 Thousands/µL     Blood culture #1 [[id number]] Collected:  06/25/20 1842    Lab Status: In process Specimen:  Blood from Arm, Left Updated:  06/25/20 1850    Blood culture #2 [[id number]] Collected:  06/25/20 1842    Lab Status: In process Specimen:  Blood from Arm, Left Updated:  06/25/20 1850    UA w Reflex to Microscopic w Reflex to Culture [[id number]]     Lab Status:  No result Specimen:  Urine, Other                  CT head without contrast   Final Result by Deysi Mascorro MD (06/25 1951)      Stable exam with chronic microangiopathic change without acute intracranial abnormality  Workstation performed: [id number]         XR chest portable   Final Result by Gerald Rivera MD (06/25 1943)      No acute cardiopulmonary disease  Workstation performed: [id number]                    Procedures  Procedures         ED Course  ED Course as of Jun 25 2027   u Jun 25, 2020   1936 Improved from 2 weeks ago   Troponin I(!): 0 15       US AUDIT      Most Recent Value   Initial Alcohol Screen: US AUDIT-C    1  How often do you have a drink containing alcohol?  0 Filed at: 06/25/2020 1711   2  How many drinks containing alcohol do you have on a typical day you are drinking? 0 Filed at: 06/25/2020 1711   3a  Male UNDER 65: How often do you have five or more drinks on one occasion? 0 Filed at: 06/25/2020 1711   3b  FEMALE Any Age, or MALE 65+: How often do you have 4 or more drinks on one occassion? 0 Filed at: 06/25/2020 1711   Audit-C Score  0 Filed at: 06/25/2020 1711                  MARY/DAST-10      Most Recent Value   How many times in the past year have you    Used an illegal drug or used a prescription medication for non-medical reasons? Never Filed at: 06/25/2020 1711              Initial Sepsis Screening     Row Name 06/25/20 1936                Is the patient's history suggestive of a new or worsening infection?           Suspected source of infection  soft tissue  -BG Are two or more of the following signs & symptoms of infection both present and new to the patient?         Indicate SIRS criteria          If the answer is yes to both questions, suspicion of sepsis is present          If severe sepsis is present AND tissue hypoperfusion perists in the hour after fluid resuscitation or lactate > 4, the patient meets criteria for SEPTIC SHOCK          Are any of the following organ dysfunction criteria present within 6 hours of suspected infection and SIRS criteria that are NOT considered to be chronic conditions?         Organ dysfunction          Date of presentation of severe sepsis          Time of presentation of severe sepsis          Tissue hypoperfusion persists in the hour after crystalloid fluid administration, evidenced, by either:          Was hypotension present within one hour of the conclusion of crystalloid fluid administration?         Date of presentation of septic shock          Time of presentation of septic shock            User Key  (r) = Recorded By, (t) = Taken By, (c) = Cosigned By    [address] Name Provider Type    BG Mary Sánchez PA-C Physician Assistant                        MDM  Number of Diagnoses or Management Options  Acute CHF (congestive heart failure) ([facility] ):   Bilateral lower extremity edema:   Chronic foot ulcer, left, with fat layer exposed ([facility] ):   Elevated lactic acid level:   Diagnosis management comments: 70 yo M presenting for evaluation of worsening LE swelling, chronic wounds with PVD, found to be in acute on chronic CHF, with CKD, will admit    Portions of the record may have been created with voice recognition software  Occasional wrong word or "sound a like" substitutions may have occurred due to the inherent limitations of voice recognition software  Read the chart carefully and recognize, using context, where substitutions have occurred            Disposition  Final diagnoses:   Elevated lactic acid level Bilateral lower extremity edema   Acute CHF (congestive heart failure) ([facility] )   Chronic foot ulcer, left, with fat layer exposed ([facility] )     Time reflects when diagnosis was documented in both MDM as applicable and the Disposition within this note     Time User Action Codes Description Comment    6/25/2020  7:46 PM Sonda Rachel Add [R79 89] Elevated lactic acid level     6/25/2020  7:46 PM Sonda Rachel Add [R60 0] Bilateral lower extremity edema     6/25/2020  7:46 PM Sonda Rachel Add [I50 9] Acute CHF (congestive heart failure) ([facility] )     6/25/2020  8:02 PM Sonda Rachel Add [L97 522] Chronic foot ulcer, left, with fat layer exposed ([facility] )     6/25/2020  8:17 PM Mayra Marilu Add [I50 43] Acute on chronic combined systolic and diastolic congestive heart failure ([facility] )     6/25/2020  8:17 PM Mayra Marilu Remove [I50 43] Acute on chronic combined systolic and diastolic congestive heart failure ([facility] )     6/25/2020  8:17 PM Mayra Marilu Add [N17 9,  N18 9] Acute kidney injury superimposed on CKD [facility])       ED Disposition     ED Disposition Condition Date/Time Comment    Admit Stable Thu Jun 25, 2020  8:01 PM Case was discussed with YAZMIN AP and the patient's admission status was agreed to be Admission Status: inpatient status to the service of Dr Radha Houston   Follow-up Information    None         Patient's Medications   Discharge Prescriptions    No medications on file     No discharge procedures on file      PDMP Review       Value Time User    PDMP Reviewed  Yes 6/18/2020  3:14 PM Levi Lazar MD          ED Provider  Electronically Signed by           Orlando Manuel PA-C  06/25/20 2027

## 2020-06-25 NOTE — ED NOTES
Attempted to take pt to ER room #11, pt denied that his name was Linwood and then after this RN continued to call for Linwood pt started laughing and then used his motorized wheelchair to come to me and then I checked his wristband and welcomed him to room #11        Regina Houston RN  06/25/20 5022

## 2020-06-26 ENCOUNTER — APPOINTMENT (INPATIENT)
Dept: NON INVASIVE DIAGNOSTICS | Facility: HOSPITAL | Age: 75
DRG: 264 | End: 2020-06-26
Payer: COMMERCIAL

## 2020-06-26 LAB
ANION GAP SERPL CALCULATED.3IONS-SCNC: 14 MMOL/L (ref 4–13)
ATRIAL RATE: 18 BPM
BACTERIA UR QL AUTO: NORMAL /HPF
BASOPHILS # BLD AUTO: 0.05 THOUSANDS/ΜL (ref 0–0.1)
BASOPHILS NFR BLD AUTO: 1 % (ref 0–1)
BILIRUB UR QL STRIP: NEGATIVE
BUN SERPL-MCNC: 61 MG/DL (ref 5–25)
CALCIUM SERPL-MCNC: 9.5 MG/DL (ref 8.3–10.1)
CHLORIDE SERPL-SCNC: 100 MMOL/L (ref 100–108)
CLARITY UR: CLEAR
CO2 SERPL-SCNC: 24 MMOL/L (ref 21–32)
COLOR UR: YELLOW
CREAT SERPL-MCNC: 2.15 MG/DL (ref 0.6–1.3)
EOSINOPHIL # BLD AUTO: 0.28 THOUSAND/ΜL (ref 0–0.61)
EOSINOPHIL NFR BLD AUTO: 5 % (ref 0–6)
ERYTHROCYTE [DISTWIDTH] IN BLOOD BY AUTOMATED COUNT: 19.6 % (ref 11.6–15.1)
GFR SERPL CREATININE-BSD FRML MDRD: 29 ML/MIN/1.73SQ M
GLUCOSE SERPL-MCNC: 80 MG/DL (ref 65–140)
GLUCOSE UR STRIP-MCNC: NEGATIVE MG/DL
HCT VFR BLD AUTO: 42.2 % (ref 36.5–49.3)
HGB BLD-MCNC: 13.9 G/DL (ref 12–17)
HGB UR QL STRIP.AUTO: NEGATIVE
IMM GRANULOCYTES # BLD AUTO: 0.01 THOUSAND/UL (ref 0–0.2)
IMM GRANULOCYTES NFR BLD AUTO: 0 % (ref 0–2)
KETONES UR STRIP-MCNC: NEGATIVE MG/DL
LACTATE SERPL-SCNC: 2.2 MMOL/L (ref 0.5–2)
LACTATE SERPL-SCNC: 2.5 MMOL/L (ref 0.5–2)
LACTATE SERPL-SCNC: 2.6 MMOL/L (ref 0.5–2)
LACTATE SERPL-SCNC: 2.8 MMOL/L (ref 0.5–2)
LACTATE SERPL-SCNC: 2.8 MMOL/L (ref 0.5–2)
LACTATE SERPL-SCNC: 3.1 MMOL/L (ref 0.5–2)
LEUKOCYTE ESTERASE UR QL STRIP: NEGATIVE
LYMPHOCYTES # BLD AUTO: 1.34 THOUSANDS/ΜL (ref 0.6–4.47)
LYMPHOCYTES NFR BLD AUTO: 24 % (ref 14–44)
MCH RBC QN AUTO: 31.7 PG (ref 26.8–34.3)
MCHC RBC AUTO-ENTMCNC: 32.9 G/DL (ref 31.4–37.4)
MCV RBC AUTO: 96 FL (ref 82–98)
MONOCYTES # BLD AUTO: 0.93 THOUSAND/ΜL (ref 0.17–1.22)
MONOCYTES NFR BLD AUTO: 17 % (ref 4–12)
NEUTROPHILS # BLD AUTO: 3.02 THOUSANDS/ΜL (ref 1.85–7.62)
NEUTS SEG NFR BLD AUTO: 53 % (ref 43–75)
NITRITE UR QL STRIP: NEGATIVE
NON-SQ EPI CELLS URNS QL MICRO: NORMAL /HPF
NRBC BLD AUTO-RTO: 0 /100 WBCS
P AXIS: 203 DEGREES
PH UR STRIP.AUTO: 5.5 [PH]
PLATELET # BLD AUTO: 117 THOUSANDS/UL (ref 149–390)
PMV BLD AUTO: 12.6 FL (ref 8.9–12.7)
POTASSIUM SERPL-SCNC: 3.5 MMOL/L (ref 3.5–5.3)
PR INTERVAL: 40 MS
PROCALCITONIN SERPL-MCNC: 0.11 NG/ML
PROT UR STRIP-MCNC: NEGATIVE MG/DL
QRS AXIS: -50 DEGREES
QRSD INTERVAL: 114 MS
QT INTERVAL: 470 MS
QTC INTERVAL: 484 MS
RBC # BLD AUTO: 4.39 MILLION/UL (ref 3.88–5.62)
RBC #/AREA URNS AUTO: NORMAL /HPF
SODIUM SERPL-SCNC: 138 MMOL/L (ref 136–145)
SP GR UR STRIP.AUTO: 1.01 (ref 1–1.03)
T WAVE AXIS: 101 DEGREES
TROPONIN I SERPL-MCNC: 0.12 NG/ML
TROPONIN I SERPL-MCNC: 0.15 NG/ML
UROBILINOGEN UR QL STRIP.AUTO: 0.2 E.U./DL
VENTRICULAR RATE: 64 BPM
WBC # BLD AUTO: 5.63 THOUSAND/UL (ref 4.31–10.16)
WBC #/AREA URNS AUTO: NORMAL /HPF

## 2020-06-26 PROCEDURE — 93005 ELECTROCARDIOGRAM TRACING: CPT

## 2020-06-26 PROCEDURE — 99223 1ST HOSP IP/OBS HIGH 75: CPT | Performed by: INTERNAL MEDICINE

## 2020-06-26 PROCEDURE — 93010 ELECTROCARDIOGRAM REPORT: CPT | Performed by: INTERNAL MEDICINE

## 2020-06-26 PROCEDURE — 99232 SBSQ HOSP IP/OBS MODERATE 35: CPT | Performed by: INTERNAL MEDICINE

## 2020-06-26 PROCEDURE — 99222 1ST HOSP IP/OBS MODERATE 55: CPT | Performed by: INTERNAL MEDICINE

## 2020-06-26 PROCEDURE — 80048 BASIC METABOLIC PNL TOTAL CA: CPT | Performed by: PHYSICIAN ASSISTANT

## 2020-06-26 PROCEDURE — 0JBQ0ZZ EXCISION OF RIGHT FOOT SUBCUTANEOUS TISSUE AND FASCIA, OPEN APPROACH: ICD-10-PCS | Performed by: PODIATRIST

## 2020-06-26 PROCEDURE — 93306 TTE W/DOPPLER COMPLETE: CPT

## 2020-06-26 PROCEDURE — 83605 ASSAY OF LACTIC ACID: CPT | Performed by: PHYSICIAN ASSISTANT

## 2020-06-26 PROCEDURE — 83605 ASSAY OF LACTIC ACID: CPT | Performed by: INTERNAL MEDICINE

## 2020-06-26 PROCEDURE — 84484 ASSAY OF TROPONIN QUANT: CPT | Performed by: PHYSICIAN ASSISTANT

## 2020-06-26 PROCEDURE — 85025 COMPLETE CBC W/AUTO DIFF WBC: CPT | Performed by: PHYSICIAN ASSISTANT

## 2020-06-26 PROCEDURE — 81001 URINALYSIS AUTO W/SCOPE: CPT | Performed by: INTERNAL MEDICINE

## 2020-06-26 PROCEDURE — 0JBR0ZZ EXCISION OF LEFT FOOT SUBCUTANEOUS TISSUE AND FASCIA, OPEN APPROACH: ICD-10-PCS | Performed by: PODIATRIST

## 2020-06-26 PROCEDURE — 93306 TTE W/DOPPLER COMPLETE: CPT | Performed by: INTERNAL MEDICINE

## 2020-06-26 RX ORDER — MIDODRINE HYDROCHLORIDE 5 MG/1
2.5 TABLET ORAL
Status: DISCONTINUED | OUTPATIENT
Start: 2020-06-26 | End: 2020-06-28

## 2020-06-26 RX ORDER — POTASSIUM CHLORIDE 20 MEQ/1
40 TABLET, EXTENDED RELEASE ORAL 2 TIMES DAILY
Status: DISCONTINUED | OUTPATIENT
Start: 2020-06-26 | End: 2020-07-01

## 2020-06-26 RX ORDER — BUMETANIDE 0.25 MG/ML
0.5 INJECTION INTRAMUSCULAR; INTRAVENOUS CONTINUOUS
Status: DISCONTINUED | OUTPATIENT
Start: 2020-06-26 | End: 2020-06-28

## 2020-06-26 RX ORDER — POTASSIUM CHLORIDE 20 MEQ/1
40 TABLET, EXTENDED RELEASE ORAL DAILY
Status: DISCONTINUED | OUTPATIENT
Start: 2020-06-26 | End: 2020-06-26

## 2020-06-26 RX ADMIN — PANTOPRAZOLE SODIUM 40 MG: 40 TABLET, DELAYED RELEASE ORAL at 08:53

## 2020-06-26 RX ADMIN — MIDODRINE HYDROCHLORIDE 2.5 MG: 5 TABLET ORAL at 17:23

## 2020-06-26 RX ADMIN — OXYCODONE HYDROCHLORIDE AND ACETAMINOPHEN 1000 MG: 500 TABLET ORAL at 08:53

## 2020-06-26 RX ADMIN — APIXABAN 5 MG: 5 TABLET, FILM COATED ORAL at 17:23

## 2020-06-26 RX ADMIN — STANDARDIZED SENNA CONCENTRATE 17.2 MG: 8.6 TABLET ORAL at 08:53

## 2020-06-26 RX ADMIN — DULOXETINE 30 MG: 30 CAPSULE, DELAYED RELEASE ORAL at 08:53

## 2020-06-26 RX ADMIN — CEFAZOLIN SODIUM 2000 MG: 2 SOLUTION INTRAVENOUS at 09:02

## 2020-06-26 RX ADMIN — MIDODRINE HYDROCHLORIDE 5 MG: 5 TABLET ORAL at 09:05

## 2020-06-26 RX ADMIN — METOPROLOL TARTRATE 25 MG: 25 TABLET, FILM COATED ORAL at 08:53

## 2020-06-26 RX ADMIN — FUROSEMIDE 40 MG: 10 INJECTION, SOLUTION INTRAMUSCULAR; INTRAVENOUS at 08:53

## 2020-06-26 RX ADMIN — CEFAZOLIN SODIUM 2000 MG: 2 SOLUTION INTRAVENOUS at 12:03

## 2020-06-26 RX ADMIN — Medication 0.5 MG/HR: at 17:32

## 2020-06-26 RX ADMIN — OXYCODONE HYDROCHLORIDE AND ACETAMINOPHEN 1000 MG: 500 TABLET ORAL at 17:23

## 2020-06-26 RX ADMIN — CEFAZOLIN SODIUM 2000 MG: 2 SOLUTION INTRAVENOUS at 22:27

## 2020-06-26 RX ADMIN — POTASSIUM CHLORIDE 40 MEQ: 1500 TABLET, EXTENDED RELEASE ORAL at 12:03

## 2020-06-26 RX ADMIN — MIDODRINE HYDROCHLORIDE 5 MG: 5 TABLET ORAL at 12:03

## 2020-06-26 RX ADMIN — POTASSIUM CHLORIDE 40 MEQ: 1500 TABLET, EXTENDED RELEASE ORAL at 17:23

## 2020-06-26 RX ADMIN — METOPROLOL TARTRATE 25 MG: 25 TABLET, FILM COATED ORAL at 22:30

## 2020-06-26 RX ADMIN — MELATONIN 1000 UNITS: at 08:53

## 2020-06-26 RX ADMIN — APIXABAN 5 MG: 5 TABLET, FILM COATED ORAL at 08:53

## 2020-06-26 NOTE — ASSESSMENT & PLAN NOTE
· When reviewing clinical images from the 14th of June, his left leg does appear to be more swollen and erythematous    · IV Ancef q 8 hours  · Trend procalcitonin  · Trend WBC and fever curve  · Podiatry consult

## 2020-06-26 NOTE — ASSESSMENT & PLAN NOTE
· Troponin 0 15, likely non-MI troponin elevation   No active CP  · Trend trops, monitor on tele  · Cardiology consultation

## 2020-06-26 NOTE — CONSULTS
Patient is a podiatry patient for wound care management of the lower leg wounds   Please consult podiatry   Dr Blessing Mitchell was following on the last admission   Wound care will sign off    Aura Arenas

## 2020-06-26 NOTE — ASSESSMENT & PLAN NOTE
· When reviewing clinical images from the 14th of June, his left leg does appear to be more swollen and erythematous    · Will consult podiatry  · IV Ancef q 8 hours  · Obtain procalcitonin  · Wound care consultation

## 2020-06-26 NOTE — ASSESSMENT & PLAN NOTE
· Son at bedside reports his follows been a little bit more confused today, he does have baseline confusion  · ? Etiology-related to uremia vs cellulitis  · CT head within normal limits    · Monitor mental status, monitor for improvement with antibiotics as well as IV Lasix

## 2020-06-26 NOTE — PLAN OF CARE
Problem: Potential for Falls  Goal: Patient will remain free of falls  Description  INTERVENTIONS:  - Assess patient frequently for physical needs  -  Identify cognitive and physical deficits and behaviors that affect risk of falls    -  [place] fall precautions as indicated by assessment   - Educate patient/family on patient safety including physical limitations  - Instruct patient to call for assistance with activity based on assessment  - Modify environment to reduce risk of injury  - Consider OT/PT consult to assist with strengthening/mobility  Outcome: Progressing     Problem: Prexisting or High Potential for Compromised Skin Integrity  Goal: Skin integrity is maintained or improved  Description  INTERVENTIONS:  - Identify patients at risk for skin breakdown  - Assess and monitor skin integrity  - Assess and monitor nutrition and hydration status  - Monitor labs   - Assess for incontinence   - Turn and reposition patient  - Assist with mobility/ambulation  - Relieve pressure over bony prominences  - Avoid friction and shearing  - Provide appropriate hygiene as needed including keeping skin clean and dry  - Evaluate need for skin moisturizer/barrier cream  - Collaborate with interdisciplinary team   - Patient/family teaching  - Consider wound care consult   Outcome: Progressing     Problem: PAIN - ADULT  Goal: Verbalizes/displays adequate comfort level or baseline comfort level  Description  Interventions:  - Encourage patient to monitor pain and request assistance  - Assess pain using appropriate pain scale  - Administer analgesics based on type and severity of pain and evaluate response  - Implement non-pharmacological measures as appropriate and evaluate response  - Consider cultural and social influences on pain and pain management  - Notify physician/advanced practitioner if interventions unsuccessful or patient reports new pain  Outcome: Progressing     Problem: INFECTION - ADULT  Goal: Absence or prevention of progression during hospitalization  Description  INTERVENTIONS:  - Assess and monitor for signs and symptoms of infection  - Monitor lab/diagnostic results  - Monitor all insertion sites, i e  indwelling lines, tubes, and drains  - Monitor endotracheal if appropriate and nasal secretions for changes in amount and color  - [place] appropriate cooling/warming therapies per order  - Administer medications as ordered  - Instruct and encourage patient and family to use good hand hygiene technique  - Identify and instruct in appropriate isolation precautions for identified infection/condition  Outcome: Progressing     Problem: SAFETY ADULT  Goal: Patient will remain free of falls  Description  INTERVENTIONS:  - Assess patient frequently for physical needs  -  Identify cognitive and physical deficits and behaviors that affect risk of falls  -  [place] fall precautions as indicated by assessment   - Educate patient/family on patient safety including physical limitations  - Instruct patient to call for assistance with activity based on assessment  - Modify environment to reduce risk of injury  - Consider OT/PT consult to assist with strengthening/mobility  Outcome: Progressing  Goal: Maintain or return to baseline ADL function  Description  INTERVENTIONS:  - Assess patient frequently for physical needs  -  Identify cognitive and physical deficits and behaviors that affect risk of falls    -  [place] fall precautions as indicated by assessment   - Educate patient/family on patient safety including physical limitations  - Instruct patient to call for assistance with activity based on assessment  - Modify environment to reduce risk of injury  - Consider OT/PT consult to assist with strengthening/mobility  Outcome: Progressing  Goal: Maintain or return mobility status to optimal level  Description  INTERVENTIONS:  -  Assess patient's ability to carry out ADLs; assess patient's baseline for ADL function and identify physical deficits which impact ability to perform ADLs (bathing, care of mouth/teeth, toileting, grooming, dressing, etc )  - Assess/evaluate cause of self-care deficits   - Assess range of motion  - Assess patient's mobility; develop plan if impaired  - Assess patient's need for assistive devices and provide as appropriate  - Encourage maximum independence but intervene and supervise when necessary  - Involve family in performance of ADLs  - Assess for home care needs following discharge   - Consider OT consult to assist with ADL evaluation and planning for discharge  - Provide patient education as appropriate  Outcome: Progressing     Problem: DISCHARGE PLANNING  Goal: Discharge to home or other facility with appropriate resources  Description  INTERVENTIONS:  - Identify barriers to discharge w/patient and caregiver  - Arrange for needed discharge resources and transportation as appropriate  - Identify discharge learning needs (meds, wound care, etc )  - Arrange for interpretive services to assist at discharge as needed  - Refer to Case Management Department for coordinating discharge planning if the patient needs post-hospital services based on physician/advanced practitioner order or complex needs related to functional status, cognitive ability, or social support system  Outcome: Progressing     Problem: Knowledge Deficit  Goal: Patient/family/caregiver demonstrates understanding of disease process, treatment plan, medications, and discharge instructions  Description  Complete learning assessment and assess knowledge base    Interventions:  - Provide teaching at level of understanding  - Provide teaching via preferred learning methods  Outcome: Progressing

## 2020-06-26 NOTE — ASSESSMENT & PLAN NOTE
Creatinine upon admission: 2 29  Baseline: around 1 3  Secondary to prerenal overload  Creatinine this morning is 2 15  Treatment: diuretics, monitor I/O  Monitor BMP    Nephrology recommendations appreciated  Avoid hypotension/nephrotoxins medications

## 2020-06-26 NOTE — CONSULTS
Consultation - Nephrology   Linwood Mazariegos 69 y o  male MRN: [id number]  Unit/Bed#: -01 Encounter: [id number]    ASSESSMENT and PLAN:  Acute kidney injury (POA) on suspected chronic kidney disease, stage 3:  - acute kidney injury suspect prerenal azotemia with cardiorenal syndrome with volume overload  - suspected chronic kidney disease secondary to hypertensive nephrosclerosis + atherosclerosis + cardiorenal syndrome + age-related nephron loss  - upon review of medical records, it appears that the patient has a baseline Creatinine of 1 4- 1 8 mg/dL dating back to 2019  Of note, patient with [facility] this month with discharge creatinine of 2 37 mg/dL  - patient was admitted with a creatinine of 2 29 mg/dL  - patient's creatinine today is at 2 15 mg/dL  - renal ultrasound completed 06/2020 revealed right kidney 11 3 x 4 2 cm, left kidney 11 5 x 5 7 cm  Normal echogenicity and contour  Bilateral renal cysts  Right kidney is in the right lower quadrant   - 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 connecting both lower poles  Bilateral probably renal cysts are similar in appearance to prior exam   No hydronephrosis or hydroureter  - avoid NSAIDS, nephrotoxins, IV contrast if possible  - adjust medications to appropriate GFR   - avoid hypotension/ fluctuations in blood pressure to prevent decreased renal perfusion    - await renal recovery  - monitor volume status with strict intake/output  - please perform daily weights       - Plan:              - interventions as above +    - continue furosemide 40 mg IV b i d    - check BMP, magnesium, phosphorus in a m                - check UA                - check PVR with bladder scan  Maintain urinary retention protocol       Hypotension:  - blood pressure overall stable and acceptable  - home medication regimen:  Metoprolol tartrate 25 mg b i d , Bumex 1 mg daily, midodrine 5 mg 3 times daily before meals    - currently on: Metoprolol tartrate 25 mg b i d , furosemide 40 mg IV b i d , and midodrine 5 mg 3 times daily before meals  - hold parameters placed for SBP > 130 for the midodrine    - optimize hemodynamics  - maintain MAP > 65mmHg  - avoid hypotension/ fluctuations in blood pressure       Electrolytes:  - mild hypokalemia  with most recent potassium 3 5       - will provide K-Dur 40 mEq now  - will continue to monitor with daily BMP       Acid/base:  - most recent bicarbonate 24 with elevated anion gap at 14      - will continue to monitor with daily BMP       Anemia likely of chronic disease:  - most recent hemoglobin at 13 9 grams/deciliter    - maintain hemoglobin greater than 8 grams/deciliter    - management per primary team       [place] bone disease of chronic kidney disease:  - most recent phosphorus 3 6   - will continue to monitor PTH and phosphorus as outpatient       CHF: EF 45% as of 11/2019    - monitor volume status closely  - home diuretic regimen: as above  - diuretics as above  - repeat echocardiogram completed, awaiting interpretation  - continue 1 5 L fluid restriction  - cardiology following      Cellulitis of left lower extremity:  - currently on Ancef  - podiatry consulted  - management per primary team      Other:  Acute metabolic encephalopathy      HISTORY OF PRESENT ILLNESS:  Requesting Physician: Dawn Early MD  Reason for Consult:  Acute kidney injury    Vazquez Brown is a 69 y o  male with past medical history of diabetes mellitus type 2, CHF, atrial fibrillation , pulmonary hypertension , chronic kidney disease, stage III who was admitted to [facility] after presenting with lower extremity edema, left leg redness, shortness of breath, weakness, and confusion  Patient recently hospitalized from 06/11 to 6/18 secondary to left leg cellulitis along with CHF exacerbation and acute kidney injury       Upon assessment and evaluation patient denies shortness of breath, chest pain, nausea, vomiting, diarrhea  Patient states that his left leg swelling has already improved  He has been eating and drinking well  He denies NSAID use  A renal consultation is requested today for assistance in the management of acute kidney injury      PAST MEDICAL HISTORY:  Past Medical History:   Diagnosis Date    Atrial fibrillation [facility])     Cardiac disease     Cellulitis     CH[facility]congest[facility] failure[facility])     Chronic pain     Chronic venous hypertension     with ulceration    GERD (gastroesophageal reflux disease)     History of methicillin resistant staphylococcus aureus (MRSA) 11/26/2019    negative nasal swab     Hyperlipidemia     Hypertension     Obesity     [facility]     [facility]peripheral vascular disease) ([facility] )     Vascular disorder of extremity ([place] [address] )        PAST SURGICAL HISTORY:  Past Surgical History:   Procedure Laterality Date    ANTERIOR RELEASE VERTEBRAL BODY W/ POSTERIOR FUSION      APPENDECTOMY  1954    CATARACT EXTRACTION      DECOMPRESSION SPINE LUMBAR POSTERIOR      ELBOW SURGERY      FOOT/ANKLE ARTHRODESIS Right      complications postoperatively with bone healing and infection    INCISION AND DRAINAGE OF WOUND Left 3/5/2020    Procedure: INCISION AND DRAINAGE (I&D) EXTREMITY;  Surgeon: Jaime Conde DPM;  Location:  MAIN OR;  Service: Podiatry    KNEE SURGERY      NOSE SURGERY      turbinectomy    RETINAL DETACHMENT SURGERY      RHINOPLASTY      TONSILLECTOMY      VEIN LIGATION AND STRIPPING      saphenous vein long       ALLERGIES:  No Known Allergies    SOCIAL HISTORY:  Social History     Substance and Sexual Activity   Alcohol Use No    Frequency: Never    Binge frequency: Never     Social History     Substance and Sexual Activity   Drug Use No     Social History     Tobacco Use   Smoking Status Never Smoker   Smokeless Tobacco Never Used       FAMILY HISTORY:  Family History   Problem Relation Age of Onset    Cancer Mother         cancer of uterus    Diabetes Sister     Mental illness Neg Hx         neg fam hx    Substance Abuse Neg Hx         neg fam hx       MEDICATIONS:    Current Facility-Administered Medications:     acetaminophen (TYLENOL) tablet 650 mg, 650 mg, Oral, Q6H PRN, Sudie Formica, PA-C    apixaban (ELIQUIS) tablet 5 mg, 5 mg, Oral, BID, Sudie Formica, PA-C, 5 mg at 06/26/20 3595    ascorbic acid (VITAMIN C) tablet 1,000 mg, 1,000 mg, Oral, BID, Sudie Formica, PA-C, 1,000 mg at 06/26/20 5460    ceFAZolin (ANCEF) IVPB (premix) 2,000 mg 50 mL, 2,000 mg, Intravenous, Q8H, Sudie Formica, PA-C, Last Rate: 100 mL/hr at 06/25/20 2341, 2,000 mg at 06/25/20 2341    cholecalciferol (VITAMIN D3) tablet 1,000 Units, 1,000 Units, Oral, Daily, Sudie Formica, PA-C, 1,000 Units at 06/26/20 0853    DULoxetine (CYMBALTA) delayed release capsule 30 mg, 30 mg, Oral, Daily, Sudie Formica, PA-C, 30 mg at 06/26/20 0853    furosemide (LASIX) injection 40 mg, 40 mg, Intravenous, BID (diuretic), Sudie Formica, PA-C, 40 mg at 06/26/20 0853    metoprolol tartrate (LOPRESSOR) tablet 25 mg, 25 mg, Oral, Q12H [address], Sudie Formica, PA-C, 25 mg at 06/26/20 0853    midodrine (PROAMATINE) tablet 5 mg, 5 mg, Oral, TID AC, Sudie Formica, PA-C    [facility]ron [facility] injection 4 mg, 4 mg, Intravenous, Q6H PRN, Sudie Formica, PA-C    pantoprazole (PROTONIX) EC tablet 40 mg, 40 mg, Oral, Daily, Sudie Formica, PA-C, 40 mg at 06/26/20 6519    senna (SENOKOT) tablet 17 2 mg, 2 tablet, Oral, Daily, Sudie Formica, PA-C, 17 2 mg at 06/26/20 5747    REVIEW OF SYSTEMS:  All the systems were reviewed and were negative except as documented on the HPI      PHYSICAL EXAM:  Current Weight: Weight - Scale: 121 kg (266 lb 12 1 oz)  First Weight: Weight - Scale: 127 kg (280 lb)  Vitals:    06/25/20 2345 06/26/20 0523 06/26/20 0600 06/26/20 0803   BP: 127/87   117/76   BP Location:       Pulse: 61   80   Resp: 18   19   Temp: (!) 97 1 °F (36 2 °C)      TempSrc:       SpO2: 98%   98%   Weight:  121 kg (267 lb 3 2 oz) 121 kg (266 lb 12 1 oz)    Height:           Intake/Output Summary (Last 24 hours) at 6/26/2020 0856  Last data filed at 6/26/2020 0803  Gross per 24 hour   Intake    Output 300 ml   Net -300 ml     General: conscious, coherent, cooperative, not in acute distress  Skin: no rash, warm  Eyes: pale conjunctivae, anicteric sclerae  ENT: moist lips and mucous membranes  Neck: supple, trachea midline  Chest:  Diminished breath sounds  CVS: distinct S1 & S2, normal rate, regular rhythm  Abdomen: soft, non-tender, non-distended, normoactive bowel sounds  Extremities:  +1 edema on the left, trace on right; erythema   Neuro: awake, alert  Psych: appropriate affect       Invasive Devices:        Lab Results:   Results from last 7 days   Lab Units 06/26/20  0454 06/25/20  1842   WBC Thousand/uL 5 63 6 51   HEMOGLOBIN g/dL 13 9 13 5   HEMATOCRIT % 42 2 40 1   PLATELETS Thousands/uL 117* 118*   POTASSIUM mmol/L 3 5 3 7   CHLORIDE mmol/L 100 101   CO2 mmol/L 24 24   BUN mg/dL 61* 63*   CREATININE mg/dL 2 15* 2 29*   CALCIUM mg/dL 9 5 9 3   MAGNESIUM mg/dL  --  1 7   PHOSPHORUS mg/dL  --  3 6   ALK PHOS U/L  --  83   ALT U/L  --  23   AST U/L  --  49*

## 2020-06-26 NOTE — CONSULTS
Consultation - Cardiology Team One  Linwood Mazariegos 69 y o  male MRN: [id number]  Unit/Bed#: -01 Encounter: [id number]    Inpatient consult to Cardiology  Consult performed by: NAVNEET Powell  Consult ordered by: Darlin Leyva PA-C      Physician Requesting Consult: Tomás Amezcua MD  Reason for Consult / Principal Problem:  CHF      Assessment/ Plan    1  Acute on chronic combined systolic and diastolic heart failure  ProBNP on admission was 34,232  On furosemide 40 mg IV b i d  Patient takes Bumex 1 mg p o  Daily at home (unlcear compliance)   Monitor I&Os  Daily weights 266 lbs today   Echocardiogram pending  Continue 2 g sodium diet 1500 mL fluid restriction  Patient reports he does not follow a low salt diet at home  2  Elevated troponin in the setting of CHF and CKD  Troponin 0 15/0 15/0 12  Ordered ECG  No complaint of chest pain     3  SAMY on CKD  Creatinine 2 15 today  Creatinine was 2 29 on admission  Baseline creatinine 1 4-1 8  Nephrology consult pending  Continue to monitor creatinine with diuresis     4  Chronic atrial fibrillation  On Eliquis for oral anticoagulation  On metoprolol 25 mg p o  B i d  Reviewed telemetry showing rates controlled     5  Hypokalemia- K 3 5  Replete      History of Present Illness   HPI: Natalio Murphy is a 69y o  year old male who has a history of chronic diastolic heart failure, chronic atrial fibrillation oral anticoagulation, chronic kidney disease, hypertension, moderate MR, history of DVT and lymphedema  Patient follows with a cardiologist in [place] and reports he has an appointment in 1-2 weeks  Patient presents to [address] [place] [address] 6/25/2020 with complaint of lower extremity edema and SOB  He was recently admitted 6/11/2020-6/18/2020 for severe sepsis in the setting of L LE cellulitis  Patient was given IV fluids and Bumex was held due to SAMY, hypotension and sepsis  Patient developed volume overload and bumex was resumed   He reports over the past couple days increased swelling in his legs and SOB  He is unclear if he was taking Bumex 1 mg PO daily and reports he does not follow a low salt diet because it doesn't help him  He denies fever, chills or cough  Cardiology was consulted for acute on chronic CHF  He was started on furosemide 40 mg IV BID and an echocardiogram was completed with report pending  Echocardiogram 11/25/2019 showed EF 45%, LV mild diffuse hypokinesis, RV moderately dilated with mildly reduced systolic function, LA and RA dilated, mild MR and mild TR  EKG reviewed personally:  Ordered    Telemetry reviewed personally:   Atrial fibrillation 70-80s    Review of Systems   Constitution: Negative for chills, decreased appetite and fever  Cardiovascular: Positive for dyspnea on exertion and leg swelling  Negative for chest pain, orthopnea and palpitations  Respiratory: Positive for shortness of breath  Negative for cough, sputum production and wheezing  Musculoskeletal: Negative for falls  Gastrointestinal: Positive for bloating  Negative for nausea and vomiting  Neurological: Negative for dizziness and light-headedness  Psychiatric/Behavioral: Negative for altered mental status  All other systems reviewed and are negative      Historical Information   Past Medical History:   Diagnosis Date    Atrial fibrillation ([facility] )     Cardiac disease     Cellulitis     [facility]est[facility]     Chronic pain     Chronic venous hypertension     with ulceration    GERD (gastroesophageal reflux disease)     History of methicillin resistant staphylococcus aureus (MRSA) 11/26/2019    negative nasal swab     Hyperlipidemia     Hypertension     Obesity     [facility]     PVD (peripheral vascular disease) ([facility] )     Vascular disorder of extremity ([facility] )      Past Surgical History:   Procedure Laterality Date    ANTERIOR RELEASE VERTEBRAL BODY W/ POSTERIOR FUSION      APPENDECTOMY 1954    CATARACT EXTRACTION      DECOMPRESSION SPINE LUMBAR POSTERIOR      ELBOW SURGERY      FOOT/ANKLE ARTHRODESIS Right      complications postoperatively with bone healing and infection    INCISION AND DRAINAGE OF WOUND Left 3/5/2020    Procedure: INCISION AND DRAINAGE (I&D) EXTREMITY;  Surgeon: Bernadette Nixon DPM;  Location:  MAIN OR;  Service: Podiatry    KNEE SURGERY      NOSE SURGERY      turbinectomy    RETINAL DETACHMENT SURGERY      RHINOPLASTY      TONSILLECTOMY      VEIN LIGATION AND STRIPPING      saphenous vein long     Social History     Substance and Sexual Activity   Alcohol Use No    Frequency: Never    Binge frequency: Never     Social History     Substance and Sexual Activity   Drug Use No     Social History     Tobacco Use   Smoking Status Never Smoker   Smokeless Tobacco Never Used     Family History:   Family History   Problem Relation Age of Onset    Cancer Mother         cancer of uterus    Diabetes Sister     Mental illness Neg Hx         neg fam hx    Substance Abuse Neg Hx         neg fam hx       Meds/Allergies   all current active meds have been reviewed and current meds:   Current Facility-Administered Medications   Medication Dose Route Frequency    acetaminophen (TYLENOL) tablet 650 mg  650 mg Oral Q6H PRN    apixaban (ELIQUIS) tablet 5 mg  5 mg Oral BID    ascorbic acid (VITAMIN C) tablet 1,000 mg  1,000 mg Oral BID    ceFAZolin (ANCEF) IVPB (premix) 2,000 mg 50 mL  2,000 mg Intravenous Q8H    cholecalciferol (VITAMIN D3) tablet 1,000 Units  1,000 Units Oral Daily    DULoxetine (CYMBALTA) delayed release capsule 30 mg  30 mg Oral Daily    furosemide (LASIX) injection 40 mg  40 mg Intravenous BID (diuretic)    metoprolol tartrate (LOPRESSOR) tablet 25 mg  25 mg Oral Q12H [facility]    midodrine (PROAMATINE) tablet 5 mg  5 mg Oral TID AC    ondansetron (ZOFRAN) injection 4 mg  4 mg Intravenous Q6H PRN    pantoprazole (PROTONIX) EC tablet 40 mg  40 mg Oral Daily    senna (SENOKOT) tablet 17 2 mg  2 tablet Oral Daily          No Known Allergies    Objective   Vitals: Blood pressure 117/76, pulse 80, temperature (!) 97 1 °F (36 2 °C), resp  rate 19, height 6' (1 829 m), weight 121 kg (266 lb 12 1 oz), SpO2 98 %  ,     Body mass index is 36 18 kg/m²  ,     Systolic (36PBG), GNB:184 , Min:111 , DOMI:269     Diastolic (63PBH), LBD:02, Min:66, Max:92      Intake/Output Summary (Last 24 hours) at 6/26/2020 0858  Last data filed at 6/26/2020 0803  Gross per 24 hour   Intake    Output 300 ml   Net -300 ml     Weight (last 2 days)     Date/Time   Weight    06/26/20 0600   121 (266 76)    06/26/20 0523   121 (267 2)    06/25/20 1709   127 (280)            Invasive Devices     Peripheral Intravenous Line            Peripheral IV 06/25/20 Left Antecubital less than 1 day              Physical Exam      LABORATORY RESULTS:  Results from last 7 days   Lab Units 06/26/20  0224 06/25/20  2341 06/25/20  1842   TROPONIN I ng/mL 0 12* 0 15* 0 15*     CBC with diff:   Results from last 7 days   Lab Units 06/26/20  0454 06/25/20  1842   WBC Thousand/uL 5 63 6 51   HEMOGLOBIN g/dL 13 9 13 5   HEMATOCRIT % 42 2 40 1   MCV fL 96 96   PLATELETS Thousands/uL 117* 118*   MCH pg 31 7 32 2   MCHC g/dL 32 9 33 7   RDW % 19 6* 19 2*   MPV fL 12 6 12 4   NRBC AUTO /100 WBCs 0 0       CMP:  Results from last 7 days   Lab Units 06/26/20  0454 06/25/20  1842   POTASSIUM mmol/L 3 5 3 7   CHLORIDE mmol/L 100 101   CO2 mmol/L 24 24   BUN mg/dL 61* 63*   CREATININE mg/dL 2 15* 2 29*   CALCIUM mg/dL 9 5 9 3   AST U/L  --  49*   ALT U/L  --  23   ALK PHOS U/L  --  83   EGFR ml/min/1 73sq m 29 27       BMP:  Results from last 7 days   Lab Units 06/26/20  0454 06/25/20  1842   POTASSIUM mmol/L 3 5 3 7   CHLORIDE mmol/L 100 101   CO2 mmol/L 24 24   BUN mg/dL 61* 63*   CREATININE mg/dL 2 15* 2 29*   CALCIUM mg/dL 9 5 9 3          Lab Results   Component Value Date    NTBN 34,232 (H) 06/25/2020    NTBN 6,437 (H) 2019    NTBNP 4,386 (H) 2019     Results from last 7 days   Lab Units 20  1842   MAGNESIUM mg/dL 1 7       Results from last 7 days   Lab Units 20  1842   INR  2 09*     Lipid Profile:   No results found for: CHOL  Lab Results   Component Value Date    HDL 41 2019     Lab Results   Component Value Date    LDLCALC 55 2019     Lab Results   Component Value Date    TRIG 30 2019         Cardiac testing:   Results for orders placed during the hospital encounter of 19   Echo complete with contrast if indicated    Narrative [facility] [address]    Transthoracic Echocardiogram  2D, M-mode, Doppler, and Color Doppler    Study date:  2019    Patient: Dutch Mix  MR number: [id number]  Account number: [de-identified]  : 1945  Age: 69 years  Gender: Male  Status: Inpatient  Location: [facility]  Height: 76 in  Weight: 284 5 lb  BP: 116/ 69 mmHg    Indications: Heart failure    Diagnoses: I50 9 - Heart failure, unspecified    Sonographer:  DELLA Ngo  Primary Physician:  Cynthia Carpenter MD  Referring Physician:  Dior Dover MD  Group:  [facility]  Interpreting Physician:  Ping Berry MD    SUMMARY    LEFT VENTRICLE:  Systolic function was mildly reduced by visual assessment  Ejection fraction was estimated to be 45 %  There was mild diffuse hypokinesis  Wall thickness was markedly increased  RIGHT VENTRICLE:  The ventricle was moderately dilated  Systolic function was mildly reduced  LEFT ATRIUM:  The atrium was moderately dilated  RIGHT ATRIUM:  The atrium was markedly dilated  MITRAL VALVE:  There was mild regurgitation  TRICUSPID VALVE:  There was mild regurgitation      HISTORY: PRIOR HISTORY: GERD; Obesity; HTN; HLD; Afib; CHF; Edema; Lymphedema; PVD; Pulm HTN; DVT    PROCEDURE: The study was performed in the [facility]  This was a routine study  The transthoracic approach was used  The study included complete 2D imaging, M-mode, complete spectral Doppler, and color Doppler  Echocardiographic views were limited due to decreased penetration and lung interference  This was a technically difficult study  LEFT VENTRICLE: Size was normal  Systolic function was mildly reduced by visual assessment  Ejection fraction was estimated to be 45 %  There was mild diffuse hypokinesis  Wall thickness was markedly increased  DOPPLER: Transmitral flow  pattern: atrial fibrillation  RIGHT VENTRICLE: The ventricle was moderately dilated  Systolic function was mildly reduced  DOPPLER: Estimated peak pressure was at least 60 mmHg  LEFT ATRIUM: The atrium was moderately dilated  RIGHT ATRIUM: The atrium was markedly dilated  MITRAL VALVE: Valve structure was normal  There was normal leaflet separation  DOPPLER: The transmitral velocity was within the normal range  There was no evidence for stenosis  There was mild regurgitation  AORTIC VALVE: The valve was trileaflet  Leaflets exhibited normal thickness and normal cuspal separation  DOPPLER: Transaortic velocity was within the normal range  There was no evidence for stenosis  There was no regurgitation  TRICUSPID VALVE: The valve structure was normal  There was normal leaflet separation  DOPPLER: The transtricuspid velocity was within the normal range  There was no evidence for stenosis  There was mild regurgitation  PULMONIC VALVE: Leaflets exhibited normal thickness, no calcification, and normal cuspal separation  DOPPLER: The transpulmonic velocity was within the normal range  There was no regurgitation  PERICARDIUM: There was no pericardial effusion  AORTA: The root exhibited normal size  SYSTEMIC VEINS: IVC: The inferior vena cava was not well visualized      SYSTEM MEASUREMENT TABLES    2D  %FS: 9 84 %  AV Diam: 2 98 cm  EDV(Teich): 51 19 ml  EF Biplane: 22 23 %  EF(Teich): 22 17 %  ESV(Teich): 39 85 ml  IVSd: 1 6 cm  LA Area: 37 85 cm2  LA Diam: 5 36 cm  LVEDV MOD A2C: 242 91 ml  LVEDV MOD A4C: 147 73 ml  LVEDV MOD BP: 187 2 ml  LVEF MOD A2C: 25 41 %  LVEF MOD A4C: 21 32 %  LVESV MOD A2C: 181 19 ml  LVESV MOD A4C: 116 24 ml  LVESV MOD BP: 145 57 ml  LVIDd: 3 51 cm  LVIDs: 3 16 cm  LVLd A2C: 9 1 cm  LVLd A4C: 9 09 cm  LVLs A2C: 8 43 cm  LVLs A4C: 8 06 cm  LVPWd: 1 61 cm  RA Area: 60 3 cm2  RVIDd: 5 72 cm  SV MOD A2C: 61 71 ml  SV MOD A4C: 31 49 ml  SV(Teich): 11 35 ml    CW  RAP: 10 mmHg  TR Vmax: 2 98 m/s  TR maxP 44 mmHg    MM  TAPSE: 2 93 cm    PW  E': 0 05 m/s  E/E': 12 61  MV A Anthony: 0 29 m/s  MV Dec [place]: 5 25 m/s2  MV DecT: 124 59 ms  MV E Anthony: 0 65 m/s  MV E/A Ratio: 2 24  MV PHT: 36 13 ms  MVA By PHT: 6 09 cm2  RVSP: 45 44 mmHg    [facility]    Prepared and electronically signed by    King Richards MD  Signed [id number] 14:49:13       Imaging:  Xr Chest Portable    Result Date: 2020  Narrative: CHEST INDICATION:   Lower extremity swelling  COMPARISON:  2020 EXAM PERFORMED/VIEWS:  XR CHEST PORTABLE FINDINGS: Stable severe cardiomegaly  No pleural effusion  No airspace consolidation, pulmonary edema, atelectasis or pneumothorax  Limited evaluation bones and soft tissues  Impression: No acute cardiopulmonary disease  Workstation performed: [id number]     Xr Chest 1 View Portable    Result Date: 2020  Narrative: CHEST INDICATION:   altered mental status  COMPARISON:  2020 EXAM PERFORMED/VIEWS:  XR CHEST PORTABLE FINDINGS: Heart shadow is enlarged but unchanged from prior exam  The lungs are clear  No pneumothorax or pleural effusion  Osseous structures appear within normal limits for patient age  Impression: Stable cardiomegaly  No acute cardiopulmonary disease   Workstation performed: [id number]     Xr Chest Pa & Lateral    Result Date: 6/15/2020  Narrative: CHEST INDICATION:   sob  COMPARISON:  6/11/2020 EXAM PERFORMED/VIEWS:  XR CHEST PA & LATERAL FINDINGS: Cardiomediastinal silhouette remains enlarged  Slight vascular congestion and slight costophrenic angle blunting  No pneumothorax  Osseous structures appear within normal limits for patient age  Impression: Cardiomegaly  Slight vascular congestion  Trace effusions  Workstation performed: [id number]     Xr Ankle 2 Vw Right    Result Date: 6/14/2020  Narrative: RIGHT ANKLE INDICATION:   Chronic recurring ulcer right lateral rearfoot, history of foot arthrodesis with complications and multiple surgeries  COMPARISON:  2/19/2020 VIEWS:  XR ANKLE 2 VW RIGHT Images: 2 FINDINGS: Stable appearance of midfoot/hindfoot fusion  Fixation hardware appears unchanged, noting fracture of 2 screws as before  Distal fibulectomy again seen  There is no acute fracture or dislocation  Plantar and retrocalcaneal spurring  No new periosteal reaction or cortical destruction to suggest osteomyelitis  There are atherosclerotic calcifications  Soft tissues are otherwise unremarkable  Impression: Stable postoperative appearance  No radiographic evidence for osteomyelitis  Workstation performed: [id number]     Xr Foot 3+ Vw Left    Result Date: 6/12/2020  Narrative: LEFT FOOT INDICATION:   Rule out osteo  COMPARISON:  2/23/2020 VIEWS:  XR FOOT 3+ VW LEFT FINDINGS: No acute fracture or dislocation is identified  Please refer to prior MRI for findings related to an age-indeterminate deformity of the 1st proximal phalanx  There are plantar and dorsal calcaneal spurs  There is spurring along the dorsal aspect of the midfoot  There is question of lucency along the plantar margin of what appears to be the 5th distal metatarsal  Vascular calcifications are seen  No large soft tissue ulcerations are identified       Impression: Question of lucency along the plantar margin of what appears to be the 5th distal metatarsal   Findings are only appreciated on the lateral projection but appear new since the prior radiograph  If there is any focal soft tissue ulceration at this level consider correlation with dedicated MRI for further evaluation  Workstation performed: [id number]     Ct Head Without Contrast    Result Date: 6/25/2020  Narrative: CT BRAIN - WITHOUT CONTRAST INDICATION:   Altered mental status  COMPARISON:  CT 6/11/2020 TECHNIQUE:  CT examination of the brain was performed  In addition to axial images, coronal 2D reformatted images were created and submitted for interpretation  Radiation dose length product (DLP) for this visit:  896 87 mGy-cm   This examination, like all CT scans performed in the [facility], was performed utilizing techniques to minimize radiation dose exposure, including the use of iterative  reconstruction and automated exposure control  IMAGE QUALITY:  Diagnostic  FINDINGS: PARENCHYMA: Decreased attenuation is noted in periventricular and subcortical white matter demonstrating an appearance that is statistically most likely to represent mild microangiopathic change; this appearance is similar when compared to most recent prior examination  No CT signs of acute infarction  No intracranial mass, mass effect or midline shift  No acute parenchymal hemorrhage  Atherosclerotic vascular calcifications in carotid and vertebral arteries are more advanced than would be expected for the patient's  age  VENTRICLES AND EXTRA-AXIAL SPACES:  Ventricles and extra-axial CSF spaces are prominent commensurate with the degree of volume loss  No hydrocephalus  No acute extra-axial hemorrhage  Extensive ossification of the falx cerebri noted  VISUALIZED ORBITS AND PARANASAL SINUSES:  Bilateral ocular lens implants present  Chronic appearing mucosal thickening inferiorly in both maxillary sinuses is again noted   CALVARIUM AND EXTRACRANIAL SOFT TISSUES:  Normal      Impression: Stable exam with chronic microangiopathic change without acute intracranial abnormality  Workstation performed: [id number]     Ct Head Without Contrast    Result Date: 6/11/2020  Narrative: CT BRAIN - WITHOUT CONTRAST INDICATION:   Altered mental status on Eliquis  Patient has suspected COVID-19  COMPARISON:  None  TECHNIQUE:  CT examination of the brain was performed  In addition to axial images, coronal 2D reformatted images were created and submitted for interpretation  Radiation dose length product (DLP) for this visit:  881 72 mGy-cm   This examination, like all CT scans performed in the [facility], was performed utilizing techniques to minimize radiation dose exposure, including the use of iterative  reconstruction and automated exposure control  IMAGE QUALITY:  Diagnostic  FINDINGS: PARENCHYMA:  Mild generalized parenchymal volume loss and mild periventricular chronic microangiopathic disease with intracranial carotid and vertebral artery atherosclerotic calcifications  Extensive ossification along the interhemispheric falx  VENTRICLES AND EXTRA-AXIAL SPACES:  Ventricles and extra-axial CSF spaces are prominent commensurate with the degree of volume loss  No hydrocephalus  No acute extra-axial hemorrhage  VISUALIZED ORBITS AND PARANASAL SINUSES:  Evidence of prior bilateral lens surgery  Polypoid mucosal thickening in the left maxillary sinus  CALVARIUM AND EXTRACRANIAL SOFT TISSUES:  Normal      Impression: No acute intracranial abnormality  Mild generalized parenchymal volume loss and chronic microangiopathic disease with intracranial carotid and vertebral artery atherosclerotic calcifications  Workstation performed: [id number]     Mri Foot/forefoot Toes Left Wo Contrast    Result Date: 6/15/2020  Narrative: MRI LEFT FOOT INDICATION:   Rule osteomyelitis left forefoot   COMPARISON: Left foot radiograph 6/12/2020 and left foot MRI 3/4/2020 TECHNIQUE:  MR sequences were obtained of the left foot including the following:  Localizer, sagittal T1/STIR, coronal T1/T2 fat/ sat/STIR, axial T2 fat sat/STIR/PD  Gadolinium was not used FINDINGS: Evaluation slightly limited by motion artifact  SUBCUTANEOUS TISSUES: Dorsal midfoot soft tissue wound/ ulcer appears more prominent  There is underlying mild to moderate subcutaneous edema albeit less prominent than what was present on March 4, 2020  No loculated fluid collection  BONES:  No acute fracture or dislocation  Old healed fracture of the tibial distal aspect of the first proximal phalanx  No osseous edema  Small plantar calcaneal spur  FIRST MTP JOINT:  Intact  SESAMOID BONES:  Intact  Bipartite hallux fibular sesamoid  OTHER ARTICULAR SURFACE:  Mild polyarticular osteoarthritis  PLANTAR FASCIA:  Intact  Minimal thickening of the medial cord origin attributed to mild chronic plantar fasciitis  LISFRANC LIGAMENT:  Intact  FOREFOOT TENDONS: Intact  INTERMETATARSAL REGIONS: No bursitis or Nagel's neuroma  MUSCULATURE: Plantar hindfoot and intermetatarsal fatty muscle atrophy  Impression: Dorsal midfoot soft tissue wound/ulcer with mild to moderate underlying cellulitis  While the soft tissue wound/ulcer appears slightly larger, the underlying subcutaneous edema appears less prominent since March 4, 2020  No osteomyelitis  No loculated fluid collection  Workstation performed: [id number]     Vas Lower Limb Arterial Duplex, Complete Bilateral    Result Date: 6/12/2020  Narrative:  [facility] REPORT CLINICAL: Indications:  Patient presents with an ulcer on his left foot  Patient is a poor historian  Operative History: GSV Ligation Risk Factors The patient has history of HTN    FINDINGS:  Segment                Right                       Left                                          Waveform   PSV (cm/s)  EDV  Waveform   PSV (cm/s)  EDV  Common Femoral Artery  Triphasic          28    0                              Prox Profunda          Triphasic          76    0 Prox SFA               Triphasic          54    0  Triphasic          49   13  Mid SFA                Triphasic          75    0  Triphasic          57   11  Dist SFA               Triphasic          54    0  Triphasic          35    0  Proximal Pop           Triphasic          34    0                              Distal Pop             Triphasic          47    0  Biphasic           33    0  Prox Post Tibial       Biphasic           28    0                              Dist Post Tibial       Biphasic           26    0                              Prox  Ant  Tibial      Biphasic           40    0  Triphasic          37    8  Dist  Ant  Tibial      Biphasic           23    0  Biphasic           44   15     CONCLUSION: Impression: Tech Note: Technically difficult and limited study due to patient's constant leg movements and pain of his left leg  Bilateral EMMANUEL's and pressures were not obtained secondary to movement and pain  RIGHT LOWER LIMB: This resting evaluation shows no evidence of significant lower extremity arterial occlusive disease  Prior Ankle/Brachial index: 1 33  LEFT LOWER LIMB: This resting evaluation shows no evidence of significant lower extremity arterial occlusive disease  Prior Ankle/Brachial index: 0 99  Compared to previous study on 8/15/2019, there is no significant change  SIGNATURE: Electronically Signed by: Valeria Tavares MD, RPVI on 2020-06-12 03:33:41 PM    Us Kidney And Bladder    Result Date: 6/13/2020  Narrative: RENAL ULTRASOUND INDICATION:   SAMY  COMPARISON: CT 10/28/2016  TECHNIQUE:   Ultrasound of the retroperitoneum was performed with a curvilinear transducer utilizing volumetric sweeps and still imaging techniques  FINDINGS: KIDNEYS: Symmetric and normal size  Right kidney:  11 3 x 4 2 cm  Left kidney:  11 5 x 5 7 cm  Right kidney Normal echogenicity and contour  Right kidney is within the right lower quadrant  No suspicious masses detected    Right renal cysts measuring up to 3 0 cm are identified  No hydronephrosis  No shadowing calculi  No perinephric fluid collections  Left kidney Normal echogenicity and contour  No suspicious masses detected  Left renal cysts measuring up to 2 6 cm are present  No hydronephrosis  No shadowing calculi  No perinephric fluid collections  URETERS: Nonvisualized  BLADDER: Bladder is nondistended  Impression: No evidence of obstruction  Bilateral renal cysts  Workstation performed: [id number]     Thank you for allowing us to participate in this patient's care  Counseling / Coordination of Care  Total floor / unit time spent today 45 minutes  Greater than 50% of total time was spent with the patient and / or family counseling and / or coordination of care  A description of the counseling / coordination of care: Review of history, current assessment, development of a plan  Code Status: Level 1 - Full Code    ** Please Note: Dragon 360 Dictation voice to text software may have been used in the creation of this document   **

## 2020-06-26 NOTE — PLAN OF CARE
Problem: Potential for Falls  Goal: Patient will remain free of falls  Description  INTERVENTIONS:  - Assess patient frequently for physical needs  -  Identify cognitive and physical deficits and behaviors that affect risk of falls    -  [place] fall precautions as indicated by assessment   - Educate patient/family on patient safety including physical limitations  - Instruct patient to call for assistance with activity based on assessment  - Modify environment to reduce risk of injury  - Consider OT/PT consult to assist with strengthening/mobility  Outcome: Progressing

## 2020-06-26 NOTE — ASSESSMENT & PLAN NOTE
· Elevated at 2 2  · Trend to normal  · Give thiamine x1 tonight  · Fluids contraindicated in setting of CHF exacerbation

## 2020-06-26 NOTE — ASSESSMENT & PLAN NOTE
Wt Readings from Last 3 Encounters:   06/25/20 127 kg (280 lb)   06/18/20 127 kg (279 lb 1 6 oz)   03/10/20 114 kg (251 lb 8 7 oz)      Chest x-ray: NAD   Last echocardiogram on file: EF 45%, November 2019   Initial troponin: 0 15  o Will trend x3 or to peak   BNP: 35,000   Diuresis: IV Lasix 40mg BID  o On Bumex 1mg daily at home, has been compliant   Beta-blocker: lopressor   Monitor intake and output, daily weights   Diet: Fluid restriction and 2 g Sodium  [facility]

## 2020-06-26 NOTE — ASSESSMENT & PLAN NOTE
· Son at bedside reports his follows been a little bit more confused today, he does have baseline confusion  · CT head within normal limits    · Monitor mental status, monitor for improvement with antibiotics as well as IV Lasix

## 2020-06-26 NOTE — ASSESSMENT & PLAN NOTE
Wt Readings from Last 3 Encounters:   06/26/20 121 kg (266 lb 12 1 oz)   06/18/20 127 kg (279 lb 1 6 oz)   03/10/20 114 kg (251 lb 8 7 oz)      Chest x-ray: NAD   Last echocardiogram on file: EF 45%, November 2019   Initial troponin: 0 15  o Will trend x3 or to peak   BNP: 35,000   Diuresis: IV Lasix 40mg BID  Patient is on Bumex at home   Beta-blocker: lopressor   Monitor intake and output, daily weights   Diet: Fluid restriction and 2 g Sodium    Tariq Caleb Cardiology consult appreciated   Oxygen supplementation p r n    Monitor renal function while diuresing

## 2020-06-26 NOTE — ASSESSMENT & PLAN NOTE
 Blood pressure is reviewed and acceptable   o Last recorded pressure: 124/66   Continue lopressor, lasix BID   Monitor blood pressure

## 2020-06-26 NOTE — ASSESSMENT & PLAN NOTE
 Controlled at this time  Rates in the 80s   Rate/rhythm control: lopressor   Anticoagulation: eliquis   Monitor heart rate

## 2020-06-26 NOTE — H&P
[address] - Internal Medicine Service  H&P- Kalli Snyder 1945, 69 y o  male MRN: [id number]  Unit/Bed#: -Marisol Encounter: [id number]  Primary Care Provider: Darrius Garcia MD   Date and time admitted to hospital: 6/25/2020  6:10 PM    * Acute on chronic combined systolic and diastolic congestive heart failure [facility])  Assessment & Plan  Wt Readings from Last 3 Encounters:   06/25/20 127 kg (280 lb)   06/18/20 127 kg (279 lb 1 6 oz)   03/10/20 114 kg (251 lb 8 7 oz)      Chest x-ray: NAD   Last echocardiogram on file: EF 45%, November 2019   Initial troponin: 0 15  o Will trend x3 or to peak   BNP: 35,000   Diuresis: IV Lasix 40mg BID  o On Bumex 1mg daily at home, has been compliant   Beta-blocker: lopressor   Monitor intake and output, daily weights   Diet: Fluid restriction and 2 g Sodium  [facility]  Lactic acidosis  Assessment & Plan  · Elevated at 2 2  · Trend to normal  · Give thiamine x1 tonight  · Fluids contraindicated in setting of CHF exacerbation    Acute metabolic encephalopathy  Assessment & Plan  · Son at bedside reports his follows been a little bit more confused today, he does have baseline confusion  · CT head within normal limits  · Monitor mental status, monitor for improvement with antibiotics as well as IV Lasix    Atrial fibrillation ([address] )  Assessment & Plan   Controlled at this time  Rates in the 80s   Rate/rhythm control: lopressor   Anticoagulation: eliquis   Monitor heart rate  Elevated troponin level not due myocardial infarction  Assessment & Plan  · Troponin 0 15, likely non-MI troponin elevation  No active CP  · Trend trops, monitor on tele  · Cardiology consultation    Acute kidney injury superimposed on CKD [facility])  Assessment & Plan  Creatinine upon admission: 2 29  Baseline: around 1 3  Secondary to prerenal overload  Treatment: diuretics, monitor I/O  Monitor BMP  Nephrology consultation      Cellulitis of left lower extremity  Assessment & Plan  · When reviewing clinical images from the 14th of June, his left leg does appear to be more swollen and erythematous  · Will consult podiatry  · IV Ancef q 8 hours  · Obtain procalcitonin  · Wound care consultation    Essential hypertension  Assessment & Plan   Blood pressure is reviewed and acceptable   o Last recorded pressure: 124/66   Continue lopressor, lasix BID   Monitor blood pressure  VTE Prophylaxis: Apixaban (Eliquis)  / sequential compression device   Code Status: full code  POLST: POLST form is not discussed and not completed at this time  Discussion with family: patient    Anticipated Length of Stay:  Patient will be admitted on an Inpatient basis with an anticipated length of stay of > 2 midnights  Justification for Hospital Stay: CHF, cellulitis, SAMY    Total Time for Visit, including Counseling / Coordination of Care: 1 hour  Greater than 50% of this total time spent on direct patient counseling and coordination of care  Chief Complaint:   SOB, leg swelling, left leg pain, weakness    History of Present Illness:    Nomi Aguila is a 69 y o  male with a PMH of DM2, CHF, afib, pulmonary HTN, CKD3 who presents with lower extremity edema, left leg redness, shortness of breath, weakness, as well as some confusion  The patient is a poor historian, however also has some forgetfulness at baseline but this has been worse over the past few days  Had recent hospitalization secondary to left leg cellulitis, with resulting congestive heart failure exacerbation in the setting of holding home Bumex as he also had acute kidney injury  He was deemed stable to be discharged home, and did not go to rehabilitation  Denies any fevers or chills  He has shortness of breath which is chronic however it is worsened  Also has chest pain which is chronic, this has not worsened      Review of Systems:    Review of Systems   Unable to perform ROS: Mental status change       Past Medical and Surgical History:     Past Medical History:   Diagnosis Date    Atrial fibrillation [facility])     Cardiac disease     Cellulitis     [facility]est[facility]     Chronic pain     Chronic venous hypertension     with ulceration    GERD (gastroesophageal reflux disease)     History of methicillin resistant staphylococcus aureus (MRSA) 11/26/2019    negative nasal swab     Hyperlipidemia     Hypertension     Obesity     Pulmonary hypertension ([facility] )     [facility]     Vascular disorder of extremity [facility]        Past Surgical History:   Procedure Laterality Date    ANTERIOR RELEASE VERTEBRAL BODY W/ POSTERIOR FUSION      APPENDECTOMY  1955    CATARACT EXTRACTION      DECOMPRESSION SPINE LUMBAR POSTERIOR      ELBOW SURGERY      FOOT/ANKLE ARTHRODESIS Right      complications postoperatively with bone healing and infection    INCISION AND DRAINAGE OF WOUND Left 3/5/2020    Procedure: INCISION AND DRAINAGE (I&D) EXTREMITY;  Surgeon: Joshua Ledesma DPM;  Location:  MAIN OR;  Service: Podiatry    KNEE SURGERY      NOSE SURGERY      turbinectomy    RETINAL DETACHMENT SURGERY      RHINOPLASTY      TONSILLECTOMY      VEIN LIGATION AND STRIPPING      saphenous vein long       Meds/Allergies:    Prior to Admission medications    Medication Sig Start Date End Date Taking?  Authorizing Provider   apixaban (ELIQUIS) 5 mg Take 5 mg by mouth 2 (two) times a day     Historical Provider, MD   Ascorbic Acid (VITAMIN C) 1000 MG tablet Take 1,000 mg by mouth 2 (two) times a day     Historical Provider, MD   bumetanide (BUMEX) 1 mg tablet Take 1 tablet (1 mg total) by mouth daily 6/19/20   Nelly Vieira MD   cholecalciferol (VITAMIN D3) 1,000 units tablet Take 1,000 Units by mouth daily    Historical Provider, MD   DULoxetine (CYMBALTA) 30 mg delayed release capsule Take 1 capsule (30 mg total) by mouth daily 5/21/20   Dixie Sultana MD   metoprolol tartrate (LOPRESSOR) 25 mg tablet Take 1 tablet (25 mg total) by mouth every 12 (twelve) hours 3/10/20   Brenna BEACH PA-C   midodrine (PROAMATINE) 5 mg tablet Take 1 tablet (5 mg total) by mouth 3 (three) times a day before meals 6/18/20   Sanya Dowling MD   pantoprazole (PROTONIX) 40 mg tablet Take 1 tablet (40 mg total) by mouth daily 5/7/20   Violeta Acosta MD   senna (SENOKOT) 8 6 mg Take 2 tablets (17 2 mg total) by mouth daily 3/11/20   Lionel BEACH PA-C   sodium chloride (OCEAN) 0 65 % nasal spray 1 spray into each nostril as needed for congestion    Historical Provider, MD     I have reviewed home medications with patient personally  Allergies: No Known Allergies    Social History:     Marital Status: Single   Occupation: non-contributory  Patient Pre-hospital Living Situation: home  Patient Pre-hospital Level of Mobility: full  Patient Pre-hospital Diet Restrictions: cardiac  Substance Use History:   Social History     Substance and Sexual Activity   Alcohol Use No    Frequency: Never    Binge frequency: Never     Social History     Tobacco Use   Smoking Status Never Smoker   Smokeless Tobacco Never Used     Social History     Substance and Sexual Activity   Drug Use No       Family History:    Family History   Problem Relation Age of Onset    Cancer Mother         cancer of uterus    Diabetes Sister     Mental illness Neg Hx         neg fam hx    Substance Abuse Neg Hx         neg fam hx       Physical Exam:     Vitals:   Blood Pressure: 124/66 (06/25/20 2009)  Pulse: 74 (06/25/20 2009)  Temperature: 97 7 °F (36 5 °C) (06/25/20 1709)  Temp Source: Temporal (06/25/20 1709)  Respirations: 20 (06/25/20 2009)  Height: 6' (182 9 cm) (06/25/20 1709)  Weight - Scale: 127 kg (280 lb) (06/25/20 1709)  SpO2: 94 % (06/25/20 2009)    Physical Exam   Constitutional: He is oriented to person, place, and time  He appears well-developed and well-nourished  He appears lethargic  No distress     Hard of hearing HENT:   Head: Normocephalic and atraumatic  Mouth/Throat: Oropharynx is clear and moist    Eyes: Pupils are equal, round, and reactive to light  EOM are normal  No scleral icterus  Neck: Neck supple  Cardiovascular: Normal rate, regular rhythm and normal heart sounds  No murmur heard  Pulmonary/Chest: Effort normal  No respiratory distress  He has no wheezes  He has rales  Abdominal: Soft  Bowel sounds are normal  He exhibits no distension  There is no tenderness  Musculoskeletal: He exhibits no deformity  Neurological: He is oriented to person, place, and time  He appears lethargic  GCS eye subscore is 4  GCS verbal subscore is 5  GCS motor subscore is 6  Skin: Skin is warm and dry  Capillary refill takes less than 2 seconds  No rash noted  He is not diaphoretic  There is erythema (left lower leg with ulceration)  No pallor  Psychiatric: He has a normal mood and affect  Nursing note and vitals reviewed  Additional Data:     Lab Results: I have personally reviewed pertinent reports  Results from last 7 days   Lab Units 06/25/20  1842   WBC Thousand/uL 6 51   HEMOGLOBIN g/dL 13 5   HEMATOCRIT % 40 1   PLATELETS Thousands/uL 118*   NEUTROS PCT % 59   LYMPHS PCT % 20   MONOS PCT % 18*   EOS PCT % 2     Results from last 7 days   Lab Units 06/25/20  1842   SODIUM mmol/L 136   POTASSIUM mmol/L 3 7   CHLORIDE mmol/L 101   CO2 mmol/L 24   BUN mg/dL 63*   CREATININE mg/dL 2 29*   ANION GAP mmol/L 11   CALCIUM mg/dL 9 3   ALBUMIN g/dL 3 0*   TOTAL BILIRUBIN mg/dL 2 00*   ALK PHOS U/L 83   ALT U/L 23   AST U/L 49*   GLUCOSE RANDOM mg/dL 88     Results from last 7 days   Lab Units 06/25/20  1842   INR  2 09*             Results from last 7 days   Lab Units 06/25/20  1842   LACTIC ACID mmol/L 2 2*       Imaging: I have personally reviewed pertinent reports        CT head without contrast   Final Result by Farhad Olson MD (06/25 1951)      Stable exam with chronic microangiopathic change without acute intracranial abnormality  Workstation performed: [id number]         XR chest portable   Final Result by Dawson Vergara MD (06/25 1943)      No acute cardiopulmonary disease  Workstation performed: [id number]             EKG, Pathology, and Other Studies Reviewed on Admission:   · Prior pertinent studies and records reviewed in [facility] Records Reviewed: Yes     ** Please Note: This note has been constructed using a voice recognition system   **

## 2020-06-26 NOTE — PROGRESS NOTES
Progress Note - Linwood Estephanieist 1945, 69 y o  male MRN: [id number]    Unit/Bed#: -01 Encounter: [id number]    Primary Care Provider: Clary Goodman MD   Date and time admitted to hospital: 6/25/2020  6:10 PM        Lactic acidosis  Assessment & Plan  · Elevated at 2 2  · Trend to normal  · Give thiamine x1 tonight  · Fluids contraindicated in setting of CHF exacerbation    Acute metabolic encephalopathy  Assessment & Plan  · Son at bedside reports his follows been a little bit more confused today, he does have baseline confusion  · ? Etiology-related to uremia vs cellulitis  · CT head within normal limits  · Monitor mental status, monitor for improvement with antibiotics as well as IV Lasix    Atrial fibrillation ([address] )  Assessment & Plan   Controlled at this time  Rates in the 80s   Rate/rhythm control: lopressor   Anticoagulation: eliquis   Monitor heart rate  Elevated troponin level not due myocardial infarction  Assessment & Plan  · Troponin 0 15, likely non-MI troponin elevation  No active CP  · Trend trops, monitor on tele  · Cardiology consultation    Acute kidney injury superimposed on CKD [facility])  Assessment & Plan  Creatinine upon admission: 2 29  Baseline: around 1 3  Secondary to prerenal overload  Creatinine this morning is 2 15  Treatment: diuretics, monitor I/O  Monitor BMP  Nephrology recommendations appreciated  Avoid hypotension/nephrotoxins medications    Cellulitis of left lower extremity  Assessment & Plan  · When reviewing clinical images from the 14th of June, his left leg does appear to be more swollen and erythematous  · IV Ancef q 8 hours  · Trend procalcitonin  · Trend WBC and fever curve  · Podiatry consult    Essential hypertension  Assessment & Plan   Continue lopressor, lasix BID     Monitor vitals as per protocol    * Acute on chronic combined systolic and diastolic congestive heart failure [facility])  Assessment & Plan  Wt Readings from Last 3 Encounters:   06/26/20 121 kg (266 lb 12 1 oz)   06/18/20 127 kg (279 lb 1 6 oz)   03/10/20 114 kg (251 lb 8 7 oz)      Chest x-ray: NAD   Last echocardiogram on file: EF 45%, November 2019   Initial troponin: 0 15  o Will trend x3 or to peak   BNP: 35,000   Diuresis: IV Lasix 40mg BID  Patient is on Bumex at home   Beta-blocker: lopressor   Monitor intake and output, daily weights   Diet: Fluid restriction and 2 g Sodium  Mavis Cousin Cardiology consult appreciated   Oxygen supplementation p r n    Monitor renal function while diuresing          Labs & Imaging: I have personally reviewed pertinent reports  VTE Pharmacologic Prophylaxis:  Eliquis  VTE Mechanical Prophylaxis: sequential compression device    Code Status:   Level 1 - Full Code    Patient Centered Rounds: I have performed bedside rounds with nursing staff today  Discussions with Specialists or Other Care Team Provider:  Nephrology and cardiology    Education and Discussions with Family / Patient:  Called son-no response    Current Length of Stay: 1 day(s)    Current Patient Status: Inpatient   Certification Statement: The patient will continue to require additional inpatient hospital stay due to see my assessment and plan  Subjective:   Patient is seen and examined at bedside  Patient is little confused  Oriented x2  Presented with leg swelling, shortness of breath, left leg pain and weakness  States he feels better  Afebrile  All other ROS are negative  Objective:    Vitals: Blood pressure 117/76, pulse 80, temperature (!) 97 1 °F (36 2 °C), resp  rate 19, height 6' (1 829 m), weight 121 kg (266 lb 12 1 oz), SpO2 99 %  ,Body mass index is 36 18 kg/m²    SPO2 RA Rest      ED to Hosp-Admission (Current) from 6/25/2020 in [address] Med Surg Unit   SpO2  99 %   SpO2 Activity  At Rest   O2 Device  None (Room air)   O2 Flow Rate          I&O:     Intake/Output Summary (Last 24 hours) at 6/26/2020 1340  Last data filed at 6/26/2020 5575  Gross per 24 hour   Intake    Output 300 ml   Net -300 ml       Physical Exam:    General- Alert, lying comfortably in bed  Not in any acute distress  CVS- regular, S1 and S2 normal  Chest- Bilateral Air entry, No rhochi, crackles or wheezing present  Abdomen- soft, nontender, not distended, no guarding or rigidity, BS+  Extremities-  No pedal edema, No calf tenderness  Left lower extremity-erythema, warmth-dressing in place  Has chronic left foot ulcer  CNS-   Alert, awake and orientedx2  No focal deficits present      Invasive Devices     Peripheral Intravenous Line            Peripheral IV 06/25/20 Left Antecubital less than 1 day                      Social History  reviewed  Family History   Problem Relation Age of Onset    Cancer Mother         cancer of uterus    Diabetes Sister     Mental illness Neg Hx         neg fam hx    Substance Abuse Neg Hx         neg fam hx    reviewed    Meds:  Current Facility-Administered Medications   Medication Dose Route Frequency Provider Last Rate Last Dose    acetaminophen (TYLENOL) tablet 650 mg  650 mg Oral Q6H PRN Sudie Formica, PA-C        apixaban (ELIQUIS) tablet 5 mg  5 mg Oral BID Sudie Formica, PA-C   5 mg at 06/26/20 6397    ascorbic acid (VITAMIN C) tablet 1,000 mg  1,000 mg Oral BID Sudie Formica, PA-C   1,000 mg at 06/26/20 0853    ceFAZolin (ANCEF) IVPB (premix) 2,000 mg 50 mL  2,000 mg Intravenous Q8H Sudie Formica, PA-C 100 mL/hr at 06/26/20 1203 2,000 mg at 06/26/20 1203    cholecalciferol (VITAMIN D3) tablet 1,000 Units  1,000 Units Oral Daily Sudie Formica, PA-C   1,000 Units at 06/26/20 0853    DULoxetine (CYMBALTA) delayed release capsule 30 mg  30 mg Oral Daily Sudie Formica, PA-C   30 mg at 06/26/20 0853    furosemide (LASIX) injection 40 mg  40 mg Intravenous BID (diuretic) Sudie Formica, PA-C   40 mg at 06/26/20 0853    metoprolol tartrate (LOPRESSOR) tablet 25 mg  25 mg Oral Q12H [address] Sudie Formica, PA-C   25 mg at 06/26/20 0853    midodrine (PROAMATINE) tablet 5 mg  5 mg Oral TID AC Noelle Galarza, CRNP   5 mg at 06/26/20 1203    ondansetron (ZOFRAN) injection 4 mg  4 mg Intravenous Q6H PRN Karan Crisp, PA-C        pantoprazole (PROTONIX) EC tablet 40 mg  40 mg Oral Daily Karan Crisp, PA-C   40 mg at 06/26/20 0853    potassium chloride (K-DUR,KLOR-CON) CR tablet 40 mEq  40 mEq Oral Daily Tanesha Flattery, CRNP   40 mEq at 06/26/20 1203    senna (SENOKOT) tablet 17 2 mg  2 tablet Oral Daily Karan Crisp, PA-C   17 2 mg at 06/26/20 8268      Medications Prior to Admission   Medication    apixaban (ELIQUIS) 5 mg    Ascorbic Acid (VITAMIN C) 1000 MG tablet    bumetanide (BUMEX) 1 mg tablet    cholecalciferol (VITAMIN D3) 1,000 units tablet    DULoxetine (CYMBALTA) 30 mg delayed release capsule    metoprolol tartrate (LOPRESSOR) 25 mg tablet    midodrine (PROAMATINE) 5 mg tablet    pantoprazole (PROTONIX) 40 mg tablet    senna (SENOKOT) 8 6 mg    sodium chloride (OCEAN) 0 65 % nasal spray       Labs:  Results from last 7 days   Lab Units 06/26/20  0454 06/25/20  1842   WBC Thousand/uL 5 63 6 51   HEMOGLOBIN g/dL 13 9 13 5   HEMATOCRIT % 42 2 40 1   PLATELETS Thousands/uL 117* 118*   NEUTROS PCT % 53 59   LYMPHS PCT % 24 20   MONOS PCT % 17* 18*   EOS PCT % 5 2     Results from last 7 days   Lab Units 06/26/20  0454 06/25/20  1842   POTASSIUM mmol/L 3 5 3 7   CHLORIDE mmol/L 100 101   CO2 mmol/L 24 24   BUN mg/dL 61* 63*   CREATININE mg/dL 2 15* 2 29*   CALCIUM mg/dL 9 5 9 3   ALK PHOS U/L  --  83   ALT U/L  --  23   AST U/L  --  49*     Lab Results   Component Value Date    TROPONINI 0 12 (H) 06/26/2020    TROPONINI 0 15 (H) 06/25/2020    TROPONINI 0 15 (H) 06/25/2020    CKMB 11 1 (H) 06/14/2020    CKMB 12 8 (H) 06/13/2020    CKMB 13 3 (H) 06/12/2020    CKTOTAL 319 (H) 06/14/2020    CKTOTAL 431 (H) 06/13/2020    CKTOTAL 492 (H) 06/12/2020     Results from last 7 days   Lab Units 06/25/20  1842   INR 2 09*     Lab Results   Component Value Date    BLOODCX Received in Microbiology Lab  Culture in Progress  06/25/2020    BLOODCX Received in Microbiology Lab  Culture in Progress  06/25/2020    BLOODCX No Growth After 5 Days  06/14/2020    URINECX <10,000 cfu/ml  02/19/2020    WOUNDCULT 2+ Growth of Serratia marcescens (A) 03/05/2020    WOUNDCULT 4+ Growth of Serratia marcescens (A) 03/02/2020    WOUNDCULT 3+ Growth of Serratia marcescens (A) 07/24/2019    WOUNDCULT 1+ Growth of  07/24/2019         Imaging:  Results for orders placed during the hospital encounter of 06/25/20   XR chest portable    Narrative CHEST     INDICATION:   Lower extremity swelling  COMPARISON:  6/14/2020    EXAM PERFORMED/VIEWS:  XR CHEST PORTABLE      FINDINGS:    Stable severe cardiomegaly  No pleural effusion  No airspace consolidation, pulmonary edema, atelectasis or pneumothorax  Limited evaluation bones and soft tissues  Impression No acute cardiopulmonary disease  Workstation performed: [id number]       Results for orders placed during the hospital encounter of 06/11/20   XR chest pa & lateral    Narrative CHEST     INDICATION:   sob  COMPARISON:  6/11/2020    EXAM PERFORMED/VIEWS:  XR CHEST PA & LATERAL      FINDINGS:    Cardiomediastinal silhouette remains enlarged  Slight vascular congestion and slight costophrenic angle blunting  No pneumothorax  Osseous structures appear within normal limits for patient age  Impression Cardiomegaly  Slight vascular congestion  Trace effusions          Workstation performed: [id number]         Last 24 Hours Medication List:     Current Facility-Administered Medications:  acetaminophen 650 mg Oral Q6H PRN Salome Bergman PA-C    apixaban 5 mg Oral BID Salome Bergman PA-C    vitamin C 1,000 mg Oral BID Salome Bergman PA-C    cefazolin 2,000 mg Intravenous Q8H Salome Bergman PA-C Last Rate: 2,000 mg (06/26/20 1203)   cholecalciferol 1,000 Units Oral Daily Salome Bergman PA-C    DULoxetine 30 mg Oral Daily Salome Bergman PA-C    furosemide 40 mg Intravenous BID (diuretic) Salome Bergman PA-C    metoprolol tartrate 25 mg Oral Q12H [address] Salome Bergman PA-C    midodrine 5 mg Oral TID AC NAVNEET Renee    ondansetron 4 mg Intravenous Q6H PRN Salome Bergman PA-C    pantoprazole 40 mg Oral Daily Salome Bergman PA-C    potassium chloride 40 mEq Oral Daily NAVNEET Iyer    senna 2 tablet Oral Daily Salome Bergman PA-C         Today, Patient Was Seen By: Peyman Sauer MD    ** Please Note: Dictation voice to text software may have been used in the creation of this document   **

## 2020-06-26 NOTE — ASSESSMENT & PLAN NOTE
Creatinine upon admission: 2 29  Baseline: around 1 3  Secondary to prerenal overload  Treatment: diuretics, monitor I/O  Monitor BMP  Nephrology consultation

## 2020-06-26 NOTE — CONSULTS
Consult - Podiatry   Linwood Mazariegos 69 y o  male MRN: [id number]  Unit/Bed#: -01 Encounter: [id number]    Assessment/Plan     1  Traumatic wound left leg   -hydrocolloid dressing applied  2  Surgical wound LFT - S/P I&D Abscess dorsal forefoot (03/05/2020)              - Cont Santyl dressing daily   -plan for wound debridement tomorrow  3  Pressure ulcer Stage 3 -  Lateral left midfoot   - Foam dressing applied for padding, will initiate Santyl tomorrow  4  Skin ulceration full-thickness -  Right lateral rearfoot              - will start Santyl dressing tomorrow  5  Chronic venous insufficiency bilateral              - Chronic +1 right , +2 left edema with rubor     CHF, Acute kidney injury, metabolic encephalopathy, lactic acidosis, AFib, hypertension              - managed per Internal Medicine, Cardiology  History of Present Illness     HPI:  Natalio Murphy is a 69 y o  male who presents with altered mental status with history of chronic wounds both legs  Podiatry consult requested to evaluate wounds  Patient relates new wound on the front of his left leg which he sustained upon discharge from hospital last week  Consults  Review of Systems   Constitutional: Negative  HENT: Negative  Eyes: Negative  Respiratory: Negative  Cardiovascular:  History CHF, AFib    Gastrointestinal: Negative      Musculoskeletal:  Diminished strength both lower extremities   Skin:  Multiple wounds both legs   Neurological:  Negative        Historical Information   Past Medical History:   Diagnosis Date    Atrial fibrillation (HCC)     Cardiac disease     Cellulitis     CHF (congestive heart failure) (HCC)     Chronic pain     Chronic venous hypertension     with ulceration    GERD (gastroesophageal reflux disease)     History of methicillin resistant staphylococcus aureus (MRSA) 11/26/2019    negative nasal swab     Hyperlipidemia     Hypertension     Obesity     Pulmonary hypertension ([address] )     PVD [facility]) [facility]     Vascular disorder of extremity ([facility] )      Past Surgical History:   Procedure Laterality Date    ANTERIOR RELEASE VERTEBRAL BODY W/ POSTERIOR FUSION      APPENDECTOMY  1954    CATARACT EXTRACTION      DECOMPRESSION SPINE LUMBAR POSTERIOR      ELBOW SURGERY      FOOT/ANKLE ARTHRODESIS Right      complications postoperatively with bone healing and infection    INCISION AND DRAINAGE OF WOUND Left 3/5/2020    Procedure: INCISION AND DRAINAGE (I&D) EXTREMITY;  Surgeon: Kamila Eugene DPM;  Location:  MAIN OR;  Service: Podiatry    KNEE SURGERY      NOSE SURGERY      turbinectomy    RETINAL DETACHMENT SURGERY      RHINOPLASTY      TONSILLECTOMY      VEIN LIGATION AND STRIPPING      saphenous vein long     Social History   Social History     Substance and Sexual Activity   Alcohol Use No    Frequency: Never    Binge frequency: Never     Social History     Substance and Sexual Activity   Drug Use No     Social History     Tobacco Use   Smoking Status Never Smoker   Smokeless Tobacco Never Used     Family History:   Family History   Problem Relation Age of Onset    Cancer Mother         cancer of uterus    Diabetes Sister     Mental illness Neg Hx         neg fam hx    Substance Abuse Neg Hx         neg fam hx       Meds/Allergies   Medications Prior to Admission   Medication    apixaban (ELIQUIS) 5 mg    Ascorbic Acid (VITAMIN C) 1000 MG tablet    bumetanide (BUMEX) 1 mg tablet    cholecalciferol (VITAMIN D3) 1,000 units tablet    DULoxetine (CYMBALTA) 30 mg delayed release capsule    metoprolol tartrate (LOPRESSOR) 25 mg tablet    midodrine (PROAMATINE) 5 mg tablet    pantoprazole (PROTONIX) 40 mg tablet    senna (SENOKOT) 8 6 mg    sodium chloride (OCEAN) 0 65 % nasal spray     No Known Allergies    Objective   First Vitals:   Blood Pressure: 127/78 (06/25/20 1709)  Pulse: 65 (06/25/20 1709)  Temperature: 97 7 °F (36 5 °C) (06/25/20 1709)  Temp Source: Temporal (06/25/20 1709)  Respirations: 20 (06/25/20 1709)  Height: 6' (182 9 cm) (06/25/20 1709)  Weight - Scale: 127 kg (280 lb) (06/25/20 1709)  SpO2: 93 % (06/25/20 1709)    Current Vitals:   Blood Pressure: 113/70 (06/26/20 1531)  Pulse: 60 (06/26/20 1531)  Temperature: (!) 97 3 °F (36 3 °C) (06/26/20 1531)  Temp Source: Temporal (06/25/20 1709)  Respirations: 16 (06/26/20 1531)  Height: 6' (182 9 cm) (06/25/20 1709)  Weight - Scale: 121 kg (266 lb 12 1 oz) (06/26/20 0600)  SpO2: 99 % (06/26/20 1531)        /70   Pulse 60   Temp (!) 97 3 °F (36 3 °C)   Resp 16   Ht 6' (1 829 m)   Wt 121 kg (266 lb 12 1 oz)   SpO2 99%   BMI 36 18 kg/m²     General Appearance:    Alert, cooperative, no distress   Head:    Normocephalic, without obvious abnormality, atraumatic   Eyes:    PERRL, conjunctiva/corneas clear, EOM's intact            Nose:   Moist mucous membranes, no drainage or sinus tenderness   Throat:   No tenderness, no exudates   Neck:   Supple, symmetrical, trachea midline, no JVD   Back:     Symmetric, no CVA tenderness   Lungs:     Respirations unlabored   Chest wall:    No tenderness or deformity   Heart:    Rate and rhythm noted on last vitals  Abdomen:     Soft, non-tender, bowel sounds active all four quadrants,     no masses, no organomegaly       Lower Ext/Ortho: Limited ambulation, external hip rotation contributing to pressure wound on the lateral aspect of his left foot  Neuro/Vasc: CNII-XII intact  Normal strength  Sensation and reflexes:  Grossly intact  Pulses: Right: DP 0/4, PT 0/4, Left: DP 0/4, PT 0/4  Capillary Filling:  3 Sec, Edema:  +2 left, +1 right  Arterial duplex completed during last hospital admission 1 week ago showed adequate perfusion of both lower extremities for healing  Chronic skin rubor of both legs secondary to chronic venous insufficiency  Skin: Texture, Tone and Turgor:  Diminished, Digital Hair:  None  Atrophic Changes:   Moderate   Lesions: None  Nail Pathology:  Multiple dystrophic nails consistent with mycosis  Ulcers/Wounds:   · Traumatic wound anterior left leg measures 6 5 x 3 0 x 0 1 cm, 50% red, 50% yellow, subcutaneous in depth, no evidence of infection  Moderate serosanguineous drainage  · Pressure ulcer lateral left midfoot measures 1 2 x 1 0 x 0 2 cm, subcutaneous depth, 50% red, 50% yellow, but is mild serosanguineous drainage, no evidence of infection  · Slow nonhealing surgical wound dorsal left forefoot measures 8 0 x 3 0 x 0 8 cm, 70% yellow, 30% red, moderate serosanguineous drainage, no evidence of infection  · Subcutaneous depth ulcerative breakdown right lateral rearfoot within scar tissue of previous ORIF ankle, measures 1 0 x 0 5 x 0 2cm, 50% red, 50% yellow, minimal serosanguineous drainage, no evidence of infection  Lab Results:   CBC w/diff  Results from last 7 days   Lab Units 06/26/20  0454   WBC Thousand/uL 5 63   HEMOGLOBIN g/dL 13 9   HEMATOCRIT % 42 2   PLATELETS Thousands/uL 117*   NEUTROS PCT % 53   LYMPHS PCT % 24   MONOS PCT % 17*   EOS PCT % 5     BMP  Results from last 7 days   Lab Units 06/26/20  0454   POTASSIUM mmol/L 3 5   CHLORIDE mmol/L 100   CO2 mmol/L 24   BUN mg/dL 61*   CREATININE mg/dL 2 15*   CALCIUM mg/dL 9 5     CMP  Results from last 7 days   Lab Units 06/26/20  0454 06/25/20  1842   POTASSIUM mmol/L 3 5 3 7   CHLORIDE mmol/L 100 101   CO2 mmol/L 24 24   BUN mg/dL 61* 63*   CREATININE mg/dL 2 15* 2 29*   CALCIUM mg/dL 9 5 9 3   ALK PHOS U/L  --  83   ALT U/L  --  23   AST U/L  --  49*     @Culture@  Lab Results   Component Value Date    BLOODCX Received in Microbiology Lab  Culture in Progress  06/25/2020    BLOODCX Received in Microbiology Lab  Culture in Progress  06/25/2020    BLOODCX No Growth After 5 Days  06/14/2020    BLOODCX No Growth After 5 Days   06/14/2020    BLOODCX Chryseobacterium (A) 06/11/2020    BLOODCX Flavobacterium (A) 06/11/2020    BLOODCX Gram-positive alec (A) 06/11/2020    BLOODCX No Growth After 5 Days  06/11/2020    URINECX <10,000 cfu/ml  02/19/2020         Imaging: I have personally reviewed pertinent films in PACS      EKG, Pathology, and Other Studies: I have personally reviewed pertinent reports  Code Status: Level 1 - Full Code      Please Note: Voice to text dictation software was used in the creation of this document         Rashid Rooney DPM

## 2020-06-26 NOTE — UTILIZATION REVIEW
Initial Clinical Review    Admission: Date/Time/Statement: Admission Orders (From admission, onward)     Ordered        06/25/20 2004  Inpatient Admission  Once                   Orders Placed This Encounter   Procedures    Inpatient Admission     Standing Status:   Standing     Number of Occurrences:   1     Order Specific Question:   Admitting Physician     Answer:   Mara Sierra     Order Specific Question:   Level of Care     Answer:   Med Surg [16]     Order Specific Question:   Estimated length of stay     Answer:   More than 2 Midnights     Order Specific Question:   Certification     Answer:   I certify that inpatient services are medically necessary for this patient for a duration of greater than two midnights  See H&P and MD Progress Notes for additional information about the patient's course of treatment  ED Arrival Information     Expected Arrival Acuity Means of Arrival Escorted By Service Admission Type    - 6/25/2020 16:43 Emergent Wheelchair Friend Hospitalist Emergency    [address] [facility] [address] Laboratory Results        Chief Complaint   Patient presents with    Leg Swelling     patient presents to the ED with fluid retention, sent from PCP      Assessment/Plan: this is a 69year old male from home to ED per PCP admitted inpatient due to acute on chronic CHF/acute metabolic encephalopathy/SAMY superimposed on CKD and cellulitis of LLE  Presented due to worsening of swelling in bilateral lower extremities starting few days ago, increasing confusion and lethargy  On exam has chronic ulceration and healing wounds on dorsum of left foot and bilateral anterior lower extremities  Lactic acid 2 2  Ammonia 45  INR 2 09  NT-proBNP [id number]  Troponin 0 15  Bun 63  Creatinine 2 29 with baseline of 1 3  Platelets 708  Blood cultures done  CT head without acute abnormality  In the ED lasix 40 mg IV given    Plan is trend troponin, telemetry, diuresis with lV lasix BID, continue lopressor, fluid restriction  Cardiology consulted  IV antibiotics, monitor CBC and BMP in progress  Nephrology consulted    On 6/26/2020 has dyspnea on exertion, bloating and  continued edema  Alert  Telemetry controlled atrial fibrillation   On exam diminished breath sounds  +1 edema on left and trace on right; erythema  IV ancef and Lasix continue  On 6/26/2020 per nephrology - Patient with SAMY on CKD stage 3  Suspect due to prerenal azotemia with cardiorenal syndrome and volume overload  Continue lasix IV BID   K 3 5 and replete with K DUR 40 meq    6/26/202 per cardiology- has acute on chronic combined systolic and diastolic heart failure  On Lasix 40 IV bid  To continue and monitor I&O, fluid restriction, low sodium diet  Elevated troponin without chest pain       ED Triage Vitals   Temperature Pulse Respirations Blood Pressure SpO2   06/25/20 1709 06/25/20 1709 06/25/20 1709 06/25/20 1709 06/25/20 1709   97 7 °F (36 5 °C) 65 20 127/78 93 %      Temp Source Heart Rate Source Patient Position - Orthostatic VS BP Location FiO2 (%)   06/25/20 1709 06/25/20 1830 06/25/20 1709 06/25/20 1709 --   Temporal Monitor Sitting Right arm       Pain Score       06/25/20 1709       Worst Possible Pain        Wt Readings from Last 1 Encounters:   06/26/20 121 kg (266 lb 12 1 oz)     Additional Vital Signs:   06/26/20 08:03:06    80  19  117/76  90  98 %       06/25/20 23:45:19  97 1 °F (36 2 °C)Abnormal   61  18  127/87  100  98 %       06/25/20 21:15:19  97 3 °F (36 3 °C)Abnormal   64  23Abnormal   128/92  104  90 %       06/25/20 2009    74  20  124/66    94 %  None (Room air       06/26/20 0600  121 kg (266 lb 12 1 oz)  Bed scale     06/26/20 0523  121 kg (267 lb 3 2 oz)  Bed scale     06/25/20 1709  127 kg (280 lb)  Stated  6' (1 829 m)       Pertinent Labs/Diagnostic Test Results:   6/25/2020 CT head Stable exam with chronic microangiopathic change without acute intracranial abnormality    6/25/2020 CxR - No acute cardiopulmonary disease  6/25/2020 ECHO LEFT VENTRICLE:Systolic function was markedly reduced  Ejection fraction was estimated to be 30 %  There was severe diffuse hypokinesis with regional variations  Wall thickness was moderately to markedly increased  Doppler parameters were consistent with elevated ventricular end-diastolic filling pressure   RIGHT VENTRICLE:The ventricle was moderately dilated  Systolic function was reduced   LEFT ATRIUM:The atrium was moderately dilated   RIGHT ATRIUM:The atrium was markedly dilated    MITRAL VALVE:There was moderate regurgitation  The regurgitant jet was eccentric, not well captured on all images, and could be underestimated   [place]us [place] was mild to moderate regurgitation  Pulmonary artery systolic pressure was at least moderately increased    IVC, HEPATIC VEINS:The inferior vena cava was dilated    Results from last 7 days   Lab Units 06/26/20  0454 06/25/20  1842   WBC Thousand/uL 5 63 6 51   HEMOGLOBIN g/dL 13 9 13 5   HEMATOCRIT % 42 2 40 1   PLATELETS Thousands/uL 117* 118*   NEUTROS ABS Thousands/µL 3 02 3 84     Results from last 7 days   Lab Units 06/26/20  0454 06/25/20  1842   SODIUM mmol/L 138 136   POTASSIUM mmol/L 3 5 3 7   CHLORIDE mmol/L 100 101   CO2 mmol/L 24 24   ANION GAP mmol/L 14* 11   BUN mg/dL 61* 63*   CREATININE mg/dL 2 15* 2 29*   EGFR ml/min/1 73sq m 29 27   CALCIUM mg/dL 9 5 9 3   MAGNESIUM mg/dL  --  1 7   PHOSPHORUS mg/dL  --  3 6     Results from last 7 days   Lab Units 06/25/20  1842   AST U/L 49*   ALT U/L 23   ALK PHOS U/L 83   TOTAL PROTEIN g/dL 8 1   ALBUMIN g/dL 3 0*   TOTAL BILIRUBIN mg/dL 2 00*   AMMONIA umol/L 45*     Results from last 7 days   Lab Units 06/26/20  0454 06/25/20  1842   GLUCOSE RANDOM mg/dL 80 88     Results from last 7 days   Lab Units 06/26/20  0224 06/25/20  2341 06/25/20  1842   TROPONIN I ng/mL 0 12* 0 15* 0 15*     Results from last 7 days   Lab Units 06/25/20  1842   PROTIME seconds 23 2*   INR  2 09*   PTT seconds 42*     Results from last 7 days   Lab Units 06/25/20  2341   PROCALCITONIN ng/ml 0 11     Results from last 7 days   Lab Units 06/26/20  0848 06/26/20  0454 06/26/20  0224 06/25/20  2341 06/25/20  1842   LACTIC ACID mmol/L 2 2* 2 5* 2 6* 2 8* 2 2*     Results from last 7 days   Lab Units 06/25/20  1842   NT-PRO BNP pg/mL 34,232*     Results from last 7 days   Lab Units 06/25/20  1842   LIPASE u/L 93     Results from last 7 days   Lab Units 06/25/20  1842   BLOOD CULTURE  Received in Microbiology Lab  Culture in Progress  Received in Microbiology Lab  Culture in Progress         ED Treatment:   Medication Administration from 06/25/2020 1643 to 06/25/2020 2040       Date/Time Order Dose Route Action Comments     06/25/2020 2008 furosemide (LASIX) injection 40 mg 40 mg Intravenous Given         Past Medical History:   Diagnosis Date    [facility]rial fibrillation ([facility])     Cardiac disease     Cellulitis     [facility] failure) [facility])     Chronic pain     Chronic venous hypertension     with ulceration    GERD (gastroesophageal reflux disease)     History of methicillin resistant staphylococcus aureus (MRSA) 11/26/2019    negative nasal swab     Hyperlipidemia     Hypertension     Obesity     Pul[facility]     PVD (peripheral vascular disease) ([facility]     [facility]      Present on Admission:   Acute kidney injury superimposed on CKD [facility]   Acute metabolic encephalopathy   Acute on chronic combined systolic and diastolic congestive heart failure (HCC)   Essential hypertension   Atrial fibrillation (HCC)   Elevated troponin level not due myocardial infarction   Lactic acidosis   Cellulitis of left lower extremity      Admitting Diagnosis: Abnormal laboratory test [R89 9]  Acute CHF (congestive heart failure) (HCC) [I50 9]  Cellulitis of left lower extremity [L03 116]  Elevated lactic acid level [R79 89]  Bilateral lower extremity edema [R60 0]  Chronic foot ulcer, left, with fat layer exposed ([facility] [address] ) [L97 522]  Acute [facility] superimposed [facility] CKD [facility] ) [N17 9, N18 9]  Age/Sex: 69 y o  male  Admission Orders: 6/25/2020 2004 inpatient   Scheduled Medications:  Medications:  apixaban 5 mg Oral BID   vitamin C 1,000 mg Oral BID   cefazolin 2,000 mg Intravenous Q8H   cholecalciferol 1,000 Units Oral Daily   DULoxetine 30 mg Oral Daily   furosemide 40 mg Intravenous BID (diuretic)   metoprolol tartrate 25 mg Oral Q12H FRANKIE   midodrine 5 mg Oral TID AC   pantoprazole 40 mg Oral Daily   potassium chloride 40 mEq Oral Daily   senna 2 tablet Oral Daily     Continuous IV Infusions: none     PRN Meds: not used   acetaminophen 650 mg Oral Q6H PRN   ondansetron 4 mg Intravenous Q6H PRN       IP CONSULT TO CARDIOLOGY  IP CONSULT TO WOUND CARE  IP CONSULT TO PODIATRY  IP CONSULT TO NEPHROLOGY    Telemetry  Fluid restriction    Network Utilization Review Department  [email or web address]  ATTENTION: Please call with any questions or concerns to [phone] and carefully listen to the prompts so that you are directed to the right person  All voicemails are confidential   Clepetros Merino all requests for admission clinical reviews, approved or denied determinations and any other requests to dedicated fax number below belonging to the campus where the patient is receiving treatment   List of dedicated fax numbers for the Facilities:  FACILITY NAME UR FAX NUMBER   ADMISSION DENIALS (Administrative/Medical Necessity) [phone]   [address]  (Maternity/NICU/Pediatrics) [phone]   [facility] [place] [phone]   [facility] [place] [phone]   Raje[facility] [place] [phone]   [facility] [address] [facility] [address] [place] [address] [address] [facility] [phone]   [address] [place] [address] [phone]

## 2020-06-27 LAB
ALBUMIN SERPL BCP-MCNC: 2.5 G/DL (ref 3.5–5)
ALP SERPL-CCNC: 65 U/L (ref 46–116)
ALT SERPL W P-5'-P-CCNC: 16 U/L (ref 12–78)
ANION GAP SERPL CALCULATED.3IONS-SCNC: 12 MMOL/L (ref 4–13)
AST SERPL W P-5'-P-CCNC: 34 U/L (ref 5–45)
BASOPHILS # BLD AUTO: 0.05 THOUSANDS/ΜL (ref 0–0.1)
BASOPHILS NFR BLD AUTO: 1 % (ref 0–1)
BILIRUB SERPL-MCNC: 1.4 MG/DL (ref 0.2–1)
BUN SERPL-MCNC: 56 MG/DL (ref 5–25)
CALCIUM SERPL-MCNC: 8.9 MG/DL (ref 8.3–10.1)
CHLORIDE SERPL-SCNC: 101 MMOL/L (ref 100–108)
CO2 SERPL-SCNC: 26 MMOL/L (ref 21–32)
CREAT SERPL-MCNC: 2.07 MG/DL (ref 0.6–1.3)
EOSINOPHIL # BLD AUTO: 0.27 THOUSAND/ΜL (ref 0–0.61)
EOSINOPHIL NFR BLD AUTO: 4 % (ref 0–6)
ERYTHROCYTE [DISTWIDTH] IN BLOOD BY AUTOMATED COUNT: 19.3 % (ref 11.6–15.1)
GFR SERPL CREATININE-BSD FRML MDRD: 31 ML/MIN/1.73SQ M
GLUCOSE SERPL-MCNC: 89 MG/DL (ref 65–140)
HCT VFR BLD AUTO: 41 % (ref 36.5–49.3)
HGB BLD-MCNC: 13.6 G/DL (ref 12–17)
IMM GRANULOCYTES # BLD AUTO: 0.01 THOUSAND/UL (ref 0–0.2)
IMM GRANULOCYTES NFR BLD AUTO: 0 % (ref 0–2)
LACTATE SERPL-SCNC: 2.3 MMOL/L (ref 0.5–2)
LACTATE SERPL-SCNC: 2.4 MMOL/L (ref 0.5–2)
LACTATE SERPL-SCNC: 2.4 MMOL/L (ref 0.5–2)
LACTATE SERPL-SCNC: 2.7 MMOL/L (ref 0.5–2)
LACTATE SERPL-SCNC: 2.7 MMOL/L (ref 0.5–2)
LYMPHOCYTES # BLD AUTO: 1.28 THOUSANDS/ΜL (ref 0.6–4.47)
LYMPHOCYTES NFR BLD AUTO: 20 % (ref 14–44)
MAGNESIUM SERPL-MCNC: 1.9 MG/DL (ref 1.6–2.6)
MCH RBC QN AUTO: 31.7 PG (ref 26.8–34.3)
MCHC RBC AUTO-ENTMCNC: 33.2 G/DL (ref 31.4–37.4)
MCV RBC AUTO: 96 FL (ref 82–98)
MONOCYTES # BLD AUTO: 0.87 THOUSAND/ΜL (ref 0.17–1.22)
MONOCYTES NFR BLD AUTO: 14 % (ref 4–12)
NEUTROPHILS # BLD AUTO: 3.96 THOUSANDS/ΜL (ref 1.85–7.62)
NEUTS SEG NFR BLD AUTO: 61 % (ref 43–75)
NRBC BLD AUTO-RTO: 0 /100 WBCS
PHOSPHATE SERPL-MCNC: 3.2 MG/DL (ref 2.3–4.1)
PLATELET # BLD AUTO: 121 THOUSANDS/UL (ref 149–390)
PMV BLD AUTO: 12.6 FL (ref 8.9–12.7)
POTASSIUM SERPL-SCNC: 4.1 MMOL/L (ref 3.5–5.3)
PROCALCITONIN SERPL-MCNC: 0.06 NG/ML
PROT SERPL-MCNC: 7.6 G/DL (ref 6.4–8.2)
RBC # BLD AUTO: 4.29 MILLION/UL (ref 3.88–5.62)
SODIUM SERPL-SCNC: 139 MMOL/L (ref 136–145)
TROPONIN I SERPL-MCNC: 0.1 NG/ML
WBC # BLD AUTO: 6.44 THOUSAND/UL (ref 4.31–10.16)

## 2020-06-27 PROCEDURE — 84145 PROCALCITONIN (PCT): CPT | Performed by: INTERNAL MEDICINE

## 2020-06-27 PROCEDURE — 83605 ASSAY OF LACTIC ACID: CPT | Performed by: PHYSICIAN ASSISTANT

## 2020-06-27 PROCEDURE — 99233 SBSQ HOSP IP/OBS HIGH 50: CPT | Performed by: INTERNAL MEDICINE

## 2020-06-27 PROCEDURE — 84484 ASSAY OF TROPONIN QUANT: CPT | Performed by: INTERNAL MEDICINE

## 2020-06-27 PROCEDURE — 84100 ASSAY OF PHOSPHORUS: CPT | Performed by: NURSE PRACTITIONER

## 2020-06-27 PROCEDURE — 80053 COMPREHEN METABOLIC PANEL: CPT | Performed by: INTERNAL MEDICINE

## 2020-06-27 PROCEDURE — 83735 ASSAY OF MAGNESIUM: CPT | Performed by: NURSE PRACTITIONER

## 2020-06-27 PROCEDURE — 83605 ASSAY OF LACTIC ACID: CPT | Performed by: INTERNAL MEDICINE

## 2020-06-27 PROCEDURE — 85025 COMPLETE CBC W/AUTO DIFF WBC: CPT | Performed by: INTERNAL MEDICINE

## 2020-06-27 PROCEDURE — 99232 SBSQ HOSP IP/OBS MODERATE 35: CPT | Performed by: INTERNAL MEDICINE

## 2020-06-27 RX ADMIN — APIXABAN 5 MG: 5 TABLET, FILM COATED ORAL at 17:08

## 2020-06-27 RX ADMIN — DULOXETINE 30 MG: 30 CAPSULE, DELAYED RELEASE ORAL at 09:52

## 2020-06-27 RX ADMIN — OXYCODONE HYDROCHLORIDE AND ACETAMINOPHEN 1000 MG: 500 TABLET ORAL at 17:08

## 2020-06-27 RX ADMIN — POTASSIUM CHLORIDE 40 MEQ: 1500 TABLET, EXTENDED RELEASE ORAL at 17:08

## 2020-06-27 RX ADMIN — APIXABAN 5 MG: 5 TABLET, FILM COATED ORAL at 09:52

## 2020-06-27 RX ADMIN — Medication 0.5 MG/HR: at 08:16

## 2020-06-27 RX ADMIN — METOPROLOL TARTRATE 25 MG: 25 TABLET, FILM COATED ORAL at 21:06

## 2020-06-27 RX ADMIN — MIDODRINE HYDROCHLORIDE 2.5 MG: 5 TABLET ORAL at 12:36

## 2020-06-27 RX ADMIN — CEFAZOLIN SODIUM 2000 MG: 2 SOLUTION INTRAVENOUS at 21:03

## 2020-06-27 RX ADMIN — METOPROLOL TARTRATE 25 MG: 25 TABLET, FILM COATED ORAL at 09:53

## 2020-06-27 RX ADMIN — ACETAMINOPHEN 650 MG: 325 TABLET ORAL at 00:56

## 2020-06-27 RX ADMIN — STANDARDIZED SENNA CONCENTRATE 17.2 MG: 8.6 TABLET ORAL at 09:53

## 2020-06-27 RX ADMIN — ACETAMINOPHEN 650 MG: 325 TABLET ORAL at 17:18

## 2020-06-27 RX ADMIN — METRONIDAZOLE 500 MG: 500 INJECTION, SOLUTION INTRAVENOUS at 17:07

## 2020-06-27 RX ADMIN — METRONIDAZOLE 500 MG: 500 INJECTION, SOLUTION INTRAVENOUS at 23:33

## 2020-06-27 RX ADMIN — CEFAZOLIN SODIUM 2000 MG: 2 SOLUTION INTRAVENOUS at 12:36

## 2020-06-27 RX ADMIN — CEFAZOLIN SODIUM 2000 MG: 2 SOLUTION INTRAVENOUS at 05:32

## 2020-06-27 RX ADMIN — ACETAMINOPHEN 650 MG: 325 TABLET ORAL at 23:33

## 2020-06-27 RX ADMIN — MIDODRINE HYDROCHLORIDE 2.5 MG: 5 TABLET ORAL at 17:13

## 2020-06-27 RX ADMIN — MELATONIN 1000 UNITS: at 09:53

## 2020-06-27 RX ADMIN — POTASSIUM CHLORIDE 40 MEQ: 1500 TABLET, EXTENDED RELEASE ORAL at 09:53

## 2020-06-27 RX ADMIN — ACETAMINOPHEN 650 MG: 325 TABLET ORAL at 12:36

## 2020-06-27 RX ADMIN — OXYCODONE HYDROCHLORIDE AND ACETAMINOPHEN 1000 MG: 500 TABLET ORAL at 09:52

## 2020-06-27 RX ADMIN — PANTOPRAZOLE SODIUM 40 MG: 40 TABLET, DELAYED RELEASE ORAL at 09:53

## 2020-06-27 NOTE — QUICK NOTE
Pt w/persistently elevated lactic acid in setting of cellulitis as well as acute CHF/SAMY  He cannot receive fluids due to active volume overload    Will hold on further checks tonight as this unfortunately does not change mgmt and repeat in am

## 2020-06-27 NOTE — ASSESSMENT & PLAN NOTE
· When reviewing clinical images from the 14th of June, his left leg does appear to be more swollen and erythematous    · Continue on IV Ancef q 8 hours  · Trend procalcitonin  · Trend WBC and fever curve  · Podiatry consult appreciated  · Wound care and dressing changes per their recommendations  · Patient is scheduled for wound debridement on dorsal forefoot

## 2020-06-27 NOTE — PROGRESS NOTES
NEPHROLOGY PROGRESS NOTE   Linwood Mazariegos 69 y o  male MRN: [id number]  Unit/Bed#: -01 Encounter: [id number]  Reason for Consult: SAMY    ASSESSMENT/PLAN:  Acute kidney injury (POA) on suspected chronic kidney disease, stage 3:  - acute kidney injury suspect prerenal azotemia with cardiorenal syndrome with volume overload  - suspected chronic kidney disease secondary to hypertensive nephrosclerosis + atherosclerosis + cardiorenal syndrome + age-related nephron loss    - baseline Creatinine of 1 3-1 6 from March 2020   Of note, patient with SAMY earlier this month with creatinine in the low 2s  - monitor with bumex drip 0 5mg/hr per cardiology, may benefit from increased dose of bumex drip with only 1L UO yesterday  - patient was admitted with a creatinine of 2 29  - patient's creatinine today is 2 07  - urine output 1L yesterday  - UA: bland  - renal ultrasound completed 06/2020 revealed right kidney 11 3 x 4 2 cm, left kidney 11 5 x 5 7 cm  Normal echogenicity and contour  Bilateral renal cysts  Right kidney is in the right lower quadrant   - 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 connecting both lower poles   Bilateral probably renal cysts are similar in appearance to prior exam   No hydronephrosis or hydroureter    - avoid NSAIDS, nephrotoxins, IV contrast if possible  - avoid hypotension/ fluctuations in blood pressure to prevent decreased renal perfusion    - monitor volume status with strict intake/output and daily weights     Hypotension:  - blood pressure overall stable and acceptable  - home medication regimen:  Metoprolol tartrate 25 mg b i d , Bumex 1 mg daily, midodrine 5 mg 3 times daily before meals  - currently on midodrine 2 5mg TID  - hold parameters placed for SBP > 130 for the midodrine    - maintain MAP > 65mmHg     - avoid hypotension/ fluctuations in blood pressure       CHF: EF 30% as of 6/26/2020  - home diuretic regimen: bumex 1mg daily  - currently on bumex drip 0 5mg/hr per cardiology  - consider increasing bumex for better urine output  - continue 1 5 L fluid restriction  - cardiology following      Cellulitis of left lower extremity:  - currently on Ancef   - management per primary team     Disposition:  Creatinine stable  Consider increasing bumex per cardiology discretion    SUBJECTIVE:  Patient without acute complaints  Denies sob    Feels legs are back to normal     OBJECTIVE:  Current Weight: Weight - Scale: 121 kg (266 lb 12 1 oz)  Vitals:    06/27/20 0004 06/27/20 0600 06/27/20 0631 06/27/20 0755   BP: 118/85  (!) 138/112 111/68   BP Location:       Pulse:   62 70   Resp:    16   Temp:    (!) 97 3 °F (36 3 °C)   TempSrc:       SpO2:   100% 100%   Weight:  121 kg (266 lb 12 1 oz)     Height:           Intake/Output Summary (Last 24 hours) at 6/27/2020 1411  Last data filed at 6/27/2020 0800  Gross per 24 hour   Intake 200 ml   Output 1320 ml   Net -1120 ml     General: NAD  Skin: no rash  Eyes: anicteric  ENMT: mm moist  Neck: no masses  Respiratory: CTAB  Cardiac: RRR  Extremities: + bilateral edema  GI: soft nt nd  Neuro: alert awake  Psych: mood and affect appropriate    Medications:    Current Facility-Administered Medications:     acetaminophen (TYLENOL) tablet 650 mg, 650 mg, Oral, Q6H PRN, Malik Goodwin PA-C, 650 mg at 06/27/20 1236    apixaban (ELIQUIS) tablet 5 mg, 5 mg, Oral, BID, Malik Goodwin PA-C, 5 mg at 06/27/20 4074    ascorbic acid (VITAMIN C) tablet 1,000 mg, 1,000 mg, Oral, BID, Malik Goodwin PA-C, 1,000 mg at 06/27/20 8226    bumetanide (BUMEX) 12 5 mg infusion 50 mL, 0 5 mg/hr, Intravenous, Continuous, Prabhjot Pellet, CRNP, Last Rate: 2 mL/hr at 06/27/20 0816, 0 5 mg/hr at 06/27/20 0816    ceFAZolin (ANCEF) IVPB (premix) 2,000 mg 50 mL, 2,000 mg, Intravenous, Q8H, Malik Goodwin PA-C, Last Rate: 100 mL/hr at 06/27/20 1236, 2,000 mg at 06/27/20 1236    cholecalciferol (VITAMIN D3) tablet 1,000 Units, 1,000 Units, Oral, Daily, Karan Crisp, PA-C, 1,000 Units at 06/27/20 0684    collagenase (SANTYL) ointment, , Topical, Daily, Rocio Costa, ASHOK    DULoxetine (CYMBALTA) delayed release capsule 30 mg, 30 mg, Oral, Daily, Karan Crisp, PA-C, 30 mg at 06/27/20 3704    metoprolol tartrate (LOPRESSOR) tablet 25 mg, 25 mg, Oral, Q12H [address], Karan [place], PA-C, 25 mg at 06/27/20 0953    midodrine (PROAMATINE) tablet 2 5 mg, 2 5 mg, Oral, TID AC, Noelle Galarza, CRNP, 2 5 mg at 06/27/20 1236    ondansetron (ZOFRAN) injection 4 mg, 4 mg, Intravenous, Q6H PRN, Karan Crisp, PA-C    pantoprazole (PROTONIX) EC tablet 40 mg, 40 mg, Oral, Daily, Karan Crisp, PA-C, 40 mg at 06/27/20 0953    potassium chloride (K-DUR,KLOR-CON) CR tablet 40 mEq, 40 mEq, Oral, BID, Tanesha Flattery, CRNP, 40 mEq at 06/27/20 7558    senna (SENOKOT) tablet 17 2 mg, 2 tablet, Oral, Daily, Karan Crisp, PA-C, 17 2 mg at 06/27/20 9902    Laboratory Results:  Results from last 7 days   Lab Units 06/27/20  0625 06/26/20  0454 06/25/20  1842   WBC Thousand/uL 6 44 5 63 6 51   HEMOGLOBIN g/dL 13 6 13 9 13 5   HEMATOCRIT % 41 0 42 2 40 1   PLATELETS Thousands/uL 121* 117* 118*   POTASSIUM mmol/L 4 1 3 5 3 7   CHLORIDE mmol/L 101 100 101   CO2 mmol/L 26 24 24   BUN mg/dL 56* 61* 63*   CREATININE mg/dL 2 07* 2 15* 2 29*   CALCIUM mg/dL 8 9 9 5 9 3   MAGNESIUM mg/dL 1 9  --  1 7   PHOSPHORUS mg/dL 3 2  --  3 6

## 2020-06-27 NOTE — PROGRESS NOTES
Progress Note - Podiatry  Linwood Mazariegos 69 y o  male MRN: [id number]  Unit/Bed#: -01 Encounter: [id number]    Assessment/Plan:  1  Traumatic wound left leg              -hydrocolloid dressing to be changed tomorrow  2  Surgical Wound LFT - S/P I&D Abscess dorsal forefoot (03/05/2020)   -debridement skin subcutaneous tissues, excisional with Tissue Nipper and Curette, 21 sqcm debrided              -Santyl dressing applied  -plan for wound debridement tomorrow  3  Pressure ulcer Stage 3 -  Lateral left midfoot   -debridement skin subcutaneous tissues, excisional with Tissue Nipper and Curette, 1 2 sqcm debrided              -Santyl dressing applied  4  Skin ulceration full-thickness -  Right lateral rearfoot   -debridement skin subcutaneous tissues, excisional with Tissue Nipper and Curette, 1 5 sqcm debrided              - Santyl dressing applied  5  Chronic venous insufficiency bilateral              - Chronic +1 right , +2 left edema with rubor      CHF, Acute kidney injury, metabolic encephalopathy, lactic acidosis, AFib, hypertension              - managed per Internal Medicine, Cardiology  Subjective/Objective   Chief Complaint:   Chief Complaint   Patient presents with    Leg Swelling     patient presents to the ED with fluid retention, sent from PCP        Subjective:  71-year-old white male seen resting bed, denies any new pain/discomfort, or SOB  Blood pressure 111/68, pulse 70, temperature (!) 97 3 °F (36 3 °C), resp  rate 16, height 6' (1 829 m), weight 121 kg (266 lb 12 1 oz), SpO2 100 %  ,Body mass index is 36 18 kg/m²      Invasive Devices     Peripheral Intravenous Line            Peripheral IV 06/25/20 Left Antecubital 1 day    Peripheral IV 06/26/20 Right Forearm less than 1 day                Physical Exam:   General appearance: alert, cooperative and no distress  Neuro/Vascular: Sensation and reflexes:  Grossly intact  Pulses: Right: DP 0/4, PT 0/4, Left: DP 0/4, PT 0/4  Capillary Filling:  3 Sec, Edema:  +2 left, +1 right  Arterial duplex completed during last hospital admission 1 week ago showed adequate perfusion of both lower extremities for healing  Chronic skin rubor of both legs secondary to chronic venous insufficiency  Extremity:  Limited ambulation can only walk extremely short distance across the room without requiring assistance for balance, utilizes power wheelchair for daily activities  Ulcers/Wounds:   · Traumatic wound anterior left leg measures 8 0 x 3 0 x 0 1 cm, 50% red, 50% yellow, subcutaneous in depth, no evidence of infection  Moderate serosanguineous drainage  · Pressure ulcer lateral left midfoot measures 1 2 x 1 0 x 0 2 cm, subcutaneous depth, 50% red, 50% yellow, but is mild serosanguineous drainage, no evidence of infection  · Slow nonhealing surgical wound dorsal left forefoot measures 7 0 x 3 0 x 0 8 cm, 70% yellow, 30% red, moderate serosanguineous drainage, no evidence of infection  · Subcutaneous depth ulcerative breakdown right lateral rearfoot within scar tissue of previous ORIF ankle, wound measures 1 0 x 0 5 x 0 2cm, 50% red, 50% yellow, minimal serosanguineous drainage, no evidence of infection                Labs and other studies:   CBC w/diff  Results from last 7 days   Lab Units 06/27/20  0625   WBC Thousand/uL 6 44   HEMOGLOBIN g/dL 13 6   HEMATOCRIT % 41 0   PLATELETS Thousands/uL 121*   NEUTROS PCT % 61   LYMPHS PCT % 20   MONOS PCT % 14*   EOS PCT % 4     BMP  Results from last 7 days   Lab Units 06/27/20  0625   POTASSIUM mmol/L 4 1   CHLORIDE mmol/L 101   CO2 mmol/L 26   BUN mg/dL 56*   CREATININE mg/dL 2 07*   CALCIUM mg/dL 8 9     CMP  Results from last 7 days   Lab Units 06/27/20  0625   POTASSIUM mmol/L 4 1   CHLORIDE mmol/L 101   CO2 mmol/L 26   BUN mg/dL 56*   CREATININE mg/dL 2 07*   CALCIUM mg/dL 8 9   ALK PHOS U/L 65   ALT U/L 16   AST U/L 34       @Culture@  Lab Results   Component Value Date    BLOODCX No Growth at 24 hrs  06/25/2020    BLOODCX No Growth at 24 hrs  06/25/2020    BLOODCX No Growth After 5 Days  06/14/2020    BLOODCX No Growth After 5 Days  06/14/2020    BLOODCX Chryseobacterium (A) 06/11/2020    BLOODCX Flavobacterium (A) 06/11/2020    BLOODCX Gram-positive alec (A) 06/11/2020    BLOODCX No Growth After 5 Days   06/11/2020    URINECX <10,000 cfu/ml  02/19/2020         Current Facility-Administered Medications:     acetaminophen (TYLENOL) tablet 650 mg, 650 mg, Oral, Q6H PRN, Rayna Chase PA-C, 650 mg at 06/27/20 0056    apixaban (ELIQUIS) tablet 5 mg, 5 mg, Oral, BID, Rayna Chase PA-C, 5 mg at 06/27/20 3377    ascorbic acid (VITAMIN C) tablet 1,000 mg, 1,000 mg, Oral, BID, Rayna Chase PA-C, 1,000 mg at 06/27/20 8591    bumetanide (BUMEX) 12 5 mg infusion 50 mL, 0 5 mg/hr, Intravenous, Continuous, NAVNEET Aguilar, Last Rate: 2 mL/hr at 06/27/20 0816, 0 5 mg/hr at 06/27/20 0816    ceFAZolin (ANCEF) IVPB (premix) 2,000 mg 50 mL, 2,000 mg, Intravenous, Q8H, Rayna Chase PA-C, Last Rate: 100 mL/hr at 06/27/20 0532, 2,000 mg at 06/27/20 0532    cholecalciferol (VITAMIN D3) tablet 1,000 Units, 1,000 Units, Oral, Daily, Rayna Chase PA-C, 1,000 Units at 06/27/20 6471    collagenase (SANTYL) ointment, , Topical, Daily, Rashid Rooney, DPM    DULoxetine (CYMBALTA) delayed release capsule 30 mg, 30 mg, Oral, Daily, Rayna Chase PA-C, 30 mg at 06/27/20 7909    metoprolol tartrate (LOPRESSOR) tablet 25 mg, 25 mg, Oral, Q12H [facility], Rayna Chase PA-C, 25 mg at 06/27/20 0953    midodrine (PROAMATINE) tablet 2 5 mg, 2 5 mg, Oral, TID AC, NAVNEET Renee, 2 5 mg at 06/26/20 1723    ondansetron (ZOFRAN) injection 4 mg, 4 mg, Intravenous, Q6H PRN, Rayna Chase PA-C    pantoprazole (PROTONIX) EC tablet 40 mg, 40 mg, Oral, Daily, Rayna Chase PA-C, 40 mg at 06/27/20 0953    potassium chloride (K-DUR,KLOR-CON) CR tablet 40 mEq, 40 mEq, Oral, BID, NAVNEET Aguilar, 40 mEq at 06/27/20 0953    senna (SENOKOT) tablet 17 2 mg, 2 tablet, Oral, Daily, Kyle Ruelas PA-C, 17 2 mg at 06/27/20 0537    Imaging: I have personally reviewed pertinent films in PACS  EKG, Pathology, and Other Studies: I have personally reviewed pertinent reports    VTE Pharmacologic Prophylaxis:  Eliquis  VTE Mechanical Prophylaxis:  Patient refuses    Satinder Mckeon DPM

## 2020-06-27 NOTE — ASSESSMENT & PLAN NOTE
Wt Readings from Last 3 Encounters:   06/27/20 121 kg (266 lb 12 1 oz)   06/18/20 127 kg (279 lb 1 6 oz)   03/10/20 114 kg (251 lb 8 7 oz)      Chest x-ray: NAD   Last echocardiogram on file: EF 45%, November 2019   Initial troponin: 0 15  o Will trend x3 or to peak   BNP: 35,000   Patient was switched to Bumex drip yesterday   Continue on Beta-blocker: lopressor   Monitor intake and output, daily weights   Diet: Fluid restriction and 2 g Sodium    [facility] Cardiology and nephrology on board   Oxygen supplementation p r n    Monitor renal function while diuresing

## 2020-06-27 NOTE — ASSESSMENT & PLAN NOTE
· Troponin 0 15, likely non-MI troponin elevation   No active CP  · Trend trops, monitor on tele  · Cardiology consult appreciated  · Denies any chest pain

## 2020-06-27 NOTE — ASSESSMENT & PLAN NOTE
Creatinine upon admission: 2 29  Baseline: around 1 3  Secondary to prerenal overload    Creatinine this morning is 2 07  Treatment: diuretics, monitor I/O  On Bumex drip  Monitor renal function while diuresing  Nephrology recommendations appreciated  Avoid hypotension/nephrotoxins medications

## 2020-06-27 NOTE — PLAN OF CARE
Problem: Potential for Falls  Goal: Patient will remain free of falls  Description  INTERVENTIONS:  - Assess patient frequently for physical needs  -  Identify cognitive and physical deficits and behaviors that affect risk of falls    -  [place] fall precautions as indicated by assessment   - Educate patient/family on patient safety including physical limitations  - Instruct patient to call for assistance with activity based on assessment  - Modify environment to reduce risk of injury  - Consider OT/PT consult to assist with strengthening/mobility  Outcome: Progressing     Problem: Prexisting or High Potential for Compromised Skin Integrity  Goal: Skin integrity is maintained or improved  Description  INTERVENTIONS:  - Identify patients at risk for skin breakdown  - Assess and monitor skin integrity  - Assess and monitor nutrition and hydration status  - Monitor labs   - Assess for incontinence   - Turn and reposition patient  - Assist with mobility/ambulation  - Relieve pressure over bony prominences  - Avoid friction and shearing  - Provide appropriate hygiene as needed including keeping skin clean and dry  - Evaluate need for skin moisturizer/barrier cream  - Collaborate with interdisciplinary team   - Patient/family teaching  - Consider wound care consult   Outcome: Progressing     Problem: PAIN - ADULT  Goal: Verbalizes/displays adequate comfort level or baseline comfort level  Description  Interventions:  - Encourage patient to monitor pain and request assistance  - Assess pain using appropriate pain scale  - Administer analgesics based on type and severity of pain and evaluate response  - Implement non-pharmacological measures as appropriate and evaluate response  - Consider cultural and social influences on pain and pain management  - Notify physician/advanced practitioner if interventions unsuccessful or patient reports new pain  Outcome: Progressing     Problem: INFECTION - ADULT  Goal: Absence or prevention of progression during hospitalization  Description  INTERVENTIONS:  - Assess and monitor for signs and symptoms of infection  - Monitor lab/diagnostic results  - Monitor all insertion sites, i e  indwelling lines, tubes, and drains  - Monitor endotracheal if appropriate and nasal secretions for changes in amount and color  - [place] appropriate cooling/warming therapies per order  - Administer medications as ordered  - Instruct and encourage patient and family to use good hand hygiene technique  - Identify and instruct in appropriate isolation precautions for identified infection/condition  Outcome: Progressing     Problem: SAFETY ADULT  Goal: Patient will remain free of falls  Description  INTERVENTIONS:  - Assess patient frequently for physical needs  -  Identify cognitive and physical deficits and behaviors that affect risk of falls    -  [place] fall precautions as indicated by assessment   - Educate patient/family on patient safety including physical limitations  - Instruct patient to call for assistance with activity based on assessment  - Modify environment to reduce risk of injury  - Consider OT/PT consult to assist with strengthening/mobility  Outcome: Progressing     Problem: SAFETY ADULT  Goal: Maintain or return to baseline ADL function  Description  INTERVENTIONS:  -  Assess patient's ability to carry out ADLs; assess patient's baseline for ADL function and identify physical deficits which impact ability to perform ADLs (bathing, care of mouth/teeth, toileting, grooming, dressing, etc )  - Assess/evaluate cause of self-care deficits   - Assess range of motion  - Assess patient's mobility; develop plan if impaired  - Assess patient's need for assistive devices and provide as appropriate  - Encourage maximum independence but intervene and supervise when necessary  - Involve family in performance of ADLs  - Assess for home care needs following discharge   - Consider OT consult to assist with ADL evaluation and planning for discharge  - Provide patient education as appropriate  Outcome: Progressing     Problem: SAFETY ADULT  Goal: Maintain or return mobility status to optimal level  Description  INTERVENTIONS:  - Assess patient's baseline mobility status (ambulation, transfers, stairs, etc )    - Identify cognitive and physical deficits and behaviors that affect mobility  - Identify mobility aids required to assist with transfers and/or ambulation (gait belt, sit-to-stand, lift, walker, cane, etc )  - [place] fall precautions as indicated by assessment  - Record patient progress and toleration of activity level on Mobility SBAR; progress patient to next Phase/Stage  - Instruct patient to call for assistance with activity based on assessment  - Consider rehabilitation consult to assist with strengthening/weightbearing, etc   Outcome: Progressing     Problem: DISCHARGE PLANNING  Goal: Discharge to home or other facility with appropriate resources  Description  INTERVENTIONS:  - Identify barriers to discharge w/patient and caregiver  - Arrange for needed discharge resources and transportation as appropriate  - Identify discharge learning needs (meds, wound care, etc )  - Arrange for interpretive services to assist at discharge as needed  - Refer to Case Management Department for coordinating discharge planning if the patient needs post-hospital services based on physician/advanced practitioner order or complex needs related to functional status, cognitive ability, or social support system  Outcome: Progressing

## 2020-06-27 NOTE — PROGRESS NOTES
Progress Note - Linwood Mazariegos 1945, 69 y o  male MRN: [id number]    Unit/Bed#: -01 Encounter: [id number]    Primary Care Provider: Desiree Artis MD   Date and time admitted to hospital: 6/25/2020  6:10 PM        Lactic acidosis  Assessment & Plan  · Elevated at 2 2  · Trend to normal  · Fluids contraindicated in setting of CHF exacerbation    Acute metabolic encephalopathy  Assessment & Plan  · Son at bedside reports his follows been a little bit more confused today, he does have baseline confusion  · ? Etiology-related to uremia vs cellulitis  · CT head within normal limits  · Monitor mental status closely    Atrial fibrillation [facility])  Assessment & Plan   Controlled at this time  Rates in the 80s   Rate/rhythm control: lopressor   Anticoagulation: eliquis   Monitor heart rate  Elevated troponin level not due myocardial infarction  Assessment & Plan  · Troponin 0 15, likely non-MI troponin elevation  No active CP  · Trend trops, monitor on tele  · Cardiology consult appreciated  · Denies any chest pain     Acute kidney injury superimposed on CKD [facility])  Assessment & Plan  Creatinine upon admission: 2 29  Baseline: around 1 3  Secondary to prerenal overload  Creatinine this morning is 2 07  Treatment: diuretics, monitor I/O  On Bumex drip  Monitor renal function while diuresing  Nephrology recommendations appreciated  Avoid hypotension/nephrotoxins medications    Cellulitis of left lower extremity  Assessment & Plan  · When reviewing clinical images from the 14th of June, his left leg does appear to be more swollen and erythematous  · Continue on IV Ancef q 8 hours  · Trend procalcitonin  · Trend WBC and fever curve  · Podiatry consult appreciated  · Wound care and dressing changes per their recommendations  · Patient is scheduled for wound debridement on dorsal forefoot    Essential hypertension  Assessment & Plan   Continue lopressor    On Bumex drip   Monitor vitals as per protocol    * Acute on chronic combined systolic and diastolic congestive heart failure [facility])  Assessment & Plan  Wt Readings from Last 3 Encounters:   06/27/20 121 kg (266 lb 12 1 oz)   06/18/20 127 kg (279 lb 1 6 oz)   03/10/20 114 kg (251 lb 8 7 oz)      Chest x-ray: NAD   Last echocardiogram on file: EF 45%, November 2019   Initial troponin: 0 15  o Will trend x3 or to peak   BNP: 35,000   Patient was switched to Bumex drip yesterday   Continue on Beta-blocker: lopressor   Monitor intake and output, daily weights   Diet: Fluid restriction and 2 g Sodium  [facility] and [facility]rology on board   Oxygen supplementation p r n    Monitor renal function while diuresing        Labs & Imaging: I have personally reviewed pertinent reports  VTE Pharmacologic Prophylaxis:  Eliquis  VTE Mechanical Prophylaxis: sequential compression device    Code Status:   Level 1 - Full Code    Patient Centered Rounds: I have performed bedside rounds with nursing staff today  Discussions with Specialists or Other Care Team Provider:  Cardiology    Education and Discussions with Family / Patient:  Called and left VM for son    Current Length of Stay: 2 day(s)    Current Patient Status: Inpatient   Certification Statement: The patient will continue to require additional inpatient hospital stay due to see my assessment and plan  Subjective:   Patient is seen and examined at bedside  Denies any new complaints  Afebrile  All other ROS are negative  Objective:    Vitals: Blood pressure 111/68, pulse 70, temperature (!) 97 3 °F (36 3 °C), resp  rate 16, height 6' (1 829 m), weight 121 kg (266 lb 12 1 oz), SpO2 100 %  ,Body mass index is 36 18 kg/m²    SPO2 RA Rest      ED to Hosp-Admission (Current) from 6/25/2020 in [address] Med [facility] [address]   SpO2  100 %   SpO2 Activity  At Rest   O2 Device  None (Room air)   O2 Flow Rate          I&O:     Intake/Output Summary (Last 24 hours) at 6/27/2020 1056  Last data filed at 6/27/2020 0800  Gross per 24 hour   Intake 400 ml   Output 1320 ml   Net -920 ml       Physical Exam:    General- Alert, sitting in his scooter  Not in any acute distress  Neck- Supple, No JVD  CVS- irregular, S1 and S2 normal   Chest- Bilateral Air entry, No rhochi, crackles or wheezing present  Abdomen- soft, nontender, distended, no guarding or rigidity, BS+  Extremities-  has pedal edema  Chronic venous stasis changes  Left lower extremity in a dressing  CNS-   Alert, awake and orientedx3  No focal deficits present      Invasive Devices     Peripheral Intravenous Line            Peripheral IV 06/25/20 Left Antecubital 1 day    Peripheral IV 06/26/20 Right Forearm less than 1 day                      Social History  reviewed  Family History   Problem Relation Age of Onset    Cancer Mother         cancer of uterus    Diabetes Sister     Mental illness Neg Hx         neg fam hx    Substance Abuse Neg Hx         neg fam hx    reviewed    Meds:  Current Facility-Administered Medications   Medication Dose Route Frequency Provider Last Rate Last Dose    acetaminophen (TYLENOL) tablet 650 mg  650 mg Oral Q6H PRN Sudie Formica, PA-C   650 mg at 06/27/20 0056    apixaban (ELIQUIS) tablet 5 mg  5 mg Oral BID Sudie Formica, PA-C   5 mg at 06/27/20 0899    ascorbic acid (VITAMIN C) tablet 1,000 mg  1,000 mg Oral BID Sudie Formica, PA-C   1,000 mg at 06/27/20 7281    bumetanide (BUMEX) 12 5 mg infusion 50 mL  0 5 mg/hr Intravenous Continuous NAVNEET Powell 2 mL/hr at 06/27/20 0816 0 5 mg/hr at 06/27/20 0816    ceFAZolin (ANCEF) IVPB (premix) 2,000 mg 50 mL  2,000 mg Intravenous Q8H Sudie Formica, PA-C 100 mL/hr at 06/27/20 0532 2,000 mg at 06/27/20 0532    cholecalciferol (VITAMIN D3) tablet 1,000 Units  1,000 Units Oral Daily Sudie Formica, PA-C   1,000 Units at 06/27/20 3676    collagenase (SANTYL) ointment   Topical Daily Gina Avina DPM        DULoxetine (CYMBALTA) delayed release capsule 30 mg  30 mg Oral Daily Lana Lindsey PA-C   30 mg at 06/27/20 6282    metoprolol tartrate (LOPRESSOR) tablet 25 mg  25 mg Oral Q12H [address] Lana Lindsey PA-C   25 mg at 06/27/20 2756    midodrine (PROAMATINE) tablet 2 5 mg  2 5 mg Oral TID AC Noelle Galarza, CRNP   2 5 mg at 06/26/20 1723    ondansetron (ZOFRAN) injection 4 mg  4 mg Intravenous Q6H PRN Lana Lindsey PA-C        pantoprazole (PROTONIX) EC tablet 40 mg  40 mg Oral Daily Lana Lindsey PA-C   40 mg at 06/27/20 3644    potassium chloride (K-DUR,KLOR-CON) CR tablet 40 mEq  40 mEq Oral BID Skip Big CRNP   40 mEq at 06/27/20 7527    senna (SENOKOT) tablet 17 2 mg  2 tablet Oral Daily Lana Lindsey PA-C   17 2 mg at 06/27/20 8368      Medications Prior to Admission   Medication    apixaban (ELIQUIS) 5 mg    Ascorbic Acid (VITAMIN C) 1000 MG tablet    bumetanide (BUMEX) 1 mg tablet    cholecalciferol (VITAMIN D3) 1,000 units tablet    DULoxetine (CYMBALTA) 30 mg delayed release capsule    metoprolol tartrate (LOPRESSOR) 25 mg tablet    midodrine (PROAMATINE) 5 mg tablet    pantoprazole (PROTONIX) 40 mg tablet    senna (SENOKOT) 8 6 mg    sodium chloride (OCEAN) 0 65 % nasal spray       Labs:  Results from last 7 days   Lab Units 06/27/20  0625 06/26/20  0454 06/25/20  1842   WBC Thousand/uL 6 44 5 63 6 51   HEMOGLOBIN g/dL 13 6 13 9 13 5   HEMATOCRIT % 41 0 42 2 40 1   PLATELETS Thousands/uL 121* 117* 118*   NEUTROS PCT % 61 53 59   LYMPHS PCT % 20 24 20   MONOS PCT % 14* 17* 18*   EOS PCT % 4 5 2     Results from last 7 days   Lab Units 06/27/20  0625 06/26/20  0454 06/25/20  1842   POTASSIUM mmol/L 4 1 3 5 3 7   CHLORIDE mmol/L 101 100 101   CO2 mmol/L 26 24 24   BUN mg/dL 56* 61* 63*   CREATININE mg/dL 2 07* 2 15* 2 29*   CALCIUM mg/dL 8 9 9 5 9 3   ALK PHOS U/L 65  --  83   ALT U/L 16  --  23   AST U/L 34  --  49*     Lab Results   Component Value Date    TROPONINI 0 10 (H) 06/27/2020    TROPONINI 0 12 (H) 06/26/2020    TROPONINI 0 15 (H) 06/25/2020    CKMB 11 1 (H) 06/14/2020    CKMB 12 8 (H) 06/13/2020    CKMB 13 3 (H) 06/12/2020    CKTOTAL 319 (H) 06/14/2020    CKTOTAL 431 (H) 06/13/2020    CKTOTAL 492 (H) 06/12/2020     Results from last 7 days   Lab Units 06/25/20  1842   INR  2 09*     Lab Results   Component Value Date    BLOODCX No Growth at 24 hrs  06/25/2020    BLOODCX No Growth at 24 hrs  06/25/2020    BLOODCX No Growth After 5 Days  06/14/2020    URINECX <10,000 cfu/ml  02/19/2020    WOUNDCULT 2+ Growth of Serratia marcescens (A) 03/05/2020    WOUNDCULT 4+ Growth of Serratia marcescens (A) 03/02/2020    WOUNDCULT 3+ Growth of Serratia marcescens (A) 07/24/2019    WOUNDCULT 1+ Growth of  07/24/2019         Imaging:  Results for orders placed during the hospital encounter of 06/25/20   XR chest portable    Narrative CHEST     INDICATION:   Lower extremity swelling  COMPARISON:  6/14/2020    EXAM PERFORMED/VIEWS:  XR CHEST PORTABLE      FINDINGS:    Stable severe cardiomegaly  No pleural effusion  No airspace consolidation, pulmonary edema, atelectasis or pneumothorax  Limited evaluation bones and soft tissues  Impression No acute cardiopulmonary disease  Workstation performed: [id number]       Results for orders placed during the hospital encounter of 06/11/20   XR chest pa & lateral    Narrative CHEST     INDICATION:   sob  COMPARISON:  6/11/2020    EXAM PERFORMED/VIEWS:  XR CHEST PA & LATERAL      FINDINGS:    Cardiomediastinal silhouette remains enlarged  Slight vascular congestion and slight costophrenic angle blunting  No pneumothorax  Osseous structures appear within normal limits for patient age  Impression Cardiomegaly  Slight vascular congestion  Trace effusions          Workstation performed: [id number]         Last 24 Hours Medication List:     Current Facility-Administered Medications:  acetaminophen 650 mg Oral Q6H PRN Bernardo Ayala PA-C    apixaban 5 mg Oral BID Krystal Lips, PA-C    vitamin C 1,000 mg Oral BID Krystal Lips, PA-C    bumetanide 0 5 mg/hr Intravenous Continuous Robe Foots, CRNP Last Rate: 0 5 mg/hr (06/27/20 0816)   cefazolin 2,000 mg Intravenous Q8H Krystal Lips, PA-C Last Rate: 2,000 mg (06/27/20 0532)   cholecalciferol 1,000 Units Oral Daily Krystal Lips, PA-C    collagenase  Topical Daily Valiant Bodo, DPM    DULoxetine 30 mg Oral Daily Krystal Lips, PA-C    metoprolol tartrate 25 mg Oral Q12H [facility] Krystal Lips, PA-C    midodrine 2 5 mg Oral TID AC NAVNEET Renee    ondansetron 4 mg Intravenous Q6H PRN Krystal Lips, PA-C    pantoprazole 40 mg Oral Daily Krystal Lips, PA-C    potassium chloride 40 mEq Oral BID Robe Foots, CRNP    senna 2 tablet Oral Daily Krystal Lips, PA-C         Today, Patient Was Seen By: Darlin Moncada MD    ** Please Note: Dictation voice to text software may have been used in the creation of this document   **

## 2020-06-27 NOTE — ASSESSMENT & PLAN NOTE
· Son at bedside reports his follows been a little bit more confused today, he does have baseline confusion  · ? Etiology-related to uremia vs cellulitis  · CT head within normal limits    · Monitor mental status closely

## 2020-06-28 LAB
ANION GAP SERPL CALCULATED.3IONS-SCNC: 11 MMOL/L (ref 4–13)
BUN SERPL-MCNC: 57 MG/DL (ref 5–25)
CALCIUM SERPL-MCNC: 9 MG/DL (ref 8.3–10.1)
CHLORIDE SERPL-SCNC: 101 MMOL/L (ref 100–108)
CO2 SERPL-SCNC: 26 MMOL/L (ref 21–32)
CREAT SERPL-MCNC: 2.13 MG/DL (ref 0.6–1.3)
GFR SERPL CREATININE-BSD FRML MDRD: 30 ML/MIN/1.73SQ M
GLUCOSE SERPL-MCNC: 76 MG/DL (ref 65–140)
LACTATE SERPL-SCNC: 2 MMOL/L (ref 0.5–2)
LACTATE SERPL-SCNC: 2.6 MMOL/L (ref 0.5–2)
POTASSIUM SERPL-SCNC: 4.4 MMOL/L (ref 3.5–5.3)
SODIUM SERPL-SCNC: 138 MMOL/L (ref 136–145)

## 2020-06-28 PROCEDURE — 99232 SBSQ HOSP IP/OBS MODERATE 35: CPT | Performed by: INTERNAL MEDICINE

## 2020-06-28 PROCEDURE — 80048 BASIC METABOLIC PNL TOTAL CA: CPT | Performed by: INTERNAL MEDICINE

## 2020-06-28 PROCEDURE — 83605 ASSAY OF LACTIC ACID: CPT | Performed by: PHYSICIAN ASSISTANT

## 2020-06-28 PROCEDURE — 83605 ASSAY OF LACTIC ACID: CPT | Performed by: INTERNAL MEDICINE

## 2020-06-28 RX ORDER — MIDODRINE HYDROCHLORIDE 5 MG/1
5 TABLET ORAL
Status: DISCONTINUED | OUTPATIENT
Start: 2020-06-28 | End: 2020-07-01 | Stop reason: HOSPADM

## 2020-06-28 RX ORDER — NYSTATIN 100000 [USP'U]/G
POWDER TOPICAL 2 TIMES DAILY
Status: DISCONTINUED | OUTPATIENT
Start: 2020-06-28 | End: 2020-07-01 | Stop reason: HOSPADM

## 2020-06-28 RX ORDER — OXYCODONE HYDROCHLORIDE 5 MG/1
5 TABLET ORAL EVERY 6 HOURS PRN
Status: DISCONTINUED | OUTPATIENT
Start: 2020-06-28 | End: 2020-07-01 | Stop reason: HOSPADM

## 2020-06-28 RX ADMIN — MIDODRINE HYDROCHLORIDE 5 MG: 5 TABLET ORAL at 18:59

## 2020-06-28 RX ADMIN — POTASSIUM CHLORIDE 40 MEQ: 1500 TABLET, EXTENDED RELEASE ORAL at 09:24

## 2020-06-28 RX ADMIN — OXYCODONE HYDROCHLORIDE 5 MG: 5 TABLET ORAL at 18:59

## 2020-06-28 RX ADMIN — METRONIDAZOLE 500 MG: 500 INJECTION, SOLUTION INTRAVENOUS at 19:01

## 2020-06-28 RX ADMIN — OXYCODONE HYDROCHLORIDE AND ACETAMINOPHEN 1000 MG: 500 TABLET ORAL at 09:24

## 2020-06-28 RX ADMIN — CEFAZOLIN SODIUM 2000 MG: 2 SOLUTION INTRAVENOUS at 13:00

## 2020-06-28 RX ADMIN — APIXABAN 5 MG: 5 TABLET, FILM COATED ORAL at 18:59

## 2020-06-28 RX ADMIN — ACETAMINOPHEN 650 MG: 325 TABLET ORAL at 11:01

## 2020-06-28 RX ADMIN — METOPROLOL TARTRATE 25 MG: 25 TABLET, FILM COATED ORAL at 22:04

## 2020-06-28 RX ADMIN — STANDARDIZED SENNA CONCENTRATE 17.2 MG: 8.6 TABLET ORAL at 09:24

## 2020-06-28 RX ADMIN — Medication 0.5 MG/HR: at 13:53

## 2020-06-28 RX ADMIN — MIDODRINE HYDROCHLORIDE 2.5 MG: 5 TABLET ORAL at 06:23

## 2020-06-28 RX ADMIN — CEFAZOLIN SODIUM 2000 MG: 2 SOLUTION INTRAVENOUS at 03:55

## 2020-06-28 RX ADMIN — CEFAZOLIN SODIUM 2000 MG: 2 SOLUTION INTRAVENOUS at 20:49

## 2020-06-28 RX ADMIN — PANTOPRAZOLE SODIUM 40 MG: 40 TABLET, DELAYED RELEASE ORAL at 09:24

## 2020-06-28 RX ADMIN — POTASSIUM CHLORIDE 40 MEQ: 1500 TABLET, EXTENDED RELEASE ORAL at 18:59

## 2020-06-28 RX ADMIN — MIDODRINE HYDROCHLORIDE 2.5 MG: 5 TABLET ORAL at 13:02

## 2020-06-28 RX ADMIN — APIXABAN 5 MG: 5 TABLET, FILM COATED ORAL at 09:23

## 2020-06-28 RX ADMIN — METOPROLOL TARTRATE 25 MG: 25 TABLET, FILM COATED ORAL at 09:24

## 2020-06-28 RX ADMIN — DULOXETINE 30 MG: 30 CAPSULE, DELAYED RELEASE ORAL at 09:32

## 2020-06-28 RX ADMIN — MELATONIN 1000 UNITS: at 09:24

## 2020-06-28 RX ADMIN — OXYCODONE HYDROCHLORIDE AND ACETAMINOPHEN 1000 MG: 500 TABLET ORAL at 18:59

## 2020-06-28 RX ADMIN — COLLAGENASE SANTYL: 250 OINTMENT TOPICAL at 09:25

## 2020-06-28 RX ADMIN — METRONIDAZOLE 500 MG: 500 INJECTION, SOLUTION INTRAVENOUS at 09:25

## 2020-06-28 NOTE — PROGRESS NOTES
NEPHROLOGY PROGRESS NOTE   Linwood Mazariegos 69 y o  male MRN: [id number]  Unit/Bed#: -01 Encounter: [id number]  Reason for Consult: samy/ckd    ASSESSMENT/PLAN:  Acute kidney injury (POA) on suspected chronic kidney disease, stage 3:  - acute kidney injury suspect prerenal azotemia with cardiorenal syndrome with volume overload    - suspected chronic kidney disease secondary to hypertensive nephrosclerosis + atherosclerosis + cardiorenal syndrome + age-related nephron loss    - baseline creatinine of 1 3-1 6 from March 2020   Of note, patient with SAMY earlier this month with creatinine in the low 2s  - patient was admitted with a creatinine of 2 29  - patient's creatinine today is 2 13 with stable electrolytes  - UA: bland  - renal ultrasound completed 06/2020 revealed right kidney 11 3 x 4 2 cm, left kidney 11 5 x 5 7 cm   Normal echogenicity and contour   Bilateral renal cysts   Right kidney is in the right lower quadrant  - 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 connecting both lower poles   Bilateral probably renal cysts are similar in appearance to prior exam   No hydronephrosis or hydroureter    - avoid NSAIDS, nephrotoxins, IV contrast if possible  - avoid hypotension/ fluctuations in blood pressure to prevent decreased renal perfusion    - monitor volume status with strict intake/output and daily weights     Hypotension:  - blood pressure overall stable and acceptable  - home medication regimen:  Metoprolol tartrate 25 mg b i d , Bumex 1 mg daily, midodrine 5 mg 3 times daily before meals  - increase midodrine to 5mg TID  - hold parameters placed for SBP > 130 for the midodrine    - maintain MAP > 65mmHg     - avoid hypotension/ fluctuations in blood pressure       CHF: EF 30% as of 6/26/2020  - home diuretic regimen: bumex 1mg daily  - stop bumex drip for relative hypotension  - volume status improved  - continue 1 5 L fluid restriction   - cardiology following      Cellulitis of left lower extremity:  - currently on Ancef   - management per primary team     Disposition:  Will hold bumex drip and increase midodrine  Volume evaluation for oral diuretic needs tomorrow  SUBJECTIVE:  Patient just had large bowel movement  Denies sob  Has a lot of pain in his foot      OBJECTIVE:  Current Weight: Weight - Scale: 122 kg (268 lb 15 4 oz)  Vitals:    06/28/20 0750 06/28/20 0843 06/28/20 1105 06/28/20 1301   BP: 112/79 112/67 111/68 107/70   BP Location:       Pulse: 59 57 72 70   Resp: 17      Temp: (!) 96 5 °F (35 8 °C)      TempSrc:       SpO2: 97% 99% 100% 96%   Weight:       Height:           Intake/Output Summary (Last 24 hours) at 6/28/2020 1438  Last data filed at 6/28/2020 1427  Gross per 24 hour   Intake    Output 2810 ml   Net -2810 ml     General: NAD  Skin: no rash  Eyes: anicteric  ENMT: mm moist  Neck: no masses  Respiratory: CTAB  Cardiac: RRR  Extremities: + bilateral edema  GI: soft nt nd  Neuro: alert awake  Psych: mood and affect appropriate    Medications:    Current Facility-Administered Medications:     acetaminophen (TYLENOL) tablet 650 mg, 650 mg, Oral, Q6H PRN, Jamil Hooper PA-C, 650 mg at 06/28/20 1101    apixaban (ELIQUIS) tablet 5 mg, 5 mg, Oral, BID, Jamil Hooper PA-C, 5 mg at 06/28/20 3895    ascorbic acid (VITAMIN C) tablet 1,000 mg, 1,000 mg, Oral, BID, Jamil Hooper PA-C, 1,000 mg at 06/28/20 5923    ceFAZolin (ANCEF) IVPB (premix) 2,000 mg 50 mL, 2,000 mg, Intravenous, Q8H, Jamil Hooper PA-C, Last Rate: 100 mL/hr at 06/28/20 1300, 2,000 mg at 06/28/20 1300    cholecalciferol (VITAMIN D3) tablet 1,000 Units, 1,000 Units, Oral, Daily, Jamil Hooper PA-C, 1,000 Units at 06/28/20 0230    collagenase (SANTYL) ointment, , Topical, Daily, Jemal Cola, DPM    DULoxetine (CYMBALTA) delayed release capsule 30 mg, 30 mg, Oral, Daily, Jamil Hooper PA-C, 30 mg at 06/28/20 0932    metoprolol tartrate (LOPRESSOR) tablet 25 mg, 25 mg, Oral, Q12H [address], Latonia Milligan PA-C, 25 mg at 06/28/20 9087    metroNIDAZOLE (FLAGYL) IVPB (premix) 500 mg 100 mL, 500 mg, Intravenous, Q8H, Yenny Jorgensen MD, Last Rate: 200 mL/hr at 06/28/20 0925, 500 mg at 06/28/20 0925    midodrine (PROAMATINE) tablet 5 mg, 5 mg, Oral, TID AC, Lynette Hernandez PA-C    nystatin (MYCOSTATIN) powder, , Topical, BID, Yenny Jorgensen MD    ondansetron [facility]) injection 4 mg, 4 mg, Intravenous, Q6H PRN, Latonia Milligan PA-C    oxyCODONE (ROXICODONE) IR tablet 5 mg, 5 mg, Oral, Q6H PRN, Yenny Jorgensen MD    pantoprazole (PROTONIX) EC tablet 40 mg, 40 mg, Oral, Daily, Latonia Milligan PA-C, 40 mg at 06/28/20 9769    potassium chloride (K-DUR,KLOR-CON) CR tablet 40 mEq, 40 mEq, Oral, BID, Clinton Florencio, CRNP, 40 mEq at 06/28/20 3182    senna (SENOKOT) tablet 17 2 mg, 2 tablet, Oral, Daily, Latonia Milligan PA-C, 17 2 mg at 06/28/20 9455    Laboratory Results:  Results from last 7 days   Lab Units 06/28/20  0340 06/27/20  0625 06/26/20  0454 06/25/20  1842   WBC Thousand/uL  --  6 44 5 63 6 51   HEMOGLOBIN g/dL  --  13 6 13 9 13 5   HEMATOCRIT %  --  41 0 42 2 40 1   PLATELETS Thousands/uL  --  121* 117* 118*   POTASSIUM mmol/L 4 4 4 1 3 5 3 7   CHLORIDE mmol/L 101 101 100 101   CO2 mmol/L 26 26 24 24   BUN mg/dL 57* 56* 61* 63*   CREATININE mg/dL 2 13* 2 07* 2 15* 2 29*   CALCIUM mg/dL 9 0 8 9 9 5 9 3   MAGNESIUM mg/dL  --  1 9  --  1 7   PHOSPHORUS mg/dL  --  3 2  --  3 6

## 2020-06-28 NOTE — ASSESSMENT & PLAN NOTE
Wt Readings from Last 3 Encounters:   06/28/20 122 kg (268 lb 15 4 oz)   06/18/20 127 kg (279 lb 1 6 oz)   03/10/20 114 kg (251 lb 8 7 oz)      Chest x-ray: NAD   Last echocardiogram on file: EF 45%, November 2019   Initial troponin: 0 15  o Will trend x3 or to peak   BNP: 35,000   Patient was switched to Bumex drip on 02/26   Continue on Beta-blocker: lopressor   Monitor intake and output, daily weights   Diet: Fluid restriction and 2 g Sodium     Monitor renal function while diuresing   Cardiology and nephrology on board   Oxygen supplementation p r n    Monitor renal function while diuresing

## 2020-06-28 NOTE — NURSING NOTE
Patient reported chronic back  Not due for Tylenol  Notified Bethany Quarles PA-C  Verbal received for Aqua K  No other order obtained  Bumex held due to parameters not being met  Directed to hold for an hour by SLIM  Will recheck BP in hour and address then based on VS  Will continue to monitor

## 2020-06-28 NOTE — ASSESSMENT & PLAN NOTE
· When reviewing clinical images from the 14th of June, his left leg does appear to be more swollen and erythematous    · Continue on IV Ancef q 8 hours and Flagyl  · Trend WBC and fever curve  · Podiatry follow-up noted  · Wound care and dressing changes per their recommendations  · Patient had wound debridement done by Podiatry on 06/27 at bedside

## 2020-06-28 NOTE — ASSESSMENT & PLAN NOTE
· Son at bedside reports his follows been a little bit more confused today, he does have baseline confusion  · ? Etiology likely Cellulitis  · CT head within normal limits    · Monitor mental status closely

## 2020-06-28 NOTE — ASSESSMENT & PLAN NOTE
Creatinine upon admission: 2 29  Baseline: around 1 3  Secondary to prerenal overload  Creatinine this morning is 2 13  Treatment: diuretics, monitor I/O  On Bumex drip    Monitor renal function while diuresing  Nephrology recommendations appreciated  Avoid hypotension/nephrotoxins medications

## 2020-06-29 ENCOUNTER — APPOINTMENT (INPATIENT)
Dept: NON INVASIVE DIAGNOSTICS | Facility: HOSPITAL | Age: 75
DRG: 264 | End: 2020-06-29
Payer: COMMERCIAL

## 2020-06-29 PROBLEM — E87.2 LACTIC ACIDOSIS: Status: RESOLVED | Noted: 2020-06-12 | Resolved: 2020-06-29

## 2020-06-29 LAB
ANION GAP SERPL CALCULATED.3IONS-SCNC: 11 MMOL/L (ref 4–13)
BASOPHILS # BLD AUTO: 0.05 THOUSANDS/ΜL (ref 0–0.1)
BASOPHILS NFR BLD AUTO: 1 % (ref 0–1)
BUN SERPL-MCNC: 54 MG/DL (ref 5–25)
CALCIUM SERPL-MCNC: 8.6 MG/DL (ref 8.3–10.1)
CHLORIDE SERPL-SCNC: 100 MMOL/L (ref 100–108)
CO2 SERPL-SCNC: 26 MMOL/L (ref 21–32)
CREAT SERPL-MCNC: 2.12 MG/DL (ref 0.6–1.3)
EOSINOPHIL # BLD AUTO: 0.33 THOUSAND/ΜL (ref 0–0.61)
EOSINOPHIL NFR BLD AUTO: 5 % (ref 0–6)
ERYTHROCYTE [DISTWIDTH] IN BLOOD BY AUTOMATED COUNT: 19.2 % (ref 11.6–15.1)
GFR SERPL CREATININE-BSD FRML MDRD: 30 ML/MIN/1.73SQ M
GLUCOSE SERPL-MCNC: 75 MG/DL (ref 65–140)
HCT VFR BLD AUTO: 40.1 % (ref 36.5–49.3)
HGB BLD-MCNC: 13.4 G/DL (ref 12–17)
IMM GRANULOCYTES # BLD AUTO: 0.02 THOUSAND/UL (ref 0–0.2)
IMM GRANULOCYTES NFR BLD AUTO: 0 % (ref 0–2)
LYMPHOCYTES # BLD AUTO: 1.05 THOUSANDS/ΜL (ref 0.6–4.47)
LYMPHOCYTES NFR BLD AUTO: 17 % (ref 14–44)
MAGNESIUM SERPL-MCNC: 1.6 MG/DL (ref 1.6–2.6)
MCH RBC QN AUTO: 32 PG (ref 26.8–34.3)
MCHC RBC AUTO-ENTMCNC: 33.4 G/DL (ref 31.4–37.4)
MCV RBC AUTO: 96 FL (ref 82–98)
MONOCYTES # BLD AUTO: 0.68 THOUSAND/ΜL (ref 0.17–1.22)
MONOCYTES NFR BLD AUTO: 11 % (ref 4–12)
NEUTROPHILS # BLD AUTO: 3.93 THOUSANDS/ΜL (ref 1.85–7.62)
NEUTS SEG NFR BLD AUTO: 66 % (ref 43–75)
NRBC BLD AUTO-RTO: 0 /100 WBCS
PLATELET # BLD AUTO: 117 THOUSANDS/UL (ref 149–390)
PMV BLD AUTO: 12.2 FL (ref 8.9–12.7)
POTASSIUM SERPL-SCNC: 4.1 MMOL/L (ref 3.5–5.3)
RBC # BLD AUTO: 4.19 MILLION/UL (ref 3.88–5.62)
SODIUM SERPL-SCNC: 137 MMOL/L (ref 136–145)
WBC # BLD AUTO: 6.06 THOUSAND/UL (ref 4.31–10.16)

## 2020-06-29 PROCEDURE — 99232 SBSQ HOSP IP/OBS MODERATE 35: CPT | Performed by: INTERNAL MEDICINE

## 2020-06-29 PROCEDURE — 99232 SBSQ HOSP IP/OBS MODERATE 35: CPT | Performed by: NURSE PRACTITIONER

## 2020-06-29 PROCEDURE — 93971 EXTREMITY STUDY: CPT | Performed by: SURGERY

## 2020-06-29 PROCEDURE — 93971 EXTREMITY STUDY: CPT

## 2020-06-29 PROCEDURE — 80048 BASIC METABOLIC PNL TOTAL CA: CPT | Performed by: INTERNAL MEDICINE

## 2020-06-29 PROCEDURE — 83735 ASSAY OF MAGNESIUM: CPT | Performed by: INTERNAL MEDICINE

## 2020-06-29 PROCEDURE — 99233 SBSQ HOSP IP/OBS HIGH 50: CPT | Performed by: INTERNAL MEDICINE

## 2020-06-29 PROCEDURE — 85025 COMPLETE CBC W/AUTO DIFF WBC: CPT | Performed by: INTERNAL MEDICINE

## 2020-06-29 RX ORDER — BUMETANIDE 1 MG/1
1 TABLET ORAL DAILY
Status: DISCONTINUED | OUTPATIENT
Start: 2020-06-29 | End: 2020-07-01 | Stop reason: HOSPADM

## 2020-06-29 RX ADMIN — POTASSIUM CHLORIDE 40 MEQ: 1500 TABLET, EXTENDED RELEASE ORAL at 18:54

## 2020-06-29 RX ADMIN — OXYCODONE HYDROCHLORIDE AND ACETAMINOPHEN 1000 MG: 500 TABLET ORAL at 18:54

## 2020-06-29 RX ADMIN — CEFAZOLIN SODIUM 2000 MG: 2 SOLUTION INTRAVENOUS at 12:22

## 2020-06-29 RX ADMIN — APIXABAN 5 MG: 5 TABLET, FILM COATED ORAL at 09:29

## 2020-06-29 RX ADMIN — METRONIDAZOLE 500 MG: 500 INJECTION, SOLUTION INTRAVENOUS at 00:30

## 2020-06-29 RX ADMIN — CEFAZOLIN SODIUM 2000 MG: 2 SOLUTION INTRAVENOUS at 04:25

## 2020-06-29 RX ADMIN — OXYCODONE HYDROCHLORIDE 5 MG: 5 TABLET ORAL at 14:28

## 2020-06-29 RX ADMIN — MELATONIN 1000 UNITS: at 09:28

## 2020-06-29 RX ADMIN — PANTOPRAZOLE SODIUM 40 MG: 40 TABLET, DELAYED RELEASE ORAL at 09:28

## 2020-06-29 RX ADMIN — DULOXETINE 30 MG: 30 CAPSULE, DELAYED RELEASE ORAL at 09:28

## 2020-06-29 RX ADMIN — NYSTATIN: 100000 POWDER TOPICAL at 19:08

## 2020-06-29 RX ADMIN — MIDODRINE HYDROCHLORIDE 5 MG: 5 TABLET ORAL at 06:45

## 2020-06-29 RX ADMIN — METRONIDAZOLE 500 MG: 500 INJECTION, SOLUTION INTRAVENOUS at 14:31

## 2020-06-29 RX ADMIN — CEFAZOLIN SODIUM 2000 MG: 2 SOLUTION INTRAVENOUS at 20:14

## 2020-06-29 RX ADMIN — COLLAGENASE SANTYL: 250 OINTMENT TOPICAL at 11:00

## 2020-06-29 RX ADMIN — NYSTATIN: 100000 POWDER TOPICAL at 09:39

## 2020-06-29 RX ADMIN — METRONIDAZOLE 500 MG: 500 INJECTION, SOLUTION INTRAVENOUS at 07:39

## 2020-06-29 RX ADMIN — MIDODRINE HYDROCHLORIDE 5 MG: 5 TABLET ORAL at 10:55

## 2020-06-29 RX ADMIN — APIXABAN 5 MG: 5 TABLET, FILM COATED ORAL at 18:54

## 2020-06-29 RX ADMIN — MIDODRINE HYDROCHLORIDE 5 MG: 5 TABLET ORAL at 15:41

## 2020-06-29 RX ADMIN — BUMETANIDE 1 MG: 1 TABLET ORAL at 12:22

## 2020-06-29 RX ADMIN — POTASSIUM CHLORIDE 40 MEQ: 1500 TABLET, EXTENDED RELEASE ORAL at 09:28

## 2020-06-29 RX ADMIN — OXYCODONE HYDROCHLORIDE 5 MG: 5 TABLET ORAL at 07:39

## 2020-06-29 RX ADMIN — OXYCODONE HYDROCHLORIDE AND ACETAMINOPHEN 1000 MG: 500 TABLET ORAL at 09:28

## 2020-06-29 NOTE — PLAN OF CARE
Problem: Potential for Falls  Goal: Patient will remain free of falls  Description  INTERVENTIONS:  - Assess patient frequently for physical needs  -  Identify cognitive and physical deficits and behaviors that affect risk of falls    -  [place] fall precautions as indicated by assessment   - Educate patient/family on patient safety including physical limitations  - Instruct patient to call for assistance with activity based on assessment  - Modify environment to reduce risk of injury  - Consider OT/PT consult to assist with strengthening/mobility  Outcome: Progressing     Problem: Prexisting or High Potential for Compromised Skin Integrity  Goal: Skin integrity is maintained or improved  Description  INTERVENTIONS:  - Identify patients at risk for skin breakdown  - Assess and monitor skin integrity  - Assess and monitor nutrition and hydration status  - Monitor labs   - Assess for incontinence   - Turn and reposition patient  - Assist with mobility/ambulation  - Relieve pressure over bony prominences  - Avoid friction and shearing  - Provide appropriate hygiene as needed including keeping skin clean and dry  - Evaluate need for skin moisturizer/barrier cream  - Collaborate with interdisciplinary team   - Patient/family teaching  - Consider wound care consult   Outcome: Progressing     Problem: PAIN - ADULT  Goal: Verbalizes/displays adequate comfort level or baseline comfort level  Description  Interventions:  - Encourage patient to monitor pain and request assistance  - Assess pain using appropriate pain scale  - Administer analgesics based on type and severity of pain and evaluate response  - Implement non-pharmacological measures as appropriate and evaluate response  - Consider cultural and social influences on pain and pain management  - Notify physician/advanced practitioner if interventions unsuccessful or patient reports new pain  Outcome: Progressing     Problem: INFECTION - ADULT  Goal: Absence or prevention of progression during hospitalization  Description  INTERVENTIONS:  - Assess and monitor for signs and symptoms of infection  - Monitor lab/diagnostic results  - Monitor all insertion sites, i e  indwelling lines, tubes, and drains  - Monitor endotracheal if appropriate and nasal secretions for changes in amount and color  - [place] appropriate cooling/warming therapies per order  - Administer medications as ordered  - Instruct and encourage patient and family to use good hand hygiene technique  - Identify and instruct in appropriate isolation precautions for identified infection/condition  Outcome: Progressing     Problem: SAFETY ADULT  Goal: Patient will remain free of falls  Description  INTERVENTIONS:  - Assess patient frequently for physical needs  -  Identify cognitive and physical deficits and behaviors that affect risk of falls    -  [place] fall precautions as indicated by assessment   - Educate patient/family on patient safety including physical limitations  - Instruct patient to call for assistance with activity based on assessment  - Modify environment to reduce risk of injury  - Consider OT/PT consult to assist with strengthening/mobility  Outcome: Progressing  Goal: Maintain or return to baseline ADL function  Description  INTERVENTIONS:  -  Assess patient's ability to carry out ADLs; assess patient's baseline for ADL function and identify physical deficits which impact ability to perform ADLs (bathing, care of mouth/teeth, toileting, grooming, dressing, etc )  - Assess/evaluate cause of self-care deficits   - Assess range of motion  - Assess patient's mobility; develop plan if impaired  - Assess patient's need for assistive devices and provide as appropriate  - Encourage maximum independence but intervene and supervise when necessary  - Involve family in performance of ADLs  - Assess for home care needs following discharge   - Consider OT consult to assist with ADL evaluation and planning for discharge  - Provide patient education as appropriate  Outcome: Progressing  Goal: Maintain or return mobility status to optimal level  Description  INTERVENTIONS:  - Assess patient's baseline mobility status (ambulation, transfers, stairs, etc )    - Identify cognitive and physical deficits and behaviors that affect mobility  - Identify mobility aids required to assist with transfers and/or ambulation (gait belt, sit-to-stand, lift, walker, cane, etc )  - [place] fall precautions as indicated by assessment  - Record patient progress and toleration of activity level on Mobility SBAR; progress patient to next Phase/Stage  - Instruct patient to call for assistance with activity based on assessment  - Consider rehabilitation consult to assist with strengthening/weightbearing, etc   Outcome: Progressing     Problem: DISCHARGE PLANNING  Goal: Discharge to home or other facility with appropriate resources  Description  INTERVENTIONS:  - Identify barriers to discharge w/patient and caregiver  - Arrange for needed discharge resources and transportation as appropriate  - Identify discharge learning needs (meds, wound care, etc )  - Arrange for interpretive services to assist at discharge as needed  - Refer to Case Management Department for coordinating discharge planning if the patient needs post-hospital services based on physician/advanced practitioner order or complex needs related to functional status, cognitive ability, or social support system  Outcome: Progressing     Problem: Knowledge Deficit  Goal: Patient/family/caregiver demonstrates understanding of disease process, treatment plan, medications, and discharge instructions  Description  Complete learning assessment and assess knowledge base    Interventions:  - Provide teaching at level of understanding  - Provide teaching via preferred learning methods  Outcome: Progressing     Problem: Nutrition/Hydration-ADULT  Goal: Nutrient/Hydration intake appropriate for improving, restoring or maintaining nutritional needs  Description  Monitor and assess patient's nutrition/hydration status for malnutrition  Collaborate with interdisciplinary team and initiate plan and interventions as ordered  Monitor patient's weight and dietary intake as ordered or per policy  Utilize nutrition screening tool and intervene as necessary  Determine patient's food preferences and provide high-protein, high-caloric foods as appropriate       INTERVENTIONS:  - Monitor oral intake, urinary output, labs, and treatment plans  - Assess nutrition and hydration status and recommend course of action  - Evaluate amount of meals eaten  - Assist patient with eating if necessary   - Allow adequate time for meals  - Recommend/ encourage appropriate diets, oral nutritional supplements, and vitamin/mineral supplements  - Order, calculate, and assess calorie counts as needed  - Recommend, monitor, and adjust tube feedings and TPN/PPN based on assessed needs  - Assess need for intravenous fluids  - Provide specific nutrition/hydration education as appropriate  - Include patient/family/caregiver in decisions related to nutrition  Outcome: Progressing

## 2020-06-29 NOTE — PROGRESS NOTES
NEPHROLOGY PROGRESS NOTE   Linwood Mazariegos 69 y o  male MRN: [id number]  Unit/Bed#: -01 Encounter: [id number]  Reason for Consult: SAMY (POA) on CKD III    ASSESSMENT/PLAN:  SAMY (POA) on CKD III:  Suspected prerenal azotemia with possible CRS contributing   -presented with creatinine of 2 29   -baseline creatinine 1 3-1 6   -creatinine stable around 2 1 x 4 days, may be new baseline  -UA:  [facility]   -last renal ultrasound showed normal echogenicity and contour, bilateral renal cysts  -CT scan in 2016 showed horseshoe kidney with thin fibrosis band connecting both lower poles  -previously on Bumex drip  Discontinue 6/28   -will start Bumex 1 mg PO daily    -continue to avoid nephrotoxins  -I/O  Hypotension: Blood pressure overall stable   -continues on midodrine 5 mg t i d  With holding parameter for systolic blood pressure greater than 130   -avoid hypotension or high fluctuations in blood pressure   -home medications:  Metoprolol tartrate 25 mg b i d , Bumex 1 mg daily, midodrine 5 mg 3 times a day  CHF:  With EF of 30%  -chest x-ray: negative for cardiopulmonary disease    -home diuretic:  Bumex 1 mg daily    -cardiology following   -previously on Bumex drip, discontinue 6/28  -continue 1 5 L fluid restriction, daily weights, and I/O  Cellulitis:  -continues on Ancef per primary team     LLE edema:  -duplex pending to R/O DVT  Anemia:  -continue to monitor and transfuse as needed  Other:  Atrial fibrillation    SUBJECTIVE:  The patient states he is doing fine today  He denies chest pain or SOB  He denies N,V,D, or issues with urination  He is eating and drinking well       OBJECTIVE:  Current Weight: Weight - Scale: 122 kg (269 lb 2 9 oz)  Vitals:    06/28/20 2202 06/29/20 0600 06/29/20 0643 06/29/20 0721   BP: 120/74  123/77 93/57   BP Location:       Pulse: 66  68 71   Resp:    17   Temp:    (!) 97 3 °F (36 3 °C)   TempSrc:       SpO2: 98%  95% 98%   Weight:  122 kg (269 lb 2 9 oz) Height:           Intake/Output Summary (Last 24 hours) at 6/29/2020 0958  Last data filed at 6/29/2020 5122  Gross per 24 hour   Intake 360 ml   Output 2075 ml   Net -1715 ml     General: NAD  Skin: warm, dry, intact, no rash, LLE wound intact  HEENT: Moist mucous membranes, sclera anicteric, normocephalic, atraumatic  Neck: No apparent JVD appreciated  Chest: lung sounds clear B/L, on RA   CVS:Regular rate and rhythm, no murmer   Abdomen: Soft, round, non-tender, +BS, obese  Extremities:  B/L LE edema present  Neuro: alert and oriented  Psych: appropriate mood and affect     Medications:    Current Facility-Administered Medications:     acetaminophen (TYLENOL) tablet 650 mg, 650 mg, Oral, Q6H PRN, Jalaine Amas, PA-C, 650 mg at 06/28/20 1101    apixaban (ELIQUIS) tablet 5 mg, 5 mg, Oral, BID, Jalaine Amas, PA-C, 5 mg at 06/29/20 3263    ascorbic acid (VITAMIN C) tablet 1,000 mg, 1,000 mg, Oral, BID, Jalaine Amas, PA-C, 1,000 mg at 06/29/20 2065    ceFAZolin (ANCEF) IVPB (premix) 2,000 mg 50 mL, 2,000 mg, Intravenous, Q8H, Jalaine Amas, PA-C, Stopped at 06/29/20 1723    cholecalciferol (VITAMIN D3) tablet 1,000 Units, 1,000 Units, Oral, Daily, Jalaine Amas, PA-C, 1,000 Units at 06/29/20 7400    collagenase (SANTYL) ointment, , Topical, Daily, Eladio Dang, DPM    DULoxetine (CYMBALTA) delayed release capsule 30 mg, 30 mg, Oral, Daily, Jalaine Amas, PA-C, 30 mg at 06/29/20 7184    metoprolol tartrate (LOPRESSOR) tablet 25 mg, 25 mg, Oral, Q12H [address], Jalaine Amas, PA-C, Stopped at 06/29/20 0928    metroNIDAZOLE (FLAGYL) IVPB (premix) 500 mg 100 mL, 500 mg, Intravenous, Q8H, Lollie Jade, MD, Last Rate: 200 mL/hr at 06/29/20 0739, 500 mg at 06/29/20 0739    midodrine (PROAMATINE) tablet 5 mg, 5 mg, Oral, TID , [facility], PA-C, 5 mg at 06/29/20 0645    nystatin (MYCOSTATIN) powder, , Topical, BID, Zen Hansen MD    ondansetron (ZOFRAN) injection 4 mg, 4 mg, Intravenous, Q6H PRN, Jannette Serve, PA-C    oxyCODONE (ROXICODONE) IR tablet 5 mg, 5 mg, Oral, Q6H PRN, Michael Truong MD, 5 mg at 06/29/20 0739    pantoprazole (PROTONIX) EC tablet 40 mg, 40 mg, Oral, Daily, Mardene Serve, PA-C, 40 mg at 06/29/20 7923    potassium chloride (K-DUR,KLOR-CON) CR tablet 40 mEq, 40 mEq, Oral, BID, Naliss Cooper, CRNP, 40 mEq at 06/29/20 8868    senna (SENOKOT) tablet 17 2 mg, 2 tablet, Oral, Daily, Jannette Hutchison, PA-C, 17 2 mg at 06/28/20 9243    Laboratory Results:  Results from last 7 days   Lab Units 06/29/20  0523 06/28/20  0340 06/27/20  0625 06/26/20  0454 06/25/20  1842   WBC Thousand/uL 6 06  --  6 44 5 63 6 51   HEMOGLOBIN g/dL 13 4  --  13 6 13 9 13 5   HEMATOCRIT % 40 1  --  41 0 42 2 40 1   PLATELETS Thousands/uL 117*  --  121* 117* 118*   SODIUM mmol/L 137 138 139 138 136   POTASSIUM mmol/L 4 1 4 4 4 1 3 5 3 7   CHLORIDE mmol/L 100 101 101 100 101   CO2 mmol/L 26 26 26 24 24   BUN mg/dL 54* 57* 56* 61* 63*   CREATININE mg/dL 2 12* 2 13* 2 07* 2 15* 2 29*   CALCIUM mg/dL 8 6 9 0 8 9 9 5 9 3   MAGNESIUM mg/dL 1 6  --  1 9  --  1 7   PHOSPHORUS mg/dL  --   --  3 2  --  3 6   ALK PHOS U/L  --   --  65  --  83   ALT U/L  --   --  16  --  23   AST U/L  --   --  34  --  49*

## 2020-06-29 NOTE — ASSESSMENT & PLAN NOTE
· When reviewing clinical images from the 14th of June, his left leg does appear to be more swollen and erythematous  · Day 2   IV Ancef q 8 hours and Flagyl  · Trend WBC and fever curve  · Podiatry continue to follow with dressing changes-Wound care and dressing changes per their recommendations he is complaining of pain at the debridement site for  · Patient had wound debridement done by Podiatry on 06/27 at bedside

## 2020-06-29 NOTE — PROGRESS NOTES
Progress Note - Linwood Mazariegos 1945, 69 y o  male MRN: [id number]    Unit/Bed#: -01 Encounter: [id number]    Primary Care Provider: Daphney Adhikari MD   Date and time admitted to hospital: 6/25/2020  6:10 PM        Cellulitis of left lower extremity  Assessment & Plan  · When reviewing clinical images from the 14th of June, his left leg does appear to be more swollen and erythematous  · Day 2  IV Ancef q 8 hours and Flagyl  · Trend WBC and fever curve  · Podiatry continue to follow with dressing changes-Wound care and dressing changes per their recommendations he is complaining of pain at the debridement site for  · Patient had wound debridement done by Podiatry on 06/27 at bedside    * Acute on chronic combined systolic and diastolic congestive heart failure (HCC)  Assessment & Plan  Wt Readings from Last 3 Encounters:   06/29/20 122 kg (269 lb 2 9 oz)   06/18/20 127 kg (279 lb 1 6 oz)   03/10/20 114 kg (251 lb 8 7 oz)      Chest x-ray: NAD   Last echocardiogram on file: EF 45%, November 2019   Initial troponin: 0 15  o Will trend x3 or to peak   BNP: 35,000   Patient was switched to Bumex drip on 02/26   Continue on Beta-blocker: lopressor   Monitor intake and output, daily weights   Diet: Fluid restriction and 2 g Sodium   Monitor renal function while [facility] Cardiology and nephrology on board   Still with leg edema but slightly improved    Atrial fibrillation [facility])  Assessment & Plan   Controlled at this time  With Rates in the 80s   Rate/rhythm control: lopressor   Anticoagulation: eliquis   Monitor heart rate  Acute kidney injury superimposed on CKD [facility])  Assessment & Plan  Creatinine upon admission: 2 29-Baseline: around 1 3  Secondary to prerenal overload  Creatinine this morning is 2 12   Treatment: diuretics, monitor I/O  Switch to p o   Bumex  Monitor renal function while diuresing  Nephrology recommendations appreciated  Avoid hypotension/nephrotoxins medications    Essential hypertension  Assessment & Plan   Continue lopressor  And diuretics-previously been on midodrine for orthostasis-will check orthostatic readings today   Monitor vitals as per protocol    Acute metabolic encephalopathy  Assessment & Plan  · Son at bedside reports his follows been a little bit more confused today and baseline but appears to be improving today CT head within normal limits  · Monitor mental status closely        VTE Prophylaxis: in place    Patient Centered Rounds: I rounded with patient's nurse    Current Length of Stay: 4 day(s)    Current Patient Status: Inpatient    Certification Statement: Pt requires additional inpatient hospital stay due to: see assessment and plan        Subjective:     Patient complains of pain in his legs in the area of previous debridement  Did  have venous Doppler today but results are currently pending    All other ROS are negative    Objective:     Vitals:   Temp (24hrs), Av 3 °F (36 3 °C), Min:97 2 °F (36 2 °C), Max:97 3 °F (36 3 °C)    Temp:  [97 2 °F (36 2 °C)-97 3 °F (36 3 °C)] 97 3 °F (36 3 °C)  HR:  [66-71] 69  Resp:  [17] 17  BP: ()/(57-77) 116/72  SpO2:  [95 %-98 %] 98 %  Body mass index is 36 51 kg/m²  Input and Output Summary (last 24 hours): Intake/Output Summary (Last 24 hours) at 2020 1203  Last data filed at 2020 5358  Gross per 24 hour   Intake 360 ml   Output 1725 ml   Net -1365 ml       Physical Exam:     Physical Exam   Constitutional: He is oriented to person, place, and time  He appears distressed  Restless due to pain   HENT:   Head: Normocephalic  Right Ear: External ear normal    Left Ear: External ear normal    Eyes: Right eye exhibits no discharge  Left eye exhibits no discharge  No scleral icterus  Neck: No thyromegaly present  Cardiovascular: Normal rate  No murmur heard  Irregular regular   Pulmonary/Chest: Effort normal  He has no wheezes  He has rales     Minimal basilar crackles   Abdominal: Soft  Bowel sounds are normal  He exhibits no distension  There is no tenderness  Musculoskeletal: He exhibits edema and tenderness  Dressing in place on leg   Lymphadenopathy:     He has no cervical adenopathy  Neurological: He is alert and oriented to person, place, and time  Coordination normal    Skin: No rash noted  No erythema  Psychiatric:   Appears to be more focused in conversation than on initial presentation   Nursing note and vitals reviewed  I personally reviewed labs and imaging reports for today  Last 24 Hours Medication List:     Current Facility-Administered Medications:  acetaminophen 650 mg Oral Q6H PRN Felicie Adas, PA-C    apixaban 5 mg Oral BID Felicie Adas, PA-C    vitamin C 1,000 mg Oral BID Felicie Adas, PA-C    bumetanide 1 mg Oral Daily NAVNEET Dahl    cefazolin 2,000 mg Intravenous Q8H Felicie Adas, PA-C Last Rate: Stopped (06/29/20 0529)   cholecalciferol 1,000 Units Oral Daily Felicie Adas, PA-C    collagenase  Topical Daily Gela Smith DPM    DULoxetine 30 mg Oral Daily Felicie Adas, PA-C    metoprolol tartrate 25 mg Oral Q12H [address] Felicie Adas, PA-C    metroNIDAZOLE 500 mg Intravenous Q8H Kiley Rodriguez MD Last Rate: 500 mg (06/29/20 0739)   midodrine 5 mg Oral TID [facility], KEISHA    nystatin  Topical BID Kiley Rodriguez MD    ondansetron 4 mg Intravenous Q6H PRN Felicie Adas, KEISHA    oxyCODONE 5 mg Oral Q6H PRN Kiley Rodriguez MD    pantoprazole 40 mg Oral Daily Felicie Adas, PA-C    potassium chloride 40 mEq Oral BID NAVNEET Ervin    senna 2 tablet Oral Daily Felicie Adas, PA-C           Today, Patient Was Seen By: Kendra Bowman DO    ** Please Note: Dictation voice to text software may have been used in the creation of this document   **        I attempted to reach patient's son Hunter Molina the his mobile number- left message to update him

## 2020-06-29 NOTE — PROGRESS NOTES
General Cardiology   Progress Note -  [facility] One   Linwood Mazariegos 69 y o  male MRN: [id number]    Unit/Bed#: -01 Encounter: [id number]    Assessment/ Plan    1  Acute on chronic combined systolic and diastolic heart failure  ProBNP on admission was 34,232  Patient takes Bumex 1 mg p o  Daily at home (unlcear compliance)   He was on Bumex gtt  Discontinued yesterday by nephrology and started on Bumex 1 mg PO daily  Monitor I&Os -2 2 L in 24 hours and -4 5 L since   Daily weights 269 lbs today   Echocardiogram reviewed showing EF 30%, severe diffuse hypokinesis with regional variations, RV moderately dilated with reduced systolic function, LA and RA dilated, moderate mitral regurgitation and mild-to-moderate TR  Continue 2 g sodium diet 1500 mL fluid restriction  Patient reports he does not follow a low salt diet at home       2  Elevated troponin in the setting of CHF and CKD  Troponin 0 15/0 15/0 12  ECG reviewed showing atrial fibrillation with left axis deviation and nonspecific ST and T-wave abnormalities  No complaint of chest pain      3  SAMY on CKD  Creatinine 2 12 today  Creatinine was 2 29 on admission  Baseline creatinine 1 3-1 6  Nephrology following     4  Chronic atrial fibrillation  On Eliquis for oral anticoagulation  On metoprolol 25 mg p o  B i d  Reviewed telemetry showing rates controlled      Subjective  Patient reporting pain in his legs  No complaints of chest pain or SOB  No palpitations or dizziness  No fever or chills  Review of Systems   Constitution: Negative for chills and fever  Cardiovascular: Positive for leg swelling  Negative for chest pain, orthopnea and palpitations  Respiratory: Negative for cough and shortness of breath  Skin: Positive for poor wound healing  Musculoskeletal: Negative for falls  Gastrointestinal: Negative for bloating, nausea and vomiting  Neurological: Negative for dizziness and light-headedness     Psychiatric/Behavioral: Negative for altered mental status  Objective:   Vitals: Blood pressure 116/72, pulse 69, temperature (!) 97 3 °F (36 3 °C), resp  rate 17, height 6' (1 829 m), weight 122 kg (269 lb 2 9 oz), SpO2 98 %  ,   Body mass index is 36 51 kg/m²  ,     Systolic (25WXV), KRF:901 , Min:93 , EDN:995     Diastolic (32JJI), PQL:81, Min:57, Max:77      Intake/Output Summary (Last 24 hours) at 6/29/2020 1100  Last data filed at 6/29/2020 0839  Gross per 24 hour   Intake 360 ml   Output 2075 ml   Net -1715 ml     Weight (last 2 days)     Date/Time   Weight    06/29/20 0600   122 (269 18)    06/28/20 0539   122 (268 96)    06/28/20 0345   122 (268 52)    06/27/20 0600   121 (266 76)            Telemetry Review: Atrial fibrillation HR 60-70s with PVCs     Physical Exam   Constitutional: He is oriented to person, place, and time  No distress  HENT:   Head: Normocephalic  Neck: Neck supple  Cardiovascular: Normal rate and intact distal pulses  Murmur heard  Irregular rhythm    Pulmonary/Chest: Effort normal  No stridor  No respiratory distress  Decreased in bases   RA    Abdominal: Soft  Bowel sounds are normal    Musculoskeletal: He exhibits edema  + 1 edema bilateral LE    Neurological: He is alert and oriented to person, place, and time  Skin: Skin is warm and dry  He is not diaphoretic     PVD discoloration bilateral LE  Wounds LLE dsg intact    Psychiatric:   Agitated        LABORATORY RESULTS  Results from last 7 days   Lab Units 06/27/20  0625 06/26/20  0224 06/25/20  2341   TROPONIN I ng/mL 0 10* 0 12* 0 15*     CBC with diff:   Results from last 7 days   Lab Units 06/29/20  0523 06/27/20  0625 06/26/20  0454 06/25/20  1842   WBC Thousand/uL 6 06 6 44 5 63 6 51   HEMOGLOBIN g/dL 13 4 13 6 13 9 13 5   HEMATOCRIT % 40 1 41 0 42 2 40 1   MCV fL 96 96 96 96   PLATELETS Thousands/uL 117* 121* 117* 118*   MCH pg 32 0 31 7 31 7 32 2   MCHC g/dL 33 4 33 2 32 9 33 7   RDW % 19 2* 19 3* 19 6* 19 2*   MPV fL 12 2 12 6 12 6 12 4 NRBC AUTO /100 WBCs 0 0 0 0       CMP:  Results from last 7 days   Lab Units 20  0520  03420  0620  0454 20  1842   POTASSIUM mmol/L 4 1 4 4 4 1 3 5 3 7   CHLORIDE mmol/L 100 101 101 100 101   CO2 mmol/L 26 26 26 24 24   BUN mg/dL 54* 57* 56* 61* 63*   CREATININE mg/dL 2 12* 2 13* 2 07* 2 15* 2 29*   CALCIUM mg/dL 8 6 9 0 8 9 9 5 9 3   AST U/L  --   --  34  --  49*   ALT U/L  --   --  16  --  23   ALK PHOS U/L  --   --  65  --  83   EGFR ml/min/1 73sq m 30 30 31 29 27       BMP:  Results from last 7 days   Lab Units 20  0520  0620  0454 20  1842   POTASSIUM mmol/L 4 1 4 4 4 1 3 5 3 7   CHLORIDE mmol/L 100 101 101 100 101   CO2 mmol/L 26 26 26 24 24   BUN mg/dL 54* 57* 56* 61* 63*   CREATININE mg/dL 2 12* 2 13* 2 07* 2 15* 2 29*   CALCIUM mg/dL 8 6 9 0 8 9 9 5 9 3       Lab Results   Component Value Date    NTBNP 34,232 (H) 2020    NTBNP 6,437 (H) 2019    NTBNP 4,386 (H) 2019             Results from last 7 days   Lab Units 20  0520  0625 20  1842   MAGNESIUM mg/dL 1 6 1 9 1 7                     Results from last 7 days   Lab Units 20  1842   INR  2 09*       Lipid Profile:   No results found for: CHOL  Lab Results   Component Value Date    HDL 41 2019     Lab Results   Component Value Date    LDLCALC 55 2019     Lab Results   Component Value Date    TRIG 30 2019       Cardiac testing:   Results for orders placed during the hospital encounter of 20   Echo complete with contrast if indicated    [address] [address]    Transthoracic Echocardiogram  2D, M-mode, Doppler, and Color Doppler    Study date:  2020    Patient: Michael Pantoja  MR number: [id number]  Account number: [de-identified]  : 1945  Age: 69 years  Gender: Male  Status: Inpatient  Location: [address] [place] [id number]  Height: 72 in  Weight: 265 5 lb  BP: 127/ 87 mmHg    Indications: Heart Failure    Diagnoses: I50 9 - Heart failure, unspecified    Sonographer:  Joyce Zapata RDCS  Primary Physician:  Hema Tate MD  Group:  [facility]  Interpreting Physician:  Norma Lemos MD    SUMMARY    LEFT VENTRICLE:  Systolic function was markedly reduced  Ejection fraction was estimated to be 30 %  There was severe diffuse hypokinesis with regional variations  Wall thickness was moderately to markedly increased  Doppler parameters were consistent with elevated ventricular end-diastolic filling pressure  RIGHT VENTRICLE:  The ventricle was moderately dilated  Systolic function was reduced  LEFT ATRIUM:  The atrium was moderately dilated  RIGHT ATRIUM:  The atrium was markedly dilated  MITRAL VALVE:  There was moderate regurgitation  The regurgitant jet was eccentric, not well captured on all images, and could be underestimated  TRICUSPID VALVE:  There was mild to moderate regurgitation  Pulmonary artery systolic pressure was at least moderately increased  IVC, HEPATIC VEINS:  The inferior vena cava was dilated  HISTORY: PRIOR HISTORY: Persistent Atrial Fibrillation, Pulmonary Hypertension, Hypertension    PROCEDURE: The study was performed in the [address] [place] [address]  This was a routine study  The transthoracic approach was used  The study included complete 2D imaging, M-mode, complete spectral Doppler, and color Doppler  The heart rate was  73 bpm, at the start of the study  Images were obtained from the parasternal, apical, and subcostal acoustic windows  Images were not obtained from the suprasternal notch acoustic windows  Image quality was adequate  LEFT VENTRICLE: Size was normal  Systolic function was markedly reduced  Ejection fraction was estimated to be 30 %  There was severe diffuse hypokinesis with regional variations  Wall thickness was moderately to markedly increased    DOPPLER: Transmitral flow pattern: atrial fibrillation  Left ventricular diastolic function parameters were abnormal  Doppler parameters were consistent with elevated ventricular end-diastolic filling pressure  RIGHT VENTRICLE: The ventricle was moderately dilated  Systolic function was reduced  Wall thickness was normal     LEFT ATRIUM: The atrium was moderately dilated  RIGHT ATRIUM: The atrium was markedly dilated  MITRAL VALVE: Valve structure was normal  There was normal leaflet separation  DOPPLER: The transmitral velocity was within the normal range  There was no evidence for stenosis  There was moderate regurgitation  The regurgitant jet was  eccentric, not well captured on all images, and could be underestimated  AORTIC VALVE: The valve was trileaflet  Leaflets exhibited normal thickness, mild calcification, normal cuspal separation, and sclerosis  DOPPLER: Transaortic velocity was within the normal range  There was no evidence for stenosis  There  was no significant regurgitation  TRICUSPID VALVE: The valve structure was normal  There was normal leaflet separation  DOPPLER: The transtricuspid velocity was within the normal range  There was no evidence for stenosis  There was mild to moderate regurgitation  Pulmonary  artery systolic pressure was at least moderately increased  PULMONIC VALVE: Leaflets exhibited normal thickness, no calcification, and normal cuspal separation  DOPPLER: The transpulmonic velocity was within the normal range  There was no significant regurgitation  PERICARDIUM: A trace pericardial effusion was identified  The pericardium was normal in appearance  AORTA: The root exhibited normal size  SYSTEMIC VEINS: IVC: The inferior vena cava was dilated  PULMONARY VEINS: DOPPLER: Doppler signals were not recordable in the pulmonary vein(s)      SYSTEM MEASUREMENT TABLES    2D mode  AoR Diam (2D): 35 mm  AoR Diam; Mean (2D): 35 mm  Inferior Vena Cava Diameter; 2D mode;: 27 7 mm  Inferior Vena Cava Diameter; Mean; Mean value chosen; 2D mode;: 27 7 mm  Area; 2D mode;: 3130 mm2  Area; Mean; Mean value chosen; 2D mode;: 3130 mm2  LA Dimension (2D): 53 mm  LA Dimension; Mean (2D): 53 mm  LA/Ao (2D): 1 51  EDV (2D-Cubed): 71666 mm3  EDV (BP): 46491 mm3  EF (2D-Cubed): 29 2 %  ESV (2D-Cubed): 71062 mm3  FS (2D-Cubed): 10 9 %  FS (2D-Teich): 10 9 %  IVS/LVPW (2D): 1 09  IVSd (2D): 19 mm  IVSd; Mean chosen (2D): 19 mm  LVIDd (2D): 45 mm  LVIDd; Mean (2D): 45 mm  LVIDs (2D): 40 1 mm  LVIDs; Mean (2D): 40 1 mm  LVOT Area (2D): 415 mm2  LVPWd (2D): 17 4 mm  LVPWd; Mean (2D): 17 4 mm  Left Ventricular Ejection Fraction; Teichholz; 2D mode;: 23 8 %  Left Ventricular End Diastolic Volume; Teichholz; 2D mode;: 56937 mm3  Left Ventricular End Systolic Volume; Teichholz; 2D mode;: 54052 mm3  SI (2D-Cubed): 11 1 ml/m2  SV (2D-Cubed): 29267 mm3  Stroke Index; Teichholz; 2D mode;: 9 17 ml/m2  Stroke Volume; Teichholz; 2D mode;: 28208 mm3  RVIDd (2D): 56 5 mm  RVIDd; Mean (2D): 56 5 mm  Right and Left Ventricular End Diastolic Diameter Ratio; 2D mode;: 1 26    Apical four chamber  EF (A4C): 23 1 %  LV MOD Diam; Recent value; End Diastole (A4C): 23 9 mm  LV MOD Diam; Recent value; End Diastole (A4C): 44 9 mm  LV MOD Diam; Recent value; End Diastole (A4C): 45 7 mm  LV MOD Diam; Recent value; End Diastole (A4C): 44 9 mm  LV MOD Diam; Recent value; End Diastole (A4C): 44 1 mm  LV MOD Diam; Recent value; End Diastole (A4C): 43 mm  LV MOD Diam; Recent value; End Diastole (A4C): 41 7 mm  LV MOD Diam; Recent value; End Diastole (A4C): 40 4 mm  LV MOD Diam; Recent value; End Diastole (A4C): 39 6 mm  LV MOD Diam; Recent value; End Diastole (A4C): 39 6 mm  LV MOD Diam; Recent value; End Diastole (A4C): 41 7 mm  LV MOD Diam; Recent value; End Diastole (A4C): 43 8 mm  LV MOD Diam; Recent value; End Diastole (A4C): 43 9 mm  LV MOD Diam; Recent value; End Diastole (A4C): 42 5 mm  LV MOD Diam; Recent value;  End Diastole (A4C): 39 6 mm  LV MOD Diam; Recent value; End Diastole (A4C): 36 7 mm  LV MOD Diam; Recent value; End Diastole (A4C): 33 2 mm  LV MOD Diam; Recent value; End Diastole (A4C): 30 3 mm  LV MOD Diam; Recent value; End Diastole (A4C): 25 8 mm  LV MOD Diam; Recent value; End Diastole (A4C): 17 5 mm  LV MOD Diam; Recent value; End Systole (A4C): 13 5 mm  LV MOD Diam; Recent value; End Systole (A4C): 20 8 mm  LV MOD Diam; Recent value; End Systole (A4C): 28 5 mm  LV MOD Diam; Recent value; End Systole (A4C): 34 8 mm  LV MOD Diam; Recent value; End Systole (A4C): 38 8 mm  LV MOD Diam; Recent value; End Systole (A4C): 40 6 mm  LV MOD Diam; Recent value; End Systole (A4C): 40 4 mm  LV MOD Diam; Recent value; End Systole (A4C): 38 5 mm  LV MOD Diam; Recent value; End Systole (A4C): 36 4 mm  LV MOD Diam; Recent value; End Systole (A4C): 34 8 mm  LV MOD Diam; Recent value; End Systole (A4C): 34 6 mm  LV MOD Diam; Recent value; End Systole (A4C): 34 8 mm  LV MOD Diam; Recent value; End Systole (A4C): 34 6 mm  LV MOD Diam; Recent value; End Systole (A4C): 33 8 mm  LV MOD Diam; Recent value; End Systole (A4C): 35 1 mm  LV MOD Diam; Recent value; End Systole (A4C): 36 2 mm  LV MOD Diam; Recent value; End Systole (A4C): 36 9 mm  LV MOD Diam; Recent value; End Systole (A4C): 36 9 mm  LV MOD Diam; Recent value; End Systole (A4C): 36 4 mm  LV MOD Diam; Recent value; End Systole (A4C): 21 6 mm  Left Ventricle diastolic major axis; Most recent value chosen; Method of Disks, Single Plane; 2D mode; Apical four chamber;: 81 6 mm  Left Ventricle systolic major axis; Most recent value chosen; Method of Disks, Single Plane; 2D mode; Apical four chamber;: 81 8 mm  Left Ventricular Diastolic Area; Most recent value chosen; Method of Disks, Single Plane; 2D mode; Apical four chamber;: 3130 mm2  Left Ventricular End Diastolic Volume; Most recent value chosen; Method of Disks, Single Plane; 2D mode;  Apical four chamber;: 38527 mm3  Left Ventricular End Systolic Volume; Most recent value chosen; Method of Disks, Single Plane; 2D mode; Apical four chamber;: 24023 mm3  Left Ventricular Systolic Area; Most recent value chosen; Method of Disks, Single Plane; 2D mode; Apical four chamber;: 2740 mm2  SI (A4C): 9 3 ml/m2  SV (A4C): 84269 mm3  Right Atrium Systolic Area; End Systole; 2D mode; Apical four chamber;: 5050 mm2  Right Atrium Systolic Area; Mean; Mean value chosen; End Systole; 2D mode; Apical four chamber;: 5050 mm2    Apical two chamber  LV MOD Diam; Recent value; End Diastole (A2C): 42 6 mm  LV MOD Diam; Recent value; End Diastole (A2C): 44 7 mm  LV MOD Diam; Recent value; End Diastole (A2C): 47 1 mm  LV MOD Diam; Recent value; End Diastole (A2C): 50 9 mm  LV MOD Diam; Recent value; End Diastole (A2C): 52 1 mm  LV MOD Diam; Recent value; End Diastole (A2C): 52 1 mm  LV MOD Diam; Recent value; End Diastole (A2C): 51 6 mm  LV MOD Diam; Recent value; End Diastole (A2C): 50 6 mm  LV MOD Diam; Recent value; End Diastole (A2C): 46 2 mm  LV MOD Diam; Recent value; End Diastole (A2C): 25 7 mm  LV MOD Diam; Recent value; End Diastole (A2C): 41 3 mm  LV MOD Diam; Recent value; End Diastole (A2C): 39 1 mm  LV MOD Diam; Recent value; End Diastole (A2C): 37 3 mm  LV MOD Diam; Recent value; End Diastole (A2C): 36 3 mm  LV MOD Diam; Recent value; End Diastole (A2C): 14 7 mm  LV MOD Diam; Recent value; End Diastole (A2C): 21 3 mm  LV MOD Diam; Recent value; End Diastole (A2C): 26 3 mm  LV MOD Diam; Recent value; End Diastole (A2C): 34 2 mm  LV MOD Diam; Recent value; End Diastole (A2C): 32 4 mm  LV MOD Diam; Recent value; End Diastole (A2C): 29 9 mm  Left Ventricle diastolic major axis; Most recent value chosen; Method of Disks, Single Plane; 2D mode; Apical two chamber;: 72 8 mm  Left Ventricular Diastolic Area; Most recent value chosen; Method of Disks, Single Plane; 2D mode; Apical two chamber;: 2850 mm2  Left Ventricular End Diastolic Volume;  Most recent value chosen; Method of Disks, Single Plane; 2D mode; Apical two chamber;: 90502 mm3    M mode  Tricuspid Annular Plane Systolic Excursion; Mean; Mean value chosen; Tricuspid Annulus; M mode;: 6 19 mm  Tricuspid Annular Plane Systolic Excursion; Tricuspid Annulus; M mode;: 6 19 mm    Tissue Doppler Imaging  LV Peak Early Julian Tissue Anthony; Mean; Medial MA (TDI): 39 2 mm/s  LV Peak Early Julian Tissue Anthony; Medial MA (TDI): 39 2 mm/s    Unspecified Scan Mode  LVOT CV Orifice Diam; Mean: 23 mm  LVOT Diam: 23 mm  MV Peak Anthony/LV Peak Tissue Anthony E-Wave; Medial MA: 17 9  DT; Antegrade Flow: 165 ms  DT; Mean; Antegrade Flow: 165 ms  Dec Victoria; Antegrade Flow: 4250 mm/s2  Dec Victoria; Mean; Antegrade Flow: 4250 mm/s2  MV E/A: 1 6  MV Peak A Anthony: 428 mm/s  MV Peak A Anthony; Mean: 428 mm/s  MV Peak E Anthony; Antegrade Flow: 703 mm/s  MV Peak E Anthony; Mean; Antegrade Flow: 703 mm/s  MVA (PHT): 458 mm2  PHT: 48 ms  PHT;  Mean: 48 ms  Peak Grad; Mean; Regurgitant Flow: 32 mm[Hg]  Vmax; Mean; Regurgitant Flow: 2830 mm/s  Vmax; Regurgitant Flow: 2960 mm/s  Vmax; Regurgitant Flow: 2890 mm/s  Vmax; Regurgitant Flow: 2930 mm/s  Vmax; Regurgitant Flow: 2430 mm/s  Vmax; Regurgitant Flow: 2950 mm/s    [facility]    Prepared and electronically signed by    Eleonora Tee MD  Signed 26-Jun-2020 12:11:20       Meds/Allergies   all current active meds have been reviewed and current meds:   Current Facility-Administered Medications   Medication Dose Route Frequency    acetaminophen (TYLENOL) tablet 650 mg  650 mg Oral Q6H PRN    apixaban (ELIQUIS) tablet 5 mg  5 mg Oral BID    ascorbic acid (VITAMIN C) tablet 1,000 mg  1,000 mg Oral BID    ceFAZolin (ANCEF) IVPB (premix) 2,000 mg 50 mL  2,000 mg Intravenous Q8H    cholecalciferol (VITAMIN D3) tablet 1,000 Units  1,000 Units Oral Daily    collagenase (SANTYL) ointment   Topical Daily    DULoxetine (CYMBALTA) delayed release capsule 30 mg  30 mg Oral Daily    metoprolol tartrate (LOPRESSOR) tablet 25 mg  25 mg Oral Q12H [address]    metroNIDAZOLE (FLAGYL) IVPB (premix) 500 mg 100 mL  500 mg Intravenous Q8H    midodrine (PROAMATINE) tablet 5 mg  5 mg Oral TID AC    nystatin (MYCOSTATIN) powder   Topical BID    ondansetron (ZOFRAN) injection 4 mg  4 mg Intravenous Q6H PRN    oxyCODONE (ROXICODONE) IR tablet 5 mg  5 mg Oral Q6H PRN    pantoprazole (PROTONIX) EC tablet 40 mg  40 mg Oral Daily    potassium chloride (K-DUR,KLOR-CON) CR tablet 40 mEq  40 mEq Oral BID    senna (SENOKOT) tablet 17 2 mg  2 tablet Oral Daily     Medications Prior to Admission   Medication    apixaban (ELIQUIS) 5 mg    Ascorbic Acid (VITAMIN C) 1000 MG tablet    bumetanide (BUMEX) 1 mg tablet    cholecalciferol (VITAMIN D3) 1,000 units tablet    DULoxetine (CYMBALTA) 30 mg delayed release capsule    metoprolol tartrate (LOPRESSOR) 25 mg tablet    midodrine (PROAMATINE) 5 mg tablet    pantoprazole (PROTONIX) 40 mg tablet    senna (SENOKOT) 8 6 mg    sodium chloride (OCEAN) 0 65 % nasal spray     Counseling / Coordination of Care  Total floor / unit time spent today 20 minutes  Greater than 50% of total time was spent with the patient and / or family counseling and / or coordination of care  ** Please Note: Dragon 360 Dictation voice to text software may have been used in the creation of this document   **

## 2020-06-29 NOTE — ASSESSMENT & PLAN NOTE
Creatinine upon admission: 2 29-Baseline: around 1 3  Secondary to prerenal overload  Creatinine this morning is 2 12   Treatment: diuretics, monitor I/O  Switch to p o   Bumex  Monitor renal function while diuresing  Nephrology recommendations appreciated  Avoid hypotension/nephrotoxins medications

## 2020-06-29 NOTE — PHYSICAL THERAPY NOTE
PT cancel  ORder rec'd chart reveiwed  Pt with hypotension and on midodrine  Will eval when more stable

## 2020-06-29 NOTE — PROGRESS NOTES
Progress Note - Podiatry  Linwood Mazariegos 69 y o  male MRN: [id number]  Unit/Bed#: -01 Encounter: [id number]    Assessment/Plan:  1  Traumatic wound left leg              -hydrocolloid dressing reapplied, good progress noted with new skin growth evident  2  Surgical Wound LFT - S/P I&D Abscess dorsal forefoot (03/05/2020)              -Santyl dressing reapplied               -AQQSG slowly improving  3  Pressure ulcer Stage 3 -  Lateral left midfoot              -Santyl dressing reapplied   - continues to improve slowly  4  Skin ulceration full-thickness -  Right lateral rearfoot              - Less drainage with progress noted               - Santyl dressing reapplied  5  Chronic venous insufficiency bilateral              - Chronic +1 right , +2 left edema with rubor      CHF, Acute kidney injury, metabolic encephalopathy, lactic acidosis, AFib, hypertension              - managed per Internal Medicine, Cardiology  Subjective/Objective   Chief Complaint:   Chief Complaint   Patient presents with    Leg Swelling     patient presents to the ED with fluid retention, sent from PCP        Subjective:   78-year-old white male resting in bed relates mild pain from left foot, relates having pain from back also  Denies any new issues with his feet  Denies any new pain/discomfort or shortness of breath  Blood pressure 117/73, pulse 60, temperature (!) 96 8 °F (36 °C), resp  rate 22, height 6' (1 829 m), weight 122 kg (269 lb 2 9 oz), SpO2 97 %  ,Body mass index is 36 51 kg/m²      Invasive Devices     Peripheral Intravenous Line            Peripheral IV 06/26/20 Right Forearm 2 days                Physical Exam:   General appearance: alert, cooperative and no distress  Neuro/Vascular: Grossly intact  Pulses: Right: DP 0/4, PT 0/4, Left: DP 0/4, PT 0/4  Capillary Filling:  3 Sec, Edema:  +2 left, +1 right  Arterial duplex completed during last hospital admission 1 week ago showed adequate perfusion of both lower extremities for healing   Chronic skin rubor of both legs secondary to chronic venous insufficiency  Extremity:   Left foot notably less swollen than 2 days ago  Ulcers/Wounds:   · Traumatic wound anterior left leg measures 8 0 x 3 0 x 0 1 cm,   85% red, 15%  Pink new skin,  Overall good progress, no evidence of infection  Mild serosanguineous drainage  · Pressure ulcer lateral left midfoot measures 1 2 x 1 0 x 0 2 cm, subcutaneous depth, 75% red, 25% yellow, but is mild serosanguineous drainage, no evidence of infection  · Slow nonhealing surgical wound dorsal left forefoot measures 7 0 x 3 0 x 0 8 cm, 85% yellow, 15% red, moderate serosanguineous drainage, no evidence of infection  Good progress since Saturday  · Subcutaneous depth ulcerative breakdown right lateral rearfoot within scar tissue of previous ORIF ankle, wound measures 1 0 x 0 5 x 0 2cm, 50% red, 50% yellow, minimal S/S drainage, No maceration  Labs and other studies:   CBC w/diff  Results from last 7 days   Lab Units 06/29/20  0523   WBC Thousand/uL 6 06   HEMOGLOBIN g/dL 13 4   HEMATOCRIT % 40 1   PLATELETS Thousands/uL 117*   NEUTROS PCT % 66   LYMPHS PCT % 17   MONOS PCT % 11   EOS PCT % 5     BMP  Results from last 7 days   Lab Units 06/29/20  0523   POTASSIUM mmol/L 4 1   CHLORIDE mmol/L 100   CO2 mmol/L 26   BUN mg/dL 54*   CREATININE mg/dL 2 12*   CALCIUM mg/dL 8 6     CMP  Results from last 7 days   Lab Units 06/29/20  0523  06/27/20  0625   POTASSIUM mmol/L 4 1   < > 4 1   CHLORIDE mmol/L 100   < > 101   CO2 mmol/L 26   < > 26   BUN mg/dL 54*   < > 56*   CREATININE mg/dL 2 12*   < > 2 07*   CALCIUM mg/dL 8 6   < > 8 9   ALK PHOS U/L  --   --  65   ALT U/L  --   --  16   AST U/L  --   --  34    < > = values in this interval not displayed  @Culture@  Lab Results   Component Value Date    BLOODCX No Growth at 72 hrs  06/25/2020    BLOODCX No Growth at 72 hrs  06/25/2020    BLOODCX No Growth After 5 Days   06/14/2020 BLOODCX No Growth After 5 Days  06/14/2020    BLOODCX Chryseobacterium (A) 06/11/2020    BLOODCX Flavobacterium (A) 06/11/2020    BLOODCX Gram-positive alec (A) 06/11/2020    BLOODCX No Growth After 5 Days   06/11/2020    URINECX <10,000 cfu/ml  02/19/2020         Current Facility-Administered Medications:     acetaminophen (TYLENOL) tablet 650 mg, 650 mg, Oral, Q6H PRN, Amie Pelaez, PA-C, 650 mg at 06/28/20 1101    apixaban (ELIQUIS) tablet 5 mg, 5 mg, Oral, BID, Amie Benigno, PA-C, 5 mg at 06/29/20 3450    ascorbic acid (VITAMIN C) tablet 1,000 mg, 1,000 mg, Oral, BID, Amie Pelaez, PA-C, 1,000 mg at 06/29/20 6233    bumetanide (BUMEX) tablet 1 mg, 1 mg, Oral, Daily, Madge Romberg, CRNP, 1 mg at 06/29/20 1222    ceFAZolin (ANCEF) IVPB (premix) 2,000 mg 50 mL, 2,000 mg, Intravenous, Q8H, Amie Pelaez PA-C, Last Rate: 100 mL/hr at 06/29/20 1222, 2,000 mg at 06/29/20 1222    cholecalciferol (VITAMIN D3) tablet 1,000 Units, 1,000 Units, Oral, Daily, Amie Pelaez PA-C, 1,000 Units at 06/29/20 1261    collagenase (SANTYL) ointment, , Topical, Daily, Karen Garner, DPANTONIO    DULoxetine (CYMBALTA) delayed release capsule 30 mg, 30 mg, Oral, Daily, Amie Pelaez PA-C, 30 mg at 06/29/20 7687    metoprolol tartrate (LOPRESSOR) tablet 25 mg, 25 mg, Oral, Q12H [address], Amie Pelaez PA-C, Stopped at 06/29/20 5258    metroNIDAZOLE (FLAGYL) IVPB (premix) 500 mg 100 mL, 500 mg, Intravenous, Q8H, Noam Cisneros MD, Last Rate: 200 mL/hr at 06/29/20 1431, 500 mg at 06/29/20 1431    midodrine (PROAMATINE) tablet 5 mg, 5 mg, Oral, TID , [facility], KEISHA, 5 mg at 06/29/20 1541    nystatin (MYCOSTATIN) powder, , Topical, BID, Noam Cisneros MD    ondansetron (ZOFRAN) injection 4 mg, 4 mg, Intravenous, Q6H PRN, Amie Pelaez PA-C    oxyCODONE (ROXICODONE) IR tablet 5 mg, 5 mg, Oral, Q6H PRN, Noam Cisneros MD, 5 mg at 06/29/20 1428    pantoprazole (PROTONIX) EC tablet 40 mg, 40 mg, Oral, Daily, Sary Monroy PA-C, 40 mg at 06/29/20 7702    potassium chloride (K-DUR,KLOR-CON) CR tablet 40 mEq, 40 mEq, Oral, BID, Arjun Carlos, KRISTYNP, 40 mEq at 06/29/20 9979    senna (SENOKOT) tablet 17 2 mg, 2 tablet, Oral, Daily, Sary Monroy PA-C, 17 2 mg at 06/28/20 7382    Imaging: I have personally reviewed pertinent films in PACS  EKG, Pathology, and Other Studies: I have personally reviewed pertinent reports    VTE Pharmacologic Prophylaxis:   Eliquis  VTE Mechanical Prophylaxis: reason for no mechanical VTE prophylaxis  patient refuses     Diane Shown, DPM

## 2020-06-29 NOTE — ASSESSMENT & PLAN NOTE
 Continue lopressor    And diuretics-previously been on midodrine for orthostasis-will check orthostatic readings today   Monitor vitals as per protocol

## 2020-06-29 NOTE — ASSESSMENT & PLAN NOTE
Wt Readings from Last 3 Encounters:   06/29/20 122 kg (269 lb 2 9 oz)   06/18/20 127 kg (279 lb 1 6 oz)   03/10/20 114 kg (251 lb 8 7 oz)      Chest x-ray: NAD   Last echocardiogram on file: EF 45%, November 2019   Initial troponin: 0 15  o Will trend x3 or to peak   BNP: 35,000   Patient was switched to Bumex drip on 02/26   Continue on Beta-blocker: lopressor   Monitor intake and output, daily weights   Diet: Fluid restriction and 2 g Sodium     Monitor renal function while [facility] and nephrology on board   Still with leg edema but slightly improved

## 2020-06-29 NOTE — ASSESSMENT & PLAN NOTE
 Controlled at this time  With Rates in the 80s   Rate/rhythm control: lopressor   Anticoagulation: eliquis   Monitor heart rate

## 2020-06-29 NOTE — ASSESSMENT & PLAN NOTE
· Son at bedside reports his follows been a little bit more confused today and baseline but appears to be improving today CT head within normal limits    · Monitor mental status closely

## 2020-06-29 NOTE — OCCUPATIONAL THERAPY NOTE
Occupational Therapy Cancellation    Patient Name: Mae Bernabe  Today's Date: 6/29/2020    OT orders received and chart reviewed  Pt with hypotension and currently on midodrine  OT to withhold evaluation at this time and will evaluate as appropriate, when BP is stable      "[employer] MS, OTR/L

## 2020-06-30 PROBLEM — E11.621 TYPE 2 DIABETES MELLITUS WITH DIABETIC HEEL ULCER (HCC): Status: ACTIVE | Noted: 2020-06-30

## 2020-06-30 PROBLEM — G93.41 ACUTE METABOLIC ENCEPHALOPATHY: Status: RESOLVED | Noted: 2020-06-11 | Resolved: 2020-06-30

## 2020-06-30 PROBLEM — D64.9 ANEMIA: Status: ACTIVE | Noted: 2020-06-30

## 2020-06-30 PROBLEM — L97.409 TYPE 2 DIABETES MELLITUS WITH DIABETIC HEEL ULCER (HCC): Status: ACTIVE | Noted: 2020-06-30

## 2020-06-30 LAB
ANION GAP SERPL CALCULATED.3IONS-SCNC: 8 MMOL/L (ref 4–13)
BUN SERPL-MCNC: 51 MG/DL (ref 5–25)
CALCIUM SERPL-MCNC: 8.8 MG/DL (ref 8.3–10.1)
CHLORIDE SERPL-SCNC: 101 MMOL/L (ref 100–108)
CO2 SERPL-SCNC: 28 MMOL/L (ref 21–32)
CREAT SERPL-MCNC: 2.13 MG/DL (ref 0.6–1.3)
ERYTHROCYTE [DISTWIDTH] IN BLOOD BY AUTOMATED COUNT: 18.6 % (ref 11.6–15.1)
GFR SERPL CREATININE-BSD FRML MDRD: 30 ML/MIN/1.73SQ M
GLUCOSE SERPL-MCNC: 79 MG/DL (ref 65–140)
HCT VFR BLD AUTO: 39.8 % (ref 36.5–49.3)
HGB BLD-MCNC: 13.3 G/DL (ref 12–17)
MCH RBC QN AUTO: 31.9 PG (ref 26.8–34.3)
MCHC RBC AUTO-ENTMCNC: 33.4 G/DL (ref 31.4–37.4)
MCV RBC AUTO: 95 FL (ref 82–98)
PLATELET # BLD AUTO: 117 THOUSANDS/UL (ref 149–390)
PMV BLD AUTO: 12.8 FL (ref 8.9–12.7)
POTASSIUM SERPL-SCNC: 4.2 MMOL/L (ref 3.5–5.3)
RBC # BLD AUTO: 4.17 MILLION/UL (ref 3.88–5.62)
SODIUM SERPL-SCNC: 137 MMOL/L (ref 136–145)
WBC # BLD AUTO: 6.03 THOUSAND/UL (ref 4.31–10.16)

## 2020-06-30 PROCEDURE — 85027 COMPLETE CBC AUTOMATED: CPT | Performed by: INTERNAL MEDICINE

## 2020-06-30 PROCEDURE — 97530 THERAPEUTIC ACTIVITIES: CPT

## 2020-06-30 PROCEDURE — 97167 OT EVAL HIGH COMPLEX 60 MIN: CPT

## 2020-06-30 PROCEDURE — 99232 SBSQ HOSP IP/OBS MODERATE 35: CPT | Performed by: INTERNAL MEDICINE

## 2020-06-30 PROCEDURE — 97163 PT EVAL HIGH COMPLEX 45 MIN: CPT

## 2020-06-30 PROCEDURE — 99233 SBSQ HOSP IP/OBS HIGH 50: CPT | Performed by: INTERNAL MEDICINE

## 2020-06-30 PROCEDURE — 80048 BASIC METABOLIC PNL TOTAL CA: CPT | Performed by: INTERNAL MEDICINE

## 2020-06-30 PROCEDURE — 97535 SELF CARE MNGMENT TRAINING: CPT

## 2020-06-30 RX ORDER — BUMETANIDE 0.25 MG/ML
2 INJECTION, SOLUTION INTRAMUSCULAR; INTRAVENOUS ONCE
Status: DISCONTINUED | OUTPATIENT
Start: 2020-06-30 | End: 2020-07-01 | Stop reason: HOSPADM

## 2020-06-30 RX ADMIN — MELATONIN 1000 UNITS: at 08:59

## 2020-06-30 RX ADMIN — PANTOPRAZOLE SODIUM 40 MG: 40 TABLET, DELAYED RELEASE ORAL at 08:59

## 2020-06-30 RX ADMIN — METRONIDAZOLE 500 MG: 500 INJECTION, SOLUTION INTRAVENOUS at 15:37

## 2020-06-30 RX ADMIN — POTASSIUM CHLORIDE 40 MEQ: 1500 TABLET, EXTENDED RELEASE ORAL at 17:48

## 2020-06-30 RX ADMIN — METRONIDAZOLE 500 MG: 500 INJECTION, SOLUTION INTRAVENOUS at 00:30

## 2020-06-30 RX ADMIN — METOPROLOL TARTRATE 25 MG: 25 TABLET, FILM COATED ORAL at 20:35

## 2020-06-30 RX ADMIN — MIDODRINE HYDROCHLORIDE 5 MG: 5 TABLET ORAL at 06:53

## 2020-06-30 RX ADMIN — OXYCODONE HYDROCHLORIDE AND ACETAMINOPHEN 1000 MG: 500 TABLET ORAL at 09:00

## 2020-06-30 RX ADMIN — COLLAGENASE SANTYL 1 APPLICATION: 250 OINTMENT TOPICAL at 16:14

## 2020-06-30 RX ADMIN — APIXABAN 5 MG: 5 TABLET, FILM COATED ORAL at 17:48

## 2020-06-30 RX ADMIN — STANDARDIZED SENNA CONCENTRATE 17.2 MG: 8.6 TABLET ORAL at 08:59

## 2020-06-30 RX ADMIN — OXYCODONE HYDROCHLORIDE 5 MG: 5 TABLET ORAL at 17:48

## 2020-06-30 RX ADMIN — METOPROLOL TARTRATE 25 MG: 25 TABLET, FILM COATED ORAL at 09:00

## 2020-06-30 RX ADMIN — CEFAZOLIN SODIUM 2000 MG: 2 SOLUTION INTRAVENOUS at 16:37

## 2020-06-30 RX ADMIN — DULOXETINE 30 MG: 30 CAPSULE, DELAYED RELEASE ORAL at 09:00

## 2020-06-30 RX ADMIN — METRONIDAZOLE 500 MG: 500 INJECTION, SOLUTION INTRAVENOUS at 09:44

## 2020-06-30 RX ADMIN — OXYCODONE HYDROCHLORIDE AND ACETAMINOPHEN 1000 MG: 500 TABLET ORAL at 17:48

## 2020-06-30 RX ADMIN — BUMETANIDE 1 MG: 1 TABLET ORAL at 09:00

## 2020-06-30 RX ADMIN — NYSTATIN 1 APPLICATION: 100000 POWDER TOPICAL at 16:40

## 2020-06-30 RX ADMIN — MIDODRINE HYDROCHLORIDE 5 MG: 5 TABLET ORAL at 15:19

## 2020-06-30 RX ADMIN — MIDODRINE HYDROCHLORIDE 5 MG: 5 TABLET ORAL at 16:52

## 2020-06-30 RX ADMIN — APIXABAN 5 MG: 5 TABLET, FILM COATED ORAL at 09:00

## 2020-06-30 RX ADMIN — OXYCODONE HYDROCHLORIDE 5 MG: 5 TABLET ORAL at 01:03

## 2020-06-30 RX ADMIN — CEFAZOLIN SODIUM 2000 MG: 2 SOLUTION INTRAVENOUS at 05:45

## 2020-06-30 RX ADMIN — POTASSIUM CHLORIDE 40 MEQ: 1500 TABLET, EXTENDED RELEASE ORAL at 09:00

## 2020-06-30 NOTE — SOCIAL WORK
Pt discussed during care coordination rounds  Pt not yet stable for discharge per Dr Guille Reyes  Will need another 24 hours inpatient treatment  CM met with pt and son Sharmin Perez at bedside at son's request  After speaking with pt son is in agreement with him returning home with [facility] able to accept pt again for services  IMM reviewed  Copy provided  Son will transport pt home  Pt has power chair and ramp outside to get into home  CM will continue to follow

## 2020-06-30 NOTE — PROGRESS NOTES
General Cardiology   Progress Note -  Team One   Linwood Mazariegos 69 y o  male MRN: [id number]    Unit/Bed#: -01 Encounter: [id number]    Assessment/ Plan    1  Acute on chronic combined systolic and diastolic heart failure  ProBNP on admission was 34,232  Patient takes Bumex 1 mg p o  Daily at home (?compliance)   Initially diuresed with Bumex drip, started on oral Bumex 1 mg daily per Nephrology 6/29  I/O:  limited urine output, net -4 4 L  Weight:  Trending up, 271 lb but this is per bed scale     LVEF 30%, severe diffuse hypokinesis with regional variations, RV moderately dilated with reduced systolic function, LA and RA dilated, moderate mitral regurgitation and mild-to-moderate TR  He remains somewhat volume overloaded on exam   Will give 2 mg IV Bumex this afternoon and monitor response as well as renal function  Continue 2 g sodium diet 1500 mL fluid restriction       2  Non MI troponin elevation in setting of acute CHF and SAMY  Troponin 0 15/0 15/0 12  ECG reviewed showing atrial fibrillation with left axis deviation and nonspecific ST and T-wave abnormalities  No complaint of chest pain      3  SAMY on CKD  Creatinine 2 13 today, this is unchanged x3 days   Creatinine was 2 29 on admission  Baseline creatinine 1 3-1 6  Nephrology following     4  Chronic atrial fibrillation  On Eliquis for oral anticoagulation  On metoprolol tartrate 25 mg q 12 hours  Rates controlled per vitals    Subjective  Review of Systems   Constitution: Negative for chills, diaphoresis and malaise/fatigue  Cardiovascular: Positive for leg swelling  Negative for chest pain, dyspnea on exertion, orthopnea, palpitations and syncope  Respiratory: Negative for cough and shortness of breath  Musculoskeletal: Positive for joint pain  Gastrointestinal: Negative for bloating  Neurological: Negative for dizziness and light-headedness  All other systems reviewed and are negative      Objective:   Vitals: Blood pressure 138/92, pulse 89, temperature 97 6 °F (36 4 °C), resp  rate 17, height 6' (1 829 m), weight 123 kg (271 lb 9 7 oz), SpO2 98 %  , Body mass index is 36 84 kg/m²  ,     Systolic (05IFN), GMT:652 , Min:116 , DMA:586     Diastolic (76JIW), AIQ:53, Min:72, Max:92    Intake/Output Summary (Last 24 hours) at 6/30/2020 0937  Last data filed at 6/30/2020 0858  Gross per 24 hour   Intake 940 ml   Output 825 ml   Net 115 ml     Wt Readings from Last 3 Encounters:   06/30/20 123 kg (271 lb 9 7 oz)   06/18/20 127 kg (279 lb 1 6 oz)   03/10/20 114 kg (251 lb 8 7 oz)     Telemetry Review: N/A    Physical Exam   Constitutional: He is oriented to person, place, and time  No distress  Slightly unkempt appearing elderly male  Sitting up in bed, eating lunch without any distress  Neck: Neck supple  JVD present  Cardiovascular: Normal rate, normal heart sounds and intact distal pulses  An irregularly irregular rhythm present  Exam reveals no gallop and no friction rub  No murmur heard  Pulmonary/Chest: Effort normal  No respiratory distress  He has no rales  Decreased at bases, room air   Abdominal: Soft  Bowel sounds are normal  He exhibits no distension  Musculoskeletal: Normal range of motion  He exhibits edema (2+ LE edema, L>R) and tenderness (Bilateral lower extremities)  Neurological: He is alert and oriented to person, place, and time  Skin: Skin is warm and dry  He is not diaphoretic  Chronic venous stasis changes bilateral lower extremities  Left shin wound dressing dry and intact  Vitals reviewed      LABORATORY RESULTS  Results from last 7 days   Lab Units 06/27/20  0625 06/26/20  0224 06/25/20  2341   TROPONIN I ng/mL 0 10* 0 12* 0 15*     CBC with diff:   Results from last 7 days   Lab Units 06/30/20  0508 06/29/20  0523 06/27/20  0625 06/26/20  0454 06/25/20  1842   WBC Thousand/uL 6 03 6 06 6 44 5 63 6 51   HEMOGLOBIN g/dL 13 3 13 4 13 6 13 9 13 5   HEMATOCRIT % 39 8 40 1 41 0 42 2 40 1   MCV fL 95 96 96 96 96   PLATELETS Thousands/uL 117* 117* 121* 117* 118*   MCH pg 31 9 32 0 31 7 31 7 32 2   MCHC g/dL 33 4 33 4 33 2 32 9 33 7   RDW % 18 6* 19 2* 19 3* 19 6* 19 2*   MPV fL 12 8* 12 2 12 6 12 6 12 4   NRBC AUTO /100 WBCs  --  0 0 0 0     CMP:  Results from last 7 days   Lab Units 06/30/20  0508 06/29/20  0523 06/28/20  0340 06/27/20  0625 06/26/20  0454 06/25/20  1842   POTASSIUM mmol/L 4 2 4 1 4 4 4 1 3 5 3 7   CHLORIDE mmol/L 101 100 101 101 100 101   CO2 mmol/L 28 26 26 26 24 24   BUN mg/dL 51* 54* 57* 56* 61* 63*   CREATININE mg/dL 2 13* 2 12* 2 13* 2 07* 2 15* 2 29*   CALCIUM mg/dL 8 8 8 6 9 0 8 9 9 5 9 3   AST U/L  --   --   --  34  --  49*   ALT U/L  --   --   --  16  --  23   ALK PHOS U/L  --   --   --  65  --  83   EGFR ml/min/1 73sq m 30 30 30 31 29 27     BMP:  Results from last 7 days   Lab Units 06/30/20  0508 06/29/20  0523 06/28/20  0340 06/27/20  0625 06/26/20  0454 06/25/20  1842   POTASSIUM mmol/L 4 2 4 1 4 4 4 1 3 5 3 7   CHLORIDE mmol/L 101 100 101 101 100 101   CO2 mmol/L 28 26 26 26 24 24   BUN mg/dL 51* 54* 57* 56* 61* 63*   CREATININE mg/dL 2 13* 2 12* 2 13* 2 07* 2 15* 2 29*   CALCIUM mg/dL 8 8 8 6 9 0 8 9 9 5 9 3     Lab Results   Component Value Date    CREATININE 2 13 (H) 06/30/2020    CREATININE 2 12 (H) 06/29/2020    CREATININE 2 13 (H) 06/28/2020     Lab Results   Component Value Date    NTBNP 34,232 (H) 06/25/2020    NTBNP 6,437 (H) 11/24/2019    NTBNP 4,386 (H) 11/18/2019      Results from last 7 days   Lab Units 06/29/20  0523 06/27/20  0625 06/25/20  1842   MAGNESIUM mg/dL 1 6 1 9 1 7     Results from last 7 days   Lab Units 06/25/20  1842   INR  2 09*     Lipid Profile:   No results found for: CHOL  Lab Results   Component Value Date    HDL 41 01/03/2019     Lab Results   Component Value Date    LDLCALC 55 01/03/2019     Lab Results   Component Value Date    TRIG 30 01/03/2019       Cardiac testing:   Results for orders placed during the hospital encounter of 06/25/20   Echo complete with contrast if indicated    Narrative [facility] [employer] [address]    Transthoracic Echocardiogram  2D, M-mode, Doppler, and Color Doppler    Study date:  2020    Patient: Radha Mcbride  MR number: [id number]  Account number: [de-identified]  : 1945  Age: 69 years  Gender: Male  Status: Inpatient  Location: [address] [place] [address]  Height: 72 in  Weight: 265 5 lb  BP: 127/ 87 mmHg    Indications: Heart Failure    Diagnoses: I50 9 - Heart failure, unspecified    Sonographer:  Saul Gil [facility]  Primary Physician:  Sonali Estrada MD  Group:  [facility]  Interpreting Physician:  Sonia Olsen MD    SUMMARY    LEFT VENTRICLE:  Systolic function was markedly reduced  Ejection fraction was estimated to be 30 %  There was severe diffuse hypokinesis with regional variations  Wall thickness was moderately to markedly increased  Doppler parameters were consistent with elevated ventricular end-diastolic filling pressure  RIGHT VENTRICLE:  The ventricle was moderately dilated  Systolic function was reduced  LEFT ATRIUM:  The atrium was moderately dilated  RIGHT ATRIUM:  The atrium was markedly dilated  MITRAL VALVE:  There was moderate regurgitation  The regurgitant jet was eccentric, not well captured on all images, and could be underestimated  TRICUSPID VALVE:  There was mild to moderate regurgitation  Pulmonary artery systolic pressure was at least moderately increased  IVC, HEPATIC VEINS:  The inferior vena cava was dilated  HISTORY: PRIOR HISTORY: Persistent Atrial Fibrillation, Pulmonary Hypertension, Hypertension    PROCEDURE: The study was performed in the [address] [place] [address]  This was a routine study  The transthoracic approach was used  The study included complete 2D imaging, M-mode, complete spectral Doppler, and color Doppler  The heart rate was  73 bpm, at the start of the study   Images were obtained from the parasternal, apical, and subcostal acoustic windows  Images were not obtained from the suprasternal notch acoustic windows  Image quality was adequate  LEFT VENTRICLE: Size was normal  Systolic function was markedly reduced  Ejection fraction was estimated to be 30 %  There was severe diffuse hypokinesis with regional variations  Wall thickness was moderately to markedly increased  DOPPLER: Transmitral flow pattern: atrial fibrillation  Left ventricular diastolic function parameters were abnormal  Doppler parameters were consistent with elevated ventricular end-diastolic filling pressure  RIGHT VENTRICLE: The ventricle was moderately dilated  Systolic function was reduced  Wall thickness was normal     LEFT ATRIUM: The atrium was moderately dilated  RIGHT ATRIUM: The atrium was markedly dilated  MITRAL VALVE: Valve structure was normal  There was normal leaflet separation  DOPPLER: The transmitral velocity was within the normal range  There was no evidence for stenosis  There was moderate regurgitation  The regurgitant jet was  eccentric, not well captured on all images, and could be underestimated  AORTIC VALVE: The valve was trileaflet  Leaflets exhibited normal thickness, mild calcification, normal cuspal separation, and sclerosis  DOPPLER: Transaortic velocity was within the normal range  There was no evidence for stenosis  There  was no significant regurgitation  TRICUSPID VALVE: The valve structure was normal  There was normal leaflet separation  DOPPLER: The transtricuspid velocity was within the normal range  There was no evidence for stenosis  There was mild to moderate regurgitation  Pulmonary  artery systolic pressure was at least moderately increased  PULMONIC VALVE: Leaflets exhibited normal thickness, no calcification, and normal cuspal separation  DOPPLER: The transpulmonic velocity was within the normal range   There was no significant regurgitation  PERICARDIUM: A trace pericardial effusion was identified  The pericardium was normal in appearance  AORTA: The root exhibited normal size  SYSTEMIC VEINS: IVC: The inferior vena cava was dilated  PULMONARY VEINS: DOPPLER: Doppler signals were not recordable in the pulmonary vein(s)  SYSTEM MEASUREMENT TABLES    2D mode  AoR Diam (2D): 35 mm  AoR Diam; Mean (2D): 35 mm  Inferior Vena Cava Diameter; 2D mode;: 27 7 mm  Inferior Vena Cava Diameter; Mean; Mean value chosen; 2D mode;: 27 7 mm  Area; 2D mode;: 3130 mm2  Area; Mean; Mean value chosen; 2D mode;: 3130 mm2  LA Dimension (2D): 53 mm  LA Dimension; Mean (2D): 53 mm  LA/Ao (2D): 1 51  EDV (2D-Cubed): 76523 mm3  EDV (BP): 06645 mm3  EF (2D-Cubed): 29 2 %  ESV (2D-Cubed): 49631 mm3  FS (2D-Cubed): 10 9 %  FS (2D-Teich): 10 9 %  IVS/LVPW (2D): 1 09  IVSd (2D): 19 mm  IVSd; Mean chosen (2D): 19 mm  LVIDd (2D): 45 mm  LVIDd; Mean (2D): 45 mm  LVIDs (2D): 40 1 mm  LVIDs; Mean (2D): 40 1 mm  LVOT Area (2D): 415 mm2  LVPWd (2D): 17 4 mm  LVPWd; Mean (2D): 17 4 mm  Left Ventricular Ejection Fraction; Teichholz; 2D mode;: 23 8 %  Left Ventricular End Diastolic Volume; Teichholz; 2D mode;: 99145 mm3  Left Ventricular End Systolic Volume; Teichholz; 2D mode;: 44060 mm3  SI (2D-Cubed): 11 1 ml/m2  SV (2D-Cubed): 66824 mm3  Stroke Index; Teichholz; 2D mode;: 9 17 ml/m2  Stroke Volume; Teichholz; 2D mode;: 05732 mm3  RVIDd (2D): 56 5 mm  RVIDd; Mean (2D): 56 5 mm  Right and Left Ventricular End Diastolic Diameter Ratio; 2D mode;: 1 26    Apical four chamber  EF (A4C): 23 1 %  LV MOD Diam; Recent value; End Diastole (A4C): 23 9 mm  LV MOD Diam; Recent value; End Diastole (A4C): 44 9 mm  LV MOD Diam; Recent value; End Diastole (A4C): 45 7 mm  LV MOD Diam; Recent value; End Diastole (A4C): 44 9 mm  LV MOD Diam; Recent value; End Diastole (A4C): 44 1 mm  LV MOD Diam; Recent value; End Diastole (A4C): 43 mm  LV MOD Diam; Recent value;  End Diastole (A4C): 41 7 mm  LV MOD Diam; Recent value; End Diastole (A4C): 40 4 mm  LV MOD Diam; Recent value; End Diastole (A4C): 39 6 mm  LV MOD Diam; Recent value; End Diastole (A4C): 39 6 mm  LV MOD Diam; Recent value; End Diastole (A4C): 41 7 mm  LV MOD Diam; Recent value; End Diastole (A4C): 43 8 mm  LV MOD Diam; Recent value; End Diastole (A4C): 43 9 mm  LV MOD Diam; Recent value; End Diastole (A4C): 42 5 mm  LV MOD Diam; Recent value; End Diastole (A4C): 39 6 mm  LV MOD Diam; Recent value; End Diastole (A4C): 36 7 mm  LV MOD Diam; Recent value; End Diastole (A4C): 33 2 mm  LV MOD Diam; Recent value; End Diastole (A4C): 30 3 mm  LV MOD Diam; Recent value; End Diastole (A4C): 25 8 mm  LV MOD Diam; Recent value; End Diastole (A4C): 17 5 mm  LV MOD Diam; Recent value; End Systole (A4C): 13 5 mm  LV MOD Diam; Recent value; End Systole (A4C): 20 8 mm  LV MOD Diam; Recent value; End Systole (A4C): 28 5 mm  LV MOD Diam; Recent value; End Systole (A4C): 34 8 mm  LV MOD Diam; Recent value; End Systole (A4C): 38 8 mm  LV MOD Diam; Recent value; End Systole (A4C): 40 6 mm  LV MOD Diam; Recent value; End Systole (A4C): 40 4 mm  LV MOD Diam; Recent value; End Systole (A4C): 38 5 mm  LV MOD Diam; Recent value; End Systole (A4C): 36 4 mm  LV MOD Diam; Recent value; End Systole (A4C): 34 8 mm  LV MOD Diam; Recent value; End Systole (A4C): 34 6 mm  LV MOD Diam; Recent value; End Systole (A4C): 34 8 mm  LV MOD Diam; Recent value; End Systole (A4C): 34 6 mm  LV MOD Diam; Recent value; End Systole (A4C): 33 8 mm  LV MOD Diam; Recent value; End Systole (A4C): 35 1 mm  LV MOD Diam; Recent value; End Systole (A4C): 36 2 mm  LV MOD Diam; Recent value; End Systole (A4C): 36 9 mm  LV MOD Diam; Recent value; End Systole (A4C): 36 9 mm  LV MOD Diam; Recent value; End Systole (A4C): 36 4 mm  LV MOD Diam; Recent value; End Systole (A4C): 21 6 mm  Left Ventricle diastolic major axis;  Most recent value chosen; Method of Disks, Single Plane; 2D mode; Apical four chamber;: 81 6 mm  Left Ventricle systolic major axis; Most recent value chosen; Method of Disks, Single Plane; 2D mode; Apical four chamber;: 81 8 mm  Left Ventricular Diastolic Area; Most recent value chosen; Method of Disks, Single Plane; 2D mode; Apical four chamber;: 3130 mm2  Left Ventricular End Diastolic Volume; Most recent value chosen; Method of Disks, Single Plane; 2D mode; Apical four chamber;: 36632 mm3  Left Ventricular End Systolic Volume; Most recent value chosen; Method of Disks, Single Plane; 2D mode; Apical four chamber;: 55207 mm3  Left Ventricular Systolic Area; Most recent value chosen; Method of Disks, Single Plane; 2D mode; Apical four chamber;: 2740 mm2  SI (A4C): 9 3 ml/m2  SV (A4C): 01427 mm3  Right Atrium Systolic Area; End Systole; 2D mode; Apical four chamber;: 5050 mm2  Right Atrium Systolic Area; Mean; Mean value chosen; End Systole; 2D mode; Apical four chamber;: 5050 mm2    Apical two chamber  LV MOD Diam; Recent value; End Diastole (A2C): 42 6 mm  LV MOD Diam; Recent value; End Diastole (A2C): 44 7 mm  LV MOD Diam; Recent value; End Diastole (A2C): 47 1 mm  LV MOD Diam; Recent value; End Diastole (A2C): 50 9 mm  LV MOD Diam; Recent value; End Diastole (A2C): 52 1 mm  LV MOD Diam; Recent value; End Diastole (A2C): 52 1 mm  LV MOD Diam; Recent value; End Diastole (A2C): 51 6 mm  LV MOD Diam; Recent value; End Diastole (A2C): 50 6 mm  LV MOD Diam; Recent value; End Diastole (A2C): 46 2 mm  LV MOD Diam; Recent value; End Diastole (A2C): 25 7 mm  LV MOD Diam; Recent value; End Diastole (A2C): 41 3 mm  LV MOD Diam; Recent value; End Diastole (A2C): 39 1 mm  LV MOD Diam; Recent value; End Diastole (A2C): 37 3 mm  LV MOD Diam; Recent value; End Diastole (A2C): 36 3 mm  LV MOD Diam; Recent value; End Diastole (A2C): 14 7 mm  LV MOD Diam; Recent value; End Diastole (A2C): 21 3 mm  LV MOD Diam; Recent value; End Diastole (A2C): 26 3 mm  LV MOD Diam; Recent value;  End Diastole [facility]): 34 2 mm  LV MOD Diam; Recent value; End Diastole (A2C): 32 4 mm  LV MOD Diam; Recent value; End Diastole (A2C): 29 9 mm  Left Ventricle diastolic major axis; Most recent value chosen; Method of Disks, Single Plane; 2D mode; Apical two chamber;: 72 8 mm  Left Ventricular Diastolic Area; Most recent value chosen; Method of Disks, Single Plane; 2D mode; Apical two chamber;: 2850 mm2  Left Ventricular End Diastolic Volume; Most recent value chosen; Method of Disks, Single Plane; 2D mode; Apical two chamber;: 65754 mm3    M mode  Tricuspid Annular Plane Systolic Excursion; Mean; Mean value chosen; Tricuspid Annulus; M mode;: 6 19 mm  Tricuspid Annular Plane Systolic Excursion; Tricuspid Annulus; M mode;: 6 19 mm    Tissue Doppler Imaging  LV Peak Early Julian Tissue Anthony; Mean; Medial MA (TDI): 39 2 mm/s  LV Peak Early Julian Tissue Anthony; Medial MA (TDI): 39 2 mm/s    Unspecified Scan Mode  LVOT CV Orifice Diam; Mean: 23 mm  LVOT Diam: 23 mm  MV Peak Anthony/LV Peak Tissue Anthony E-Wave; Medial MA: 17 9  DT; Antegrade Flow: 165 ms  DT; Mean; Antegrade Flow: 165 ms  Dec [place]; Antegrade Flow: 4250 mm/s2  Dec [place]; Mean; Antegrade Flow: 4250 mm/s2  MV E/A: 1 6  MV Peak A Anthony: 428 mm/s  MV Peak A Anthony; Mean: 428 mm/s  MV Peak E Anthony; Antegrade Flow: 703 mm/s  MV Peak E Anthony; Mean; Antegrade Flow: 703 mm/s  MVA (PHT): 458 mm2  PHT: 48 ms  PHT;  Mean: 48 ms  Peak Grad; Mean; Regurgitant Flow: 32 mm[Hg]  Vmax; Mean; Regurgitant Flow: 2830 mm/s  Vmax; Regurgitant Flow: 2960 mm/s  Vmax; Regurgitant Flow: 2890 mm/s  Vmax; Regurgitant Flow: 2930 mm/s  Vmax; Regurgitant Flow: 2430 mm/s  Vmax; Regurgitant Flow: 2950 mm/s    [facility]    Prepared and electronically signed by    Michelle Foss MD  Signed 26-Jun-2020 12:11:20       Meds/Allergies   all current active meds have been reviewed and current meds:   Current Facility-Administered Medications   Medication Dose Route Frequency    acetaminophen (TYLENOL) tablet 650 mg  650 mg Oral Q6H PRN    apixaban (ELIQUIS) tablet 5 mg  5 mg Oral BID    ascorbic acid (VITAMIN C) tablet 1,000 mg  1,000 mg Oral BID    bumetanide (BUMEX) tablet 1 mg  1 mg Oral Daily    ceFAZolin (ANCEF) IVPB (premix) 2,000 mg 50 mL  2,000 mg Intravenous Q8H    cholecalciferol (VITAMIN D3) tablet 1,000 Units  1,000 Units Oral Daily    collagenase (SANTYL) ointment   Topical Daily    DULoxetine (CYMBALTA) delayed release capsule 30 mg  30 mg Oral Daily    metoprolol tartrate (LOPRESSOR) tablet 25 mg  25 mg Oral Q12H [address]    metroNIDAZOLE (FLAGYL) IVPB (premix) 500 mg 100 mL  500 mg Intravenous Q8H    midodrine (PROAMATINE) tablet 5 mg  5 mg Oral TID AC    nystatin (MYCOSTATIN) powder   Topical BID    ondansetron (ZOFRAN) injection 4 mg  4 mg Intravenous Q6H PRN    oxyCODONE (ROXICODONE) IR tablet 5 mg  5 mg Oral Q6H PRN    pantoprazole (PROTONIX) EC tablet 40 mg  40 mg Oral Daily    potassium chloride (K-DUR,KLOR-CON) CR tablet 40 mEq  40 mEq Oral BID    senna (SENOKOT) tablet 17 2 mg  2 tablet Oral Daily     Medications Prior to Admission   Medication    apixaban (ELIQUIS) 5 mg    Ascorbic Acid (VITAMIN C) 1000 MG tablet    bumetanide (BUMEX) 1 mg tablet    cholecalciferol (VITAMIN D3) 1,000 units tablet    DULoxetine (CYMBALTA) 30 mg delayed release capsule    metoprolol tartrate (LOPRESSOR) 25 mg tablet    midodrine (PROAMATINE) 5 mg tablet    pantoprazole (PROTONIX) 40 mg tablet    senna (SENOKOT) 8 6 mg    sodium chloride (OCEAN) 0 65 % nasal spray     Counseling / Coordination of Care  Total floor / unit time spent today 20 minutes  Greater than 50% of total time was spent with the patient and / or family counseling and / or coordination of care  ** Please Note: Dragon 360 Dictation voice to text software may have been used in the creation of this document   **

## 2020-06-30 NOTE — DISCHARGE INSTR - AVS FIRST PAGE
Wound Care  · Left foot:  Apply Santyl to Dorsal Forefoot Surgical wound and Lateral Midfoot Stage III pressure wound daily for every other day minimum  · Left leg:  Apply Duoderm/hydrocolloid dressing every 2 or 3 days as needed(x2 wounds)  · Right foot:  Apply Santyl to right lateral rearfoot wound daily or every-other-day minimum  · Please cover foot wounds with ABD/Kerlix/six-inch Ace wrap or Tubigrip F for compression  · Please call and schedule next follow-up appointment at the [place] [id number] on 7 08/2020 in the afternoon

## 2020-06-30 NOTE — OCCUPATIONAL THERAPY NOTE
Occupational Therapy Evaluation & Treatment Note      Linwood Mazariegos    6/30/2020    Principal Problem:    Acute on chronic combined systolic and diastolic congestive heart failure ([facility] )  Active Problems:    Essential hypertension    Cellulitis of left lower extremity    Acute kidney injury superimposed on CKD (HCC)    Elevated troponin level not due myocardial infarction    Atrial fibrillation (HCC)      Past Medical History:   Diagnosis Date    Atrial fibrillation (HCC)     Cardiac disease     Cellulitis     [facility])     Chronic pain     Chronic venous hypertension     with ulceration    GERD (gastroesophageal reflux disease)     History of methicillin resistant staphylococcus aureus (MRSA) 11/26/2019    negative nasal swab     Hyperlipidemia     Hypertension     Obesity     Pulmonary hypertension ([facility] )     PVD (peripheral vascular disease) ([facility] )     Type 2 diabetes mellitus with diabetic heel ulcer [facility] 6/30/2020    Vascular disorder of extremity ([place] [address] )        Past Surgical History:   Procedure Laterality Date    ANTERIOR RELEASE VERTEBRAL BODY W/ POSTERIOR FUSION      APPENDECTOMY  1954    CATARACT EXTRACTION      DECOMPRESSION SPINE LUMBAR POSTERIOR      ELBOW SURGERY      FOOT/ANKLE ARTHRODESIS Right      complications postoperatively with bone healing and infection    INCISION AND DRAINAGE OF WOUND Left 3/5/2020    Procedure: INCISION AND DRAINAGE (I&D) EXTREMITY;  Surgeon: Jaime Conde DPM;  Location:  MAIN OR;  Service: Podiatry    KNEE SURGERY      NOSE SURGERY      turbinectomy    RETINAL DETACHMENT SURGERY      RHINOPLASTY      TONSILLECTOMY      VEIN LIGATION AND STRIPPING      saphenous vein long        06/30/20 1120   Note Type   Note type Eval/Treat   Restrictions/Precautions   Weight Bearing Precautions Per Order Yes   LLE Weight Bearing Per Order   (Pt reports NWB; Confirmed with Dr Paola Osullivan that pt is WBAT)   Other Precautions Hard of hearing;Pain; Fall Risk;Bed Alarm;Cognitive; Impulsive   Pain Assessment   Pain Assessment Tool Villarreal-Baker FACES   Villarreal-Baker FACES Pain Rating 8   Pain Location/Orientation Orientation: Left;Orientation: Right;Location: Leg   Home Living   Type of [address] One level;Ramped entrance   Home Equipment Wheelchair-electric   Prior Function   Level of [place] Needs assistance with ADLs and functional mobility; Needs assistance with IADLs   Lives With Son   Receives Help From Family   ADL Assistance Needs assistance   IADLs Needs assistance   Falls in the last 6 months 1 to 4   Comments Pt is a questionable historian  Psychosocial   Psychosocial (WDL) X   Patient Behaviors/Mood Irritable   ADL   Eating Assistance 7  Independent   Grooming Assistance 4  Minimal Assistance   UB Bathing Assistance 2  Maximal Assistance   LB Bathing Assistance 2  Maximal Assistance   UB Dressing Assistance 2  Maximal Assistance   LB Dressing Assistance 2  Maximal [address]  2  Maximal Assistance   Bed Mobility   Supine to Sit 2  Maximal assistance   Additional items Assist x 2;HOB elevated; Bedrails; Increased time required; Impulsive;Verbal cues;LE management   Sit to Supine 2  Maximal assistance   Additional items Assist x 2;HOB elevated; Bedrails; Increased time required; Impulsive;Verbal cues;LE management   Transfers   Sit to Stand Unable to assess   Additional Comments Attempted sit to stand; pt unable and unsafe at this time   Functional Mobility   Additional Comments Pt has been using electric wheelchair for mobility     Activity Tolerance   Activity Tolerance Patient limited by fatigue;Patient limited by pain   Medical Staff Made Aware PT Tammy; Dr Soham Cortes present at end of session   Nurse Made Aware JERSEY SANDS Assessment   RUE Assessment WFL   LUE Assessment   LUE Assessment X  (pt with significant pain limiting ROM; needs further assess)   Hand Function   Gross Motor Coordination Functional Fine Motor Coordination Functional   Cognition   Overall Cognitive Status Impaired   Arousal/Participation Alert   Attention Difficulty attending to directions   Orientation Level Oriented X4   Memory Decreased recall of precautions;Decreased recall of recent events;Decreased short term memory;Decreased long term memory   Following Commands Follows one step commands inconsistently   Comments Repetitive verbal cues  Pt resistive to safety education insisting that his way was right  Pt also reported to therapists that he is NWB; confirmed after session with Dr Laura Jon that his is able to bear weight  Pt also reported that he had    Assessment   Limitation Decreased ADL status; Decreased UE ROM; Decreased UE strength;Decreased Safe judgement during ADL;Decreased cognition;Decreased endurance;Decreased self-care trans;Decreased high-level ADLs   Prognosis Fair   Assessment Pt is a 69 y o  male seen for OT evaluation at [address] [place] [address], admitted 6/25/2020 w/ Acute on chronic combined systolic and diastolic congestive heart failure ([facility] )  OT completed extensive review of pt's medical and social history  Comorbidities affecting pt's functional performance at time of assessment include: HTN, LLE cellulitis, SAMY, elevated troponin, aFib, open foot wounds, GERD, PVD, obesity, ambulatory dysfunction, chronic left shoulder pain, rotator cuff arthropathy left shoulder, chronic right wrist pain, abscess left foot  Personal factors affecting pt at time of IE include:behavioral pattern, difficulty performing ADLS, limited insight into deficits, compliance, decreased initiation and engagement , health management  and environment  Prior to admission, pt was living in [address] [place] [address] with son and reports he was assisted for shower transfers, but otherwise able to perform his ADLs and simple transfers  Uses electric wheelchair for mobility  Reports lately spending much time in bed   Pt is also a questionable historian, reporting conflicting information throughout the session  Pt states he is NWB on LLE and was treated as such during the session, but after session Dr Soham Cortes confirmed that pt does NOT have WB restrictions  Pt also resistive to therapists educations, stating he wants to do it "his way"  Pt also appears to be confused stating that he fell twice in the ED upon this admission, and even noting that "amazingly the bruises are gone already"  There are no notes of any falls during this hospitalization, and upon reporting this to Charge RN, she states he is likely remembering falls he had during a previous admission  Pt with significant L shoulder pain, claiming it is from the "fall in the emergency room", but upon chart review, it notes patient has chronic L shoulder injuries  Upon evaluation, pt presents to OT below baseline due to the following performance deficits: weakness, decreased strength, decreased balance, decreased tolerance, impaired memory, impaired problem solving, impulsivity, decreased safety awareness, increased pain, orthopedic restrictions, impaired interpersonal skills and decreased coping skills  Pt to benefit from continued skilled OT tx while in the hospital to address deficits as defined above and maximize level of functional independence w ADL's and functional mobility  Occupational Performance areas to address include: grooming, bathing/shower, toilet hygiene, dressing, bed mobility and functional transfers  Based on findings, pt is of high complexity  At this time, OT recommendations at time of discharge are short term rehab  Goals   Patient Goals less pain   Plan   Treatment Interventions ADL retraining;Functional transfer training;UE strengthening/ROM; Endurance training;Cognitive reorientation;Patient/family training;Equipment evaluation/education; Compensatory technique education;Continued evaluation; Energy conservation   Goal Expiration Date 07/10/20   OT Frequency 3-5x/wk   Additional Treatment Session   Start Time 1050   End Time 1120   Treatment Assessment Patient participated in Skilled OT session this date with interventions consisting of ADL re training with the use of correct body mechnaics, Energy Conservation techniques, safety awareness and fall prevention techniques,  therapeutic activities to: increase activity tolerance, increase standing tolerance time with unilateral UE support to complete sink level ADLs, increase trunk control and increase OOB/ sitting tolerance   Patient agreeable to OT treatment session, upon arrival patient was found supine in bed  Treatment session as follows: Pt edu on bed mobility techniques; pt resistive  With Max X2 and significant time/cues, pt able to sit at EOB  Pt able to don clean hospital gown with assistance  Pt argumentative with therapists regarding best way to transfer, refusing to follow therapist safety education and training  Ultimately patient with too much pain to be able to stand  Able to slightly shift weight onto feet in order to switch kaci pad on bed  Patient continues to be functioning below baseline level, occupational performance remains limited secondary to factors listed above and increased risk for falls and injury  From OT standpoint, recommendation at time of d/c would be Short Term Rehab  Pt unsafe to return home at this level of assistance  Patient to benefit from continued Occupational Therapy treatment while in the hospital to address deficits as defined above and maximize level of functional independence with ADLs and functional mobility  Pt left with call pickett in reach, Dr Ying Colmenares present  Recommendation   OT Discharge Recommendation Post-Acute Rehabilitation Services   OT - OK to Discharge Yes  (to rehab when medically stable; no to home)     Pt will achieve the following goals within 10 days  *Pt will complete grooming with modified independence      *Pt will complete UB bathing and dressing with modified independence  *Pt will complete LB bathing and dressing with modified independence   *Pt will complete toileting (hygiene and clothing management) with modified independence    *Pt will complete bed mobility with independence, with bed flat and no side rail to prep for purposeful tasks    *Pt will perform functional transfers with modified independence in order to complete ADL routine  *Pt will increase standing tolerance to 3+ minutes in order to complete sinkside ADL  *Pt will complete item retrieval and light home management with modified independence, in electric wheelchair, while demonstrating good safety  *Pt will demonstrate increased activity tolerance in order to complete ADL routine  *Pt will participate in cognitive assessment to determine level of safety for returning home    *Pt will participate in UE therapeutic exercise in order to maximize strength for ADL transfers  *Pt will identify 3-5 fall risks to ensure safety upon discharge      [place], MS, OTR/L

## 2020-06-30 NOTE — UTILIZATION REVIEW
Continued Stay Review    Date: 6/30/2020                        Current Patient Class: inpatient  Current Level of Care: med surg    HPI:74 y o  male initially admitted on 6/25/2020 inpatient  due to acute on chronic CHF/acute metabolic encephalopathy/SAMY superimposed on CKD and cellulitis of LLE  Plan was diuresis with IV Bumex, IV antibiotics  Baseline creatinine is 1 3 - 1 6     On 6/27/2020: Today more confused  continues on Bumex gtt with creatinine > baseline at 2 07  Left leg more swollen and erythematous and IV antibiotics continue  Right lateral rearfoot debridecd  , lateral left foot debrided  On 6/28/2020  Has improved LLE swelling but increased pain since debridement  IV antibiotics continue  Creatinine 2 13 and per nephrology leveling off, Bumex gtt dc  On 6/29/2020 patient with  pain buttock  Venous doppler without DVT  Wound left leg redressed with hydrocolloid, surgical wound left foot, santyl dressing re applied and is improving slowly  Right lateral rear foot with less drainage, santyl dressing reapplied  IV antibiotics continue  Transition to oral diuresis  Assessment/Plan: on 6/30/2020:  Has pain in foot with any movement  Left high heel ulcer with cellulitis and IV antibiotics continue, santyl dressing daily  Creatinine 2 13 and for extra dose Bumex  Pertinent Labs/Diagnostic Results:   6/29/2020 Venous duplex - RIGHT LOWER LIMB LIMITED:  Evaluation shows no evidence of thrombus in the common femoral vein  Doppler evaluation shows a normal response to augmentation maneuvers  LEFT LOWER LIMB:No gross evidence of acute deep vein thrombosis based on color flow analysis  No gross evidence of superficial thrombophlebitis noted  Doppler evaluation shows a normal response to augmentation maneuvers         6/25/2020 CT head Stable exam with chronic microangiopathic change without acute intracranial abnormality     6/25/2020 CxR - No acute cardiopulmonary disease      6/25/2020 ECHO LEFT [place] function was markedly reduced  Ejection fraction was estimated to be 30 %  There was severe diffuse hypokinesis with regional variations  Wall thickness was moderately to markedly increased  Doppler parameters were consistent with elevated ventricular end-diastolic filling pressure   RIGHT VENTRICLE:The ventricle was moderately dilated  Systolic function was reduced   LEFT ATRIUM:The atrium was moderately dilated   RIGHT ATRIUM:The atrium was markedly dilated    MITRAL VALVE:There was moderate regurgitation  The regurgitant jet was eccentric, not well captured on all images, and could be underestimated   Loreli Muss was mild to moderate regurgitation  Pulmonary artery systolic pressure was at least moderately increased    IVC, HEPATIC VEINS:The inferior vena cava was dilated    Results from last 7 days   Lab Units 06/30/20  0508 06/29/20  0523 06/27/20  0625 06/26/20  0454 06/25/20  1842   WBC Thousand/uL 6 03 6 06 6 44 5 63 6 51   HEMOGLOBIN g/dL 13 3 13 4 13 6 13 9 13 5   HEMATOCRIT % 39 8 40 1 41 0 42 2 40 1   PLATELETS Thousands/uL 117* 117* 121* 117* 118*   NEUTROS ABS Thousands/µL  --  3 93 3 96 3 02 3 84     Results from last 7 days   Lab Units 06/30/20  0508 06/29/20  0523 06/28/20  0340 06/27/20  0625 06/26/20  0454 06/25/20  1842   SODIUM mmol/L 137 137 138 139 138 136   POTASSIUM mmol/L 4 2 4 1 4 4 4 1 3 5 3 7   CHLORIDE mmol/L 101 100 101 101 100 101   CO2 mmol/L 28 26 26 26 24 24   ANION GAP mmol/L 8 11 11 12 14* 11   BUN mg/dL 51* 54* 57* 56* 61* 63*   CREATININE mg/dL 2 13* 2 12* 2 13* 2 07* 2 15* 2 29*   EGFR ml/min/1 73sq m 30 30 30 31 29 27   CALCIUM mg/dL 8 8 8 6 9 0 8 9 9 5 9 3   MAGNESIUM mg/dL  --  1 6  --  1 9  --  1 7   PHOSPHORUS mg/dL  --   --   --  3 2  --  3 6     Results from last 7 days   Lab Units 06/27/20  0625 06/25/20  1842   AST U/L 34 49*   ALT U/L 16 23   ALK PHOS U/L 65 83   TOTAL PROTEIN g/dL 7 6 8 1 ALBUMIN g/dL 2 5* 3 0*   TOTAL BILIRUBIN mg/dL 1 40* 2 00*   AMMONIA umol/L  --  45*     Results from last 7 days   Lab Units 06/30/20  0508 06/29/20  0523 06/28/20  0340 06/27/20  0625 06/26/20  0454 06/25/20  1842   GLUCOSE RANDOM mg/dL 79 75 76 89 80 88     Results from last 7 days   Lab Units 06/27/20  0625 06/26/20  0224 06/25/20  2341 06/25/20  1842   TROPONIN I ng/mL 0 10* 0 12* 0 15* 0 15*     Results from last 7 days   Lab Units 06/25/20  1842   PROTIME seconds 23 2*   INR  2 09*   PTT seconds 42*     Results from last 7 days   Lab Units 06/27/20  0625 06/25/20  2341   PROCALCITONIN ng/ml 0 06 0 11     Results from last 7 days   Lab Units 06/28/20  0340 06/28/20  0010 06/27/20  2158 06/27/20  1921 06/27/20  1516   LACTIC ACID mmol/L 2 0 2 6* 2 4* 2 7* 2 7*     Results from last 7 days   Lab Units 06/25/20  1842   NT-PRO BNP pg/mL 82,366*     Results from last 7 days   Lab Units 06/25/20  1842   LIPASE u/L 93     Results from last 7 days   Lab Units 06/26/20  1851   CLARITY UA  Clear   COLOR UA  Yellow   SPEC GRAV UA  1 010   PH UA  5 5   GLUCOSE UA mg/dl Negative   KETONES UA mg/dl Negative   BLOOD UA  Negative   PROTEIN UA mg/dl Negative   NITRITE UA  Negative   BILIRUBIN UA  Negative   UROBILINOGEN UA E U /dl 0 2   LEUKOCYTES UA  Negative   WBC UA /hpf None Seen   RBC UA /hpf None Seen   BACTERIA UA /hpf None Seen   EPITHELIAL CELLS WET PREP /hpf Occasional     Results from last 7 days   Lab Units 06/25/20  1842   BLOOD CULTURE  No Growth After 4 Days  No Growth After 4 Days       Vital Signs:   06/30/20 08:17:53  97 6 °F (36 4 °C)  89  17  138/92  107  98 %       06/30/20 06:53:13    74    128/81  97  92 %       06/29/20 22:30:29  97 7 °F (36 5 °C)  54Abnormal     119/72  88  95 %       06/29/20 22:29:59  96 4 °F (35 8 °C)Abnormal     20  119/72  88         06/29/20 16:36:42  96 8 °F (36 °C)Abnormal   60  22  117/73  88  97 %         06/30/20 0508  123 kg (271 lb 9 7 oz)  Bed scale   06/29/20 0600  122 kg (269 lb 2 9 oz)  Bed scale     06/28/20 0539  122 kg (268 lb 15 4 oz)  Bed scale     06/28/20 0345  122 kg (268 lb 8 3 oz)  Bed scale     06/27/20 0600  121 kg (266 lb 12 1 oz)  Bed scale     06/26/20 0600  121 kg (266 lb 12 1 oz)  Bed scale     06/26/20 0523  121 kg (267 lb 3 2 oz)  Bed scale     06/25/20 1709  127 kg (280 lb)  Stated  6' (1 829 m)     Medications:   Scheduled Medications:  Medications:  apixaban 5 mg Oral BID   vitamin C 1,000 mg Oral BID   Bumetanide - 1300 on 6/30/2020 2 mg Intravenous Once   bumetanide 1 mg Oral Daily   cefazolin 2,000 mg Intravenous Q8H   cholecalciferol 1,000 Units Oral Daily   collagenase  Topical Daily   DULoxetine 30 mg Oral Daily   metoprolol tartrate 25 mg Oral Q12H FRANKIE   metroNIDAZOLE 500 mg Intravenous Q8H   midodrine 5 mg Oral TID AC   nystatin  Topical BID   pantoprazole 40 mg Oral Daily   potassium chloride 40 mEq Oral BID   senna 2 tablet Oral Daily     Continuous IV Infusions:    bumetanide (BUMEX) 12 5 mg infusion 50 mL   Rate: 2 mL/hr Dose: 0 5 mg/hr  Freq: Continuous Route: IV  Last Dose: Stopped (06/28/20 1359)  Start: 06/26/20 1600 End: 06/28/20 1416    PRN Meds:  acetaminophen 650 mg Oral Q6H PRN   ondansetron 4 mg Intravenous Q6H PRN   oxyCODONE - used x 1 6/28, x 2 6/29, x 1 6/30 5 mg Oral Q6H PRN       Discharge Plan: to be determined  PT     Network Utilization Review Department  [email or web address]  ATTENTION: Please call with any questions or concerns to [phone] and carefully listen to the prompts so that you are directed to the right person  All voicemails are confidential   [facility] all requests for admission clinical reviews, approved or denied determinations and any other requests to dedicated fax number below belonging to the campus where the patient is receiving treatment   List of dedicated fax numbers for the Facilities:  FACILITY NAME UR FAX NUMBER   ADMISSION DENIALS (Administrative/Medical Necessity) [facility] (Maternity[facility]NICU[facility] [phone]     [address] [phone]   [facility] [phone]   [facility] [address] [place] [address] [phone]   [facility]  [phone]   [address] [place] [address], [facility]  [address] [place] [address] [phone]

## 2020-06-30 NOTE — PHYSICAL THERAPY NOTE
PHYSICAL THERAPY  EVAL/TX     06/30/20 1125   Note Type   Note type Eval/Treat   Pain Assessment   Pain Assessment Tool Villarreal-Baker FACES   Villarreal-Baker FACES Pain Rating 8   Pain Location/Orientation   (B/L LE's)   Home Living   Type of [address] One level;Ramped entrance   Home Equipment Wheelchair-electric   Additional Comments Reports that he does not leave his bedroom  Prior Function   Level of [place] Needs assistance with ADLs and functional mobility   Lives With Son   Receives Help From Family   ADL Assistance Needs assistance   IADLs Needs assistance   Falls in the last 6 months 1 to 4   Comments ?historian  Restrictions/Precautions   Weight Bearing Precautions Per Order Yes   LLE Weight Bearing Per Order   (Not currently in chart but patient reports NWB)   Other Precautions Hard of hearing;Pain; Fall Risk;Bed Alarm;Cognitive; Impulsive   General   Family/Caregiver Present No   Cognition   Overall Cognitive Status Impaired   Arousal/Participation Alert   Memory Decreased recall of recent events   Following Commands Follows one step commands inconsistently   Comments Repeated verbal cues for safety  RLE Assessment   RLE Assessment   (Unable to fully assess due to pain)   LLE Assessment   LLE Assessment   (unable to fully assess due to pain)   Light Touch   RLE Light Touch Impaired   LLE Light Touch Impaired   Bed Mobility   Supine to Sit 2  Maximal assistance   Additional items Assist x 2;HOB elevated; Bedrails; Increased time required;Verbal cues;LE management   Sit to Supine 2  Maximal assistance   Additional items Assist x 2;HOB elevated; Bedrails; Increased time required;Verbal cues;LE management   Balance   Static Sitting Fair +   Dynamic Sitting Fair -   Endurance Deficit   Endurance Deficit Yes   Activity Tolerance   Activity Tolerance Patient limited by fatigue;Patient limited by pain   Medical Staff Made Aware GINO Ceja   Nurse Made Aware JERSEY Recinos   Assessment   Prognosis Fair   Problem List Decreased strength;Decreased range of motion;Decreased endurance; Impaired balance;Decreased mobility; Decreased cognition; Impaired judgement;Decreased safety awareness; Impaired hearing; Impaired sensation;Obesity; Decreased skin integrity;Pain  (NWB)   Assessment Patient is a 67y/o M who presented with cellulitis of the LLE  Multiple wounds on the LE's  Also presenting with CHF  PMH significant for A-fib, CKD, HTN, PVD, obesity  Patient lives with his son in a single story home  Reports that he stays in his bedroom and uses a scooter for mobility  Able to perform transfers ind at home  ?historian  Current medical status includes high pain levels, fall risk, NWB LLE, confusion, decreased safety awareness, decreased sensation, obesity, bed alarm, decreased ROM, strength, balance and endurance  Patient argumentative during session  Excess amount of time to complete all activity  Patient required repeated cues throughout session for bed mobility technique and for safety  He does not appear to be at his baseline  Will continue to assess but recommending rehab at this time  Goals   Patient Goals To have less pain   STG Expiration Date 07/14/20   Short Term Goal #1 1  Perform supine<>sit with Mod A x 1 2  Perform sit<>stand transfers with use of UE's at a mod A x 1 level 3  Stand pivot transfer with mod A x 1  4  maintain % of the time on the LLE   PT Treatment Day 0   Plan   Treatment/Interventions OT; Spoke to nursing;Bed mobility; Equipment eval/education;Cognitive reorientation; Therapeutic exercise; Endurance training;LE strengthening/ROM; Functional transfer training   PT Frequency Other (Comment)  (3-5x/wk)   Recommendation   PT Discharge Recommendation Post-Acute Rehabilitation Services   Equipment Recommended Wheelchair   Modified [place] Scale   Modified [place] Scale 5       P T  Tx note    S: "Let me do it my way   I don't trust anyone"    O: Patient sat edge of bed for approx 10-15 minutes with supervision  Instruction provided for proper transfer technique and how to maintain NWB  Patient argumentative and had difficulty following instruction  Lacks safety awareness  Patient also unable to lift LUE due to pain  RN aware  Patient was able to perform a half stand with max A x 2 to scoot bottom back further on to bed  A: Patient deconditioned presenting with decreased strength, ROM, balance, and endurance  Very limited by pain  Poor safety awareness and is at high risk for falls  Excess amount of time to complete all activity  Further activity was not assessed as patient was not following instruction  P: Recommending rehab  Will continue to assess  Daryel Gosselin Sterling, PT            Patient Name: Rox Bernheim  Today's Date: 6/30/2020

## 2020-06-30 NOTE — PHYSICAL THERAPY NOTE
PHYSICAL THERAPY NOTE      During session patient reported to therapists that he was supposed to be NWB on the LLE per Dr Eula Tay  Chart did not specify weight bearing status  Continued with NWB during session  Spoke with Dr Eula Tay at end of session who reports that patient does not have any weight bearing restrictions at this time  Gilford Sarna Sterling, PT      Patient Name: Ralf Lezama  Today's Date: 6/30/2020

## 2020-06-30 NOTE — PROGRESS NOTES
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 connecting both lower poles  -previously on Bumex drip  Discontinue 6/28  -continue Bumex 1 mg PO daily    -continue to avoid nephrotoxins  -I/O      Hypotension: blood pressure improved, acceptable    -continues on midodrine 5 mg t i d  with holding parameter for systolic blood pressure greater than 130   -avoid hypotension or high fluctuations in blood pressure   -home medications:  Metoprolol tartrate 25 mg b i d , Bumex 1 mg daily, midodrine 5 mg 3 times a day      CHF:  With EF of 30%  -chest x-ray: negative for cardiopulmonary disease    -home diuretic:  Bumex 1 mg daily    -cardiology following   -previously on Bumex drip, discontinue 6/28  -continue 1 5 L fluid restriction, daily weights, and I/O      Cellulitis:  -continues on Ancef per primary team      LLE edema:  -duplex negative for DVT  -wound care per podiatry to LLE       Anemia:  -continue to monitor and transfuse as needed      Other:  Atrial fibrillation    Disposition:  Renal function remains stable at 2 1  Diuretics re-initiated on 06/29  Will need follow-up at discharge  Message sent office staff to schedule follow-up appointment  Will need BMP prior to appointment  SUBJECTIVE:  The patient is resting in bed  He is in no apparent distress  He denies chest pain or increased shortness of breath  He is complaining of left foot pain  He denies nausea, vomiting, diarrhea    He denies any changes to his appetite      OBJECTIVE:  Current Weight: Weight - Scale: 123 kg (271 lb 9 7 oz)  Vitals:    06/29/20 2230 06/30/20 0508 06/30/20 0653 06/30/20 0817   BP: 119/72  128/81 138/92   Pulse: (!) 54  74 89   Resp:    17   Temp: 97 7 °F (36 5 °C)   97 6 °F (36 4 °C)   TempSrc:       SpO2: 95%  92% 98%   Weight:  123 kg (271 lb 9 7 oz)     Height:           Intake/Output Summary (Last 24 hours) at 6/30/2020 1146  Last data filed at 6/30/2020 0858  Gross per 24 hour   Intake 460 ml   Output 825 ml   Net -365 ml     General: NAD  Skin: warm, dry,  no rash, OVD  HEENT: Moist mucous membranes, sclera anicteric, normocephalic, atraumatic  Neck: No apparent JVD appreciated  Chest: lung sounds clear B/L, on RA   CVS:Regular rate and rhythm, no murmer   Abdomen: Soft, round, non-tender, +BS  Extremities: B/L LE edema present, LLE foot wrapped, LLE shin wound  Neuro: alert and oriented  Psych: appropriate mood and affect     Medications:    Current Facility-Administered Medications:     acetaminophen (TYLENOL) tablet 650 mg, 650 mg, Oral, Q6H PRN, Salome Bergman PA-C, 650 mg at 06/28/20 1101    apixaban (ELIQUIS) tablet 5 mg, 5 mg, Oral, BID, Salome Bergman PA-C, 5 mg at 06/30/20 0900    ascorbic acid (VITAMIN C) tablet 1,000 mg, 1,000 mg, Oral, BID, Salome Bergman PA-C, 1,000 mg at 06/30/20 0900    bumetanide (BUMEX) tablet 1 mg, 1 mg, Oral, Daily, NAVNEET Schneider, 1 mg at 06/30/20 0900    ceFAZolin (ANCEF) IVPB (premix) 2,000 mg 50 mL, 2,000 mg, Intravenous, Q8H, Salome Bergman PA-C, Last Rate: 100 mL/hr at 06/30/20 0545, 2,000 mg at 06/30/20 0545    cholecalciferol (VITAMIN D3) tablet 1,000 Units, 1,000 Units, Oral, Daily, Salome Bergman PA-C, 1,000 Units at 06/30/20 5017    collagenase (SANTYL) ointment, , Topical, Daily, Purnima Lamar, DPM    DULoxetine (CYMBALTA) delayed release capsule 30 mg, 30 mg, Oral, Daily, Salome Bergman PA-C, 30 mg at 06/30/20 0900    metoprolol tartrate (LOPRESSOR) tablet 25 mg, 25 mg, Oral, Q12H FRANKIE, Colleen Garrison PA-C, 25 mg at 06/30/20 0900    metroNIDAZOLE (FLAGYL) IVPB (premix) 500 mg 100 mL, 500 mg, Intravenous, Q8H, Paulette Hawkins MD, Last Rate: 200 mL/hr at 06/30/20 0944, 500 mg at 06/30/20 0944    midodrine (PROAMATINE) tablet 5 mg, 5 mg, Oral, TID [facility], PA-C, 5 mg at 06/30/20 3007    nystatin (MYCOSTATIN) powder, , Topical, BID, Paulette Hawkins MD    ondansetron [facility]) injection 4 mg, 4 mg, Intravenous, Q6H PRN, Colleen Garrison PA-C    oxyCODONE (ROXICODONE) IR tablet 5 mg, 5 mg, Oral, Q6H PRN, Paulette Hawkins MD, 5 mg at 06/30/20 0103    pantoprazole (PROTONIX) EC tablet 40 mg, 40 mg, Oral, Daily, Colleen Garrison PA-C, 40 mg at 06/30/20 0859    potassium chloride (K-DUR,KLOR-CON) CR tablet 40 mEq, 40 mEq, Oral, BID, Rene Bosworth, KIRSTYNP, 40 mEq at 06/30/20 0900    senna (SENOKOT) tablet 17 2 mg, 2 tablet, Oral, Daily, Colleen Garrison PA-C, 17 2 mg at 06/30/20 1613    Laboratory Results:  Results from last 7 days   Lab Units 06/30/20  0508 06/29/20  0523 06/28/20  0340 06/27/20  0625  06/25/20  1842   WBC Thousand/uL 6 03 6 06  --  6 44   < > 6 51   HEMOGLOBIN g/dL 13 3 13 4  --  13 6   < > 13 5   HEMATOCRIT % 39 8 40 1  --  41 0   < > 40 1   PLATELETS Thousands/uL 117* 117*  --  121*   < > 118*   SODIUM mmol/L 137 137 138 139   < > 136   POTASSIUM mmol/L 4 2 4 1 4 4 4 1   < > 3 7   CHLORIDE mmol/L 101 100 101 101   < > 101   CO2 mmol/L 28 26 26 26   < > 24   BUN mg/dL 51* 54* 57* 56*   < > 63*   CREATININE mg/dL 2 13* 2 12* 2 13* 2 07*   < > 2 29*   CALCIUM mg/dL 8 8 8 6 9 0 8 9   < > 9 3   MAGNESIUM mg/dL  --  1 6  --  1 9  --  1 7   PHOSPHORUS mg/dL  --   --   --  3 2  --  3 6   ALK PHOS U/L  --   --   --  65  --  83   ALT U/L  --   --   --  16  --  23   AST U/L  --   --   --  34  --  49*    < > = values in this interval not displayed

## 2020-06-30 NOTE — SOCIAL WORK
LOS: 5  Pt is a 30 day re-admit  Pt is not a bundle  Pt was discharged from [address] on 6/18 after admit for CHF and Cellulitis  STR was recommended at that time but pt and son refused  Pt returned home with [address] through [facility]  Pt states he followed up as directed after previous discharge  He reports being complaint with his medications  CM met with pt at bedside and explained role  CM also spoke with pt son Sarah Witt via phone to discuss discharge planning  Pt resides with his son Sarah Witt and grandson in a trailer home; [address] but now has ramp outside  Pt primarily uses power chair to get around  He does not ambulate currently at baseline  Son and grandson assist with ADLs  Son provides transport  Pt PCP is Cynthia Carpenter  He uses [facility] [place] and has prescription plan  Pt is currently active with [facility] for [address]  He has history [facility] [address] [facility]  Pt has history of STR at [facility]  History of MH and D&A treatment was denied  CM awaiting PT/OT consults for recommendations  Therapy will likely recommended STR for pt given his presentation  CM discussed same with pt and son  Pt is adamant he does not want STR on discharge and wishes to return home with family  Son in agreement with same  A post acute care recommendation was made by your care team for [address]  Discussed Freedom of Choice with both patient and caregiver  Choice is to return/continue services  Referral sent to [facility] for MAURIS[facility]  Son will transport pt home on discharge  CM will continue to follow  Patient/caregiver received discharge checklist   Content reviewed  Patient/caregiver encouraged to participate in discharge plan of care prior to discharge home   CM reviewed d/c planning process including the following: identifying help at home, patient preference for d/c planning needs, availability of treatment team to discuss questions or concerns patient and/or family may have regarding understanding medications and recognizing signs and symptoms once discharged  CM also encouraged patient to follow up with all recommended appointments after discharge  Patient advised of importance for patient and family to participate in managing patients medical well being

## 2020-06-30 NOTE — PROGRESS NOTES
Progress Note - Podiatry  Linwood Mazariegos 69 y o  male MRN: [id number]  Unit/Bed#: -01 Encounter: [id number]    Assessment/Plan:  1  Traumatic wounds left leg   -new traumatic wound noted just proximal to the current traumatic leg wound               -hydrocolloid dressing to be applied to both wounds     2  Surgical Wound LFT - S/P I&D Abscess dorsal forefoot (03/05/2020)              -continue Santyl dressing daily              -QWCJT slowly improving  3  Pressure ulcer Stage 3 -  Lateral left midfoot 5th Met base              - continue Santyl daily  - improving slowly  4  Skin ulceration full-thickness -  Right lateral rearfoot               - continue Santyl dressing reapplied  5  Chronic venous insufficiency bilateral              - Chronic +1 right , +2 left edema with rubor      CHF, Acute kidney injury, metabolic encephalopathy, lactic acidosis, AFib, hypertension              - managed per Internal Medicine, Cardiology  Subjective/Objective   Chief Complaint:   Chief Complaint   Patient presents with    Leg Swelling     patient presents to the ED with fluid retention, sent from PCP        Subjective:   60-year-old white male resting in bed having just completed physical therapy, relates having pain from back and no new pain from feet  New wound noted on left upper leg which patient has no idea how it occurred over the past 24 hours  Denies any new pain or shortness of breath  Blood pressure 138/92, pulse 89, temperature 97 6 °F (36 4 °C), resp  rate 17, height 6' (1 829 m), weight 123 kg (271 lb 9 7 oz), SpO2 98 %  ,Body mass index is 36 84 kg/m²      Invasive Devices     Peripheral Intravenous Line            Peripheral IV 06/26/20 Right Forearm 3 days                Physical Exam:   General appearance: alert, cooperative and no distress  Neuro/Vascular: Grossly intact  Pulses: Right: DP 0/4, PT 0/4, Left: DP 0/4, PT 0/4  Capillary Filling:  3 Sec, Edema:  +2 left, +1 right  Arterial duplex completed during last hospital admission 1 week ago showed adequate perfusion of both lower extremities for healing   Chronic skin rubor of both legs secondary to chronic venous insufficiency  Extremity:   Left foot notably less swollen than 2 days ago  Ulcers/Wounds:   · Traumatic wound anterior left leg measures 8 0 x 3 0 x 0 1 cm,   75% red, 25%  Pink new skin,  Overall good progress, no evidence of infection  Mild serosanguineous drainage  · Traumatic wound proximal anterior left leg just above the current wound  90% red with small amount of necrotic skin slough secondary to trauma  No evidence of infection  Minimal serous drainage  · Pressure ulcer lateral left midfoot measures 1 2 x 1 0 x 0 2 cm, subcutaneous depth, 75% red, 25% yellow, but is mild serosanguineous drainage, no evidence of infection  · Slow nonhealing surgical wound dorsal left forefoot measures 7 0 x 3 0 x 0 8 cm, remained 15% yellow, 85% red, moderate serosanguineous drainage, no evidence of infection  Good progress since Saturday  · Subcutaneous depth ulcerative breakdown right lateral rearfoot within scar tissue of previous ORIF ankle, wound measures 1 0 x 0 5 x 0 2cm, 50% red, 50% yellow, minimal S/S drainage, No maceration              Labs and other studies:   CBC w/diff  Results from last 7 days   Lab Units 06/30/20  0508 06/29/20  0523   WBC Thousand/uL 6 03 6 06   HEMOGLOBIN g/dL 13 3 13 4   HEMATOCRIT % 39 8 40 1   PLATELETS Thousands/uL 117* 117*   NEUTROS PCT %  --  66   LYMPHS PCT %  --  17   MONOS PCT %  --  11   EOS PCT %  --  5     BMP  Results from last 7 days   Lab Units 06/30/20  0508   POTASSIUM mmol/L 4 2   CHLORIDE mmol/L 101   CO2 mmol/L 28   BUN mg/dL 51*   CREATININE mg/dL 2 13*   CALCIUM mg/dL 8 8     CMP  Results from last 7 days   Lab Units 06/30/20  0508  06/27/20  0625   POTASSIUM mmol/L 4 2   < > 4 1   CHLORIDE mmol/L 101   < > 101   CO2 mmol/L 28   < > 26   BUN mg/dL 51*   < > 56*   CREATININE mg/dL 2 13*   < > 2 07*   CALCIUM mg/dL 8 8   < > 8 9   ALK PHOS U/L  --   --  65   ALT U/L  --   --  16   AST U/L  --   --  34    < > = values in this interval not displayed  @Culture@  Lab Results   Component Value Date    BLOODCX No Growth After 4 Days  06/25/2020    BLOODCX No Growth After 4 Days  06/25/2020    BLOODCX No Growth After 5 Days  06/14/2020    BLOODCX No Growth After 5 Days  06/14/2020    BLOODCX Chryseobacterium (A) 06/11/2020    BLOODCX Flavobacterium (A) 06/11/2020    BLOODCX Gram-positive alec (A) 06/11/2020    BLOODCX No Growth After 5 Days   06/11/2020    URINECX <10,000 cfu/ml  02/19/2020         Current Facility-Administered Medications:     acetaminophen (TYLENOL) tablet 650 mg, 650 mg, Oral, Q6H PRN, Karan Crisp, PA-C, 650 mg at 06/28/20 1101    apixaban (ELIQUIS) tablet 5 mg, 5 mg, Oral, BID, Karan Crisp, PA-C, 5 mg at 06/30/20 0900    ascorbic acid (VITAMIN C) tablet 1,000 mg, 1,000 mg, Oral, BID, Kraan Crisp, PA-C, 1,000 mg at 06/30/20 0900    bumetanide (BUMEX) tablet 1 mg, 1 mg, Oral, Daily, Marcine Trip, CRNP, 1 mg at 06/30/20 0900    ceFAZolin (ANCEF) IVPB (premix) 2,000 mg 50 mL, 2,000 mg, Intravenous, Q8H, Karan Crisp, PA-C, Last Rate: 100 mL/hr at 06/30/20 0545, 2,000 mg at 06/30/20 0545    cholecalciferol (VITAMIN D3) tablet 1,000 Units, 1,000 Units, Oral, Daily, Karan Crisp, PA-C, 1,000 Units at 06/30/20 6591    collagenase (SANTYL) ointment, , Topical, Daily, Rocio Costa DPM    DULoxetine (CYMBALTA) delayed release capsule 30 mg, 30 mg, Oral, Daily, Karan Crisp, PA-C, 30 mg at 06/30/20 0900    metoprolol tartrate (LOPRESSOR) tablet 25 mg, 25 mg, Oral, Q12H [facility], Karan Abbott PA-C, 25 mg at 06/30/20 0900    metroNIDAZOLE (FLAGYL) IVPB (premix) 500 mg 100 mL, 500 mg, Intravenous, Q8H, Jesse Nelson MD, Last Rate: 200 mL/hr at 06/30/20 0944, 500 mg at 06/30/20 0944    midodrine (PROAMATINE) tablet 5 mg, 5 mg, Oral, TID Radha MORALES KEISHA Villarreal, 5 mg at 06/30/20 9087    nystatin (MYCOSTATIN) powder, , Topical, BID, Otilia Nelson MD    ondansetron (ZOFRAN) injection 4 mg, 4 mg, Intravenous, Q6H PRN, Koki Lopes PA-C    oxyCODONE (ROXICODONE) IR tablet 5 mg, 5 mg, Oral, Q6H PRN, Otilia Nelson MD, 5 mg at 06/30/20 0103    pantoprazole (PROTONIX) EC tablet 40 mg, 40 mg, Oral, Daily, Koki Lopes PA-C, 40 mg at 06/30/20 0859    potassium chloride (K-DUR,KLOR-CON) CR tablet 40 mEq, 40 mEq, Oral, BID, NAVNEET Palma, 40 mEq at 06/30/20 0900    senna (SENOKOT) tablet 17 2 mg, 2 tablet, Oral, Daily, Koki Loeps PA-C, 17 2 mg at 06/30/20 7027    Imaging: I have personally reviewed pertinent films in PACS  EKG, Pathology, and Other Studies: I have personally reviewed pertinent reports    VTE Pharmacologic Prophylaxis:   Eliquis  VTE Mechanical Prophylaxis: reason for no mechanical VTE prophylaxis  patient refuses     Thai Brewer DPM

## 2020-06-30 NOTE — PROGRESS NOTES
Progress Note - Linwood Mazariegos 69 y o  male MRN: [id number]    Unit/Bed#: -01 Encounter: [id number]      Assessment:  Principal Problem:    Acute on chronic combined systolic and diastolic congestive heart failure (HCC)  Active Problems:    Cellulitis of left lower extremity    Essential hypertension    Acute kidney injury superimposed on CKD (HCC)    Elevated troponin level not due myocardial infarction    Atrial fibrillation (HCC)  Resolved Problems:    Acute metabolic encephalopathy    Lactic acidosis        Plan:  · Acute on chronic combined systolic and diastolic CHF-improved and now off 5 and diuretics  · Acute kidney injury with chronic kidney disease stage III-creatinine is at 2 13 which is stable  · Left high heel ulcer with cellulitis-day now her 5 of antibiotics-will have patient set for wound care as per Dr Mecca Huynh management is exploring options for discharge with patient and son  · Hypertension-mild diastolic elevation noted  ·   atrial fibrillation-controlled rates    Subjective:   Patient complains of pain in his foot on the left with any large movement  He reports this is worse as they had had some debridement yesterday with Dr Cruz Dus shortness of breath    ROS  Comprehensive system review negative other than noted above    Objective:     Vitals: Blood pressure 138/92, pulse 89, temperature 97 6 °F (36 4 °C), resp  rate 17, height 6' (1 829 m), weight 123 kg (271 lb 9 7 oz), SpO2 98 %  ,Body mass index is 36 84 kg/m²    Current Facility-Administered Medications   Medication Dose Route Frequency Provider Last Rate Last Dose    acetaminophen (TYLENOL) tablet 650 mg  650 mg Oral Q6H PRN Sary Monroy PA-C   650 mg at 06/28/20 1101    apixaban (ELIQUIS) tablet 5 mg  5 mg Oral BID Sary Monroy PA-C   5 mg at 06/30/20 0900    ascorbic acid (VITAMIN C) tablet 1,000 mg  1,000 mg Oral BID Sary Monroy PA-C   1,000 mg at 06/30/20 0900    bumetanide (BUMEX) tablet 1 mg  1 mg Oral Daily Verle Common, CRNP   1 mg at 06/30/20 0900    ceFAZolin (ANCEF) IVPB (premix) 2,000 mg 50 mL  2,000 mg Intravenous Q8H Mi Rodriguez PA-C 100 mL/hr at 06/30/20 0545 2,000 mg at 06/30/20 0545    cholecalciferol (VITAMIN D3) tablet 1,000 Units  1,000 Units Oral Daily Mi Rodriguez PA-C   1,000 Units at 06/30/20 9388    collagenase (SANTYL) ointment   Topical Daily Nuris Darnell DPM        DULoxetine (CYMBALTA) delayed release capsule 30 mg  30 mg Oral Daily Mi Rodriguez PA-C   30 mg at 06/30/20 0900    metoprolol tartrate (LOPRESSOR) tablet 25 mg  25 mg Oral Q12H [address] Mi Rodriguez PA-C   25 mg at 06/30/20 0900    metroNIDAZOLE (FLAGYL) IVPB (premix) 500 mg 100 mL  500 mg Intravenous Q8H Rajni Fitzgerald  mL/hr at 06/30/20 0944 500 mg at 06/30/20 0944    midodrine (PROAMATINE) tablet 5 mg  5 mg Oral TID [facility]   5 mg at 06/30/20 8538    nystatin (MYCOSTATIN) powder   Topical BID Rajni Fitzgerald MD        ondansetron [facility]) injection 4 mg  4 mg Intravenous Q6H PRN Mi Rodriguez PA-C        oxyCODONE (ROXICODONE) IR tablet 5 mg  5 mg Oral Q6H PRN Rajni Fitzgerald MD   5 mg at 06/30/20 0103    pantoprazole (PROTONIX) EC tablet 40 mg  40 mg Oral Daily Mi Rodriguez PA-C   40 mg at 06/30/20 0859    potassium chloride (K-DUR,KLOR-CON) CR tablet 40 mEq  40 mEq Oral BID Derral Grumet, CRNP   40 mEq at 06/30/20 0900    senna (SENOKOT) tablet 17 2 mg  2 tablet Oral Daily HARRIET DianeC   17 2 mg at 06/30/20 7922     Medications Prior to Admission   Medication    apixaban (ELIQUIS) 5 mg    Ascorbic Acid (VITAMIN C) 1000 MG tablet    bumetanide (BUMEX) 1 mg tablet    cholecalciferol (VITAMIN D3) 1,000 units tablet    DULoxetine (CYMBALTA) 30 mg delayed release capsule    metoprolol tartrate (LOPRESSOR) 25 mg tablet    midodrine (PROAMATINE) 5 mg tablet    pantoprazole (PROTONIX) 40 mg tablet    senna (SENOKOT) 8 6 mg    sodium chloride (OCEAN) 0 65 % nasal spray         Intake/Output Summary (Last 24 hours) at 6/30/2020 1110  Last data filed at 6/30/2020 0858  Gross per 24 hour   Intake 460 ml   Output 825 ml   Net -365 ml       Physical Exam:  General appearance: alert and cooperative  Neck: no adenopathy, no JVD, supple, symmetrical, trachea midline and thyroid not enlarged, symmetric, no tenderness/mass/nodules  Lungs: clear to auscultation bilaterally  Heart:  Irregular regular with controlled related  Abdomen: soft, non-tender; bowel sounds normal; no masses,  no organomegaly  Extremities: extremities normal, warm and well-perfused; no cyanosis, clubbing, or edema  Skin:  No rashes noted dressings in place on leg wounds  Neurologic:  Speech is clear and no focal deficit-encephalopathy has improved       Lab, Imaging and other studies: I have personally reviewed pertinent reports  Results from last 7 days   Lab Units 06/30/20  0508 06/29/20  0523 06/27/20  0625 06/26/20  0454   WBC Thousand/uL 6 03 6 06 6 44 5 63   HEMOGLOBIN g/dL 13 3 13 4 13 6 13 9   HEMATOCRIT % 39 8 40 1 41 0 42 2   PLATELETS Thousands/uL 117* 117* 121* 117*   NEUTROS PCT %  --  66 61 53   LYMPHS PCT %  --  17 20 24   MONOS PCT %  --  11 14* 17*   EOS PCT %  --  5 4 5     Results from last 7 days   Lab Units 06/30/20  0508 06/29/20  0523 06/28/20  0340 06/27/20  0625  06/25/20  1842   POTASSIUM mmol/L 4 2 4 1 4 4 4 1   < > 3 7   CHLORIDE mmol/L 101 100 101 101   < > 101   CO2 mmol/L 28 26 26 26   < > 24   BUN mg/dL 51* 54* 57* 56*   < > 63*   CREATININE mg/dL 2 13* 2 12* 2 13* 2 07*   < > 2 29*   CALCIUM mg/dL 8 8 8 6 9 0 8 9   < > 9 3   ALK PHOS U/L  --   --   --  65  --  83   ALT U/L  --   --   --  16  --  23   AST U/L  --   --   --  34  --  49*    < > = values in this interval not displayed       Lab Results   Component Value Date    TROPONINI 0 10 (H) 06/27/2020    TROPONINI 0 12 (H) 06/26/2020    TROPONINI 0 15 (H) 06/25/2020    CKMB 11 1 (H) 06/14/2020 CKMB 12 8 (H) 06/13/2020    CKMB 13 3 (H) 06/12/2020    CKTOTAL 319 (H) 06/14/2020    CKTOTAL 431 (H) 06/13/2020    CKTOTAL 492 (H) 06/12/2020     Results from last 7 days   Lab Units 06/25/20  1842   INR  2 09*     Lab Results   Component Value Date    BLOODCX No Growth After 4 Days  06/25/2020    BLOODCX No Growth After 4 Days  06/25/2020    BLOODCX No Growth After 5 Days  06/14/2020    URINECX <10,000 cfu/ml  02/19/2020    WOUNDCULT 2+ Growth of Serratia marcescens (A) 03/05/2020    WOUNDCULT 4+ Growth of Serratia marcescens (A) 03/02/2020    WOUNDCULT 3+ Growth of Serratia marcescens (A) 07/24/2019    WOUNDCULT 1+ Growth of  07/24/2019       Imaging:  Results for orders placed during the hospital encounter of 06/25/20   XR chest portable    Narrative CHEST     INDICATION:   Lower extremity swelling  COMPARISON:  6/14/2020    EXAM PERFORMED/VIEWS:  XR CHEST PORTABLE      FINDINGS:    Stable severe cardiomegaly  No pleural effusion  No airspace consolidation, pulmonary edema, atelectasis or pneumothorax  Limited evaluation bones and soft tissues  Impression No acute cardiopulmonary disease  Workstation performed: [id number]       Results for orders placed during the hospital encounter of 06/11/20   XR chest pa & lateral    Narrative CHEST     INDICATION:   sob  COMPARISON:  6/11/2020    EXAM PERFORMED/VIEWS:  XR CHEST PA & LATERAL      FINDINGS:    Cardiomediastinal silhouette remains enlarged  Slight vascular congestion and slight costophrenic angle blunting  No pneumothorax  Osseous structures appear within normal limits for patient age  Impression Cardiomegaly  Slight vascular congestion  Trace effusions  Workstation performed: [id number]         PATIENT CENTERED ROUNDS: I have performed rounds with the nursing staff            Anmol Young DO

## 2020-06-30 NOTE — PLAN OF CARE
Problem: Potential for Falls  Goal: Patient will remain free of falls  Description  INTERVENTIONS:  - Assess patient frequently for physical needs  -  Identify cognitive and physical deficits and behaviors that affect risk of falls    -  [place] fall precautions as indicated by assessment   - Educate patient/family on patient safety including physical limitations  - Instruct patient to call for assistance with activity based on assessment  - Modify environment to reduce risk of injury  - Consider OT/PT consult to assist with strengthening/mobility  Outcome: Progressing     Problem: Prexisting or High Potential for Compromised Skin Integrity  Goal: Skin integrity is maintained or improved  Description  INTERVENTIONS:  - Identify patients at risk for skin breakdown  - Assess and monitor skin integrity  - Assess and monitor nutrition and hydration status  - Monitor labs   - Assess for incontinence   - Turn and reposition patient  - Assist with mobility/ambulation  - Relieve pressure over bony prominences  - Avoid friction and shearing  - Provide appropriate hygiene as needed including keeping skin clean and dry  - Evaluate need for skin moisturizer/barrier cream  - Collaborate with interdisciplinary team   - Patient/family teaching  - Consider wound care consult   Outcome: Progressing     Problem: PAIN - ADULT  Goal: Verbalizes/displays adequate comfort level or baseline comfort level  Description  Interventions:  - Encourage patient to monitor pain and request assistance  - Assess pain using appropriate pain scale  - Administer analgesics based on type and severity of pain and evaluate response  - Implement non-pharmacological measures as appropriate and evaluate response  - Consider cultural and social influences on pain and pain management  - Notify physician/advanced practitioner if interventions unsuccessful or patient reports new pain  Outcome: Progressing     Problem: INFECTION - ADULT  Goal: Absence or prevention of progression during hospitalization  Description  INTERVENTIONS:  - Assess and monitor for signs and symptoms of infection  - Monitor lab/diagnostic results  - Monitor all insertion sites, i e  indwelling lines, tubes, and drains  - Monitor endotracheal if appropriate and nasal secretions for changes in amount and color  - [place] appropriate cooling/warming therapies per order  - Administer medications as ordered  - Instruct and encourage patient and family to use good hand hygiene technique  - Identify and instruct in appropriate isolation precautions for identified infection/condition  Outcome: Progressing     Problem: SAFETY ADULT  Goal: Patient will remain free of falls  Description  INTERVENTIONS:  - Assess patient frequently for physical needs  -  Identify cognitive and physical deficits and behaviors that affect risk of falls    -  [place] fall precautions as indicated by assessment   - Educate patient/family on patient safety including physical limitations  - Instruct patient to call for assistance with activity based on assessment  - Modify environment to reduce risk of injury  - Consider OT/PT consult to assist with strengthening/mobility  Outcome: Progressing  Goal: Maintain or return to baseline ADL function  Description  INTERVENTIONS:  -  Assess patient's ability to carry out ADLs; assess patient's baseline for ADL function and identify physical deficits which impact ability to perform ADLs (bathing, care of mouth/teeth, toileting, grooming, dressing, etc )  - Assess/evaluate cause of self-care deficits   - Assess range of motion  - Assess patient's mobility; develop plan if impaired  - Assess patient's need for assistive devices and provide as appropriate  - Encourage maximum independence but intervene and supervise when necessary  - Involve family in performance of ADLs  - Assess for home care needs following discharge   - Consider OT consult to assist with ADL evaluation and planning for discharge  - Provide patient education as appropriate  Outcome: Progressing  Goal: Maintain or return mobility status to optimal level  Description  INTERVENTIONS:  - Assess patient's baseline mobility status (ambulation, transfers, stairs, etc )    - Identify cognitive and physical deficits and behaviors that affect mobility  - Identify mobility aids required to assist with transfers and/or ambulation (gait belt, sit-to-stand, lift, walker, cane, etc )  - [place] fall precautions as indicated by assessment  - Record patient progress and toleration of activity level on Mobility SBAR; progress patient to next Phase/Stage  - Instruct patient to call for assistance with activity based on assessment  - Consider rehabilitation consult to assist with strengthening/weightbearing, etc   Outcome: Progressing     Problem: DISCHARGE PLANNING  Goal: Discharge to home or other facility with appropriate resources  Description  INTERVENTIONS:  - Identify barriers to discharge w/patient and caregiver  - Arrange for needed discharge resources and transportation as appropriate  - Identify discharge learning needs (meds, wound care, etc )  - Arrange for interpretive services to assist at discharge as needed  - Refer to Case Management Department for coordinating discharge planning if the patient needs post-hospital services based on physician/advanced practitioner order or complex needs related to functional status, cognitive ability, or social support system  Outcome: Progressing     Problem: Knowledge Deficit  Goal: Patient/family/caregiver demonstrates understanding of disease process, treatment plan, medications, and discharge instructions  Description  Complete learning assessment and assess knowledge base    Interventions:  - Provide teaching at level of understanding  - Provide teaching via preferred learning methods  Outcome: Progressing     Problem: Nutrition/Hydration-ADULT  Goal: Nutrient/Hydration intake appropriate for improving, restoring or maintaining nutritional needs  Description  Monitor and assess patient's nutrition/hydration status for malnutrition  Collaborate with interdisciplinary team and initiate plan and interventions as ordered  Monitor patient's weight and dietary intake as ordered or per policy  Utilize nutrition screening tool and intervene as necessary  Determine patient's food preferences and provide high-protein, high-caloric foods as appropriate       INTERVENTIONS:  - Monitor oral intake, urinary output, labs, and treatment plans  - Assess nutrition and hydration status and recommend course of action  - Evaluate amount of meals eaten  - Assist patient with eating if necessary   - Allow adequate time for meals  - Recommend/ encourage appropriate diets, oral nutritional supplements, and vitamin/mineral supplements  - Order, calculate, and assess calorie counts as needed  - Recommend, monitor, and adjust tube feedings and TPN/PPN based on assessed needs  - Assess need for intravenous fluids  - Provide specific nutrition/hydration education as appropriate  - Include patient/family/caregiver in decisions related to nutrition  Outcome: Progressing

## 2020-06-30 NOTE — PLAN OF CARE
Problem: OCCUPATIONAL THERAPY ADULT  Goal: Performs self-care activities at highest level of function for planned discharge setting  See evaluation for individualized goals  Outcome: Progressing  Note:   Limitation: Decreased ADL status, Decreased UE ROM, Decreased UE strength, Decreased Safe judgement during ADL, Decreased cognition, Decreased endurance, Decreased self-care trans, Decreased high-level ADLs  Prognosis: Fair  Assessment: Pt is a 69 y o  male seen for OT evaluation at [address] [place] [address], admitted 6/25/2020 w/ Acute on chronic combined systolic and diastolic congestive heart failure ([facility] [address] )  OT completed extensive review of pt's medical and social history  Comorbidities affecting pt's functional performance at time of assessment include: HTN, LLE cellulitis, SAMY, elevated troponin, aFib, open foot wounds, GERD, PVD, obesity, ambulatory dysfunction, chronic left shoulder pain, rotator cuff arthropathy left shoulder, chronic right wrist pain, abscess left foot  Personal factors affecting pt at time of IE include:behavioral pattern, difficulty performing ADLS, limited insight into deficits, compliance, decreased initiation and engagement , health management  and environment  Prior to admission, pt was living in [address] [place] [address] with son and reports he was assisted for shower transfers, but otherwise able to perform his ADLs and simple transfers  Uses electric wheelchair for mobility  Reports lately spending much time in bed  Pt is also a questionable historian, reporting conflicting information throughout the session  Pt states he is NWB on LLE and was treated as such during the session, but after session Dr Pramod Hui confirmed that pt does NOT have WB restrictions  Pt also resistive to therapists educations, stating he wants to do it "his way"  Pt also appears to be confused stating that he fell twice in the ED upon this admission, and even noting that "amazingly the bruises are gone already"  There are no notes of any falls during this hospitalization, and upon reporting this to Charge RN, she states he is likely remembering falls he had during a previous admission  Pt with significant L shoulder pain, claiming it is from the "fall in the emergency room", but upon chart review, it notes patient has chronic L shoulder injuries  Upon evaluation, pt presents to OT below baseline due to the following performance deficits: weakness, decreased strength, decreased balance, decreased tolerance, impaired memory, impaired problem solving, impulsivity, decreased safety awareness, increased pain, orthopedic restrictions, impaired interpersonal skills and decreased coping skills  Pt to benefit from continued skilled OT tx while in the hospital to address deficits as defined above and maximize level of functional independence w ADL's and functional mobility  Occupational Performance areas to address include: grooming, bathing/shower, toilet hygiene, dressing, bed mobility and functional transfers  Based on findings, pt is of high complexity   At this time, OT recommendations at time of discharge are short term rehab     OT Discharge Recommendation: Post-Acute Rehabilitation Services  OT - OK to Discharge: Yes(to rehab when medically stable; no to home)

## 2020-07-01 VITALS
WEIGHT: 267.42 LBS | OXYGEN SATURATION: 98 % | HEART RATE: 78 BPM | RESPIRATION RATE: 16 BRPM | SYSTOLIC BLOOD PRESSURE: 111 MMHG | BODY MASS INDEX: 36.22 KG/M2 | TEMPERATURE: 97.3 F | DIASTOLIC BLOOD PRESSURE: 78 MMHG | HEIGHT: 72 IN

## 2020-07-01 DIAGNOSIS — Z71.89 COMPLEX CARE COORDINATION: Primary | ICD-10-CM

## 2020-07-01 PROBLEM — E87.5 HYPERKALEMIA: Status: ACTIVE | Noted: 2020-07-01

## 2020-07-01 PROBLEM — D64.9 ANEMIA: Status: RESOLVED | Noted: 2020-06-30 | Resolved: 2020-07-01

## 2020-07-01 LAB
ANION GAP SERPL CALCULATED.3IONS-SCNC: 9 MMOL/L (ref 4–13)
BACTERIA BLD CULT: NORMAL
BACTERIA BLD CULT: NORMAL
BUN SERPL-MCNC: 48 MG/DL (ref 5–25)
CALCIUM SERPL-MCNC: 9.1 MG/DL (ref 8.3–10.1)
CHLORIDE SERPL-SCNC: 100 MMOL/L (ref 100–108)
CO2 SERPL-SCNC: 27 MMOL/L (ref 21–32)
CREAT SERPL-MCNC: 2.04 MG/DL (ref 0.6–1.3)
GFR SERPL CREATININE-BSD FRML MDRD: 31 ML/MIN/1.73SQ M
GLUCOSE SERPL-MCNC: 74 MG/DL (ref 65–140)
POTASSIUM SERPL-SCNC: 5.6 MMOL/L (ref 3.5–5.3)
SODIUM SERPL-SCNC: 136 MMOL/L (ref 136–145)

## 2020-07-01 PROCEDURE — 99233 SBSQ HOSP IP/OBS HIGH 50: CPT | Performed by: INTERNAL MEDICINE

## 2020-07-01 PROCEDURE — 99233 SBSQ HOSP IP/OBS HIGH 50: CPT | Performed by: NURSE PRACTITIONER

## 2020-07-01 PROCEDURE — 80048 BASIC METABOLIC PNL TOTAL CA: CPT | Performed by: NURSE PRACTITIONER

## 2020-07-01 PROCEDURE — 99238 HOSP IP/OBS DSCHRG MGMT 30/<: CPT | Performed by: INTERNAL MEDICINE

## 2020-07-01 RX ORDER — TRAMADOL HYDROCHLORIDE 50 MG/1
50 TABLET ORAL EVERY 8 HOURS PRN
Qty: 30 TABLET | Refills: 0 | Status: SHIPPED | OUTPATIENT
Start: 2020-07-01 | End: 2020-07-06

## 2020-07-01 RX ORDER — NYSTATIN 100000 [USP'U]/G
POWDER TOPICAL 2 TIMES DAILY
Qty: 15 G | Refills: 0 | Status: SHIPPED | OUTPATIENT
Start: 2020-07-01 | End: 2020-08-14 | Stop reason: HOSPADM

## 2020-07-01 RX ORDER — ACETAMINOPHEN 325 MG/1
650 TABLET ORAL EVERY 6 HOURS PRN
Qty: 30 TABLET | Refills: 0 | Status: SHIPPED | OUTPATIENT
Start: 2020-07-01 | End: 2020-08-14 | Stop reason: HOSPADM

## 2020-07-01 RX ORDER — CEPHALEXIN 500 MG/1
500 CAPSULE ORAL EVERY 12 HOURS SCHEDULED
Qty: 10 CAPSULE | Refills: 0 | Status: SHIPPED | OUTPATIENT
Start: 2020-07-01 | End: 2020-07-06

## 2020-07-01 RX ADMIN — METRONIDAZOLE 500 MG: 500 INJECTION, SOLUTION INTRAVENOUS at 07:54

## 2020-07-01 RX ADMIN — DULOXETINE 30 MG: 30 CAPSULE, DELAYED RELEASE ORAL at 08:02

## 2020-07-01 RX ADMIN — STANDARDIZED SENNA CONCENTRATE 17.2 MG: 8.6 TABLET ORAL at 08:01

## 2020-07-01 RX ADMIN — METOPROLOL TARTRATE 25 MG: 25 TABLET, FILM COATED ORAL at 08:03

## 2020-07-01 RX ADMIN — PANTOPRAZOLE SODIUM 40 MG: 40 TABLET, DELAYED RELEASE ORAL at 08:02

## 2020-07-01 RX ADMIN — BUMETANIDE 1 MG: 1 TABLET ORAL at 08:03

## 2020-07-01 RX ADMIN — METRONIDAZOLE 500 MG: 500 INJECTION, SOLUTION INTRAVENOUS at 00:44

## 2020-07-01 RX ADMIN — MELATONIN 1000 UNITS: at 08:04

## 2020-07-01 RX ADMIN — CEFAZOLIN SODIUM 2000 MG: 2 SOLUTION INTRAVENOUS at 08:53

## 2020-07-01 RX ADMIN — COLLAGENASE SANTYL 1 APPLICATION: 250 OINTMENT TOPICAL at 14:50

## 2020-07-01 RX ADMIN — OXYCODONE HYDROCHLORIDE AND ACETAMINOPHEN 1000 MG: 500 TABLET ORAL at 08:01

## 2020-07-01 RX ADMIN — CEFAZOLIN SODIUM 2000 MG: 2 SOLUTION INTRAVENOUS at 00:04

## 2020-07-01 RX ADMIN — OXYCODONE HYDROCHLORIDE 5 MG: 5 TABLET ORAL at 09:28

## 2020-07-01 RX ADMIN — APIXABAN 5 MG: 5 TABLET, FILM COATED ORAL at 08:03

## 2020-07-01 NOTE — DISCHARGE INSTRUCTIONS
Wound Care  · Left foot:  Apply Santyl to Dorsal Forefoot Surgical wound and Lateral Midfoot Stage III pressure wound daily for every other day minimum  · Left leg:  Apply Duoderm/hydrocolloid dressing every 2 or 3 days as needed(x2 wounds)  · Right foot:  Apply Santyl to right lateral rearfoot wound daily or every-other-day minimum  · Please cover foot wounds with ABD/Kerlix/six-inch Ace wrap or Tubigrip F for compression  · Please call and schedule next follow-up appointment at the [place] [id number] on 7 08/2020 in the afternoon  Acute Kidney Injury (SAMY)  You have been diagnosed with Acute Kidney Injury (SAMY)  The following information has been developed to provide you with information about SAMY and treatment  What is Acute Kidney Injury (SAMY)? SAMY occurs when kidney function decreases over a short period of time  This condition causes a buildup of waste products in the blood and can cause fluid to build up causing swelling in the legs and shortness of breath  Sometimes called Acute Kidney Failure or Acute Renal Failure, SAMY is often reversible if it is found and treated quickly  How do you know if you have SAMY? SAMY is diagnosed by assessing kidney function  This is done by obtaining a blood test to measure the blood level of creatinine  Decreased urine output can sometimes also indicate SAMY  Who is at risk for SAMY? SAMY can happen to anyone but usually happens to people who are already sick and may be in the hospital  People are at higher risk for SAMY if they have any of the following:   age 72 years or older   high or low blood pressure   underlying kidney disease (e g , Chronic Kidney Disease (CKD)   peripheral vascular disease (hardening of arteries)   chronic diseases such as liver disease, heart disease and diabetes   a single kidney    What are the symptoms of SAMY?    You may or may not have the symptoms to suggest you have kidney injury until the SAMY has progressed  Some of the symptoms are listed below:   Not making enough urine   Increased swelling in legs    Feeling tired   Trouble breathing or shortness of breath   Nausea   New or worsening confusion    What causes SAMY? The causes are divided into three categories:   Not enough blood flowing to the kidneys (e g , low blood pressure, bleeding, diarrhea, dehydration)   Injury directly to the kidneys (e g , blood clots, severe infections such as sepsis, medicine toxicity, IV contrast dye used for cardiac catheterization or CT scans)   Blockage to the tubes (ureters) that drain the urine from the kidneys (e g , enlarged prostate, kidney stones, blood clots)    What is the treatment for SAMY? The treatment for SAMY depends on correcting what caused it  Treatment usually involves removing the cause and measures to prevent further injury to the kidneys  This may require the use of intravenous fluids or medications and/or temporary dialysis  Dialysis is a process using a machine that does the job of the kidneys to remove waste and help correct the electrolyte and fluid balance while the kidneys are recovering  If dialysis is needed to treat SAMY, the doctor will assess daily to see if the kidneys are showing signs of recovery  The daily assessments determine how long dialysis needs to continue  Depending on the cause and the extent of damage, an episode of SAMY may resolve in a few days to several weeks to several months  What are the long term effects of having an episode of SAMY?  People who have one episode of SAMY are at an increased risk of having another episode of SAMY as well as other health problems such as kidney disease, stroke, and heart disease   In a small number of people who had unrecognized kidney disease, an SAMY episode may result in Chronic Kidney Disease (CKD) which requires lifelong monitoring and treatment  How do you prevent future episodes of SAMY?    Make sure to follow up with the kidney doctor after hospital discharge and obtain blood work to reassess and monitor kidney function   If you have diabetes, keep your blood sugar in goal range and keep appointments with your diabetes specialist    Cole Needs If you have high blood pressure, have your blood pressure checked regularly to make sure it is in target range  o If you take blood pressure medicine called ACEIs or ARBs (e g , Lisinopril, Enalapril, Diovan, Losartan), your doctor may tell you to skip a dose or two if you have severe dehydration and your blood pressure is running low   Avoid using medicines such as NSAIDs (Nonsteroidal Anti-inflammatory Drugs) and Harmon-2 Inhibitors (a type of NSAID) that may be harmful to kidney function  These may include the medicines listed in the table that follows  Examples of NSAIDS and Harmon-2 Inhibitors   Talk to your doctor or healthcare provider before stopping any medicine ordered for you  Celecoxib (CELEBREX) Ketoprofen (ORUDIS, ORUVAIL)   Diclofenac (VOLTAREN, CATAFLAM) Ketorolac (TORADOL)   Diflunisal (DOLOBID) Meloxicam (MOBIC)   Etodolac (LODINE) Nabumetone (RELAFEN)   Fenoprofen (NALFON) Naproxen (ALEVE, NAPROSYN,    NAPRELAN, ANAPROX)   Flurbiprofen (ANSAID) Oxaprozin (DAYPRO)   Ibuprofen (MOTRIN, ADVIL) Piroxicam (FELDENE)   Indomethacin (INDOCIN) Sulindac (CLINORIL)    Tolmetin (Anton Rodes)     Is there a special diet for people with SAMY? People with SAMY and/or other kidney disease often have high potassium and phosphorus levels in their blood  To protect the kidneys from further injury and to avoid complications, most doctors recommend following a healthy diet choosing foods low potassium and low phosphorus   Limiting dietary potassium to 2 5 grams/day and phosphorus to 800 milligrams/day is recommended   A dietitian can help you with learning more about this type of diet      The tables on the following page may help you to choose lower potassium and phosphorus foods     The following websites are also good sources of information:  Jalyn at  org/nutrition/Kidney-Disease-Stages-1-4   [facility]   https://www niddk nih gov/health-information/kidney-disease/chronic-kidney-disease-ckd/eating-nutrition  https://[facility]/health/articles/15641-renal-diet-basics  [employer]    If you have any questions or concerns about your condition, please contact your doctor or healthcare provider  These tables may help you to choose lower potassium and phosphorus foods    AVOID these higher phosphorus* foods: CHOOSE these lower phosphorus* foods:   Milk, pudding , yogurt made from animals and from many soy varieties Rice milk (unfortified), nondairy creamer   Hard cheeses, ricotta, cottage cheese, fat-free cream cheese Regular and low-fat cream cheese   Ice cream, frozen yogurt Sherbet, frozen fruit pops, sorbet   Soups made with milk, dried peas, beans, lentils or other high phosphorus ingredients Soups made with broth, are water-based, or other lower phosphorus ingredients   Whole grains, including whole-grain breads, crackers, cereal, rice and pasta, corn tortillas Refined grains, including white bread, crackers, cereals, rice and pasta   Quick breads, biscuits, cornbread, muffins, pancakes, waffles, granola, wheat germ Homemade refined (white) dinner rolls, bagels, English muffins, sugar cookies, shortbread cookies, skye food cake   Dried peas (split, black-eyed), beans (black, garbanzo, lima, kidney, navy, galvez), lentils Green peas (canned, frozen), green beans, wax beans   Organ meats, walleye, [place], sardines Lean beef, pork, lamb, poultry, other fish   Nuts and seeds Popcorn   Peanut butter, other nut butters; tofu, veggie or soy burgers Jam, jelly, honey   Chocolate, including chocolate drinks Carob (chocolate-flavored) candy, hard candy, gumdrops   Toro, pepper-type sodas, flavored her, bottled teas, beer Lemon-lime soda, ginger ale or root beer, plain water, cream soda, grape soda   *Always read labels and avoid foods with ingredients containing "phos"  AVOID these higher potassium foods: CHOOSE these lower potassium foods:      Milk (fat free, low fat, whole, buttermilk, Soy), yogurt    Regular and low-fat cream cheese      Beans (white, [place]), [place] sprouts, spinach Swiss       chard, broccoli, avocado, artichoke, potatoes, sweet      potatoes, tomatoes/tomato sauce, beet greens      Green beans, alfalfa sprouts, bamboo shoots (canned),       cabbage, carrots, cauliflower, corn, cucumber, eggplant,      endive, lettuce, mushrooms, onions, radishes,  watercress,       water chestnuts (canned), rice, peas      Halibut, tuna, cod, snapper, tuna fish, turkey    Egg, lean beef, pork, lamb, shellfish, chicken       Banana, papaya, orange, cantaloupe, dates, raisins and      other dried fruit, pomegranate, avocado      Apple, applesauce, blackberries, raspberries, pears,     watermelon, cucumbers, blueberries, cranberries,       peaches      Almonds, peanuts, hazelnuts, [place], cashew, mixed,      seeds (sunflower, pumpkin)     Homemade refined (white) dinner rolls, bagels,      English muffins, flour tortilla, crackers, isela      crackers, popcorn, pretzels, spaghetti or macaroni,       hummus      Tomato or vegetable juice, prune juice    Papaya, magnus, or pear nectar, cranberry juice cocktail      Molasses

## 2020-07-01 NOTE — ASSESSMENT & PLAN NOTE
· When reviewing clinical images from the 14th of June, his left leg does appear to be more swollen and erythematous  ·  Patient has had 6 days of IV Flagyl/Ancef-will complete oral meds with cephalexin as an outpatient is redness is improved  ·  Will follow with Podiatry as outpatient  · Podiatry continue to follow with dressing changes-Wound care and dressing changes per their recommendations he is complaining of pain at the debridement site for-a given Rx for tramadol to be used for pain p r n   For the next several days  · Patient had wound debridement done by Podiatry on 06/27 at bedside

## 2020-07-01 NOTE — PROGRESS NOTES
Progress Note - Podiatry  Linwood Mazariegos 69 y o  male MRN: [id number]  Unit/Bed#: -01 Encounter: [id number]    Assessment/Plan:  1  Traumatic wounds left leg   -new traumatic wound noted just proximal to the current traumatic leg wound               -continue hydrocolloid dressing to both wounds Q 2-3days  - continue with [facility] for home care  - Okay for discharge with follow-up 1 week at the [address] [place] [address] Road  2  Surgical Wound LFT - S/P I&D Abscess dorsal forefoot (03/05/2020)              -continue Santyl dressing daily              -XNKMD slowly improving  3  Pressure ulcer Stage 3 -  Lateral left midfoot 5th Met base              - continue Santyl daily  - improving slowly  4  Skin ulceration full-thickness -  Right lateral rearfoot               - continue Santyl dressing reapplied  5  Chronic venous insufficiency bilateral              - Chronic +1 right , +2 left edema with rubor   - recommend patient continue to utilize Tubigrip compression or compression stockings      CHF, Acute kidney injury, metabolic encephalopathy, lactic acidosis, AFib, hypertension              - managed per Internal Medicine, Cardiology  Subjective/Objective   Chief Complaint:   Chief Complaint   Patient presents with    Leg Swelling     patient presents to the ED with fluid retention, sent from PCP        Subjective:   25-year-old white male seen today at bedside anxious to go home, patient's son coming at 2:00 pm for pickup, relates having no new pain from feet/legs  Denies any shortness of breath  Blood pressure 111/78, pulse 78, temperature (!) 97 3 °F (36 3 °C), resp  rate 16, height 6' (1 829 m), weight 121 kg (267 lb 6 7 oz), SpO2 98 %  ,Body mass index is 36 27 kg/m²      Invasive Devices     None                 Physical Exam:   General appearance: alert, cooperative and no distress  Neuro/Vascular: Grossly intact  Pulses: Right: DP 0/4, PT 0/4, Left: DP 0/4, PT 0/4  Capillary Filling:  3 Sec, Edema:  +2 left, +1 right  Arterial duplex completed during last hospital admission 1 week ago showed adequate perfusion of both lower extremities for healing   Chronic skin rubor of both legs secondary to chronic venous insufficiency  Extremity:   Left foot notably less swollen than 2 days ago  Ulcers/Wounds:   · Traumatic wound anterior left leg measures 8 0 x 3 0 x 0 1 cm,   75% red, 25%  Pink new skin,  Overall good progress, no evidence of infection  Mild serosanguineous drainage  · Traumatic wound proximal anterior left leg just above the current wound  90% red with small amount of necrotic skin slough secondary to trauma  No evidence of infection  Minimal serous drainage  · Pressure ulcer lateral left midfoot measures 1 2 x 1 0 x 0 2 cm, subcutaneous depth, 75% red, 25% yellow, but is mild serosanguineous drainage, no evidence of infection  · Slow nonhealing surgical wound dorsal left forefoot measures 7 0 x 3 0 x 0 8 cm, remained 15% yellow, 85% red, moderate serosanguineous drainage, no evidence of infection  Good progress over this past week  · Subcutaneous depth ulcerative breakdown right lateral rearfoot within old scar tissue of previous ORIF ankle, wound measures 1 0 x 0 5 x 0 2cm, 50% red, 50% yellow, minimal S/S drainage, No maceration            Labs and other studies:   CBC w/diff  Results from last 7 days   Lab Units 06/30/20  0508 06/29/20  0523   WBC Thousand/uL 6 03 6 06   HEMOGLOBIN g/dL 13 3 13 4   HEMATOCRIT % 39 8 40 1   PLATELETS Thousands/uL 117* 117*   NEUTROS PCT %  --  66   LYMPHS PCT %  --  17   MONOS PCT %  --  11   EOS PCT %  --  5     BMP  Results from last 7 days   Lab Units 07/01/20  0620   POTASSIUM mmol/L 5 6*   CHLORIDE mmol/L 100   CO2 mmol/L 27   BUN mg/dL 48*   CREATININE mg/dL 2 04*   CALCIUM mg/dL 9 1     CMP  Results from last 7 days   Lab Units 07/01/20  0620  06/27/20  0625   POTASSIUM mmol/L 5 6*   < > 4 1   CHLORIDE mmol/L 100   < > 101   CO2 mmol/L 27   < > 26   BUN mg/dL 48*   < > 56*   CREATININE mg/dL 2 04*   < > 2 07*   CALCIUM mg/dL 9 1   < > 8 9   ALK PHOS U/L  --   --  65   ALT U/L  --   --  16   AST U/L  --   --  34    < > = values in this interval not displayed  @Culture@  Lab Results   Component Value Date    BLOODCX No Growth After 5 Days  06/25/2020    BLOODCX No Growth After 5 Days  06/25/2020    BLOODCX No Growth After 5 Days  06/14/2020    BLOODCX No Growth After 5 Days  06/14/2020    BLOODCX Chryseobacterium (A) 06/11/2020    BLOODCX Flavobacterium (A) 06/11/2020    BLOODCX Gram-positive alec (A) 06/11/2020    BLOODCX No Growth After 5 Days  06/11/2020    URINECX <10,000 cfu/ml  02/19/2020       No current facility-administered medications for this encounter       Current Outpatient Medications:     acetaminophen (TYLENOL) 325 mg tablet, Take 2 tablets (650 mg total) by mouth every 6 (six) hours as needed for mild pain, headaches or fever, Disp: 30 tablet, Rfl: 0    apixaban (ELIQUIS) 5 mg, Take 5 mg by mouth 2 (two) times a day , Disp: , Rfl:     Ascorbic Acid (VITAMIN C) 1000 MG tablet, Take 1,000 mg by mouth 2 (two) times a day , Disp: , Rfl:     bumetanide (BUMEX) 1 mg tablet, Take 1 tablet (1 mg total) by mouth daily, Disp: 30 tablet, Rfl: 0    cephalexin (KEFLEX) 500 mg capsule, Take 1 capsule (500 mg total) by mouth every 12 (twelve) hours for 5 days, Disp: 10 capsule, Rfl: 0    cholecalciferol (VITAMIN D3) 1,000 units tablet, Take 1,000 Units by mouth daily, Disp: , Rfl:     collagenase (SANTYL) ointment, Apply topically daily, Disp: 15 g, Rfl: 0    DULoxetine (CYMBALTA) 30 mg delayed release capsule, Take 1 capsule (30 mg total) by mouth daily, Disp: 30 capsule, Rfl: 5    metoprolol tartrate (LOPRESSOR) 25 mg tablet, Take 1 tablet (25 mg total) by mouth every 12 (twelve) hours, Disp: 60 tablet, Rfl: 0    midodrine (PROAMATINE) 5 mg tablet, Take 1 tablet (5 mg total) by mouth 3 (three) times a day before meals, Disp: 90 tablet, Rfl: 0   nystatin (MYCOSTATIN) powder, Apply topically 2 (two) times a day, Disp: 15 g, Rfl: 0    pantoprazole (PROTONIX) 40 mg tablet, Take 1 tablet (40 mg total) by mouth daily, Disp: 90 tablet, Rfl: 3    senna (SENOKOT) 8 6 mg, Take 2 tablets (17 2 mg total) by mouth daily, Disp: 120 each, Rfl: 0    traMADol (ULTRAM) 50 mg tablet, Take 1 tablet (50 mg total) by mouth every 8 (eight) hours as needed for moderate pain for up to 5 days, Disp: 30 tablet, Rfl: 0    Imaging: I have personally reviewed pertinent films in PACS  EKG, Pathology, and Other Studies: I have personally reviewed pertinent reports    VTE Pharmacologic Prophylaxis:   Eliquis  VTE Mechanical Prophylaxis: reason for no mechanical VTE prophylaxis  patient refuses     Karsten Jordan DPM

## 2020-07-01 NOTE — ASSESSMENT & PLAN NOTE
· Troponin 0 15, likely non-MI troponin elevation   No active CP  · Cardiology consult appreciated  · Denies any chest pain

## 2020-07-01 NOTE — ASSESSMENT & PLAN NOTE
 Controlled at this time    Rates in the 70s to [de-identified]   Rate/rhythm control: lopressor   Anticoagulation: eliquis continue  

## 2020-07-01 NOTE — ASSESSMENT & PLAN NOTE
Wt Readings from Last 3 Encounters:   07/01/20 121 kg (267 lb 6 7 oz)   06/18/20 127 kg (279 lb 1 6 oz)   03/10/20 114 kg (251 lb 8 7 oz)      Chest x-ray: NAD   Last echocardiogram on file: EF 45%, November 2019   Clinically edema is improving after IV diuretics  o     BNP: 35,000   Patient was switched to Bumex drip on 02/26   Continue on Beta-blocker: lopressor   Monitor intake and output, daily weights   Diet: Fluid restriction and 2 g Sodium     Monitor renal function while [facility] Cardiology and nephrology on board   Still with leg edema but slightly improved

## 2020-07-01 NOTE — PROGRESS NOTES
General Cardiology   Progress Note -  Team One   Linwood Mazariegos 69 y o  male MRN: [id number]    Unit/Bed#: -01 Encounter: [id number]    Assessment/ Plan    1  Acute on chronic combined systolic and diastolic heart failure, LVEF 30%  ProBNP on admission was 34,232  Patient takes Bumex 1 mg p o  Daily at home (?compliance)   Initially diuresed with Bumex drip, started on oral Bumex 1 mg daily per Nephrology 6/29  He was still volume overloaded yesterday and was ordered a one time dose of IV Bumex but this was held despite SBP above the hold parameter  Nonetheless, he had improved urine output, improved renal function, and is close to euvolemic on exam today  I/O: not always accurate, 1 3 L out yesterday  -80 mL/24 hours, net -4 5 L  Weight: 267 lb (from 271 lb) per bed scale  Continue 2 g sodium diet 1500 mL fluid restriction  Needs close outpatient follow up with cardiologist in [place]      2  Non MI troponin elevation in setting of acute CHF and SAMY     3  SAMY on CKD  Creatinine 2 04 today, was 2 29 on admission  Baseline creatinine 1 3-1 6  Nephrology following     4  Chronic atrial fibrillation  On Eliquis for oral anticoagulation  On metoprolol tartrate 25 mg q 12 hours  Rates controlled per vitals    Subjective  He offers no complaints, was told he was leaving today and wants to go home  Review of Systems   Constitution: Negative for diaphoresis and malaise/fatigue  Cardiovascular: Negative for chest pain, dyspnea on exertion, leg swelling, orthopnea and palpitations  Respiratory: Negative for cough and shortness of breath  Gastrointestinal: Negative for bloating  Neurological: Negative for dizziness and light-headedness  All other systems reviewed and are negative  Objective:   Vitals: Blood pressure 111/78, pulse 78, temperature (!) 97 3 °F (36 3 °C), resp  rate 16, height 6' (1 829 m), weight 121 kg (267 lb 6 7 oz), SpO2 98 %  , Body mass index is 36 27 kg/m²  ,     Systolic (24hrs), Av , Min:95 , VSF:118     Diastolic (72GWZ), MZZ:34, Min:53, Max:92    Intake/Output Summary (Last 24 hours) at 2020 0835  Last data filed at 2020 0425  Gross per 24 hour   Intake 1220 ml   Output 1100 ml   Net 120 ml     Wt Readings from Last 3 Encounters:   20 121 kg (267 lb 6 7 oz)   20 127 kg (279 lb 1 6 oz)   03/10/20 114 kg (251 lb 8 7 oz)     Telemetry Review: N/A    Physical Exam   Constitutional: He is oriented to person, place, and time  No distress  Sitting up in electric wheelchair, no acute distress, on room air  Neck: Neck supple  No JVD present  Cardiovascular: Normal rate, normal heart sounds and intact distal pulses  An irregular rhythm present  Exam reveals no gallop and no friction rub  No murmur heard  Pulmonary/Chest: Effort normal  No respiratory distress  He has no rales  Decreased at bases, room air  Abdominal: Soft  Bowel sounds are normal  He exhibits no distension  There is no tenderness  Musculoskeletal: He exhibits edema (1+ LE, L>R) and tenderness (LLE)  Neurological: He is alert and oriented to person, place, and time  Skin: Skin is warm and dry  He is not diaphoretic  Chronic venous stasis changes  LLE wounds- dressings C/D/I   Vitals reviewed      LABORATORY RESULTS  Results from last 7 days   Lab Units 20  0625 20  0224 20  2341   TROPONIN I ng/mL 0 10* 0 12* 0 15*     CBC with diff:   Results from last 7 days   Lab Units 20  0508 20  0523 20  0625 20  0454 20  1842   WBC Thousand/uL 6 03 6 06 6 44 5 63 6 51   HEMOGLOBIN g/dL 13 3 13 4 13 6 13 9 13 5   HEMATOCRIT % 39 8 40 1 41 0 42 2 40 1   MCV fL 95 96 96 96 96   PLATELETS Thousands/uL 117* 117* 121* 117* 118*   MCH pg 31 9 32 0 31 7 31 7 32 2   MCHC g/dL 33 4 33 4 33 2 32 9 33 7   RDW % 18 6* 19 2* 19 3* 19 6* 19 2*   MPV fL 12 8* 12 2 12 6 12 6 12 4   NRBC AUTO /100 WBCs  --  0 0 0 0       CMP:  Results from last 7 days   Lab Units 07/01/20  0620 06/30/20  0508 06/29/20  0523 06/28/20  0340 06/27/20  0625 06/26/20  0454 06/25/20  1842   POTASSIUM mmol/L 5 6* 4 2 4 1 4 4 4 1 3 5 3 7   CHLORIDE mmol/L 100 101 100 101 101 100 101   CO2 mmol/L 27 28 26 26 26 24 24   BUN mg/dL 48* 51* 54* 57* 56* 61* 63*   CREATININE mg/dL 2 04* 2 13* 2 12* 2 13* 2 07* 2 15* 2 29*   CALCIUM mg/dL 9 1 8 8 8 6 9 0 8 9 9 5 9 3   AST U/L  --   --   --   --  34  --  49*   ALT U/L  --   --   --   --  16  --  23   ALK PHOS U/L  --   --   --   --  65  --  83   EGFR ml/min/1 73sq m 31 30 30 30 31 29 27       BMP:  Results from last 7 days   Lab Units 07/01/20  0620 06/30/20  0508 06/29/20  0523 06/28/20  0340 06/27/20  0625 06/26/20  0454 06/25/20  1842   POTASSIUM mmol/L 5 6* 4 2 4 1 4 4 4 1 3 5 3 7   CHLORIDE mmol/L 100 101 100 101 101 100 101   CO2 mmol/L 27 28 26 26 26 24 24   BUN mg/dL 48* 51* 54* 57* 56* 61* 63*   CREATININE mg/dL 2 04* 2 13* 2 12* 2 13* 2 07* 2 15* 2 29*   CALCIUM mg/dL 9 1 8 8 8 6 9 0 8 9 9 5 9 3       Lab Results   Component Value Date    CREATININE 2 04 (H) 07/01/2020    CREATININE 2 13 (H) 06/30/2020    CREATININE 2 12 (H) 06/29/2020       Lab Results   Component Value Date    NTBNP 34,232 (H) 06/25/2020    NTBNP 6,437 (H) 11/24/2019    NTBNP 4,386 (H) 11/18/2019           Results from last 7 days   Lab Units 06/29/20  0523 06/27/20  0625 06/25/20  1842   MAGNESIUM mg/dL 1 6 1 9 1 7                     Results from last 7 days   Lab Units 06/25/20  1842   INR  2 09*       Lipid Profile:   No results found for: CHOL  Lab Results   Component Value Date    HDL 41 01/03/2019     Lab Results   Component Value Date    LDLCALC 55 01/03/2019     Lab Results   Component Value Date    TRIG 30 01/03/2019       Cardiac testing:   Results for orders placed during the hospital encounter of 06/25/20   Echo complete with contrast if indicated    Narrative [facility] [address]    Transthoracic Echocardiogram  2D, M-mode, Doppler, and Color Doppler    Study date:  2020    Patient: Antionette Rose  MR number: [id number]  Account number: [de-identified]  : 1945  Age: 69 years  Gender: Male  Status: Inpatient  Location: [address] [place] [address]  Height: 72 in  Weight: 265 5 lb  BP: 127/ 87 mmHg    Indications: Heart Failure    Diagnoses: I50 9 - Heart failure, unspecified    Sonographer:  Shira Qiu RDCS  Primary Physician:  George Emery MD  Group:  [facility]  Interpreting Physician:  Terrence Torres MD    SUMMARY    LEFT VENTRICLE:  Systolic function was markedly reduced  Ejection fraction was estimated to be 30 %  There was severe diffuse hypokinesis with regional variations  Wall thickness was moderately to markedly increased  Doppler parameters were consistent with elevated ventricular end-diastolic filling pressure  RIGHT VENTRICLE:  The ventricle was moderately dilated  Systolic function was reduced  LEFT ATRIUM:  The atrium was moderately dilated  RIGHT ATRIUM:  The atrium was markedly dilated  MITRAL VALVE:  There was moderate regurgitation  The regurgitant jet was eccentric, not well captured on all images, and could be underestimated  TRICUSPID VALVE:  There was mild to moderate regurgitation  Pulmonary artery systolic pressure was at least moderately increased  IVC, HEPATIC VEINS:  The inferior vena cava was dilated  HISTORY: PRIOR HISTORY: Persistent Atrial Fibrillation, Pulmonary Hypertension, Hypertension    PROCEDURE: The study was performed in the [address] [place] [address]  This was a routine study  The transthoracic approach was used  The study included complete 2D imaging, M-mode, complete spectral Doppler, and color Doppler  The heart rate was  73 bpm, at the start of the study  Images were obtained from the parasternal, apical, and subcostal acoustic windows  Images were not obtained from the suprasternal notch acoustic windows   Image quality was adequate  LEFT VENTRICLE: Size was normal  Systolic function was markedly reduced  Ejection fraction was estimated to be 30 %  There was severe diffuse hypokinesis with regional variations  Wall thickness was moderately to markedly increased  DOPPLER: Transmitral flow pattern: atrial fibrillation  Left ventricular diastolic function parameters were abnormal  Doppler parameters were consistent with elevated ventricular end-diastolic filling pressure  RIGHT VENTRICLE: The ventricle was moderately dilated  Systolic function was reduced  Wall thickness was normal     LEFT ATRIUM: The atrium was moderately dilated  RIGHT ATRIUM: The atrium was markedly dilated  MITRAL VALVE: Valve structure was normal  There was normal leaflet separation  DOPPLER: The transmitral velocity was within the normal range  There was no evidence for stenosis  There was moderate regurgitation  The regurgitant jet was  eccentric, not well captured on all images, and could be underestimated  AORTIC VALVE: The valve was trileaflet  Leaflets exhibited normal thickness, mild calcification, normal cuspal separation, and sclerosis  DOPPLER: Transaortic velocity was within the normal range  There was no evidence for stenosis  There  was no significant regurgitation  TRICUSPID VALVE: The valve structure was normal  There was normal leaflet separation  DOPPLER: The transtricuspid velocity was within the normal range  There was no evidence for stenosis  There was mild to moderate regurgitation  Pulmonary  artery systolic pressure was at least moderately increased  PULMONIC VALVE: Leaflets exhibited normal thickness, no calcification, and normal cuspal separation  DOPPLER: The transpulmonic velocity was within the normal range  There was no significant regurgitation  PERICARDIUM: A trace pericardial effusion was identified  The pericardium was normal in appearance  AORTA: The root exhibited normal size      SYSTEMIC VEINS: IVC: The inferior vena cava was dilated  PULMONARY VEINS: DOPPLER: Doppler signals were not recordable in the pulmonary vein(s)  SYSTEM MEASUREMENT TABLES    2D mode  AoR Diam (2D): 35 mm  AoR Diam; Mean (2D): 35 mm  Inferior Vena Cava Diameter; 2D mode;: 27 7 mm  Inferior Vena Cava Diameter; Mean; Mean value chosen; 2D mode;: 27 7 mm  Area; 2D mode;: 3130 mm2  Area; Mean; Mean value chosen; 2D mode;: 3130 mm2  LA Dimension (2D): 53 mm  LA Dimension; Mean (2D): 53 mm  LA/Ao (2D): 1 51  EDV (2D-Cubed): 20686 mm3  EDV (BP): 60097 mm3  EF (2D-Cubed): 29 2 %  ESV (2D-Cubed): 40248 mm3  FS (2D-Cubed): 10 9 %  FS (2D-Teich): 10 9 %  IVS/LVPW (2D): 1 09  IVSd (2D): 19 mm  IVSd; Mean chosen (2D): 19 mm  LVIDd (2D): 45 mm  LVIDd; Mean (2D): 45 mm  LVIDs (2D): 40 1 mm  LVIDs; Mean (2D): 40 1 mm  LVOT Area (2D): 415 mm2  LVPWd (2D): 17 4 mm  LVPWd; Mean (2D): 17 4 mm  Left Ventricular Ejection Fraction; Teichholz; 2D mode;: 23 8 %  Left Ventricular End Diastolic Volume; Teichholz; 2D mode;: 80008 mm3  Left Ventricular End Systolic Volume; Teichholz; 2D mode;: 76947 mm3  SI (2D-Cubed): 11 1 ml/m2  SV (2D-Cubed): 26260 mm3  Stroke Index; Teichholz; 2D mode;: 9 17 ml/m2  Stroke Volume; Teichholz; 2D mode;: 90912 mm3  RVIDd (2D): 56 5 mm  RVIDd; Mean (2D): 56 5 mm  Right and Left Ventricular End Diastolic Diameter Ratio; 2D mode;: 1 26    Apical four chamber  EF (A4C): 23 1 %  LV MOD Diam; Recent value; End Diastole (A4C): 23 9 mm  LV MOD Diam; Recent value; End Diastole (A4C): 44 9 mm  LV MOD Diam; Recent value; End Diastole (A4C): 45 7 mm  LV MOD Diam; Recent value; End Diastole (A4C): 44 9 mm  LV MOD Diam; Recent value; End Diastole (A4C): 44 1 mm  LV MOD Diam; Recent value; End Diastole (A4C): 43 mm  LV MOD Diam; Recent value; End Diastole (A4C): 41 7 mm  LV MOD Diam; Recent value; End Diastole (A4C): 40 4 mm  LV MOD Diam; Recent value; End Diastole (A4C): 39 6 mm  LV MOD Diam; Recent value;  End Diastole (A4C): 39 6 mm  LV MOD Diam; Recent value; End Diastole (A4C): 41 7 mm  LV MOD Diam; Recent value; End Diastole (A4C): 43 8 mm  LV MOD Diam; Recent value; End Diastole (A4C): 43 9 mm  LV MOD Diam; Recent value; End Diastole (A4C): 42 5 mm  LV MOD Diam; Recent value; End Diastole (A4C): 39 6 mm  LV MOD Diam; Recent value; End Diastole (A4C): 36 7 mm  LV MOD Diam; Recent value; End Diastole (A4C): 33 2 mm  LV MOD Diam; Recent value; End Diastole (A4C): 30 3 mm  LV MOD Diam; Recent value; End Diastole (A4C): 25 8 mm  LV MOD Diam; Recent value; End Diastole (A4C): 17 5 mm  LV MOD Diam; Recent value; End Systole (A4C): 13 5 mm  LV MOD Diam; Recent value; End Systole (A4C): 20 8 mm  LV MOD Diam; Recent value; End Systole (A4C): 28 5 mm  LV MOD Diam; Recent value; End Systole (A4C): 34 8 mm  LV MOD Diam; Recent value; End Systole (A4C): 38 8 mm  LV MOD Diam; Recent value; End Systole (A4C): 40 6 mm  LV MOD Diam; Recent value; End Systole (A4C): 40 4 mm  LV MOD Diam; Recent value; End Systole (A4C): 38 5 mm  LV MOD Diam; Recent value; End Systole (A4C): 36 4 mm  LV MOD Diam; Recent value; End Systole (A4C): 34 8 mm  LV MOD Diam; Recent value; End Systole (A4C): 34 6 mm  LV MOD Diam; Recent value; End Systole (A4C): 34 8 mm  LV MOD Diam; Recent value; End Systole (A4C): 34 6 mm  LV MOD Diam; Recent value; End Systole (A4C): 33 8 mm  LV MOD Diam; Recent value; End Systole (A4C): 35 1 mm  LV MOD Diam; Recent value; End Systole (A4C): 36 2 mm  LV MOD Diam; Recent value; End Systole (A4C): 36 9 mm  LV MOD Diam; Recent value; End Systole (A4C): 36 9 mm  LV MOD Diam; Recent value; End Systole (A4C): 36 4 mm  LV MOD Diam; Recent value; End Systole (A4C): 21 6 mm  Left Ventricle diastolic major axis; Most recent value chosen; Method of Disks, Single Plane; 2D mode; Apical four chamber;: 81 6 mm  Left Ventricle systolic major axis; Most recent value chosen; Method of Disks, Single Plane; 2D mode;  Apical four chamber;: 81 8 mm  Left Ventricular Diastolic Area; Most recent value chosen; Method of Disks, Single Plane; 2D mode; Apical four chamber;: 3130 mm2  Left Ventricular End Diastolic Volume; Most recent value chosen; Method of Disks, Single Plane; 2D mode; Apical four chamber;: 96997 mm3  Left Ventricular End Systolic Volume; Most recent value chosen; Method of Disks, Single Plane; 2D mode; Apical four chamber;: 62705 mm3  Left Ventricular Systolic Area; Most recent value chosen; Method of Disks, Single Plane; 2D mode; Apical four chamber;: 2740 mm2  SI (A4C): 9 3 ml/m2  SV (A4C): 96210 mm3  Right Atrium Systolic Area; End Systole; 2D mode; Apical four chamber;: 5050 mm2  Right Atrium Systolic Area; Mean; Mean value chosen; End Systole; 2D mode; Apical four chamber;: 5050 mm2    Apical two chamber  LV MOD Diam; Recent value; End Diastole (A2C): 42 6 mm  LV MOD Diam; Recent value; End Diastole (A2C): 44 7 mm  LV MOD Diam; Recent value; End Diastole (A2C): 47 1 mm  LV MOD Diam; Recent value; End Diastole (A2C): 50 9 mm  LV MOD Diam; Recent value; End Diastole (A2C): 52 1 mm  LV MOD Diam; Recent value; End Diastole (A2C): 52 1 mm  LV MOD Diam; Recent value; End Diastole (A2C): 51 6 mm  LV MOD Diam; Recent value; End Diastole (A2C): 50 6 mm  LV MOD Diam; Recent value; End Diastole (A2C): 46 2 mm  LV MOD Diam; Recent value; End Diastole (A2C): 25 7 mm  LV MOD Diam; Recent value; End Diastole (A2C): 41 3 mm  LV MOD Diam; Recent value; End Diastole (A2C): 39 1 mm  LV MOD Diam; Recent value; End Diastole (A2C): 37 3 mm  LV MOD Diam; Recent value; End Diastole (A2C): 36 3 mm  LV MOD Diam; Recent value; End Diastole (A2C): 14 7 mm  LV MOD Diam; Recent value; End Diastole (A2C): 21 3 mm  LV MOD Diam; Recent value; End Diastole (A2C): 26 3 mm  LV MOD Diam; Recent value; End Diastole (A2C): 34 2 mm  LV MOD Diam; Recent value; End Diastole (A2C): 32 4 mm  LV MOD Diam; Recent value; End Diastole (A2C): 29 9 mm  Left Ventricle diastolic major axis;  Most recent value chosen; Method of Disks, Single Plane; 2D mode; Apical two chamber;: 72 8 mm  Left Ventricular Diastolic Area; Most recent value chosen; Method of Disks, Single Plane; 2D mode; Apical two chamber;: 2850 mm2  Left Ventricular End Diastolic Volume; Most recent value chosen; Method of Disks, Single Plane; 2D mode; Apical two chamber;: 41566 mm3    M mode  Tricuspid Annular Plane Systolic Excursion; Mean; Mean value chosen; Tricuspid Annulus; M mode;: 6 19 mm  Tricuspid Annular Plane Systolic Excursion; Tricuspid Annulus; M mode;: 6 19 mm    Tissue Doppler Imaging  LV Peak Early Julian Tissue Anthony; Mean; Medial MA (TDI): 39 2 mm/s  LV Peak Early Julian Tissue Anthony; Medial MA (TDI): 39 2 mm/s    Unspecified Scan Mode  LVOT CV Orifice Diam; Mean: 23 mm  LVOT Diam: 23 mm  MV Peak Anthony/LV Peak Tissue Anthony E-Wave; Medial MA: 17 9  DT; Antegrade Flow: 165 ms  DT; Mean; Antegrade Flow: 165 ms  Dec Luna; Antegrade Flow: 4250 mm/s2  Dec Luna; Mean; Antegrade Flow: 4250 mm/s2  MV E/A: 1 6  MV Peak A Anthony: 428 mm/s  MV Peak A Anthony; Mean: 428 mm/s  MV Peak E Anthony; Antegrade Flow: 703 mm/s  MV Peak E Anthony; Mean; Antegrade Flow: 703 mm/s  MVA (PHT): 458 mm2  PHT: 48 ms  PHT;  Mean: 48 ms  Peak Grad; Mean; Regurgitant Flow: 32 mm[Hg]  Vmax; Mean; Regurgitant Flow: 2830 mm/s  Vmax; Regurgitant Flow: 2960 mm/s  Vmax; Regurgitant Flow: 2890 mm/s  Vmax; Regurgitant Flow: 2930 mm/s  Vmax; Regurgitant Flow: 2430 mm/s  Vmax; Regurgitant Flow: 2950 mm/s    Inters[employer] Commission Accredited Echocardiography Laboratory    Prepared and electronically signed by    Giancarlo Durant MD  Signed 26-Jun-2020 12:11:20       Meds/Allergies   all current active meds have been reviewed and current meds:   Current Facility-Administered Medications   Medication Dose Route Frequency    acetaminophen (TYLENOL) tablet 650 mg  650 mg Oral Q6H PRN    apixaban (ELIQUIS) tablet 5 mg  5 mg Oral BID    ascorbic acid (VITAMIN C) tablet 1,000 mg  1,000 mg Oral BID    bumetanide (BUMEX) injection 2 mg  2 mg Intravenous Once    bumetanide (BUMEX) tablet 1 mg  1 mg Oral Daily    ceFAZolin (ANCEF) IVPB (premix) 2,000 mg 50 mL  2,000 mg Intravenous Q8H    cholecalciferol (VITAMIN D3) tablet 1,000 Units  1,000 Units Oral Daily    collagenase (SANTYL) ointment   Topical Daily    DULoxetine (CYMBALTA) delayed release capsule 30 mg  30 mg Oral Daily    metoprolol tartrate (LOPRESSOR) tablet 25 mg  25 mg Oral Q12H [facility]    metroNIDAZOLE (FLAGYL) IVPB (premix) 500 mg 100 mL  500 mg Intravenous Q8H    midodrine (PROAMATINE) tablet 5 mg  5 mg Oral TID AC    nystatin (MYCOSTATIN) powder   Topical BID    ondansetron (ZOFRAN) injection 4 mg  4 mg Intravenous Q6H PRN    oxyCODONE (ROXICODONE) IR tablet 5 mg  5 mg Oral Q6H PRN    pantoprazole (PROTONIX) EC tablet 40 mg  40 mg Oral Daily    potassium chloride (K-DUR,KLOR-CON) CR tablet 40 mEq  40 mEq Oral BID    senna (SENOKOT) tablet 17 2 mg  2 tablet Oral Daily     Medications Prior to Admission   Medication    apixaban (ELIQUIS) 5 mg    Ascorbic Acid (VITAMIN C) 1000 MG tablet    bumetanide (BUMEX) 1 mg tablet    cholecalciferol (VITAMIN D3) 1,000 units tablet    DULoxetine (CYMBALTA) 30 mg delayed release capsule    metoprolol tartrate (LOPRESSOR) 25 mg tablet    midodrine (PROAMATINE) 5 mg tablet    pantoprazole (PROTONIX) 40 mg tablet    senna (SENOKOT) 8 6 mg    sodium chloride (OCEAN) 0 65 % nasal spray     Counseling / Coordination of Care  Total floor / unit time spent today 20 minutes  Greater than 50% of total time was spent with the patient and / or family counseling and / or coordination of care  ** Please Note: Dragon 360 Dictation voice to text software may have been used in the creation of this document   **

## 2020-07-01 NOTE — PROGRESS NOTES
Progress Note - Nephrology   Linwood Mazariegos 69 y o  male MRN: [id number]  Unit/Bed#: -01 Encounter: [id number]      Assessment / Plan:  1  SAMY, POA, on CKD stage 3 in setting of possible CRS/prerenal azotemia   -CKD in  setting of CHF/EF 30% and horseshoe kidney  -b/l sCr 1 3-1 6, admit sCr 2 29, now down to 2 04  -f/u am BMP  2  Hyperkalemia - stop potassium supplements and f/u BMP  3  Thrombocytopenia - chronic  4  CHF, EF 30% - continue bumex 1mg po daily, 1 5 L per day fluid restriction, monitoring daily weight  5  HTN - BP , continue metoprolol 25 mg p o  Q 12 hours with hold parameters, also on midodrine 5 mg t i d  For hypotension  6  Cellulitis-status post antibiotics per primary team  7  Left lower extremity edema-wound care per Podiatry, negative for DVT  8  Anemia-resolved    Patient set up for follow-up with renal office as well as BMP prior to appointment  Subjective:   He denies any chest pain or shortness of breath but complains of feeling sore all over  He denies any edema  Objective:     Vitals: Blood pressure 111/78, pulse 78, temperature (!) 97 3 °F (36 3 °C), resp  rate 16, height 6' (1 829 m), weight 121 kg (267 lb 6 7 oz), SpO2 98 %  ,Body mass index is 36 27 kg/m²  Temp (24hrs), Av 3 °F (36 3 °C), Min:97 1 °F (36 2 °C), Max:97 4 °F (36 3 °C)      Weight (last 2 days)     Date/Time   Weight    20 0600   121 (267 42)    20 0508   123 (271 61)    20 0600   122 (269 18)                Intake/Output Summary (Last 24 hours) at 2020 1430  Last data filed at 2020 1406  Gross per 24 hour   Intake 1870 ml   Output 1100 ml   Net 770 ml     I/O last 24 hours: In:  [P O :1940; IV Piggyback:150]  Out: 56 [Urine:1300]        Physical Exam:   Physical Exam   Constitutional: He appears well-developed and well-nourished  No distress  HENT:   Head: Normocephalic and atraumatic  Mouth/Throat: No oropharyngeal exudate     Eyes: Right eye exhibits no discharge  Left eye exhibits no discharge  No scleral icterus  Neck: Neck supple  No thyromegaly present  Cardiovascular: Normal rate, regular rhythm and normal heart sounds  Pulmonary/Chest: Effort normal and breath sounds normal  He has no wheezes  He has no rales  Abdominal: Soft  Bowel sounds are normal  He exhibits no distension  There is no tenderness  Musculoskeletal: Normal range of motion  He exhibits no edema  Neurological: He is alert  awake   Skin: Skin is warm and dry  No rash noted  He is not diaphoretic  Psychiatric: His behavior is normal    irritable   Vitals reviewed        Invasive Devices     Peripheral Intravenous Line            Peripheral IV 06/26/20 Right Forearm 4 days                Medications:    Scheduled Meds:  Current Facility-Administered Medications:  acetaminophen 650 mg Oral Q6H PRN Freddy Funes PA-C    apixaban 5 mg Oral BID Freddy Funes PA-C    vitamin C 1,000 mg Oral BID Freddy Funes PA-C    bumetanide 2 mg Intravenous Once NAVNEET Bowman    bumetanide 1 mg Oral Daily NAVNEET Waddell    cefazolin 2,000 mg Intravenous Q8H Freddy Funes PA-C Last Rate: Stopped (07/01/20 8666)   cholecalciferol 1,000 Units Oral Daily Freddy Funes PA-C    collagenase  Topical Daily Joshua Baltazar, DPM    DULoxetine 30 mg Oral Daily Freddy Funes PA-C    metoprolol tartrate 25 mg Oral Q12H [address] Freddy Funes PA-C    metroNIDAZOLE 500 mg Intravenous Q8H Bishop Benja MD Last Rate: Stopped (07/01/20 0853)   [facility]od[facility]    nystatin  Topical BID Bishop Benja MD    ondansetron 4 mg Intravenous Q6H PRN Freddy Funes PA-C    oxyCODONE 5 mg Oral Q6H PRN Bishop Benja MD    pantoprazole 40 mg Oral Daily Freddy Funes PA-C    senna 2 tablet Oral Daily Freddy Funes PA-C        PRN Meds:   acetaminophen    ondansetron    oxyCODONE    Continuous Infusions:         LAB RESULTS:      Results from last 7 days   Lab Units 07/01/20  0620 06/30/20  0508 06/29/20  0523 06/28/20  0340 06/27/20  0625 06/26/20  0454 06/25/20  1842   WBC Thousand/uL  --  6 03 6 06  --  6 44 5 63 6 51   HEMOGLOBIN g/dL  --  13 3 13 4  --  13 6 13 9 13 5   HEMATOCRIT %  --  39 8 40 1  --  41 0 42 2 40 1   PLATELETS Thousands/uL  --  117* 117*  --  121* 117* 118*   NEUTROS PCT %  --   --  66  --  61 53 59   LYMPHS PCT %  --   --  17  --  20 24 20   MONOS PCT %  --   --  11  --  14* 17* 18*   EOS PCT %  --   --  5  --  4 5 2   POTASSIUM mmol/L 5 6* 4 2 4 1 4 4 4 1 3 5 3 7   CHLORIDE mmol/L 100 101 100 101 101 100 101   CO2 mmol/L 27 28 26 26 26 24 24   BUN mg/dL 48* 51* 54* 57* 56* 61* 63*   CREATININE mg/dL 2 04* 2 13* 2 12* 2 13* 2 07* 2 15* 2 29*   CALCIUM mg/dL 9 1 8 8 8 6 9 0 8 9 9 5 9 3   ALK PHOS U/L  --   --   --   --  65  --  83   ALT U/L  --   --   --   --  16  --  23   AST U/L  --   --   --   --  34  --  49*   MAGNESIUM mg/dL  --   --  1 6  --  1 9  --  1 7   PHOSPHORUS mg/dL  --   --   --   --  3 2  --  3 6       CUTURES:  Lab Results   Component Value Date    BLOODCX No Growth After 5 Days  06/25/2020    BLOODCX No Growth After 5 Days  06/25/2020    BLOODCX No Growth After 5 Days  06/14/2020    BLOODCX No Growth After 5 Days  06/14/2020    BLOODCX Chryseobacterium (A) 06/11/2020    BLOODCX Flavobacterium (A) 06/11/2020    BLOODCX Gram-positive alec (A) 06/11/2020    BLOODCX No Growth After 5 Days  06/11/2020    URINECX <10,000 cfu/ml  02/19/2020                 Portions of the record may have been created with voice recognition software  Occasional wrong word or "sound a like" substitutions may have occurred due to the inherent limitations of voice recognition software  Read the chart carefully and recognize, using context, where substitutions have occurred  If you have any questions, please contact the dictating provider

## 2020-07-01 NOTE — ASSESSMENT & PLAN NOTE
Creatinine upon admission: 2 29-Baseline: around 1 3  Secondary to prerenal overload -elevated with Bumex drip but now gradually improving  Creatinine this morning continues to improve at 2 04-nephrology is following  T Switch to p o   Bumex  Monitor renal function while diuresing  Nephrology recommendations appreciated  Avoid hypotension/nephrotoxins medications

## 2020-07-01 NOTE — PLAN OF CARE
Problem: Potential for Falls  Goal: Patient will remain free of falls  Description  INTERVENTIONS:  - Assess patient frequently for physical needs  -  Identify cognitive and physical deficits and behaviors that affect risk of falls    -  [place] fall precautions as indicated by assessment   - Educate patient/family on patient safety including physical limitations  - Instruct patient to call for assistance with activity based on assessment  - Modify environment to reduce risk of injury  - Consider OT/PT consult to assist with strengthening/mobility  Outcome: Not Progressing     Problem: Prexisting or High Potential for Compromised Skin Integrity  Goal: Skin integrity is maintained or improved  Description  INTERVENTIONS:  - Identify patients at risk for skin breakdown  - Assess and monitor skin integrity  - Assess and monitor nutrition and hydration status  - Monitor labs   - Assess for incontinence   - Turn and reposition patient  - Assist with mobility/ambulation  - Relieve pressure over bony prominences  - Avoid friction and shearing  - Provide appropriate hygiene as needed including keeping skin clean and dry  - Evaluate need for skin moisturizer/barrier cream  - Collaborate with interdisciplinary team   - Patient/family teaching  - Consider wound care consult   Outcome: Not Progressing     Problem: PAIN - ADULT  Goal: Verbalizes/displays adequate comfort level or baseline comfort level  Description  Interventions:  - Encourage patient to monitor pain and request assistance  - Assess pain using appropriate pain scale  - Administer analgesics based on type and severity of pain and evaluate response  - Implement non-pharmacological measures as appropriate and evaluate response  - Consider cultural and social influences on pain and pain management  - Notify physician/advanced practitioner if interventions unsuccessful or patient reports new pain  Outcome: Progressing     Problem: INFECTION - ADULT  Goal: Absence or prevention of progression during hospitalization  Description  INTERVENTIONS:  - Assess and monitor for signs and symptoms of infection  - Monitor lab/diagnostic results  - Monitor all insertion sites, i e  indwelling lines, tubes, and drains  - Monitor endotracheal if appropriate and nasal secretions for changes in amount and color  - [place] appropriate cooling/warming therapies per order  - Administer medications as ordered  - Instruct and encourage patient and family to use good hand hygiene technique  - Identify and instruct in appropriate isolation precautions for identified infection/condition  Outcome: Progressing     Problem: SAFETY ADULT  Goal: Patient will remain free of falls  Description  INTERVENTIONS:  - Assess patient frequently for physical needs  -  Identify cognitive and physical deficits and behaviors that affect risk of falls    -  [place] fall precautions as indicated by assessment   - Educate patient/family on patient safety including physical limitations  - Instruct patient to call for assistance with activity based on assessment  - Modify environment to reduce risk of injury  - Consider OT/PT consult to assist with strengthening/mobility  Outcome: Not Progressing  Goal: Maintain or return to baseline ADL function  Description  INTERVENTIONS:  -  Assess patient's ability to carry out ADLs; assess patient's baseline for ADL function and identify physical deficits which impact ability to perform ADLs (bathing, care of mouth/teeth, toileting, grooming, dressing, etc )  - Assess/evaluate cause of self-care deficits   - Assess range of motion  - Assess patient's mobility; develop plan if impaired  - Assess patient's need for assistive devices and provide as appropriate  - Encourage maximum independence but intervene and supervise when necessary  - Involve family in performance of ADLs  - Assess for home care needs following discharge   - Consider OT consult to assist with ADL evaluation and planning for discharge  - Provide patient education as appropriate  Outcome: Not Progressing  Goal: Maintain or return mobility status to optimal level  Description  INTERVENTIONS:  - Assess patient's baseline mobility status (ambulation, transfers, stairs, etc )    - Identify cognitive and physical deficits and behaviors that affect mobility  - Identify mobility aids required to assist with transfers and/or ambulation (gait belt, sit-to-stand, lift, walker, cane, etc )  - [place] fall precautions as indicated by assessment  - Record patient progress and toleration of activity level on Mobility SBAR; progress patient to next Phase/Stage  - Instruct patient to call for assistance with activity based on assessment  - Consider rehabilitation consult to assist with strengthening/weightbearing, etc   Outcome: Not Progressing     Problem: DISCHARGE PLANNING  Goal: Discharge to home or other facility with appropriate resources  Description  INTERVENTIONS:  - Identify barriers to discharge w/patient and caregiver  - Arrange for needed discharge resources and transportation as appropriate  - Identify discharge learning needs (meds, wound care, etc )  - Arrange for interpretive services to assist at discharge as needed  - Refer to Case Management Department for coordinating discharge planning if the patient needs post-hospital services based on physician/advanced practitioner order or complex needs related to functional status, cognitive ability, or social support system  Outcome: Progressing

## 2020-07-01 NOTE — SOCIAL WORK
Pt for discharge home today  CM left message for pt genaro Huff to see when he can provide transport  CM to follow

## 2020-07-01 NOTE — DISCHARGE SUMMARY
Discharge- Nomi Aguila 1945, 69 y o  male MRN: [id number]    Unit/Bed#: -01 Encounter: [id number]    Primary Care Provider: Justin Mejias MD   Date and time admitted to hospital: 6/25/2020  6:10 PM        Cellulitis of left lower extremity  Assessment & Plan  · When reviewing clinical images from the 14th of June, his left leg does appear to be more swollen and erythematous  ·  Patient has had 6 days of IV Flagyl/Ancef-will complete oral meds with cephalexin as an outpatient is redness is improved  ·  Will follow with Podiatry as outpatient  · Podiatry continue to follow with dressing changes-Wound care and dressing changes per their recommendations he is complaining of pain at the debridement site for-a given Rx for tramadol to be used for pain p r n  For the next several days  · Patient had wound debridement done by Podiatry on 06/27 at bedside    * Acute on chronic combined systolic and diastolic congestive heart failure [facility])  Assessment & Plan  Wt Readings from Last 3 Encounters:   07/01/20 121 kg (267 lb 6 7 oz)   06/18/20 127 kg (279 lb 1 6 oz)   03/10/20 114 kg (251 lb 8 7 oz)      Chest x-ray: NAD   Last echocardiogram on file: EF 45%, November 2019   Clinically edema is improving after IV diuretics  o     BNP: 35,000   Patient was switched to Bumex drip on 02/26   Continue on Beta-blocker: lopressor   Monitor intake and output, daily weights   Diet: Fluid restriction and 2 g Sodium   Monitor renal function while [facility] and nephrology on board   Still with leg edema but slightly improved    Atrial fibrillation [facility])  Assessment & Plan   Controlled at this time  Rates in the 70s to [de-identified]   Rate/rhythm control: lopressor   Anticoagulation: eliquis continue        Elevated troponin level not due myocardial infarction  Assessment & Plan  · Troponin 0 15, likely non-MI troponin elevation   No active CP  · Cardiology consult appreciated  · Denies any chest pain Acute kidney injury superimposed on CKD [facility])  Assessment & Plan  Creatinine upon admission: 2 29-Baseline: around 1 3  Secondary to prerenal overload -elevated with Bumex drip but now gradually improving  Creatinine this morning continues to improve at 2 04-nephrology is following  T Switch to p o  Bumex  Monitor renal function while diuresing  Nephrology recommendations appreciated  Avoid hypotension/nephrotoxins medications    Essential hypertension  Assessment & Plan   Continue lopressor  And diuretics-previously been on midodrine for orthostasis but readings are currently stable                 Resolved Problems  Date Reviewed: 7/1/2020          Resolved    Acute metabolic encephalopathy 2/65/2711     Resolved by  Caswell Clear, DO    Lactic acidosis 6/29/2020     Resolved by  Rosebud Clear, DO    Anemia 7/1/2020     Resolved by  Caswell Clear, DO          Admission Date:   Admission Orders (From admission, onward)     Ordered        06/25/20 2004  Inpatient Admission  Once                     Admitting Diagnosis: Abnormal laboratory test [R89 9]  Acute CHF (congestive heart failure) (HCC) [I50 9]  Cellulitis of left lower extremity [L03 116]  Elevated lactic acid level [R79 89]  Bilateral lower extremity edema [R60 0]  Chronic foot ulcer, left, with fat layer exposed ([address] ) [L97 522]  Acute kidney injury superimposed on CKD ([address] ) [N17 9, N18 9]    HPI:  71-year-old male with chronic leg wounds admitted with worsening leg redness and pain associated also with edema and shortness of breath due to his chronic systolic CHF   Procedures Performed: No orders of the defined types were placed in this encounter  Summary of Hospital Course:   Patient was admitted and placed on IV diuretics as well as empiric antibiotic therapy with cefazolin and metronidazole  Patient was seen by Podiatry and had debridement multiple times of the left leg wound     His edema gradually improved but he did have elevation in his creatinine was seen by Nephrology with adjustment in his diuretics  Patient had a recent hospitalization last month from 06/11 through June 18, 2020 and at that time returned home with visiting nurses  Case management and podiatry also discussed with patient possibility of short-term rehab to see if more intensive wound care and therapy would help with the healing of the left leg but patient declined at present because he felt that he only spent minimal time in therapy and he would prefer to manage that at home  He is willing to have visiting nurses continue to follow  Significant Findings, Care, Treatment and Services Provided:   Vascular study venous Doppler of lower extremities  Impression:  RIGHT LOWER LIMB LIMITED:  Evaluation shows no evidence of thrombus in the common femoral vein  Doppler evaluation shows a normal response to augmentation maneuvers  LEFT LOWER LIMB:  No gross evidence of acute deep vein thrombosis based on color flow analysis  No gross evidence of superficial thrombophlebitis noted  Doppler evaluation shows a normal response to augmentation maneuvers  Complications: none    Condition at Discharge: fair         Discharge instructions/Information to patient and family:   See after visit summary for information provided to patient and family  Provisions for Follow-Up Care:  See after visit summary for information related to follow-up care and any pertinent home health orders  PCP: Chris Zapata MD    Disposition: Home    Planned Readmission: No    Discharge Statement   I spent 30 minutes discharging the patient  This time was spent on the day of discharge  I had direct contact with the patient on the day of discharge  Additional documentation is required if more than 30 minutes were spent on discharge  Discharge Medications:  See after visit summary for reconciled discharge medications provided to patient and family

## 2020-07-01 NOTE — SOCIAL WORK
LOS: 6, URR: 25, pt is a re-admit  Pt for likely discharge home today  He continues to decline STR as recommended by therapy  Son in agreement with pt returning home  GRIFFIN is able to resume [address] treatment  [employer] message sent to PCP for TCM appointment  CM also made referral to Out Patient CM via [employer] message for follow up  Son states he can transport pt home today around 1400  CM to follow

## 2020-07-01 NOTE — ASSESSMENT & PLAN NOTE
 Continue lopressor    And diuretics-previously been on midodrine for orthostasis but readings are currently stable

## 2020-07-02 ENCOUNTER — TELEPHONE (OUTPATIENT)
Dept: NEPHROLOGY | Facility: CLINIC | Age: 75
End: 2020-07-02

## 2020-07-02 ENCOUNTER — DOCUMENTATION (OUTPATIENT)
Dept: FAMILY MEDICINE CLINIC | Facility: HOSPITAL | Age: 75
End: 2020-07-02

## 2020-07-02 ENCOUNTER — APPOINTMENT (EMERGENCY)
Dept: CT IMAGING | Facility: HOSPITAL | Age: 75
End: 2020-07-02
Payer: COMMERCIAL

## 2020-07-02 ENCOUNTER — HOSPITAL ENCOUNTER (EMERGENCY)
Facility: HOSPITAL | Age: 75
Discharge: HOME | End: 2020-07-02
Attending: EMERGENCY MEDICINE | Admitting: EMERGENCY MEDICINE
Payer: COMMERCIAL

## 2020-07-02 VITALS
OXYGEN SATURATION: 99 % | WEIGHT: 269.18 LBS | SYSTOLIC BLOOD PRESSURE: 115 MMHG | RESPIRATION RATE: 16 BRPM | HEIGHT: 72 IN | HEART RATE: 88 BPM | BODY MASS INDEX: 36.46 KG/M2 | TEMPERATURE: 97.8 F | DIASTOLIC BLOOD PRESSURE: 71 MMHG

## 2020-07-02 DIAGNOSIS — G89.29 CHRONIC PAIN: ICD-10-CM
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
## 2020-07-02 DIAGNOSIS — I10 HYPERTENSION: ICD-10-CM

## 2020-07-02 PROCEDURE — 99284 EMERGENCY DEPT VISIT MOD MDM: CPT

## 2020-07-02 PROCEDURE — 72125 CT NECK SPINE W/O DYE: CPT

## 2020-07-02 PROCEDURE — 70450 CT HEAD/BRAIN W/O DYE: CPT

## 2020-07-02 PROCEDURE — 93005 ELECTROCARDIOGRAM TRACING: CPT

## 2020-07-02 PROCEDURE — 99285 EMERGENCY DEPT VISIT HI MDM: CPT | Performed by: EMERGENCY MEDICINE

## 2020-07-02 NOTE — TELEPHONE ENCOUNTER
Left a message to schedule pt's follow up     ----- Message from Patricia Lucia sent at 6/30/2020 11:52 AM EDT -----  [facility] hospital follow up with Dr Ivonne Owens  BMP entered  Thank you Likely being discharged tomorrow 07/01

## 2020-07-02 NOTE — ED PROVIDER NOTES
Emergency Department Trauma Note  Linwood Mazariegos 69 y o  male MRN: [id number]  Unit/Bed#: ED TR13/TR13B Encounter: [id number]      Trauma Alert: Trauma Acuity: Trauma Evaluation  Model of Arrival: Mode of Arrival: ALS via Trauma Squad Name and Number: mike  Trauma Team: Current Providers  Attending Provider: Ciera Daniel DO  Registered Nurse: Rachael Bhatt RN  Consultants: None      History of Present Illness     Chief Complaint:   Chief Complaint   Patient presents with    Fall     Pt was on commode and slid off onto floor  +eliquis  no LOC, deneis head strike, pt with c/o chronic pain, denies acute injury  HPI:  Jerel Ashby is a 69 y o  male who presents with an assisted fall when he attempted to get up off the toilet  The patient states that he slipped and fell onto the floor  He was stuck on the floor until EMS was able extricate him from the house  The patient states that he has chronic pain but does not have any new pains since the incident  There was no reported loss of consciousness  His pain is worse with movement     Mechanism:Details of Incident: Pt was on commode and slid off onto floor  +plavix  no LOC, deneis head strike, pt with c/o chronic pain, denies acute injury  Injury Date: 07/02/20 Injury Time: 1300      HPI  Review of Systems   Respiratory: Negative for chest tightness and shortness of breath  Cardiovascular: Negative for chest pain  Musculoskeletal: Positive for arthralgias and back pain  Neurological: Positive for weakness  Negative for headaches  All other systems reviewed and are negative        Historical Information     Immunizations:   Immunization History   Administered Date(s) Administered    INFLUENZA 09/14/2017    Influenza, high dose seasonal 0 5 mL 01/09/2019, 12/06/2019       Past Medical History:   Diagnosis Date    Atrial fibrillation (HCC)     Cardiac disease     Cellulitis     CHF (congestive heart failure) (HCC)     Chronic pain     Chronic venous hypertension     with ulceration    GERD (gastroesophageal reflux disease)     History of methicillin resistant staphylococcus aureus (MRSA) 11/26/2019    negative nasal swab     Hyperlipidemia     Hypertension     Obesity     Pulmonary hypertension ([place] [address] )     PVD (peripheral vascular disease) ([place] [address] )     Type 2 diabetes mellitus with diabetic heel ulcer ([facility] ) 6/30/2020    Vascular disorder of extremity ([place] [address] )        Family History   Problem Relation Age of Onset    Cancer Mother         cancer of uterus    Diabetes Sister     Mental illness Neg Hx         neg fam hx    Substance Abuse Neg Hx         neg fam hx     Past Surgical History:   Procedure Laterality Date    ANTERIOR RELEASE VERTEBRAL BODY W/ POSTERIOR FUSION      APPENDECTOMY  1954    CATARACT EXTRACTION      DECOMPRESSION SPINE LUMBAR POSTERIOR      ELBOW SURGERY      FOOT/ANKLE ARTHRODESIS Right      complications postoperatively with bone healing and infection    INCISION AND DRAINAGE OF WOUND Left 3/5/2020    Procedure: INCISION AND DRAINAGE (I&D) EXTREMITY;  Surgeon: Jaime Conde DPM;  Location:  MAIN OR;  Service: Podiatry    KNEE SURGERY      NOSE SURGERY      turbinectomy    RETINAL DETACHMENT SURGERY      RHINOPLASTY      TONSILLECTOMY      VEIN LIGATION AND STRIPPING      saphenous vein long     Social History     Tobacco Use    Smoking status: Never Smoker    Smokeless tobacco: Never Used   Substance Use Topics    Alcohol use: Not Currently     Frequency: Never     Drinks per session: Patient refused     Binge frequency: Never    Drug use: Never     E-Cigarette/Vaping    E-Cigarette Use Never User      E-Cigarette/Vaping Substances    Nicotine No     Flavoring No        Family History: non-contributory    Meds/Allergies   Prior to Admission Medications   Prescriptions Last Dose Informant Patient Reported? Taking?    Ascorbic Acid (VITAMIN C) 1000 MG tablet  Self Yes No   Sig: Take 1,000 mg by mouth 2 (two) times a day    DULoxetine (CYMBALTA) 30 mg delayed release capsule   No No   Sig: Take 1 capsule (30 mg total) by mouth daily   acetaminophen (TYLENOL) 325 mg tablet   No No   Sig: Take 2 tablets (650 mg total) by mouth every 6 (six) hours as needed for mild pain, headaches or fever   apixaban (ELIQUIS) 5 mg  Self Yes No   Sig: Take 5 mg by mouth 2 (two) times a day    bumetanide (BUMEX) 1 mg tablet   No No   Sig: Take 1 tablet (1 mg total) by mouth daily   cephalexin (KEFLEX) 500 mg capsule   No No   Sig: Take 1 capsule (500 mg total) by mouth every 12 (twelve) hours for 5 days   cholecalciferol (VITAMIN D3) 1,000 units tablet  Self Yes No   Sig: Take 1,000 Units by mouth daily   collagenase (SANTYL) ointment   No No   Sig: Apply topically daily   metoprolol tartrate (LOPRESSOR) 25 mg tablet   No No   Sig: Take 1 tablet (25 mg total) by mouth every 12 (twelve) hours   midodrine (PROAMATINE) 5 mg tablet   No No   Sig: Take 1 tablet (5 mg total) by mouth 3 (three) times a day before meals   nystatin (MYCOSTATIN) powder   No No   Sig: Apply topically 2 (two) times a day   pantoprazole (PROTONIX) 40 mg tablet   No No   Sig: Take 1 tablet (40 mg total) by mouth daily   senna (SENOKOT) 8 6 mg   No No   Sig: Take 2 tablets (17 2 mg total) by mouth daily   traMADol (ULTRAM) 50 mg tablet   No No   Sig: Take 1 tablet (50 mg total) by mouth every 8 (eight) hours as needed for moderate pain for up to 5 days      Facility-Administered Medications: None       No Known Allergies    PHYSICAL EXAM    PE limited by:  Nothing    Objective   Vitals:   First set: Temperature: 97 8 °F (36 6 °C) (07/02/20 1400)  Pulse: 95 (07/02/20 1400)  Respirations: 20 (07/02/20 1400)  Blood Pressure: 114/54 (07/02/20 1400)  SpO2: 97 % (07/02/20 1400)    Primary Survey:   (A) Airway:  Patent, intact  (B) Breathing:  Clear to auscultation bilaterally  (C) Circulation: Pulses:   normal  (D) Disabliity:  GCS Total:  15  (E) Expose: Completed    Secondary Survey: (Click on Physical Exam tab above)  Physical Exam   Constitutional: He is oriented to person, place, and time  He appears well-developed and well-nourished  No distress  Unkempt   HENT:   Head: Normocephalic and atraumatic  Right Ear: External ear normal    Left Ear: External ear normal    Mouth/Throat: No oropharyngeal exudate  Eyes: Pupils are equal, round, and reactive to light  EOM are normal  No scleral icterus  Neck: Normal range of motion  Neck supple  Cardiovascular: Normal rate, regular rhythm and normal heart sounds  Pulmonary/Chest: Effort normal and breath sounds normal  No respiratory distress  Abdominal: Soft  Bowel sounds are normal  There is no tenderness  There is no rebound and no guarding  Musculoskeletal: Normal range of motion  Neurological: He is alert and oriented to person, place, and time  Skin: Skin is warm and dry  Rash ( Bilateral lower extremities) noted  Stage I sacral decubitus, chronic lower extremity wounds appropriate dressings in place   Psychiatric: He has a normal mood and affect  Nursing note and vitals reviewed  Invasive Devices     Peripheral Intravenous Line            Peripheral IV 07/02/20 Left Arm less than 1 day                Lab Results:   Results Reviewed     None                 Imaging Studies:   Direct to CT: Yes  CT head without contrast   Final Result by Freddy Spann MD (07/02 1457)      No acute intracranial abnormality  Workstation performed: [id number]         CT cervical spine without contrast   Final Result by Freddy Spann MD (07/02 1500)      No cervical spine fracture or traumatic malalignment  Workstation performed: [id number]               Procedures  ECG 12 Lead Documentation Only  Date/Time: 7/2/2020 2:22 PM  Performed by: Hever Lawson DO  Authorized by:  Hever Lawson DO     Indications / Diagnosis:  Fall  ECG reviewed by me, the ED Provider: yes Patient location:  ED  Previous ECG:     Previous ECG:  Compared to current    Comparison ECG info:  6/26/2020    Similarity:  No change  Interpretation:     Interpretation: abnormal    Rate:     ECG rate:  96  Rhythm:     Rhythm: atrial fibrillation    Ectopy:     Ectopy: none    ST segments:     ST segments:  Normal  T waves:     T waves: normal               ED Course  ED Course as of Jul 02 1638   Thu Jul 02, 2020   1553 Patient stable and improved upon re-evaluation  I discussed the results of the CT scan  C-collar removed and the patient will be discharged  MDM  Number of Diagnoses or Management Options  Accidental fall from commode or toilet, initial encounter:   Chronic pain:   Hypertension:   Diagnosis management comments: Plan is for CT of the head and cervical spine to rule out clinically significant traumatic injury such as subdural/epidural hematoma cervical spine fracture or dislocation  The patient (and any family present) verbalized understanding of the discharge instructions and warnings that would necessitate return to the Emergency Department  All questions were answered prior to discharge         Amount and/or Complexity of Data Reviewed  Tests in the radiology section of CPT®: reviewed  Review and summarize past medical records: yes  Independent visualization of images, tracings, or specimens: yes            Disposition  [facility] One Transfer: No  Final diagnoses:   Accidental fall from commode or toilet, initial encounter   Chronic pain   Hypertension     Time reflects when diagnosis was documented in both MDM as applicable and the Disposition within this note     Time User Action Codes Description Comment    7/2/2020  3:57 PM Shahana Woodard Add [W18 11XA] Accidental fall from commode or toilet, initial encounter     7/2/2020  3:57 PM Shahana Woodard Add [G89 29] Chronic pain     7/2/2020  3:57 PM Isaac Acosta 51 Hypertension       ED Disposition     ED Disposition Condition Date/Time Comment    Discharge Stable Thu Jul 2, 2020  3:56 PM Linwood Mazariegos discharge to home/self care  Follow-up Information     Follow up With Specialties Details Why Contact Info    Bre Meyers MD Internal Medicine Schedule an appointment as soon as possible for a visit  For further evaluation [facility]  [address]          Patient's Medications   Discharge Prescriptions    No medications on file     No discharge procedures on file      PDMP Review       Value Time User    PDMP Reviewed  Yes 7/1/2020 12:05 PM Jordyn Dos Santos DO          ED Provider  Electronically Signed by         Irais Sewell DO  07/02/20 4767

## 2020-07-04 ENCOUNTER — TELEPHONE (OUTPATIENT)
Dept: OTHER | Facility: OTHER | Age: 75
End: 2020-07-04

## 2020-07-04 NOTE — TELEPHONE ENCOUNTER
Raman Nieto - home health nurse is calling to notify Dr Darío Prasad that Stephanie Chaudhry is receiving a resumption of care orders for skilled nursing PT and social work  Raman Nieto is also reporting an open wound on the butt and that there is no Duloxetine 30 ml at the home for the patient

## 2020-07-07 ENCOUNTER — TELEPHONE (OUTPATIENT)
Dept: FAMILY MEDICINE CLINIC | Facility: HOSPITAL | Age: 75
End: 2020-07-07

## 2020-07-07 LAB
ATRIAL RATE: 98 BPM
QRS AXIS: 263 DEGREES
QRSD INTERVAL: 108 MS
QT INTERVAL: 388 MS
QTC INTERVAL: 490 MS
T WAVE AXIS: 120 DEGREES
VENTRICULAR RATE: 96 BPM

## 2020-07-07 PROCEDURE — 93010 ELECTROCARDIOGRAM REPORT: CPT | Performed by: INTERNAL MEDICINE

## 2020-07-07 NOTE — TELEPHONE ENCOUNTER
Spoke with the patient asked him to schedule his hospital follow up with us  He stated that he cant schedule anything right now and that he will buzz us once he can schedule his appointment

## 2020-07-07 NOTE — TELEPHONE ENCOUNTER
Message relayed to [facility]  He will call back if he wants an order for the xray  As of right now he does not have any way of getting him out the house and to a facility to have it done  He will talk to the  when she gets there today to see if she has any advise for him

## 2020-07-08 ENCOUNTER — TELEPHONE (OUTPATIENT)
Dept: FAMILY MEDICINE CLINIC | Facility: HOSPITAL | Age: 75
End: 2020-07-08

## 2020-07-08 ENCOUNTER — PATIENT OUTREACH (OUTPATIENT)
Dept: FAMILY MEDICINE CLINIC | Facility: HOSPITAL | Age: 75
End: 2020-07-08

## 2020-07-08 NOTE — PROGRESS NOTES
Outpatient Care Management Note:    Care manager called and spoke with Linwood and his son, Alejandrina Huff  Jeny De Oliveira gave permission for me to speak with his son  Alejandrina Huff states that the nurse and  from [facility] are coming today at 12:30 to assess Linwood for inpatient hospice  Alejandrina Huff is unable to get Linwood out of the home to appointments  He is aware that he needs follow up with wound care, nephrology, and his PCP  Alejandrina Huff states he ordered a ramp but it has not arrived  Linwood is able to transfer with maximal assistance  Alejandrina Huff is afraid to have him move too much, because he does not want him to fall  Linwood uses a portable commode or urinal when needed  Alejandrina Huff does not feel he can manage his father's needs  Per Linwood, his whole body hurts  His left hand is swollen and his shoulder is bruised  He had a fall 7/2 and was seen in the emergency department  CESLIIA gave Alejandrina Huff my contact information  I will contact the A after their visit to day to see if a plan was arranged

## 2020-07-08 NOTE — TELEPHONE ENCOUNTER
Emili Olivas called  She wanted you to be aware the patient has 15 wounds, butt knee  Has Stage II pressure ulcer on his buttocks  Using duoderm  Has abrasions on legs-using non stick dressing  Dr Sarai Green is havling foot  Pt has had a significant decline in the last 4-6weeks  Basically no conversation  Pt cannot get out of the house without calling the ambulance  She is not sure how he got into the house upon discharge from [place] [address]

## 2020-07-09 ENCOUNTER — PATIENT OUTREACH (OUTPATIENT)
Dept: FAMILY MEDICINE CLINIC | Facility: HOSPITAL | Age: 75
End: 2020-07-09

## 2020-07-09 NOTE — PROGRESS NOTES
Outpatient Care Management Note:    Voice mail message left for [facility], with my contact information, requesting a call back with an update  Fidel Bergeronastridana called back  She has been following Danakeiry Stack for many months  She notes that he has had a significant decline in his mental status and physical abilities  She has been working with the son and grandson who have been providing care  Per Ruben Rice, the grandson is only 13years old and is bathing and managing his grandfather's medications  Ruben Sonhire feels the son, Mamadou Sanchez, is very overwhelmed and distracted  Ruben Rice has been doing daily wound care  Ruben ZamoranoKrystal states that there has been no conversation of hospice  The , Abby Patel, met with Nicky Stack and Mamadou Sanchez yesterday to discuss skilled nursing rehab  The family has declined Son but was receptive to [address] [place] [address] contacted them, but they are not accepting direct admissions from the home and noted that SNF everywhere are only accepting admissions from hospital setting  Ruben Sonhire notes that Linwood would need a 3 day hospital stay for a rehab admission  CM discussed that per Dr Cole Sanderson note yesterday, he was recommending Linwood return to the ED for placement  Ruben [facility] states that Mamadou Sanchez noted he can not afford the ongoing ambulance bills  CM will contact [facility] worker  CM spoke with Alex Adams, inpatient case management  CM questioned if patient is eligible for rehab stay based off of recent hospital admission from 6/25-7/1  Alex Adams notes that he should be eligible  Each admission they speak with the family regarding placement, but family refuses  Call received from MEMO [place]   I informed her of above  She is not scheduled to follow up with patient  They were instructed to call if they needed assistance  CM attempted to call Mamadou Sanchez  His son answered the phone  Mamadou Sanchez is at work    The son recommended I call his dad at work and gave me his cell number   Voice mail message left for Judy Villavicencio, with my contact information, requesting a call back  Judy Villavicencio returned my call  He states that he did meet with the A  yesterday  He was told that if his father declines to send him to the ER  Judy Villavicencio feels that he can not handle his father's care  I did discuss with him that [facility] contacted Dr Garett Bates yesterday regarding her concerns  Dr Garett Bates recommended Linwood return to the ED for placement  Judy Maye states his father is now receptive to placement for rehab  We discussed that if Judy Villavicencio can not handle his father's care and he is not safe, he should send him back to ED  Hamburgantoine Villavicencio has my contact information and will call with any questions or concerns  I also recommended he continue to have close follow up the A nurse as she is seeing and assessing Linwood daily

## 2020-07-09 NOTE — TELEPHONE ENCOUNTER
Mike Sabillon and Jessee Plunkett, Care Manager, are aware of Dr Paloma Bravo recommendations  They are working with family

## 2020-07-11 ENCOUNTER — APPOINTMENT (EMERGENCY)
Dept: CT IMAGING | Facility: HOSPITAL | Age: 75
DRG: 291 | End: 2020-07-11
Payer: COMMERCIAL

## 2020-07-11 ENCOUNTER — APPOINTMENT (EMERGENCY)
Dept: RADIOLOGY | Facility: HOSPITAL | Age: 75
DRG: 291 | End: 2020-07-11
Payer: COMMERCIAL

## 2020-07-11 ENCOUNTER — HOSPITAL ENCOUNTER (INPATIENT)
Facility: HOSPITAL | Age: 75
LOS: 6 days | Discharge: NURSING, REHAB OR LONG-TERM CARE | DRG: 291 | End: 2020-07-17
Attending: EMERGENCY MEDICINE | Admitting: INTERNAL MEDICINE
Payer: COMMERCIAL

## 2020-07-11 DIAGNOSIS — R26.2 AMBULATORY DYSFUNCTION: ICD-10-CM

## 2020-07-11 DIAGNOSIS — G89.29 CHRONIC PAIN OF RIGHT WRIST: ICD-10-CM

## 2020-07-11 DIAGNOSIS — A04.72 CLOSTRIDIUM DIFFICILE DIARRHEA: ICD-10-CM

## 2020-07-11 DIAGNOSIS — I10 ESSENTIAL HYPERTENSION: ICD-10-CM

## 2020-07-11 DIAGNOSIS — I50.43 ACUTE ON CHRONIC COMBINED SYSTOLIC AND DIASTOLIC CONGESTIVE HEART FAILURE (HCC): ICD-10-CM

## 2020-07-11 DIAGNOSIS — E87.6 HYPOKALEMIA: ICD-10-CM

## 2020-07-11 DIAGNOSIS — N17.9 AKI (ACUTE KIDNEY INJURY) (HCC): ICD-10-CM

## 2020-07-11 DIAGNOSIS — R79.89 ELEVATED LACTIC ACID LEVEL: ICD-10-CM

## 2020-07-11 DIAGNOSIS — M25.512 LEFT SHOULDER PAIN: ICD-10-CM
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
## 2020-07-11 DIAGNOSIS — I50.9 CHF (CONGESTIVE HEART FAILURE) (HCC): ICD-10-CM

## 2020-07-11 DIAGNOSIS — N18.9 ACUTE KIDNEY INJURY SUPERIMPOSED ON CKD (HCC): ICD-10-CM

## 2020-07-11 DIAGNOSIS — L03.116 CELLULITIS OF LEFT LOWER EXTREMITY: Primary | ICD-10-CM

## 2020-07-11 DIAGNOSIS — S30.1XXA ABDOMINAL WALL HEMATOMA, INITIAL ENCOUNTER: ICD-10-CM

## 2020-07-11 DIAGNOSIS — N17.9 ACUTE KIDNEY INJURY SUPERIMPOSED ON CKD (HCC): ICD-10-CM

## 2020-07-11 DIAGNOSIS — L30.4 INTERTRIGO: ICD-10-CM

## 2020-07-11 DIAGNOSIS — R77.8 ELEVATED TROPONIN: ICD-10-CM

## 2020-07-11 DIAGNOSIS — L89.152 DECUBITUS ULCER OF SACRAL REGION, STAGE 2 (HCC): ICD-10-CM

## 2020-07-11 DIAGNOSIS — M25.531 CHRONIC PAIN OF RIGHT WRIST: ICD-10-CM

## 2020-07-11 PROBLEM — S20.211A HEMATOMA OF RIGHT CHEST WALL: Status: ACTIVE | Noted: 2020-07-11

## 2020-07-11 LAB
ALBUMIN SERPL BCP-MCNC: 2.7 G/DL (ref 3.5–5)
ALP SERPL-CCNC: 62 U/L (ref 46–116)
ALT SERPL W P-5'-P-CCNC: 8 U/L (ref 12–78)
ANION GAP SERPL CALCULATED.3IONS-SCNC: 13 MMOL/L (ref 4–13)
APTT PPP: 48 SECONDS (ref 23–37)
AST SERPL W P-5'-P-CCNC: 72 U/L (ref 5–45)
BACTERIA UR QL AUTO: ABNORMAL /HPF
BASOPHILS # BLD AUTO: 0.04 THOUSANDS/ΜL (ref 0–0.1)
BASOPHILS NFR BLD AUTO: 1 % (ref 0–1)
BILIRUB SERPL-MCNC: 3.3 MG/DL (ref 0.2–1)
BILIRUB UR QL STRIP: NEGATIVE
BUN SERPL-MCNC: 61 MG/DL (ref 5–25)
CALCIUM SERPL-MCNC: 9.1 MG/DL (ref 8.3–10.1)
CHLORIDE SERPL-SCNC: 101 MMOL/L (ref 100–108)
CLARITY UR: CLEAR
CO2 SERPL-SCNC: 24 MMOL/L (ref 21–32)
COLOR UR: YELLOW
CREAT SERPL-MCNC: 3.01 MG/DL (ref 0.6–1.3)
EOSINOPHIL # BLD AUTO: 0.11 THOUSAND/ΜL (ref 0–0.61)
EOSINOPHIL NFR BLD AUTO: 2 % (ref 0–6)
ERYTHROCYTE [DISTWIDTH] IN BLOOD BY AUTOMATED COUNT: 19.6 % (ref 11.6–15.1)
GFR SERPL CREATININE-BSD FRML MDRD: 20 ML/MIN/1.73SQ M
GLUCOSE SERPL-MCNC: 97 MG/DL (ref 65–140)
GLUCOSE UR STRIP-MCNC: NEGATIVE MG/DL
HCT VFR BLD AUTO: 41.5 % (ref 36.5–49.3)
HGB BLD-MCNC: 13.8 G/DL (ref 12–17)
HGB UR QL STRIP.AUTO: NEGATIVE
HYALINE CASTS #/AREA URNS LPF: ABNORMAL /LPF
IMM GRANULOCYTES # BLD AUTO: 0.02 THOUSAND/UL (ref 0–0.2)
IMM GRANULOCYTES NFR BLD AUTO: 0 % (ref 0–2)
INR PPP: 3.67 (ref 0.84–1.19)
KETONES UR STRIP-MCNC: NEGATIVE MG/DL
LACTATE SERPL-SCNC: 2.8 MMOL/L (ref 0.5–2)
LACTATE SERPL-SCNC: 3.3 MMOL/L (ref 0.5–2)
LEUKOCYTE ESTERASE UR QL STRIP: ABNORMAL
LYMPHOCYTES # BLD AUTO: 1.02 THOUSANDS/ΜL (ref 0.6–4.47)
LYMPHOCYTES NFR BLD AUTO: 16 % (ref 14–44)
MCH RBC QN AUTO: 31.7 PG (ref 26.8–34.3)
MCHC RBC AUTO-ENTMCNC: 33.3 G/DL (ref 31.4–37.4)
MCV RBC AUTO: 95 FL (ref 82–98)
MONOCYTES # BLD AUTO: 0.8 THOUSAND/ΜL (ref 0.17–1.22)
MONOCYTES NFR BLD AUTO: 13 % (ref 4–12)
MUCOUS THREADS UR QL AUTO: ABNORMAL
NEUTROPHILS # BLD AUTO: 4.25 THOUSANDS/ΜL (ref 1.85–7.62)
NEUTS SEG NFR BLD AUTO: 68 % (ref 43–75)
NITRITE UR QL STRIP: NEGATIVE
NON-SQ EPI CELLS URNS QL MICRO: ABNORMAL /HPF
NRBC BLD AUTO-RTO: 1 /100 WBCS
NT-PROBNP SERPL-MCNC: ABNORMAL PG/ML
OTHER STN SPEC: ABNORMAL
PH UR STRIP.AUTO: 5.5 [PH]
PLATELET # BLD AUTO: 175 THOUSANDS/UL (ref 149–390)
PMV BLD AUTO: 11.7 FL (ref 8.9–12.7)
POTASSIUM SERPL-SCNC: 3.8 MMOL/L (ref 3.5–5.3)
PROCALCITONIN SERPL-MCNC: 0.21 NG/ML
PROT SERPL-MCNC: 8 G/DL (ref 6.4–8.2)
PROT UR STRIP-MCNC: NEGATIVE MG/DL
PROTHROMBIN TIME: 36.2 SECONDS (ref 11.6–14.5)
RBC # BLD AUTO: 4.36 MILLION/UL (ref 3.88–5.62)
RBC #/AREA URNS AUTO: ABNORMAL /HPF
SODIUM SERPL-SCNC: 138 MMOL/L (ref 136–145)
SP GR UR STRIP.AUTO: 1.01 (ref 1–1.03)
TROPONIN I SERPL-MCNC: 0.18 NG/ML
TROPONIN I SERPL-MCNC: 0.26 NG/ML
UROBILINOGEN UR QL STRIP.AUTO: 0.2 E.U./DL
WBC # BLD AUTO: 6.24 THOUSAND/UL (ref 4.31–10.16)
WBC #/AREA URNS AUTO: ABNORMAL /HPF

## 2020-07-11 PROCEDURE — 85730 THROMBOPLASTIN TIME PARTIAL: CPT | Performed by: EMERGENCY MEDICINE

## 2020-07-11 PROCEDURE — 84484 ASSAY OF TROPONIN QUANT: CPT | Performed by: NURSE PRACTITIONER

## 2020-07-11 PROCEDURE — 80053 COMPREHEN METABOLIC PANEL: CPT | Performed by: EMERGENCY MEDICINE

## 2020-07-11 PROCEDURE — 84484 ASSAY OF TROPONIN QUANT: CPT | Performed by: EMERGENCY MEDICINE

## 2020-07-11 PROCEDURE — 87040 BLOOD CULTURE FOR BACTERIA: CPT | Performed by: EMERGENCY MEDICINE

## 2020-07-11 PROCEDURE — 83605 ASSAY OF LACTIC ACID: CPT | Performed by: EMERGENCY MEDICINE

## 2020-07-11 PROCEDURE — 73030 X-RAY EXAM OF SHOULDER: CPT

## 2020-07-11 PROCEDURE — 96367 TX/PROPH/DG ADDL SEQ IV INF: CPT

## 2020-07-11 PROCEDURE — 74176 CT ABD & PELVIS W/O CONTRAST: CPT

## 2020-07-11 PROCEDURE — 83880 ASSAY OF NATRIURETIC PEPTIDE: CPT | Performed by: EMERGENCY MEDICINE

## 2020-07-11 PROCEDURE — 93005 ELECTROCARDIOGRAM TRACING: CPT

## 2020-07-11 PROCEDURE — 36415 COLL VENOUS BLD VENIPUNCTURE: CPT | Performed by: EMERGENCY MEDICINE

## 2020-07-11 PROCEDURE — 99285 EMERGENCY DEPT VISIT HI MDM: CPT | Performed by: EMERGENCY MEDICINE

## 2020-07-11 PROCEDURE — 99285 EMERGENCY DEPT VISIT HI MDM: CPT

## 2020-07-11 PROCEDURE — 84145 PROCALCITONIN (PCT): CPT | Performed by: EMERGENCY MEDICINE

## 2020-07-11 PROCEDURE — 96366 THER/PROPH/DIAG IV INF ADDON: CPT

## 2020-07-11 PROCEDURE — 72125 CT NECK SPINE W/O DYE: CPT

## 2020-07-11 PROCEDURE — 81001 URINALYSIS AUTO W/SCOPE: CPT | Performed by: NURSE PRACTITIONER

## 2020-07-11 PROCEDURE — 85025 COMPLETE CBC W/AUTO DIFF WBC: CPT | Performed by: EMERGENCY MEDICINE

## 2020-07-11 PROCEDURE — 96365 THER/PROPH/DIAG IV INF INIT: CPT

## 2020-07-11 PROCEDURE — 85610 PROTHROMBIN TIME: CPT | Performed by: EMERGENCY MEDICINE

## 2020-07-11 PROCEDURE — 71250 CT THORAX DX C-: CPT

## 2020-07-11 PROCEDURE — 70450 CT HEAD/BRAIN W/O DYE: CPT

## 2020-07-11 PROCEDURE — 99222 1ST HOSP IP/OBS MODERATE 55: CPT | Performed by: INTERNAL MEDICINE

## 2020-07-11 RX ORDER — CEFEPIME HYDROCHLORIDE 2 G/50ML
2000 INJECTION, SOLUTION INTRAVENOUS ONCE
Status: COMPLETED | OUTPATIENT
Start: 2020-07-11 | End: 2020-07-11

## 2020-07-11 RX ORDER — BUMETANIDE 0.25 MG/ML
1 INJECTION, SOLUTION INTRAMUSCULAR; INTRAVENOUS ONCE
Status: COMPLETED | OUTPATIENT
Start: 2020-07-11 | End: 2020-07-11

## 2020-07-11 RX ORDER — DULOXETIN HYDROCHLORIDE 30 MG/1
30 CAPSULE, DELAYED RELEASE ORAL DAILY
Status: DISCONTINUED | OUTPATIENT
Start: 2020-07-12 | End: 2020-07-17 | Stop reason: HOSPADM

## 2020-07-11 RX ORDER — HEPARIN SODIUM 5000 [USP'U]/ML
5000 INJECTION, SOLUTION INTRAVENOUS; SUBCUTANEOUS EVERY 8 HOURS SCHEDULED
Status: DISCONTINUED | OUTPATIENT
Start: 2020-07-11 | End: 2020-07-11

## 2020-07-11 RX ORDER — PANTOPRAZOLE SODIUM 40 MG/1
40 TABLET, DELAYED RELEASE ORAL DAILY
Status: DISCONTINUED | OUTPATIENT
Start: 2020-07-12 | End: 2020-07-17 | Stop reason: HOSPADM

## 2020-07-11 RX ORDER — MIDODRINE HYDROCHLORIDE 5 MG/1
5 TABLET ORAL
Status: DISCONTINUED | OUTPATIENT
Start: 2020-07-12 | End: 2020-07-17 | Stop reason: HOSPADM

## 2020-07-11 RX ORDER — NYSTATIN 100000 [USP'U]/G
POWDER TOPICAL ONCE
Status: COMPLETED | OUTPATIENT
Start: 2020-07-11 | End: 2020-07-11

## 2020-07-11 RX ORDER — ACETAMINOPHEN 325 MG/1
650 TABLET ORAL EVERY 6 HOURS PRN
Status: DISCONTINUED | OUTPATIENT
Start: 2020-07-11 | End: 2020-07-17 | Stop reason: HOSPADM

## 2020-07-11 RX ORDER — NYSTATIN 100000 [USP'U]/G
POWDER TOPICAL 2 TIMES DAILY
Status: DISCONTINUED | OUTPATIENT
Start: 2020-07-11 | End: 2020-07-17 | Stop reason: HOSPADM

## 2020-07-11 RX ADMIN — NYSTATIN 1 APPLICATION: 100000 POWDER TOPICAL at 17:16

## 2020-07-11 RX ADMIN — BUMETANIDE 1 MG: 0.25 INJECTION INTRAMUSCULAR; INTRAVENOUS at 20:52

## 2020-07-11 RX ADMIN — CEFEPIME HYDROCHLORIDE 2000 MG: 2 INJECTION, SOLUTION INTRAVENOUS at 17:16

## 2020-07-11 RX ADMIN — NYSTATIN: 100000 POWDER TOPICAL at 21:00

## 2020-07-11 RX ADMIN — VANCOMYCIN HYDROCHLORIDE 1750 MG: 1 INJECTION, POWDER, LYOPHILIZED, FOR SOLUTION INTRAVENOUS at 18:01

## 2020-07-11 NOTE — ED PROVIDER NOTES
History  Chief Complaint   Patient presents with   Rosalba brewster states pt has been confused x 1 month, multiple falls recently  69year old male brought by his son for evaluation of worsening chronic wounds, confusion and ambulatory dysfunction  Patient fell from his bed, landing on his right side yesterday morning  Since the fall, he has been complaining of bilateral shoulder pain as well as right flank pain  Patient does not know if he struck his head during the fall  He has not complained of headaches or dizziness since falling  Patient has multiple chronic wounds including a wound of the left foot as well as sacral decubitus ulcers  He had been seen by Dr Steve Elena regarding his foot wound during his recent hospitalizations; however, he has not been able to follow up in the outpatient setting due to difficulty with moving the patient in and out of the home per his son  Patient had been receiving wound care from visiting nurses; however, they have not been to the home in the past 2 days  Patient's son is concerned that he can no longer adequately care for his father  He states his father has been declining over the past month with increasing confusion and falls  Patient is currently on Eliquis  Most recent admission 6/25/20-7/1/20  He was treated with 6 days of IV Flagyl/Ancef and discharged on cephalexin which he completed last week  Last echocardiogram on 6/26/20 showed an ejection fraction of 30%  Patient has history of CKD  He is currently being treated with Bumex  Patient had required bumex drip during his prior admission  No DVTs noted on duplex of the bilateral lower extremities on 6/29/20        History provided by:  Patient, relative and medical records  Fall   Mechanism of injury: fall    Injury location:  Torso and shoulder/arm  Shoulder/arm injury location:  L shoulder  Torso injury location:  R flank  Incident location:  Home  Time since incident:  2 days  Arrived directly from scene: no    Fall:     Fall occurred:  From a bed    Impact surface:  [facility] [employer] of impact: right side  Suspicion of alcohol use: no    Suspicion of drug use: no    Prior to arrival data:     Patient ambulatory at scene: yes      Blood loss:  None    Responsiveness at scene:  Alert    Loss of consciousness: no      Amnesic to event: no      Immobilization:  None  Current pain details:     Pain quality:  Aching ("everywhere")    Pain Severity:  Moderate  Risk factors: anticoagulation therapy        Prior to Admission Medications   Prescriptions Last Dose Informant Patient Reported? Taking?    Ascorbic Acid (VITAMIN C) 1000 MG tablet  Self Yes No   Sig: Take 1,000 mg by mouth 2 (two) times a day    DULoxetine (CYMBALTA) 30 mg delayed release capsule   No No   Sig: Take 1 capsule (30 mg total) by mouth daily   acetaminophen (TYLENOL) 325 mg tablet   No No   Sig: Take 2 tablets (650 mg total) by mouth every 6 (six) hours as needed for mild pain, headaches or fever   apixaban (ELIQUIS) 5 mg  Self Yes No   Sig: Take 5 mg by mouth 2 (two) times a day    bumetanide (BUMEX) 1 mg tablet   No No   Sig: Take 1 tablet (1 mg total) by mouth daily   cholecalciferol (VITAMIN D3) 1,000 units tablet  Self Yes No   Sig: Take 1,000 Units by mouth daily   collagenase (SANTYL) ointment   No No   Sig: Apply topically daily   metoprolol tartrate (LOPRESSOR) 25 mg tablet   No No   Sig: Take 1 tablet (25 mg total) by mouth every 12 (twelve) hours   midodrine (PROAMATINE) 5 mg tablet   No No   Sig: Take 1 tablet (5 mg total) by mouth 3 (three) times a day before meals   nystatin (MYCOSTATIN) powder   No No   Sig: Apply topically 2 (two) times a day   pantoprazole (PROTONIX) 40 mg tablet   No No   Sig: Take 1 tablet (40 mg total) by mouth daily   senna (SENOKOT) 8 6 mg   No No   Sig: Take 2 tablets (17 2 mg total) by mouth daily      Facility-Administered Medications: None       Past Medical History:   Diagnosis Date    Atrial fibrillation [facility])     Cardiac disease     Cellulitis     CHF (congestive heart failure) (HCC)     Chronic pain     Chronic venous hypertension     with ulceration    GERD (gastroesophageal reflux disease)     History of methicillin resistant staphylococcus aureus (MRSA) 11/26/2019    negative nasal swab     Hyperlipidemia     Hypertension     Obesity     [facility]     PVD (peripheral vascular disease) ([facility]     Type 2 diabetes mellitus with diabetic [facility] ulcer [facility] 6/30/2020    Vascular disorder of extremity ([facility] )        Past Surgical History:   Procedure Laterality Date    ANTERIOR RELEASE VERTEBRAL BODY W/ POSTERIOR FUSION      APPENDECTOMY  1954    CATARACT EXTRACTION      DECOMPRESSION SPINE LUMBAR POSTERIOR      ELBOW SURGERY      FOOT/ANKLE ARTHRODESIS Right      complications postoperatively with bone healing and infection    INCISION AND DRAINAGE OF WOUND Left 3/5/2020    Procedure: INCISION AND DRAINAGE (I&D) EXTREMITY;  Surgeon: Arden Lynn DPM;  Location:  MAIN OR;  Service: Podiatry    KNEE SURGERY      NOSE SURGERY      turbinectomy    RETINAL DETACHMENT SURGERY      RHINOPLASTY      TONSILLECTOMY      VEIN LIGATION AND STRIPPING      saphenous vein long       Family History   Problem Relation Age of Onset    Cancer Mother         cancer of uterus    Diabetes Sister     Mental illness Neg Hx         neg fam hx    Substance Abuse Neg Hx         neg fam hx     I have reviewed and agree with the history as documented      E-Cigarette/Vaping    E-Cigarette Use Never User      E-Cigarette/Vaping Substances    Nicotine No     THC No     CBD No     Flavoring No     Other No     Unknown No      Social History     Tobacco Use    Smoking status: Never Smoker    Smokeless tobacco: Never Used   Substance Use Topics    Alcohol use: Not Currently     Frequency: Never     Drinks per session: Patient refused     Binge frequency: Never    Drug use: Never Review of Systems   Unable to perform ROS: Mental status change       Physical Exam  Physical Exam   Constitutional: He appears well-developed and well-nourished  Non-toxic appearance  No distress  HENT:   Head: Normocephalic and atraumatic  Eyes: Pupils are equal, round, and reactive to light  EOM are normal    Neck: Normal range of motion  No tracheal deviation present  No thyromegaly present  Cardiovascular: Normal heart sounds and intact distal pulses  An irregularly irregular rhythm present  Bradycardia present  Pulmonary/Chest: Effort normal and breath sounds normal    Abdominal: Soft  Bowel sounds are normal  He exhibits no distension  There is no tenderness  Musculoskeletal: He exhibits edema  No midline C-spine tenderness  T and L spine tenderness present  No step offs or deformities  Lymphadenopathy:     He has no cervical adenopathy  Neurological: He is alert  Skin: Skin is warm and dry  He is not diaphoretic  Large contusion to right flank with ecchymosis  Tender to palpation  No crepitus  Chronic wound to left foot similar to prior exams in size; however, now with foul smelling discharge  2+ edema of the foot and lower extremities bilaterally  Nursing note and vitals reviewed        Vital Signs  ED Triage Vitals [07/11/20 1616]   Temperature Pulse Respirations Blood Pressure SpO2   97 8 °F (36 6 °C) 56 18 103/54 93 %      Temp Source Heart Rate Source Patient Position - Orthostatic VS BP Location FiO2 (%)   Temporal Monitor Lying Right arm --      Pain Score       --           Vitals:    07/11/20 1616   BP: 103/54   Pulse: 56   Patient Position - Orthostatic VS: Lying         Visual Acuity      ED Medications  Medications   vancomycin (VANCOCIN) 1,750 mg in sodium chloride 0 9 % 500 mL IVPB (1,750 mg Intravenous New Bag 7/11/20 1801)   nystatin (MYCOSTATIN) powder (1 application Topical Given 7/11/20 1716)   cefepime (MAXIPIME) IVPB (premix) 2,000 mg 50 mL (0 mg Intravenous Stopped 7/11/20 1801)       Diagnostic Studies  Results Reviewed     Procedure Component Value Units Date/Time    Lactic acid 2 Hours [[id number]] Collected:  07/11/20 1907    Lab Status: In process Specimen:  Blood from Arm, Right Updated:  07/11/20 1935    NT-BNP PRO [[id number]]  (Abnormal) Collected:  07/11/20 1702    Lab Status:  Final result Specimen:  Blood from Arm, Left Updated:  07/11/20 1822     NT-proBNP 00,138 pg/mL     Comprehensive metabolic panel [[id number]]  (Abnormal) Collected:  07/11/20 1702    Lab Status:  Final result Specimen:  Blood from Arm, Left Updated:  07/11/20 1816     Sodium 138 mmol/L      Potassium 3 8 mmol/L      Chloride 101 mmol/L      CO2 24 mmol/L      ANION GAP 13 mmol/L      BUN 61 mg/dL      Creatinine 3 01 mg/dL      Glucose 97 mg/dL      Calcium 9 1 mg/dL      AST 72 U/L      ALT 8 U/L      Alkaline Phosphatase 62 U/L      Total Protein 8 0 g/dL      Albumin 2 7 g/dL      Total Bilirubin 3 30 mg/dL      eGFR 20 ml/min/1 73sq m     Narrative:       [place] guidelines for Chronic Kidney Disease (CKD):     Stage 1 with normal or high GFR (GFR > 90 mL/min/1 73 square meters)    Stage 2 Mild CKD (GFR = 60-89 mL/min/1 73 square meters)    Stage 3A Moderate CKD (GFR = 45-59 mL/min/1 73 square meters)    Stage 3B Moderate CKD (GFR = 30-44 mL/min/1 73 square meters)    Stage 4 Severe CKD (GFR = 15-29 mL/min/1 73 square meters)    Stage 5 End Stage CKD (GFR <15 mL/min/1 73 square meters)  Note: GFR calculation is accurate only with a steady state creatinine    Lactic acid [[id number]]  (Abnormal) Collected:  07/11/20 1702    Lab Status:  Final result Specimen:  Blood from Arm, Left Updated:  07/11/20 1745     LACTIC ACID 3 3 mmol/L     Narrative:       Result may be elevated if tourniquet was used during collection      Protime-INR [[id number]]  (Abnormal) Collected:  07/11/20 1702    Lab Status:  Final result Specimen:  Blood from Arm, Left Updated: 07/11/20 1739     Protime 36 2 seconds      INR 3 67    APTT [[id number]]  (Abnormal) Collected:  07/11/20 1702    Lab Status:  Final result Specimen:  Blood from Arm, Left Updated:  07/11/20 1739     PTT 48 seconds     Troponin I [[id number]]  (Abnormal) Collected:  07/11/20 1702    Lab Status:  Final result Specimen:  Blood from Arm, Left Updated:  07/11/20 1738     Troponin I 0 26 ng/mL     CBC and differential [[id number]]  (Abnormal) Collected:  07/11/20 1702    Lab Status:  Final result Specimen:  Blood from Arm, Left Updated:  07/11/20 1713     WBC 6 24 Thousand/uL      RBC 4 36 Million/uL      Hemoglobin 13 8 g/dL      Hematocrit 41 5 %      MCV 95 fL      MCH 31 7 pg      MCHC 33 3 g/dL      RDW 19 6 %      MPV 11 7 fL      Platelets 373 Thousands/uL      nRBC 1 /100 WBCs      Neutrophils Relative 68 %      Immat GRANS % 0 %      Lymphocytes Relative 16 %      Monocytes Relative 13 %      Eosinophils Relative 2 %      Basophils Relative 1 %      Neutrophils Absolute 4 25 Thousands/µL      Immature Grans Absolute 0 02 Thousand/uL      Lymphocytes Absolute 1 02 Thousands/µL      Monocytes Absolute 0 80 Thousand/µL      Eosinophils Absolute 0 11 Thousand/µL      Basophils Absolute 0 04 Thousands/µL     Procalcitonin [[id number]] Collected:  07/11/20 1702    Lab Status: In process Specimen:  Blood from Arm, Left Updated:  07/11/20 1709    Blood culture #1 [[id number]] Collected:  07/11/20 1702    Lab Status: In process Specimen:  Blood from Hand, Left Updated:  07/11/20 1709    Blood culture #2 [[id number]] Collected:  07/11/20 1702    Lab Status: In process Specimen:  Blood from Hand, Left Updated:  07/11/20 1709    POCT urinalysis dipstick [[id number]]     Lab Status:  No result Specimen:  Urine                  CT head without contrast   Final Result by Prisca Balderas MD (07/11 1856)      No acute intracranial abnormality  Microangiopathic changes                    Workstation performed: [id number]         CT cervical spine without contrast   Final Result by Carlo Del Toro MD (07/11 1904)      1  No cervical spine fracture or traumatic malalignment  2   Loss of the normal cervical lordosis with severe multilevel degenerative change and grade 1 anterolisthesis of C2 on C3       3   Trace left mastoid air cell fluid  Workstation performed: [id number]         CT chest abdomen pelvis wo contrast   Final Result by Carlo Del Toro MD (07/11 1931)      Limited evaluation primarily of the abdomen and pelvis due to lack of contrast and beam hardening artifact from the patient's arms and lumbar spine hardware  1   Left abdominal wall fluid, possibly hematoma given the history of trauma  Correlation for injury to this region recommended  Otherwise no acute traumatic injury to the chest, abdomen, and pelvis  2   Subacute to chronic right anterior 8th rib fracture  3   Evidence of congestive heart failure with upper lobe groundglass opacity and bilateral pleural effusions with overlying atelectasis  4   Asymmetric gynecomastia, right greater than left  This is somewhat masslike on the right and increased from the prior study  Correlation with physical exam recommended  5   Diffuse subcutaneous edema  6   Stable mild retroperitoneal lymphadenopathy  Workstation performed: [id number]         XR shoulder 2+ views LEFT   Final Result by Darion Doyle DO (07/11 1802)      No acute osseous abnormality  Degenerative changes as described        Workstation performed: [id number]                    Procedures  ECG 12 Lead Documentation Only  Date/Time: 7/11/2020 4:25 PM  Performed by: Vin Jansen MD  Authorized by: Vin Jansen MD     Indications / Diagnosis:  Altered mental status, falls  ECG reviewed by me, the ED Provider: yes    Patient location:  ED  Previous ECG:     Previous ECG:  Compared to current    Comparison ECG info:  7/2/20 afib with PVCs and poor r wave progression    Similarity:  No change  Interpretation:     Interpretation: abnormal    Rate:     ECG rate:  78    ECG rate assessment: normal    Rhythm:     Rhythm: atrial fibrillation    Ectopy:     Ectopy: PVCs    QRS:     QRS axis:  Left    QRS intervals:  Normal  Conduction:     Conduction: normal    ST segments:     ST segments:  Normal  T waves:     T waves: inverted      Inverted:  AVL             ED Course  ED Course as of Jul 11 1936   Sat Jul 11, 2020   1738 0 10 two weeks ago   Troponin I(!): 0 26   1744 On Eliquis   INR(!): 3 67   1757 LACTIC ACID(!!): 3 3   1816 2 04 ten days ago   Creatinine(!): 3 01   1826 34,232 two weeks ago   NT-proBNP(!): 21,617       US AUDIT      Most Recent Value   Initial Alcohol Screen: US AUDIT-C    1  How often do you have a drink containing alcohol?  0 Filed at: 07/11/2020 1615   2  How many drinks containing alcohol do you have on a typical day you are drinking? 0 Filed at: 07/11/2020 1615   3a  Male UNDER 65: How often do you have five or more drinks on one occasion? 0 Filed at: 07/11/2020 1615   3b  FEMALE Any Age, or MALE 65+: How often do you have 4 or more drinks on one occassion? 0 Filed at: 07/11/2020 1615   Audit-C Score  0 Filed at: 07/11/2020 1615                  MARY/DAST-10      Most Recent Value   How many times in the past year have you    Used an illegal drug or used a prescription medication for non-medical reasons? Never Filed at: 07/11/2020 1615              Initial Sepsis Screening     Row Name 07/11/20 1736                Is the patient's history suggestive of a new or worsening infection? (!) Yes (Proceed)  -EE        Suspected source of infection  soft tissue  -EE        Are two or more of the following signs & symptoms of infection both present and new to the patient?   No  -EE        Indicate SIRS criteria  Altered mental status  -EE        If the answer is yes to both questions, suspicion of sepsis is present          If severe sepsis is present AND tissue hypoperfusion perists in the hour after fluid resuscitation or lactate > 4, the patient meets criteria for SEPTIC SHOCK          Are any of the following organ dysfunction criteria present within 6 hours of suspected infection and SIRS criteria that are NOT considered to be chronic conditions?         Organ dysfunction          Date of presentation of severe sepsis          Time of presentation of severe sepsis          Tissue hypoperfusion persists in the hour after crystalloid fluid administration, evidenced, by either:          Was hypotension present within one hour of the conclusion of crystalloid fluid administration?         Date of presentation of septic shock          Time of presentation of septic shock            User Key  (r) = Recorded By, (t) = Taken By, (c) = Cosigned By    [address] Name Provider Type     Everett Jones MD Physician                        MDM  Number of Diagnoses or Management Options  Abdominal wall hematoma, initial encounter: new and requires workup  SAMY (acute kidney injury) [facility]): new and requires workup  Ambulatory dysfunction: new and requires workup  Cellulitis of left lower extremity: established and worsening  CHF (congestive heart failure) ([facility] ): established and worsening  Decubitus ulcer of sacral region, stage 2 ([facility] ): established and worsening  Elevated lactic acid level: new and requires workup  Elevated troponin: established and worsening  Fall from bed, initial encounter: new and requires workup  Intertrigo: minor  Diagnosis management comments: 69year old male brought in by son for evaluation of gradually worsening confusion and ambulatory dysfunction  Fall yesterday with significant contusion to right flank, T and L spine tenderness  No visible signs of head trauma  Patient takes Eliquis for history of afib  Patient's troponin is elevated on labs greater than prior  No STEMI criteria on EKG   No chest pain or shortness of breath per patient; however, he is an unreliable historian  Nystatin powder for intertrigo of abdominal folds  Cefepime and vancomycin for concern of worsening of left lower extremity cellulitis  Elevated lactic acid level on labs  SAMY on CKD  Abdominal wall hematoma on CT  Patient admitted for further evaluation and management  Amount and/or Complexity of Data Reviewed  Clinical lab tests: ordered and reviewed  Tests in the radiology section of CPT®: ordered and reviewed    Patient Progress  Patient progress: stable        Disposition  Final diagnoses:   Cellulitis of left lower extremity   Decub[facility] ulcer [facility]   Intertrigo   Ambulatory dysfunction   Fall from bed, initial encounter   Elevated troponin   Elevated lactic acid level   SAMY (acute kidney injury) ([facility] )   CHF (congestive heart failure) ([facility] )   Abdominal wall hematoma, initial encounter   Left shoulder pain     Time reflects when diagnosis was documented in both MDM as applicable and the Disposition within this note     Time User Action Codes Description Comment    7/11/2020  4:32 PM Radha Hoffman Add [L03 116] Cellulitis of left lower extremity     7/11/2020  4:32 PM Edd MALIK Add [L89 152] Decubitus ulcer of sacral region, stage 2 ([facility] )     7/11/2020  4:32 PM Radha Hoffman Add [L30 4] Intertrigo     7/11/2020  5:08 PM Roxianne ClockRic Add [R26 2] Ambulatory dysfunction     7/11/2020  5:08 PM Roxianne ClockRic Add [W06  GAGB] Fall from bed, initial encounter     7/11/2020  5:39 PM Lorena Sat [R79 89] Elevated troponin     7/11/2020  5:58 PM Radha Hoffman Add [R79 89] Elevated lactic acid level     7/11/2020  6:17 PM OnelRic Mcdonald Add [N17 9] SAMY (acute kidney injury) ([facility] )     7/11/2020  6:27 PM OnelRic Add [I50 9] CHF (congestive heart failure) ([facility] )     7/11/2020  7:33 PM OnelRic Add [S30 1XXA] Abdominal wall hematoma, initial encounter 7/11/2020  7:36 PM Ladonna Dennis Add [Q32 905] Left shoulder pain       ED Disposition     ED Disposition Condition Date/Time Comment    Admit Stable Sat Jul 11, 2020  7:34 PM Case was discussed with YAZMIN and the patient's admission status was agreed to be Admission Status: inpatient status to the service of Dr Charisma Marrero   Follow-up Information    None         Patient's Medications   Discharge Prescriptions    No medications on file     No discharge procedures on file      PDMP Review       Value Time User    PDMP Reviewed  Yes 7/1/2020 12:05 PM Jamar Ren DO          ED Provider  Electronically Signed by           Mihaela Duff MD  07/11/20 0844

## 2020-07-12 ENCOUNTER — APPOINTMENT (INPATIENT)
Dept: RADIOLOGY | Facility: HOSPITAL | Age: 75
DRG: 291 | End: 2020-07-12
Payer: COMMERCIAL

## 2020-07-12 PROBLEM — E11.9 DM (DIABETES MELLITUS) (HCC): Status: ACTIVE | Noted: 2020-07-12

## 2020-07-12 LAB
ANION GAP SERPL CALCULATED.3IONS-SCNC: 11 MMOL/L (ref 4–13)
ANION GAP SERPL CALCULATED.3IONS-SCNC: 14 MMOL/L (ref 4–13)
BASOPHILS # BLD AUTO: 0.04 THOUSANDS/ΜL (ref 0–0.1)
BASOPHILS NFR BLD AUTO: 1 % (ref 0–1)
BUN SERPL-MCNC: 59 MG/DL (ref 5–25)
BUN SERPL-MCNC: 59 MG/DL (ref 5–25)
CALCIUM SERPL-MCNC: 8.7 MG/DL (ref 8.3–10.1)
CALCIUM SERPL-MCNC: 9 MG/DL (ref 8.3–10.1)
CHLORIDE SERPL-SCNC: 102 MMOL/L (ref 100–108)
CHLORIDE SERPL-SCNC: 104 MMOL/L (ref 100–108)
CO2 SERPL-SCNC: 23 MMOL/L (ref 21–32)
CO2 SERPL-SCNC: 25 MMOL/L (ref 21–32)
CREAT SERPL-MCNC: 2.85 MG/DL (ref 0.6–1.3)
CREAT SERPL-MCNC: 2.85 MG/DL (ref 0.6–1.3)
EOSINOPHIL # BLD AUTO: 0.25 THOUSAND/ΜL (ref 0–0.61)
EOSINOPHIL NFR BLD AUTO: 4 % (ref 0–6)
ERYTHROCYTE [DISTWIDTH] IN BLOOD BY AUTOMATED COUNT: 19.4 % (ref 11.6–15.1)
GFR SERPL CREATININE-BSD FRML MDRD: 21 ML/MIN/1.73SQ M
GFR SERPL CREATININE-BSD FRML MDRD: 21 ML/MIN/1.73SQ M
GLUCOSE SERPL-MCNC: 110 MG/DL (ref 65–140)
GLUCOSE SERPL-MCNC: 112 MG/DL (ref 65–140)
GLUCOSE SERPL-MCNC: 140 MG/DL (ref 65–140)
GLUCOSE SERPL-MCNC: 72 MG/DL (ref 65–140)
GLUCOSE SERPL-MCNC: 80 MG/DL (ref 65–140)
GLUCOSE SERPL-MCNC: 82 MG/DL (ref 65–140)
HCT VFR BLD AUTO: 38.3 % (ref 36.5–49.3)
HGB BLD-MCNC: 13.1 G/DL (ref 12–17)
IMM GRANULOCYTES # BLD AUTO: 0.01 THOUSAND/UL (ref 0–0.2)
IMM GRANULOCYTES NFR BLD AUTO: 0 % (ref 0–2)
LYMPHOCYTES # BLD AUTO: 1.12 THOUSANDS/ΜL (ref 0.6–4.47)
LYMPHOCYTES NFR BLD AUTO: 20 % (ref 14–44)
MCH RBC QN AUTO: 32 PG (ref 26.8–34.3)
MCHC RBC AUTO-ENTMCNC: 34.2 G/DL (ref 31.4–37.4)
MCV RBC AUTO: 94 FL (ref 82–98)
MONOCYTES # BLD AUTO: 0.83 THOUSAND/ΜL (ref 0.17–1.22)
MONOCYTES NFR BLD AUTO: 15 % (ref 4–12)
NEUTROPHILS # BLD AUTO: 3.41 THOUSANDS/ΜL (ref 1.85–7.62)
NEUTS SEG NFR BLD AUTO: 60 % (ref 43–75)
NRBC BLD AUTO-RTO: 0 /100 WBCS
PLATELET # BLD AUTO: 159 THOUSANDS/UL (ref 149–390)
PMV BLD AUTO: 11.4 FL (ref 8.9–12.7)
POTASSIUM SERPL-SCNC: 3.4 MMOL/L (ref 3.5–5.3)
POTASSIUM SERPL-SCNC: 3.6 MMOL/L (ref 3.5–5.3)
RBC # BLD AUTO: 4.09 MILLION/UL (ref 3.88–5.62)
SODIUM SERPL-SCNC: 139 MMOL/L (ref 136–145)
SODIUM SERPL-SCNC: 140 MMOL/L (ref 136–145)
TROPONIN I SERPL-MCNC: 0.19 NG/ML
TROPONIN I SERPL-MCNC: 0.2 NG/ML
WBC # BLD AUTO: 5.66 THOUSAND/UL (ref 4.31–10.16)

## 2020-07-12 PROCEDURE — 84484 ASSAY OF TROPONIN QUANT: CPT | Performed by: NURSE PRACTITIONER

## 2020-07-12 PROCEDURE — 85025 COMPLETE CBC W/AUTO DIFF WBC: CPT | Performed by: NURSE PRACTITIONER

## 2020-07-12 PROCEDURE — 73630 X-RAY EXAM OF FOOT: CPT

## 2020-07-12 PROCEDURE — 80048 BASIC METABOLIC PNL TOTAL CA: CPT | Performed by: NURSE PRACTITIONER

## 2020-07-12 PROCEDURE — 99233 SBSQ HOSP IP/OBS HIGH 50: CPT | Performed by: INTERNAL MEDICINE

## 2020-07-12 PROCEDURE — 82948 REAGENT STRIP/BLOOD GLUCOSE: CPT

## 2020-07-12 RX ORDER — BUMETANIDE 0.25 MG/ML
1 INJECTION, SOLUTION INTRAMUSCULAR; INTRAVENOUS 2 TIMES DAILY
Status: DISCONTINUED | OUTPATIENT
Start: 2020-07-12 | End: 2020-07-14

## 2020-07-12 RX ADMIN — NYSTATIN 1 APPLICATION: 100000 POWDER TOPICAL at 12:15

## 2020-07-12 RX ADMIN — MIDODRINE HYDROCHLORIDE 5 MG: 5 TABLET ORAL at 17:15

## 2020-07-12 RX ADMIN — MIDODRINE HYDROCHLORIDE 5 MG: 5 TABLET ORAL at 11:55

## 2020-07-12 RX ADMIN — BUMETANIDE 1 MG: 0.25 INJECTION INTRAMUSCULAR; INTRAVENOUS at 17:22

## 2020-07-12 RX ADMIN — NYSTATIN: 100000 POWDER TOPICAL at 17:23

## 2020-07-12 RX ADMIN — DULOXETINE 30 MG: 30 CAPSULE, DELAYED RELEASE ORAL at 09:28

## 2020-07-12 RX ADMIN — MIDODRINE HYDROCHLORIDE 5 MG: 5 TABLET ORAL at 06:11

## 2020-07-12 RX ADMIN — PANTOPRAZOLE SODIUM 40 MG: 40 TABLET, DELAYED RELEASE ORAL at 09:28

## 2020-07-12 NOTE — ASSESSMENT & PLAN NOTE
· Likely due to SAMY  · Trop- 0 26 on admission   · Repeat trop- pending  · EKG- afib, no acute st changes

## 2020-07-12 NOTE — ASSESSMENT & PLAN NOTE
· Likely non MI due to ADHF, SAMY on CKD  · Trending down  · EKG- afib, no acute st changes, Pt is CP free

## 2020-07-12 NOTE — PLAN OF CARE
Problem: PAIN - ADULT  Goal: Verbalizes/displays adequate comfort level or baseline comfort level  Description  Interventions:  - Encourage patient to monitor pain and request assistance  - Assess pain using appropriate pain scale  - Administer analgesics based on type and severity of pain and evaluate response  - Implement non-pharmacological measures as appropriate and evaluate response  - Consider cultural and social influences on pain and pain management  - Notify physician/advanced practitioner if interventions unsuccessful or patient reports new pain  Outcome: Progressing     Problem: INFECTION - ADULT  Goal: Absence or prevention of progression during hospitalization  Description  INTERVENTIONS:  - Assess and monitor for signs and symptoms of infection  - Monitor lab/diagnostic results  - Monitor all insertion sites, i e  indwelling lines, tubes, and drains  - Monitor endotracheal if appropriate and nasal secretions for changes in amount and color  - [place] appropriate cooling/warming therapies per order  - Administer medications as ordered  - Instruct and encourage patient and family to use good hand hygiene technique  - Identify and instruct in appropriate isolation precautions for identified infection/condition  Outcome: Progressing  Goal: Absence of fever/infection during neutropenic period  Description  INTERVENTIONS:  - Monitor WBC    Outcome: Progressing     Problem: SAFETY ADULT  Goal: Patient will remain free of falls  Description  INTERVENTIONS:  - Assess patient frequently for physical needs  -  Identify cognitive and physical deficits and behaviors that affect risk of falls    -  [place] fall precautions as indicated by assessment   - Educate patient/family on patient safety including physical limitations  - Instruct patient to call for assistance with activity based on assessment  - Modify environment to reduce risk of injury  - Consider OT/PT consult to assist with strengthening/mobility  Outcome: Progressing  Goal: Maintain or return to baseline ADL function  Description  INTERVENTIONS:  -  Assess patient's ability to carry out ADLs; assess patient's baseline for ADL function and identify physical deficits which impact ability to perform ADLs (bathing, care of mouth/teeth, toileting, grooming, dressing, etc )  - Assess/evaluate cause of self-care deficits   - Assess range of motion  - Assess patient's mobility; develop plan if impaired  - Assess patient's need for assistive devices and provide as appropriate  - Encourage maximum independence but intervene and supervise when necessary  - Involve family in performance of ADLs  - Assess for home care needs following discharge   - Consider OT consult to assist with ADL evaluation and planning for discharge  - Provide patient education as appropriate  Outcome: Progressing  Goal: Maintain or return mobility status to optimal level  Description  INTERVENTIONS:  - Assess patient's baseline mobility status (ambulation, transfers, stairs, etc )    - Identify cognitive and physical deficits and behaviors that affect mobility  - Identify mobility aids required to assist with transfers and/or ambulation (gait belt, sit-to-stand, lift, walker, cane, etc )  - [place] fall precautions as indicated by assessment  - Record patient progress and toleration of activity level on Mobility SBAR; progress patient to next Phase/Stage  - Instruct patient to call for assistance with activity based on assessment  - Consider rehabilitation consult to assist with strengthening/weightbearing, etc   Outcome: Progressing     Problem: DISCHARGE PLANNING  Goal: Discharge to home or other facility with appropriate resources  Description  INTERVENTIONS:  - Identify barriers to discharge w/patient and caregiver  - Arrange for needed discharge resources and transportation as appropriate  - Identify discharge learning needs (meds, wound care, etc )  - Arrange for interpretive services to assist at discharge as needed  - Refer to Case Management Department for coordinating discharge planning if the patient needs post-hospital services based on physician/advanced practitioner order or complex needs related to functional status, cognitive ability, or social support system  Outcome: Progressing     Problem: Knowledge Deficit  Goal: Patient/family/caregiver demonstrates understanding of disease process, treatment plan, medications, and discharge instructions  Description  Complete learning assessment and assess knowledge base    Interventions:  - Provide teaching at level of understanding  - Provide teaching via preferred learning methods  Outcome: Progressing     Problem: CARDIOVASCULAR - ADULT  Goal: Maintains optimal cardiac output and hemodynamic stability  Description  INTERVENTIONS:  - Monitor I/O, vital signs and rhythm  - Monitor for S/S and trends of decreased cardiac output  - Administer and titrate ordered vasoactive medications to optimize hemodynamic stability  - Assess quality of pulses, skin color and temperature  - Assess for signs of decreased coronary artery perfusion  - Instruct patient to report change in severity of symptoms  Outcome: Progressing  Goal: Absence of cardiac dysrhythmias or at baseline rhythm  Description  INTERVENTIONS:  - Continuous cardiac monitoring, vital signs, obtain 12 lead EKG if ordered  - Administer antiarrhythmic and heart rate control medications as ordered  - Monitor electrolytes and administer replacement therapy as ordered  Outcome: Progressing     Problem: MUSCULOSKELETAL - ADULT  Goal: Maintain or return mobility to safest level of function  Description  INTERVENTIONS:  - Assess patient's ability to carry out ADLs; assess patient's baseline for ADL function and identify physical deficits which impact ability to perform ADLs (bathing, care of mouth/teeth, toileting, grooming, dressing, etc )  - Assess/evaluate cause of self-care deficits   - Assess range of motion  - Assess patient's mobility  - Assess patient's need for assistive devices and provide as appropriate  - Encourage maximum independence but intervene and supervise when necessary  - Involve family in performance of ADLs  - Assess for home care needs following discharge   - Consider OT consult to assist with ADL evaluation and planning for discharge  - Provide patient education as appropriate  Outcome: Progressing

## 2020-07-12 NOTE — ASSESSMENT & PLAN NOTE
Wt Readings from Last 3 Encounters:   07/02/20 122 kg (269 lb 2 9 oz)   07/01/20 121 kg (267 lb 6 7 oz)   06/18/20 127 kg (279 lb 1 6 oz)       · FUB-65,514  · Last echo- 6/26/20- EF 30 %  severe diffuse hypokinesis with regional variations    · Pt on lopressor and bumex 1mg outpatient, unknown if patient is compliant with medications  · Will give bumex 1mg now   · Strict I/O

## 2020-07-12 NOTE — CONSULTS
Consultation - Nephrology   Linwood Mazariegos 69 y o  male MRN: [id number]  Unit/Bed#: -01 Encounter: [id number]    ASSESSMENT/PLAN:  SAMY (POA) on likely CKD 3:  History of horseshoe kidney, frequent admissions, recent SAMY in the beginning of the month with discharge creatinine of 2 04   -presents with creatinine of 3 01   -creatinine improving with diuresis  -continues on Bumex 1 mg IV daily, up titrate as needed   -home diuretic:  Bumex 1 mg p o  Daily   -baseline creatinine 1 3-1 6   -UA:  Trace leukocytes, 0-1 RBCs, 4-10 WBCs, 30 50 hyaline cast   -teixeira inserted for urinary retention  -CT scan showed bilateral renal cysts  -continue to avoid nephrotoxins  -I/O  Hypokalemia: In the setting of IV diuretics  -will check Mag level in a m  [place] -continue to monitor and replace as needed  Acute on chronic CHF:  Echocardiogram on June 26, 2020 showed  EF of 30%, dilated IVC  On RA, stable LE edema  -CT scan shows bilateral pleural effusions with overlying atelectasis  -home diuretic:  Bumex 1 mg daily   -continue Bumex 1 mg PO IV daily    -continue daily weight, strict I&O, fluid restriction  Lactic acidosis:  Trending down   -continue to monitor and trend  Blood pressure:  Blood pressure overall stable and acceptable   -avoid hypotension or high fluctuations in blood pressure   -recommend holding parameters for antihypertensive less than 130    -continue monitoring 5 mg 3 times per day  Atrial fibrillation:  Eliquis on hold  Leg wounds:  With repetitive falls and recent lower extremity cellulitis  -podiatry following   -continue local wound care  -x-ray pending      Other:  GERD, ambulatory dysfunction, PVD    HISTORY OF PRESENT ILLNESS:  Requesting Physician: Karen Carey DO  Reason for Consult: SAMY (POA)    Hardik Guzman is a 69y o  year old male with past medical history of hypertension, CHF, GERD, hyperlipidemia, atrial fibrillation, lower extremity wound, and falls, CKD, hypertension, CHF, GERD, hyperlipidemia, lower extremity wounds, and frequent falls, who was admitted to [facility] after presenting with worsening lower extremity wounds and recent fall  A renal consultation is requested today for assistance in the management of SAMY  The patient resides at home with his family  His family states that he has intermittent confusion spells  He is on multiple hospital admissions in refuses to go to rehab  The patient denies feeling chest pain or shortness of breath  He denies nausea, vomiting, diarrhea, or changes to his appetite  He has been having ongoing issues with urination      PAST MEDICAL HISTORY:  Past Medical History:   Diagnosis Date    [facility]     Cardiac disease     Cellulitis     [facility]     Chronic pain     Chronic venous hypertension     with ulceration    GERD (gastroesophageal reflux disease)     History of methicillin resistant staphylococcus aureus (MRSA) 11/26/2019    negative nasal swab     Hyperlipidemia     Hypertension     Obesity     [facility]monary hypertension ([facility] )     PVD (peripheral vascular disease) ([facility] )     Type 2 diabetes mellitus with diabetic heel ulcer ([facility] ) 6/30/2020    Vascular disorder of extremity ([place] [address] )        PAST SURGICAL HISTORY:  Past Surgical History:   Procedure Laterality Date    ANTERIOR RELEASE VERTEBRAL BODY W/ POSTERIOR FUSION      APPENDECTOMY  1954    CATARACT EXTRACTION      DECOMPRESSION SPINE LUMBAR POSTERIOR      ELBOW SURGERY      FOOT/ANKLE ARTHRODESIS Right      complications postoperatively with bone healing and infection    INCISION AND DRAINAGE OF WOUND Left 3/5/2020    Procedure: INCISION AND DRAINAGE (I&D) EXTREMITY;  Surgeon: Olya Stiles DPM;  Location:  MAIN OR;  Service: Podiatry    KNEE SURGERY      NOSE SURGERY      turbinectomy    RETINAL DETACHMENT SURGERY      RHINOPLASTY      TONSILLECTOMY      VEIN LIGATION AND STRIPPING      saphenous vein long       ALLERGIES:  No Known Allergies    SOCIAL HISTORY:  Social History     Substance and Sexual Activity   Alcohol Use Not Currently    Frequency: Never    Drinks per session: Patient refused    Binge frequency: Never     Social History     Substance and Sexual Activity   Drug Use Never     Social History     Tobacco Use   Smoking Status Never Smoker   Smokeless Tobacco Never Used       FAMILY HISTORY:  Family History   Problem Relation Age of Onset    Cancer Mother         cancer of uterus    Diabetes Sister     Mental illness Neg Hx         neg fam hx    Substance Abuse Neg Hx         neg fam hx       MEDICATIONS:    Current Facility-Administered Medications:     acetaminophen (TYLENOL) tablet 650 mg, 650 mg, Oral, Q6H PRN, NAVNEET Cintron    DULoxetine (CYMBALTA) delayed release capsule 30 mg, 30 mg, Oral, Daily, KRISTY PetersonNP, 30 mg at 07/12/20 0928    insulin lispro (HumaLOG) 100 units/mL subcutaneous injection 1-5 Units, 1-5 Units, Subcutaneous, HS, Oj Tremayne, DO    insulin lispro (HumaLOG) 100 units/mL subcutaneous injection 1-6 Units, 1-6 Units, Subcutaneous, TID AC **AND** Fingerstick Glucose (POCT), , , TID AC, Oj Tremayne, DO    midodrine (PROAMATINE) tablet 5 mg, 5 mg, Oral, TID AC, KRISTY PetersonNP, 5 mg at 07/12/20 1155    nystatin (MYCOSTATIN) powder, , Topical, BID, NAVNEET Cintron, 1 application at 98/62/43 1215    pantoprazole (PROTONIX) EC tablet 40 mg, 40 mg, Oral, Daily, KRISTY PetersonNP, 40 mg at 07/12/20 2868    REVIEW OF SYSTEMS:  A complete review of systems was performed and found to be negative unless otherwise noted in the history of present illness  General: No fevers, chills  Cardiovascular:  - chest pain, + leg edema  Respiratory: No cough, sputum production,  - shortness of breath  Gastrointestinal:  - nausea/vomiting,  - diarrhea,  - abdominal pain  Genitourinary: No hematuria  No foamy urine    No dysuria    PHYSICAL EXAM:  Current Weight: Weight - Scale: 119 kg (262 lb 5 6 oz)  First Weight: Weight - Scale: 119 kg (263 lb 3 7 oz)  Vitals:    07/12/20 0555 07/12/20 0607 07/12/20 0749 07/12/20 1032   BP:  120/75 113/65    BP Location:       Pulse:  82 78    Resp:   20    Temp:   97 6 °F (36 4 °C)    TempSrc:       SpO2:  100% 96%    Weight: 119 kg (262 lb 5 6 oz)      Height:    6' (1 829 m)       Intake/Output Summary (Last 24 hours) at 7/12/2020 1309  Last data filed at 7/12/2020 8774  Gross per 24 hour   Intake 290 ml   Output 2950 ml   Net -2660 ml     General: NAD  Skin: warm, dry, intact, no rash, B/L leg wounds  HEENT: Moist mucous membranes, sclera anicteric, normocephalic, atraumatic  Neck: No apparent JVD appreciated  Chest:lung sounds clear, decreasedB/L, on RA   CVS:Regular rate and rhythm, no murmer   Abdomen: Soft, round, non-tender, +BS  Extremities: B/L LE edema present, PVD  Neuro: alert and oriented  Psych: appropriate mood and affect     Invasive Devices:   Urethral Catheter Non-latex 16 Fr   (Active)   Amt returned on insertion(mL) 550 mL 7/12/2020  3:47 AM   Reasons to continue Urinary Catheter  Acute urinary retention/obstruction failing urinary retention protocol 7/12/2020  3:47 AM   Goal for Removal Will consult urology 7/12/2020  3:47 AM   Site Assessment Clean;Skin intact 7/12/2020  3:47 AM   Collection Container Standard drainage bag 7/12/2020  3:47 AM   Securement Method Securing device (Describe) 7/12/2020  3:47 AM     Lab Results:   Results from last 7 days   Lab Units 07/12/20  0547 07/12/20  0015 07/11/20  1702   WBC Thousand/uL 5 66  --  6 24   HEMOGLOBIN g/dL 13 1  --  13 8   HEMATOCRIT % 38 3  --  41 5   PLATELETS Thousands/uL 159  --  175   SODIUM mmol/L 140 139 138   POTASSIUM mmol/L 3 4* 3 6 3 8   CHLORIDE mmol/L 104 102 101   CO2 mmol/L 25 23 24   BUN mg/dL 59* 59* 61*   CREATININE mg/dL 2 85* 2 85* 3 01*   CALCIUM mg/dL 8 7 9 0 9 1   ALK PHOS U/L  --   --  62   ALT U/L --   --  8*   AST U/L  --   --  72*

## 2020-07-12 NOTE — ASSESSMENT & PLAN NOTE
· Consult wound care, has been followed outpatient  By wound care by no one has been to see him in a few days per patient

## 2020-07-12 NOTE — ASSESSMENT & PLAN NOTE
· Pt with bruising to chest wall  · CT C/A/P-Left abdominal wall fluid, possibly hematoma given the history of trauma  Subacute to chronic right anterior 8th rib fracture    Diffuse subcutaneous edema  · Holding eliquis  · Will monitor hematoma for any changes in size

## 2020-07-12 NOTE — ASSESSMENT & PLAN NOTE
Wt Readings from Last 3 Encounters:   07/02/20 122 kg (269 lb 2 9 oz)   07/01/20 121 kg (267 lb 6 7 oz)   06/18/20 127 kg (279 lb 1 6 oz)         · REW-70,866  · Last echo- 6/26/20- EF 30 %  severe diffuse hypokinesis with regional variations    · Pt on lopressor and bumex 1mg outpatient, unknown if patient is compliant with medications  · Will give bumex 1mg now   · Strict I/O

## 2020-07-12 NOTE — ASSESSMENT & PLAN NOTE
· CT C/A/P-Left abdominal wall fluid, possibly hematoma given the history of trauma  Subacute to chronic right anterior 8th rib fracture    Diffuse subcutaneous edema  · Gen surg consulted, will cont to hold eliquis for now; resume when ok per gen surg  · Cont to monitor h/h

## 2020-07-12 NOTE — ASSESSMENT & PLAN NOTE
· Lactic-3 8 on admission  · No sign of infectious etiology; f/u blood cx  · Chronically elevated in past admission as well, cont to trend

## 2020-07-12 NOTE — PROGRESS NOTES
Progress Note - Linwood Mazariegos 1945, 69 y o  male MRN: [id number]    Unit/Bed#: -01 Encounter: [id number]    Primary Care Provider: Alejandra Hernandez MD   Date and time admitted to hospital: 7/11/2020  4:05 PM        * Acute kidney injury superimposed on CKD [facility])  Assessment & Plan  · On ckd stg III  · Appreciate nephrology input  · Possibly secondary to acute decompensated HF  · Hx of horseshoe kidneyPresented from home with more falls and increased weakness  · Baseline cr-1 3-2 3  · Cr trending down with diuresis  · Monitor ur output, avoid nephrotoxins and renally dose meds  · Lopez catheter in place, remains non oliguric  · S/p UA    Acute on chronic combined systolic and diastolic congestive heart failure (HCC)  Assessment & Plan  Wt Readings from Last 3 Encounters:   07/12/20 119 kg (262 lb 5 6 oz)   07/02/20 122 kg (269 lb 2 9 oz)   07/01/20 121 kg (267 lb 6 7 oz)         · VYZ-42,787  · Last echo- 6/26/20- EF 30 %  severe diffuse hypokinesis with regional variations  · Pt on lopressor and bumex 1mg outpatient, unclear if patient is compliant with medications  · Cont on IV bumex daily, ok per nephrology  · Strict I/O    Hematoma of right chest wall  Assessment & Plan  · CT C/A/P-Left abdominal wall fluid, possibly hematoma given the history of trauma  Subacute to chronic right anterior 8th rib fracture  Diffuse subcutaneous edema  · Gen surg consulted, will cont to hold eliquis for now; resume when ok per gen surg  · Cont to monitor h/h    Persistent atrial fibrillation  Assessment & Plan  · Afib on the monitor and EKG  · Resume back on metoprolol  · On eliquis outpatient, currently holding due to abdominal wall hematoma  Resume when ok per gen surg  · Will continue to monitor    Wound, open, foot  Assessment & Plan  · Chronic  · Appreciate podiatry input, no sign of infection thus not recommending abx  Cont wound care  · Wound care also consulted       DM (diabetes mellitus) ([facility] )  Assessment & Plan  No results found for: HGBA1C    Recent Labs     07/12/20  0759 07/12/20  1121   POCGLU 80 140       Blood Sugar Average: Last 72 hrs:  (P) 110   Not chronically on meds  ISS, CBG monitoring  No hemoglobin A1c on chart thus will obtain  Controlled    Lactic acid acidosis  Assessment & Plan  · Lactic-3 8 on admission  · No sign of infectious etiology; f/u blood cx  · Chronically elevated in past admission as well, cont to trend    Elevated troponin level not due myocardial infarction  Assessment & Plan  · Likely non MI due to ADHF, SAMY on CKD  · Trending down  · EKG- afib, no acute st changes, Pt is CP free    Ambulatory dysfunction  Assessment & Plan  · Pending PT/OT consult  · Will likely need to d/c to rehab; pt w/ multiple admission and has persistently refused to go to rehab    Essential hypertension  Assessment & Plan  · Controlled; chronically on midodrine    Obesity due to excess calories with serious comorbidity  Assessment & Plan  · Dietary/lifestyle education    PVD (peripheral vascular disease) ([facility] )  Assessment & Plan  · Multiple B/L LE wounds, s/p podiatry consult no sign of acute infxn      VTE Pharmacologic Prophylaxis:   Pharmacologic: Pharmacologic VTE Prophylaxis contraindicated due to secondary to abd wall hematoma  Mechanical VTE Prophylaxis in Place: Yes    Patient Centered Rounds: I have performed bedside rounds with nursing staff today  Discussions with Specialists or Other Care Team Provider:     Education and Discussions with Family / Patient: Patient at length    Time Spent for Care: 45 minutes  More than 50% of total time spent on counseling and coordination of care as described above      Current Length of Stay: 1 day(s)    Current Patient Status: Inpatient   Certification Statement: The patient will continue to require additional inpatient hospital stay due to SAMY on CKD, ADHF    Discharge Plan:     Code Status: Level 1 - Full Code      Subjective:   Patient seen and examined at bedside  Reports of generalized weakness  Afebrile, non toxic appearing  Denies dyspnea or cp  Annoyed that he keeps on being advised that he needs physical rehabilitation at a facility  Objective:     Vitals:   Temp (24hrs), Av 1 °F (36 2 °C), Min:95 8 °F (35 4 °C), Max:97 8 °F (36 6 °C)    Temp:  [95 8 °F (35 4 °C)-97 8 °F (36 6 °C)] 97 6 °F (36 4 °C)  HR:  [56-95] 78  Resp:  [18-20] 20  BP: (103-124)/(54-91) 113/65  SpO2:  [93 %-100 %] 96 %  Body mass index is 35 58 kg/m²  Input and Output Summary (last 24 hours): Intake/Output Summary (Last 24 hours) at 2020 1503  Last data filed at 2020 2087  Gross per 24 hour   Intake 290 ml   Output 2950 ml   Net -2660 ml       Physical Exam:     Physical Exam   Constitutional: He is oriented to person, place, and time  He appears well-developed  Cardiovascular: Normal rate  Irregularly, irregular   Pulmonary/Chest: Effort normal and breath sounds normal    Diminshed bs bibasilar  + extensive ecchymosis on RT lateral chest wall, tender to palpation   Abdominal: Soft  Bowel sounds are normal  He exhibits no distension and no mass  There is no tenderness  There is no guarding  Musculoskeletal: Normal range of motion  He exhibits edema  Neurological: He is alert and oriented to person, place, and time  He displays normal reflexes  No cranial nerve deficit or sensory deficit  He exhibits normal muscle tone   Coordination normal    Skin:   Chronic RT foot wound   Multiple superfical abrasion on b/l LE secondary to fall     Additional Data:     Labs:    Results from last 7 days   Lab Units 20  0547   WBC Thousand/uL 5 66   HEMOGLOBIN g/dL 13 1   HEMATOCRIT % 38 3   PLATELETS Thousands/uL 159   NEUTROS PCT % 60   LYMPHS PCT % 20   MONOS PCT % 15*   EOS PCT % 4     Results from last 7 days   Lab Units 20  0547  20  1702   SODIUM mmol/L 140   < > 138   POTASSIUM mmol/L 3 4*   < > 3 8   CHLORIDE mmol/L 104   < > 101   CO2 mmol/L 25 < > 24   BUN mg/dL 59*   < > 61*   CREATININE mg/dL 2 85*   < > 3 01*   ANION GAP mmol/L 11   < > 13   CALCIUM mg/dL 8 7   < > 9 1   ALBUMIN g/dL  --   --  2 7*   TOTAL BILIRUBIN mg/dL  --   --  3 30*   ALK PHOS U/L  --   --  62   ALT U/L  --   --  8*   AST U/L  --   --  72*   GLUCOSE RANDOM mg/dL 82   < > 97    < > = values in this interval not displayed  Results from last 7 days   Lab Units 07/11/20  1702   INR  3 67*     Results from last 7 days   Lab Units 07/12/20  1121 07/12/20  0759   POC GLUCOSE mg/dl 140 80         Results from last 7 days   Lab Units 07/11/20  1907 07/11/20  1702   LACTIC ACID mmol/L 2 8* 3 3*   PROCALCITONIN ng/ml  --  0 21           * I Have Reviewed All Lab Data Listed Above  * Additional Pertinent Lab Tests Reviewed: All Labs Within Last 24 Hours Reviewed    Imaging:    Imaging Reports Reviewed Today Include:   Imaging Personally Reviewed by Myself Includes:      Recent Cultures (last 7 days):     Results from last 7 days   Lab Units 07/11/20  1702   BLOOD CULTURE  Received in Microbiology Lab  Culture in Progress  Received in Microbiology Lab  Culture in Progress  Last 24 Hours Medication List:     Current Facility-Administered Medications:  acetaminophen 650 mg Oral Q6H PRN NAVNEET Aguilar   bumetanide 1 mg Intravenous BID Angella Oropeza MD   DULoxetine 30 mg Oral Daily NAVNEET Aguilar   insulin lispro 1-5 Units Subcutaneous HS Celeste Thakur DO   insulin lispro 1-6 Units Subcutaneous TID AC Celeste Thakur DO   [START ON 7/13/2020] metoprolol tartrate 25 mg Oral Q12H [address] Celeste Thakur DO   midodrine 5 mg Oral TID AC NAVNEET Aguilar   nystatin  Topical BID NAVNEET Aguilar   pantoprazole 40 mg Oral Daily NAVNEET Aguilar        Today, Patient Was Seen By: Celeste Thakur DO    ** Please Note: Dictation voice to text software may have been used in the creation of this document   **

## 2020-07-12 NOTE — ASSESSMENT & PLAN NOTE
· Chronic  · Appreciate podiatry input, no sign of infection thus not recommending abx  Cont wound care  · Wound care also consulted

## 2020-07-12 NOTE — ASSESSMENT & PLAN NOTE
Consult wound care, has been followed outpatient  By wound care by no one has been to see him in a few days per patient

## 2020-07-12 NOTE — ASSESSMENT & PLAN NOTE
· Pending PT/OT consult  · Will likely need to d/c to rehab; pt w/ multiple admission and has persistently refused to go to rehab

## 2020-07-12 NOTE — ASSESSMENT & PLAN NOTE
· On ckd stg III  · Appreciate nephrology input  · Possibly secondary to acute decompensated HF  · Hx of horseshoe kidneyPresented from home with more falls and increased weakness  · Baseline cr-1 3-2 3  · Cr trending down with diuresis  · Monitor ur output, avoid nephrotoxins and renally dose meds  · Lopez catheter in place, remains non oliguric  · S/p UA

## 2020-07-12 NOTE — ASSESSMENT & PLAN NOTE
Wt Readings from Last 3 Encounters:   07/12/20 119 kg (262 lb 5 6 oz)   07/02/20 122 kg (269 lb 2 9 oz)   07/01/20 121 kg (267 lb 6 7 oz)         · NDN-83,495  · Last echo- 6/26/20- EF 30 %  severe diffuse hypokinesis with regional variations    · Pt on lopressor and bumex 1mg outpatient, unclear if patient is compliant with medications  · Cont on IV bumex daily, ok per nephrology  · Strict I/O

## 2020-07-12 NOTE — ASSESSMENT & PLAN NOTE
· Afib on the monitor and EKG  · On eliquis outpatient, currently holding due to R chest wall hematoma  · Will continue to monitor

## 2020-07-12 NOTE — ASSESSMENT & PLAN NOTE
No results found for: HGBA1C    Recent Labs     07/12/20  0759 07/12/20  1121   POCGLU 80 140       Blood Sugar Average: Last 72 hrs:  (P) 110   Not chronically on meds  ISS, CBG monitoring  No hemoglobin A1c on chart thus will obtain  Controlled

## 2020-07-12 NOTE — ASSESSMENT & PLAN NOTE
· Afib on the monitor and EKG  · Resume back on metoprolol  · On eliquis outpatient, currently holding due to abdominal wall hematoma   Resume when ok per gen surg  · Will continue to monitor

## 2020-07-12 NOTE — UTILIZATION REVIEW
Initial Clinical Review    Admission: Date/Time/Statement: Admission Orders (From admission, onward)     Ordered        07/11/20 1935  Inpatient Admission (expected length of stay for this patient Order details is greater than two midnights)  Once                   Orders Placed This Encounter   Procedures    Inpatient Admission (expected length of stay for this patient Order details is greater than two midnights)     Standing Status:   Standing     Number of Occurrences:   1     Order Specific Question:   Admitting Physician     Answer:   Philomena Garcia [[id number]]     Order Specific Question:   Level of Care     Answer:   Med Surg [[id number]]     Order Specific Question:   Estimated length of stay     Answer:   More than 2 Midnights     Order Specific Question:   Certification     Answer:   I certify that inpatient services are medically necessary for this patient for a duration of greater than two midnights  See H&P and MD Progress Notes for additional information about the patient's course of treatment  ED Arrival Information     Expected Arrival Acuity Means of Arrival Escorted By Service Admission Type    - 7/11/2020 15:18 Urgent Wheelchair Family Member Hospitalist Urgent    Arrival Complaint    leg pain        Chief Complaint   Patient presents with   [facility] states pt has been confused x 1 month, multiple falls recently  Assessment/Plan:69year old male to the ED from home with worsening confusion, recent falls over the past month  Admitted to inpatient for SAMY, CHF, elevated lactic acid  Most recent hospital admission 6/25/20-7/1 for cellulitis, chf  Per his son, he has worsening chronic wounds, fell from his bed the day prior, landing on his right side and complaining of pain  VNA visits patient at home for wound care  On past admission, patient refusing rehab at [place]  Arrives to ED with bradycardia,has large contusion to right flank area, tender to palpation with ecchymosis    Chronic wound to left foot with foul smelling discharge  +2 bilateral DAVID  PRo BNP very elevated  Creat above baseline which is 1 3-2 3   GIve Bumex and recheck BMP every 8 hours  No IVFluids due to CHF  Medication compliance unknown  Last echo- 6/26/20- EF 30 %  severe diffuse hypokinesis with regional variations  Elevated troponin likely due to SAMY  CT C/A/P-Left abdominal wall fluid, possibly hematoma given the history of trauma  Subacute to chronic right anterior 8th rib fracture   Diffuse subcutaneous edema  HOld ELiquis  7/12 Podiatry consult: Traumatic wounds to left leg  New wound proximal to last wound which has healed  Apply santyl to wound on LFoot  Allevyn foam to right upper leg  Ulcers/Wounds:   · Traumatic wound proximal anterior left leg measures 3 0 x 3 0 x 0 1 cm,   50% red, 50% Pink new skin,  No evidence of infection  Mild serosanguineous drainage  · Traumatic wound Left hallux tip measuring 1 0 x 1 0 cm and plantar medial hallux IPJ measuring 2 0 x 1 5 cm, dry eschar present, no drainage or infection noted  · Pressure ulcer lateral left midfoot measures 1 5 x 1 5 x 0 2 cm, subcutaneous depth, 75% eschar, 25% yellow, ,mild serosanguineous drainage, no evidence of infection  · Slow nonhealing surgical wound dorsal left forefoot measures 8 0 x 2 5 x 0 5 cm, remained 30% yellow, 70% red, moderate serosanguineous drainage, no evidence of infection  · Traumatic wound right anterior upper leg just below the knee, wound measures 3 5 x 3 5 x 0 1, 100% red, no infection, minimal serosanguineous drainage  Traumatic wound right foot multiple involving hallux measuring 0 5 x 0 5 cm and 2nd toe PIPJ measuring 1 0 x 1 5 cm , all are dry with stable eschars, no infection or drainage noted  7/12 Nephrology consult: H/O horshoe kidney with frequent admits  REcent SAMY with creat 2 04  Creatinine improving with IV diuresis  CT shows bilateral renal cysts    Echocardiogram on June 26, 2020 showed  EF of 30%, dilated IVC  BP stable and acceptable  Avoid hypotension  Eliquis on hold  ED Triage Vitals   Temperature Pulse Respirations Blood Pressure SpO2   07/11/20 1616 07/11/20 1616 07/11/20 1616 07/11/20 1616 07/11/20 1616   97 8 °F (36 6 °C) 56 18 103/54 93 %      Temp Source Heart Rate Source Patient Position - Orthostatic VS BP Location FiO2 (%)   07/11/20 1616 07/11/20 1616 07/11/20 1616 07/11/20 1616 --   Temporal Monitor Lying Right arm       Pain Score       07/11/20 2336       4        Wt Readings from Last 1 Encounters:   07/12/20 119 kg (262 lb 5 6 oz)     Additional Vital Signs:   Date/Time  Temp  Pulse  Resp  BP  MAP (mmHg)  SpO2  O2 Device  Patient Position - Orthostatic VS   07/12/20 07:49:30  97 6 °F (36 4 °C)  78  20  113/65  81  96 %       07/12/20 06:07:37    82    120/75  90  100 %       07/12/20 0545  97 1 °F (36 2 °C)Abnormal                  07/12/20 0500    85               07/12/20 0252  96 5 °F (35 8 °C)Abnormal                  07/12/20 0100    63        95 %       07/11/20 2251  95 8 °F (35 4 °C)Abnormal   95  20  124/91    95 %  None (Room air)  Lying   07/11/20 20:21:22  97 5 °F (36 4 °C)  66  20  119/74  89  98 %       07/11/20 1616  97 8 °F (36 6 °C)  56  18  103/54    93 %  None (Room air)  Lying      Weights (last 14 days)     Pertinent Labs/Diagnostic Test Results:  7/11 CT head: No acute intracranial abnormality   Microangiopathic changes  7/11 CT c-spine: No cervical spine fracture or traumatic malalignment     Loss of the normal cervical lordosis with severe multilevel degenerative change and grade 1 anterolisthesis of C2 on C3    Trace left mastoid air cell fluid  7/11 CT C/A/P: Left abdominal wall fluid, possibly hematoma given the history of trauma   Correlation for injury to this region recommended   Otherwise no acute traumatic injury to the chest, abdomen, and pelvis  Subacute to chronic right anterior 8th rib fracture     Evidence of congestive heart failure with upper lobe groundglass opacity and bilateral pleural effusions with overlying atelectasis     Asymmetric gynecomastia, right greater than left   This is somewhat masslike on the right and increased from the prior study   Correlation with physical exam recommended     Stable mild retroperitoneal lymphadenopathy     7/11 CXR: No acute osseous abnormality       /  EKG: Rate:     ECG rate:  78    ECG rate assessment: normal    Rhythm:     Rhythm: atrial fibrillation    Ectopy:     Ectopy: PVCs    QRS:     QRS axis:  Left    QRS intervals:  Normal  Conduction:     Conduction: normal    ST segments:     ST segments:  Normal  T waves:     T waves: inverted      Inverted:   AVL         Results from last 7 days   Lab Units 07/12/20  0547 07/11/20  1702   WBC Thousand/uL 5 66 6 24   HEMOGLOBIN g/dL 13 1 13 8   HEMATOCRIT % 38 3 41 5   PLATELETS Thousands/uL 159 175   NEUTROS ABS Thousands/µL 3 41 4 25         Results from last 7 days   Lab Units 07/12/20  0547 07/12/20  0015 07/11/20  1702   SODIUM mmol/L 140 139 138   POTASSIUM mmol/L 3 4* 3 6 3 8   CHLORIDE mmol/L 104 102 101   CO2 mmol/L 25 23 24   ANION GAP mmol/L 11 14* 13   BUN mg/dL 59* 59* 61*   CREATININE mg/dL 2 85* 2 85* 3 01*   EGFR ml/min/1 73sq m 21 21 20   CALCIUM mg/dL 8 7 9 0 9 1     Results from last 7 days   Lab Units 07/11/20  1702   AST U/L 72*   ALT U/L 8*   ALK PHOS U/L 62   TOTAL PROTEIN g/dL 8 0   ALBUMIN g/dL 2 7*   TOTAL BILIRUBIN mg/dL 3 30*     Results from last 7 days   Lab Units 07/12/20  0759   POC GLUCOSE mg/dl 80     Results from last 7 days   Lab Units 07/12/20  0547 07/12/20  0015 07/11/20  1702   GLUCOSE RANDOM mg/dL 82 72 97         Results from last 7 days   Lab Units 07/12/20  0306 07/12/20  0015 07/11/20  2042 07/11/20  1702   TROPONIN I ng/mL 0 19* 0 20* 0 18* 0 26*         Results from last 7 days   Lab Units 07/11/20  1702   PROTIME seconds 36 2*   INR  3 67*   PTT seconds 48*         Results from last 7 days   Lab Units 07/11/20  1702   PROCALCITONIN ng/ml 0 21     Results from last 7 days   Lab Units 07/11/20  1907 07/11/20  1702   LACTIC ACID mmol/L 2 8* 3 3*             Results from last 7 days   Lab Units 07/11/20  1702   NT-PRO BNP pg/mL 09,427*     Results from last 7 days   Lab Units 07/11/20  2058   CLARITY UA  Clear   COLOR UA  Yellow   SPEC GRAV UA  1 015   PH UA  5 5   GLUCOSE UA mg/dl Negative   KETONES UA mg/dl Negative   BLOOD UA  Negative   PROTEIN UA mg/dl Negative   NITRITE UA  Negative   BILIRUBIN UA  Negative   UROBILINOGEN UA E U /dl 0 2   LEUKOCYTES UA  Trace*   WBC UA /hpf 4-10*   RBC UA /hpf 0-1*   BACTERIA UA /hpf Occasional   EPITHELIAL CELLS WET PREP /hpf Occasional   MUCUS THREADS  Occasional*     Results from last 7 days   Lab Units 07/11/20  1702   BLOOD CULTURE  Received in Microbiology Lab  Culture in Progress  Received in Microbiology Lab  Culture in Progress       ED Treatment:   Medication Administration from 07/11/2020 1518 to 07/11/2020 2008       Date/Time Order Dose Route Action     07/11/2020 1716 nystatin (MYCOSTATIN) powder 1 application Topical Given     07/11/2020 1716 cefepime (MAXIPIME) IVPB (premix) 2,000 mg 50 mL 2,000 mg Intravenous New Bag     07/11/2020 1801 vancomycin (VANCOCIN) 1,750 mg in sodium chloride 0 9 % 500 mL IVPB 1,750 mg Intravenous New Bag        Past Medical History:   Diagnosis Date    Atrial fibrillation (HCC)     Cardiac disease     Cellulitis     [facility]     Chronic pain     Chronic venous hypertension     with ulceration    GERD (gastroesophageal reflux disease)     History of methicillin resistant staphylococcus aureus (MRSA) 11/26/2019    negative nasal swab     Hyperlipidemia     Hypertension     Obesity     Pulmonary hypertension ([facility] [address]     PVD (peripheral vascular disease) ([facility])     Type 2 diabetes mellitus with diabetic heel ulcer ([facility] [address] ) 6/30/2020    Vascular disorder of extremity ([facility] [address] [facility] Admitting Diagnosis: Intertrigo [L30 4]  CHF (congestive heart failure) ([facility]) [I50 9]  Pain [R52]  Elevated troponin [R79 89]  Left shoulder pain [M25 512]  SAMY (acute kidney injury) ([facility] [address] ) [N17 9]  Cellulitis of left lower extremity [L03 116]  Elevated lactic acid level [R79 89]  Abdominal wall hematoma, initial encounter [S30 1XXA]  Decubitus ulcer of sacral region, stage 2 ([facility] [address] ) [L89 152]  Fall from bed, initial encounter [W06  XXXA]  Ambulatory dysfunction [R26 2]  Age/Sex: 69 y o  male  Admission Orders:  TEle  UP and OOB    Scheduled Medications:    Medications:  DULoxetine 30 mg Oral Daily   insulin lispro 1-5 Units Subcutaneous HS   insulin lispro 1-6 Units Subcutaneous TID AC   midodrine 5 mg Oral TID AC   nystatin  Topical BID   pantoprazole 40 mg Oral Daily     Continuous IV Infusions:     PRN Meds:    acetaminophen 650 mg Oral Q6H PRN       IP CONSULT TO CASE MANAGEMENT  IP CONSULT TO WOUND CARE  IP CONSULT TO PODIATRY  IP CONSULT TO ACUTE CARE SURGERY  IP CONSULT TO NEPHROLOGY    Network Utilization Review Department  [email or web address]  ATTENTION: Please call with any questions or concerns to [phone] and carefully listen to the prompts so that you are directed to the right person  All voicemails are confidential   Robe Lopez all requests for admission clinical reviews, approved or denied determinations and any other requests to dedicated fax number below belonging to the campus where the patient is receiving treatment   List of dedicated fax numbers for the Facilities:  FACILITY NAME UR FAX NUMBER   ADMISSION DENIALS (Administrative/Medical Necessity) [phone]   [address]  (Maternity/NICU/Pediatrics) [phone] [address] [facility] [phone]   Alvena Garnett [phone]   [facility] [address] [place] [address] [address] [phone]   [facility] [phone]   [address] [phone]

## 2020-07-12 NOTE — CONSULTS
Consult - Podiatry   Linwood Mazariegos 69 y o  male MRN: [id number]  Unit/Bed#: -01 Encounter: [id number]    Assessment/Plan   1  Traumatic wounds left leg              -new traumatic wound noted just proximal to the last traumatic wound which has healed               -hydrocolloid dressing applied to be changed Q2-3 days  2  Surgical Wound LFT - S/P I&D Abscess dorsal forefoot (03/05/2020)              -continue Santyl dressing daily              -FLAJP clinically unchanged from 2 weeks  3  Pressure ulcer Stage 3 -  Lateral left midfoot 5th Met base              - continue Santyl daily  - improving slowly, clinically unchanged from 2 weeks  4  Traumatic wound right upper leg   -Allevyn foam dressing applied, to be changed every Q2-3 days   5  Multiple traumatic wounds bilateral feet and toes   -dry with stable eschars left hallux, right hallux, and right 2nd toe   -no bandaging necessary, watch for further breakdown  6  Chronic venous insufficiency bilateral              - Chronic +1 right , +1 left edema              - recommend patient continue to utilize Tubigrip compression or compression stockings  7  CHF, Acute kidney injury, lactic acidosis, lactic acidosis, AFib, hypertension              - managed per Internal Medicine, Cardiology  History of Present Illness     HPI:  Baltazar Matthews is a 69 y o  male who presents with acute kidney injury and congestive heart failure  Podiatry consult requested to evaluate multiple wounds  Patient is known to me from multiple prior admissions and chronic wounds involving both lower extremities  Was discharged from the hospital approximately 2 weeks ago and refused this skilled nursing facility placement of the time  He continues to struggle at home and his family clearly is unable to provide the level of care he needs  Patient's chart review noting all pertinent clinical laboratory data        Inpatient consult to Podiatry  Consult performed by: Cameron Chavez DPM  Consult ordered by: NAVNEET Pedroza        Review of Systems   Constitutional: Negative  HENT: Negative  Eyes: Negative  Respiratory: Negative  Cardiovascular:  CHF    Gastrointestinal: Negative      Musculoskeletal:  Limited ambulation   Skin:  Multiple wounds   Neurological:  Negative        Historical Information   Past Medical History:   Diagnosis Date    [facility]rial fibrillation ([facility] )     Cardiac disease     Cellulitis     [facility]     Chronic pain     Chronic venous hypertension     with ulceration    GERD (gastroesophageal reflux disease)     History of methicillin resistant staphylococcus aureus (MRSA) 11/26/2019    negative nasal swab     Hyperlipidemia     Hypertension     Obesity     Pulmonary hypertension ([facility] )     PVD (peripheral vascular disease) ([place] [address] )     Type 2 diabetes mellitus with diabetic heel ulcer ([place] [address] ) 6/30/2020    Vascular disorder of extremity ([place] [address] )      Past Surgical History:   Procedure Laterality Date    ANTERIOR RELEASE VERTEBRAL BODY W/ POSTERIOR FUSION      APPENDECTOMY  1954    CATARACT EXTRACTION      DECOMPRESSION SPINE LUMBAR POSTERIOR      ELBOW SURGERY      FOOT/ANKLE ARTHRODESIS Right      complications postoperatively with bone healing and infection    INCISION AND DRAINAGE OF WOUND Left 3/5/2020    Procedure: INCISION AND DRAINAGE (I&D) EXTREMITY;  Surgeon: Cameron Chavez DPM;  Location:  MAIN OR;  Service: Podiatry    KNEE SURGERY      NOSE SURGERY      turbinectomy    RETINAL DETACHMENT SURGERY      RHINOPLASTY      TONSILLECTOMY      VEIN LIGATION AND STRIPPING      saphenous vein long     Social History   Social History     Substance and Sexual Activity   Alcohol Use Not Currently    Frequency: Never    Drinks per session: Patient refused    Binge frequency: Never     Social History     Substance and Sexual Activity   Drug Use Never     Social History     Tobacco Use   Smoking Status Never Smoker   Smokeless Tobacco Never Used     Family History:   Family History   Problem Relation Age of Onset    Cancer Mother         cancer of uterus    Diabetes Sister     Mental illness Neg Hx         neg fam hx    Substance Abuse Neg Hx         neg fam hx       Meds/Allergies   Medications Prior to Admission   Medication    acetaminophen (TYLENOL) 325 mg tablet    apixaban (ELIQUIS) 5 mg    Ascorbic Acid (VITAMIN C) 1000 MG tablet    bumetanide (BUMEX) 1 mg tablet    cholecalciferol (VITAMIN D3) 1,000 units tablet    collagenase (SANTYL) ointment    DULoxetine (CYMBALTA) 30 mg delayed release capsule    metoprolol tartrate (LOPRESSOR) 25 mg tablet    midodrine (PROAMATINE) 5 mg tablet    nystatin (MYCOSTATIN) powder    pantoprazole (PROTONIX) 40 mg tablet    senna (SENOKOT) 8 6 mg     No Known Allergies    Objective   First Vitals:   Blood Pressure: 103/54 (07/11/20 1616)  Pulse: 56 (07/11/20 1616)  Temperature: 97 8 °F (36 6 °C) (07/11/20 1616)  Temp Source: Temporal (07/11/20 1616)  Respirations: 18 (07/11/20 1616)  Weight - Scale: 119 kg (263 lb 3 7 oz) (07/11/20 2022)  SpO2: 93 % (07/11/20 1616)    Current Vitals:   Blood Pressure: 113/65 (07/12/20 0749)  Pulse: 78 (07/12/20 0749)  Temperature: 97 6 °F (36 4 °C) (07/12/20 0749)  Temp Source: Axillary (07/12/20 0545)  Respirations: 20 (07/12/20 0749)  Weight - Scale: 119 kg (262 lb 5 6 oz) (07/12/20 0555)  SpO2: 96 % (07/12/20 0749)        /65   Pulse 78   Temp 97 6 °F (36 4 °C)   Resp 20   Wt 119 kg (262 lb 5 6 oz)   SpO2 96%   BMI 35 58 kg/m²     General Appearance:    Alert, cooperative, no distress   Head:    Normocephalic, without obvious abnormality, atraumatic   Eyes:    PERRL, conjunctiva/corneas clear, EOM's intact            Nose:   Moist mucous membranes, no drainage or sinus tenderness   Throat:   No tenderness, no exudates   Neck:   Supple, symmetrical, trachea midline, no JVD   Back:     Symmetric, no CVA tenderness   Lungs:     Respirations unlabored   Chest wall:    No tenderness or deformity   Heart:    Rate and rhythm noted on last vitals  Abdomen:     Soft, non-tender, bowel sounds active all four quadrants,     no masses, no organomegaly       Lower Ext/Ortho: Multiple lower extremity wound, weakness and inability to transfer safely  Neuro/Vasc: CNII-XII intact  Normal strength  Sensation and reflexes:  Grossly intact  Pulses: Right: DP 0/4, PT 0/4, Left: DP 0/4, PT 0/4  Capillary Filling:  3 Sec, Edema:  +1 bilateral  Arterial duplex completed during a previous hospital admission 1 month ago showed adequate perfusion of both lower extremities for healing   Chronic skin rubor of both legs secondary to chronic venous insufficiency  Skin: Texture, Tone and Turgor:  Diminished, Digital Hair:  None  Atrophic Changes: Moderate   Lesions:  None  Nail Pathology:  Multiple dystrophic consistent with mycosis  Ulcers/Wounds:   · Traumatic wound proximal anterior left leg measures 3 0 x 3 0 x 0 1 cm,   50% red, 50% Pink new skin,  No evidence of infection  Mild serosanguineous drainage  · Traumatic wound Left hallux tip measuring 1 0 x 1 0 cm and plantar medial hallux IPJ measuring 2 0 x 1 5 cm, dry eschar present, no drainage or infection noted  · Pressure ulcer lateral left midfoot measures 1 5 x 1 5 x 0 2 cm, subcutaneous depth, 75% eschar, 25% yellow, ,mild serosanguineous drainage, no evidence of infection  · Slow nonhealing surgical wound dorsal left forefoot measures 8 0 x 2 5 x 0 5 cm, remained 30% yellow, 70% red, moderate serosanguineous drainage, no evidence of infection  · Traumatic wound right anterior upper leg just below the knee, wound measures 3 5 x 3 5 x 0 1, 100% red, no infection, minimal serosanguineous drainage    · Traumatic wound right foot multiple involving hallux measuring 0 5 x 0 5 cm and 2nd toe PIPJ measuring 1 0 x 1 5 cm , all are dry with stable eschars, no infection or drainage noted  Lab Results:   CBC w/diff  Results from last 7 days   Lab Units 07/12/20  0547   WBC Thousand/uL 5 66   HEMOGLOBIN g/dL 13 1   HEMATOCRIT % 38 3   PLATELETS Thousands/uL 159   NEUTROS PCT % 60   LYMPHS PCT % 20   MONOS PCT % 15*   EOS PCT % 4     BMP  Results from last 7 days   Lab Units 07/12/20  0547   POTASSIUM mmol/L 3 4*   CHLORIDE mmol/L 104   CO2 mmol/L 25   BUN mg/dL 59*   CREATININE mg/dL 2 85*   CALCIUM mg/dL 8 7     CMP  Results from last 7 days   Lab Units 07/12/20  0547  07/11/20  1702   POTASSIUM mmol/L 3 4*   < > 3 8   CHLORIDE mmol/L 104   < > 101   CO2 mmol/L 25   < > 24   BUN mg/dL 59*   < > 61*   CREATININE mg/dL 2 85*   < > 3 01*   CALCIUM mg/dL 8 7   < > 9 1   ALK PHOS U/L  --   --  62   ALT U/L  --   --  8*   AST U/L  --   --  72*    < > = values in this interval not displayed  @Culture@  Lab Results   Component Value Date    BLOODCX Received in Microbiology Lab  Culture in Progress  07/11/2020    BLOODCX Received in Microbiology Lab  Culture in Progress  07/11/2020    BLOODCX No Growth After 5 Days  06/25/2020    BLOODCX No Growth After 5 Days  06/25/2020    BLOODCX No Growth After 5 Days  06/14/2020    BLOODCX No Growth After 5 Days  06/14/2020    BLOODCX Chryseobacterium (A) 06/11/2020    BLOODCX Flavobacterium (A) 06/11/2020    BLOODCX Gram-positive alec (A) 06/11/2020    BLOODCX No Growth After 5 Days  06/11/2020    URINECX <10,000 cfu/ml  02/19/2020         Imaging: I have personally reviewed pertinent films in PACS      EKG, Pathology, and Other Studies: I have personally reviewed pertinent reports  Code Status: Level 1 - Full Code    Please Note: Voice to text dictation software was used in the creation of this document         Devin Llanos DPM

## 2020-07-12 NOTE — H&P
H&P- Linwood  1945, 69 y o  male MRN: [id number]    Unit/Bed#: -01 Encounter: [id number]    Primary Care Provider: Fer Reich MD   Date and time admitted to hospital: 7/11/2020  4:05 PM        * Acute kidney injury superimposed on CKD [facility])  Assessment & Plan  · Presented from home with more falls and increased weakness  · Baseline cr-1 3-2 3  · BUN/CR- 61/3 01 on admission  · Pt startes he is unsure how much he eats or drinks  · Pt will not receive any IVF due to elevated BNP  · Will give Bumex 1mg now and repeat BMP at midnight  · Trend BMP q8H    Acute on chronic combined systolic and diastolic congestive heart failure [facility])  Assessment & Plan  Wt Readings from Last 3 Encounters:   07/02/20 122 kg (269 lb 2 9 oz)   07/01/20 121 kg (267 lb 6 7 oz)   06/18/20 127 kg (279 lb 1 6 oz)         · GIK-31,258  · Last echo- 6/26/20- EF 30 %  severe diffuse hypokinesis with regional variations    · Pt on lopressor and bumex 1mg outpatient, unknown if patient is compliant with medications  · Will give bumex 1mg now   · Strict I/O    Lactic acid acidosis  Assessment & Plan  · Lactic-3 8 on admission  · Repeat lactic- 2 8  · Baseline lactic- 2 2 to 2 5  · Will repeat lactic in AM, no infectious etiology    Elevated troponin level not due myocardial infarction  Assessment & Plan  · Likely due to SAMY  · Trop- 0 26 on admission   · Repeat trop- pending  · EKG- afib, no acute st changes    Gastroesophageal reflux disease without esophagitis  Assessment & Plan  · Continue protonix    Ambulatory dysfunction  Assessment & Plan  · Will need PT/OT consult    Essential hypertension  Assessment & Plan  On midodrine outpatient, restarted midodrine, will monitor for HTN    Persistent atrial fibrillation  Assessment & Plan  · Afib on the monitor and EKG  · On eliquis outpatient, currently holding due to R chest wall hematoma  · Will continue to monitor    Wound, open, foot  Assessment & Plan  Consult wound care, has been followed outpatient  By wound care by no one has been to see him in a few days per patient    PVD (peripheral vascular disease) ([facility] [address] )  Assessment & Plan  Multiple B/L LE wounds    Obesity due to excess calories with serious comorbidity  Assessment & Plan  nutritional education     Hematoma of right chest wall  Assessment & Plan  · Pt with bruising to chest wall  · CT C/A/P-Left abdominal wall fluid, possibly hematoma given the history of trauma  Subacute to chronic right anterior 8th rib fracture  Diffuse subcutaneous edema  · Holding eliquis  · Will monitor hematoma for any changes in size      VTE Prophylaxis: Pharmacologic VTE Prophylaxis contraindicated due to on eliquis at home, holding all AC due to hematoma  / sequential compression device   Code Status: level 1  POLST: POLST form is not discussed and not completed at this time  Discussion with family: Son gavin, touch base on patient be admitted to hospital, patient states he is aware per ER  Discuss code status with son and advised T4 levels of code status and patient and states he would like to be a full code however due to mental status currently we are unsure if he is able to reposition  Patient will discuss with family and I will touch base with team likely tomorrow  Anticipated Length of Stay:  Patient will be admitted on an Inpatient basis with an anticipated length of stay of  Greater than 2 midnights  Justification for Hospital Stay:  Acute kidney injury    Total Time for Visit, including Counseling / Coordination of Care: 1 hour  Greater than 50% of this total time spent on direct patient counseling and coordination of care  Chief Complaint:   Confusion and falls    History of Present Illness:    Thomas Serrano is a 69 y o  male past medical history of chronic wounds, CHF, CKD, PVD who presents from home with family    Son states that he has had worsening chronic wounds which she was receiving wound care for but also has had these confusion spells  Patient fell from his bed yesterday landing on his right side since the fall he has had some shoulder pain as well as flank plain he did not hit his head and has had no dizziness or headaches per patient  per family the wounds have also gotten worse  Patient has been here multiple times over the last few months and has been refusing to go to rehab on discharge  Patient has been able to meet any follow-up appointments due to difficulty moving the patient in and out of the home  Patient on Eliquis currently was admitted on 06/20 527/1 received IV Flagyl and Ancef at that point was discharged 1 cephalexin  On exam in the ER, patient states he is tired and just feels okay otherwise he could not give me any complaints sleep recently and when asking about falls he states he fell here in the hospital but has not fallen at home  Patient was oriented to self place location and time however was unable to give the correct president but did know year and month  Patient does have a history of CKD on this admission he presented with an SAMY as well as an elevated BNP at 64,000  CT chest abdomen pelvis was completed which noted in the left abdominal wall fluid, likely hematoma, subacute to chronic right 8th rib fracture, has evidence of CHF with upper lobe ground-glass opacities and bilateral pleural effusions and diffuse subcutaneous edema  Patient will be admitted to [facility] and will be started on Bumex IV to monitor for diuresis  Will trend labs and assess in a m       Of note patient states son states that he is having difficulty caring for his father at home  Case management to follow up with son and patient about possible facilities for further care  Review of Systems:    Review of Systems   Constitutional: Positive for activity change  HENT: Negative  Eyes: Negative  Respiratory: Negative  Cardiovascular: Positive for leg swelling  Gastrointestinal: Negative      Endocrine: Negative  Genitourinary: Positive for difficulty urinating  Musculoskeletal: Negative  Neurological: Negative  Hematological: Negative  Psychiatric/Behavioral: Negative  Past Medical and Surgical History:     Past Medical History:   Diagnosis Date    Atrial fibrillation ([place] [address] )     Cardiac disease     Cellulitis     CHF (congestive heart failure) (formerly [facility])     Chronic pain     Chronic venous hypertension     with ulceration    GERD (gastroesophageal reflux disease)     History of methicillin resistant staphylococcus aureus (MRSA) 11/26/2019    negative nasal swab     Hyperlipidemia     Hypertension     Obesity     Pulmonary hypertension ([place] [address] )     PVD (peripheral vascular disease) ([place] [address] )     Type 2 diabetes mellitus with diabetic heel ulcer ([place] [address] ) 6/30/2020    Vascular disorder of extremity ([place] [address] )        Past Surgical History:   Procedure Laterality Date    ANTERIOR RELEASE VERTEBRAL BODY W/ POSTERIOR FUSION      APPENDECTOMY  1955    CATARACT EXTRACTION      DECOMPRESSION SPINE LUMBAR POSTERIOR      ELBOW SURGERY      FOOT/ANKLE ARTHRODESIS Right      complications postoperatively with bone healing and infection    INCISION AND DRAINAGE OF WOUND Left 3/5/2020    Procedure: INCISION AND DRAINAGE (I&D) EXTREMITY;  Surgeon: Jorje Cisneros DPM;  Location:  MAIN OR;  Service: Podiatry    KNEE SURGERY      NOSE SURGERY      turbinectomy    RETINAL DETACHMENT SURGERY      RHINOPLASTY      TONSILLECTOMY      VEIN LIGATION AND STRIPPING      saphenous vein long       Meds/Allergies:    Prior to Admission medications    Medication Sig Start Date End Date Taking?  Authorizing Provider   acetaminophen (TYLENOL) 325 mg tablet Take 2 tablets (650 mg total) by mouth every 6 (six) hours as needed for mild pain, headaches or fever 7/1/20   Aristides Wheeler DO   apixaban (ELIQUIS) 5 mg Take 5 mg by mouth 2 (two) times a day     Historical Provider, MD   Ascorbic Acid (VITAMIN C) 1000 MG tablet Take 1,000 mg by mouth 2 (two) times a day     Historical Provider, MD   bumetanide (BUMEX) 1 mg tablet Take 1 tablet (1 mg total) by mouth daily 6/19/20   Deyanira Moore MD   cholecalciferol (VITAMIN D3) 1,000 units tablet Take 1,000 Units by mouth daily    Historical Provider, MD   collagenase (SANTYL) ointment Apply topically daily 7/1/20   Brandee Andrea DO   DULoxetine (CYMBALTA) 30 mg delayed release capsule Take 1 capsule (30 mg total) by mouth daily 5/21/20   Stacey He MD   metoprolol tartrate (LOPRESSOR) 25 mg tablet Take 1 tablet (25 mg total) by mouth every 12 (twelve) hours 3/10/20   Brenna BEACH PA-C   midodrine (PROAMATINE) 5 mg tablet Take 1 tablet (5 mg total) by mouth 3 (three) times a day before meals 6/18/20   Deyanira Moore MD   nystatin (MYCOSTATIN) powder Apply topically 2 (two) times a day 7/1/20   Brandee Andrea,    pantoprazole (PROTONIX) 40 mg tablet Take 1 tablet (40 mg total) by mouth daily 5/7/20   Stacey He MD   senna (SENOKOT) 8 6 mg Take 2 tablets (17 2 mg total) by mouth daily 3/11/20   Sophia Lou PA-C     I have reviewed home medications with patient personally      Allergies: No Known Allergies    Social History:     Marital Status: Single   Occupation: unknown  Patient Pre-hospital Living Situation: with son  Patient Pre-hospital Level of Mobility: "has been laying in bed"  Patient Pre-hospital Diet Restrictions: unknown  Substance Use History:   Social History     Substance and Sexual Activity   Alcohol Use Not Currently    Frequency: Never    Drinks per session: Patient refused    Binge frequency: Never     Social History     Tobacco Use   Smoking Status Never Smoker   Smokeless Tobacco Never Used     Social History     Substance and Sexual Activity   Drug Use Never       Family History:    Family History   Problem Relation Age of Onset    Cancer Mother         cancer of uterus    Diabetes Sister     Mental illness Neg Hx         neg fam hx    Substance Abuse Neg Hx neg fam hx       Physical Exam:     Vitals:   Blood Pressure: 119/74 (07/11/20 2021)  Pulse: 66 (07/11/20 2021)  Temperature: 97 5 °F (36 4 °C) (07/11/20 2021)  Temp Source: Temporal (07/11/20 1616)  Respirations: 20 (07/11/20 2021)  SpO2: 98 % (07/11/20 2021)    Physical Exam   Constitutional: He appears well-developed  HENT:   Head: Normocephalic  Eyes: Pupils are equal, round, and reactive to light  EOM are normal    Neck: Normal range of motion  Cardiovascular: Normal rate  An irregular rhythm present  Pulmonary/Chest: He has decreased breath sounds  Abdominal: Soft  Normal appearance  Musculoskeletal: He exhibits edema  Neurological: He is alert  Skin: Skin is warm  Capillary refill takes 2 to 3 seconds  Wound LLE   Psychiatric: He has a normal mood and affect  Additional Data:     Lab Results: I have personally reviewed pertinent reports  and I have personally reviewed pertinent films in PACS    Results from last 7 days   Lab Units 07/11/20  1702   WBC Thousand/uL 6 24   HEMOGLOBIN g/dL 13 8   HEMATOCRIT % 41 5   PLATELETS Thousands/uL 175   NEUTROS PCT % 68   LYMPHS PCT % 16   MONOS PCT % 13*   EOS PCT % 2     Results from last 7 days   Lab Units 07/11/20  1702   SODIUM mmol/L 138   POTASSIUM mmol/L 3 8   CHLORIDE mmol/L 101   CO2 mmol/L 24   BUN mg/dL 61*   CREATININE mg/dL 3 01*   ANION GAP mmol/L 13   CALCIUM mg/dL 9 1   ALBUMIN g/dL 2 7*   TOTAL BILIRUBIN mg/dL 3 30*   ALK PHOS U/L 62   ALT U/L 8*   AST U/L 72*   GLUCOSE RANDOM mg/dL 97     Results from last 7 days   Lab Units 07/11/20  1702   INR  3 67*             Results from last 7 days   Lab Units 07/11/20  1907 07/11/20  1702   LACTIC ACID mmol/L 2 8* 3 3*       Imaging: I have personally reviewed pertinent reports  and I have personally reviewed pertinent films in PACS    CT head without contrast   Final Result by Caitlin Diaz MD (07/11 1856)      No acute intracranial abnormality  Microangiopathic changes  Workstation performed: [id number]         CT cervical spine without contrast   Final Result by Joseph Noriega MD (07/11 1904)      1  No cervical spine fracture or traumatic malalignment  2   Loss of the normal cervical lordosis with severe multilevel degenerative change and grade 1 anterolisthesis of C2 on C3       3   Trace left mastoid air cell fluid  Workstation performed: [id number]         CT chest abdomen pelvis wo contrast   Final Result by Joseph Noriega MD (07/11 1931)      Limited evaluation primarily of the abdomen and pelvis due to lack of contrast and beam hardening artifact from the patient's arms and lumbar spine hardware  1   Left abdominal wall fluid, possibly hematoma given the history of trauma  Correlation for injury to this region recommended  Otherwise no acute traumatic injury to the chest, abdomen, and pelvis  2   Subacute to chronic right anterior 8th rib fracture  3   Evidence of congestive heart failure with upper lobe groundglass opacity and bilateral pleural effusions with overlying atelectasis  4   Asymmetric gynecomastia, right greater than left  This is somewhat masslike on the right and increased from the prior study  Correlation with physical exam recommended  5   Diffuse subcutaneous edema  6   Stable mild retroperitoneal lymphadenopathy  Workstation performed: [id number]         XR shoulder 2+ views LEFT   Final Result by Regine Case DO (07/11 1802)      No acute osseous abnormality  Degenerative changes as described  Workstation performed: [id number]             EKG, Pathology, and Other Studies Reviewed on Admission:   · EKG: Afib with no acute ST changes    Allscripts / Epic Records Reviewed: Yes     ** Please Note: This note has been constructed using a voice recognition system   **

## 2020-07-12 NOTE — ASSESSMENT & PLAN NOTE
· Presented from home with more falls and increased weakness  · Baseline cr-1 3-2 3  · BUN/CR- 61/3 01 on admission  · Pt startes he is unsure how much he eats or drinks    · Pt will not receive any IVF due to elevated BNP  · Will give Bumex 1mg now and repeat BMP at midnight  · Trend BMP q8H

## 2020-07-12 NOTE — ASSESSMENT & PLAN NOTE
· Lactic-3 8 on admission  · Repeat lactic- 2 8  · Baseline lactic- 2 2 to 2 5  · Will repeat lactic in AM, no infectious etiology

## 2020-07-13 ENCOUNTER — PATIENT OUTREACH (OUTPATIENT)
Dept: FAMILY MEDICINE CLINIC | Facility: HOSPITAL | Age: 75
End: 2020-07-13

## 2020-07-13 DIAGNOSIS — Z91.89 AT RISK FOR CLOSTRIDIUM DIFFICILE INFECTION: Primary | ICD-10-CM

## 2020-07-13 PROBLEM — L02.612 ABSCESS OF LEFT FOOT: Status: RESOLVED | Noted: 2020-02-19 | Resolved: 2020-07-13

## 2020-07-13 PROBLEM — N40.1 BENIGN PROSTATIC HYPERPLASIA WITH URINARY RETENTION: Status: ACTIVE | Noted: 2020-07-13

## 2020-07-13 PROBLEM — A41.9 SEVERE SEPSIS (HCC): Status: RESOLVED | Noted: 2020-06-11 | Resolved: 2020-07-13

## 2020-07-13 PROBLEM — E87.5 HYPERKALEMIA: Status: RESOLVED | Noted: 2020-07-01 | Resolved: 2020-07-13

## 2020-07-13 PROBLEM — R33.8 BENIGN PROSTATIC HYPERPLASIA WITH URINARY RETENTION: Status: ACTIVE | Noted: 2020-07-13

## 2020-07-13 PROBLEM — L03.116 CELLULITIS OF LEFT LOWER EXTREMITY: Status: RESOLVED | Noted: 2019-01-02 | Resolved: 2020-07-13

## 2020-07-13 PROBLEM — R65.20 SEVERE SEPSIS (HCC): Status: RESOLVED | Noted: 2020-06-11 | Resolved: 2020-07-13

## 2020-07-13 LAB
ANION GAP SERPL CALCULATED.3IONS-SCNC: 11 MMOL/L (ref 4–13)
BUN SERPL-MCNC: 60 MG/DL (ref 5–25)
CALCIUM SERPL-MCNC: 8.5 MG/DL (ref 8.3–10.1)
CHLORIDE SERPL-SCNC: 104 MMOL/L (ref 100–108)
CO2 SERPL-SCNC: 25 MMOL/L (ref 21–32)
CREAT SERPL-MCNC: 2.56 MG/DL (ref 0.6–1.3)
ERYTHROCYTE [DISTWIDTH] IN BLOOD BY AUTOMATED COUNT: 19.3 % (ref 11.6–15.1)
EST. AVERAGE GLUCOSE BLD GHB EST-MCNC: 100 MG/DL
GFR SERPL CREATININE-BSD FRML MDRD: 24 ML/MIN/1.73SQ M
GLUCOSE SERPL-MCNC: 107 MG/DL (ref 65–140)
GLUCOSE SERPL-MCNC: 79 MG/DL (ref 65–140)
GLUCOSE SERPL-MCNC: 84 MG/DL (ref 65–140)
GLUCOSE SERPL-MCNC: 90 MG/DL (ref 65–140)
GLUCOSE SERPL-MCNC: 90 MG/DL (ref 65–140)
HBA1C MFR BLD: 5.1 %
HCT VFR BLD AUTO: 36.6 % (ref 36.5–49.3)
HGB BLD-MCNC: 12.5 G/DL (ref 12–17)
LACTATE SERPL-SCNC: 1.6 MMOL/L (ref 0.5–2)
MAGNESIUM SERPL-MCNC: 1.5 MG/DL (ref 1.6–2.6)
MCH RBC QN AUTO: 32.3 PG (ref 26.8–34.3)
MCHC RBC AUTO-ENTMCNC: 34.2 G/DL (ref 31.4–37.4)
MCV RBC AUTO: 95 FL (ref 82–98)
PLATELET # BLD AUTO: 141 THOUSANDS/UL (ref 149–390)
PMV BLD AUTO: 11.6 FL (ref 8.9–12.7)
POTASSIUM SERPL-SCNC: 3.5 MMOL/L (ref 3.5–5.3)
RBC # BLD AUTO: 3.87 MILLION/UL (ref 3.88–5.62)
SODIUM SERPL-SCNC: 140 MMOL/L (ref 136–145)
WBC # BLD AUTO: 6.7 THOUSAND/UL (ref 4.31–10.16)

## 2020-07-13 PROCEDURE — 99222 1ST HOSP IP/OBS MODERATE 55: CPT | Performed by: SURGERY

## 2020-07-13 PROCEDURE — 80048 BASIC METABOLIC PNL TOTAL CA: CPT | Performed by: INTERNAL MEDICINE

## 2020-07-13 PROCEDURE — 99232 SBSQ HOSP IP/OBS MODERATE 35: CPT | Performed by: INTERNAL MEDICINE

## 2020-07-13 PROCEDURE — 83735 ASSAY OF MAGNESIUM: CPT | Performed by: NURSE PRACTITIONER

## 2020-07-13 PROCEDURE — 99233 SBSQ HOSP IP/OBS HIGH 50: CPT | Performed by: INTERNAL MEDICINE

## 2020-07-13 PROCEDURE — 83036 HEMOGLOBIN GLYCOSYLATED A1C: CPT | Performed by: INTERNAL MEDICINE

## 2020-07-13 PROCEDURE — 82948 REAGENT STRIP/BLOOD GLUCOSE: CPT

## 2020-07-13 PROCEDURE — 83605 ASSAY OF LACTIC ACID: CPT | Performed by: INTERNAL MEDICINE

## 2020-07-13 PROCEDURE — 85027 COMPLETE CBC AUTOMATED: CPT | Performed by: INTERNAL MEDICINE

## 2020-07-13 RX ORDER — TAMSULOSIN HYDROCHLORIDE 0.4 MG/1
0.4 CAPSULE ORAL
Status: DISCONTINUED | OUTPATIENT
Start: 2020-07-13 | End: 2020-07-14

## 2020-07-13 RX ORDER — POTASSIUM CHLORIDE 20 MEQ/1
20 TABLET, EXTENDED RELEASE ORAL ONCE
Status: COMPLETED | OUTPATIENT
Start: 2020-07-13 | End: 2020-07-13

## 2020-07-13 RX ADMIN — APIXABAN 5 MG: 5 TABLET, FILM COATED ORAL at 18:15

## 2020-07-13 RX ADMIN — DULOXETINE 30 MG: 30 CAPSULE, DELAYED RELEASE ORAL at 10:40

## 2020-07-13 RX ADMIN — ACETAMINOPHEN 650 MG: 325 TABLET, FILM COATED ORAL at 10:40

## 2020-07-13 RX ADMIN — NYSTATIN: 100000 POWDER TOPICAL at 18:24

## 2020-07-13 RX ADMIN — ACETAMINOPHEN 650 MG: 325 TABLET, FILM COATED ORAL at 03:02

## 2020-07-13 RX ADMIN — MIDODRINE HYDROCHLORIDE 5 MG: 5 TABLET ORAL at 18:15

## 2020-07-13 RX ADMIN — METOPROLOL TARTRATE 25 MG: 25 TABLET, FILM COATED ORAL at 10:34

## 2020-07-13 RX ADMIN — BUMETANIDE 1 MG: 0.25 INJECTION INTRAMUSCULAR; INTRAVENOUS at 10:34

## 2020-07-13 RX ADMIN — TAMSULOSIN HYDROCHLORIDE 0.4 MG: 0.4 CAPSULE ORAL at 18:15

## 2020-07-13 RX ADMIN — NYSTATIN: 100000 POWDER TOPICAL at 10:42

## 2020-07-13 RX ADMIN — PANTOPRAZOLE SODIUM 40 MG: 40 TABLET, DELAYED RELEASE ORAL at 10:40

## 2020-07-13 RX ADMIN — POTASSIUM CHLORIDE 20 MEQ: 1500 TABLET, EXTENDED RELEASE ORAL at 03:02

## 2020-07-13 RX ADMIN — MIDODRINE HYDROCHLORIDE 5 MG: 5 TABLET ORAL at 10:39

## 2020-07-13 RX ADMIN — MIDODRINE HYDROCHLORIDE 5 MG: 5 TABLET ORAL at 06:06

## 2020-07-13 NOTE — ASSESSMENT & PLAN NOTE
Patient has BPH with urinary retention    Will try Flomax 0 4 mg in the evening and will continue Lopez catheter    He will be discharged with a Lopez catheter    Will monitor blood pressure closely as he was hypotensive last admission

## 2020-07-13 NOTE — PROGRESS NOTES
Progress Note - Linwood Mazariegos 1945, 69 y o  male MRN: [id number]    Unit/Bed#: -01 Encounter: [id number]    Primary Care Provider: Andrés Howe MD   Date and time admitted to hospital: 7/11/2020  4:05 PM        * Acute kidney injury superimposed on CKD [facility])  Assessment & Plan  Patient presented with acute kidney injury stage 3-4 chronic kidney disease with baseline creatinine around 2 1  This is mostly due to urinary retention as well as acute systolic CHF  Lopez catheter had to be inserted because patient had urinary retention: >700ml was drained from the bladder  Acute kidney injury is improving after catheter placement and on IV Bumex  Creatinine decreased 2 56    Will continue IV Bumex today    D/w son Raf Haganfrank on the phone in detail! Acute on chronic combined systolic and diastolic congestive heart failure (HCC)  Assessment & Plan  Wt Readings from Last 3 Encounters:   07/13/20 120 kg (264 lb 8 8 oz)   07/02/20 122 kg (269 lb 2 9 oz)   07/01/20 121 kg (267 lb 6 7 oz)         Patient in acute systolic CHF on admission as evidenced by CT of the chest findings as well as very high BNP  Patient still IV Bumex  Will continue same dose of Bumex today as he is responding    Persistent atrial fibrillation  Assessment & Plan  The patient has chronic atrial fibrillation  His heart rates are controlled on metoprolol current  Continue the same  Will resume Eliquis by mouth for CVA prophylaxis    Hematoma of right chest wall  Assessment & Plan  Patient had hematoma after a fall  Surgery thought that there is no need for any surgical procedure and anticoagulation can be resumed    Lactic acid acidosis  Assessment & Plan  Patient had lactic acidosis on admission due to acute systolic CHF and not due to infection    Benign prostatic hyperplasia with urinary retention  Assessment & Plan  Patient has BPH with urinary retention    Will try Flomax 0 4 mg in the evening and will continue Lopez catheter    He will be discharged with a Lopez catheter    Will monitor blood pressure closely as he was hypotensive last admission      VTE Prophylaxis: in place    Patient Centered Rounds: I rounded with patient's nurse    Current Length of Stay: 2 day(s)    Current Patient Status: Inpatient    Certification Statement: Pt requires additional inpatient hospital stay due to: see assessment and plan        Subjective:   Patient's nurse reports the patient developed urinary retention requiring Lopez catheter insertion  Patient was in rapid atrial fibrillation earlier this morning but after receiving p o  Lopressor her heart rates are in the 70-90 range    Patient denies chest pain, shortness of palpitations, abdominal pain, signs of bleeding    All other ROS are negative    Objective:     Vitals:   Temp (24hrs), Av 4 °F (36 3 °C), Min:97 2 °F (36 2 °C), Max:97 5 °F (36 4 °C)    Temp:  [97 2 °F (36 2 °C)-97 5 °F (36 4 °C)] 97 2 °F (36 2 °C)  HR:  [] 109  Resp:  [20] 20  BP: (113-121)/(71-85) 121/85  SpO2:  [96 %-98 %] 98 %  Body mass index is 35 88 kg/m²  Input and Output Summary (last 24 hours): Intake/Output Summary (Last 24 hours) at 2020 1534  Last data filed at 2020 0501  Gross per 24 hour   Intake 950 ml   Output    Net 950 ml       Physical Exam:     Physical Exam   Constitutional: He appears well-developed  No distress  HENT:   Head: Normocephalic  Mouth/Throat: Oropharynx is clear and moist    Eyes: Conjunctivae are normal    Neck: Neck supple  Cardiovascular: Normal rate  Irregular irregular, controlled rate   Pulmonary/Chest: Effort normal  No respiratory distress  He has no wheezes  He has no rales  Abdominal: Soft  Bowel sounds are normal  There is no tenderness  Neurological: He is alert  No cranial nerve deficit  Skin: Skin is warm and dry     Patient has chronic hyperpigmentation of the skin number next without signs of cellulitis or abscess or edema Psychiatric: He has a normal mood and affect  Vitals reviewed  I personally reviewed labs and imaging reports for today  Last 24 Hours Medication List:     Current Facility-Administered Medications:  acetaminophen 650 mg Oral Q6H PRN NAVNEET Jones   apixaban 5 mg Oral BID Olya Tierney MD   bumetanide 1 mg Intravenous BID Olya Tierney MD   DULoxetine 30 mg Oral Daily NAVNEET Jones   insulin lispro 1-5 Units Subcutaneous HS Hollice Cambric, DO   insulin lispro 1-6 Units Subcutaneous TID AC Hollice Cambric, DO   metoprolol tartrate 25 mg Oral Q12H [facility] Hollice Cambric, DO   midodrine 5 mg Oral TID AC NAVNEET Jones   nystatin  Topical BID NAVNEET Jones   pantoprazole 40 mg Oral Daily NAVNEET Jones   tamsulosin 0 4 mg Oral Daily With Alessandra Cheng MD          Today, Patient Was Seen By: Olya Tierney MD    ** Please Note: Dictation voice to text software may have been used in the creation of this document   **

## 2020-07-13 NOTE — PLAN OF CARE
Problem: PAIN - ADULT  Goal: Verbalizes/displays adequate comfort level or baseline comfort level  Description  Interventions:  - Encourage patient to monitor pain and request assistance  - Assess pain using appropriate pain scale  - Administer analgesics based on type and severity of pain and evaluate response  - Implement non-pharmacological measures as appropriate and evaluate response  - Consider cultural and social influences on pain and pain management  - Notify physician/advanced practitioner if interventions unsuccessful or patient reports new pain  Outcome: Progressing     Problem: INFECTION - ADULT  Goal: Absence or prevention of progression during hospitalization  Description  INTERVENTIONS:  - Assess and monitor for signs and symptoms of infection  - Monitor lab/diagnostic results  - Monitor all insertion sites, i e  indwelling lines, tubes, and drains  - Monitor endotracheal if appropriate and nasal secretions for changes in amount and color  - [place] appropriate cooling/warming therapies per order  - Administer medications as ordered  - Instruct and encourage patient and family to use good hand hygiene technique  - Identify and instruct in appropriate isolation precautions for identified infection/condition  Outcome: Progressing  Goal: Absence of fever/infection during neutropenic period  Description  INTERVENTIONS:  - Monitor WBC    Outcome: Progressing     Problem: SAFETY ADULT  Goal: Patient will remain free of falls  Description  INTERVENTIONS:  - Assess patient frequently for physical needs  -  Identify cognitive and physical deficits and behaviors that affect risk of falls    -  [place] fall precautions as indicated by assessment   - Educate patient/family on patient safety including physical limitations  - Instruct patient to call for assistance with activity based on assessment  - Modify environment to reduce risk of injury  - Consider OT/PT consult to assist with strengthening/mobility  Outcome: Progressing  Goal: Maintain or return to baseline ADL function  Description  INTERVENTIONS:  -  Assess patient's ability to carry out ADLs; assess patient's baseline for ADL function and identify physical deficits which impact ability to perform ADLs (bathing, care of mouth/teeth, toileting, grooming, dressing, etc )  - Assess/evaluate cause of self-care deficits   - Assess range of motion  - Assess patient's mobility; develop plan if impaired  - Assess patient's need for assistive devices and provide as appropriate  - Encourage maximum independence but intervene and supervise when necessary  - Involve family in performance of ADLs  - Assess for home care needs following discharge   - Consider OT consult to assist with ADL evaluation and planning for discharge  - Provide patient education as appropriate  Outcome: Progressing  Goal: Maintain or return mobility status to optimal level  Description  INTERVENTIONS:  - Assess patient's baseline mobility status (ambulation, transfers, stairs, etc )    - Identify cognitive and physical deficits and behaviors that affect mobility  - Identify mobility aids required to assist with transfers and/or ambulation (gait belt, sit-to-stand, lift, walker, cane, etc )  - [place] fall precautions as indicated by assessment  - Record patient progress and toleration of activity level on Mobility SBAR; progress patient to next Phase/Stage  - Instruct patient to call for assistance with activity based on assessment  - Consider rehabilitation consult to assist with strengthening/weightbearing, etc   Outcome: Progressing     Problem: DISCHARGE PLANNING  Goal: Discharge to home or other facility with appropriate resources  Description  INTERVENTIONS:  - Identify barriers to discharge w/patient and caregiver  - Arrange for needed discharge resources and transportation as appropriate  - Identify discharge learning needs (meds, wound care, etc )  - Arrange for interpretive services to assist at discharge as needed  - Refer to Case Management Department for coordinating discharge planning if the patient needs post-hospital services based on physician/advanced practitioner order or complex needs related to functional status, cognitive ability, or social support system  Outcome: Progressing     Problem: Knowledge Deficit  Goal: Patient/family/caregiver demonstrates understanding of disease process, treatment plan, medications, and discharge instructions  Description  Complete learning assessment and assess knowledge base    Interventions:  - Provide teaching at level of understanding  - Provide teaching via preferred learning methods  Outcome: Progressing     Problem: CARDIOVASCULAR - ADULT  Goal: Maintains optimal cardiac output and hemodynamic stability  Description  INTERVENTIONS:  - Monitor I/O, vital signs and rhythm  - Monitor for S/S and trends of decreased cardiac output  - Administer and titrate ordered vasoactive medications to optimize hemodynamic stability  - Assess quality of pulses, skin color and temperature  - Assess for signs of decreased coronary artery perfusion  - Instruct patient to report change in severity of symptoms  Outcome: Progressing  Goal: Absence of cardiac dysrhythmias or at baseline rhythm  Description  INTERVENTIONS:  - Continuous cardiac monitoring, vital signs, obtain 12 lead EKG if ordered  - Administer antiarrhythmic and heart rate control medications as ordered  - Monitor electrolytes and administer replacement therapy as ordered  Outcome: Progressing     Problem: MUSCULOSKELETAL - ADULT  Goal: Maintain or return mobility to safest level of function  Description  INTERVENTIONS:  - Assess patient's ability to carry out ADLs; assess patient's baseline for ADL function and identify physical deficits which impact ability to perform ADLs (bathing, care of mouth/teeth, toileting, grooming, dressing, etc )  - Assess/evaluate cause of self-care deficits   - Assess range of motion  - Assess patient's mobility  - Assess patient's need for assistive devices and provide as appropriate  - Encourage maximum independence but intervene and supervise when necessary  - Involve family in performance of ADLs  - Assess for home care needs following discharge   - Consider OT consult to assist with ADL evaluation and planning for discharge  - Provide patient education as appropriate  Outcome: Progressing     Problem: Potential for Falls  Goal: Patient will remain free of falls  Description  INTERVENTIONS:  - Assess patient frequently for physical needs  -  Identify cognitive and physical deficits and behaviors that affect risk of falls    -  [place] fall precautions as indicated by assessment   - Educate patient/family on patient safety including physical limitations  - Instruct patient to call for assistance with activity based on assessment  - Modify environment to reduce risk of injury  - Consider OT/PT consult to assist with strengthening/mobility  Outcome: Progressing     Problem: Prexisting or High Potential for Compromised Skin Integrity  Goal: Skin integrity is maintained or improved  Description  INTERVENTIONS:  - Identify patients at risk for skin breakdown  - Assess and monitor skin integrity  - Assess and monitor nutrition and hydration status  - Monitor labs   - Assess for incontinence   - Turn and reposition patient  - Assist with mobility/ambulation  - Relieve pressure over bony prominences  - Avoid friction and shearing  - Provide appropriate hygiene as needed including keeping skin clean and dry  - Evaluate need for skin moisturizer/barrier cream  - Collaborate with interdisciplinary team   - Patient/family teaching  - Consider wound care consult   Outcome: Progressing     Problem: Nutrition/Hydration-ADULT  Goal: Nutrient/Hydration intake appropriate for improving, restoring or maintaining nutritional needs  Description  Monitor and assess patient's nutrition/hydration status for malnutrition  Collaborate with interdisciplinary team and initiate plan and interventions as ordered  Monitor patient's weight and dietary intake as ordered or per policy  Utilize nutrition screening tool and intervene as necessary  Determine patient's food preferences and provide high-protein, high-caloric foods as appropriate       INTERVENTIONS:  - Monitor oral intake, urinary output, labs, and treatment plans  - Assess nutrition and hydration status and recommend course of action  - Evaluate amount of meals eaten  - Assist patient with eating if necessary   - Allow adequate time for meals  - Recommend/ encourage appropriate diets, oral nutritional supplements, and vitamin/mineral supplements  - Order, calculate, and assess calorie counts as needed  - Recommend, monitor, and adjust tube feedings and TPN/PPN based on assessed needs  - Assess need for intravenous fluids  - Provide specific nutrition/hydration education as appropriate  - Include patient/family/caregiver in decisions related to nutrition  Outcome: Progressing

## 2020-07-13 NOTE — ASSESSMENT & PLAN NOTE
Patient had hematoma after a fall    Surgery thought that there is no need for any surgical procedure and anticoagulation can be resumed

## 2020-07-13 NOTE — PROGRESS NOTES
Outpatient Care Management Note:    Voice mail message received from Kevyn brown, [facility], stating that the [facility] nurse was not out the past 2 days and his father fell again  (message left Saturday 7/11/20 1:08pm)    Voice mail message received from Kevyn brown stating that his father is in the hospital  (message left 7/11/20 at 3:43 pm)    Voice mail message received from Kevyn brown this morning at 8:45 am stating his dad is in the hospital and was seen by Dr Mak Harris  Voice mail message left for Kevyn brown, with my contact information, requesting a call back  Email received from Maura Garcia, RN with [facility]  She notes that Linwood's needs are beyond home health care and they can not accept a referral on hospital discharge  They are recommending placement in SNF  Voice mail message left for inpatient  regarding [facility]  I also informed them that as of last week, both the son and patient were in agreement to go to SNF  CM left my contact information and requested a call back  Kevyn brown returned my call  He is overwhelmed with his father's needs, and states he can not take him home  I reinforced that he needs to tell the hospital that  Kevyn brown will keep my posted of any future arrangements or plans  He has my contact information  He knows not to leave urgent messages on my voice mail but to call the office  CM reinforced that my phone goes through my computer, and I do not get messages unless I am working

## 2020-07-13 NOTE — ASSESSMENT & PLAN NOTE
Patient presented with acute kidney injury stage 3-4 chronic kidney disease with baseline creatinine around 2 1  This is mostly due to urinary retention as well as acute systolic CHF  Lopez catheter had to be inserted because patient had urinary retention: >700ml was drained from the bladder  Acute kidney injury is improving after catheter placement and on IV Bumex  Creatinine decreased 2 56    Will continue IV Bumex today    D/w son Edwar Amezcua on the phone in detail!

## 2020-07-13 NOTE — ASSESSMENT & PLAN NOTE
The patient has chronic atrial fibrillation  His heart rates are controlled on metoprolol current  Continue the same      Will resume Eliquis by mouth for CVA prophylaxis

## 2020-07-13 NOTE — ASSESSMENT & PLAN NOTE
Wt Readings from Last 3 Encounters:   07/13/20 120 kg (264 lb 8 8 oz)   07/02/20 122 kg (269 lb 2 9 oz)   07/01/20 121 kg (267 lb 6 7 oz)         Patient in acute systolic CHF on admission as evidenced by CT of the chest findings as well as very high BNP  Patient still IV Bumex      Will continue same dose of Bumex today as he is responding

## 2020-07-13 NOTE — PLAN OF CARE
Problem: PAIN - ADULT  Goal: Verbalizes/displays adequate comfort level or baseline comfort level  Description  Interventions:  - Encourage patient to monitor pain and request assistance  - Assess pain using appropriate pain scale  - Administer analgesics based on type and severity of pain and evaluate response  - Implement non-pharmacological measures as appropriate and evaluate response  - Consider cultural and social influences on pain and pain management  - Notify physician/advanced practitioner if interventions unsuccessful or patient reports new pain  7/13/2020 1628 by Hiral Thurston RN  Outcome: Progressing  7/13/2020 1627 by Hiral Thurston RN  Outcome: Progressing     Problem: INFECTION - ADULT  Goal: Absence or prevention of progression during hospitalization  Description  INTERVENTIONS:  - Assess and monitor for signs and symptoms of infection  - Monitor lab/diagnostic results  - Monitor all insertion sites, i e  indwelling lines, tubes, and drains  - Monitor endotracheal if appropriate and nasal secretions for changes in amount and color  - [place] appropriate cooling/warming therapies per order  - Administer medications as ordered  - Instruct and encourage patient and family to use good hand hygiene technique  - Identify and instruct in appropriate isolation precautions for identified infection/condition  7/13/2020 1628 by Hiral Thurston RN  Outcome: Progressing  7/13/2020 1627 by Hiral Thurston RN  Outcome: Progressing  Goal: Absence of fever/infection during neutropenic period  Description  INTERVENTIONS:  - Monitor WBC    7/13/2020 1628 by Hiral Thurston RN  Outcome: Progressing  7/13/2020 1627 by Hiral Thurston RN  Outcome: Progressing     Problem: SAFETY ADULT  Goal: Patient will remain free of falls  Description  INTERVENTIONS:  - Assess patient frequently for physical needs  -  Identify cognitive and physical deficits and behaviors that affect risk of falls    -  [place] fall precautions as indicated by assessment   - Educate patient/family on patient safety including physical limitations  - Instruct patient to call for assistance with activity based on assessment  - Modify environment to reduce risk of injury  - Consider OT/PT consult to assist with strengthening/mobility  7/13/2020 1628 by Jane Bullock RN  Outcome: Progressing  7/13/2020 1627 by Jane Bullock RN  Outcome: Progressing  Goal: Maintain or return to baseline ADL function  Description  INTERVENTIONS:  -  Assess patient's ability to carry out ADLs; assess patient's baseline for ADL function and identify physical deficits which impact ability to perform ADLs (bathing, care of mouth/teeth, toileting, grooming, dressing, etc )  - Assess/evaluate cause of self-care deficits   - Assess range of motion  - Assess patient's mobility; develop plan if impaired  - Assess patient's need for assistive devices and provide as appropriate  - Encourage maximum independence but intervene and supervise when necessary  - Involve family in performance of ADLs  - Assess for home care needs following discharge   - Consider OT consult to assist with ADL evaluation and planning for discharge  - Provide patient education as appropriate  7/13/2020 1628 by Jane Bullock RN  Outcome: Progressing  7/13/2020 1627 by Jane Bullock RN  Outcome: Progressing  Goal: Maintain or return mobility status to optimal level  Description  INTERVENTIONS:  - Assess patient's baseline mobility status (ambulation, transfers, stairs, etc )    - Identify cognitive and physical deficits and behaviors that affect mobility  - Identify mobility aids required to assist with transfers and/or ambulation (gait belt, sit-to-stand, lift, walker, cane, etc )  - [place] fall precautions as indicated by assessment  - Record patient progress and toleration of activity level on Mobility SBAR; progress patient to next Phase/Stage  - Instruct patient to call for assistance with activity based on assessment  - Consider rehabilitation consult to assist with strengthening/weightbearing, etc   7/13/2020 1628 by Cozetta Lanes, RN  Outcome: Progressing  7/13/2020 1627 by Cozetta Lanes, RN  Outcome: Progressing     Problem: DISCHARGE PLANNING  Goal: Discharge to home or other facility with appropriate resources  Description  INTERVENTIONS:  - Identify barriers to discharge w/patient and caregiver  - Arrange for needed discharge resources and transportation as appropriate  - Identify discharge learning needs (meds, wound care, etc )  - Arrange for interpretive services to assist at discharge as needed  - Refer to Case Management Department for coordinating discharge planning if the patient needs post-hospital services based on physician/advanced practitioner order or complex needs related to functional status, cognitive ability, or social support system  7/13/2020 1628 by Cozetta Lanes, RN  Outcome: Progressing  7/13/2020 1627 by Cozetta Lanes, RN  Outcome: Progressing     Problem: Knowledge Deficit  Goal: Patient/family/caregiver demonstrates understanding of disease process, treatment plan, medications, and discharge instructions  Description  Complete learning assessment and assess knowledge base    Interventions:  - Provide teaching at level of understanding  - Provide teaching via preferred learning methods  7/13/2020 1628 by Cozetta Lanes, RN  Outcome: Progressing  7/13/2020 1627 by Cozetta Lanes, RN  Outcome: Progressing     Problem: CARDIOVASCULAR - ADULT  Goal: Maintains optimal cardiac output and hemodynamic stability  Description  INTERVENTIONS:  - Monitor I/O, vital signs and rhythm  - Monitor for S/S and trends of decreased cardiac output  - Administer and titrate ordered vasoactive medications to optimize hemodynamic stability  - Assess quality of pulses, skin color and temperature  - Assess for signs of decreased coronary artery perfusion  - Instruct patient to report change in severity of symptoms  7/13/2020 1628 by Radha Dillard RN  Outcome: Progressing  7/13/2020 1627 by Radha Dillard RN  Outcome: Progressing  Goal: Absence of cardiac dysrhythmias or at baseline rhythm  Description  INTERVENTIONS:  - Continuous cardiac monitoring, vital signs, obtain 12 lead EKG if ordered  - Administer antiarrhythmic and heart rate control medications as ordered  - Monitor electrolytes and administer replacement therapy as ordered  7/13/2020 1628 by Radha Dillard RN  Outcome: Progressing  7/13/2020 1627 by Radha Dillard RN  Outcome: Progressing     Problem: MUSCULOSKELETAL - ADULT  Goal: Maintain or return mobility to safest level of function  Description  INTERVENTIONS:  - Assess patient's ability to carry out ADLs; assess patient's baseline for ADL function and identify physical deficits which impact ability to perform ADLs (bathing, care of mouth/teeth, toileting, grooming, dressing, etc )  - Assess/evaluate cause of self-care deficits   - Assess range of motion  - Assess patient's mobility  - Assess patient's need for assistive devices and provide as appropriate  - Encourage maximum independence but intervene and supervise when necessary  - Involve family in performance of ADLs  - Assess for home care needs following discharge   - Consider OT consult to assist with ADL evaluation and planning for discharge  - Provide patient education as appropriate  7/13/2020 1628 by Radha Dillard RN  Outcome: Progressing  7/13/2020 1627 by Radha Dillard RN  Outcome: Progressing     Problem: Potential for Falls  Goal: Patient will remain free of falls  Description  INTERVENTIONS:  - Assess patient frequently for physical needs  -  Identify cognitive and physical deficits and behaviors that affect risk of falls    -  [place] fall precautions as indicated by assessment   - Educate patient/family on patient safety including physical limitations  - Instruct patient to call for assistance with activity based on assessment  - Modify environment to reduce risk of injury  - Consider OT/PT consult to assist with strengthening/mobility  7/13/2020 1628 by Jane Bullock RN  Outcome: Progressing  7/13/2020 1627 by Jane Bullock RN  Outcome: Progressing     Problem: Prexisting or High Potential for Compromised Skin Integrity  Goal: Skin integrity is maintained or improved  Description  INTERVENTIONS:  - Identify patients at risk for skin breakdown  - Assess and monitor skin integrity  - Assess and monitor nutrition and hydration status  - Monitor labs   - Assess for incontinence   - Turn and reposition patient  - Assist with mobility/ambulation  - Relieve pressure over bony prominences  - Avoid friction and shearing  - Provide appropriate hygiene as needed including keeping skin clean and dry  - Evaluate need for skin moisturizer/barrier cream  - Collaborate with interdisciplinary team   - Patient/family teaching  - Consider wound care consult   7/13/2020 1628 by Jane Bullock RN  Outcome: Progressing  7/13/2020 1627 by Jane Bullock RN  Outcome: Progressing     Problem: Nutrition/Hydration-ADULT  Goal: Nutrient/Hydration intake appropriate for improving, restoring or maintaining nutritional needs  Description  Monitor and assess patient's nutrition/hydration status for malnutrition  Collaborate with interdisciplinary team and initiate plan and interventions as ordered  Monitor patient's weight and dietary intake as ordered or per policy  Utilize nutrition screening tool and intervene as necessary  Determine patient's food preferences and provide high-protein, high-caloric foods as appropriate       INTERVENTIONS:  - Monitor oral intake, urinary output, labs, and treatment plans  - Assess nutrition and hydration status and recommend course of action  - Evaluate amount of meals eaten  - Assist patient with eating if necessary   - Allow adequate time for meals  - Recommend/ encourage appropriate diets, oral nutritional supplements, and vitamin/mineral supplements  - Order, calculate, and assess calorie counts as needed  - Recommend, monitor, and adjust tube feedings and TPN/PPN based on assessed needs  - Assess need for intravenous fluids  - Provide specific nutrition/hydration education as appropriate  - Include patient/family/caregiver in decisions related to nutrition  7/13/2020 1628 by Juanita Dorantes RN  Outcome: Progressing  7/13/2020 1627 by Juanita Dorantes, RN  Outcome: Progressing

## 2020-07-13 NOTE — CONSULTS
Consultation - General Surgery   Linwood Mazariegos 69 y o  male MRN: [id number]  Unit/Bed#: -01 Encounter: [id number]    Assessment/Plan   Abdominal wall fluid collection   -likely consistent with hematoma secondary to recent trauma  -no signs of active extravasation  -hemoglobin remains stable  -no indication for surgical intervention  -okay to restart anticoagulation  -continue to monitor site and hemoglobin    Subacute right anterior 8th rib fracture with overlying ecchymosis  -continue pain control    Stage to sacral decubitus ulcers  -no signs of wound infection  -continue allyvan and offloading with position changes    Left foot wound, PVD, venous stasis changes  -being followed by Podiatry  -continue local wound care    Gynecomastia R>L  -no large palpable masses, small pea-sized mass in right breast  -consider correlation with mammography    Multiple medical issues including acute on chronic CHF with fluid overload, SAMY on CKD3, DM2, AFIB, DM2  -diuresis per Nephrology  -hemoglobin A1c pending, continue to monitor and treat BSs  -continue medical management  -okay to restart anticoagulation    Ambulatory dysfunction with repetitive multiple falls  -patient has refused rehab placement in the past      History of Present Illness     HPI:  Ml Zavala is a 69 y o  male with a history of hypertension, CHF, GERD, CKD, hyperlipidemia, atrial fibrillation, PVD, lower extremity wounds, ambulatory dysfunction with multiple falls who was admitted to [facility] with worsening lower extremity wounds after a recent fall  Patient was found to be in CHF with acute kidney injury  Patient lives at home with his family  He has had multiple admissions related to multiple falls  He has refused to go to rehab in the past    CT scan on admission showed subacute right 8th rib fracture and left abdominal wall fluid collection  Patient complains of right-sided rib pain    Complains of some pain in his lower extremities  Denies any fevers or chills  No chest pain or shortness of breath  No nausea, vomiting, changes in appetite  He has had issues with urinary retention and does currently have a Lopez catheter in place  Consults    Review of Systems   Constitutional: Positive for chills  Negative for appetite change, fever and unexpected weight change  HENT: Negative  Eyes: Negative  Respiratory: Positive for cough  Negative for shortness of breath  Cardiovascular: Negative  Negative for chest pain and palpitations  Gastrointestinal: Negative  Negative for abdominal distention, abdominal pain, diarrhea, nausea and vomiting  Endocrine: Negative  Genitourinary: Positive for difficulty urinating  Musculoskeletal: Positive for arthralgias  Skin: Positive for color change and wound  Allergic/Immunologic: Negative  Neurological: Positive for weakness  Hematological: Bruises/bleeds easily  Psychiatric/Behavioral: Positive for confusion  Admits to occasional confusion   All other systems reviewed and are negative        Historical Information   Past Medical History:   Diagnosis Date    Atrial fibrillation ([facility] )     Cardiac disease     Cellulitis     [facility]est[facility]     Chronic pain     Chronic venous hypertension     with ulceration    GERD (gastroesophageal reflux disease)     History of methicillin resistant staphylococcus aureus (MRSA) 11/26/2019    negative nasal swab     Hyperlipidemia     Hypertension     Obesity     [facility] hypertension [facility] )     PVD (peripheral vascular disease) ([facility])     Type 2 diabetes mellitus with diabetic heel ulcer ([facility] ) 6/30/2020    Vascular disorder of extremity ([facility] )      Past Surgical History:   Procedure Laterality Date    ANTERIOR RELEASE VERTEBRAL BODY W/ POSTERIOR FUSION      APPENDECTOMY  1954    CATARACT EXTRACTION      DECOMPRESSION SPINE LUMBAR POSTERIOR      ELBOW SURGERY      FOOT/ANKLE ARTHRODESIS Right      complications postoperatively with bone healing and infection    INCISION AND DRAINAGE OF WOUND Left 3/5/2020    Procedure: INCISION AND DRAINAGE (I&D) EXTREMITY;  Surgeon: Denia Alcazar DPM;  Location:  MAIN OR;  Service: Podiatry    KNEE SURGERY      NOSE SURGERY      turbinectomy    RETINAL DETACHMENT SURGERY      RHINOPLASTY      TONSILLECTOMY      VEIN LIGATION AND STRIPPING      saphenous vein long     Social History   Social History     Substance and Sexual Activity   Alcohol Use Not Currently    Frequency: Never    Drinks per session: Patient refused    Binge frequency: Never     Social History     Substance and Sexual Activity   Drug Use Never     E-Cigarette/Vaping    E-Cigarette Use Never User      E-Cigarette/Vaping Substances    Nicotine No     THC No     CBD No     Flavoring No     Other No     Unknown No      Social History     Tobacco Use   Smoking Status Never Smoker   Smokeless Tobacco Never Used     Family History: non-contributory    Meds/Allergies   all current active meds have been reviewed  No Known Allergies    Objective   First Vitals:   Blood Pressure: 103/54 (07/11/20 1616)  Pulse: 56 (07/11/20 1616)  Temperature: 97 8 °F (36 6 °C) (07/11/20 1616)  Temp Source: Temporal (07/11/20 1616)  Respirations: 18 (07/11/20 1616)  Height: 6' (182 9 cm) (07/12/20 1032)  Weight - Scale: 119 kg (263 lb 3 7 oz) (07/11/20 2022)  SpO2: 93 % (07/11/20 1616)    Current Vitals:   Blood Pressure: 113/71 (07/12/20 2259)  Pulse: 103 (07/12/20 2303)  Temperature: 97 5 °F (36 4 °C) (07/12/20 2303)  Temp Source: Axillary (07/12/20 0545)  Respirations: 20 (07/12/20 2303)  Height: 6' (182 9 cm) (07/12/20 1032)  Weight - Scale: 120 kg (264 lb 8 8 oz) (07/13/20 0549)  SpO2: 97 % (07/12/20 2303)      Intake/Output Summary (Last 24 hours) at 7/13/2020 0842  Last data filed at 7/13/2020 0501  Gross per 24 hour   Intake 1430 ml   Output 550 ml   Net 880 ml       Invasive Devices Peripheral Intravenous Line            Peripheral IV 07/11/20 Left Forearm 1 day    Peripheral IV 07/11/20 Left Wrist 1 day          Drain            Urethral Catheter Non-latex 16 Fr  1 day                Physical Exam   Constitutional: He appears well-developed and well-nourished  Uncomfortable   HENT:   Head: Normocephalic and atraumatic  Mouth/Throat: Oropharynx is clear and moist  No oropharyngeal exudate  Eyes: EOM are normal  Right eye exhibits no discharge  Left eye exhibits no discharge  No scleral icterus  Neck: Normal range of motion  Neck supple  No JVD present  No tracheal deviation present  Cardiovascular: Normal rate  No murmur heard  Irregularly irregular rhythm   Pulmonary/Chest: Effort normal  No respiratory distress  He has no wheezes  Decreased breath sounds at the bases   Abdominal: Soft  Bowel sounds are normal  He exhibits no distension  Tenderness over right flank with overlying ecchymosis  No skin changes over left flank, no definitive masses, non tender   Genitourinary:   Genitourinary Comments: Sacral area with multiple small stage II pressure ulcers, no signs of active infection   Musculoskeletal: Normal range of motion  He exhibits no edema or deformity  Lower extremity edema, multiple skin wounds, abrasions, venous stasis changes bilaterally left foot dressing in place   Neurological: He is alert  Oriented to person and place, mild confusion   Skin: Skin is warm and dry  He is not diaphoretic  Multiple skin abrasions and ecchymotic areas   Psychiatric: He has a normal mood and affect  Talkative, mild confusion, occasional rambling speech       Lab Results:   I have personally reviewed pertinent lab results    , CBC:   Lab Results   Component Value Date    WBC 6 70 07/13/2020    HGB 12 5 07/13/2020    HCT 36 6 07/13/2020    MCV 95 07/13/2020     (L) 07/13/2020    MCH 32 3 07/13/2020    MCHC 34 2 07/13/2020    RDW 19 3 (H) 07/13/2020    MPV 11 6 07/13/2020   , CMP:   Lab Results   Component Value Date    SODIUM 140 07/13/2020    K 3 5 07/13/2020     07/13/2020    CO2 25 07/13/2020    BUN 60 (H) 07/13/2020    CREATININE 2 56 (H) 07/13/2020    CALCIUM 8 5 07/13/2020    EGFR 24 07/13/2020   , Coagulation: No results found for: PT, INR, APTT, Urinalysis: No results found for: Kevin Wyatt, SPECGRAV, PHUR, LEUKOCYTESUR, NITRITE, PROTEINUA, GLUCOSEU, KETONESU, BILIRUBINUR, BLOODU, Amylase: No results found for: AMYLASE, Lipase: No results found for: LIPASE  Imaging: I have personally reviewed pertinent reports  Images reviewed in PACS  CT A/P:  IMPRESSION:     Limited evaluation primarily of the abdomen and pelvis due to lack of contrast and beam hardening artifact from the patient's arms and lumbar spine hardware      1  Left abdominal wall fluid, possibly hematoma given the history of trauma  Correlation for injury to this region recommended  Otherwise no acute traumatic injury to the chest, abdomen, and pelvis      2   Subacute to chronic right anterior 8th rib fracture      3   Evidence of congestive heart failure with upper lobe groundglass opacity and bilateral pleural effusions with overlying atelectasis      4   Asymmetric gynecomastia, right greater than left  This is somewhat masslike on the right and increased from the prior study  Correlation with physical exam recommended      5  Diffuse subcutaneous edema      6   Stable mild retroperitoneal lymphadenopathy  EKG, Pathology, and Other Studies: I have personally reviewed pertinent reports        Jero Pizarro PA-C

## 2020-07-13 NOTE — PROGRESS NOTES
Progress Note - Nephrology   Linwood Mazariegos 69 y o  male MRN: [id number]  Unit/Bed#: -[place] Encounter: [id number]      Assessment / Plan:  1  SAMY, POA, on top of CKD stage 3 in setting of horseshoe kidney, frequent admissions, recent SAMY with d/c sCr 2 04, with recurrent SAMY in setting of probable cardiorenal syndrome  -admit sCr 3 01, improving on diuresis  -continue bumex 1mg IV twice daily  -f/u am BMP    2  Hypokalemia in setting of diuretic use - monitor potassium status post repletion, K 3 5 today    3  Acute on chronic CHF  EF 30% - continue IV bumex as above, monitor daily weight, I/O    4  afib - eliquis on hold, on metoprolol    5  Leg wounds with repetitive falls and recent LE cellulitis - podiatry and wound care follow         Subjective:   He has no complaints  He says he is trying to get some "z's"  Objective:     Vitals: Blood pressure 121/85, pulse (!) 109, temperature (!) 97 2 °F (36 2 °C), resp  rate 20, height 6' (1 829 m), weight 120 kg (264 lb 8 8 oz), SpO2 98 %  ,Body mass index is 35 88 kg/m²  Temp (24hrs), Av 4 °F (36 3 °C), Min:97 2 °F (36 2 °C), Max:97 5 °F (36 4 °C)      Weight (last 2 days)     Date/Time   Weight    20 0549   120 (264 55)    20 0555   119 (262 35)    20   119 (263 23)                Intake/Output Summary (Last 24 hours) at 2020 1610  Last data filed at 2020 0501  Gross per 24 hour   Intake 950 ml   Output    Net 950 ml     I/O last 24 hours: In: 0 [P O :1430]  Out: 550 [Urine:550]        Physical Exam:   Physical Exam   Constitutional: He appears well-developed and well-nourished  No distress  HENT:   Head: Normocephalic and atraumatic  Mouth/Throat: No oropharyngeal exudate  Eyes: Right eye exhibits no discharge  Left eye exhibits no discharge  No scleral icterus  Neck: Normal range of motion  Neck supple  No thyromegaly present  Cardiovascular: Normal rate, regular rhythm and normal heart sounds  Pulmonary/Chest: Effort normal and breath sounds normal  He has no wheezes  He has no rales  Abdominal: Soft  Bowel sounds are normal  He exhibits no distension  There is no tenderness  Genitourinary:   Genitourinary Comments: +Lopez   Musculoskeletal: Normal range of motion  He exhibits edema (b/l chronic venous stasis changes)  Neurological: He is alert  awake   Skin: Skin is warm and dry  No rash noted  He is not diaphoretic  Psychiatric:   Flat affect   Vitals reviewed        Invasive Devices     Peripheral Intravenous Line            Peripheral IV 07/11/20 Left Forearm 1 day    Peripheral IV 07/11/20 Left Wrist 1 day          Drain            Urethral Catheter Non-latex 16 Fr  1 day                Medications:    Scheduled Meds:  Current Facility-Administered Medications:  acetaminophen 650 mg Oral Q6H PRN Dora Soto, NAVNEET   apixaban 5 mg Oral BID Norma Rodriguez MD   bumetanide 1 mg Intravenous BID Norma Rodriguez MD   DULoxetine 30 mg Oral Daily [place] Number, CRNP   insulin lispro 1-5 Units Subcutaneous HS Peg Golas, DO   insulin lispro 1-6 Units Subcutaneous TID AC Peg Golas, DO   metoprolol tartrate 25 mg Oral Q12H [facility] Peg Golas, DO   midodrine 5 mg Oral TID AC [place] Number, CRNP   nystatin  Topical BID [place] Number, CRNP   pantoprazole 40 mg Oral Daily [place] Number, CRNP   tamsulosin 0 4 mg Oral Daily With Maximilian Logan MD       PRN Meds:   acetaminophen    Continuous Infusions:         LAB RESULTS:      Results from last 7 days   Lab Units 07/13/20  0549 07/12/20  0547 07/12/20  0015 07/11/20  1702   WBC Thousand/uL 6 70 5 66  --  6 24   HEMOGLOBIN g/dL 12 5 13 1  --  13 8   HEMATOCRIT % 36 6 38 3  --  41 5   PLATELETS Thousands/uL 141* 159  --  175   NEUTROS PCT %  --  60  --  68   LYMPHS PCT %  --  20  --  16   MONOS PCT %  --  15*  --  13*   EOS PCT %  --  4  --  2   POTASSIUM mmol/L 3 5 3 4* 3 6 3 8   CHLORIDE mmol/L 104 104 102 101   CO2 mmol/L 25 25 23 24   BUN mg/dL 60* 59* 59* 61*   CREATININE mg/dL 2 56* 2 85* 2 85* 3 01*   CALCIUM mg/dL 8 5 8 7 9 0 9 1   ALK PHOS U/L  --   --   --  62   ALT U/L  --   --   --  8*   AST U/L  --   --   --  72*   MAGNESIUM mg/dL 1 5*  --   --   --        CUTURES:  Lab Results   Component Value Date    BLOODCX No Growth at 24 hrs  07/11/2020    BLOODCX No Growth at 24 hrs  07/11/2020    BLOODCX No Growth After 5 Days  06/25/2020    BLOODCX No Growth After 5 Days  06/25/2020    BLOODCX No Growth After 5 Days  06/14/2020    BLOODCX No Growth After 5 Days  06/14/2020    BLOODCX Chryseobacterium (A) 06/11/2020    BLOODCX Flavobacterium (A) 06/11/2020    BLOODCX Gram-positive alec (A) 06/11/2020    BLOODCX No Growth After 5 Days  06/11/2020    URINECX <10,000 cfu/ml  02/19/2020                 Portions of the record may have been created with voice recognition software  Occasional wrong word or "sound a like" substitutions may have occurred due to the inherent limitations of voice recognition software  Read the chart carefully and recognize, using context, where substitutions have occurred  If you have any questions, please contact the dictating provider

## 2020-07-13 NOTE — PLAN OF CARE
Problem: PAIN - ADULT  Goal: Verbalizes/displays adequate comfort level or baseline comfort level  Description  Interventions:  - Encourage patient to monitor pain and request assistance  - Assess pain using appropriate pain scale  - Administer analgesics based on type and severity of pain and evaluate response  - Implement non-pharmacological measures as appropriate and evaluate response  - Consider cultural and social influences on pain and pain management  - Notify physician/advanced practitioner if interventions unsuccessful or patient reports new pain  Outcome: Progressing     Problem: INFECTION - ADULT  Goal: Absence or prevention of progression during hospitalization  Description  INTERVENTIONS:  - Assess and monitor for signs and symptoms of infection  - Monitor lab/diagnostic results  - Monitor all insertion sites, i e  indwelling lines, tubes, and drains  - Monitor endotracheal if appropriate and nasal secretions for changes in amount and color  - [place] appropriate cooling/warming therapies per order  - Administer medications as ordered  - Instruct and encourage patient and family to use good hand hygiene technique  - Identify and instruct in appropriate isolation precautions for identified infection/condition  Outcome: Progressing  Goal: Absence of fever/infection during neutropenic period  Description  INTERVENTIONS:  - Monitor WBC    Outcome: Progressing     Problem: SAFETY ADULT  Goal: Patient will remain free of falls  Description  INTERVENTIONS:  - Assess patient frequently for physical needs  -  Identify cognitive and physical deficits and behaviors that affect risk of falls    -  [place] fall precautions as indicated by assessment   - Educate patient/family on patient safety including physical limitations  - Instruct patient to call for assistance with activity based on assessment  - Modify environment to reduce risk of injury  - Consider OT/PT consult to assist with strengthening/mobility  Outcome: Progressing  Goal: Maintain or return to baseline ADL function  Description  INTERVENTIONS:  -  Assess patient's ability to carry out ADLs; assess patient's baseline for ADL function and identify physical deficits which impact ability to perform ADLs (bathing, care of mouth/teeth, toileting, grooming, dressing, etc )  - Assess/evaluate cause of self-care deficits   - Assess range of motion  - Assess patient's mobility; develop plan if impaired  - Assess patient's need for assistive devices and provide as appropriate  - Encourage maximum independence but intervene and supervise when necessary  - Involve family in performance of ADLs  - Assess for home care needs following discharge   - Consider OT consult to assist with ADL evaluation and planning for discharge  - Provide patient education as appropriate  Outcome: Progressing  Goal: Maintain or return mobility status to optimal level  Description  INTERVENTIONS:  - Assess patient's baseline mobility status (ambulation, transfers, stairs, etc )    - Identify cognitive and physical deficits and behaviors that affect mobility  - Identify mobility aids required to assist with transfers and/or ambulation (gait belt, sit-to-stand, lift, walker, cane, etc )  - [place] fall precautions as indicated by assessment  - Record patient progress and toleration of activity level on Mobility SBAR; progress patient to next Phase/Stage  - Instruct patient to call for assistance with activity based on assessment  - Consider rehabilitation consult to assist with strengthening/weightbearing, etc   Outcome: Progressing     Problem: CARDIOVASCULAR - ADULT  Goal: Maintains optimal cardiac output and hemodynamic stability  Description  INTERVENTIONS:  - Monitor I/O, vital signs and rhythm  - Monitor for S/S and trends of decreased cardiac output  - Administer and titrate ordered vasoactive medications to optimize hemodynamic stability  - Assess quality of pulses, skin color and temperature  - Assess for signs of decreased coronary artery perfusion  - Instruct patient to report change in severity of symptoms  Outcome: Progressing  Goal: Absence of cardiac dysrhythmias or at baseline rhythm  Description  INTERVENTIONS:  - Continuous cardiac monitoring, vital signs, obtain 12 lead EKG if ordered  - Administer antiarrhythmic and heart rate control medications as ordered  - Monitor electrolytes and administer replacement therapy as ordered  Outcome: Progressing     Problem: MUSCULOSKELETAL - ADULT  Goal: Maintain or return mobility to safest level of function  Description  INTERVENTIONS:  - Assess patient's ability to carry out ADLs; assess patient's baseline for ADL function and identify physical deficits which impact ability to perform ADLs (bathing, care of mouth/teeth, toileting, grooming, dressing, etc )  - Assess/evaluate cause of self-care deficits   - Assess range of motion  - Assess patient's mobility  - Assess patient's need for assistive devices and provide as appropriate  - Encourage maximum independence but intervene and supervise when necessary  - Involve family in performance of ADLs  - Assess for home care needs following discharge   - Consider OT consult to assist with ADL evaluation and planning for discharge  - Provide patient education as appropriate  Outcome: Progressing     Problem: Potential for Falls  Goal: Patient will remain free of falls  Description  INTERVENTIONS:  - Assess patient frequently for physical needs  -  Identify cognitive and physical deficits and behaviors that affect risk of falls    -  [place] fall precautions as indicated by assessment   - Educate patient/family on patient safety including physical limitations  - Instruct patient to call for assistance with activity based on assessment  - Modify environment to reduce risk of injury  - Consider OT/PT consult to assist with strengthening/mobility  Outcome: Progressing Problem: Prexisting or High Potential for Compromised Skin Integrity  Goal: Skin integrity is maintained or improved  Description  INTERVENTIONS:  - Identify patients at risk for skin breakdown  - Assess and monitor skin integrity  - Assess and monitor nutrition and hydration status  - Monitor labs   - Assess for incontinence   - Turn and reposition patient  - Assist with mobility/ambulation  - Relieve pressure over bony prominences  - Avoid friction and shearing  - Provide appropriate hygiene as needed including keeping skin clean and dry  - Evaluate need for skin moisturizer/barrier cream  - Collaborate with interdisciplinary team   - Patient/family teaching  - Consider wound care consult   Outcome: Progressing     Problem: Nutrition/Hydration-ADULT  Goal: Nutrient/Hydration intake appropriate for improving, restoring or maintaining nutritional needs  Description  Monitor and assess patient's nutrition/hydration status for malnutrition  Collaborate with interdisciplinary team and initiate plan and interventions as ordered  Monitor patient's weight and dietary intake as ordered or per policy  Utilize nutrition screening tool and intervene as necessary  Determine patient's food preferences and provide high-protein, high-caloric foods as appropriate       INTERVENTIONS:  - Monitor oral intake, urinary output, labs, and treatment plans  - Assess nutrition and hydration status and recommend course of action  - Evaluate amount of meals eaten  - Assist patient with eating if necessary   - Allow adequate time for meals  - Recommend/ encourage appropriate diets, oral nutritional supplements, and vitamin/mineral supplements  - Order, calculate, and assess calorie counts as needed  - Recommend, monitor, and adjust tube feedings and TPN/PPN based on assessed needs  - Assess need for intravenous fluids  - Provide specific nutrition/hydration education as appropriate  - Include patient/family/caregiver in decisions related to nutrition  Outcome: Progressing

## 2020-07-13 NOTE — PLAN OF CARE
Problem: PAIN - ADULT  Goal: Verbalizes/displays adequate comfort level or baseline comfort level  Description  Interventions:  - Encourage patient to monitor pain and request assistance  - Assess pain using appropriate pain scale  - Administer analgesics based on type and severity of pain and evaluate response  - Implement non-pharmacological measures as appropriate and evaluate response  - Consider cultural and social influences on pain and pain management  - Notify physician/advanced practitioner if interventions unsuccessful or patient reports new pain  Outcome: Progressing     Problem: INFECTION - ADULT  Goal: Absence or prevention of progression during hospitalization  Description  INTERVENTIONS:  - Assess and monitor for signs and symptoms of infection  - Monitor lab/diagnostic results  - Monitor all insertion sites, i e  indwelling lines, tubes, and drains  - Monitor endotracheal if appropriate and nasal secretions for changes in amount and color  - [place] appropriate cooling/warming therapies per order  - Administer medications as ordered  - Instruct and encourage patient and family to use good hand hygiene technique  - Identify and instruct in appropriate isolation precautions for identified infection/condition  Outcome: Progressing  Goal: Absence of fever/infection during neutropenic period  Description  INTERVENTIONS:  - Monitor WBC    Outcome: Progressing     Problem: SAFETY ADULT  Goal: Patient will remain free of falls  Description  INTERVENTIONS:  - Assess patient frequently for physical needs  -  Identify cognitive and physical deficits and behaviors that affect risk of falls    -  [place] fall precautions as indicated by assessment   - Educate patient/family on patient safety including physical limitations  - Instruct patient to call for assistance with activity based on assessment  - Modify environment to reduce risk of injury  - Consider OT/PT consult to assist with strengthening/mobility  Outcome: Progressing  Goal: Maintain or return to baseline ADL function  Description  INTERVENTIONS:  -  Assess patient's ability to carry out ADLs; assess patient's baseline for ADL function and identify physical deficits which impact ability to perform ADLs (bathing, care of mouth/teeth, toileting, grooming, dressing, etc )  - Assess/evaluate cause of self-care deficits   - Assess range of motion  - Assess patient's mobility; develop plan if impaired  - Assess patient's need for assistive devices and provide as appropriate  - Encourage maximum independence but intervene and supervise when necessary  - Involve family in performance of ADLs  - Assess for home care needs following discharge   - Consider OT consult to assist with ADL evaluation and planning for discharge  - Provide patient education as appropriate  Outcome: Progressing  Goal: Maintain or return mobility status to optimal level  Description  INTERVENTIONS:  - Assess patient's baseline mobility status (ambulation, transfers, stairs, etc )    - Identify cognitive and physical deficits and behaviors that affect mobility  - Identify mobility aids required to assist with transfers and/or ambulation (gait belt, sit-to-stand, lift, walker, cane, etc )  - [place] fall precautions as indicated by assessment  - Record patient progress and toleration of activity level on Mobility SBAR; progress patient to next Phase/Stage  - Instruct patient to call for assistance with activity based on assessment  - Consider rehabilitation consult to assist with strengthening/weightbearing, etc   Outcome: Progressing     Problem: DISCHARGE PLANNING  Goal: Discharge to home or other facility with appropriate resources  Description  INTERVENTIONS:  - Identify barriers to discharge w/patient and caregiver  - Arrange for needed discharge resources and transportation as appropriate  - Identify discharge learning needs (meds, wound care, etc )  - Arrange for interpretive services to assist at discharge as needed  - Refer to Case Management Department for coordinating discharge planning if the patient needs post-hospital services based on physician/advanced practitioner order or complex needs related to functional status, cognitive ability, or social support system  Outcome: Progressing     Problem: Knowledge Deficit  Goal: Patient/family/caregiver demonstrates understanding of disease process, treatment plan, medications, and discharge instructions  Description  Complete learning assessment and assess knowledge base    Interventions:  - Provide teaching at level of understanding  - Provide teaching via preferred learning methods  Outcome: Progressing     Problem: CARDIOVASCULAR - ADULT  Goal: Maintains optimal cardiac output and hemodynamic stability  Description  INTERVENTIONS:  - Monitor I/O, vital signs and rhythm  - Monitor for S/S and trends of decreased cardiac output  - Administer and titrate ordered vasoactive medications to optimize hemodynamic stability  - Assess quality of pulses, skin color and temperature  - Assess for signs of decreased coronary artery perfusion  - Instruct patient to report change in severity of symptoms  Outcome: Progressing  Goal: Absence of cardiac dysrhythmias or at baseline rhythm  Description  INTERVENTIONS:  - Continuous cardiac monitoring, vital signs, obtain 12 lead EKG if ordered  - Administer antiarrhythmic and heart rate control medications as ordered  - Monitor electrolytes and administer replacement therapy as ordered  Outcome: Progressing     Problem: MUSCULOSKELETAL - ADULT  Goal: Maintain or return mobility to safest level of function  Description  INTERVENTIONS:  - Assess patient's ability to carry out ADLs; assess patient's baseline for ADL function and identify physical deficits which impact ability to perform ADLs (bathing, care of mouth/teeth, toileting, grooming, dressing, etc )  - Assess/evaluate cause of self-care deficits   - Assess range of motion  - Assess patient's mobility  - Assess patient's need for assistive devices and provide as appropriate  - Encourage maximum independence but intervene and supervise when necessary  - Involve family in performance of ADLs  - Assess for home care needs following discharge   - Consider OT consult to assist with ADL evaluation and planning for discharge  - Provide patient education as appropriate  Outcome: Progressing     Problem: Potential for Falls  Goal: Patient will remain free of falls  Description  INTERVENTIONS:  - Assess patient frequently for physical needs  -  Identify cognitive and physical deficits and behaviors that affect risk of falls    -  [place] fall precautions as indicated by assessment   - Educate patient/family on patient safety including physical limitations  - Instruct patient to call for assistance with activity based on assessment  - Modify environment to reduce risk of injury  - Consider OT/PT consult to assist with strengthening/mobility  Outcome: Progressing     Problem: Prexisting or High Potential for Compromised Skin Integrity  Goal: Skin integrity is maintained or improved  Description  INTERVENTIONS:  - Identify patients at risk for skin breakdown  - Assess and monitor skin integrity  - Assess and monitor nutrition and hydration status  - Monitor labs   - Assess for incontinence   - Turn and reposition patient  - Assist with mobility/ambulation  - Relieve pressure over bony prominences  - Avoid friction and shearing  - Provide appropriate hygiene as needed including keeping skin clean and dry  - Evaluate need for skin moisturizer/barrier cream  - Collaborate with interdisciplinary team   - Patient/family teaching  - Consider wound care consult   Outcome: Progressing     Problem: Nutrition/Hydration-ADULT  Goal: Nutrient/Hydration intake appropriate for improving, restoring or maintaining nutritional needs  Description  Monitor and assess patient's nutrition/hydration status for malnutrition  Collaborate with interdisciplinary team and initiate plan and interventions as ordered  Monitor patient's weight and dietary intake as ordered or per policy  Utilize nutrition screening tool and intervene as necessary  Determine patient's food preferences and provide high-protein, high-caloric foods as appropriate       INTERVENTIONS:  - Monitor oral intake, urinary output, labs, and treatment plans  - Assess nutrition and hydration status and recommend course of action  - Evaluate amount of meals eaten  - Assist patient with eating if necessary   - Allow adequate time for meals  - Recommend/ encourage appropriate diets, oral nutritional supplements, and vitamin/mineral supplements  - Order, calculate, and assess calorie counts as needed  - Recommend, monitor, and adjust tube feedings and TPN/PPN based on assessed needs  - Assess need for intravenous fluids  - Provide specific nutrition/hydration education as appropriate  - Include patient/family/caregiver in decisions related to nutrition  Outcome: Progressing

## 2020-07-13 NOTE — SOCIAL WORK
LOS: 2 days  Pt is not a documented bundle  Pt is a 30 day readmission  Pt's son(Jaleel) reports that Pt had increased weakness and not able to care for himself  Pt's son reports that it was difficult to care for Pt at home due to weakness and medical status  Pt's son reports Pt had VNA upon discharge and was taking his medications as prescribed  Message received from [employer], patient care manager at [address] that Pt's home care needs are beyond what home care can provide and are suggesting Pt go to SNF upon discharge  Met with Pt  Pt presents AA&Ox3  Discussed role of , discharge planning, identifying help at home  Pt reports he lives with his son and grandson in [facility], 3 steps to enter  Pt reports he uses [facility] pharmacy, has prescription plan and is able to afford medications  Pt reports his son(Jaleel) is POA and he has living will  Pt denies hx of mental health and drug and alcohol treatment  Pt reports he has been to Son for SNF in past and has had [facility] for [facility]  Pt informed he is aware that he will need to go to SNF and is choosing [facility]  Call placed to Pt's son(Jaleel: [phone]) per Pt's permission, Pt's son confirmed Pt's functional status and is requesting SNF for Pt  Pt's son inquired about STR into possibility for LTC  CM informed Pt's son that depending on Pt's status at STR, Pt will need to qualify for long term care and apply for medical assistance  Pt's son expressed understanding  Pt's son is choosing [facility] for Pt  Referral sent to [facility] via Page  Await PT/OT eval  Will need to obtain insurance auth for SNF prior to discharge  CM to follow

## 2020-07-14 PROBLEM — A04.72 CLOSTRIDIUM DIFFICILE DIARRHEA: Status: ACTIVE | Noted: 2020-07-14

## 2020-07-14 PROBLEM — R19.7 DIARRHEA OF PRESUMED INFECTIOUS ORIGIN: Status: ACTIVE | Noted: 2020-07-14

## 2020-07-14 PROBLEM — E83.42 HYPOMAGNESEMIA: Status: ACTIVE | Noted: 2020-07-14

## 2020-07-14 LAB
ANION GAP SERPL CALCULATED.3IONS-SCNC: 10 MMOL/L (ref 4–13)
ANISOCYTOSIS BLD QL SMEAR: PRESENT
ATRIAL RATE: 81 BPM
BASOPHILS # BLD MANUAL: 0 THOUSAND/UL (ref 0–0.1)
BASOPHILS NFR MAR MANUAL: 0 % (ref 0–1)
BUN SERPL-MCNC: 53 MG/DL (ref 5–25)
C DIFF TOX A+B STL QL IA: POSITIVE
C DIFF TOX GENS STL QL NAA+PROBE: POSITIVE
CALCIUM SERPL-MCNC: 8.2 MG/DL (ref 8.3–10.1)
CHLORIDE SERPL-SCNC: 104 MMOL/L (ref 100–108)
CO2 SERPL-SCNC: 28 MMOL/L (ref 21–32)
CREAT SERPL-MCNC: 2.15 MG/DL (ref 0.6–1.3)
EOSINOPHIL # BLD MANUAL: 0.36 THOUSAND/UL (ref 0–0.4)
EOSINOPHIL NFR BLD MANUAL: 8 % (ref 0–6)
ERYTHROCYTE [DISTWIDTH] IN BLOOD BY AUTOMATED COUNT: 19.7 % (ref 11.6–15.1)
GFR SERPL CREATININE-BSD FRML MDRD: 29 ML/MIN/1.73SQ M
GLUCOSE SERPL-MCNC: 111 MG/DL (ref 65–140)
GLUCOSE SERPL-MCNC: 113 MG/DL (ref 65–140)
GLUCOSE SERPL-MCNC: 67 MG/DL (ref 65–140)
GLUCOSE SERPL-MCNC: 82 MG/DL (ref 65–140)
GLUCOSE SERPL-MCNC: 90 MG/DL (ref 65–140)
GLUCOSE SERPL-MCNC: 91 MG/DL (ref 65–140)
HCT VFR BLD AUTO: 35.9 % (ref 36.5–49.3)
HGB BLD-MCNC: 12.2 G/DL (ref 12–17)
HYPERCHROMIA BLD QL SMEAR: PRESENT
LYMPHOCYTES # BLD AUTO: 0.75 THOUSAND/UL (ref 0.6–4.47)
LYMPHOCYTES # BLD AUTO: 17 % (ref 14–44)
MACROCYTES BLD QL AUTO: PRESENT
MAGNESIUM SERPL-MCNC: 1.5 MG/DL (ref 1.6–2.6)
MCH RBC QN AUTO: 32 PG (ref 26.8–34.3)
MCHC RBC AUTO-ENTMCNC: 34 G/DL (ref 31.4–37.4)
MCV RBC AUTO: 94 FL (ref 82–98)
METAMYELOCYTES NFR BLD MANUAL: 1 % (ref 0–1)
MONOCYTES # BLD AUTO: 0.71 THOUSAND/UL (ref 0–1.22)
MONOCYTES NFR BLD: 16 % (ref 4–12)
NEUTROPHILS # BLD MANUAL: 2.58 THOUSAND/UL (ref 1.85–7.62)
NEUTS BAND NFR BLD MANUAL: 6 % (ref 0–8)
NEUTS SEG NFR BLD AUTO: 52 % (ref 43–75)
NRBC BLD AUTO-RTO: 1 /100 WBC (ref 0–2)
NRBC BLD AUTO-RTO: 1 /100 WBCS
OVALOCYTES BLD QL SMEAR: PRESENT
PLATELET # BLD AUTO: 114 THOUSANDS/UL (ref 149–390)
PLATELET BLD QL SMEAR: ABNORMAL
PLATELET CLUMP BLD QL SMEAR: PRESENT
PMV BLD AUTO: 11.7 FL (ref 8.9–12.7)
POLYCHROMASIA BLD QL SMEAR: PRESENT
POTASSIUM SERPL-SCNC: 3.1 MMOL/L (ref 3.5–5.3)
QRS AXIS: -66 DEGREES
QRSD INTERVAL: 114 MS
QT INTERVAL: 416 MS
QTC INTERVAL: 474 MS
RBC # BLD AUTO: 3.81 MILLION/UL (ref 3.88–5.62)
RBC MORPH BLD: PRESENT
SODIUM SERPL-SCNC: 142 MMOL/L (ref 136–145)
T WAVE AXIS: 102 DEGREES
TARGETS BLD QL SMEAR: PRESENT
TOTAL CELLS COUNTED SPEC: 100
VENTRICULAR RATE: 78 BPM
WBC # BLD AUTO: 4.44 THOUSAND/UL (ref 4.31–10.16)

## 2020-07-14 PROCEDURE — 82948 REAGENT STRIP/BLOOD GLUCOSE: CPT

## 2020-07-14 PROCEDURE — 87493 C DIFF AMPLIFIED PROBE: CPT

## 2020-07-14 PROCEDURE — 99232 SBSQ HOSP IP/OBS MODERATE 35: CPT | Performed by: INTERNAL MEDICINE

## 2020-07-14 PROCEDURE — 83735 ASSAY OF MAGNESIUM: CPT | Performed by: INTERNAL MEDICINE

## 2020-07-14 PROCEDURE — 99233 SBSQ HOSP IP/OBS HIGH 50: CPT | Performed by: INTERNAL MEDICINE

## 2020-07-14 PROCEDURE — 93010 ELECTROCARDIOGRAM REPORT: CPT | Performed by: INTERNAL MEDICINE

## 2020-07-14 PROCEDURE — 85027 COMPLETE CBC AUTOMATED: CPT | Performed by: PHYSICIAN ASSISTANT

## 2020-07-14 PROCEDURE — 85007 BL SMEAR W/DIFF WBC COUNT: CPT | Performed by: PHYSICIAN ASSISTANT

## 2020-07-14 PROCEDURE — 80048 BASIC METABOLIC PNL TOTAL CA: CPT | Performed by: INTERNAL MEDICINE

## 2020-07-14 RX ORDER — BUMETANIDE 1 MG/1
1 TABLET ORAL 2 TIMES DAILY
Status: DISCONTINUED | OUTPATIENT
Start: 2020-07-14 | End: 2020-07-17 | Stop reason: HOSPADM

## 2020-07-14 RX ORDER — POTASSIUM CHLORIDE 20 MEQ/1
40 TABLET, EXTENDED RELEASE ORAL 2 TIMES DAILY
Status: DISCONTINUED | OUTPATIENT
Start: 2020-07-14 | End: 2020-07-16

## 2020-07-14 RX ORDER — MAGNESIUM SULFATE HEPTAHYDRATE 40 MG/ML
2 INJECTION, SOLUTION INTRAVENOUS ONCE
Status: COMPLETED | OUTPATIENT
Start: 2020-07-14 | End: 2020-07-14

## 2020-07-14 RX ADMIN — PANTOPRAZOLE SODIUM 40 MG: 40 TABLET, DELAYED RELEASE ORAL at 10:26

## 2020-07-14 RX ADMIN — DULOXETINE 30 MG: 30 CAPSULE, DELAYED RELEASE ORAL at 10:26

## 2020-07-14 RX ADMIN — APIXABAN 5 MG: 5 TABLET, FILM COATED ORAL at 10:25

## 2020-07-14 RX ADMIN — MAGNESIUM SULFATE IN WATER 2 G: 40 INJECTION, SOLUTION INTRAVENOUS at 12:25

## 2020-07-14 RX ADMIN — COLLAGENASE SANTYL: 250 OINTMENT TOPICAL at 15:56

## 2020-07-14 RX ADMIN — BUMETANIDE 1 MG: 1 TABLET ORAL at 18:03

## 2020-07-14 RX ADMIN — APIXABAN 5 MG: 5 TABLET, FILM COATED ORAL at 18:03

## 2020-07-14 RX ADMIN — MIDODRINE HYDROCHLORIDE 5 MG: 5 TABLET ORAL at 10:41

## 2020-07-14 RX ADMIN — MIDODRINE HYDROCHLORIDE 5 MG: 5 TABLET ORAL at 06:04

## 2020-07-14 RX ADMIN — NYSTATIN: 100000 POWDER TOPICAL at 18:59

## 2020-07-14 RX ADMIN — METOPROLOL TARTRATE 12.5 MG: 25 TABLET ORAL at 10:29

## 2020-07-14 RX ADMIN — POTASSIUM CHLORIDE 40 MEQ: 1500 TABLET, EXTENDED RELEASE ORAL at 10:25

## 2020-07-14 RX ADMIN — Medication 125 MG: at 18:17

## 2020-07-14 RX ADMIN — METOPROLOL TARTRATE 12.5 MG: 25 TABLET ORAL at 21:42

## 2020-07-14 RX ADMIN — POTASSIUM CHLORIDE 40 MEQ: 1500 TABLET, EXTENDED RELEASE ORAL at 18:05

## 2020-07-14 RX ADMIN — NYSTATIN: 100000 POWDER TOPICAL at 10:32

## 2020-07-14 RX ADMIN — MIDODRINE HYDROCHLORIDE 5 MG: 5 TABLET ORAL at 16:50

## 2020-07-14 NOTE — ASSESSMENT & PLAN NOTE
Wt Readings from Last 3 Encounters:   07/13/20 120 kg (264 lb 8 8 oz)   07/02/20 122 kg (269 lb 2 9 oz)   07/01/20 121 kg (267 lb 6 7 oz)         Patient in acute systolic CHF on admission as evidenced by CT of the chest findings as well as very high BNP  Acute systolic CHF resolved:  Lungs are clear to auscultation and oxygen saturation 100% on room air    Will switch to p o  Bumex 1 mg b i d

## 2020-07-14 NOTE — ASSESSMENT & PLAN NOTE
The patient has chronic atrial fibrillation  Heart rates are controlled    I decrease metoprolol to 12 5 b i d  Because of systolic blood pressures in the 90s  I resumed Eliquis yesterday and hemoglobin is 12 2 today

## 2020-07-14 NOTE — PLAN OF CARE
Problem: PAIN - ADULT  Goal: Verbalizes/displays adequate comfort level or baseline comfort level  Description  Interventions:  - Encourage patient to monitor pain and request assistance  - Assess pain using appropriate pain scale  - Administer analgesics based on type and severity of pain and evaluate response  - Implement non-pharmacological measures as appropriate and evaluate response  - Consider cultural and social influences on pain and pain management  - Notify physician/advanced practitioner if interventions unsuccessful or patient reports new pain  Outcome: Progressing     Problem: INFECTION - ADULT  Goal: Absence or prevention of progression during hospitalization  Description  INTERVENTIONS:  - Assess and monitor for signs and symptoms of infection  - Monitor lab/diagnostic results  - Monitor all insertion sites, i e  indwelling lines, tubes, and drains  - Monitor endotracheal if appropriate and nasal secretions for changes in amount and color  - [place] appropriate cooling/warming therapies per order  - Administer medications as ordered  - Instruct and encourage patient and family to use good hand hygiene technique  - Identify and instruct in appropriate isolation precautions for identified infection/condition  Outcome: Progressing  Goal: Absence of fever/infection during neutropenic period  Description  INTERVENTIONS:  - Monitor WBC    Outcome: Progressing     Problem: SAFETY ADULT  Goal: Patient will remain free of falls  Description  INTERVENTIONS:  - Assess patient frequently for physical needs  -  Identify cognitive and physical deficits and behaviors that affect risk of falls    -  [place] fall precautions as indicated by assessment   - Educate patient/family on patient safety including physical limitations  - Instruct patient to call for assistance with activity based on assessment  - Modify environment to reduce risk of injury  - Consider OT/PT consult to assist with strengthening/mobility  Outcome: Progressing  Goal: Maintain or return to baseline ADL function  Description  INTERVENTIONS:  -  Assess patient's ability to carry out ADLs; assess patient's baseline for ADL function and identify physical deficits which impact ability to perform ADLs (bathing, care of mouth/teeth, toileting, grooming, dressing, etc )  - Assess/evaluate cause of self-care deficits   - Assess range of motion  - Assess patient's mobility; develop plan if impaired  - Assess patient's need for assistive devices and provide as appropriate  - Encourage maximum independence but intervene and supervise when necessary  - Involve family in performance of ADLs  - Assess for home care needs following discharge   - Consider OT consult to assist with ADL evaluation and planning for discharge  - Provide patient education as appropriate  Outcome: Progressing  Goal: Maintain or return mobility status to optimal level  Description  INTERVENTIONS:  - Assess patient's baseline mobility status (ambulation, transfers, stairs, etc )    - Identify cognitive and physical deficits and behaviors that affect mobility  - Identify mobility aids required to assist with transfers and/or ambulation (gait belt, sit-to-stand, lift, walker, cane, etc )  - [place] fall precautions as indicated by assessment  - Record patient progress and toleration of activity level on Mobility SBAR; progress patient to next Phase/Stage  - Instruct patient to call for assistance with activity based on assessment  - Consider rehabilitation consult to assist with strengthening/weightbearing, etc   Outcome: Progressing     Problem: DISCHARGE PLANNING  Goal: Discharge to home or other facility with appropriate resources  Description  INTERVENTIONS:  - Identify barriers to discharge w/patient and caregiver  - Arrange for needed discharge resources and transportation as appropriate  - Identify discharge learning needs (meds, wound care, etc )  - Arrange for interpretive services to assist at discharge as needed  - Refer to Case Management Department for coordinating discharge planning if the patient needs post-hospital services based on physician/advanced practitioner order or complex needs related to functional status, cognitive ability, or social support system  Outcome: Progressing     Problem: Knowledge Deficit  Goal: Patient/family/caregiver demonstrates understanding of disease process, treatment plan, medications, and discharge instructions  Description  Complete learning assessment and assess knowledge base    Interventions:  - Provide teaching at level of understanding  - Provide teaching via preferred learning methods  Outcome: Progressing     Problem: CARDIOVASCULAR - ADULT  Goal: Maintains optimal cardiac output and hemodynamic stability  Description  INTERVENTIONS:  - Monitor I/O, vital signs and rhythm  - Monitor for S/S and trends of decreased cardiac output  - Administer and titrate ordered vasoactive medications to optimize hemodynamic stability  - Assess quality of pulses, skin color and temperature  - Assess for signs of decreased coronary artery perfusion  - Instruct patient to report change in severity of symptoms  Outcome: Progressing  Goal: Absence of cardiac dysrhythmias or at baseline rhythm  Description  INTERVENTIONS:  - Continuous cardiac monitoring, vital signs, obtain 12 lead EKG if ordered  - Administer antiarrhythmic and heart rate control medications as ordered  - Monitor electrolytes and administer replacement therapy as ordered  Outcome: Progressing     Problem: MUSCULOSKELETAL - ADULT  Goal: Maintain or return mobility to safest level of function  Description  INTERVENTIONS:  - Assess patient's ability to carry out ADLs; assess patient's baseline for ADL function and identify physical deficits which impact ability to perform ADLs (bathing, care of mouth/teeth, toileting, grooming, dressing, etc )  - Assess/evaluate cause of self-care deficits   - Assess range of motion  - Assess patient's mobility  - Assess patient's need for assistive devices and provide as appropriate  - Encourage maximum independence but intervene and supervise when necessary  - Involve family in performance of ADLs  - Assess for home care needs following discharge   - Consider OT consult to assist with ADL evaluation and planning for discharge  - Provide patient education as appropriate  Outcome: Progressing     Problem: Potential for Falls  Goal: Patient will remain free of falls  Description  INTERVENTIONS:  - Assess patient frequently for physical needs  -  Identify cognitive and physical deficits and behaviors that affect risk of falls    -  [place] fall precautions as indicated by assessment   - Educate patient/family on patient safety including physical limitations  - Instruct patient to call for assistance with activity based on assessment  - Modify environment to reduce risk of injury  - Consider OT/PT consult to assist with strengthening/mobility  Outcome: Progressing     Problem: Prexisting or High Potential for Compromised Skin Integrity  Goal: Skin integrity is maintained or improved  Description  INTERVENTIONS:  - Identify patients at risk for skin breakdown  - Assess and monitor skin integrity  - Assess and monitor nutrition and hydration status  - Monitor labs   - Assess for incontinence   - Turn and reposition patient  - Assist with mobility/ambulation  - Relieve pressure over bony prominences  - Avoid friction and shearing  - Provide appropriate hygiene as needed including keeping skin clean and dry  - Evaluate need for skin moisturizer/barrier cream  - Collaborate with interdisciplinary team   - Patient/family teaching  - Consider wound care consult   Outcome: Progressing     Problem: Nutrition/Hydration-ADULT  Goal: Nutrient/Hydration intake appropriate for improving, restoring or maintaining nutritional needs  Description  Monitor and assess patient's nutrition/hydration status for malnutrition  Collaborate with interdisciplinary team and initiate plan and interventions as ordered  Monitor patient's weight and dietary intake as ordered or per policy  Utilize nutrition screening tool and intervene as necessary  Determine patient's food preferences and provide high-protein, high-caloric foods as appropriate       INTERVENTIONS:  - Monitor oral intake, urinary output, labs, and treatment plans  - Assess nutrition and hydration status and recommend course of action  - Evaluate amount of meals eaten  - Assist patient with eating if necessary   - Allow adequate time for meals  - Recommend/ encourage appropriate diets, oral nutritional supplements, and vitamin/mineral supplements  - Order, calculate, and assess calorie counts as needed  - Recommend, monitor, and adjust tube feedings and TPN/PPN based on assessed needs  - Assess need for intravenous fluids  - Provide specific nutrition/hydration education as appropriate  - Include patient/family/caregiver in decisions related to nutrition  Outcome: Progressing

## 2020-07-14 NOTE — ASSESSMENT & PLAN NOTE
Patient's nurse reports that patient had 3 watery bowel movements last night  Stool was sent for C diff  Patient has no fever or leukocytosis and his abdomen is nontender      Will await C diff results

## 2020-07-14 NOTE — ASSESSMENT & PLAN NOTE
Patient presented with acute kidney injury on stage 3/4 chronic kidney disease with baseline creatinine around 2 1  This is mostly due to urinary retention as well as acute systolic CHF  Lopez catheter had to be inserted because patient had urinary retention: >700ml was drained from the bladder  Acute kidney injury resolved this morning:  Creatinine is down to 2 15    Will switch patient to p o  Bumex, will continue Lopez catheter due to urinary retention  Patient's systolic blood pressure was in the 90s, I discontinued Flomax to avoid hypotension      I spoke and updated Jyothi Leavitt this morning

## 2020-07-14 NOTE — ASSESSMENT & PLAN NOTE
Patient has mild hypokalemia with potassium 3 1 this morning      I placed patient on potassium chloride 40 mg p o  B i d  and will monitor potassium levels

## 2020-07-14 NOTE — WOUND OSTOMY CARE
Called and discussed the patient 's picture noted in the chart for the buttocks sacral area   Stage 2 as noted by the RN [facility]   Wound looks 100% pink in color   Placed orders for the calazime tid and prn to the area   Dr Evalina Nageotte following the patient for lower extremity wounds   Discussed the treatment with the RN [facility]    Daniella [place] RN BSN [facility] Heck

## 2020-07-14 NOTE — PROGRESS NOTES
Progress Note - Nephrology   Linwood Mazariegos 69 y o  male MRN: [id number]  Unit/Bed#: -Marisol Encounter: [id number]      Assessment / Plan:  1  SAMY, POA, on top of CKD stage 3 in setting of horseshoe kidney, frequent admissions, recent SAMY with d/c sCr 2 04, with recurrent SAMY in setting of probable cardiorenal syndrome  -admit sCr 3 01, improving on diuresis  -continue bumex 1mg po twice daily  -f/u am BMP     2  Hypokalemia in setting of diuretic use and hypomagnesemia - monitor potassium status post repletion, K 3 1 today     3  Acute on chronic CHF  EF 30% - continue po bumex as above, monitor daily weight, I/O     4  afib - eliquis on hold, on metoprolol     5  Leg wounds with repetitive falls and recent LE cellulitis - podiatry and wound care follow     6  Hypomagnesemia-magnesium 1 5 today, likely diuretic-induced, status post 2 g magnesium sulfate, monitor mag      Subjective:   He complains of "someone pushing on his abdomen all night and now he has diarrhea " No other complaints  Objective:     Vitals: Blood pressure 103/67, pulse 93, temperature (!) 97 2 °F (36 2 °C), resp  rate 18, height 6' (1 829 m), weight 120 kg (264 lb 8 8 oz), SpO2 97 %  ,Body mass index is 35 88 kg/m²  Temp (24hrs), Av 5 °F (36 4 °C), Min:97 2 °F (36 2 °C), Max:97 7 °F (36 5 °C)      Weight (last 2 days)     Date/Time   Weight    20 0549   120 (264 55)    20 0555   119 (262 35)                Intake/Output Summary (Last 24 hours) at 2020 1438  Last data filed at 2020 1044  Gross per 24 hour   Intake 360 ml   Output 2700 ml   Net -2340 ml     I/O last 24 hours: In: 360 [P O :360]  Out: 2700 [Urine:2700]        Physical Exam:   Physical Exam   Constitutional: He appears well-developed and well-nourished  No distress  HENT:   Head: Normocephalic and atraumatic  Mouth/Throat: No oropharyngeal exudate  Eyes: Right eye exhibits no discharge  Left eye exhibits no discharge  No scleral icterus  Neck: Neck supple  No thyromegaly present  Cardiovascular: Normal rate, regular rhythm and normal heart sounds  Pulmonary/Chest: Effort normal and breath sounds normal  He has no wheezes  He has no rales  Abdominal: Soft  Bowel sounds are normal  He exhibits no distension  There is no tenderness  Genitourinary:   Genitourinary Comments: +teixeira   Musculoskeletal: Normal range of motion  He exhibits no edema  Lymphadenopathy:     He has no cervical adenopathy  Neurological: He is alert  awake   Skin: Skin is warm and dry  No rash noted  He is not diaphoretic  Psychiatric: He has a normal mood and affect  His behavior is normal    Vitals reviewed        Invasive Devices     Peripheral Intravenous Line            Peripheral IV 07/11/20 Left Forearm 2 days    Peripheral IV 07/11/20 Left Wrist 2 days          Drain            Urethral Catheter Non-latex 16 Fr  2 days                Medications:    Scheduled Meds:  Current Facility-Administered Medications:  acetaminophen 650 mg Oral Q6H PRN NAVNEET Catalan   apixaban 5 mg Oral BID Carlos Ashley MD   bumetanide 1 mg Oral BID Carlos Ashley MD   collagenase  Topical Daily Doug Espinoza DPM   DULoxetine 30 mg Oral Daily NAVNEET Catalan   insulin lispro 1-5 Units Subcutaneous HS Geeta Stahl, DO   insulin lispro 1-6 Units Subcutaneous TID AC Geeta Stahl DO   metoprolol tartrate 12 5 mg Oral Q12H [address] [address], MD   midodrine 5 mg Oral TID AC NAVNEET Catalan   nystatin  Topical BID NAVNEET Catalan   pantoprazole 40 mg Oral Daily NAVNEET Catalan   potassium chloride 40 mEq Oral BID Carlos Ashley MD       PRN Meds:   acetaminophen    Continuous Infusions:         LAB RESULTS:      Results from last 7 days   Lab Units 07/14/20  0510 07/13/20  0549 07/12/20  0547 07/12/20  0015 07/11/20  1702   WBC Thousand/uL 4 44 6 70 5 66  --  6 24   HEMOGLOBIN g/dL 12 2 12 5 13 1  --  13 8   HEMATOCRIT % 35 9* 36 6 38 3  --  41 5 PLATELETS Thousands/uL 114* 141* 159  --  175   NEUTROS PCT %  --   --  60  --  68   LYMPHS PCT %  --   --  20  --  16   LYMPHO PCT % 17  --   --   --   --    MONOS PCT %  --   --  15*  --  13*   MONO PCT % 16*  --   --   --   --    EOS PCT % 8*  --  4  --  2   POTASSIUM mmol/L 3 1* 3 5 3 4* 3 6 3 8   CHLORIDE mmol/L 104 104 104 102 101   CO2 mmol/L 28 25 25 23 24   BUN mg/dL 53* 60* 59* 59* 61*   CREATININE mg/dL 2 15* 2 56* 2 85* 2 85* 3 01*   CALCIUM mg/dL 8 2* 8 5 8 7 9 0 9 1   ALK PHOS U/L  --   --   --   --  62   ALT U/L  --   --   --   --  8*   AST U/L  --   --   --   --  72*   MAGNESIUM mg/dL 1 5* 1 5*  --   --   --        CUTURES:  Lab Results   Component Value Date    BLOODCX No Growth at 48 hrs  07/11/2020    BLOODCX No Growth at 48 hrs  07/11/2020    BLOODCX No Growth After 5 Days  06/25/2020    BLOODCX No Growth After 5 Days  06/25/2020    BLOODCX No Growth After 5 Days  06/14/2020    BLOODCX No Growth After 5 Days  06/14/2020    BLOODCX Chryseobacterium (A) 06/11/2020    BLOODCX Flavobacterium (A) 06/11/2020    BLOODCX Gram-positive alec (A) 06/11/2020    BLOODCX No Growth After 5 Days  06/11/2020    URINECX <10,000 cfu/ml  02/19/2020                 Portions of the record may have been created with voice recognition software  Occasional wrong word or "sound a like" substitutions may have occurred due to the inherent limitations of voice recognition software  Read the chart carefully and recognize, using context, where substitutions have occurred  If you have any questions, please contact the dictating provider

## 2020-07-14 NOTE — PROGRESS NOTES
Progress Note - Linwood Mazariegos 1945, 69 y o  male MRN: [id number]    Unit/Bed#: -Marisol Encounter: [id number]    Primary Care Provider: Daniela Jerez MD   Date and time admitted to hospital: 7/11/2020  4:05 PM        * Acute kidney injury superimposed on CKD [facility])  Assessment & Plan  Patient presented with acute kidney injury on stage 3/4 chronic kidney disease with baseline creatinine around 2 1  This is mostly due to urinary retention as well as acute systolic CHF  Lopez catheter had to be inserted because patient had urinary retention: >700ml was drained from the bladder  Acute kidney injury resolved this morning:  Creatinine is down to 2 15    Will switch patient to p o  Bumex, will continue Lopez catheter due to urinary retention  Patient's systolic blood pressure was in the 90s, I discontinued Flomax to avoid hypotension  I spoke and updated Annalee Claudio this morning    Acute on chronic combined systolic and diastolic congestive heart failure ([facility] [address] )  Assessment & Plan  Wt Readings from Last 3 Encounters:   07/13/20 120 kg (264 lb 8 8 oz)   07/02/20 122 kg (269 lb 2 9 oz)   07/01/20 121 kg (267 lb 6 7 oz)         Patient in acute systolic CHF on admission as evidenced by CT of the chest findings as well as very high BNP  Acute systolic CHF resolved:  Lungs are clear to auscultation and oxygen saturation 100% on room air    Will switch to p o  Bumex 1 mg b i d  Persistent atrial fibrillation  Assessment & Plan  The patient has chronic atrial fibrillation  Heart rates are controlled    I decrease metoprolol to 12 5 b i d  Because of systolic blood pressures in the 90s  I resumed Eliquis yesterday and hemoglobin is 12 2 today  Diarrhea of presumed infectious origin  Assessment & Plan  Patient's nurse reports that patient had 3 watery bowel movements last night  Stool was sent for C diff  Patient has no fever or leukocytosis and his abdomen is nontender      Will await C diff results    Hypokalemia  Assessment & Plan  Patient has mild hypokalemia with potassium 3 1 this morning  I placed patient on potassium chloride 40 mg p o  B i d  and will monitor potassium levels        VTE Prophylaxis: in place    Patient Centered Rounds: I rounded with patient's nurse    Current Length of Stay: 3 day(s)    Current Patient Status: Inpatient    Certification Statement: Pt requires additional inpatient hospital stay due to: see assessment and plan        Subjective:   Patient denies chest pain, shortness of breath abdominal pain, nausea  Patient's nurse reports that patient has had 3 watery bowel movements since yesterday without any bleeding  Patient's urine output is good, Lopez is in place    All other ROS are negative    Objective:     Vitals:   Temp (24hrs), Av 3 °F (36 3 °C), Min:97 2 °F (36 2 °C), Max:97 7 °F (36 5 °C)    Temp:  [97 2 °F (36 2 °C)-97 7 °F (36 5 °C)] 97 2 °F (36 2 °C)  HR:  [] 81  Resp:  [18-22] 18  BP: ()/(53-85) 108/68  SpO2:  [95 %-100 %] 98 %  Body mass index is 35 88 kg/m²  Input and Output Summary (last 24 hours): Intake/Output Summary (Last 24 hours) at 2020 0944  Last data filed at 2020 0133  Gross per 24 hour   Intake    Output 2050 ml   Net -2050 ml       Physical Exam:     Physical Exam        I personally reviewed labs and imaging reports for today        Last 24 Hours Medication List:     Current Facility-Administered Medications:  acetaminophen 650 mg Oral Q6H PRN [place] Seats, CRNP   apixaban 5 mg Oral BID Prema Sidhu MD   bumetanide 1 mg Oral BID Prema Sidhu MD   DULoxetine 30 mg Oral Daily [place] Seats, KRISTYNP   insulin lispro 1-5 Units Subcutaneous HS Jacy Freeze, DO   insulin lispro 1-6 Units Subcutaneous TID AC Jacy Freeze, DO   metoprolol tartrate 12 5 mg Oral Q12H [address], MD   midodrine 5 mg Oral TID AC [place] Seats, CRNP   nystatin  Topical BID [place]oco Seats, CRNP   pantoprazole 40 mg Oral Daily NAVNEET Navarrete   potassium chloride 40 mEq Oral BID Jovanni Morgan MD          Today, Patient Was Seen By: Jovanni Morgan MD    ** Please Note: Dictation voice to text software may have been used in the creation of this document   **

## 2020-07-15 ENCOUNTER — TELEPHONE (OUTPATIENT)
Dept: NEPHROLOGY | Facility: CLINIC | Age: 75
End: 2020-07-15

## 2020-07-15 LAB
ANION GAP SERPL CALCULATED.3IONS-SCNC: 8 MMOL/L (ref 4–13)
BUN SERPL-MCNC: 52 MG/DL (ref 5–25)
CALCIUM SERPL-MCNC: 8.5 MG/DL (ref 8.3–10.1)
CHLORIDE SERPL-SCNC: 104 MMOL/L (ref 100–108)
CO2 SERPL-SCNC: 26 MMOL/L (ref 21–32)
CREAT SERPL-MCNC: 2.04 MG/DL (ref 0.6–1.3)
GFR SERPL CREATININE-BSD FRML MDRD: 31 ML/MIN/1.73SQ M
GLUCOSE SERPL-MCNC: 101 MG/DL (ref 65–140)
GLUCOSE SERPL-MCNC: 106 MG/DL (ref 65–140)
GLUCOSE SERPL-MCNC: 75 MG/DL (ref 65–140)
GLUCOSE SERPL-MCNC: 85 MG/DL (ref 65–140)
GLUCOSE SERPL-MCNC: 98 MG/DL (ref 65–140)
MAGNESIUM SERPL-MCNC: 1.6 MG/DL (ref 1.6–2.6)
POTASSIUM SERPL-SCNC: 3.7 MMOL/L (ref 3.5–5.3)
SODIUM SERPL-SCNC: 138 MMOL/L (ref 136–145)

## 2020-07-15 PROCEDURE — 97163 PT EVAL HIGH COMPLEX 45 MIN: CPT

## 2020-07-15 PROCEDURE — 83735 ASSAY OF MAGNESIUM: CPT | Performed by: INTERNAL MEDICINE

## 2020-07-15 PROCEDURE — 97530 THERAPEUTIC ACTIVITIES: CPT

## 2020-07-15 PROCEDURE — 97167 OT EVAL HIGH COMPLEX 60 MIN: CPT

## 2020-07-15 PROCEDURE — 80048 BASIC METABOLIC PNL TOTAL CA: CPT | Performed by: INTERNAL MEDICINE

## 2020-07-15 PROCEDURE — 82948 REAGENT STRIP/BLOOD GLUCOSE: CPT

## 2020-07-15 PROCEDURE — 99232 SBSQ HOSP IP/OBS MODERATE 35: CPT | Performed by: INTERNAL MEDICINE

## 2020-07-15 RX ORDER — HYDROCODONE BITARTRATE AND ACETAMINOPHEN 5; 325 MG/1; MG/1
1 TABLET ORAL EVERY 6 HOURS PRN
Status: DISCONTINUED | OUTPATIENT
Start: 2020-07-15 | End: 2020-07-17 | Stop reason: HOSPADM

## 2020-07-15 RX ADMIN — HYDROCODONE BITARTRATE AND ACETAMINOPHEN 1 TABLET: 5; 325 TABLET ORAL at 00:32

## 2020-07-15 RX ADMIN — APIXABAN 5 MG: 5 TABLET, FILM COATED ORAL at 17:50

## 2020-07-15 RX ADMIN — MIDODRINE HYDROCHLORIDE 5 MG: 5 TABLET ORAL at 09:56

## 2020-07-15 RX ADMIN — DULOXETINE 30 MG: 30 CAPSULE, DELAYED RELEASE ORAL at 09:56

## 2020-07-15 RX ADMIN — MIDODRINE HYDROCHLORIDE 5 MG: 5 TABLET ORAL at 17:50

## 2020-07-15 RX ADMIN — BUMETANIDE 1 MG: 1 TABLET ORAL at 09:56

## 2020-07-15 RX ADMIN — METOPROLOL TARTRATE 12.5 MG: 25 TABLET ORAL at 21:26

## 2020-07-15 RX ADMIN — Medication 125 MG: at 12:45

## 2020-07-15 RX ADMIN — POTASSIUM CHLORIDE 40 MEQ: 1500 TABLET, EXTENDED RELEASE ORAL at 17:50

## 2020-07-15 RX ADMIN — COLLAGENASE SANTYL: 250 OINTMENT TOPICAL at 09:57

## 2020-07-15 RX ADMIN — Medication 125 MG: at 00:03

## 2020-07-15 RX ADMIN — METOPROLOL TARTRATE 12.5 MG: 25 TABLET ORAL at 09:56

## 2020-07-15 RX ADMIN — NYSTATIN: 100000 POWDER TOPICAL at 09:57

## 2020-07-15 RX ADMIN — Medication 125 MG: at 06:24

## 2020-07-15 RX ADMIN — NYSTATIN: 100000 POWDER TOPICAL at 17:56

## 2020-07-15 RX ADMIN — Medication 125 MG: at 18:02

## 2020-07-15 RX ADMIN — BUMETANIDE 1 MG: 1 TABLET ORAL at 17:51

## 2020-07-15 RX ADMIN — POTASSIUM CHLORIDE 40 MEQ: 1500 TABLET, EXTENDED RELEASE ORAL at 09:56

## 2020-07-15 RX ADMIN — PANTOPRAZOLE SODIUM 40 MG: 40 TABLET, DELAYED RELEASE ORAL at 09:56

## 2020-07-15 RX ADMIN — MIDODRINE HYDROCHLORIDE 5 MG: 5 TABLET ORAL at 12:45

## 2020-07-15 RX ADMIN — APIXABAN 5 MG: 5 TABLET, FILM COATED ORAL at 09:56

## 2020-07-15 NOTE — PHYSICAL THERAPY NOTE
PT eval   07/15/20 0920   Note Type   Note type Eval only   Pain Assessment   Pain Assessment Tool Villarreal-Baker FACES   Villarreal-Baker FACES Pain Rating 6   Pain Location/Orientation Orientation: Left; Location: Abdomen;Orientation: Right;Location: Rib Cage; Location: Shoulder   Home Living   Type of [address] to live on main level with bedroom/bathroom   Home Equipment Electric scooter;Walker   Prior Function   Level of [place] Modified independent with wheelchair;Needs assistance with ADLs and functional mobility; Needs assistance with IADLs   Lives With Son   Receives Help From Family;Home health   ADL Assistance Needs assistance   IADLs Needs assistance   Falls in the last 6 months 1 to 4   Vocational Retired   Restrictions/Precautions   Other Precautions Contact/isolation  (+ C-diff)   General   Additional Pertinent History Pt wioth several recent admissions adn return home against advice with most recent fall at hoem with adom hematoma; PMH + Bles wound L foot woun chf, SAMY sacral decub   Family/Caregiver Present Yes   Cognition   Overall Cognitive Status Impaired   Arousal/Participation Arousable   Orientation Level Oriented to person;Oriented to place   Memory Within functional limits;Decreased recall of precautions;Decreased recall of recent events;Decreased short term memory   Following Commands Follows one step commands with increased time or repetition   Comments slow processing    RUE Assessment   RUE Assessment WFL  (arthritic changes B hands grossly functional)   LUE Assessment   LUE Assessment WFL  (shldr limited +pain)   RLE Assessment   RLE Assessment WFL  (flexion to 90/90 strength 3-/5 ankle neautral DF)   LLE Assessment   LLE Assessment WFL  (hip knee to 90 strength 3-/5 ankle to neutral DF)   Coordination   Movements are Fluid and Coordinated 1   Sensation   (some decrease in light touch/neuropathy)   Proprioception   RLE Proprioception Grossly intact   LLE Proprioception Grossly Intact   Bed Mobility   Rolling R 3  Moderate assistance   Additional items Assist x 2   Rolling L 3  Moderate assistance   Additional items Assist x 2   Supine to Sit 2  Maximal assistance   Additional items Assist x 2   Sit to Supine 2  Maximal assistance   Additional items Assist x 2   Transfers   Sit to Stand 2  Maximal assistance  (attempted but unsuccessful)   Additional items Assist x 2   Balance   Static Sitting Fair -   Dynamic Sitting Poor   Static Standing Zero   Endurance Deficit   Endurance Deficit Yes   Endurance Deficit Description tires quickly slow processing   Activity Tolerance   Activity Tolerance Patient limited by fatigue;Patient limited by pain   Medical Staff Made Aware GINO Garza   Nurse Made Aware JERSEY Cosby   Assessment   Prognosis Fair   Problem List Decreased strength;Decreased range of motion;Decreased endurance; Impaired balance;Decreased mobility; Decreased coordination;Decreased skin integrity; Decreased cognition;Decreased safety awareness; Impaired judgement; Impaired tone; Impaired sensation;Pain   Assessment Pt presents with signif weakness, impaired mobility, impaired balance and unable to stand even with max assist of 2  PTa was able to stnad and transfer to electric scooter for home mobility  BP stable supine and sitting, unable to stnad for third BP  Pt appears confused at times adn does not recall recent events acurately  Can benefit from skilled PT serivces to improve strength and mobility  Recommend shor tterm in-pt rehab at d/c  Will follow see goals   Barriers to Discharge Inaccessible home environment;Decreased caregiver support   Goals   Patient Goals get stronger   STG Expiration Date 07/25/20   Short Term Goal #1 1) safe amb with    Plan   Treatment/Interventions ADL retraining;Functional transfer training;LE strengthening/ROM; Elevations; Therapeutic exercise; Endurance training;Cognitive reorientation;Patient/family training;Equipment eval/education; Bed mobility;Spoke to nursing;Spoke to case management;OT   PT Frequency 5x/wk   Recommendation   PT Discharge Recommendation Post-Acute Rehabilitation Services   Equipment Recommended Nyra Deal   PT - OK to Discharge Yes   Additional Comments   (to STR with medical clearance)   Modified Ida Scale   Modified Ida Scale 4   Sheree Ham, PT

## 2020-07-15 NOTE — ASSESSMENT & PLAN NOTE
The patient has chronic atrial fibrillation  Heart rates are controlled: 70-90    I decreased metoprolol to 12 5 b i d   Because of hypotension    I resumed Eliquis for CVA prevention

## 2020-07-15 NOTE — CONSULTS
Progress Note - Wound   Linwood Mazariegos 69 y o  male MRN: [id number]  Unit/Bed#: -01 Encounter: [id number]      Assessment:   Wound care consulted for patient admitted with multiple wounds  Dr Kevin Manuel is following the patient for lower extremity wounds with wound orders in place  Patient was assessed in bed, is an assist of 1-2 to turn  Augusto PUTNAM [address]  at the bedside during buttock and sacrum assessment  Patient awake and alert  1  Sacrum intact  2  POA-left buttock stage 2 pressure injuries, three beefy red wound beds, located within area of old scarring, epithelial tissue noted, measured as one wound  Skin and Wound Care Plan:   No changes to current buttock wound care treatment orders       Wound 07/11/20 Pressure Injury Buttocks Left; Inner (Active)   Wound Image    7/15/2020  2:32 PM   Wound Description Beefy red 7/15/2020  2:32 PM   Staging Stage II 7/15/2020  2:32 PM   Saniya-wound Assessment Hyperpigmented; Intact 7/15/2020  2:32 PM   Wound Length (cm) 5 cm 7/15/2020  2:32 PM   Wound Width (cm) 3 cm 7/15/2020  2:32 PM   Wound Depth (cm) 0 1 7/15/2020  2:32 PM   Calculated Wound Area (cm^2) 15 cm^2 7/15/2020  2:32 PM   Calculated Wound Volume (cm^3) 1 5 cm^3 7/15/2020  2:32 PM   Closure Open to air 7/14/2020  5:50 PM   Drainage Amount None 7/15/2020  2:32 PM   Treatments Cleansed 7/15/2020  2:32 PM   Dressing Protective barrier 7/15/2020  2:32 PM   Patient Tolerance Tolerated well 7/15/2020  2:32 PM   Dressing Status Clean;Dry; Intact 7/14/2020  5:50 PM     Reviewed plan of care with primary JERSEY Cosby  Recommendations written as orders  Wound care to follow weekly  Questions or concerns Misty Dowling BSN, RN

## 2020-07-15 NOTE — ASSESSMENT & PLAN NOTE
Patient developed diarrhea yesterday, stool came back positive for C diff  Will continue p o   Vancomycin

## 2020-07-15 NOTE — OCCUPATIONAL THERAPY NOTE
Occupational Therapy Evaluation (8:55-9:29) & Treat (9:30-9:48)     Patient Name: Bita Frye  Today's Date: 7/15/2020  Problem List  Principal Problem:    Acute kidney injury superimposed on CKD ([facility] )  Active Problems:    Wound, open, foot    Gastroesophageal reflux disease without esophagitis    PVD (peripheral vascular disease) ([facility] )    Obesity due to excess calories with serious comorbidity    Essential hypertension    Persistent atrial fibrillation    Acute on chronic combined systolic and diastolic congestive heart failure (HCC)    Hypokalemia    Ambulatory dysfunction    Elevated troponin level not due myocardial infarction    Lactic acid acidosis    Hematoma of right chest wall    DM (diabetes mellitus) ([facility] )    Benign prostatic hyperplasia with urinary retention    Hypomagnesemia    Clostridium difficile diarrhea    Past Medical History  Past Medical History:   Diagnosis Date    [facility] ([facility]     Cardiac disease     Cellulitis     CHF [facility]congestive heart failure) ([facility]     Chronic pain     Chronic venous hypertension     with ulceration    GERD (gastroesophageal reflux disease)     History of methicillin resistant staphylococcus aureus (MRSA) 11/26/2019    negative nasal swab     Hyperlipidemia     Hypertension     Obesity     Pulmonary hypertension [facility]     PVD (peripheral vascular disease) ([facility]     Type 2 diabetes mellitus with diabetic heel ulcer ([facility] ) 6/30/2020    Vascular disorder of extremity ([place] [id number] )      Past Surgical History  Past Surgical History:   Procedure Laterality Date    ANTERIOR RELEASE VERTEBRAL BODY W/ POSTERIOR FUSION      APPENDECTOMY  1954    CATARACT EXTRACTION      DECOMPRESSION SPINE LUMBAR POSTERIOR      ELBOW SURGERY      FOOT/ANKLE ARTHRODESIS Right      complications postoperatively with bone healing and infection    INCISION AND DRAINAGE OF WOUND Left 3/5/2020    Procedure: INCISION AND DRAINAGE (I&D) EXTREMITY;  Surgeon: Doug Espinoza DPM;  Location:  MAIN OR;  Service: Podiatry    KNEE SURGERY      NOSE SURGERY      turbinectomy    RETINAL DETACHMENT SURGERY      RHINOPLASTY      TONSILLECTOMY      VEIN LIGATION AND STRIPPING      saphenous vein long           07/15/20 0948   Note Type   Note type Eval/Treat   Restrictions/Precautions   Other Precautions Fall Risk;Cognitive; Bed Alarm;Pain;Contact/isolation  (C-Diff)   Pain Assessment   Pain Assessment Tool Villarreal-Baker FACES   Villarreal-Baker FACES Pain Rating 6   Pain Location/Orientation Location: Shoulder; Location: Abdomen;Orientation: Left   Effect of Pain on Daily Activities mobility, activity   Home Living   Type of [facility] to live on main level with bedroom/bathroom   Home Equipment Electric scooter   Prior Function   Lives With Son   Receives Help From Family;Home health   ADL Assistance Needs assistance   IADLs Needs assistance   Falls in the last 6 months 1 to 4   ADL   Eating Assistance 5  Supervision/Setup   Grooming Assistance 4  Minimal Assistance   [address] 3  Moderate Assistance   LB [address] 2  Maximal [address] 3  Moderate Assistance   LB Dressing Assistance 2  Maximal [address]  2  Maximal Assistance   Bed Mobility   Rolling R 3  Moderate assistance   Additional items Assist x 2;Bedrails   Rolling L 3  Moderate assistance   Additional items Assist x 2;Bedrails   Supine to Sit 2  Maximal assistance   Additional items Assist x 2;Bedrails   Sit to Supine 2  Maximal assistance   Additional items Assist x 2   Transfers   Sit to Stand 2  Maximal assistance   Additional items Assist x 2;Verbal cues   Additional Comments Unable to come to full stand despite efforts   Balance   Static Sitting Fair -   Dynamic Sitting Poor +   Static Standing Zero   Activity Tolerance   Activity Tolerance Patient limited by fatigue   Medical Staff Made Aware PT Subha   Nurse Made Aware RN [id number] Assessment   RUE Assessment WFL  (except arthritic changes in hands)   LUE Assessment   LUE Assessment X  (shoulder 0-25 degrees, arthritic changes noted in hand)   Hand Function   Fine Motor Coordination Impaired   Cognition   Overall Cognitive Status Impaired   Arousal/Participation Alert   Attention Attends with cues to redirect   Orientation Level Oriented to person;Oriented to place; Disoriented to time;Disoriented to situation   Memory Decreased recall of precautions;Decreased recall of recent events;Decreased short term memory   Following Commands Follows one step commands with increased time or repetition   Comments Pt confused and difficulty recalling recent events (i e  that doctor was just in room, that RN recently changed LE bandage)   Assessment   Limitation Decreased ADL status; Decreased UE ROM; Decreased UE strength;Decreased Safe judgement during ADL;Decreased cognition;Decreased endurance;Decreased self-care trans;Decreased high-level ADLs; Decreased fine motor control   Prognosis Fair   Assessment Pt is a 69 y o  male seen for OT evaluation at [facility], admitted 7/11/2020 w/ Acute kidney injury superimposed on CKD ([facility] )  OT completed extensive review of pt's medical and social history  Comorbidities affecting pt's functional performance at time of assessment include: obesity, ambulatory dysfunction, right chest wall hematoma, DM, intertrigo, CHF, pain , elevated troponin, C-Diff, LLE cellulitis, stage 1 sacral decub, left shoulder pain, etc (see chart for full hx)  Personal factors affecting pt at time of IE include:steps to enter environment, limited home support, difficulty performing ADLS, difficulty performing IADLS  and decreased functional mobility  Pt with active OT orders  Prior to admission, pt was living with son in mobile home with steps to manage  Pt required assist with w/  ADLS and IADLS and relied on a scooter for mobility   Upon evaluation: Pt requires mod-max A x 2 for bed mobility  Pt unable to perform sit<>stand transfers 2/2 weakness  At this time pt required sup-mod A  for UB ADLs and max A for LB ADLS 2* the following deficits impacting occupational performance: weakness, decreased ROM, decreased strength, decreased balance, decreased tolerance, decreased safety awareness and abnormal tone  Pt to benefit from continued skilled OT tx while in the hospital to address deficits as defined above and maximize level of functional independence w ADL's and functional mobility  Occupational Performance areas to address include: grooming, bathing/shower, toilet hygiene, dressing, health maintenance, functional mobility and community mobility  Based on findings, pt is of high complexity  At this time, OT recommendations at time of discharge are short term rehab  Goals   Patient Goals Pt wishes to regain functional mobility   Plan   Treatment Interventions ADL retraining;Functional transfer training; Endurance training;Cognitive reorientation;Patient/family training;Fine motor coordination activities; Compensatory technique education;Equipment evaluation/education;Continued evaluation   Goal Expiration Date 07/25/20   OT Treatment Day 0   OT Frequency 3-5x/wk   Additional Treatment Session   Start Time 0930   End Time 9103   Treatment Assessment Pt seen for OT tx session with focus on functional balance, functional mobility, ADL status, and transfer safety  Pt noted to be on bedpan requesting toileting assistance  Pt required mod A x 2 to perform rolling to perform toileiting  Pt required max A x 2 to perform supine>sit transfer  Pt with limited ability to utilize LUE 2/2 ROM limitation/pain  Pt required further max A x 2 to perform scooting at EOB  Pt with noted difficulty with weightshifting  Pt attempted to perform sit>stand transfer, however unable to achieve despite being provided max A x 2    Patient continues to be functioning below baseline level, occupational performance remains limited secondary to factors listed above, and pt at increased risk for falls and injury  From OT standpoint, recommendation at time of d/c would be Short Term Rehab  Patient to benefit from continued Occupational Therapy treatment while in the hospital to address deficits as defined above and maximize level of functional independence with ADLs and functional mobility  Pt left with call bell in reach, tray table in reach, needs met, bed alarm activated, SCDs on  Recommendation   OT Discharge Recommendation Post-Acute Rehabilitation Services            Pt will achieve the following goals within 10 days  *Pt will complete grooming with mod I     *Pt will complete UB bathing and dressing with supervision  *Pt will complete LB bathing and dressing with min A      * Pt will complete toileting w/ min A w/ G hygiene/thoroughness using DME PRN    *Pt will complete bed mobility with supervision, with bed flat and no side rail to prep for purposeful tasks    *Pt will perform functional transfers with on/off all surfaces with min A2 using DME as needed w/ G balance/safety  *Pt will increase standing tolerance to 2 minutes in order to complete rachel-care  *Pt will participate in UE therapeutic exercise in order to maximize strength for ADL transfers  *Pt will identify 3-5 fall risks during ADL routine to ensure home safety upon discharge  *Pt will demonstrate G carryover of pt/caregiver education and training as appropriate w/ mod I w/o cues w/ good tolerance          William Dominguez OTR/BETH

## 2020-07-15 NOTE — PLAN OF CARE
Problem: PHYSICAL THERAPY ADULT  Goal: Performs mobility at highest level of function for planned discharge setting  See evaluation for individualized goals  Description  Treatment/Interventions: ADL retraining, Functional transfer training, LE strengthening/ROM, Elevations, Therapeutic exercise, Endurance training, Cognitive reorientation, Patient/family training, Equipment eval/education, Bed mobility, Spoke to nursing, Spoke to case management, OT  Equipment Recommended: Johnie Lynn       See flowsheet documentation for full assessment, interventions and recommendations  Outcome: Progressing  Note:   Prognosis: Fair  Problem List: Decreased strength, Decreased range of motion, Decreased endurance, Impaired balance, Decreased mobility, Decreased coordination, Decreased skin integrity, Decreased cognition, Decreased safety awareness, Impaired judgement, Impaired tone, Impaired sensation, Pain  Assessment: Pt presents with signif weakness, impaired mobility, impaired balance and unable to stand even with max assist of 2  PTa was able to stnad and transfer to electric scooter for home mobility  BP stable supine and sitting, unable to stnad for third BP  Pt appears confused at times adn does not recall recent events acurately  Can benefit from skilled PT serivces to improve strength and mobility  Recommend shor tterm in-pt rehab at d/c  Will follow see goals  Barriers to Discharge: Inaccessible home environment, Decreased caregiver support     PT Discharge Recommendation: [phone]     PT - OK to Discharge: Yes    See flowsheet documentation for full assessment

## 2020-07-15 NOTE — ASSESSMENT & PLAN NOTE
Patient presented with acute kidney injury on stage 3/4 chronic kidney disease with baseline creatinine around 2 1  This is mostly due to urinary retention as well as acute systolic CHF  Lopez catheter had to be inserted because patient had urinary retention: >700ml was drained from the bladder  Acute kidney injury resolved this morning:  Creatinine is down to 2 04    Continue p o  Bumex    continue Lopez catheter due to urinary retention  Patient did not tolerate Flomax and developed hypotension  I had to discontinue Flomax      I left a message on son's voicemail to call me back

## 2020-07-15 NOTE — TREATMENT PLAN
sCr near baseline  Prior b/l 1 2-1 6 but recent sCr 2 upon discharge  Suspect this may be new baseline  sCr now 2 04  Patient should have outpatient f/u with nephrology upon discharge with repeat BMP prior to office visit  Message sent to office staff  Call/text renal if needed

## 2020-07-15 NOTE — ASSESSMENT & PLAN NOTE
Wt Readings from Last 3 Encounters:   07/15/20 115 kg (254 lb 10 1 oz)   07/02/20 122 kg (269 lb 2 9 oz)   07/01/20 121 kg (267 lb 6 7 oz)         Patient in acute systolic CHF on admission as evidenced by CT of the chest findings as well as very high BNP  Acute systolic CHF resolved:  Lungs are clear to auscultation and oxygen saturation is normal on room air    Continue p o  Bumex 1 mg b i d

## 2020-07-15 NOTE — NURSING NOTE
Pt often awake during hrly rounds overnight; sleeps very lightly  States pain med effective for foot pain

## 2020-07-15 NOTE — TELEPHONE ENCOUNTER
----- Message from Bennett Diaz DO sent at 7/15/2020 11:34 AM EDT -----  Regarding: [facility] hospital follow up  Patient is admitted to [address] and placed on Epic [facility] list  Please call patient once discharged  Please schedule hospital follow up for [facility] in 1 week from d/c with first available provider  Patient should have BMP prior to office visit which has already been ordered  Thanks

## 2020-07-15 NOTE — PLAN OF CARE
Problem: OCCUPATIONAL THERAPY ADULT  Goal: Performs self-care activities at highest level of function for planned discharge setting  See evaluation for individualized goals  Description  Treatment Interventions: ADL retraining, Functional transfer training, Endurance training, Cognitive reorientation, Patient/family training, Fine motor coordination activities, Compensatory technique education, Equipment evaluation/education, Continued evaluation          See flowsheet documentation for full assessment, interventions and recommendations  Note:   Limitation: Decreased ADL status, Decreased UE ROM, Decreased UE strength, Decreased Safe judgement during ADL, Decreased cognition, Decreased endurance, Decreased self-care trans, Decreased high-level ADLs, Decreased fine motor control  Prognosis: Fair  Assessment: Pt is a 69 y o  male seen for OT evaluation at [address] [place] [address], admitted 7/11/2020 w/ Acute kidney injury superimposed on CKD ([facility] )  OT completed extensive review of pt's medical and social history  Comorbidities affecting pt's functional performance at time of assessment include: obesity, ambulatory dysfunction, right chest wall hematoma, DM, intertrigo, CHF, pain , elevated troponin, C-Diff, LLE cellulitis, stage 1 sacral decub, left shoulder pain, etc (see chart for full hx)  Personal factors affecting pt at time of IE include:steps to enter environment, limited home support, difficulty performing ADLS, difficulty performing IADLS  and decreased functional mobility  Pt with active OT orders  Prior to admission, pt was living with son in mobile home with steps to manage  Pt required assist with w/  ADLS and IADLS and relied on a scooter for mobility  Upon evaluation: Pt requires mod-max A x 2 for bed mobility  Pt unable to perform sit<>stand transfers 2/2 weakness   At this time pt required sup-mod A  for UB ADLs and max A for LB ADLS 2* the following deficits impacting occupational performance: weakness, decreased ROM, decreased strength, decreased balance, decreased tolerance, decreased safety awareness and abnormal tone  Pt to benefit from continued skilled OT tx while in the hospital to address deficits as defined above and maximize level of functional independence w ADL's and functional mobility  Occupational Performance areas to address include: grooming, bathing/shower, toilet hygiene, dressing, health maintenance, functional mobility and community mobility  Based on findings, pt is of high complexity  At this time, OT recommendations at time of discharge are short term rehab       OT Discharge Recommendation: Post-Acute Rehabilitation Services

## 2020-07-15 NOTE — PROGRESS NOTES
Progress Note - Linwood Mazariegos 1945, 69 y o  male MRN: [id number]    Unit/Bed#: -[facility] Encounter: [id number]    Primary Care Provider: Wilbert Sims MD   Date and time admitted to hospital: 7/11/2020  4:05 PM        * Acute kidney injury superimposed on CKD [facility])  Assessment & Plan  Patient presented with acute kidney injury on stage 3/4 chronic kidney disease with baseline creatinine around 2 1  This is mostly due to urinary retention as well as acute systolic CHF  Lopez catheter had to be inserted because patient had urinary retention: >700ml was drained from the bladder  Acute kidney injury resolved this morning:  Creatinine is down to 2 04    Continue p o  Bumex    continue Lopez catheter due to urinary retention  Patient did not tolerate Flomax and developed hypotension  I had to discontinue Flomax  I left a message on son's voicemail to call me back    Acute on chronic combined systolic and diastolic congestive heart failure (HCC)  Assessment & Plan  Wt Readings from Last 3 Encounters:   07/15/20 115 kg (254 lb 10 1 oz)   07/02/20 122 kg (269 lb 2 9 oz)   07/01/20 121 kg (267 lb 6 7 oz)         Patient in acute systolic CHF on admission as evidenced by CT of the chest findings as well as very high BNP  Acute systolic CHF resolved:  Lungs are clear to auscultation and oxygen saturation is normal on room air    Continue p o  Bumex 1 mg b i d  Persistent atrial fibrillation  Assessment & Plan  The patient has chronic atrial fibrillation  Heart rates are controlled: 70-90    I decreased metoprolol to 12 5 b i d  Because of hypotension    I resumed Eliquis for CVA prevention      Clostridium difficile diarrhea  Assessment & Plan  Patient developed diarrhea yesterday, stool came back positive for C diff  Will continue p o   Vancomycin      VTE Prophylaxis: in place    Patient Centered Rounds: I rounded with patient's nurse    Current Length of Stay: 4 day(s)    Current Patient Status: Inpatient    Certification Statement: Pt requires additional inpatient hospital stay due to: see assessment and plan        Subjective:   Patient's nurse reports that patient had at least 3 watery bowel movements yesterday and 1 today  There was no evidence of GI bleeding  Patient denies chest pain, palpitations, shortness of breath, abdominal pain, nausea  All other ROS are negative    Objective:     Vitals:   Temp (24hrs), Av 5 °F (36 4 °C), Min:97 3 °F (36 3 °C), Max:97 7 °F (36 5 °C)    Temp:  [97 3 °F (36 3 °C)-97 7 °F (36 5 °C)] 97 3 °F (36 3 °C)  HR:  [80-97] 90  Resp:  [17-20] 17  BP: (103-118)/(67-83) 113/77  SpO2:  [96 %-100 %] 100 %  Body mass index is 34 53 kg/m²  Input and Output Summary (last 24 hours): Intake/Output Summary (Last 24 hours) at 7/15/2020 0929  Last data filed at 7/15/2020 0600  Gross per 24 hour   Intake 780 ml   Output 1850 ml   Net -1070 ml       Physical Exam:     Physical Exam   Constitutional: He appears well-developed  No distress  HENT:   Head: Normocephalic  Mouth/Throat: Oropharynx is clear and moist    Eyes: Conjunctivae are normal    Neck: Neck supple  No JVD present  Cardiovascular: Normal rate  Irregular irregular, controlled rate   Pulmonary/Chest: Effort normal  No respiratory distress  He has no wheezes  He has no rales  Abdominal: Soft  Bowel sounds are normal  He exhibits no distension  There is tenderness (Mild diffuse tenderness)  Neurological: He is alert  No cranial nerve deficit  Moves all extremities on command   Skin: Skin is warm and dry  Patient has chronic hyperpigmentation of lower legs without edema   Vitals reviewed  I personally reviewed labs and imaging reports for today        Last 24 Hours Medication List:     Current Facility-Administered Medications:  acetaminophen 650 mg Oral Q6H PRN NAVNEET Day   apixaban 5 mg Oral BID Yuridia Kim MD   bumetanide 1 mg Oral BID Yuridia Kim MD collagenase  Topical Daily Daniella Paige DPM   DULoxetine 30 mg Oral Daily Miley Ore, CRNP   HYDROcodone-acetaminophen 1 tablet Oral Q6H PRN Soraida Jiang PA-C   insulin lispro 1-5 Units Subcutaneous HS Sherlon Porteous, DO   insulin lispro 1-6 Units Subcutaneous TID AC Sherlon Porteous, DO   metoprolol tartrate 12 5 mg Oral Q12H [address] Nathalie Pink MD   midodrine 5 mg Oral TID AC Miley Ore, CRNP   nystatin  Topical BID Miley Ore, CRNP   pantoprazole 40 mg Oral Daily Miley Ore, CRNP   potassium chloride 40 mEq Oral BID Nathalie Pink MD   vancomycin 125 mg Oral Q6H Sujey Jenkins MD          Today, Patient Was Seen By: Nathalie Pink MD    ** Please Note: Dictation voice to text software may have been used in the creation of this document   **

## 2020-07-15 NOTE — DISCHARGE INSTR - OTHER ORDERS
Skin and Wound Care orders:  1  Calazime to left buttock wounds TID and PRN with incontinence care  2  Podiatry orders for lower extremity wounds  3  Skin nourishing cream daily  4   EHOB waffle cushion to chair when OOB

## 2020-07-15 NOTE — PLAN OF CARE
Problem: PAIN - ADULT  Goal: Verbalizes/displays adequate comfort level or baseline comfort level  Description  Interventions:  - Encourage patient to monitor pain and request assistance  - Assess pain using appropriate pain scale  - Administer analgesics based on type and severity of pain and evaluate response  - Implement non-pharmacological measures as appropriate and evaluate response  - Consider cultural and social influences on pain and pain management  - Notify physician/advanced practitioner if interventions unsuccessful or patient reports new pain  Outcome: Progressing     Problem: INFECTION - ADULT  Goal: Absence or prevention of progression during hospitalization  Description  INTERVENTIONS:  - Assess and monitor for signs and symptoms of infection  - Monitor lab/diagnostic results  - Monitor all insertion sites, i e  indwelling lines, tubes, and drains  - Monitor endotracheal if appropriate and nasal secretions for changes in amount and color  - [place] appropriate cooling/warming therapies per order  - Administer medications as ordered  - Instruct and encourage patient and family to use good hand hygiene technique  - Identify and instruct in appropriate isolation precautions for identified infection/condition  Outcome: Progressing  Goal: Absence of fever/infection during neutropenic period  Description  INTERVENTIONS:  - Monitor WBC    Outcome: Progressing     Problem: SAFETY ADULT  Goal: Patient will remain free of falls  Description  INTERVENTIONS:  - Assess patient frequently for physical needs  -  Identify cognitive and physical deficits and behaviors that affect risk of falls    -  [place] fall precautions as indicated by assessment   - Educate patient/family on patient safety including physical limitations  - Instruct patient to call for assistance with activity based on assessment  - Modify environment to reduce risk of injury  - Consider OT/PT consult to assist with strengthening/mobility  Outcome: Progressing  Goal: Maintain or return to baseline ADL function  Description  INTERVENTIONS:  -  Assess patient's ability to carry out ADLs; assess patient's baseline for ADL function and identify physical deficits which impact ability to perform ADLs (bathing, care of mouth/teeth, toileting, grooming, dressing, etc )  - Assess/evaluate cause of self-care deficits   - Assess range of motion  - Assess patient's mobility; develop plan if impaired  - Assess patient's need for assistive devices and provide as appropriate  - Encourage maximum independence but intervene and supervise when necessary  - Involve family in performance of ADLs  - Assess for home care needs following discharge   - Consider OT consult to assist with ADL evaluation and planning for discharge  - Provide patient education as appropriate  Outcome: Progressing  Goal: Maintain or return mobility status to optimal level  Description  INTERVENTIONS:  - Assess patient's baseline mobility status (ambulation, transfers, stairs, etc )    - Identify cognitive and physical deficits and behaviors that affect mobility  - Identify mobility aids required to assist with transfers and/or ambulation (gait belt, sit-to-stand, lift, walker, cane, etc )  - [place] fall precautions as indicated by assessment  - Record patient progress and toleration of activity level on Mobility SBAR; progress patient to next Phase/Stage  - Instruct patient to call for assistance with activity based on assessment  - Consider rehabilitation consult to assist with strengthening/weightbearing, etc   Outcome: Progressing     Problem: DISCHARGE PLANNING  Goal: Discharge to home or other facility with appropriate resources  Description  INTERVENTIONS:  - Identify barriers to discharge w/patient and caregiver  - Arrange for needed discharge resources and transportation as appropriate  - Identify discharge learning needs (meds, wound care, etc )  - Arrange for interpretive services to assist at discharge as needed  - Refer to Case Management Department for coordinating discharge planning if the patient needs post-hospital services based on physician/advanced practitioner order or complex needs related to functional status, cognitive ability, or social support system  Outcome: Progressing     Problem: Knowledge Deficit  Goal: Patient/family/caregiver demonstrates understanding of disease process, treatment plan, medications, and discharge instructions  Description  Complete learning assessment and assess knowledge base    Interventions:  - Provide teaching at level of understanding  - Provide teaching via preferred learning methods  Outcome: Progressing     Problem: CARDIOVASCULAR - ADULT  Goal: Maintains optimal cardiac output and hemodynamic stability  Description  INTERVENTIONS:  - Monitor I/O, vital signs and rhythm  - Monitor for S/S and trends of decreased cardiac output  - Administer and titrate ordered vasoactive medications to optimize hemodynamic stability  - Assess quality of pulses, skin color and temperature  - Assess for signs of decreased coronary artery perfusion  - Instruct patient to report change in severity of symptoms  Outcome: Progressing  Goal: Absence of cardiac dysrhythmias or at baseline rhythm  Description  INTERVENTIONS:  - Continuous cardiac monitoring, vital signs, obtain 12 lead EKG if ordered  - Administer antiarrhythmic and heart rate control medications as ordered  - Monitor electrolytes and administer replacement therapy as ordered  Outcome: Progressing     Problem: MUSCULOSKELETAL - ADULT  Goal: Maintain or return mobility to safest level of function  Description  INTERVENTIONS:  - Assess patient's ability to carry out ADLs; assess patient's baseline for ADL function and identify physical deficits which impact ability to perform ADLs (bathing, care of mouth/teeth, toileting, grooming, dressing, etc )  - Assess/evaluate cause of self-care deficits   - Assess range of motion  - Assess patient's mobility  - Assess patient's need for assistive devices and provide as appropriate  - Encourage maximum independence but intervene and supervise when necessary  - Involve family in performance of ADLs  - Assess for home care needs following discharge   - Consider OT consult to assist with ADL evaluation and planning for discharge  - Provide patient education as appropriate  Outcome: Progressing     Problem: Potential for Falls  Goal: Patient will remain free of falls  Description  INTERVENTIONS:  - Assess patient frequently for physical needs  -  Identify cognitive and physical deficits and behaviors that affect risk of falls    -  [place] fall precautions as indicated by assessment   - Educate patient/family on patient safety including physical limitations  - Instruct patient to call for assistance with activity based on assessment  - Modify environment to reduce risk of injury  - Consider OT/PT consult to assist with strengthening/mobility  Outcome: Progressing     Problem: Prexisting or High Potential for Compromised Skin Integrity  Goal: Skin integrity is maintained or improved  Description  INTERVENTIONS:  - Identify patients at risk for skin breakdown  - Assess and monitor skin integrity  - Assess and monitor nutrition and hydration status  - Monitor labs   - Assess for incontinence   - Turn and reposition patient  - Assist with mobility/ambulation  - Relieve pressure over bony prominences  - Avoid friction and shearing  - Provide appropriate hygiene as needed including keeping skin clean and dry  - Evaluate need for skin moisturizer/barrier cream  - Collaborate with interdisciplinary team   - Patient/family teaching  - Consider wound care consult   Outcome: Progressing     Problem: Nutrition/Hydration-ADULT  Goal: Nutrient/Hydration intake appropriate for improving, restoring or maintaining nutritional needs  Description  Monitor and assess patient's nutrition/hydration status for malnutrition  Collaborate with interdisciplinary team and initiate plan and interventions as ordered  Monitor patient's weight and dietary intake as ordered or per policy  Utilize nutrition screening tool and intervene as necessary  Determine patient's food preferences and provide high-protein, high-caloric foods as appropriate       INTERVENTIONS:  - Monitor oral intake, urinary output, labs, and treatment plans  - Assess nutrition and hydration status and recommend course of action  - Evaluate amount of meals eaten  - Assist patient with eating if necessary   - Allow adequate time for meals  - Recommend/ encourage appropriate diets, oral nutritional supplements, and vitamin/mineral supplements  - Order, calculate, and assess calorie counts as needed  - Recommend, monitor, and adjust tube feedings and TPN/PPN based on assessed needs  - Assess need for intravenous fluids  - Provide specific nutrition/hydration education as appropriate  - Include patient/family/caregiver in decisions related to nutrition  Outcome: Progressing

## 2020-07-15 NOTE — PHYSICAL THERAPY NOTE
PT tx     07/15/20 0945   Pain Assessment   Pain Assessment Tool Villarreal-Baker FACES   Villarreal-Baker FACES Pain Rating 6   Pain Location/Orientation Orientation: Left;Orientation: Right;Location: Abdomen; Location: Rib Cage; Location: Shoulder   Restrictions/Precautions   Other Precautions Contact/isolation   General   Chart Reviewed Yes   Family/Caregiver Present No   Cognition   Overall Cognitive Status Impaired   Orientation Level Oriented to person   Memory Decreased short term memory;Decreased recall of recent events;Decreased recall of precautions   Following Commands Follows one step commands inconsistently   Subjective   Subjective I always raise the bed and spin around with my scooter  unrealistic about ability at present   Bed Mobility   Rolling R 3  Moderate assistance   Additional items Assist x 2   Rolling L 3  Moderate assistance   Additional items Assist x 2   Supine to Sit 2  Maximal assistance   Additional items Assist x 2   Sit to Supine 2  Maximal assistance   Additional items Assist x 2   Transfers   Sit to Stand 2  Maximal assistance   Additional items Assist x 2  (unable to come to full stand)   Balance   Static Sitting Fair -   Dynamic Sitting Poor +   Static Standing Zero   Endurance Deficit   Endurance Deficit Yes   Endurance Deficit Description tires quickly long rest breaks between movement   Activity Tolerance   Activity Tolerance Patient limited by fatigue;Patient limited by pain;Treatment limited secondary to medical complications (Comment)   Medical Staff Made Aware GINO Benavides   Nurse Made Aware JERSEY Cosby   Assessment   Prognosis Fair   Problem List Decreased strength;Decreased range of motion;Decreased endurance; Impaired balance;Decreased mobility; Decreased coordination;Decreased safety awareness; Impaired judgement;Decreased cognition;Decreased skin integrity;Orthopedic restrictions   Assessment Pt continues with weakness and impaired mobility  REturned to bed for dressing change Lle   Will follow as duglas  Continue to recommend short term in-pt rehab at d/c    Barriers to Discharge Inaccessible home environment;Decreased caregiver support   Goals   Patient Goals get stronger   Plan   Treatment/Interventions ADL retraining;Functional transfer training;LE strengthening/ROM; Therapeutic exercise; Endurance training;Cognitive reorientation;Patient/family training;Equipment eval/education; Bed mobility;Gait training;Spoke to nursing;Spoke to case management;OT   PT Frequency 5x/wk   Recommendation   PT Discharge Recommendation Post-Acute Rehabilitation Services   Equipment Recommended Olvin Jenkins   PT - OK to Discharge Yes   Additional Comments   (to sTR with medical clearance)   Mic Ortega, PT

## 2020-07-15 NOTE — DISCHARGE INSTRUCTIONS
Acute Kidney Injury (SAMY)  You have been diagnosed with Acute Kidney Injury (SAMY)  The following information has been developed to provide you with information about SAMY and treatment  What is Acute Kidney Injury (SAMY)? SAMY occurs when kidney function decreases over a short period of time  This condition causes a buildup of waste products in the blood and can cause fluid to build up causing swelling in the legs and shortness of breath  Sometimes called Acute Kidney Failure or Acute Renal Failure, SAMY is often reversible if it is found and treated quickly  How do you know if you have SAMY? SAMY is diagnosed by assessing kidney function  This is done by obtaining a blood test to measure the blood level of creatinine  Decreased urine output can sometimes also indicate SAMY  Who is at risk for SAMY? SAMY can happen to anyone but usually happens to people who are already sick and may be in the hospital  People are at higher risk for SAMY if they have any of the following:   age 72 years or older   high or low blood pressure   underlying kidney disease (e g , Chronic Kidney Disease (CKD)   peripheral vascular disease (hardening of arteries)   chronic diseases such as liver disease, heart disease and diabetes   a single kidney    What are the symptoms of SAMY? You may or may not have the symptoms to suggest you have kidney injury until the SAMY has progressed  Some of the symptoms are listed below:   Not making enough urine   Increased swelling in legs    Feeling tired   Trouble breathing or shortness of breath   Nausea   New or worsening confusion    What causes ASMY?   The causes are divided into three categories:   Not enough blood flowing to the kidneys (e g , low blood pressure, bleeding, diarrhea, dehydration)   Injury directly to the kidneys (e g , blood clots, severe infections such as sepsis, medicine toxicity, IV contrast dye used for cardiac catheterization or CT scans)   Blockage to the tubes (ureters) that drain the urine from the kidneys (e g , enlarged prostate, kidney stones, blood clots)    What is the treatment for SAMY? The treatment for SAMY depends on correcting what caused it  Treatment usually involves removing the cause and measures to prevent further injury to the kidneys  This may require the use of intravenous fluids or medications and/or temporary dialysis  Dialysis is a process using a machine that does the job of the kidneys to remove waste and help correct the electrolyte and fluid balance while the kidneys are recovering  If dialysis is needed to treat SAMY, the doctor will assess daily to see if the kidneys are showing signs of recovery  The daily assessments determine how long dialysis needs to continue  Depending on the cause and the extent of damage, an episode of SAMY may resolve in a few days to several weeks to several months  What are the long term effects of having an episode of SAMY?  People who have one episode of SAMY are at an increased risk of having another episode of SAMY as well as other health problems such as kidney disease, stroke, and heart disease   In a small number of people who had unrecognized kidney disease, an SAMY episode may result in Chronic Kidney Disease (CKD) which requires lifelong monitoring and treatment  How do you prevent future episodes of SAMY?  Make sure to follow up with the kidney doctor after hospital discharge and obtain blood work to reassess and monitor kidney function     If you have diabetes, keep your blood sugar in goal range and keep appointments with your diabetes specialist    Walker Perez If you have high blood pressure, have your blood pressure checked regularly to make sure it is in target range  o If you take blood pressure medicine called ACEIs or ARBs (e g , Lisinopril, Enalapril, Diovan, Losartan), your doctor may tell you to skip a dose or two if you have severe dehydration and your blood pressure is running low    Avoid using medicines such as NSAIDs (Nonsteroidal Anti-inflammatory Drugs) and Harmon-2 Inhibitors (a type of NSAID) that may be harmful to kidney function  These may include the medicines listed in the table that follows  Examples of NSAIDS and Harmon-2 Inhibitors   Talk to your doctor or healthcare provider before stopping any medicine ordered for you  Celecoxib (CELEBREX) Ketoprofen (ORUDIS, ORUVAIL)   Diclofenac (VOLTAREN, CATAFLAM) Ketorolac (TORADOL)   Diflunisal (DOLOBID) Meloxicam (MOBIC)   Etodolac (LODINE) Nabumetone (RELAFEN)   Fenoprofen (NALFON) Naproxen (ALEVE, NAPROSYN,    NAPRELAN, ANAPROX)   Flurbiprofen (ANSAID) Oxaprozin (DAYPRO)   Ibuprofen (MOTRIN, ADVIL) Piroxicam (FELDENE)   Indomethacin (INDOCIN) Sulindac (CLINORIL)    Tolmetin (Jaunita Boswell)     Is there a special diet for people with SAMY? People with SAMY and/or other kidney disease often have high potassium and phosphorus levels in their blood  To protect the kidneys from further injury and to avoid complications, most doctors recommend following a healthy diet choosing foods low potassium and low phosphorus   Limiting dietary potassium to 2 5 grams/day and phosphorus to 800 milligrams/day is recommended   A dietitian can help you with learning more about this type of diet   The tables on the following page may help you to choose lower potassium and phosphorus foods  The following websites are also good sources of information:  Jalyn at  org/nutrition/Kidney-Disease-Stages-1-4   [facility]  https://www niddk nih gov/health-information/kidney-disease/chronic-kidney-disease-ckd/eating-nutrition  https://[facility] org/health/articles/15641-renal-diet-basics  [employer] cy    If you have any questions or concerns about your condition, please contact your doctor or healthcare provider    These tables may help you to choose lower potassium and phosphorus foods    AVOID these higher phosphorus* foods: CHOOSE these lower phosphorus* foods:   Milk, pudding , yogurt made from animals and from many soy varieties Rice milk (unfortified), nondairy creamer   Hard cheeses, ricotta, cottage cheese, fat-free cream cheese Regular and low-fat cream cheese   Ice cream, frozen yogurt Sherbet, frozen fruit pops, sorbet   Soups made with milk, dried peas, beans, lentils or other high phosphorus ingredients Soups made with broth, are water-based, or other lower phosphorus ingredients   Whole grains, including whole-grain breads, crackers, cereal, rice and pasta, corn tortillas Refined grains, including white bread, crackers, cereals, rice and pasta   Quick breads, biscuits, cornbread, muffins, pancakes, waffles, granola, wheat germ Homemade refined (white) dinner rolls, bagels, English muffins, sugar cookies, shortbread cookies, skye food cake   Dried peas (split, black-eyed), beans (black, garbanzo, lima, kidney, navy, galvez), lentils Green peas (canned, frozen), green beans, wax beans   Organ meats, walleye, [place], sardines Lean beef, pork, lamb, poultry, other fish   Nuts and seeds Popcorn   Peanut butter, other nut butters; tofu, veggie or soy burgers Jam, jelly, honey   Chocolate, including chocolate drinks Carob (chocolate-flavored) candy, hard candy,  gumdrops   Toro, pepper-type sodas, flavored her, bottled teas, beer Lemon-lime soda, ginger ale or root beer, plain water, cream soda, grape soda   *Always read labels and avoid foods with ingredients containing "phos"  AVOID these higher potassium foods: CHOOSE these lower potassium foods:      Milk (fat free, low fat, whole, buttermilk, Soy), yogurt    Regular and low-fat cream cheese      Beans (white, [place]), [place] sprouts, spinach Swiss       chard, broccoli, avocado, artichoke, potatoes, sweet      potatoes, tomatoes/tomato sauce, beet greens      Green beans, alfalfa sprouts, bamboo shoots (canned),       cabbage, carrots, cauliflower, corn, cucumber, eggplant,      endive, lettuce, mushrooms, onions, radishes,  watercress,       water chestnuts (canned), rice, peas      Halibut, tuna, cod, snapper, tuna fish, turkey    Egg, lean beef, pork, lamb, shellfish, chicken       Banana, papaya, orange, cantaloupe, dates, raisins and      other dried fruit, pomegranate, avocado      Apple, applesauce, blackberries, raspberries, pears,     watermelon, cucumbers, blueberries, cranberries,       peaches      Almonds, peanuts, hazelnuts, [place], cashew, mixed,      seeds (sunflower, pumpkin)     Homemade refined (white) dinner rolls, bagels,      English muffins, flour tortilla, crackers, isela      crackers, popcorn, pretzels, spaghetti or macaroni,       hummus      Tomato or vegetable juice, prune juice    Papaya, magnus, or pear nectar, cranberry juice cocktail      Molasses

## 2020-07-16 ENCOUNTER — PATIENT OUTREACH (OUTPATIENT)
Dept: FAMILY MEDICINE CLINIC | Facility: HOSPITAL | Age: 75
End: 2020-07-16

## 2020-07-16 LAB
ANION GAP SERPL CALCULATED.3IONS-SCNC: 6 MMOL/L (ref 4–13)
BACTERIA BLD CULT: NORMAL
BACTERIA BLD CULT: NORMAL
BASOPHILS # BLD AUTO: 0.04 THOUSANDS/ΜL (ref 0–0.1)
BASOPHILS NFR BLD AUTO: 1 % (ref 0–1)
BUN SERPL-MCNC: 53 MG/DL (ref 5–25)
CALCIUM SERPL-MCNC: 8.7 MG/DL (ref 8.3–10.1)
CHLORIDE SERPL-SCNC: 103 MMOL/L (ref 100–108)
CO2 SERPL-SCNC: 29 MMOL/L (ref 21–32)
CREAT SERPL-MCNC: 1.86 MG/DL (ref 0.6–1.3)
EOSINOPHIL # BLD AUTO: 0.19 THOUSAND/ΜL (ref 0–0.61)
EOSINOPHIL NFR BLD AUTO: 5 % (ref 0–6)
ERYTHROCYTE [DISTWIDTH] IN BLOOD BY AUTOMATED COUNT: 19.6 % (ref 11.6–15.1)
GFR SERPL CREATININE-BSD FRML MDRD: 35 ML/MIN/1.73SQ M
GLUCOSE SERPL-MCNC: 102 MG/DL (ref 65–140)
GLUCOSE SERPL-MCNC: 102 MG/DL (ref 65–140)
GLUCOSE SERPL-MCNC: 105 MG/DL (ref 65–140)
GLUCOSE SERPL-MCNC: 78 MG/DL (ref 65–140)
GLUCOSE SERPL-MCNC: 86 MG/DL (ref 65–140)
HCT VFR BLD AUTO: 39.9 % (ref 36.5–49.3)
HGB BLD-MCNC: 13.3 G/DL (ref 12–17)
IMM GRANULOCYTES # BLD AUTO: 0.02 THOUSAND/UL (ref 0–0.2)
IMM GRANULOCYTES NFR BLD AUTO: 1 % (ref 0–2)
LYMPHOCYTES # BLD AUTO: 1.13 THOUSANDS/ΜL (ref 0.6–4.47)
LYMPHOCYTES NFR BLD AUTO: 29 % (ref 14–44)
MCH RBC QN AUTO: 32.1 PG (ref 26.8–34.3)
MCHC RBC AUTO-ENTMCNC: 33.3 G/DL (ref 31.4–37.4)
MCV RBC AUTO: 96 FL (ref 82–98)
MONOCYTES # BLD AUTO: 0.96 THOUSAND/ΜL (ref 0.17–1.22)
MONOCYTES NFR BLD AUTO: 25 % (ref 4–12)
NEUTROPHILS # BLD AUTO: 1.54 THOUSANDS/ΜL (ref 1.85–7.62)
NEUTS SEG NFR BLD AUTO: 39 % (ref 43–75)
NRBC BLD AUTO-RTO: 0 /100 WBCS
PLATELET # BLD AUTO: 118 THOUSANDS/UL (ref 149–390)
PMV BLD AUTO: 11.9 FL (ref 8.9–12.7)
POTASSIUM SERPL-SCNC: 4.2 MMOL/L (ref 3.5–5.3)
RBC # BLD AUTO: 4.14 MILLION/UL (ref 3.88–5.62)
SODIUM SERPL-SCNC: 138 MMOL/L (ref 136–145)
WBC # BLD AUTO: 3.88 THOUSAND/UL (ref 4.31–10.16)

## 2020-07-16 PROCEDURE — 97530 THERAPEUTIC ACTIVITIES: CPT

## 2020-07-16 PROCEDURE — 97110 THERAPEUTIC EXERCISES: CPT

## 2020-07-16 PROCEDURE — 80048 BASIC METABOLIC PNL TOTAL CA: CPT | Performed by: INTERNAL MEDICINE

## 2020-07-16 PROCEDURE — 99232 SBSQ HOSP IP/OBS MODERATE 35: CPT | Performed by: INTERNAL MEDICINE

## 2020-07-16 PROCEDURE — 85025 COMPLETE CBC W/AUTO DIFF WBC: CPT | Performed by: INTERNAL MEDICINE

## 2020-07-16 PROCEDURE — 82948 REAGENT STRIP/BLOOD GLUCOSE: CPT

## 2020-07-16 RX ORDER — POTASSIUM CHLORIDE 20 MEQ/1
20 TABLET, EXTENDED RELEASE ORAL 2 TIMES DAILY
Status: DISCONTINUED | OUTPATIENT
Start: 2020-07-16 | End: 2020-07-17

## 2020-07-16 RX ORDER — CHOLESTYRAMINE LIGHT 4 G/5.7G
4 POWDER, FOR SUSPENSION ORAL 2 TIMES DAILY
Status: DISCONTINUED | OUTPATIENT
Start: 2020-07-16 | End: 2020-07-17 | Stop reason: HOSPADM

## 2020-07-16 RX ADMIN — BUMETANIDE 1 MG: 1 TABLET ORAL at 18:09

## 2020-07-16 RX ADMIN — APIXABAN 5 MG: 5 TABLET, FILM COATED ORAL at 18:10

## 2020-07-16 RX ADMIN — METOPROLOL TARTRATE 12.5 MG: 25 TABLET ORAL at 22:02

## 2020-07-16 RX ADMIN — DULOXETINE 30 MG: 30 CAPSULE, DELAYED RELEASE ORAL at 09:54

## 2020-07-16 RX ADMIN — CHOLESTYRAMINE 4 G: 4 POWDER, FOR SUSPENSION ORAL at 18:10

## 2020-07-16 RX ADMIN — POTASSIUM CHLORIDE 40 MEQ: 1500 TABLET, EXTENDED RELEASE ORAL at 09:53

## 2020-07-16 RX ADMIN — APIXABAN 5 MG: 5 TABLET, FILM COATED ORAL at 09:55

## 2020-07-16 RX ADMIN — METOPROLOL TARTRATE 12.5 MG: 25 TABLET ORAL at 09:54

## 2020-07-16 RX ADMIN — MIDODRINE HYDROCHLORIDE 5 MG: 5 TABLET ORAL at 06:33

## 2020-07-16 RX ADMIN — Medication 125 MG: at 18:10

## 2020-07-16 RX ADMIN — BUMETANIDE 1 MG: 1 TABLET ORAL at 09:54

## 2020-07-16 RX ADMIN — Medication 125 MG: at 00:19

## 2020-07-16 RX ADMIN — NYSTATIN: 100000 POWDER TOPICAL at 09:52

## 2020-07-16 RX ADMIN — MIDODRINE HYDROCHLORIDE 5 MG: 5 TABLET ORAL at 12:24

## 2020-07-16 RX ADMIN — Medication 125 MG: at 06:33

## 2020-07-16 RX ADMIN — HYDROCODONE BITARTRATE AND ACETAMINOPHEN 1 TABLET: 5; 325 TABLET ORAL at 12:31

## 2020-07-16 RX ADMIN — COLLAGENASE SANTYL: 250 OINTMENT TOPICAL at 14:22

## 2020-07-16 RX ADMIN — NYSTATIN: 100000 POWDER TOPICAL at 18:16

## 2020-07-16 RX ADMIN — Medication 125 MG: at 12:24

## 2020-07-16 RX ADMIN — PANTOPRAZOLE SODIUM 40 MG: 40 TABLET, DELAYED RELEASE ORAL at 09:55

## 2020-07-16 RX ADMIN — MIDODRINE HYDROCHLORIDE 5 MG: 5 TABLET ORAL at 18:09

## 2020-07-16 RX ADMIN — POTASSIUM CHLORIDE 20 MEQ: 1500 TABLET, EXTENDED RELEASE ORAL at 18:09

## 2020-07-16 RX ADMIN — HYDROCODONE BITARTRATE AND ACETAMINOPHEN 1 TABLET: 5; 325 TABLET ORAL at 03:47

## 2020-07-16 NOTE — PROGRESS NOTES
Outpatient Care Management Note:    Incoming call received from [facility], Linwood's son  He states his father is stating that he is being discharged  Ricardo Denney has not been informed of any discharge but is worried the hospital may send him home  CM will contact inpatient   Inbasket message sent to Nav Haynes, inpatient case management  Inbasket message received from [place], inpatient case management:     Patient is approved for admission at [facility] and has [facility],  waiting on authorization from [address] [place] [address] just spoke with Ricardo Denney and informed him we are waiting for auth and patient mat not be discharged today   I told Ricardo Denney if patient is discharged today I would call him with transport time

## 2020-07-16 NOTE — ASSESSMENT & PLAN NOTE
Patient presented with acute kidney injury on stage 3/4 chronic kidney disease with baseline creatinine around 2 1  This was  due to urinary retention as well as acute systolic CHF  Patient was treated with IV Bumex initially and Lopez catheter had to be inserted because patient had recurrent urinary retention of >700ml      Renal function continues to improve:  Creatinine decreased to 1 86 today

## 2020-07-16 NOTE — ASSESSMENT & PLAN NOTE
Patient developed diarrhea during the hospital stay and stools came back positive for C diff  Patient had 2 episodes last night but none so far today  Will continue p o   Vancomycin and I added Questran

## 2020-07-16 NOTE — OCCUPATIONAL THERAPY NOTE
Occupational Therapy Tx Note     Patient Name: Baltazar Matthews  Today's Date: 7/16/2020  Problem List  Principal Problem:    Acute kidney injury superimposed on CKD [facility])  Active Problems:    Wound, open, foot    Gastroesophageal reflux disease without esophagitis    PVD (peripheral vascular disease) ([facility] )    Obesity due to excess calories with serious comorbidity    Essential hypertension    Persistent atrial fibrillation    Acute on chronic combined systolic and diastolic congestive heart failure (HCC)    Thrombocytopenia (HCC)    Hypokalemia    Ambulatory dysfunction    Elevated troponin level not due myocardial infarction    Lactic acid acidosis    Hematoma of right chest wall    DM (diabetes mellitus) ([facility] )    Benign prostatic hyperplasia with urinary retention    Hypomagnesemia    Clostridium difficile diarrhea            07/16/20 1630   Restrictions/Precautions   Weight Bearing Precautions Per Order No   Other Precautions Fall Risk;Cognitive;Pain   Pain Assessment   Pain Assessment Tool Villarreal-Baker FACES   Villarreal-Baker FACES Pain Rating 6   Pain Location/Orientation Orientation: Left; Location: Rib Cage  (shoulder)   Patient's Stated Pain Goal No pain   ADL   Toileting Assistance  2  Maximal Assistance   Toileting Deficit Perineal hygiene;Verbal cueing   Bed Mobility   Supine to Sit 3  Moderate assistance   Additional items Assist x 2;Verbal cues; Bedrails;HOB elevated;LE management; Increased time required   Sit to Supine 3  Moderate assistance   Additional items Assist x 2;Verbal cues; Increased time required; Bedrails;LE management   Additional Comments Pt able to scoot alongside EOB with mod A x 1 + 1 for safety  Transfers   Sit to Stand 2  Maximal assistance   Additional items Assist x 2;Verbal cues; Bedrails; Increased time required   Stand to Sit 2  Maximal assistance   Additional items Assist x 2;Verbal cues; Increased time required; Bedrails   Stand pivot Unable to assess   Additional Comments Pt sat on EOB for >25 min negotiating strategies for performing sit>stand  Pt benefitted from extensive education on body mechanics, transfer safety, weightshifting strategies, environmental set-up (i e walker placement, bed rails) as pt very particular regarding preferred (unsafe) method of transfering  Pt eventually receptive to education  Cognition   Overall Cognitive Status Impaired   Arousal/Participation Alert   Attention Attends with cues to redirect   Orientation Level Oriented to person;Oriented to place; Disoriented to situation   Memory Decreased recall of precautions;Decreased recall of recent events   Following Commands Follows one step commands with increased time or repetition   Comments Pt with decreased safety awareness  Activity Tolerance   Activity Tolerance Patient limited by pain   Medical Staff Made Aware PT Wilbert Ott RN Alea   Assessment   Assessment Patient participated in Skilled OT session this date with interventions consisting of safety awareness and fall prevention techniques,  use of appropriate body mechanics*,  therapeutic activities to: increase activity tolerance, increase dynamic sit/ stand balance during functional activity , increase postural control, increase trunk control and increase OOB/ sitting tolerance   Patient agreeable to OT treatment session, upon arrival patient was found supine in bed  Treatment session as follows: Pt performed bed mobility with mod Ax 2  Pt sat on EOB for ~20 min negotiating strategies for performing sit>stand  Pt benefitted from extensive education on body mechanics, transfer safety, weightshifting strategies, environmental set-up (i e walker placement, bed rails) as pt very particular regarding preferred (unsafe) method of transfering  Pt performed sit<>stand transfer with max Ax 2  Pt returned to supine position with mod Ax 2 where he required toileiting assistance with max A   Patient continues to be functioning below baseline level, occupational performance remains limited secondary to factors listed above, and pt at increased risk for falls and injury  From OT standpoint, recommendation at time of d/c would be Short Term Rehab  Patient to benefit from continued Occupational Therapy treatment while in the hospital to address deficits as defined above and maximize level of functional independence with ADLs and functional mobility  Pt left with call bell in reach, tray table in reach, needs met, bed alarm activated  RN aware  Plan   Treatment Interventions ADL retraining;Functional transfer training;UE strengthening/ROM; Cognitive reorientation;Patient/family training; Compensatory technique education;Continued evaluation; Endurance training   Goal Expiration Date 07/25/20   OT Treatment Day 1   OT Frequency 3-5x/wk   Recommendation   OT Discharge Recommendation Post-Acute Rehabilitation Services              LAUREEN Holguin/L

## 2020-07-16 NOTE — ASSESSMENT & PLAN NOTE
The patient has chronic atrial fibrillation  Heart rates are controlled: 70-90  Continue metoprolol 12 5 b i d this is a decreased dose due to the development of hypotension on higher doses  Patient has no signs of GI or  bleeding    Continue Eliquis for CVA prevention

## 2020-07-16 NOTE — PLAN OF CARE
Problem: PAIN - ADULT  Goal: Verbalizes/displays adequate comfort level or baseline comfort level  Description  Interventions:  - Encourage patient to monitor pain and request assistance  - Assess pain using appropriate pain scale  - Administer analgesics based on type and severity of pain and evaluate response  - Implement non-pharmacological measures as appropriate and evaluate response  - Consider cultural and social influences on pain and pain management  - Notify physician/advanced practitioner if interventions unsuccessful or patient reports new pain  Outcome: Progressing     Problem: INFECTION - ADULT  Goal: Absence or prevention of progression during hospitalization  Description  INTERVENTIONS:  - Assess and monitor for signs and symptoms of infection  - Monitor lab/diagnostic results  - Monitor all insertion sites, i e  indwelling lines, tubes, and drains  - Monitor endotracheal if appropriate and nasal secretions for changes in amount and color  - [place] appropriate cooling/warming therapies per order  - Administer medications as ordered  - Instruct and encourage patient and family to use good hand hygiene technique  - Identify and instruct in appropriate isolation precautions for identified infection/condition  Outcome: Progressing  Goal: Absence of fever/infection during neutropenic period  Description  INTERVENTIONS:  - Monitor WBC    Outcome: Progressing     Problem: SAFETY ADULT  Goal: Patient will remain free of falls  Description  INTERVENTIONS:  - Assess patient frequently for physical needs  -  Identify cognitive and physical deficits and behaviors that affect risk of falls    -  [place] fall precautions as indicated by assessment   - Educate patient/family on patient safety including physical limitations  - Instruct patient to call for assistance with activity based on assessment  - Modify environment to reduce risk of injury  - Consider OT/PT consult to assist with strengthening/mobility  Outcome: Progressing  Goal: Maintain or return to baseline ADL function  Description  INTERVENTIONS:  -  Assess patient's ability to carry out ADLs; assess patient's baseline for ADL function and identify physical deficits which impact ability to perform ADLs (bathing, care of mouth/teeth, toileting, grooming, dressing, etc )  - Assess/evaluate cause of self-care deficits   - Assess range of motion  - Assess patient's mobility; develop plan if impaired  - Assess patient's need for assistive devices and provide as appropriate  - Encourage maximum independence but intervene and supervise when necessary  - Involve family in performance of ADLs  - Assess for home care needs following discharge   - Consider OT consult to assist with ADL evaluation and planning for discharge  - Provide patient education as appropriate  Outcome: Progressing  Goal: Maintain or return mobility status to optimal level  Description  INTERVENTIONS:  - Assess patient's baseline mobility status (ambulation, transfers, stairs, etc )    - Identify cognitive and physical deficits and behaviors that affect mobility  - Identify mobility aids required to assist with transfers and/or ambulation (gait belt, sit-to-stand, lift, walker, cane, etc )  - [place] fall precautions as indicated by assessment  - Record patient progress and toleration of activity level on Mobility SBAR; progress patient to next Phase/Stage  - Instruct patient to call for assistance with activity based on assessment  - Consider rehabilitation consult to assist with strengthening/weightbearing, etc   Outcome: Progressing     Problem: DISCHARGE PLANNING  Goal: Discharge to home or other facility with appropriate resources  Description  INTERVENTIONS:  - Identify barriers to discharge w/patient and caregiver  - Arrange for needed discharge resources and transportation as appropriate  - Identify discharge learning needs (meds, wound care, etc )  - Arrange for interpretive services to assist at discharge as needed  - Refer to Case Management Department for coordinating discharge planning if the patient needs post-hospital services based on physician/advanced practitioner order or complex needs related to functional status, cognitive ability, or social support system  Outcome: Progressing     Problem: Knowledge Deficit  Goal: Patient/family/caregiver demonstrates understanding of disease process, treatment plan, medications, and discharge instructions  Description  Complete learning assessment and assess knowledge base    Interventions:  - Provide teaching at level of understanding  - Provide teaching via preferred learning methods  Outcome: Progressing     Problem: CARDIOVASCULAR - ADULT  Goal: Maintains optimal cardiac output and hemodynamic stability  Description  INTERVENTIONS:  - Monitor I/O, vital signs and rhythm  - Monitor for S/S and trends of decreased cardiac output  - Administer and titrate ordered vasoactive medications to optimize hemodynamic stability  - Assess quality of pulses, skin color and temperature  - Assess for signs of decreased coronary artery perfusion  - Instruct patient to report change in severity of symptoms  Outcome: Progressing  Goal: Absence of cardiac dysrhythmias or at baseline rhythm  Description  INTERVENTIONS:  - Continuous cardiac monitoring, vital signs, obtain 12 lead EKG if ordered  - Administer antiarrhythmic and heart rate control medications as ordered  - Monitor electrolytes and administer replacement therapy as ordered  Outcome: Progressing     Problem: MUSCULOSKELETAL - ADULT  Goal: Maintain or return mobility to safest level of function  Description  INTERVENTIONS:  - Assess patient's ability to carry out ADLs; assess patient's baseline for ADL function and identify physical deficits which impact ability to perform ADLs (bathing, care of mouth/teeth, toileting, grooming, dressing, etc )  - Assess/evaluate cause of self-care deficits   - Assess range of motion  - Assess patient's mobility  - Assess patient's need for assistive devices and provide as appropriate  - Encourage maximum independence but intervene and supervise when necessary  - Involve family in performance of ADLs  - Assess for home care needs following discharge   - Consider OT consult to assist with ADL evaluation and planning for discharge  - Provide patient education as appropriate  Outcome: Progressing     Problem: Potential for Falls  Goal: Patient will remain free of falls  Description  INTERVENTIONS:  - Assess patient frequently for physical needs  -  Identify cognitive and physical deficits and behaviors that affect risk of falls    -  [place] fall precautions as indicated by assessment   - Educate patient/family on patient safety including physical limitations  - Instruct patient to call for assistance with activity based on assessment  - Modify environment to reduce risk of injury  - Consider OT/PT consult to assist with strengthening/mobility  Outcome: Progressing     Problem: Prexisting or High Potential for Compromised Skin Integrity  Goal: Skin integrity is maintained or improved  Description  INTERVENTIONS:  - Identify patients at risk for skin breakdown  - Assess and monitor skin integrity  - Assess and monitor nutrition and hydration status  - Monitor labs   - Assess for incontinence   - Turn and reposition patient  - Assist with mobility/ambulation  - Relieve pressure over bony prominences  - Avoid friction and shearing  - Provide appropriate hygiene as needed including keeping skin clean and dry  - Evaluate need for skin moisturizer/barrier cream  - Collaborate with interdisciplinary team   - Patient/family teaching  - Consider wound care consult   Outcome: Progressing     Problem: Nutrition/Hydration-ADULT  Goal: Nutrient/Hydration intake appropriate for improving, restoring or maintaining nutritional needs  Description  Monitor and assess patient's nutrition/hydration status for malnutrition  Collaborate with interdisciplinary team and initiate plan and interventions as ordered  Monitor patient's weight and dietary intake as ordered or per policy  Utilize nutrition screening tool and intervene as necessary  Determine patient's food preferences and provide high-protein, high-caloric foods as appropriate       INTERVENTIONS:  - Monitor oral intake, urinary output, labs, and treatment plans  - Assess nutrition and hydration status and recommend course of action  - Evaluate amount of meals eaten  - Assist patient with eating if necessary   - Allow adequate time for meals  - Recommend/ encourage appropriate diets, oral nutritional supplements, and vitamin/mineral supplements  - Order, calculate, and assess calorie counts as needed  - Recommend, monitor, and adjust tube feedings and TPN/PPN based on assessed needs  - Assess need for intravenous fluids  - Provide specific nutrition/hydration education as appropriate  - Include patient/family/caregiver in decisions related to nutrition  Outcome: Progressing

## 2020-07-16 NOTE — ASSESSMENT & PLAN NOTE
Patient has BPH and developed urinary retention  Lopez catheter had to be inserted  I tried patient on Flomax but he developed hypotension and I subsequently had to discontinue Flomax      Will continue Lopez for now

## 2020-07-16 NOTE — PHYSICAL THERAPY NOTE
PT tx     07/16/20 9795   Pain Assessment   Pain Assessment Tool Villarreal-Baker FACES   Villarreal-Baker FACES Pain Rating 6   Pain Location/Orientation Location: Abdomen;Orientation: Mid;Orientation: Left; Location: Shoulder   General   Chart Reviewed Yes   Additional Pertinent History continues with c diff   Cognition   Arousal/Participation Alert   Attention Within functional limits   Orientation Level Oriented to person;Oriented to place   Memory Decreased recall of precautions;Decreased recall of recent events;Decreased short term memory   Following Commands Follows one step commands with increased time or repetition   Comments moves very slowly tangential at times   Subjective   Subjective still having diarrhea   Bed Mobility   Supine to Sit 3  Moderate assistance   Additional items Assist x 2   Sit to Supine 3  Moderate assistance   Additional items Assist x 2   Transfers   Sit to Stand 2  Maximal assistance   Additional items Assist x 2   Stand to Sit 3  Moderate assistance   Additional items Assist x 2   Stand pivot 2  Maximal assistance   Additional items Assist x 2;Armrests; Increased time required;Verbal cues  (1-2 sateps to R ankle EOB)   Balance   Static Sitting Fair   Dynamic Sitting   (scoot along EOB to R x 4 reps with supervision)   Static Standing Poor   Endurance Deficit   Endurance Deficit Yes   Endurance Deficit Description needs frequent rest breaks   Activity Tolerance   Activity Tolerance Patient limited by fatigue;Patient limited by pain   Medical Staff Made Aware yes GINO Rhodes   Nurse Made Aware RN Aaron   Exercises   Knee AROM Long [facility] ESPOO; Right;Left;25 reps  (x 4 sets)   Balance training  sitting EOb scoot to R with 100% v cues and elevated bed   Assessment   Prognosis Good   Problem List Decreased strength;Decreased endurance; Impaired balance;Decreased mobility; Decreased coordination;Decreased cognition;Decreased safety awareness;Pain   Assessment Pt able to stand uright with rw and 2 assist  Needs frequent cues and reminders to learn new technique instead of pulling up on scooter  Conitnue to recommend STR at d/c  Cont PT   Goals   Patient Goals stand straight   Plan   Treatment/Interventions ADL retraining;Functional transfer training;LE strengthening/ROM; Therapeutic exercise;Cognitive reorientation; Endurance training;Patient/family training;Equipment eval/education; Bed mobility;Gait training;Spoke to nursing;Spoke to case management;OT   Progress Improving as expected   PT Frequency 2-3x/wk   Recommendation   PT Discharge Recommendation Post-Acute Rehabilitation Services   Equipment Recommended [employer]; Wheelchair   PT - OK to Discharge Yes   Additional Comments   (to sTR with medical clearance)   Ana Camacho, PT

## 2020-07-16 NOTE — SOCIAL WORK
LOS: 5  As per Dr En Ybarra patient is for discharge today  Bed is available at [facility], no bed available at [facility]  Notified patient's son Sunitha Mascorro  of acceptance to [facility] and waiting on authorization   He plans to visit patient tonight  Placed call to [facility] and obtained pending auth number of  [id number], faxed clinical to [facility] at [phone]  Wait for rehab days covered  CM will continue baljinder follow

## 2020-07-16 NOTE — PROGRESS NOTES
Progress Note - Linwood Mazariegos 1945, 69 y o  male MRN: [id number]    Unit/Bed#: -01 Encounter: [id number]    Primary Care Provider: Wili Harp MD   Date and time admitted to hospital: 7/11/2020  4:05 PM        * Acute kidney injury superimposed on CKD [facility])  Assessment & Plan  Patient presented with acute kidney injury on stage 3/4 chronic kidney disease with baseline creatinine around 2 1  This was  due to urinary retention as well as acute systolic CHF  Patient was treated with IV Bumex initially and Lopez catheter had to be inserted because patient had recurrent urinary retention of >700ml  Renal function continues to improve:  Creatinine decreased to 1 86 today    Acute on chronic combined systolic and diastolic congestive heart failure (HCC)  Assessment & Plan  Wt Readings from Last 3 Encounters:   07/16/20 114 kg (251 lb 1 7 oz)   07/02/20 122 kg (269 lb 2 9 oz)   07/01/20 121 kg (267 lb 6 7 oz)         Patient had acute systolic CHF on admission as evidenced by CT of the chest findings as well as very high BNP  Acute systolic CHF resolved on IV Bumex    Continue p o  Bumex 1 mg b i d  Persistent atrial fibrillation  Assessment & Plan  The patient has chronic atrial fibrillation  Heart rates are controlled: 70-90  Continue metoprolol 12 5 b i d this is a decreased dose due to the development of hypotension on higher doses  Patient has no signs of GI or  bleeding  Continue Eliquis for CVA prevention      Clostridium difficile diarrhea  Assessment & Plan  Patient developed diarrhea during the hospital stay and stools came back positive for C diff  Patient had 2 episodes last night but none so far today  Will continue p o  Vancomycin and I added Questran    Benign prostatic hyperplasia with urinary retention  Assessment & Plan  Patient has BPH and developed urinary retention  Lopez catheter had to be inserted      I tried patient on Flomax but he developed hypotension and I subsequently had to discontinue Flomax  Will continue Lopez for now    Thrombocytopenia [facility])  Assessment & Plan  Patient has mild chronic thrombocytopenia  Platelet count is 572 K today which is at baseline        VTE Prophylaxis: in place    Patient Centered Rounds: I rounded with patient's nurse    Current Length of Stay: 5 day(s)    Current Patient Status: Inpatient    Certification Statement: Pt requires additional inpatient hospital stay due to: see assessment and plan        Subjective:   Patient's nurse reports that patient has not had diarrhea this morning yet  Patient to die episodes last night without any signs of bleeding  Patient denies any nausea, abdominal pain, chest pain, palpitations, shortness of breath  All other ROS are negative    Objective:     Vitals:   Temp (24hrs), Av 4 °F (36 3 °C), Min:97 1 °F (36 2 °C), Max:98 1 °F (36 7 °C)    Temp:  [97 1 °F (36 2 °C)-98 1 °F (36 7 °C)] 97 1 °F (36 2 °C)  HR:  [] 92  Resp:  [18-21] 18  BP: (104-116)/(46-80) 116/80  SpO2:  [86 %-98 %] 98 %  Body mass index is 34 06 kg/m²  Input and Output Summary (last 24 hours): Intake/Output Summary (Last 24 hours) at 2020 1425  Last data filed at 2020 1201  Gross per 24 hour   Intake 1200 ml   Output 2550 ml   Net -1350 ml       Physical Exam:     Physical Exam   Constitutional: He appears well-developed  No distress  HENT:   Head: Normocephalic  Eyes: Conjunctivae are normal    Neck: Neck supple  No JVD present  Cardiovascular: Normal rate  Irregular irregular, patient has no lower extremity edema   Pulmonary/Chest: Effort normal  No respiratory distress  He has no wheezes  He has no rales  Abdominal: Soft  Bowel sounds are normal  There is no tenderness  Neurological: He is alert  No cranial nerve deficit  Skin: Skin is warm and dry  Patient has chronic lower extremity erythema without cellulitis   Vitals reviewed            I personally reviewed labs and imaging reports for today  Last 24 Hours Medication List:     Current Facility-Administered Medications:  acetaminophen 650 mg Oral Q6H PRN Marzette Brill, CRNP   apixaban 5 mg Oral BID Celine Dockery MD   bumetanide 1 mg Oral BID Celine Dockery MD   cholestyramine sugar free 4 g Oral BID Celine Dockery MD   collagenase  Topical Daily Maxcine Meo, DPM   DULoxetine 30 mg Oral Daily Marzette Brill, CRNP   HYDROcodone-acetaminophen 1 tablet Oral Q6H PRN Soraida Jiang PA-C   insulin lispro 1-5 Units Subcutaneous HS Cordella Savin, DO   insulin lispro 1-6 Units Subcutaneous TID AC Cordella Savin, DO   metoprolol tartrate 12 5 mg Oral Q12H [address] [address], MD   midodrine 5 mg Oral TID AC Marzette Brill, CRNP   nystatin  Topical BID Marzette Brill, CRNP   pantoprazole 40 mg Oral Daily Marzette Brill, CRNP   potassium chloride 20 mEq Oral BID Celine Dockery MD   vancomycin 125 mg Oral Q6H [address] [address], MD          Today, Patient Was Seen By: Celine Docekry MD    ** Please Note: Dictation voice to text software may have been used in the creation of this document   **

## 2020-07-16 NOTE — PLAN OF CARE
Problem: PHYSICAL THERAPY ADULT  Goal: Performs mobility at highest level of function for planned discharge setting  See evaluation for individualized goals  Description  Treatment/Interventions: ADL retraining, Functional transfer training, LE strengthening/ROM, Elevations, Therapeutic exercise, Endurance training, Cognitive reorientation, Patient/family training, Equipment eval/education, Bed mobility, Spoke to nursing, Spoke to case management, OT  Equipment Recommended: Shane Santiago       See flowsheet documentation for full assessment, interventions and recommendations  Outcome: Progressing  Note:   Prognosis: Good  Problem List: Decreased strength, Decreased endurance, Impaired balance, Decreased mobility, Decreased coordination, Decreased cognition, Decreased safety awareness, Pain  Assessment: Pt able to stand uright with rw and 2 assist  Needs frequent cues and reminders to learn new technique instead of pulling up on scooter  Conitnue to recommend STR at d/c  Cont PT  Barriers to Discharge: Inaccessible home environment, Decreased caregiver support     PT Discharge Recommendation: [phone]     PT - OK to Discharge: Yes    See flowsheet documentation for full assessment

## 2020-07-16 NOTE — PLAN OF CARE
Problem: OCCUPATIONAL THERAPY ADULT  Goal: Performs self-care activities at highest level of function for planned discharge setting  See evaluation for individualized goals  Description  Treatment Interventions: ADL retraining, Functional transfer training, Endurance training, Cognitive reorientation, Patient/family training, Fine motor coordination activities, Compensatory technique education, Equipment evaluation/education, Continued evaluation          See flowsheet documentation for full assessment, interventions and recommendations  Outcome: Progressing  Note:   Limitation: Decreased ADL status, Decreased UE ROM, Decreased UE strength, Decreased Safe judgement during ADL, Decreased cognition, Decreased endurance, Decreased self-care trans, Decreased high-level ADLs, Decreased fine motor control  Prognosis: Fair  Assessment: Patient participated in Skilled OT session this date with interventions consisting of safety awareness and fall prevention techniques,  use of appropriate body mechanics*,  therapeutic activities to: increase activity tolerance, increase dynamic sit/ stand balance during functional activity , increase postural control, increase trunk control and increase OOB/ sitting tolerance   Patient agreeable to OT treatment session, upon arrival patient was found supine in bed  Treatment session as follows: Pt performed bed mobility with mod Ax 2  Pt sat on EOB for ~20 min negotiating strategies for performing sit>stand  Pt benefitted from extensive education on body mechanics, transfer safety, weightshifting strategies, environmental set-up (i e walker placement, bed rails) as pt very particular regarding preferred (unsafe) method of transfering  Pt performed sit<>stand transfer with max Ax 2  Pt returned to supine position with mod Ax 2 where he required toileiting assistance with max A   Patient continues to be functioning below baseline level, occupational performance remains limited secondary to factors listed above, and pt at increased risk for falls and injury  From OT standpoint, recommendation at time of d/c would be Short Term Rehab  Patient to benefit from continued Occupational Therapy treatment while in the hospital to address deficits as defined above and maximize level of functional independence with ADLs and functional mobility  Pt left with call bell in reach, tray table in reach, needs met, bed alarm activated  RN aware        OT Discharge Recommendation: Post-Acute Rehabilitation Services

## 2020-07-16 NOTE — ASSESSMENT & PLAN NOTE
Wt Readings from Last 3 Encounters:   07/16/20 114 kg (251 lb 1 7 oz)   07/02/20 122 kg (269 lb 2 9 oz)   07/01/20 121 kg (267 lb 6 7 oz)         Patient had acute systolic CHF on admission as evidenced by CT of the chest findings as well as very high BNP  Acute systolic CHF resolved on IV Bumex    Continue p o  Bumex 1 mg b i d

## 2020-07-17 VITALS
HEART RATE: 91 BPM | SYSTOLIC BLOOD PRESSURE: 123 MMHG | OXYGEN SATURATION: 98 % | BODY MASS INDEX: 34.01 KG/M2 | RESPIRATION RATE: 18 BRPM | WEIGHT: 251.1 LBS | HEIGHT: 72 IN | TEMPERATURE: 97.1 F | DIASTOLIC BLOOD PRESSURE: 88 MMHG

## 2020-07-17 LAB
ANION GAP SERPL CALCULATED.3IONS-SCNC: 2 MMOL/L (ref 4–13)
ANION GAP SERPL CALCULATED.3IONS-SCNC: 4 MMOL/L (ref 4–13)
BUN SERPL-MCNC: 49 MG/DL (ref 5–25)
BUN SERPL-MCNC: 49 MG/DL (ref 5–25)
CALCIUM SERPL-MCNC: 8.8 MG/DL (ref 8.3–10.1)
CALCIUM SERPL-MCNC: 8.9 MG/DL (ref 8.3–10.1)
CHLORIDE SERPL-SCNC: 101 MMOL/L (ref 100–108)
CHLORIDE SERPL-SCNC: 101 MMOL/L (ref 100–108)
CO2 SERPL-SCNC: 31 MMOL/L (ref 21–32)
CO2 SERPL-SCNC: 32 MMOL/L (ref 21–32)
CREAT SERPL-MCNC: 1.8 MG/DL (ref 0.6–1.3)
CREAT SERPL-MCNC: 1.88 MG/DL (ref 0.6–1.3)
GFR SERPL CREATININE-BSD FRML MDRD: 35 ML/MIN/1.73SQ M
GFR SERPL CREATININE-BSD FRML MDRD: 37 ML/MIN/1.73SQ M
GLUCOSE SERPL-MCNC: 146 MG/DL (ref 65–140)
GLUCOSE SERPL-MCNC: 81 MG/DL (ref 65–140)
GLUCOSE SERPL-MCNC: 82 MG/DL (ref 65–140)
GLUCOSE SERPL-MCNC: 86 MG/DL (ref 65–140)
POTASSIUM SERPL-SCNC: 4.5 MMOL/L (ref 3.5–5.3)
POTASSIUM SERPL-SCNC: 6.5 MMOL/L (ref 3.5–5.3)
SARS-COV-2 RNA RESP QL NAA+PROBE: NEGATIVE
SODIUM SERPL-SCNC: 135 MMOL/L (ref 136–145)
SODIUM SERPL-SCNC: 136 MMOL/L (ref 136–145)

## 2020-07-17 PROCEDURE — 87635 SARS-COV-2 COVID-19 AMP PRB: CPT | Performed by: INTERNAL MEDICINE

## 2020-07-17 PROCEDURE — 99239 HOSP IP/OBS DSCHRG MGMT >30: CPT | Performed by: INTERNAL MEDICINE

## 2020-07-17 PROCEDURE — 80048 BASIC METABOLIC PNL TOTAL CA: CPT | Performed by: INTERNAL MEDICINE

## 2020-07-17 PROCEDURE — 82948 REAGENT STRIP/BLOOD GLUCOSE: CPT

## 2020-07-17 RX ORDER — CHOLESTYRAMINE LIGHT 4 G/5.7G
4 POWDER, FOR SUSPENSION ORAL DAILY
Qty: 11 PACKET | Refills: 0 | Status: SHIPPED | OUTPATIENT
Start: 2020-07-17 | End: 2020-08-14 | Stop reason: HOSPADM

## 2020-07-17 RX ORDER — HYDROCODONE BITARTRATE AND ACETAMINOPHEN 5; 325 MG/1; MG/1
1 TABLET ORAL EVERY 12 HOURS PRN
Qty: 30 TABLET | Refills: 0 | Status: SHIPPED | OUTPATIENT
Start: 2020-07-17 | End: 2020-07-27

## 2020-07-17 RX ORDER — POTASSIUM CHLORIDE 750 MG/1
20 CAPSULE, EXTENDED RELEASE ORAL DAILY
Qty: 60 CAPSULE | Refills: 0 | Status: SHIPPED | OUTPATIENT
Start: 2020-07-17 | End: 2020-08-14 | Stop reason: HOSPADM

## 2020-07-17 RX ORDER — BUMETANIDE 1 MG/1
1 TABLET ORAL 2 TIMES DAILY
Qty: 60 TABLET | Refills: 0 | Status: SHIPPED | OUTPATIENT
Start: 2020-07-17 | End: 2020-08-14 | Stop reason: HOSPADM

## 2020-07-17 RX ADMIN — METOPROLOL TARTRATE 12.5 MG: 25 TABLET ORAL at 09:32

## 2020-07-17 RX ADMIN — BUMETANIDE 1 MG: 1 TABLET ORAL at 09:31

## 2020-07-17 RX ADMIN — MIDODRINE HYDROCHLORIDE 5 MG: 5 TABLET ORAL at 06:23

## 2020-07-17 RX ADMIN — NYSTATIN: 100000 POWDER TOPICAL at 09:32

## 2020-07-17 RX ADMIN — APIXABAN 5 MG: 5 TABLET, FILM COATED ORAL at 09:31

## 2020-07-17 RX ADMIN — CHOLESTYRAMINE 4 G: 4 POWDER, FOR SUSPENSION ORAL at 09:32

## 2020-07-17 RX ADMIN — PANTOPRAZOLE SODIUM 40 MG: 40 TABLET, DELAYED RELEASE ORAL at 09:31

## 2020-07-17 RX ADMIN — Medication 125 MG: at 06:23

## 2020-07-17 RX ADMIN — Medication 125 MG: at 00:47

## 2020-07-17 RX ADMIN — DULOXETINE 30 MG: 30 CAPSULE, DELAYED RELEASE ORAL at 09:31

## 2020-07-17 NOTE — TRANSPORTATION MEDICAL NECESSITY
Section I - General Information    Name of Patient: Ava Jones                 : 1945    Medicare #: [id number]  Transport Date: 20 (PCS is valid for round trips on this date and for all repetitive trips in the 60-day range as noted below )  Origin: [facility]: [facility]  Is the pt's stay covered under Medicare Part A (PPS/DRG)   []     Closest appropriate facility? If no, why is transport to more distant facility required? Yes  If hospice pt, is this transport related to pt's terminal illness? NA       Section II - Medical Necessity Questionnaire  Ambulance transportation is medically necessary only if other means of transport are contraindicated or would be potentially harmful to the patient  To meet this requirement, the patient must either be "bed confined" or suffer from a condition such that transport by means other than ambulance is contraindicated by the patient's condition  The following questions must be answered by the medical professional signing below for this form to be valid:    1)  Describe the MEDICAL CONDITION (physical and/or mental) of this patient AT [address] [place] [address] that requires the patient to be transported in an ambulance and why transport by other means is contraindicated by the patient's condition: Mod to max assist for transfers and bed mobility, poor sitting balance, fall risk    2) Is the patient "bed confined" as defined below? Yes  To be "be confined" the patient must satisfy all three of the following conditions: (1) unable to get up from bed without Assistance; AND (2) unable to ambulate; AND (3) unable to sit in a chair or wheelchair  3) Can this patient safely be transported by car or wheelchair van (i e , seated during transport without a medical attendant or monitoring)?   No    4) In addition to completing questions 1-3 above, please check any of the following conditions that apply*:   *Note: supporting documentation for any boxes checked must be maintained in the patient's medical records  If hosp-hosp transfer, describe services needed at 2nd facility not available at 1st facility? Special handling/isolation/infection control precautions required   Unable to tolerate seated position for time needed to transport   Other(specify) fall risk      Section III - Signature of Physician or Healthcare Professional  I certify that the above information is true and correct based on my evaluation of this patient, and represent that the patient requires transport by ambulance and that other forms of transport are contraindicated  I understand that this information will be used by the Centers for Medicare and Medicaid Services (CMS) to support the determination of medical necessity for ambulance services, and I represent that I have personal knowledge of the patient's condition at time of transport  []  If this box is checked, I also certify that the patient is physically or mentally incapable of signing the ambulance service's claim and that the institution with which I am affiliated has furnished care, services, or assistance to the patient  My signature below is made on behalf of the patient pursuant to 42 CFR §424 36(b)(4)  In accordance with 42 CFR §424 37, the specific reason(s) that the patient is physically or mentally incapable of signing the claim form is as follows:  Aidan Goode of Physician* or Healthcare Professional______________________________________________________________  Signature Date 07/17/20 (For scheduled repetitive transports, this form is not valid for transports performed more than 60 days after this date)    Printed Name & Credentials of Physician or Healthcare Professional (MD, DO, RN, etc )________________________________  *Form must be signed by patient's attending physician for scheduled, repetitive transports   For non-repetitive, unscheduled ambulance transports, if unable to obtain the signature of the attending physician, any of the following may sign (choose appropriate option below)  [] Physician Assistant []  Clinical Nurse Specialist []  Registered Nurse  []  Nurse Practitioner  [x] Discharge Planner

## 2020-07-17 NOTE — DISCHARGE SUMMARY
Discharge- Jo-Ann Harvey 1945, 69 y o  male MRN: [id number]    Unit/Bed#: -01 Encounter: [id number]    Primary Care Provider: Helena Dumont MD   Date and time admitted to hospital: 7/11/2020  4:05 PM        * Acute kidney injury superimposed on CKD [facility])  Assessment & Plan  Patient presented with acute kidney injury on stage 3/4 chronic kidney disease with baseline creatinine around 2 1  This was  due to urinary retention as well as acute systolic CHF  Patient was treated with IV Bumex initially and Lopez catheter had to be inserted because patient had recurrent urinary retention of >700ml  Renal function continues to improve:  Creatinine on discharge is 1 88, this is likely patient's new baseline  Patient is stable for discharge today    I refer rechecking BMP next week    I left a message on patient's son's answering machine call me back    Acute on chronic combined systolic and diastolic congestive heart failure (HCC)  Assessment & Plan  Wt Readings from Last 3 Encounters:   07/17/20 114 kg (251 lb 1 7 oz)   07/02/20 122 kg (269 lb 2 9 oz)   07/01/20 121 kg (267 lb 6 7 oz)         Patient had acute systolic CHF on admission as evidenced by CT of the chest findings as well as very high BNP  Acute systolic CHF resolved on IV Bumex    Continue p o  Bumex 1 mg b i d     Lungs are clear to auscultation bilaterally on day of discharge, oxygen saturations 94-97% on room air    Persistent atrial fibrillation  Assessment & Plan  The patient has chronic atrial fibrillation  Heart rates are controlled: 70-90  Continue metoprolol 12 5 b i d this is a decreased dose due to the development of hypotension on higher doses  Patient has no signs of GI or  bleeding  Continue Eliquis for CVA prevention      Clostridium difficile diarrhea  Assessment & Plan  Patient developed diarrhea during the hospital stay and stools came back positive for C diff      Patient diuresed well controlled with vancomycin and Questran  He has not had diarrhea for 24 hours  Will continue vancomycin and question for 11 more days  His abdomen is soft and nontender on day of discharge    Benign prostatic hyperplasia with urinary retention  Assessment & Plan  Patient has BPH and developed urinary retention  Lopez catheter had to be inserted  I tried patient on Flomax but he developed hypotension and I subsequently had to discontinue Flomax  Will continue Lopez for now in the nursing home as well  Consider voiding trial next week    Thrombocytopenia [facility])  Assessment & Plan  Patient has mild chronic thrombocytopenia  Platelet count is 664 K on discharge which is at baseline    Type 2 diabetes mellitus without complication, without long-term current use of insulin [facility])  Assessment & Plan  Lab Results   Component Value Date    HGBA1C 5 1 07/13/2020       Recent Labs     07/16/20  1129 07/16/20  1637 07/16/20  2200 07/17/20  0740   POCGLU 86 105 102 81       Blood Sugar Average: Last 72 hrs:      Patient's blood sugars are well controlled on diet, will continue same            Hospital Course:     Ronen Mosquera is a 69 y o  male patient who originally presented to the hospital on   Admission Orders (From admission, onward)     Ordered        07/11/20 1935  Inpatient Admission (expected length of stay for this patient Order details is greater than two midnights)  Once                  due to acute kidney injury, acute CHF    Please see above list of diagnoses and related plan for additional information  Condition at Discharge:  good      Discharge instructions/Information to patient and family:   See after visit summary for information provided to patient and family  Provisions for Follow-Up Care:  See after visit summary for information related to follow-up care and any pertinent home health orders        Disposition:     Other [place] [facility]       Discharge Statement:  I spent 34 minutes discharging the patient  This time was spent on the day of discharge  I had direct contact with the patient on the day of discharge  Greater than 50% of the total time was spent examining patient, answering all patient questions, arranging and discussing plan of care with patient as well as directly providing post-discharge instructions  Additional time then spent on discharge activities  Discharge Medications:  See after visit summary for reconciled discharge medications provided to patient and family        ** Please Note: This note has been constructed using a voice recognition system **

## 2020-07-17 NOTE — ASSESSMENT & PLAN NOTE
Patient has BPH and developed urinary retention  Lopez catheter had to be inserted  I tried patient on Flomax but he developed hypotension and I subsequently had to discontinue Flomax  Will continue Lopez for now in the nursing home as well      Consider voiding trial next week

## 2020-07-17 NOTE — ASSESSMENT & PLAN NOTE
Patient developed diarrhea during the hospital stay and stools came back positive for C diff  Patient diuresed well controlled with vancomycin and Questran  He has not had diarrhea for 24 hours  Will continue vancomycin and question for 11 more days      His abdomen is soft and nontender on day of discharge

## 2020-07-17 NOTE — ASSESSMENT & PLAN NOTE
Lab Results   Component Value Date    HGBA1C 5 1 07/13/2020       Recent Labs     07/16/20  1129 07/16/20  1637 07/16/20  2200 07/17/20  0740   POCGLU 86 105 102 81       Blood Sugar Average: Last 72 hrs:      Patient's blood sugars are well controlled on diet, will continue same

## 2020-07-17 NOTE — ASSESSMENT & PLAN NOTE
Patient has mild chronic thrombocytopenia    Platelet count is 360 K on discharge which is at baseline

## 2020-07-17 NOTE — PLAN OF CARE
Problem: PAIN - ADULT  Goal: Verbalizes/displays adequate comfort level or baseline comfort level  Description  Interventions:  - Encourage patient to monitor pain and request assistance  - Assess pain using appropriate pain scale  - Administer analgesics based on type and severity of pain and evaluate response  - Implement non-pharmacological measures as appropriate and evaluate response  - Consider cultural and social influences on pain and pain management  - Notify physician/advanced practitioner if interventions unsuccessful or patient reports new pain  7/17/2020 1142 by Soraya Hansen RN  Outcome: Adequate for Discharge  7/17/2020 1044 by Soraya Hansen RN  Outcome: Progressing     Problem: INFECTION - ADULT  Goal: Absence or prevention of progression during hospitalization  Description  INTERVENTIONS:  - Assess and monitor for signs and symptoms of infection  - Monitor lab/diagnostic results  - Monitor all insertion sites, i e  indwelling lines, tubes, and drains  - Monitor endotracheal if appropriate and nasal secretions for changes in amount and color  - [place] appropriate cooling/warming therapies per order  - Administer medications as ordered  - Instruct and encourage patient and family to use good hand hygiene technique  - Identify and instruct in appropriate isolation precautions for identified infection/condition  7/17/2020 1142 by Soraya Hansen RN  Outcome: Adequate for Discharge  7/17/2020 1044 by Soraya Hansen RN  Outcome: Progressing  Goal: Absence of fever/infection during neutropenic period  Description  INTERVENTIONS:  - Monitor WBC    7/17/2020 1142 by Soraya Hansen RN  Outcome: Adequate for Discharge  7/17/2020 1044 by Soraya Hansen RN  Outcome: Progressing     Problem: SAFETY ADULT  Goal: Patient will remain free of falls  Description  INTERVENTIONS:  - Assess patient frequently for physical needs  -  Identify cognitive and physical deficits and behaviors that affect risk of falls    -  [place] fall precautions as indicated by assessment   - Educate patient/family on patient safety including physical limitations  - Instruct patient to call for assistance with activity based on assessment  - Modify environment to reduce risk of injury  - Consider OT/PT consult to assist with strengthening/mobility  7/17/2020 1142 by Benito Jasso RN  Outcome: Adequate for Discharge  7/17/2020 1044 by Benito Jasso RN  Outcome: Progressing  Goal: Maintain or return to baseline ADL function  Description  INTERVENTIONS:  -  Assess patient's ability to carry out ADLs; assess patient's baseline for ADL function and identify physical deficits which impact ability to perform ADLs (bathing, care of mouth/teeth, toileting, grooming, dressing, etc )  - Assess/evaluate cause of self-care deficits   - Assess range of motion  - Assess patient's mobility; develop plan if impaired  - Assess patient's need for assistive devices and provide as appropriate  - Encourage maximum independence but intervene and supervise when necessary  - Involve family in performance of ADLs  - Assess for home care needs following discharge   - Consider OT consult to assist with ADL evaluation and planning for discharge  - Provide patient education as appropriate  7/17/2020 1142 by Benito Jasso RN  Outcome: Adequate for Discharge  7/17/2020 1044 by Benito Jasso RN  Outcome: Progressing  Goal: Maintain or return mobility status to optimal level  Description  INTERVENTIONS:  - Assess patient's baseline mobility status (ambulation, transfers, stairs, etc )    - Identify cognitive and physical deficits and behaviors that affect mobility  - Identify mobility aids required to assist with transfers and/or ambulation (gait belt, sit-to-stand, lift, walker, cane, etc )  - [place] fall precautions as indicated by assessment  - Record patient progress and toleration of activity level on Mobility SBAR; progress patient to next Phase/Stage  - Instruct patient to call for assistance with activity based on assessment  - Consider rehabilitation consult to assist with strengthening/weightbearing, etc   7/17/2020 1142 by Marva Hendricks RN  Outcome: Adequate for Discharge  7/17/2020 1044 by Marva Hendricks RN  Outcome: Progressing     Problem: DISCHARGE PLANNING  Goal: Discharge to home or other facility with appropriate resources  Description  INTERVENTIONS:  - Identify barriers to discharge w/patient and caregiver  - Arrange for needed discharge resources and transportation as appropriate  - Identify discharge learning needs (meds, wound care, etc )  - Arrange for interpretive services to assist at discharge as needed  - Refer to Case Management Department for coordinating discharge planning if the patient needs post-hospital services based on physician/advanced practitioner order or complex needs related to functional status, cognitive ability, or social support system  7/17/2020 1142 by Marva Hendricks RN  Outcome: Adequate for Discharge  7/17/2020 1044 by Marva Hendricks RN  Outcome: Progressing     Problem: Knowledge Deficit  Goal: Patient/family/caregiver demonstrates understanding of disease process, treatment plan, medications, and discharge instructions  Description  Complete learning assessment and assess knowledge base    Interventions:  - Provide teaching at level of understanding  - Provide teaching via preferred learning methods  7/17/2020 1142 by Marva Hendricks RN  Outcome: Adequate for Discharge  7/17/2020 1044 by Marva Hendricks RN  Outcome: Progressing     Problem: CARDIOVASCULAR - ADULT  Goal: Maintains optimal cardiac output and hemodynamic stability  Description  INTERVENTIONS:  - Monitor I/O, vital signs and rhythm  - Monitor for S/S and trends of decreased cardiac output  - Administer and titrate ordered vasoactive medications to optimize hemodynamic stability  - Assess quality of pulses, skin color and temperature  - Assess for signs of decreased coronary artery perfusion  - Instruct patient to report change in severity of symptoms  7/17/2020 1142 by Lucius Ahumada, RN  Outcome: Adequate for Discharge  7/17/2020 1044 by Lucius Ahumada, RN  Outcome: Progressing  Goal: Absence of cardiac dysrhythmias or at baseline rhythm  Description  INTERVENTIONS:  - Continuous cardiac monitoring, vital signs, obtain 12 lead EKG if ordered  - Administer antiarrhythmic and heart rate control medications as ordered  - Monitor electrolytes and administer replacement therapy as ordered  7/17/2020 1142 by Lucius Ahumada, RN  Outcome: Adequate for Discharge  7/17/2020 1044 by Lucius Ahumada, RN  Outcome: Progressing     Problem: MUSCULOSKELETAL - ADULT  Goal: Maintain or return mobility to safest level of function  Description  INTERVENTIONS:  - Assess patient's ability to carry out ADLs; assess patient's baseline for ADL function and identify physical deficits which impact ability to perform ADLs (bathing, care of mouth/teeth, toileting, grooming, dressing, etc )  - Assess/evaluate cause of self-care deficits   - Assess range of motion  - Assess patient's mobility  - Assess patient's need for assistive devices and provide as appropriate  - Encourage maximum independence but intervene and supervise when necessary  - Involve family in performance of ADLs  - Assess for home care needs following discharge   - Consider OT consult to assist with ADL evaluation and planning for discharge  - Provide patient education as appropriate  7/17/2020 1142 by Lucius Ahumada, RN  Outcome: Adequate for Discharge  7/17/2020 1044 by Lucius Ahumada, RN  Outcome: Progressing     Problem: Potential for Falls  Goal: Patient will remain free of falls  Description  INTERVENTIONS:  - Assess patient frequently for physical needs  -  Identify cognitive and physical deficits and behaviors that affect risk of falls    -  [place] fall precautions as indicated by assessment   - Educate patient/family on patient safety including physical limitations  - Instruct patient to call for assistance with activity based on assessment  - Modify environment to reduce risk of injury  - Consider OT/PT consult to assist with strengthening/mobility  7/17/2020 1142 by Abril Davidson RN  Outcome: Adequate for Discharge  7/17/2020 1044 by Abril Davidson RN  Outcome: Progressing     Problem: Prexisting or High Potential for Compromised Skin Integrity  Goal: Skin integrity is maintained or improved  Description  INTERVENTIONS:  - Identify patients at risk for skin breakdown  - Assess and monitor skin integrity  - Assess and monitor nutrition and hydration status  - Monitor labs   - Assess for incontinence   - Turn and reposition patient  - Assist with mobility/ambulation  - Relieve pressure over bony prominences  - Avoid friction and shearing  - Provide appropriate hygiene as needed including keeping skin clean and dry  - Evaluate need for skin moisturizer/barrier cream  - Collaborate with interdisciplinary team   - Patient/family teaching  - Consider wound care consult   7/17/2020 1142 by Abril Davidson RN  Outcome: Adequate for Discharge  7/17/2020 1044 by Abril Davidson RN  Outcome: Progressing     Problem: Nutrition/Hydration-ADULT  Goal: Nutrient/Hydration intake appropriate for improving, restoring or maintaining nutritional needs  Description  Monitor and assess patient's nutrition/hydration status for malnutrition  Collaborate with interdisciplinary team and initiate plan and interventions as ordered  Monitor patient's weight and dietary intake as ordered or per policy  Utilize nutrition screening tool and intervene as necessary  Determine patient's food preferences and provide high-protein, high-caloric foods as appropriate       INTERVENTIONS:  - Monitor oral intake, urinary output, labs, and treatment plans  - Assess nutrition and hydration status and recommend course of action  - Evaluate amount of meals eaten  - Assist patient with eating if necessary   - Allow adequate time for meals  - Recommend/ encourage appropriate diets, oral nutritional supplements, and vitamin/mineral supplements  - Order, calculate, and assess calorie counts as needed  - Recommend, monitor, and adjust tube feedings and TPN/PPN based on assessed needs  - Assess need for intravenous fluids  - Provide specific nutrition/hydration education as appropriate  - Include patient/family/caregiver in decisions related to nutrition  7/17/2020 1142 by Loco Slater RN  Outcome: Adequate for Discharge  7/17/2020 1044 by Loco Slater RN  Outcome: Progressing

## 2020-07-17 NOTE — SOCIAL WORK
Continue to follow  Spoke with Mallory Guerra [facility] can accept Pt today  SNF auth obtained from Aisha Antoine at [facility]  Auth # is [id number] subacute level 1 from 7/17 to 7/20  Faxed update on 7/21 to [phone]  Per [facility], no ambulance auth needed  Met with Pt  CM informed Pt of discharge to [facility] today  Call placed to Pt's son(Jaleel: [phone]), informed of discharge to [facility] today  Pt's son inquired where he can  Pt's motorized chair when he gets done work  Call placed to [facility], spoke with [employer] transport arranged to [facility]  Pickup is 12:00pm  Pt informed of 12pm pickup  Brock Marie at [facility] and Pt's nurse(Alea) informed of transport time  Pt's nurse(Alea) informed that Pt's son can pickup Pt's motorized wheelchair at the nurses station after work  Call placed to Pt's son(Jaleel: [phone]), left message to inform of transport time and that he can  motorized chair at nurse's station after work  Anticipate return call  Faxed PASRR and vitals to Brock Marie at [facility]

## 2020-07-17 NOTE — ASSESSMENT & PLAN NOTE
Wt Readings from Last 3 Encounters:   07/17/20 114 kg (251 lb 1 7 oz)   07/02/20 122 kg (269 lb 2 9 oz)   07/01/20 121 kg (267 lb 6 7 oz)         Patient had acute systolic CHF on admission as evidenced by CT of the chest findings as well as very high BNP  Acute systolic CHF resolved on IV Bumex    Continue p o   Bumex 1 mg b i d     Lungs are clear to auscultation bilaterally on day of discharge, oxygen saturations 94-97% on room air

## 2020-07-17 NOTE — PLAN OF CARE
Problem: PAIN - ADULT  Goal: Verbalizes/displays adequate comfort level or baseline comfort level  Description  Interventions:  - Encourage patient to monitor pain and request assistance  - Assess pain using appropriate pain scale  - Administer analgesics based on type and severity of pain and evaluate response  - Implement non-pharmacological measures as appropriate and evaluate response  - Consider cultural and social influences on pain and pain management  - Notify physician/advanced practitioner if interventions unsuccessful or patient reports new pain  Outcome: Progressing     Problem: INFECTION - ADULT  Goal: Absence or prevention of progression during hospitalization  Description  INTERVENTIONS:  - Assess and monitor for signs and symptoms of infection  - Monitor lab/diagnostic results  - Monitor all insertion sites, i e  indwelling lines, tubes, and drains  - Monitor endotracheal if appropriate and nasal secretions for changes in amount and color  - [place] appropriate cooling/warming therapies per order  - Administer medications as ordered  - Instruct and encourage patient and family to use good hand hygiene technique  - Identify and instruct in appropriate isolation precautions for identified infection/condition  Outcome: Progressing  Goal: Absence of fever/infection during neutropenic period  Description  INTERVENTIONS:  - Monitor WBC    Outcome: Progressing     Problem: SAFETY ADULT  Goal: Patient will remain free of falls  Description  INTERVENTIONS:  - Assess patient frequently for physical needs  -  Identify cognitive and physical deficits and behaviors that affect risk of falls    -  [place] fall precautions as indicated by assessment   - Educate patient/family on patient safety including physical limitations  - Instruct patient to call for assistance with activity based on assessment  - Modify environment to reduce risk of injury  - Consider OT/PT consult to assist with strengthening/mobility  Outcome: Progressing  Goal: Maintain or return to baseline ADL function  Description  INTERVENTIONS:  -  Assess patient's ability to carry out ADLs; assess patient's baseline for ADL function and identify physical deficits which impact ability to perform ADLs (bathing, care of mouth/teeth, toileting, grooming, dressing, etc )  - Assess/evaluate cause of self-care deficits   - Assess range of motion  - Assess patient's mobility; develop plan if impaired  - Assess patient's need for assistive devices and provide as appropriate  - Encourage maximum independence but intervene and supervise when necessary  - Involve family in performance of ADLs  - Assess for home care needs following discharge   - Consider OT consult to assist with ADL evaluation and planning for discharge  - Provide patient education as appropriate  Outcome: Progressing  Goal: Maintain or return mobility status to optimal level  Description  INTERVENTIONS:  - Assess patient's baseline mobility status (ambulation, transfers, stairs, etc )    - Identify cognitive and physical deficits and behaviors that affect mobility  - Identify mobility aids required to assist with transfers and/or ambulation (gait belt, sit-to-stand, lift, walker, cane, etc )  - [place] fall precautions as indicated by assessment  - Record patient progress and toleration of activity level on Mobility SBAR; progress patient to next Phase/Stage  - Instruct patient to call for assistance with activity based on assessment  - Consider rehabilitation consult to assist with strengthening/weightbearing, etc   Outcome: Progressing     Problem: DISCHARGE PLANNING  Goal: Discharge to home or other facility with appropriate resources  Description  INTERVENTIONS:  - Identify barriers to discharge w/patient and caregiver  - Arrange for needed discharge resources and transportation as appropriate  - Identify discharge learning needs (meds, wound care, etc )  - Arrange for interpretive services to assist at discharge as needed  - Refer to Case Management Department for coordinating discharge planning if the patient needs post-hospital services based on physician/advanced practitioner order or complex needs related to functional status, cognitive ability, or social support system  Outcome: Progressing     Problem: Knowledge Deficit  Goal: Patient/family/caregiver demonstrates understanding of disease process, treatment plan, medications, and discharge instructions  Description  Complete learning assessment and assess knowledge base    Interventions:  - Provide teaching at level of understanding  - Provide teaching via preferred learning methods  Outcome: Progressing     Problem: CARDIOVASCULAR - ADULT  Goal: Maintains optimal cardiac output and hemodynamic stability  Description  INTERVENTIONS:  - Monitor I/O, vital signs and rhythm  - Monitor for S/S and trends of decreased cardiac output  - Administer and titrate ordered vasoactive medications to optimize hemodynamic stability  - Assess quality of pulses, skin color and temperature  - Assess for signs of decreased coronary artery perfusion  - Instruct patient to report change in severity of symptoms  Outcome: Progressing  Goal: Absence of cardiac dysrhythmias or at baseline rhythm  Description  INTERVENTIONS:  - Continuous cardiac monitoring, vital signs, obtain 12 lead EKG if ordered  - Administer antiarrhythmic and heart rate control medications as ordered  - Monitor electrolytes and administer replacement therapy as ordered  Outcome: Progressing     Problem: MUSCULOSKELETAL - ADULT  Goal: Maintain or return mobility to safest level of function  Description  INTERVENTIONS:  - Assess patient's ability to carry out ADLs; assess patient's baseline for ADL function and identify physical deficits which impact ability to perform ADLs (bathing, care of mouth/teeth, toileting, grooming, dressing, etc )  - Assess/evaluate cause of self-care deficits   - Assess range of motion  - Assess patient's mobility  - Assess patient's need for assistive devices and provide as appropriate  - Encourage maximum independence but intervene and supervise when necessary  - Involve family in performance of ADLs  - Assess for home care needs following discharge   - Consider OT consult to assist with ADL evaluation and planning for discharge  - Provide patient education as appropriate  Outcome: Progressing     Problem: Potential for Falls  Goal: Patient will remain free of falls  Description  INTERVENTIONS:  - Assess patient frequently for physical needs  -  Identify cognitive and physical deficits and behaviors that affect risk of falls    -  [place] fall precautions as indicated by assessment   - Educate patient/family on patient safety including physical limitations  - Instruct patient to call for assistance with activity based on assessment  - Modify environment to reduce risk of injury  - Consider OT/PT consult to assist with strengthening/mobility  Outcome: Progressing     Problem: Prexisting or High Potential for Compromised Skin Integrity  Goal: Skin integrity is maintained or improved  Description  INTERVENTIONS:  - Identify patients at risk for skin breakdown  - Assess and monitor skin integrity  - Assess and monitor nutrition and hydration status  - Monitor labs   - Assess for incontinence   - Turn and reposition patient  - Assist with mobility/ambulation  - Relieve pressure over bony prominences  - Avoid friction and shearing  - Provide appropriate hygiene as needed including keeping skin clean and dry  - Evaluate need for skin moisturizer/barrier cream  - Collaborate with interdisciplinary team   - Patient/family teaching  - Consider wound care consult   Outcome: Progressing     Problem: Nutrition/Hydration-ADULT  Goal: Nutrient/Hydration intake appropriate for improving, restoring or maintaining nutritional needs  Description  Monitor and assess patient's nutrition/hydration status for malnutrition  Collaborate with interdisciplinary team and initiate plan and interventions as ordered  Monitor patient's weight and dietary intake as ordered or per policy  Utilize nutrition screening tool and intervene as necessary  Determine patient's food preferences and provide high-protein, high-caloric foods as appropriate       INTERVENTIONS:  - Monitor oral intake, urinary output, labs, and treatment plans  - Assess nutrition and hydration status and recommend course of action  - Evaluate amount of meals eaten  - Assist patient with eating if necessary   - Allow adequate time for meals  - Recommend/ encourage appropriate diets, oral nutritional supplements, and vitamin/mineral supplements  - Order, calculate, and assess calorie counts as needed  - Recommend, monitor, and adjust tube feedings and TPN/PPN based on assessed needs  - Assess need for intravenous fluids  - Provide specific nutrition/hydration education as appropriate  - Include patient/family/caregiver in decisions related to nutrition  Outcome: Progressing

## 2020-07-17 NOTE — ASSESSMENT & PLAN NOTE
Patient presented with acute kidney injury on stage 3/4 chronic kidney disease with baseline creatinine around 2 1  This was  due to urinary retention as well as acute systolic CHF  Patient was treated with IV Bumex initially and Lopez catheter had to be inserted because patient had recurrent urinary retention of >700ml  Renal function continues to improve:  Creatinine on discharge is 1 88, this is likely patient's new baseline      Patient is stable for discharge today    I refer rechecking BMP next week    I left a message on patient's son's answering machine call me back

## 2020-07-20 ENCOUNTER — PATIENT OUTREACH (OUTPATIENT)
Dept: FAMILY MEDICINE CLINIC | Facility: HOSPITAL | Age: 75
End: 2020-07-20

## 2020-07-20 NOTE — PROGRESS NOTES
Outpatient Care Management Note:    Care manager called [facility] and verified that Festus Valdez is currently in their facility  Their , Beverly, is on vacation  I left a voice mail message, with my contact information, requesting an update when she returns

## 2020-07-22 NOTE — TELEPHONE ENCOUNTER
Left message asking  Patient to call office to schedule a hospital follow up for the [facility] office  Please schedule when patient returns the call back and remind patient to have BMP done prior to visit  Lab order is in the chart

## 2020-07-23 NOTE — UTILIZATION REVIEW
Notification of Discharge  This is a Notification of Discharge from our facility [address]  Please be advised that this patient has been discharge from our facility  Below you will find the admission and discharge date and time including the patients disposition  PRESENTATION DATE: 7/11/2020  4:05 PM  OBS ADMISSION DATE:   IP ADMISSION DATE: 7/11/20 1935   DISCHARGE DATE: 7/17/2020 12:24 PM  DISPOSITION: Non [facility] SN[facility]/S[facility] Non [facility] SNF/TC[facility]/SNU   Admission Orders listed below:  Admission Orders (From admission, onward)     Ordered        07/11/20 1935  Inpatient Admission (expected length of stay for this patient Order details is greater than two midnights)  Once                   Please contact the UR Department if additional information is required to close this patient's authorization/case  [facility]  Main: [phone] x carefully listen to the prompts  All voicemails are confidential   [email or web address]  Send all requests for admission clinical reviews, approved or denied determinations and any other requests to dedicated fax number below belonging to the campus where the patient is receiving treatment   List of dedicated fax numbers:  [address] DENIALS (Administrative/Medical Necessity) [phone]   [address] (Maternity/NICU/Pediatrics) [phone]   [place] [phone]   [facility] [phone]   [facility] [phone]   [facility] [address] [place] [address] [phone]   [facility]  [phone]   [address] [place] [address], [address]  [address] [facility] [address] [facility] [phone]

## 2020-07-28 ENCOUNTER — TELEMEDICINE (OUTPATIENT)
Dept: NEPHROLOGY | Facility: HOSPITAL | Age: 75
End: 2020-07-28
Payer: COMMERCIAL

## 2020-07-28 VITALS
RESPIRATION RATE: 16 BRPM | SYSTOLIC BLOOD PRESSURE: 120 MMHG | HEART RATE: 76 BPM | TEMPERATURE: 98 F | DIASTOLIC BLOOD PRESSURE: 72 MMHG

## 2020-07-28 DIAGNOSIS — I10 HYPERTENSION, UNSPECIFIED TYPE: ICD-10-CM

## 2020-07-28 DIAGNOSIS — R33.8 BENIGN PROSTATIC HYPERPLASIA WITH URINARY RETENTION: ICD-10-CM

## 2020-07-28 DIAGNOSIS — E83.42 HYPOMAGNESEMIA: ICD-10-CM

## 2020-07-28 DIAGNOSIS — N18.30 CKD (CHRONIC KIDNEY DISEASE), STAGE III (HCC): ICD-10-CM

## 2020-07-28 DIAGNOSIS — I10 ESSENTIAL HYPERTENSION: ICD-10-CM

## 2020-07-28 DIAGNOSIS — I48.19 PERSISTENT ATRIAL FIBRILLATION (HCC): ICD-10-CM

## 2020-07-28 DIAGNOSIS — K21.9 GASTROESOPHAGEAL REFLUX DISEASE WITHOUT ESOPHAGITIS: Primary | ICD-10-CM

## 2020-07-28 DIAGNOSIS — E11.9 TYPE 2 DIABETES MELLITUS WITHOUT COMPLICATION, WITHOUT LONG-TERM CURRENT USE OF INSULIN (HCC): ICD-10-CM

## 2020-07-28 DIAGNOSIS — I50.42 CHRONIC COMBINED SYSTOLIC AND DIASTOLIC CONGESTIVE HEART FAILURE (HCC): ICD-10-CM

## 2020-07-28 DIAGNOSIS — R33.9 URINARY RETENTION: ICD-10-CM

## 2020-07-28 DIAGNOSIS — E87.6 HYPOKALEMIA: ICD-10-CM

## 2020-07-28 DIAGNOSIS — S91.302A OPEN WOUND OF LEFT FOOT, INITIAL ENCOUNTER: ICD-10-CM

## 2020-07-28 DIAGNOSIS — I73.9 PVD (PERIPHERAL VASCULAR DISEASE) (HCC): ICD-10-CM

## 2020-07-28 DIAGNOSIS — N40.1 BENIGN PROSTATIC HYPERPLASIA WITH URINARY RETENTION: ICD-10-CM

## 2020-07-28 DIAGNOSIS — I27.20 PULMONARY HYPERTENSION (HCC): ICD-10-CM

## 2020-07-28 DIAGNOSIS — E66.01 CLASS 2 SEVERE OBESITY DUE TO EXCESS CALORIES WITH SERIOUS COMORBIDITY AND BODY MASS INDEX (BMI) OF 37.0 TO 37.9 IN ADULT (HCC): ICD-10-CM

## 2020-07-28 PROCEDURE — 3078F DIAST BP <80 MM HG: CPT | Performed by: NURSE PRACTITIONER

## 2020-07-28 PROCEDURE — 3066F NEPHROPATHY DOC TX: CPT | Performed by: NURSE PRACTITIONER

## 2020-07-28 PROCEDURE — 1111F DSCHRG MED/CURRENT MED MERGE: CPT | Performed by: NURSE PRACTITIONER

## 2020-07-28 PROCEDURE — 99214 OFFICE O/P EST MOD 30 MIN: CPT | Performed by: NURSE PRACTITIONER

## 2020-07-28 PROCEDURE — 3074F SYST BP LT 130 MM HG: CPT | Performed by: NURSE PRACTITIONER

## 2020-07-28 PROCEDURE — 3044F HG A1C LEVEL LT 7.0%: CPT | Performed by: NURSE PRACTITIONER

## 2020-07-28 PROCEDURE — 1036F TOBACCO NON-USER: CPT | Performed by: NURSE PRACTITIONER

## 2020-07-28 NOTE — PROGRESS NOTES
OFFICE FOLLOW UP - Nephrology   Linwood Mazariegos 69 y o  male MRN: [id number]       ASSESSMENT and PLAN:  Diagnoses and all orders for this visit:    Gastroesophageal reflux disease without esophagitis    Type 2 diabetes mellitus without complication, without long-term current use of insulin ([facility])    Essential hypertension    PVD (peripheral vascular disease) ([facility])    Chronic combined systolic and diastolic congestive heart failure ([facility])    Persistent atrial fibrillation    Pulmonary hypertension ([facility])    Hypertension, unspecified type    Benign prostatic hyperplasia with urinary retention    Open wound of left foot, initial encounter    Class 2 severe obesity due to excess calories with serious comorbidity and body mass index (BMI) of 37 0 to 37 9 in adult ([facility])    Hypokalemia    Hypomagnesemia    CKD (chronic kidney disease), stage III ([facility])  -     Basic metabolic panel; Future  -     Urinalysis with microscopic; Future    Urinary retention  -     Ambulatory referral to Urology; Future        CKD stage 3/4:  With recent hospital admission with acute kidney injury  Baseline creatinine around 2 1, previously 1 2-1 6  Frequent acute kidney injury secondary to cardiorenal syndrome as well as urinary retention  Lopez inserted during last hospitalization  CT scan showed bilateral renal cysts, right ectopic kidney   -most recent creatinine 07/27 was 2 2 at facility   -continue to avoid NSAIDs   -continue to monitor for urinary retention  BPH:  Status post Lopez catheter insertion during last admission  Attempted Flomax while hospitalized the patient had hypotension  Patient passed voiding trial and no longer has catheter  The patient reports soreness at his penis and he feels as if he is not able to urinate well    -may benefit from urology follow up  Diabetes:    Last A1c was 5 1  Continue diabetic diet  Acute on chronic combined CHF:  EF of 30%  Discharge weight was 251 lb    Patient was discharged on Bumex 1 mg p o  B i d  -continue to check weights daily  Please call office for 2-3 lb weight gain in 1 day or 5 lb in 1 week   -instructed to hold diuretic in periods of volume depletion such as diarrhea, nausea, vomiting   -continue strict fluid restriction 1 2 L per day  Atrial fibrillation:  Continues on beta-blocker  This was decreased during last admission due to hypotension  Continues on Eliquis 5 mg po BID  Hypokalemia: In the setting of hypomagnesemia  Most recent potassium was 4 0    -continue 20 mEq's daily  Hypotension: blood pressure reported today 120's    -continue midodrine 5 mg po three times per day    -beta blocker recently decreased while hospitalized  Leg wound/lower extremity cellulitis:  Continue to follow with wound care  Patient will follow up in 2-3 months with Dr Laureano Underwood  HPI: Romayne Alderman is a 69 y o  male who is here for hospital follow-up for acute kidney injury  Patient had recent admission from 07/11 to 7/17 due to elevated creatinine likely secondary to cardiorenal syndrome as well as urinary retention  The patient had Lopez catheter placed  He was initially diuresed with IV Bumex  His creatinine improved to 1 8  His weight upon discharge was 114 kg  He was discharged on Bumex 1 mg p o  B i d   The patient also had a decrease in his metoprolol to 12 5 mg b i d  Due to hypotensive episodes  The patient resides at [address] [place] [address]  Per record review, the patient has had several recent admissions including fall with chest wall hematoma, cellulitis of lower extremity, and acute CHF exacerbation  I spoke with Indiana Salcedo on the phone today  He is unhappy residing at [facility]  He states he does not like the food  He states that he feels thirsty and they will not give him water  H wishes to return home with his son  He states he was dropped twice while moving and feels like he has a hernia  He denies chest pain   He admits to having SOB when laying down  He denies nausea, vomiting, or diarrhea  He states he has a poor appetite because he dislikes the food  Overall he is very unhappy  He is not sure if he would want dialysis if his renal function declines at this time  ROS:   A complete review of systems was done  Pertinent positives and negatives as noted in the HPI, otherwise the review of systems is negative  Allergies: Patient has no known allergies      Medications:   Current Outpatient Medications:     acetaminophen (TYLENOL) 325 mg tablet, Take 2 tablets (650 mg total) by mouth every 6 (six) hours as needed for mild pain, headaches or fever, Disp: 30 tablet, Rfl: 0    apixaban (ELIQUIS) 5 mg, Take 5 mg by mouth 2 (two) times a day , Disp: , Rfl:     Ascorbic Acid (VITAMIN C) 1000 MG tablet, Take 1,000 mg by mouth 2 (two) times a day , Disp: , Rfl:     bumetanide (BUMEX) 1 mg tablet, Take 1 tablet (1 mg total) by mouth 2 (two) times a day, Disp: 60 tablet, Rfl: 0    cholecalciferol (VITAMIN D3) 1,000 units tablet, Take 1,000 Units by mouth daily, Disp: , Rfl:     cholestyramine sugar free (QUESTRAN LIGHT) 4 g packet, Take 1 packet (4 g total) by mouth daily for 11 days, Disp: 11 packet, Rfl: 0    collagenase (SANTYL) ointment, Apply topically daily, Disp: 15 g, Rfl: 0    DULoxetine (CYMBALTA) 30 mg delayed release capsule, Take 1 capsule (30 mg total) by mouth daily, Disp: 30 capsule, Rfl: 5    metoprolol tartrate (LOPRESSOR) 25 mg tablet, Take 0 5 tablets (12 5 mg total) by mouth every 12 (twelve) hours, Disp: 30 tablet, Rfl: 0    midodrine (PROAMATINE) 5 mg tablet, Take 1 tablet (5 mg total) by mouth 3 (three) times a day before meals, Disp: 90 tablet, Rfl: 0    nystatin (MYCOSTATIN) powder, Apply topically 2 (two) times a day, Disp: 15 g, Rfl: 0    pantoprazole (PROTONIX) 40 mg tablet, Take 1 tablet (40 mg total) by mouth daily, Disp: 90 tablet, Rfl: 3    potassium chloride (MICRO-K) 10 MEQ CR capsule, Take 2 capsules (20 mEq total) by mouth daily, Disp: 60 capsule, Rfl: 0    vancomycin (VANCOCIN) 50mg/mL SOLN, Take 2 5 mL (125 mg total) by mouth every 6 (six) hours for 11 days, Disp: 110 mL, Rfl: 0    Past Medical History:   Diagnosis Date    Atrial fibrillation (HCC)     Cardiac disease     Cellulitis     CHF (congestive heart failure) (HCC)     Chronic pain     Chronic venous hypertension     with ulceration    GERD (gastroesophageal reflux disease)     History of methicillin resistant staphylococcus aureus (MRSA) 11/26/2019    negative nasal swab     Hyperlipidemia     Hypertension     Obesity     Pulmonary hypertension ([facility] )     PVD (peripheral vascular disease) ([facility] )     Type 2 diabetes mellitus with diabetic heel ulcer ([place] [address] ) 6/30/2020    Vascular disorder of extremity ([place] [address] )      Past Surgical History:   Procedure Laterality Date    ANTERIOR RELEASE VERTEBRAL BODY W/ POSTERIOR FUSION      APPENDECTOMY  1954    CATARACT EXTRACTION      DECOMPRESSION SPINE LUMBAR POSTERIOR      ELBOW SURGERY      FOOT/ANKLE ARTHRODESIS Right      complications postoperatively with bone healing and infection    INCISION AND DRAINAGE OF WOUND Left 3/5/2020    Procedure: INCISION AND DRAINAGE (I&D) EXTREMITY;  Surgeon: Gina Avina DPM;  Location:  MAIN OR;  Service: Podiatry    KNEE SURGERY      NOSE SURGERY      turbinectomy    RETINAL DETACHMENT SURGERY      RHINOPLASTY      TONSILLECTOMY      VEIN LIGATION AND STRIPPING      saphenous vein long     Family History   Problem Relation Age of Onset    Cancer Mother         cancer of uterus    Diabetes Sister     Mental illness Neg Hx         neg fam hx    Substance Abuse Neg Hx         neg fam hx      reports that he has never smoked  He has never used smokeless tobacco  He reports that he drank alcohol  He reports that he does not use drugs        Physical Exam:   Vitals:    07/28/20 1343   BP: 120/72   Pulse: 76   Resp: 16   Temp: 98 °F (36 7 °C)     There is no height or weight on file to calculate BMI  Unable to complete due to visit type  Patient is alert and oriented  Lab Results:  Results for orders placed or performed during the hospital encounter of 07/11/20   Blood culture #1   Result Value Ref Range    Blood Culture No Growth After 5 Days  Blood culture #2   Result Value Ref Range    Blood Culture No Growth After 5 Days      Novel Coronavirus (Covid-19),PCR [facility]   Result Value Ref Range    SARS-CoV-2 Negative Negative   CBC and differential   Result Value Ref Range    WBC 6 24 4 31 - 10 16 Thousand/uL    RBC 4 36 3 88 - 5 62 Million/uL    Hemoglobin 13 8 12 0 - 17 0 g/dL    Hematocrit 41 5 36 5 - 49 3 %    MCV 95 82 - 98 fL    MCH 31 7 26 8 - 34 3 pg    MCHC 33 3 31 4 - 37 4 g/dL    RDW 19 6 (H) 11 6 - 15 1 %    MPV 11 7 8 9 - 12 7 fL    Platelets 033 411 - 331 Thousands/uL    nRBC 1 /100 WBCs    Neutrophils Relative 68 43 - 75 %    Immat GRANS % 0 0 - 2 %    Lymphocytes Relative 16 14 - 44 %    Monocytes Relative 13 (H) 4 - 12 %    Eosinophils Relative 2 0 - 6 %    Basophils Relative 1 0 - 1 %    Neutrophils Absolute 4 25 1 85 - 7 62 Thousands/µL    Immature Grans Absolute 0 02 0 00 - 0 20 Thousand/uL    Lymphocytes Absolute 1 02 0 60 - 4 47 Thousands/µL    Monocytes Absolute 0 80 0 17 - 1 22 Thousand/µL    Eosinophils Absolute 0 11 0 00 - 0 61 Thousand/µL    Basophils Absolute 0 04 0 00 - 0 10 Thousands/µL   Comprehensive metabolic panel   Result Value Ref Range    Sodium 138 136 - 145 mmol/L    Potassium 3 8 3 5 - 5 3 mmol/L    Chloride 101 100 - 108 mmol/L    CO2 24 21 - 32 mmol/L    ANION GAP 13 4 - 13 mmol/L    BUN 61 (H) 5 - 25 mg/dL    Creatinine 3 01 (H) 0 60 - 1 30 mg/dL    Glucose 97 65 - 140 mg/dL    Calcium 9 1 8 3 - 10 1 mg/dL    AST 72 (H) 5 - 45 U/L    ALT 8 (L) 12 - 78 U/L    Alkaline Phosphatase 62 46 - 116 U/L    Total Protein 8 0 6 4 - 8 2 g/dL    Albumin 2 7 (L) 3 5 - 5 0 g/dL    Total Bilirubin 3 30 (H) 0 20 - 1 00 mg/dL    eGFR 20 ml/min/1 73sq m   Protime-INR   Result Value Ref Range    Protime 36 2 (H) 11 6 - 14 5 seconds    INR 3 67 (H) 0 84 - 1 19   APTT   Result Value Ref Range    PTT 48 (H) 23 - 37 seconds   Troponin I   Result Value Ref Range    Troponin I 0 26 (H) <=0 04 ng/mL   Lactic acid   Result Value Ref Range    LACTIC ACID 3 3 (HH) 0 5 - 2 0 mmol/L   NT-BNP PRO   Result Value Ref Range    NT-proBNP 64,828 (H) <125 pg/mL   Procalcitonin   Result Value Ref Range    Procalcitonin 0 21 <=0 25 ng/ml   Lactic acid 2 Hours   Result Value Ref Range    LACTIC ACID 2 8 (HH) 0 5 - 2 0 mmol/L   Urinalysis with microscopic   Result Value Ref Range    Clarity, UA Clear     Color, UA Yellow     Specific [place], UA 1 015 1 003 - 1 030    pH, UA 5 5 4 5, 5 0, 5 5, 6 0, 6 5, 7 0, 7 5, 8 0    Glucose, UA Negative Negative mg/dl    Ketones, UA Negative Negative mg/dl    Blood, UA Negative Negative    Protein, UA Negative Negative mg/dl    Nitrite, UA Negative Negative    Bilirubin, UA Negative Negative    Urobilinogen, UA 0 2 0 2, 1 0 E U /dl E U /dl    Leukocytes, UA Trace (A) Negative    WBC, UA 4-10 (A) None Seen, 0-5, 5-55, 5-65 /hpf    RBC, UA 0-1 (A) None Seen, 0-5 /hpf    Hyaline Casts, UA 30-50 (A) (none) /lpf    Bacteria, UA Occasional None Seen, Occasional /hpf    OTHER OBSERVATIONS       WBCs Clumped  Renal Tubule Epithelial Cells Present    Epithelial Cells Occasional None Seen, Occasional /hpf    MUCUS THREADS Occasional (A) None Seen   Basic metabolic panel   Result Value Ref Range    Sodium 139 136 - 145 mmol/L    Potassium 3 6 3 5 - 5 3 mmol/L    Chloride 102 100 - 108 mmol/L    CO2 23 21 - 32 mmol/L    ANION GAP 14 (H) 4 - 13 mmol/L    BUN 59 (H) 5 - 25 mg/dL    Creatinine 2 85 (H) 0 60 - 1 30 mg/dL    Glucose 72 65 - 140 mg/dL    Calcium 9 0 8 3 - 10 1 mg/dL    eGFR 21 ml/min/1 73sq m   Troponin I   Result Value Ref Range    Troponin I 0 18 (H) <=0 04 ng/mL   Troponin I   Result Value Ref Range    Troponin I 0 20 (H) <=0 04 ng/mL   Troponin I   Result Value Ref Range    Troponin I 0 19 (H) <=0 04 ng/mL   Basic metabolic panel   Result Value Ref Range    Sodium 140 136 - 145 mmol/L    Potassium 3 4 (L) 3 5 - 5 3 mmol/L    Chloride 104 100 - 108 mmol/L    CO2 25 21 - 32 mmol/L    ANION GAP 11 4 - 13 mmol/L    BUN 59 (H) 5 - 25 mg/dL    Creatinine 2 85 (H) 0 60 - 1 30 mg/dL    Glucose 82 65 - 140 mg/dL    Calcium 8 7 8 3 - 10 1 mg/dL    eGFR 21 ml/min/1 73sq m   CBC and differential   Result Value Ref Range    WBC 5 66 4 31 - 10 16 Thousand/uL    RBC 4 09 3 88 - 5 62 Million/uL    Hemoglobin 13 1 12 0 - 17 0 g/dL    Hematocrit 38 3 36 5 - 49 3 %    MCV 94 82 - 98 fL    MCH 32 0 26 8 - 34 3 pg    MCHC 34 2 31 4 - 37 4 g/dL    RDW 19 4 (H) 11 6 - 15 1 %    MPV 11 4 8 9 - 12 7 fL    Platelets 552 812 - 042 Thousands/uL    nRBC 0 /100 WBCs    Neutrophils Relative 60 43 - 75 %    Immat GRANS % 0 0 - 2 %    Lymphocytes Relative 20 14 - 44 %    Monocytes Relative 15 (H) 4 - 12 %    Eosinophils Relative 4 0 - 6 %    Basophils Relative 1 0 - 1 %    Neutrophils Absolute 3 41 1 85 - 7 62 Thousands/µL    Immature Grans Absolute 0 01 0 00 - 0 20 Thousand/uL    Lymphocytes Absolute 1 12 0 60 - 4 47 Thousands/µL    Monocytes Absolute 0 83 0 17 - 1 22 Thousand/µL    Eosinophils Absolute 0 25 0 00 - 0 61 Thousand/µL    Basophils Absolute 0 04 0 00 - 0 10 Thousands/µL   Magnesium   Result Value Ref Range    Magnesium 1 5 (L) 1 6 - 2 6 mg/dL   Basic metabolic panel   Result Value Ref Range    Sodium 140 136 - 145 mmol/L    Potassium 3 5 3 5 - 5 3 mmol/L    Chloride 104 100 - 108 mmol/L    CO2 25 21 - 32 mmol/L    ANION GAP 11 4 - 13 mmol/L    BUN 60 (H) 5 - 25 mg/dL    Creatinine 2 56 (H) 0 60 - 1 30 mg/dL    Glucose 90 65 - 140 mg/dL    Calcium 8 5 8 3 - 10 1 mg/dL    eGFR 24 ml/min/1 73sq m   CBC   Result Value Ref Range    WBC 6 70 4 31 - 10 16 Thousand/uL    RBC 3 87 (L) 3 88 - 5 62 Million/uL    Hemoglobin 12 5 12 0 - 17 0 g/dL    Hematocrit 36 6 36 5 - 49 3 %    MCV 95 82 - 98 fL    MCH 32 3 26 8 - 34 3 pg    MCHC 34 2 31 4 - 37 4 g/dL    RDW 19 3 (H) 11 6 - 15 1 %    Platelets 895 (L) 306 - 390 Thousands/uL    MPV 11 6 8 9 - 12 7 fL   Lactic acid, plasma   Result Value Ref Range    LACTIC ACID 1 6 0 5 - 2 0 mmol/L   Hemoglobin A1C   Result Value Ref Range    Hemoglobin A1C 5 1 Normal 3 8-5 6%; PreDiabetic 5 7-6 4%;  Diabetic >=6 5%; Glycemic control for adults with diabetes <7 0% %     mg/dl   Basic metabolic panel   Result Value Ref Range    Sodium 142 136 - 145 mmol/L    Potassium 3 1 (L) 3 5 - 5 3 mmol/L    Chloride 104 100 - 108 mmol/L    CO2 28 21 - 32 mmol/L    ANION GAP 10 4 - 13 mmol/L    BUN 53 (H) 5 - 25 mg/dL    Creatinine 2 15 (H) 0 60 - 1 30 mg/dL    Glucose 82 65 - 140 mg/dL    Calcium 8 2 (L) 8 3 - 10 1 mg/dL    eGFR 29 ml/min/1 73sq m   CBC and differential   Result Value Ref Range    WBC 4 44 4 31 - 10 16 Thousand/uL    RBC 3 81 (L) 3 88 - 5 62 Million/uL    Hemoglobin 12 2 12 0 - 17 0 g/dL    Hematocrit 35 9 (L) 36 5 - 49 3 %    MCV 94 82 - 98 fL    MCH 32 0 26 8 - 34 3 pg    MCHC 34 0 31 4 - 37 4 g/dL    RDW 19 7 (H) 11 6 - 15 1 %    MPV 11 7 8 9 - 12 7 fL    Platelets 952 (L) 301 - 390 Thousands/uL    nRBC 1 /100 WBCs   Magnesium   Result Value Ref Range    Magnesium 1 5 (L) 1 6 - 2 6 mg/dL   Basic metabolic panel   Result Value Ref Range    Sodium 138 136 - 145 mmol/L    Potassium 3 7 3 5 - 5 3 mmol/L    Chloride 104 100 - 108 mmol/L    CO2 26 21 - 32 mmol/L    ANION GAP 8 4 - 13 mmol/L    BUN 52 (H) 5 - 25 mg/dL    Creatinine 2 04 (H) 0 60 - 1 30 mg/dL    Glucose 85 65 - 140 mg/dL    Calcium 8 5 8 3 - 10 1 mg/dL    eGFR 31 ml/min/1 73sq m   Magnesium   Result Value Ref Range    Magnesium 1 6 1 6 - 2 6 mg/dL   Basic metabolic panel   Result Value Ref Range    Sodium 138 136 - 145 mmol/L    Potassium 4 2 3 5 - 5 3 mmol/L    Chloride 103 100 - 108 mmol/L    CO2 29 21 - 32 mmol/L    ANION GAP 6 4 - 13 mmol/L    BUN 53 (H) 5 - 25 mg/dL Creatinine 1 86 (H) 0 60 - 1 30 mg/dL    Glucose 78 65 - 140 mg/dL    Calcium 8 7 8 3 - 10 1 mg/dL    eGFR 35 ml/min/1 73sq m   CBC and differential   Result Value Ref Range    WBC 3 88 (L) 4 31 - 10 16 Thousand/uL    RBC 4 14 3 88 - 5 62 Million/uL    Hemoglobin 13 3 12 0 - 17 0 g/dL    Hematocrit 39 9 36 5 - 49 3 %    MCV 96 82 - 98 fL    MCH 32 1 26 8 - 34 3 pg    MCHC 33 3 31 4 - 37 4 g/dL    RDW 19 6 (H) 11 6 - 15 1 %    MPV 11 9 8 9 - 12 7 fL    Platelets 996 (L) 041 - 390 Thousands/uL    nRBC 0 /100 WBCs    Neutrophils Relative 39 (L) 43 - 75 %    Immat GRANS % 1 0 - 2 %    Lymphocytes Relative 29 14 - 44 %    Monocytes Relative 25 (H) 4 - 12 %    Eosinophils Relative 5 0 - 6 %    Basophils Relative 1 0 - 1 %    Neutrophils Absolute 1 54 (L) 1 85 - 7 62 Thousands/µL    Immature Grans Absolute 0 02 0 00 - 0 20 Thousand/uL    Lymphocytes Absolute 1 13 0 60 - 4 47 Thousands/µL    Monocytes Absolute 0 96 0 17 - 1 22 Thousand/µL    Eosinophils Absolute 0 19 0 00 - 0 61 Thousand/µL    Basophils Absolute 0 04 0 00 - 0 10 Thousands/µL   Basic metabolic panel   Result Value Ref Range    Sodium 135 (L) 136 - 145 mmol/L    Potassium 6 5 (HH) 3 5 - 5 3 mmol/L    Chloride 101 100 - 108 mmol/L    CO2 32 21 - 32 mmol/L    ANION GAP 2 (L) 4 - 13 mmol/L    BUN 49 (H) 5 - 25 mg/dL    Creatinine 1 80 (H) 0 60 - 1 30 mg/dL    Glucose 82 65 - 140 mg/dL    Calcium 8 9 8 3 - 10 1 mg/dL    eGFR 37 ml/min/1 73sq m   Basic metabolic panel   Result Value Ref Range    Sodium 136 136 - 145 mmol/L    Potassium 4 5 3 5 - 5 3 mmol/L    Chloride 101 100 - 108 mmol/L    CO2 31 21 - 32 mmol/L    ANION GAP 4 4 - 13 mmol/L    BUN 49 (H) 5 - 25 mg/dL    Creatinine 1 88 (H) 0 60 - 1 30 mg/dL    Glucose 86 65 - 140 mg/dL    Calcium 8 8 8 3 - 10 1 mg/dL    eGFR 35 ml/min/1 73sq m   ECG 12 lead   Result Value Ref Range    Ventricular Rate 78 BPM    Atrial Rate 81 BPM    FL Interval  ms    QRSD Interval 114 ms    QT Interval 416 ms    QTC Interval 474 ms    P Axis  degrees    QRS Axis -66 degrees    T Wave Axis 102 degrees   Fingerstick Glucose (POCT)   Result Value Ref Range    POC Glucose 80 65 - 140 mg/dl   Fingerstick Glucose (POCT)   Result Value Ref Range    POC Glucose 140 65 - 140 mg/dl   Fingerstick Glucose (POCT)   Result Value Ref Range    POC Glucose 112 65 - 140 mg/dl   Fingerstick Glucose (POCT)   Result Value Ref Range    POC Glucose 110 65 - 140 mg/dl   Fingerstick Glucose (POCT)   Result Value Ref Range    POC Glucose 90 65 - 140 mg/dl   Fingerstick Glucose (POCT)   Result Value Ref Range    POC Glucose 107 65 - 140 mg/dl   Fingerstick Glucose (POCT)   Result Value Ref Range    POC Glucose 79 65 - 140 mg/dl   Fingerstick Glucose (POCT)   Result Value Ref Range    POC Glucose 84 65 - 140 mg/dl   Fingerstick Glucose (POCT)   Result Value Ref Range    POC Glucose 67 65 - 140 mg/dl   Fingerstick Glucose (POCT)   Result Value Ref Range    POC Glucose 91 65 - 140 mg/dl   Fingerstick Glucose (POCT)   Result Value Ref Range    POC Glucose 111 65 - 140 mg/dl   Fingerstick Glucose (POCT)   Result Value Ref Range    POC Glucose 90 65 - 140 mg/dl   Fingerstick Glucose (POCT)   Result Value Ref Range    POC Glucose 113 65 - 140 mg/dl   Fingerstick Glucose (POCT)   Result Value Ref Range    POC Glucose 75 65 - 140 mg/dl   Fingerstick Glucose (POCT)   Result Value Ref Range    POC Glucose 101 65 - 140 mg/dl   Fingerstick Glucose (POCT)   Result Value Ref Range    POC Glucose 106 65 - 140 mg/dl   Fingerstick Glucose (POCT)   Result Value Ref Range    POC Glucose 98 65 - 140 mg/dl   Fingerstick Glucose (POCT)   Result Value Ref Range    POC Glucose 102 65 - 140 mg/dl   Fingerstick Glucose (POCT)   Result Value Ref Range    POC Glucose 86 65 - 140 mg/dl   Fingerstick Glucose (POCT)   Result Value Ref Range    POC Glucose 105 65 - 140 mg/dl   Fingerstick Glucose (POCT)   Result Value Ref Range    POC Glucose 102 65 - 140 mg/dl   Fingerstick Glucose (POCT)   Result Value Ref Range    POC Glucose 81 65 - 140 mg/dl   Fingerstick Glucose (POCT)   Result Value Ref Range    POC Glucose 146 (H) 65 - 140 mg/dl   Manual Differential(PHLEBS Do Not Order)   Result Value Ref Range    Segmented % 52 43 - 75 %    Bands % 6 0 - 8 %    Lymphocytes % 17 14 - 44 %    Monocytes % 16 (H) 4 - 12 %    Eosinophils, % 8 (H) 0 - 6 %    Basophils % 0 0 - 1 %    Metamyelocytes% 1 0 - 1 %    Absolute Neutrophils 2 58 1 85 - 7 62 Thousand/uL    Lymphocytes Absolute 0 75 0 60 - 4 47 Thousand/uL    Monocytes Absolute 0 71 0 00 - 1 22 Thousand/uL    Eosinophils Absolute 0 36 0 00 - 0 40 Thousand/uL    Basophils Absolute 0 00 0 00 - 0 10 Thousand/uL    Total Counted 100     nRBC 1 0 - 2 /100 WBC    RBC Morphology Present     Anisocytosis Present     Hypochromia Present     Macrocytes Present     Ovalocytes Present     Polychromasia Present     Target Cells Present     Platelet Estimate Borderline (A) Adequate    Clumped Platelets Present              Invalid input(s): ALBUMIN      Portions of the record may have been created with voice recognition software  Occasional wrong word or "sound a like" substitutions may have occurred due to the inherent limitations of voice recognition software  Read the chart carefully and recognize, using context, where substitutions have occurred  If you have any questions, please contact the dictating provider

## 2020-07-28 NOTE — PATIENT INSTRUCTIONS
He has chronic kidney disease stage 3/4  Her most recent creatinine was stable at 2 2  Please continue to avoid any NSAIDs including Motrin and ibuprofen  Please report to Nursing if your experiencing any issues with urination  Your blood pressure has been very good  Please continue your midodrine and metoprolol  Continue to check her weight daily  Please call the office for 2-3 lb weight gain in 1 day or 5 lb in 1 week  Continue your Bumex as prescribed  Please hold Bumex and periods of volume depletion such as diarrhea, nausea, vomiting, or poor oral intake  Restrict fluids to 1 2 L per day  Continue to follow with your heart doctor  We will see you in the office in 2-3 months for follow-up with repeat blood work  Please call if you have any further questions  Chronic Kidney Disease   GENERAL INFORMATION:   What is chronic kidney disease? Chronic kidney disease (CKD) is the gradual and permanent loss of kidney function  It is also called chronic kidney failure, or chronic renal insufficiency  Normally, the kidneys remove fluid, chemicals, and waste from your blood  These wastes are turned into urine by your kidneys  When you have CKD, your kidneys do not function properly  CKD may worsen over time and lead to kidney failure  What increases my risk for CKD? · Diabetes or obesity    · High blood pressure or heart disease    · Kidney infections or kidney stones    · Autoimmune diseases, such as lupus    · An enlarged prostate    · NSAIDs, illegal drugs, or smoking    · Family history of kidney disease  What are the signs and symptoms of CKD? Your signs and symptoms will depend on how well your kidneys work   You may not have symptoms, or you may have any of the following:  · Changes in how often you need to urinate    · Swelling in your arms, legs, or feet    · Shortness of breath    · Fatigue or weakness    · Bad or bitter taste in your mouth    · Nausea, vomiting, or loss of appetite  How is CKD diagnosed? · Blood and urine tests show how well your kidneys are working  They may also help manage or show the cause of CKD  · An ultrasound, CT scan, or MRI may show the cause of CKD  You may be given contrast dye to help your kidneys show up better in the pictures  Tell the healthcare provider if you have ever had an allergic reaction to contrast dye  Do not enter the MRI room with anything metal  Metal can cause serious injury  Tell the healthcare provider if you have any metal in or on your body  · A biopsy is a procedure to remove a small piece of tissue from your kidney  It is done to find the cause of your CKD  How is CKD treated? The goals of treatment are to control your symptoms and prevent your CKD from getting worse  You may need the following:  · Medicines may be given to decrease blood pressure and get rid of extra fluid  You may also receive medicine to manage health conditions that may occur with CKD, such as anemia, diabetes, and heart disease  · Dialysis is a treatment to remove chemicals and waste from your blood when your kidneys can no longer do this  · Surgery may be needed to create an arteriovenous fistula (AVF) in your arm or insert a catheter into your abdomen  This is done so you can receive dialysis  · A kidney transplant may be done if your CKD becomes severe  How can I manage CKD? · Maintain a healthy weight  Ask your healthcare provider how much you should weigh  Ask him to help you create a weight loss plan if you are overweight  · Exercise 30 to 60 minutes a day, 4 to 7 times a week, or as directed  Ask about the best exercise plan for you  Regular exercise can help you manage CKD, high blood pressure, and diabetes  · Follow your healthcare provider's advice about what to eat and drink  He may tell you to eat food low in sodium (salt), potassium, phosphorus, or protein  You may need to see a dietitian if you need help planning meals       · Limit alcohol  Ask how much alcohol is safe for you to drink  A drink of alcohol is 12 ounces of beer, 5 ounces of wine, or 1½ ounces of liquor  · Do not smoke  Smoking harms your kidneys  If you smoke, it is never too late to quit  Ask for information if you need help quitting  · Ask your healthcare provider if you need vaccines  Infections such as pneumonia, influenza, and hepatitis can be more harmful or more likely to occur when you have CKD  Vaccines reduce your risk of infection with these viruses  When should I contact my healthcare provider? · You suddenly gain or lose more weight than your healthcare provider has told you is okay  · You have itchy skin or a rash  · You urinate more or less than you normally do  · You have blood in your urine  · You have nausea and repeated vomiting  · You have fatigue or muscle weakness  · You have hiccups that will not stop  · You have questions or concerns about your condition or care  When should I seek immediate care or call 911? · Your heart is beating faster than normal for you  · You are confused and very drowsy  · You have a seizure  · You have sudden chest pain or shortness of breath  CARE AGREEMENT:   You have the right to help plan your care  Learn about your health condition and how it may be treated  Discuss treatment options with your caregivers to decide what care you want to receive  You always have the right to refuse treatment  The above information is an  only  It is not intended as medical advice for individual conditions or treatments  Talk to your doctor, nurse or pharmacist before following any medical regimen to see if it is safe and effective for you

## 2020-08-03 ENCOUNTER — PATIENT OUTREACH (OUTPATIENT)
Dept: FAMILY MEDICINE CLINIC | Facility: HOSPITAL | Age: 75
End: 2020-08-03

## 2020-08-03 NOTE — PROGRESS NOTES
Outpatient Care Management Note:    Care manager attempted to call [facility] X 3 to follow up on Linwood  The phone is continually busy

## 2020-08-06 ENCOUNTER — TELEPHONE (OUTPATIENT)
Dept: FAMILY MEDICINE CLINIC | Facility: HOSPITAL | Age: 75
End: 2020-08-06

## 2020-08-06 NOTE — TELEPHONE ENCOUNTER
CALLED HIS SON RAINER, GAVE HIM THE INFORMATION THAT WE ARE NOT FOLLOWING HIS FATHER  IN [facility], THAT DR Regina Frey IS  HE WAS FINE WITH THIS INFORMATION

## 2020-08-06 NOTE — TELEPHONE ENCOUNTER
Please call son  His father is not on our service at [address] [place] [address]  We were not accepting any additional patients there currently and so his father will be seen by Dr Herring Else  He can call [facility] to get her contact information

## 2020-08-07 ENCOUNTER — HOSPITAL ENCOUNTER (INPATIENT)
Facility: HOSPITAL | Age: 75
LOS: 7 days | DRG: 682 | End: 2020-08-14
Attending: EMERGENCY MEDICINE | Admitting: HOSPITALIST
Payer: COMMERCIAL

## 2020-08-07 DIAGNOSIS — I50.42 CHRONIC COMBINED SYSTOLIC AND DIASTOLIC HEART FAILURE (HCC): ICD-10-CM

## 2020-08-07 DIAGNOSIS — N17.9 AKI (ACUTE KIDNEY INJURY) (HCC): Primary | ICD-10-CM

## 2020-08-07 DIAGNOSIS — R77.8 ELEVATED TROPONIN: ICD-10-CM

## 2020-08-07 DIAGNOSIS — L97.509 CHRONIC FOOT ULCER (HCC): ICD-10-CM

## 2020-08-07 DIAGNOSIS — I50.43 ACUTE ON CHRONIC COMBINED SYSTOLIC AND DIASTOLIC CONGESTIVE HEART FAILURE (HCC): ICD-10-CM

## 2020-08-07 DIAGNOSIS — R33.8 ACUTE URINARY RETENTION: ICD-10-CM

## 2020-08-07 DIAGNOSIS — R79.89 ELEVATED LACTIC ACID LEVEL: ICD-10-CM

## 2020-08-07 PROBLEM — I50.32 CHRONIC DIASTOLIC (CONGESTIVE) HEART FAILURE (HCC): Status: ACTIVE | Noted: 2020-08-07

## 2020-08-07 LAB
ALBUMIN SERPL BCP-MCNC: 2.4 G/DL (ref 3.5–5)
ALP SERPL-CCNC: 62 U/L (ref 46–116)
ALT SERPL W P-5'-P-CCNC: 14 U/L (ref 12–78)
ANION GAP SERPL CALCULATED.3IONS-SCNC: 16 MMOL/L (ref 4–13)
APTT PPP: 55 SECONDS (ref 23–37)
AST SERPL W P-5'-P-CCNC: 50 U/L (ref 5–45)
BASOPHILS # BLD AUTO: 0.03 THOUSANDS/ΜL (ref 0–0.1)
BASOPHILS NFR BLD AUTO: 0 % (ref 0–1)
BILIRUB SERPL-MCNC: 2.6 MG/DL (ref 0.2–1)
BUN SERPL-MCNC: 76 MG/DL (ref 5–25)
CALCIUM SERPL-MCNC: 9.6 MG/DL (ref 8.3–10.1)
CHLORIDE SERPL-SCNC: 98 MMOL/L (ref 100–108)
CO2 SERPL-SCNC: 24 MMOL/L (ref 21–32)
CREAT SERPL-MCNC: 3.61 MG/DL (ref 0.6–1.3)
EOSINOPHIL # BLD AUTO: 0.2 THOUSAND/ΜL (ref 0–0.61)
EOSINOPHIL NFR BLD AUTO: 3 % (ref 0–6)
ERYTHROCYTE [DISTWIDTH] IN BLOOD BY AUTOMATED COUNT: 20.6 % (ref 11.6–15.1)
GFR SERPL CREATININE-BSD FRML MDRD: 16 ML/MIN/1.73SQ M
GLUCOSE SERPL-MCNC: 68 MG/DL (ref 65–140)
HCT VFR BLD AUTO: 42.1 % (ref 36.5–49.3)
HGB BLD-MCNC: 14.2 G/DL (ref 12–17)
IMM GRANULOCYTES # BLD AUTO: 0.05 THOUSAND/UL (ref 0–0.2)
IMM GRANULOCYTES NFR BLD AUTO: 1 % (ref 0–2)
INR PPP: 4.28 (ref 0.84–1.19)
LACTATE SERPL-SCNC: 3.1 MMOL/L (ref 0.5–2)
LYMPHOCYTES # BLD AUTO: 1.29 THOUSANDS/ΜL (ref 0.6–4.47)
LYMPHOCYTES NFR BLD AUTO: 17 % (ref 14–44)
MCH RBC QN AUTO: 32.1 PG (ref 26.8–34.3)
MCHC RBC AUTO-ENTMCNC: 33.7 G/DL (ref 31.4–37.4)
MCV RBC AUTO: 95 FL (ref 82–98)
MONOCYTES # BLD AUTO: 1.31 THOUSAND/ΜL (ref 0.17–1.22)
MONOCYTES NFR BLD AUTO: 17 % (ref 4–12)
NEUTROPHILS # BLD AUTO: 4.92 THOUSANDS/ΜL (ref 1.85–7.62)
NEUTS SEG NFR BLD AUTO: 62 % (ref 43–75)
NRBC BLD AUTO-RTO: 1 /100 WBCS
NT-PROBNP SERPL-MCNC: ABNORMAL PG/ML
PLATELET # BLD AUTO: 174 THOUSANDS/UL (ref 149–390)
PMV BLD AUTO: 12.4 FL (ref 8.9–12.7)
POTASSIUM SERPL-SCNC: 5.6 MMOL/L (ref 3.5–5.3)
PROT SERPL-MCNC: 8.1 G/DL (ref 6.4–8.2)
PROTHROMBIN TIME: 40.4 SECONDS (ref 11.6–14.5)
RBC # BLD AUTO: 4.43 MILLION/UL (ref 3.88–5.62)
SARS-COV-2 RNA RESP QL NAA+PROBE: NEGATIVE
SODIUM SERPL-SCNC: 138 MMOL/L (ref 136–145)
TROPONIN I SERPL-MCNC: 0.15 NG/ML
WBC # BLD AUTO: 7.8 THOUSAND/UL (ref 4.31–10.16)

## 2020-08-07 PROCEDURE — 99285 EMERGENCY DEPT VISIT HI MDM: CPT | Performed by: EMERGENCY MEDICINE

## 2020-08-07 PROCEDURE — 99285 EMERGENCY DEPT VISIT HI MDM: CPT

## 2020-08-07 PROCEDURE — 0T9B70Z DRAINAGE OF BLADDER WITH DRAINAGE DEVICE, VIA NATURAL OR ARTIFICIAL OPENING: ICD-10-PCS | Performed by: EMERGENCY MEDICINE

## 2020-08-07 PROCEDURE — 83605 ASSAY OF LACTIC ACID: CPT | Performed by: EMERGENCY MEDICINE

## 2020-08-07 PROCEDURE — 85025 COMPLETE CBC W/AUTO DIFF WBC: CPT | Performed by: EMERGENCY MEDICINE

## 2020-08-07 PROCEDURE — 87635 SARS-COV-2 COVID-19 AMP PRB: CPT | Performed by: EMERGENCY MEDICINE

## 2020-08-07 PROCEDURE — 80053 COMPREHEN METABOLIC PANEL: CPT | Performed by: EMERGENCY MEDICINE

## 2020-08-07 PROCEDURE — 83880 ASSAY OF NATRIURETIC PEPTIDE: CPT | Performed by: EMERGENCY MEDICINE

## 2020-08-07 PROCEDURE — 84484 ASSAY OF TROPONIN QUANT: CPT | Performed by: EMERGENCY MEDICINE

## 2020-08-07 PROCEDURE — 36415 COLL VENOUS BLD VENIPUNCTURE: CPT | Performed by: EMERGENCY MEDICINE

## 2020-08-07 PROCEDURE — 93005 ELECTROCARDIOGRAM TRACING: CPT

## 2020-08-07 PROCEDURE — 85730 THROMBOPLASTIN TIME PARTIAL: CPT | Performed by: EMERGENCY MEDICINE

## 2020-08-07 PROCEDURE — 85610 PROTHROMBIN TIME: CPT | Performed by: EMERGENCY MEDICINE

## 2020-08-07 RX ORDER — DULOXETIN HYDROCHLORIDE 30 MG/1
30 CAPSULE, DELAYED RELEASE ORAL DAILY
Status: DISCONTINUED | OUTPATIENT
Start: 2020-08-08 | End: 2020-08-13

## 2020-08-07 RX ORDER — HEPARIN SODIUM 5000 [USP'U]/ML
5000 INJECTION, SOLUTION INTRAVENOUS; SUBCUTANEOUS EVERY 8 HOURS SCHEDULED
Status: DISCONTINUED | OUTPATIENT
Start: 2020-08-08 | End: 2020-08-08

## 2020-08-07 RX ORDER — PANTOPRAZOLE SODIUM 40 MG/1
40 TABLET, DELAYED RELEASE ORAL DAILY
Status: DISCONTINUED | OUTPATIENT
Start: 2020-08-08 | End: 2020-08-13

## 2020-08-07 RX ORDER — MIDODRINE HYDROCHLORIDE 5 MG/1
5 TABLET ORAL
Status: DISCONTINUED | OUTPATIENT
Start: 2020-08-08 | End: 2020-08-10

## 2020-08-07 RX ORDER — ACETAMINOPHEN 325 MG/1
650 TABLET ORAL EVERY 6 HOURS PRN
Status: DISCONTINUED | OUTPATIENT
Start: 2020-08-07 | End: 2020-08-13

## 2020-08-07 RX ADMIN — SODIUM CHLORIDE 500 ML: 0.9 INJECTION, SOLUTION INTRAVENOUS at 22:03

## 2020-08-08 ENCOUNTER — APPOINTMENT (INPATIENT)
Dept: RADIOLOGY | Facility: HOSPITAL | Age: 75
DRG: 682 | End: 2020-08-08
Payer: COMMERCIAL

## 2020-08-08 PROBLEM — N39.0 UTI (URINARY TRACT INFECTION): Status: ACTIVE | Noted: 2020-08-08

## 2020-08-08 PROBLEM — I50.43 ACUTE ON CHRONIC COMBINED SYSTOLIC AND DIASTOLIC HEART FAILURE (HCC): Status: ACTIVE | Noted: 2020-08-07

## 2020-08-08 PROBLEM — I50.42 CHRONIC COMBINED SYSTOLIC AND DIASTOLIC HEART FAILURE (HCC): Status: ACTIVE | Noted: 2020-08-07

## 2020-08-08 LAB
ANION GAP SERPL CALCULATED.3IONS-SCNC: 14 MMOL/L (ref 4–13)
ANION GAP SERPL CALCULATED.3IONS-SCNC: 14 MMOL/L (ref 4–13)
ANION GAP SERPL CALCULATED.3IONS-SCNC: 17 MMOL/L (ref 4–13)
ATRIAL RATE: 101 BPM
ATRIAL RATE: 101 BPM
ATRIAL RATE: 117 BPM
ATRIAL RATE: 69 BPM
BACTERIA UR QL AUTO: ABNORMAL /HPF
BILIRUB UR QL STRIP: NEGATIVE
BUN SERPL-MCNC: 74 MG/DL (ref 5–25)
BUN SERPL-MCNC: 76 MG/DL (ref 5–25)
BUN SERPL-MCNC: 76 MG/DL (ref 5–25)
CALCIUM SERPL-MCNC: 9.3 MG/DL (ref 8.3–10.1)
CALCIUM SERPL-MCNC: 9.6 MG/DL (ref 8.3–10.1)
CALCIUM SERPL-MCNC: 9.8 MG/DL (ref 8.3–10.1)
CHLORIDE SERPL-SCNC: 98 MMOL/L (ref 100–108)
CHLORIDE SERPL-SCNC: 99 MMOL/L (ref 100–108)
CHLORIDE SERPL-SCNC: 99 MMOL/L (ref 100–108)
CLARITY UR: ABNORMAL
CO2 SERPL-SCNC: 22 MMOL/L (ref 21–32)
CO2 SERPL-SCNC: 24 MMOL/L (ref 21–32)
CO2 SERPL-SCNC: 25 MMOL/L (ref 21–32)
COLOR UR: YELLOW
CREAT SERPL-MCNC: 3.55 MG/DL (ref 0.6–1.3)
CREAT SERPL-MCNC: 3.66 MG/DL (ref 0.6–1.3)
CREAT SERPL-MCNC: 3.68 MG/DL (ref 0.6–1.3)
ERYTHROCYTE [DISTWIDTH] IN BLOOD BY AUTOMATED COUNT: 20.6 % (ref 11.6–15.1)
GFR SERPL CREATININE-BSD FRML MDRD: 15 ML/MIN/1.73SQ M
GFR SERPL CREATININE-BSD FRML MDRD: 15 ML/MIN/1.73SQ M
GFR SERPL CREATININE-BSD FRML MDRD: 16 ML/MIN/1.73SQ M
GLUCOSE SERPL-MCNC: 177 MG/DL (ref 65–140)
GLUCOSE SERPL-MCNC: 57 MG/DL (ref 65–140)
GLUCOSE SERPL-MCNC: 57 MG/DL (ref 65–140)
GLUCOSE SERPL-MCNC: 63 MG/DL (ref 65–140)
GLUCOSE SERPL-MCNC: 68 MG/DL (ref 65–140)
GLUCOSE SERPL-MCNC: 69 MG/DL (ref 65–140)
GLUCOSE SERPL-MCNC: 69 MG/DL (ref 65–140)
GLUCOSE UR STRIP-MCNC: NEGATIVE MG/DL
HCT VFR BLD AUTO: 41.9 % (ref 36.5–49.3)
HGB BLD-MCNC: 14.2 G/DL (ref 12–17)
HGB UR QL STRIP.AUTO: ABNORMAL
KETONES UR STRIP-MCNC: NEGATIVE MG/DL
LACTATE SERPL-SCNC: 3.1 MMOL/L (ref 0.5–2)
LEUKOCYTE ESTERASE UR QL STRIP: ABNORMAL
MAGNESIUM SERPL-MCNC: 1.9 MG/DL (ref 1.6–2.6)
MCH RBC QN AUTO: 32.4 PG (ref 26.8–34.3)
MCHC RBC AUTO-ENTMCNC: 33.9 G/DL (ref 31.4–37.4)
MCV RBC AUTO: 96 FL (ref 82–98)
NITRITE UR QL STRIP: NEGATIVE
NON-SQ EPI CELLS URNS QL MICRO: ABNORMAL /HPF
OTHER STN SPEC: ABNORMAL
PH UR STRIP.AUTO: 5.5 [PH]
PLATELET # BLD AUTO: 157 THOUSANDS/UL (ref 149–390)
PLATELET # BLD AUTO: 171 THOUSANDS/UL (ref 149–390)
PMV BLD AUTO: 12.2 FL (ref 8.9–12.7)
PMV BLD AUTO: 12.3 FL (ref 8.9–12.7)
POTASSIUM SERPL-SCNC: 4.2 MMOL/L (ref 3.5–5.3)
POTASSIUM SERPL-SCNC: 4.3 MMOL/L (ref 3.5–5.3)
POTASSIUM SERPL-SCNC: 4.3 MMOL/L (ref 3.5–5.3)
PROT UR STRIP-MCNC: ABNORMAL MG/DL
QRS AXIS: -42 DEGREES
QRS AXIS: -50 DEGREES
QRSD INTERVAL: 118 MS
QRSD INTERVAL: 120 MS
QRSD INTERVAL: 122 MS
QRSD INTERVAL: 122 MS
QT INTERVAL: 408 MS
QT INTERVAL: 414 MS
QT INTERVAL: 430 MS
QT INTERVAL: 430 MS
QTC INTERVAL: 495 MS
QTC INTERVAL: 495 MS
QTC INTERVAL: 511 MS
QTC INTERVAL: 520 MS
RBC # BLD AUTO: 4.38 MILLION/UL (ref 3.88–5.62)
RBC #/AREA URNS AUTO: ABNORMAL /HPF
SODIUM SERPL-SCNC: 137 MMOL/L (ref 136–145)
SODIUM SERPL-SCNC: 137 MMOL/L (ref 136–145)
SODIUM SERPL-SCNC: 138 MMOL/L (ref 136–145)
SP GR UR STRIP.AUTO: 1.02 (ref 1–1.03)
T WAVE AXIS: 108 DEGREES
T WAVE AXIS: 108 DEGREES
T WAVE AXIS: 112 DEGREES
T WAVE AXIS: 123 DEGREES
TROPONIN I SERPL-MCNC: 0.15 NG/ML
URATE CRY URNS QL MICRO: ABNORMAL /HPF
UROBILINOGEN UR QL STRIP.AUTO: 0.2 E.U./DL
VENTRICULAR RATE: 80 BPM
VENTRICULAR RATE: 80 BPM
VENTRICULAR RATE: 92 BPM
VENTRICULAR RATE: 98 BPM
WBC # BLD AUTO: 7.26 THOUSAND/UL (ref 4.31–10.16)
WBC #/AREA URNS AUTO: ABNORMAL /HPF

## 2020-08-08 PROCEDURE — 80048 BASIC METABOLIC PNL TOTAL CA: CPT | Performed by: INTERNAL MEDICINE

## 2020-08-08 PROCEDURE — 83735 ASSAY OF MAGNESIUM: CPT | Performed by: NURSE PRACTITIONER

## 2020-08-08 PROCEDURE — 85027 COMPLETE CBC AUTOMATED: CPT | Performed by: NURSE PRACTITIONER

## 2020-08-08 PROCEDURE — 87106 FUNGI IDENTIFICATION YEAST: CPT | Performed by: NURSE PRACTITIONER

## 2020-08-08 PROCEDURE — 36415 COLL VENOUS BLD VENIPUNCTURE: CPT | Performed by: NURSE PRACTITIONER

## 2020-08-08 PROCEDURE — 82948 REAGENT STRIP/BLOOD GLUCOSE: CPT

## 2020-08-08 PROCEDURE — 99232 SBSQ HOSP IP/OBS MODERATE 35: CPT | Performed by: INTERNAL MEDICINE

## 2020-08-08 PROCEDURE — 80048 BASIC METABOLIC PNL TOTAL CA: CPT | Performed by: NURSE PRACTITIONER

## 2020-08-08 PROCEDURE — 84484 ASSAY OF TROPONIN QUANT: CPT | Performed by: NURSE PRACTITIONER

## 2020-08-08 PROCEDURE — 93005 ELECTROCARDIOGRAM TRACING: CPT

## 2020-08-08 PROCEDURE — 93010 ELECTROCARDIOGRAM REPORT: CPT | Performed by: INTERNAL MEDICINE

## 2020-08-08 PROCEDURE — 87086 URINE CULTURE/COLONY COUNT: CPT | Performed by: NURSE PRACTITIONER

## 2020-08-08 PROCEDURE — 81001 URINALYSIS AUTO W/SCOPE: CPT | Performed by: NURSE PRACTITIONER

## 2020-08-08 PROCEDURE — 85049 AUTOMATED PLATELET COUNT: CPT | Performed by: NURSE PRACTITIONER

## 2020-08-08 PROCEDURE — 71045 X-RAY EXAM CHEST 1 VIEW: CPT

## 2020-08-08 PROCEDURE — 83605 ASSAY OF LACTIC ACID: CPT | Performed by: NURSE PRACTITIONER

## 2020-08-08 PROCEDURE — 99223 1ST HOSP IP/OBS HIGH 75: CPT | Performed by: INTERNAL MEDICINE

## 2020-08-08 RX ORDER — ALBUMIN (HUMAN) 12.5 G/50ML
25 SOLUTION INTRAVENOUS ONCE
Status: COMPLETED | OUTPATIENT
Start: 2020-08-08 | End: 2020-08-08

## 2020-08-08 RX ORDER — ONDANSETRON 2 MG/ML
4 INJECTION INTRAMUSCULAR; INTRAVENOUS EVERY 6 HOURS PRN
Status: DISCONTINUED | OUTPATIENT
Start: 2020-08-08 | End: 2020-08-14 | Stop reason: HOSPADM

## 2020-08-08 RX ORDER — DEXTROSE MONOHYDRATE 25 G/50ML
25 INJECTION, SOLUTION INTRAVENOUS ONCE
Status: COMPLETED | OUTPATIENT
Start: 2020-08-08 | End: 2020-08-08

## 2020-08-08 RX ORDER — SODIUM CHLORIDE 9 MG/ML
75 INJECTION, SOLUTION INTRAVENOUS CONTINUOUS
Status: DISCONTINUED | OUTPATIENT
Start: 2020-08-08 | End: 2020-08-08

## 2020-08-08 RX ORDER — SODIUM CHLORIDE 9 MG/ML
60 INJECTION, SOLUTION INTRAVENOUS CONTINUOUS
Status: DISCONTINUED | OUTPATIENT
Start: 2020-08-08 | End: 2020-08-10

## 2020-08-08 RX ORDER — BUMETANIDE 0.25 MG/ML
2 INJECTION, SOLUTION INTRAMUSCULAR; INTRAVENOUS 2 TIMES DAILY
Status: DISCONTINUED | OUTPATIENT
Start: 2020-08-08 | End: 2020-08-08

## 2020-08-08 RX ORDER — DEXTROSE MONOHYDRATE 25 G/50ML
50 INJECTION, SOLUTION INTRAVENOUS ONCE
Status: COMPLETED | OUTPATIENT
Start: 2020-08-08 | End: 2020-08-08

## 2020-08-08 RX ORDER — BUMETANIDE 1 MG/1
2 TABLET ORAL 2 TIMES DAILY
Status: DISCONTINUED | OUTPATIENT
Start: 2020-08-08 | End: 2020-08-08

## 2020-08-08 RX ORDER — BUMETANIDE 0.25 MG/ML
1 INJECTION, SOLUTION INTRAMUSCULAR; INTRAVENOUS 2 TIMES DAILY
Status: DISCONTINUED | OUTPATIENT
Start: 2020-08-08 | End: 2020-08-08

## 2020-08-08 RX ORDER — CEFTRIAXONE 1 G/50ML
1000 INJECTION, SOLUTION INTRAVENOUS EVERY 24 HOURS
Status: DISCONTINUED | OUTPATIENT
Start: 2020-08-08 | End: 2020-08-11

## 2020-08-08 RX ADMIN — APIXABAN 5 MG: 5 TABLET, FILM COATED ORAL at 10:13

## 2020-08-08 RX ADMIN — HEPARIN SODIUM 5000 UNITS: 5000 INJECTION INTRAVENOUS; SUBCUTANEOUS at 01:33

## 2020-08-08 RX ADMIN — MIDODRINE HYDROCHLORIDE 5 MG: 5 TABLET ORAL at 06:27

## 2020-08-08 RX ADMIN — BUMETANIDE 2 MG: 0.25 INJECTION INTRAMUSCULAR; INTRAVENOUS at 11:47

## 2020-08-08 RX ADMIN — PANTOPRAZOLE SODIUM 40 MG: 40 TABLET, DELAYED RELEASE ORAL at 10:13

## 2020-08-08 RX ADMIN — Medication 125 MG: at 13:32

## 2020-08-08 RX ADMIN — DEXTROSE MONOHYDRATE 50 ML: 25 INJECTION, SOLUTION INTRAVENOUS at 03:22

## 2020-08-08 RX ADMIN — CEFTRIAXONE 1000 MG: 1 INJECTION, SOLUTION INTRAVENOUS at 11:47

## 2020-08-08 RX ADMIN — METOPROLOL TARTRATE 12.5 MG: 25 TABLET ORAL at 20:14

## 2020-08-08 RX ADMIN — ALBUMIN (HUMAN) 25 G: 0.25 INJECTION, SOLUTION INTRAVENOUS at 03:21

## 2020-08-08 RX ADMIN — HEPARIN SODIUM 5000 UNITS: 5000 INJECTION INTRAVENOUS; SUBCUTANEOUS at 06:33

## 2020-08-08 RX ADMIN — MIDODRINE HYDROCHLORIDE 5 MG: 5 TABLET ORAL at 18:23

## 2020-08-08 RX ADMIN — METOPROLOL TARTRATE 12.5 MG: 25 TABLET ORAL at 01:23

## 2020-08-08 RX ADMIN — Medication 125 MG: at 20:14

## 2020-08-08 RX ADMIN — DEXTROSE MONOHYDRATE 25 ML: 25 INJECTION, SOLUTION INTRAVENOUS at 23:54

## 2020-08-08 RX ADMIN — MIDODRINE HYDROCHLORIDE 5 MG: 5 TABLET ORAL at 11:47

## 2020-08-08 RX ADMIN — APIXABAN 2.5 MG: 2.5 TABLET, FILM COATED ORAL at 18:23

## 2020-08-08 RX ADMIN — DULOXETINE 30 MG: 30 CAPSULE, DELAYED RELEASE ORAL at 10:13

## 2020-08-08 RX ADMIN — SODIUM CHLORIDE 60 ML/HR: 0.9 INJECTION, SOLUTION INTRAVENOUS at 18:27

## 2020-08-08 NOTE — ASSESSMENT & PLAN NOTE
In the setting of acute urinary retention and dehydration 2/2 PO po intake and possible over diuresis  Baseline creatine 1 33-2 3  On admit 76/3 61  Hold nephrotoxins  Trend renal indices  Gentle hydration of 500 cc given by ED

## 2020-08-08 NOTE — ASSESSMENT & PLAN NOTE
Wt Readings from Last 3 Encounters:   07/17/20 114 kg (251 lb 1 7 oz)   07/02/20 122 kg (269 lb 2 9 oz)   07/01/20 121 kg (267 lb 6 7 oz)     Last echo from 6/26/2020 with EF 30% and there was severe diffuse hypokinesis with regional variations    Appears euvolemic   BNP pending  No complaints of dyspnea  Continue Bumex 1 mg PO BID  2+ pedal edema  Strict I and O's  Daily weights

## 2020-08-08 NOTE — ASSESSMENT & PLAN NOTE
In the setting of acute urinary retention and dehydration 2/2 PO po intake and possible over diuresis  Baseline creatine 1 33-2 3  Presented with creatinine of 3 61  Creatinine this morning is 3 55  Will start diuresis with Bumex  Monitor BMPs q 8 hours  Avoid hypotension/nephrotoxins medication  Urinary retention protocol/bladder scan/PVR  Nephrology consult

## 2020-08-08 NOTE — NURSING NOTE
Gave Dextrose 50% to pt via IV for low BG of 57 per NP's orders followed by Albumin 25% injection via gravity  Made NP aware of low rectal temp  Continued to monitor pt

## 2020-08-08 NOTE — PROGRESS NOTES
Progress Note - Linwood Mazariegos 1945, 77 y o  male MRN: [id number]    Unit/Bed#: -01 Encounter: [id number]    Primary Care Provider: Karen Howard MD   Date and time admitted to hospital: 8/7/2020  8:42 PM        UTI (urinary tract infection)  Assessment & Plan  Will start on Rocephin  Will also start on prophylactic vancomycin for C diff  Trend WBC and fever curve  Follow-up culture results    Acute on chronic combined systolic and diastolic heart failure (HCC)  Assessment & Plan  Wt Readings from Last 3 Encounters:   08/08/20 110 kg (241 lb 10 oz)   07/17/20 114 kg (251 lb 1 7 oz)   07/02/20 122 kg (269 lb 2 9 oz)     Last echo from 6/26/2020 with EF 30% and there was severe diffuse hypokinesis with regional variations    Will start on Bumex 2 mg IV twice daily  BNP is >35,000  Oxygen supplementation and bronchodilators  Strict I and O's  Daily weights  Monitor renal function q 8 hours  Cardiology input  Will order stat chest x-ray    Benign prostatic hyperplasia with urinary retention  Assessment & Plan  Patient with urinary retention-650 ml on bladder scan  Lopez inserted in ED   Continue on Flomax    Hyperkalemia  Assessment & Plan  Secondary to hemolysis  Normal  No acute changes on EKG      Acute metabolic encephalopathy  Assessment & Plan  Likely secondary to UTI  Continue Rocephin  Follow-up culture results    Elevated troponin level not due myocardial infarction  Assessment & Plan  Chronically elevated and in the setting of dehyration/SAMY   EKG with Atrial Fib and no ischemic changes or ST changes  Denies Chest pain   Troponin 0 15  Continue to monitor and trend troponin    Persistent atrial fibrillation  Assessment & Plan  Rate controlled 80-99  Continue on metoprolol and Eliquis  Monitor on telemetry    Essential hypertension  Assessment & Plan  Continue on Lopressor  On midodrine  Monitor vitals as per protocol      PVD (peripheral vascular disease) ([facility])  Assessment & Plan  Bilateral lower extremities with multiple wounds  Follows up with Podiatry as outpatient  Gastroesophageal reflux disease without esophagitis  Assessment & Plan  Continue Protonix    Wound, open, foot  Assessment & Plan  Wound to left foot-does not appear infectious  Will consult podiatry  Wound care and dressing changes per recommendations    * Acute kidney injury superimposed on CKD [facility])  Assessment & Plan  In the setting of acute urinary retention and dehydration 2/2 PO po intake and possible over diuresis  Baseline creatine 1 33-2 3  Presented with creatinine of 3 61  Creatinine this morning is 3 55  Will start diuresis with Bumex  Monitor BMPs q 8 hours  Avoid hypotension/nephrotoxins medication  Urinary retention protocol/bladder scan/PVR  Nephrology consult  Labs & Imaging: I have personally reviewed pertinent reports  VTE Pharmacologic Prophylaxis:  Eliquis  VTE Mechanical Prophylaxis: sequential compression device    Code Status:   Level 1 - Full Code    Patient Centered Rounds: I have performed bedside rounds with nursing staff today  Discussions with Specialists or Other Care Team Provider:  Nephrology and Podiatry    Education and Discussions with Family / Patient:  Wally Alcala    Current Length of Stay: 1 day(s)    Current Patient Status: Inpatient   Certification Statement: The patient will continue to require additional inpatient hospital stay due to see my assessment and plan  Subjective:   Patient is seen and examined at bedside  Patient is confused  Patient was sent from nursing facility with normal blood work, confusion  Spoke to son and as per him patient has been refusing his medications at the nursing facility for the past few days  Afebrile  Has shortness of breath  No cough, abdominal pain, nausea or vomiting  All other ROS are negative  Objective:    Vitals: Blood pressure 100/56, pulse 79, temperature (!) 96 5 °F (35 8 °C), resp   rate 16, weight 110 kg (241 lb 10 oz), SpO2 100 % ,Body mass index is 32 77 kg/m²  SPO2 RA Rest      ED to Hosp-Admission (Current) from 8/7/2020 in [address] [facility]   SpO2  100 %   SpO2 Activity  At Rest   O2 Device  Nasal cannula   O2 Flow Rate          I&O:     Intake/Output Summary (Last 24 hours) at 8/8/2020 1130  Last data filed at 8/8/2020 0601  Gross per 24 hour   Intake 750 ml   Output 350 ml   Net 400 ml       Physical Exam:    General- awake, lying comfortably in bed  Not in any acute distress  HEENT- SERGIO, EOM intact  CVS- irregularly irregular, S1 and S2 normal  Chest- Bilateral Air entry, decreased at bases  Abdomen- soft, nontender, not distended, no guarding or rigidity, BS+  Extremities-  has pedal edema, No calf tenderness  Patient has chronic lower extremity wounds  CNS-   Alert, awake and orientedx1    Moving all 4 extremities    Invasive Devices     Peripheral Intravenous Line            Peripheral IV 08/07/20 Right Antecubital 1 day    Peripheral IV 08/07/20 Left Antecubital less than 1 day          Drain            Urethral Catheter Double-lumen 16 Fr  less than 1 day                      Social History  reviewed  Family History   Problem Relation Age of Onset    Cancer Mother         cancer of uterus    Diabetes Sister     Mental illness Neg Hx         neg fam hx    Substance Abuse Neg Hx         neg fam hx    reviewed    Meds:  Current Facility-Administered Medications   Medication Dose Route Frequency Provider Last Rate Last Dose    acetaminophen (TYLENOL) tablet 650 mg  650 mg Oral Q6H PRN NAVNEET Escobar        apixaban (ELIQUIS) tablet 5 mg  5 mg Oral BID Fly Costello MD        bumetanide (BUMEX) injection 2 mg  2 mg Intravenous BID Fly Costello MD        cefTRIAXone (ROCEPHIN) IVPB (premix) 1,000 mg 50 mL  1,000 mg Intravenous Q24H Fly Costello MD        DULoxetine (CYMBALTA) delayed release capsule 30 mg  30 mg Oral Daily NAVNEET Escobar   30 mg at 08/08/20 1013    heparin (porcine) subcutaneous injection 5,000 Units  5,000 Units Subcutaneous [facility] Mary Stewart CRNP   5,000 Units at 08/08/20 7951    metoprolol tartrate (LOPRESSOR) partial tablet 12 5 mg  12 5 mg Oral Q12H Denis Arenas MD        midodrine (PROAMATINE) tablet 5 mg  5 mg Oral TID AC Mary CAMEJO Mengmaegan, CRNP   5 mg at 08/08/20 8443    pantoprazole (PROTONIX) EC tablet 40 mg  40 mg Oral Daily Mary Stewart CRNP   40 mg at 08/08/20 1013    vancomycin (VANCOCIN) oral solution 125 mg  125 mg Oral Q12H Denis Arenas MD          Medications Prior to Admission   Medication    acetaminophen (TYLENOL) 325 mg tablet    apixaban (ELIQUIS) 5 mg    Ascorbic Acid (VITAMIN C) 1000 MG tablet    bumetanide (BUMEX) 1 mg tablet    cholecalciferol (VITAMIN D3) 1,000 units tablet    cholestyramine sugar free (QUESTRAN LIGHT) 4 g packet    collagenase (SANTYL) ointment    DULoxetine (CYMBALTA) 30 mg delayed release capsule    metoprolol tartrate (LOPRESSOR) 25 mg tablet    midodrine (PROAMATINE) 5 mg tablet    nystatin (MYCOSTATIN) powder    pantoprazole (PROTONIX) 40 mg tablet    potassium chloride (MICRO-K) 10 MEQ CR capsule       Labs:  Results from last 7 days   Lab Units 08/08/20  0455 08/08/20  0109 08/07/20  2053   WBC Thousand/uL 7 26  --  7 80   HEMOGLOBIN g/dL 14 2  --  14 2   HEMATOCRIT % 41 9  --  42 1   PLATELETS Thousands/uL 157 171 174   NEUTROS PCT %  --   --  62   LYMPHS PCT %  --   --  17   MONOS PCT %  --   --  17*   EOS PCT %  --   --  3     Results from last 7 days   Lab Units 08/08/20  0757 08/08/20  0109 08/07/20  2053   POTASSIUM mmol/L 4 3 4 3 5 6*   CHLORIDE mmol/L 99* 99* 98*   CO2 mmol/L 25 24 24   BUN mg/dL 76* 76* 76*   CREATININE mg/dL 3 55* 3 66* 3 61*   CALCIUM mg/dL 9 6 9 8 9 6   ALK PHOS U/L  --   --  62   ALT U/L  --   --  14   AST U/L  --   --  50*     Lab Results   Component Value Date    TROPONINI 0 15 (H) 08/08/2020    TROPONINI 0 15 (H) 08/08/2020    TROPONINI 0 15 (H) 08/08/2020    CKMB 11 1 (H) 06/14/2020    CKMB 12 8 (H) 06/13/2020    CKMB 13 3 (H) 06/12/2020    CKTOTAL 319 (H) 06/14/2020    CKTOTAL 431 (H) 06/13/2020    CKTOTAL 492 (H) 06/12/2020     Results from last 7 days   Lab Units 08/07/20 2053   INR  4 28*     Lab Results   Component Value Date    BLOODCX No Growth After 5 Days  07/11/2020    BLOODCX No Growth After 5 Days  07/11/2020    BLOODCX No Growth After 5 Days  06/25/2020    BLOODCX No Growth After 5 Days  06/25/2020    URINECX <10,000 cfu/ml  02/19/2020    WOUNDCULT 2+ Growth of Serratia marcescens (A) 03/05/2020    WOUNDCULT 4+ Growth of Serratia marcescens (A) 03/02/2020    WOUNDCULT 3+ Growth of Serratia marcescens (A) 07/24/2019    WOUNDCULT 1+ Growth of  07/24/2019         Imaging:  Results for orders placed during the hospital encounter of 06/25/20   XR chest portable    Narrative CHEST     INDICATION:   Lower extremity swelling  COMPARISON:  6/14/2020    EXAM PERFORMED/VIEWS:  XR CHEST PORTABLE      FINDINGS:    Stable severe cardiomegaly  No pleural effusion  No airspace consolidation, pulmonary edema, atelectasis or pneumothorax  Limited evaluation bones and soft tissues  Impression No acute cardiopulmonary disease  Workstation performed: [id number]       Results for orders placed during the hospital encounter of 06/11/20   XR chest pa & lateral    Narrative CHEST     INDICATION:   sob  COMPARISON:  6/11/2020    EXAM PERFORMED/VIEWS:  XR CHEST PA & LATERAL      FINDINGS:    Cardiomediastinal silhouette remains enlarged  Slight vascular congestion and slight costophrenic angle blunting  No pneumothorax  Osseous structures appear within normal limits for patient age  Impression Cardiomegaly  Slight vascular congestion  Trace effusions          Workstation performed: [id number]         Last 24 Hours Medication List:   Current Facility-Administered Medications   Medication Dose Route Frequency Provider Last Rate    acetaminophen  650 mg Oral Q6H PRN NAVNEET Escobar      apixaban  5 mg Oral BID Lynda Yi MD      bumetanide  2 mg Intravenous BID Lynda Yi MD      cefTRIAXone  1,000 mg Intravenous Q24H Lynda Yi MD      DULoxetine  30 mg Oral Daily NAVNEET Escobar      heparin (porcine)  5,000 Units Subcutaneous Q8H [facility] NAVNEET Escobar      metoprolol tartrate  12 5 mg Oral Q12H Mayra Srinivasan MD      midodrine  5 mg Oral TID AC NAVNEET Escobar      pantoprazole  40 mg Oral Daily NAVNEET Escobar      vancomycin  125 mg Oral Q12H Mayra Srinivasan MD          Today, Patient Was Seen By: Lynda Yi MD    ** Please Note: Dictation voice to text software may have been used in the creation of this document   **

## 2020-08-08 NOTE — ED PROVIDER NOTES
History  Chief Complaint   Patient presents with    Altered Mental Status     patient presents with AMS from [address] and abnormal labs      77year old male with history of CKD brought from [facility] for evaluation of altered mental status with increased confusion as well as elevated BUN and creatinine on outpatient labs  BUN 54 with creatinine 2 2 on labs collected on 7/24, BUN 59 with creatinine 2 2 on 7/27 and BUN 69 with creatinine 3 2 on 8/7 at facility  Patient is unable to provide any history at this time  Patient has history of cardiorenal syndrome with CKD and CHF  Last echocardiogram showed an ejection fraction of 30% on 6/26/20  He had been admitted from 7/11-7/17/20 for increased confusion, falls and SAMY superimposed on CKD as well as acute CHF exacerbation  He was discharged on Bumex  Admission was complicated by C diff which was treated with oral vancomycin  History provided by:  EMS personnel  History limited by:  Mental status change  Altered Mental Status   Presenting symptoms: confusion    Severity:  Unable to specify  Most recent episode: Today  Episode history:  Continuous  Timing:  Constant  Progression:  Worsening  Chronicity:  New  Context: nursing home resident    Context comment:  Recent hospital admission, cardiorenal syndrome      Prior to Admission Medications   Prescriptions Last Dose Informant Patient Reported? Taking?    Ascorbic Acid (VITAMIN C) 1000 MG tablet  Self Yes Yes   Sig: Take 1,000 mg by mouth 2 (two) times a day    DULoxetine (CYMBALTA) 30 mg delayed release capsule   No Yes   Sig: Take 1 capsule (30 mg total) by mouth daily   acetaminophen (TYLENOL) 325 mg tablet   No Yes   Sig: Take 2 tablets (650 mg total) by mouth every 6 (six) hours as needed for mild pain, headaches or fever   apixaban (ELIQUIS) 5 mg  Self Yes Yes   Sig: Take 5 mg by mouth 2 (two) times a day    bumetanide (BUMEX) 1 mg tablet   No Yes   Sig: Take 1 tablet (1 mg total) by mouth 2 (two) times a day   cholecalciferol (VITAMIN D3) 1,000 units tablet  Self Yes Yes   Sig: Take 1,000 Units by mouth daily   cholestyramine sugar free (QUESTRAN LIGHT) 4 g packet   No Yes   Sig: Take 1 packet (4 g total) by mouth daily for 11 days   collagenase (SANTYL) ointment   No Yes   Sig: Apply topically daily   metoprolol tartrate (LOPRESSOR) 25 mg tablet   No Yes   Sig: Take 0 5 tablets (12 5 mg total) by mouth every 12 (twelve) hours   midodrine (PROAMATINE) 5 mg tablet   No Yes   Sig: Take 1 tablet (5 mg total) by mouth 3 (three) times a day before meals   nystatin (MYCOSTATIN) powder   No Yes   Sig: Apply topically 2 (two) times a day   pantoprazole (PROTONIX) 40 mg tablet   No Yes   Sig: Take 1 tablet (40 mg total) by mouth daily   potassium chloride (MICRO-K) 10 MEQ CR capsule   No Yes   Sig: Take 2 capsules (20 mEq total) by mouth daily      Facility-Administered Medications: None       Past Medical History:   Diagnosis Date    Atrial fibrillation ([facility])     Cardiac disease     Cellulitis     CHF (congestive heart failure) ([facility])     Chronic pain     Chronic venous hypertension     with ulceration    GERD (gastroesophageal reflux disease)     History of methicillin resistant staphylococcus aureus (MRSA) 11/26/2019    negative nasal swab     Hyperlipidemia     Hypertension     Obesity     Pulmonary hypertension ([facility] )     PVD (peripheral vascular disease) ([facility])     Type 2 diabetes mellitus with diabetic heel ulcer ([facility] ) 6/30/2020    Vascular disorder of extremity ([facility] )        Past Surgical History:   Procedure Laterality Date    ANTERIOR RELEASE VERTEBRAL BODY W/ POSTERIOR FUSION      APPENDECTOMY  1954    CATARACT EXTRACTION      DECOMPRESSION SPINE LUMBAR POSTERIOR      ELBOW SURGERY      FOOT/ANKLE ARTHRODESIS Right      complications postoperatively with bone healing and infection    INCISION AND DRAINAGE OF WOUND Left 3/5/2020    Procedure: INCISION AND DRAINAGE (I&D) EXTREMITY;  Surgeon: Sugey Fong DPM;  Location:  MAIN OR;  Service: Podiatry    KNEE SURGERY      NOSE SURGERY      turbinectomy    RETINAL DETACHMENT SURGERY      RHINOPLASTY      TONSILLECTOMY      VEIN LIGATION AND STRIPPING      saphenous vein long       Family History   Problem Relation Age of Onset    Cancer Mother         cancer of uterus    Diabetes Sister     Mental illness Neg Hx         neg fam hx    Substance Abuse Neg Hx         neg fam hx     I have reviewed and agree with the history as documented  E-Cigarette/Vaping    E-Cigarette Use Never User      E-Cigarette/Vaping Substances    Nicotine No     THC No     CBD No     Flavoring No     Other No     Unknown No      Social History     Tobacco Use    Smoking status: Never Smoker    Smokeless tobacco: Never Used   Substance Use Topics    Alcohol use: Not Currently     Frequency: Never     Drinks per session: Patient refused     Binge frequency: Never    Drug use: Never       Review of Systems   Unable to perform ROS: Mental status change   Psychiatric/Behavioral: Positive for confusion  Physical Exam  Physical Exam  Vitals signs and nursing note reviewed  Constitutional:       General: He is not in acute distress  Appearance: He is well-developed  He is not toxic-appearing or diaphoretic  HENT:      Head: Normocephalic and atraumatic  Mouth/Throat:      Mouth: Mucous membranes are dry  Eyes:      Pupils: Pupils are equal, round, and reactive to light  Neck:      Musculoskeletal: Normal range of motion  Thyroid: No thyromegaly  Trachea: No tracheal deviation  Cardiovascular:      Rate and Rhythm: Normal rate  Rhythm irregularly irregular  Heart sounds: Normal heart sounds  Pulmonary:      Effort: Pulmonary effort is normal       Breath sounds: Normal breath sounds  Abdominal:      General: Bowel sounds are normal  There is no distension  Palpations: Abdomen is soft  Tenderness: There is abdominal tenderness in the suprapubic area  There is no guarding or rebound  Lymphadenopathy:      Cervical: No cervical adenopathy  Skin:     General: Skin is warm and dry  Comments: Bilateral pedal edema with venous congestion  Chronic ulcers of toes bilaterally with chronic wound dorsum left foot  No induration  Neurological:      Mental Status: He is alert  He is disoriented  Comments: Moderately confused         Vital Signs  ED Triage Vitals   Temperature Pulse Respirations Blood Pressure SpO2   08/07/20 2049 08/07/20 2045 08/07/20 2045 08/07/20 2045 08/07/20 2045   97 9 °F (36 6 °C) 79 20 107/58 98 %      Temp Source Heart Rate Source Patient Position - Orthostatic VS BP Location FiO2 (%)   08/07/20 2049 -- 08/07/20 2045 08/07/20 2045 --   Tympanic  Sitting Left arm       Pain Score       --                  Vitals:    08/07/20 2115 08/07/20 2130 08/07/20 2200 08/07/20 2230   BP: 101/58 111/84 108/65 90/55   Pulse: 90 91 86 99   Patient Position - Orthostatic VS:             Visual Acuity  Visual Acuity      Most Recent Value   L Pupil Size (mm)  2   R Pupil Size (mm)  2          ED Medications  Medications   sodium chloride 0 9 % bolus 500 mL (0 mL Intravenous Stopped 8/7/20 2239)       Diagnostic Studies  Results Reviewed     Procedure Component Value Units Date/Time    NT-BNP PRO [[id number]]  (Abnormal) Collected:  08/07/20 2053    Lab Status:  Final result Specimen:  Blood from Arm, Right Updated:  08/07/20 2306     NT-proBNP >35,000 pg/mL     Troponin I [[id number]]     Lab Status:  No result Specimen:  Blood     Lactic acid [[id number]]  (Abnormal) Collected:  08/07/20 2053    Lab Status:  Final result Specimen:  Blood from Arm, Right Updated:  08/07/20 2205     LACTIC ACID 3 1 mmol/L     Narrative:       Result may be elevated if tourniquet was used during collection      Lactic acid 2 Hours [[id number]]     Lab Status:  No result Specimen:  Blood     Novel [facility]a [facility] [[id number]]  (Normal) Collected:  08/07/20 2059    Lab Status:  Final result Specimen:  Nares from Nose Updated:  08/07/20 2204     SARS-CoV-2 Negative    Narrative: The specimen collection materials, transport medium, and/or testing methodology utilized in the production of these test results have been proven to be reliable in a limited validation with an abbreviated program under the Emergency Utilization Authorization provided by the FDA  Testing reported as "Presumptive positive" will be confirmed with secondary testing with a reference laboratory to ensure result accuracy  Clinical caution and judgement should be used with the interpretation of these results with consideration of the clinical impression and other laboratory testing  Testing reported as "Positive" or "Negative" has been proven to be accurate according to standard laboratory validation requirements  All testing is performed with control materials showing appropriate reactivity at standard intervals        Troponin I [[id number]]  (Abnormal) Collected:  08/07/20 2109    Lab Status:  Final result Specimen:  Blood from Arm, Right Updated:  08/07/20 2152     Troponin I 0 15 ng/mL     Protime-INR [[id number]]  (Abnormal) Collected:  08/07/20 2053    Lab Status:  Final result Specimen:  Blood from Arm, Right Updated:  08/07/20 2127     Protime 40 4 seconds      INR 4 28    APTT [[id number]]  (Abnormal) Collected:  08/07/20 2053    Lab Status:  Final result Specimen:  Blood from Arm, Right Updated:  08/07/20 2127     PTT 55 seconds     Comprehensive metabolic panel [[id number]]  (Abnormal) Collected:  08/07/20 2053    Lab Status:  Final result Specimen:  Blood from Arm, Right Updated:  08/07/20 2127     Sodium 138 mmol/L      Potassium 5 6 mmol/L      Chloride 98 mmol/L      CO2 24 mmol/L      ANION GAP 16 mmol/L      BUN 76 mg/dL      Creatinine 3 61 mg/dL      Glucose 68 mg/dL      Calcium 9 6 mg/dL      AST 50 U/L      ALT 14 U/L      Alkaline Phosphatase 62 U/L      Total Protein 8 1 g/dL      Albumin 2 4 g/dL      Total Bilirubin 2 60 mg/dL      eGFR 16 ml/min/1 73sq m     Narrative:       [place] guidelines for Chronic Kidney Disease (CKD):     Stage 1 with normal or high GFR (GFR > 90 mL/min/1 73 square meters)    Stage 2 Mild CKD (GFR = 60-89 mL/min/1 73 square meters)    Stage 3A Moderate CKD (GFR = 45-59 mL/min/1 73 square meters)    Stage 3B Moderate CKD (GFR = 30-44 mL/min/1 73 square meters)    Stage 4 Severe CKD (GFR = 15-29 mL/min/1 73 square meters)    Stage 5 End Stage CKD (GFR <15 mL/min/1 73 square meters)  Note: GFR calculation is accurate only with a steady state creatinine    CBC and differential [[id number]]  (Abnormal) Collected:  08/07/20 2053    Lab Status:  Final result Specimen:  Blood from Arm, Right Updated:  08/07/20 2105     WBC 7 80 Thousand/uL      RBC 4 43 Million/uL      Hemoglobin 14 2 g/dL      Hematocrit 42 1 %      MCV 95 fL      MCH 32 1 pg      MCHC 33 7 g/dL      RDW 20 6 %      MPV 12 4 fL      Platelets 660 Thousands/uL      nRBC 1 /100 WBCs      Neutrophils Relative 62 %      Immat GRANS % 1 %      Lymphocytes Relative 17 %      Monocytes Relative 17 %      Eosinophils Relative 3 %      Basophils Relative 0 %      Neutrophils Absolute 4 92 Thousands/µL      Immature Grans Absolute 0 05 Thousand/uL      Lymphocytes Absolute 1 29 Thousands/µL      Monocytes Absolute 1 31 Thousand/µL      Eosinophils Absolute 0 20 Thousand/µL      Basophils Absolute 0 03 Thousands/µL     POCT urinalysis dipstick [[id number]]     Lab Status:  No result Specimen:  Urine                  No orders to display              Procedures  ECG 12 Lead Documentation Only    Date/Time: 8/7/2020 9:04 PM  Performed by: Toya Milian MD  Authorized by: Toya Milian MD     Indications / Diagnosis:  Altered mental status  ECG reviewed by me, the ED Provider: yes    Patient location:  ED  Previous ECG:     Previous ECG:  Compared to current    Comparison ECG info:  7/11/20 afib with PVC    Similarity:  Changes noted (PVC no longer present)  Interpretation:     Interpretation: abnormal    Rate:     ECG rate:  80    ECG rate assessment: normal    Rhythm:     Rhythm: atrial fibrillation    Ectopy:     Ectopy: none    QRS:     QRS axis:  Left    QRS intervals:  Normal  Conduction:     Conduction: normal    ST segments:     ST segments:  Normal  T waves:     T waves: flattening      Flattening:  AVL             ED Course  ED Course as of Aug 07 2328   Fri Aug 07, 2020   2132 hemolyzed   Potassium(!): 5 6   2143 1 88 three weeks ago, 2 2 on outpatient labs 7/27   Creatinine(!): 3 61   2155 Chronically elevated, 0 19 three weeks ago   Troponin I(!): 0 15   2215 1 6 three weeks ago   LACTIC ACID(!!): 3 1       US AUDIT      Most Recent Value   Initial Alcohol Screen: US AUDIT-C    1  How often do you have a drink containing alcohol?  0 Filed at: 08/07/2020 2049   2  How many drinks containing alcohol do you have on a typical day you are drinking? 0 Filed at: 08/07/2020 2049   3a  Male UNDER 65: How often do you have five or more drinks on one occasion? 0 Filed at: 08/07/2020 2049   3b  FEMALE Any Age, or MALE 65+: How often do you have 4 or more drinks on one occassion? 0 Filed at: 08/07/2020 2049   Audit-C Score  0 Filed at: 08/07/2020 2049                  MARY/DAST-10      Most Recent Value   How many times in the past year have you    Used an illegal drug or used a prescription medication for non-medical reasons?   Never Filed at: 08/07/2020 2049                                MDM  Number of Diagnoses or Management Options  Acute urinary retention: new and requires workup  SAMY (acute kidney injury) [facility]): new and requires workup  Chronic foot ulcer ([facility] ): new and requires workup  Elevated lactic acid level: new and requires workup  Elevated troponin: new and requires workup  Diagnosis management comments: 77year old male brought to the ED by EMS for increased confusion and abnormal BUN and creatinine on outpatient labs  SAMY on CKD with chronically elevated troponin  Elevated lactic acid  Chronic wounds slightly improved since my prior exam last month  Peripheral edema improved  Possibly secondary to over diuresis with restricted oral fluid intake  500 mL NS ordered  Patient admitted for further evaluation and management  Amount and/or Complexity of Data Reviewed  Clinical lab tests: ordered and reviewed    Patient Progress  Patient progress: stable        Disposition  Final diagnoses:   SAMY (acute kidney injury) ([facility] )   Chronic foot ulcer ([facility] )   Elevated lactic acid level   Elevated troponin   Acute urinary retention     Time reflects when diagnosis was documented in both MDM as applicable and the Disposition within this note     Time User Action Codes Description Comment    8/7/2020 10:17 PM Bary Blamer Add [N17 9] SAMY (acute kidney [facility]) ([facility] )     8/7/2020 10:17 PM Bary Blamer Add [L97 509] Chronic foot ulcer ([facility] )     8/7/2020 10:17 PM Bary Blamer Add [R79 89] Elevated lactic acid level     8/7/2020 10:17 PM Bary Blamer Add [R79 89] Elevated troponin     8/7/2020 10:32 PM Bary Blamer Add [R33 8] Acute urinary retention       ED Disposition     ED Disposition Condition Date/Time Comment    Admit Stable Fri Aug 7, 2020 10:25 PM Case was discussed with YAZMIN and the patient's admission status was agreed to be Admission Status: inpatient status to the service of Dr Zaria Chahal   Follow-up Information    None         Patient's Medications   Discharge Prescriptions    No medications on file     No discharge procedures on file      PDMP Review       Value Time User    PDMP Reviewed  Yes 7/17/2020 10:07 AM Rick Ritchie MD          ED Provider  Electronically Signed by           Genaro Denise MD  08/07/20 [id number]

## 2020-08-08 NOTE — ASSESSMENT & PLAN NOTE
Will start on Rocephin  Will also start on prophylactic vancomycin for C diff  Trend WBC and fever curve  Follow-up culture results

## 2020-08-08 NOTE — ASSESSMENT & PLAN NOTE
Wt Readings from Last 3 Encounters:   08/08/20 110 kg (241 lb 10 oz)   07/17/20 114 kg (251 lb 1 7 oz)   07/02/20 122 kg (269 lb 2 9 oz)     Last echo from 6/26/2020 with EF 30% and there was severe diffuse hypokinesis with regional variations    Will start on Bumex 2 mg IV twice daily  BNP is >35,000  Oxygen supplementation and bronchodilators  Strict I and O's  Daily weights  Monitor renal function q 8 hours  Cardiology input  Will order stat chest x-ray

## 2020-08-08 NOTE — CONSULTS
2           Consultation - Nephrology   Linwood Mazariegos 77 y o  male MRN: [id number]  Unit/Bed#: -01 Encounter: [id number]      Assessment/Plan     Assessment / Plan:  1  Renal    Patient has acute renal failure chronic kidney disease with baseline creatinine that tends to run around 1 8 back in July of this year  The patient apparently had large residual in the emergency room in catheter was placed  He has not been eating much and is a difficult exam given his body appearance specially is lower extremities  I personally reviewed echocardiogram report from 06/26/2020 and he does have reduced ejection fraction of 30%  He also has dilated right ventricle with systolic dysfunction  If he is not eating much he can have reduced filling pressures resulting in drop in cardiac output so would favor giving trial of IV fluids slowly  The patient does have lower extremity edema but again has right-sided heart failure  I personally reviewed chest x-ray and there was a small left pleural effusion but no congestive heart failure  Will hold Bumex for now  Begin normal saline at 60 cc an hour  BMP a m  Renal ultrasound pending  Monitor urine output    Monitor for ability to resume maintenance diuretic  2  Cardiac    As above stated I reviewed the last echocardiogram from 06/26/2020  He is biventricular CHF with a left ventricular ejection fraction of 30%  For now will hold diuretic but obviously will resume when necessary  Would favor giving trial fluids as above  Patient's chronic atrial fibrillation rate is controlled presently  3  PVD with lower extremity wounds                  History of Present Illness   Physician Requesting Consult: Debra Lucero MD  Reason for Consult / Principal Problem:  Acute on chronic kidney disease  Hx and PE limited by:   HPI: Noah Goncalves is a 77y o  year old male who presented to the emergency room after outpatient labs revealed worsening kidney function with a creatinine of 3 2  The patient at the time of presentation per the history of physical had some suprapubic tenderness and had some urine retention that was relieved by catheter placement  He does have history of cardiomyopathy with chronic atrial fibrillation  Were asked to see him for acute renal failure  History obtained from chart review and the patient although patient very poor historian  Consults    Review of Systems   Constitutional: Positive for appetite change and fatigue  Negative for chills and fever  Not eating much  HENT: Negative  Eyes: Negative  Respiratory: Negative  Negative for cough, chest tightness, shortness of breath and wheezing  Cardiovascular: Negative  Negative for chest pain, palpitations and leg swelling  Gastrointestinal: Negative for abdominal pain, diarrhea, nausea and vomiting  Genitourinary:        Catheter was placed in ER  Neurological: Negative for dizziness, seizures, syncope and light-headedness  Psychiatric/Behavioral: Negative for agitation and behavioral problems  The patient is not nervous/anxious and is not hyperactive          Historical Information   Patient Active Problem List   Diagnosis    Wound, open, foot    Gastroesophageal reflux disease without esophagitis    PVD (peripheral vascular disease) ([facility]    Obesity due to excess calories with serious comorbidity    Essential hypertension    Persistent atrial fibrillation    Acute on chronic combined systolic and diastolic congestive heart failure (HCC)    Thrombocytopenia (HCC)    Hypokalemia    Ambulatory dysfunction    Chronic left shoulder pain    Rotator cuff arthropathy of left shoulder    Chronic pain of right wrist    Acute kidney injury superimposed on CKD (HCC)    Elevated troponin level not due myocardial infarction    Chronic combined systolic and diastolic congestive heart failure (HCC)    Pulmonary hypertension ([address] )    Hypertension    History of methicillin resistant staphylococcus aureus (MRSA)    GERD (gastroesophageal reflux disease)    Acute metabolic encephalopathy    Hyperkalemia    Hematoma of right chest wall    Type 2 diabetes mellitus without complication, without long-term current use of insulin (HCC)    Benign prostatic hyperplasia with urinary retention    Hypomagnesemia    Clostridium difficile diarrhea    CKD (chronic kidney disease), stage III (HCC)    Acute on chronic combined systolic and diastolic heart failure (HCC)    UTI (urinary tract infection)     Past Medical History:   Diagnosis Date    Atrial fibrillation (HCC)     Cardiac disease     Cellulitis     [facility]     Chronic pain     Chronic venous hypertension     with ulceration    Diabetes mellitus (HCC)     GERD (gastroesophageal reflux disease)     History of methicillin resistant staphylococcus aureus (MRSA) 11/26/2019    negative nasal swab     Hyperlipidemia     Hypertension     Obesity     Pressure injury of skin     Pulmonary [facility] [address] [facility]     PVD (peripheral vascular disease) ([facility])     Type 2 diabetes mellitus with diabetic heel ulcer ([facility] [address] ) 6/30/2020    Vascular disorder of extremity ([facility] [address] )      Past Surgical History:   Procedure Laterality Date    ANTERIOR RELEASE VERTEBRAL BODY W/ POSTERIOR FUSION      APPENDECTOMY  1955    CATARACT EXTRACTION      DECOMPRESSION SPINE LUMBAR POSTERIOR      ELBOW SURGERY      FOOT/ANKLE ARTHRODESIS Right      complications postoperatively with bone healing and infection    INCISION AND DRAINAGE OF WOUND Left 3/5/2020    Procedure: INCISION AND DRAINAGE (I&D) EXTREMITY;  Surgeon: Magali Laureano DPM;  Location:  MAIN OR;  Service: Podiatry    KNEE SURGERY      NOSE SURGERY      turbinectomy    RETINAL DETACHMENT SURGERY      RHINOPLASTY      TONSILLECTOMY      VEIN LIGATION AND STRIPPING      saphenous vein long     Social History   Social History     Substance and Sexual Activity   Alcohol Use Not Currently    Frequency: Never    Drinks per session: Patient refused    Binge frequency: Never     Social History     Substance and Sexual Activity   Drug Use Never     Social History     Tobacco Use   Smoking Status Never Smoker   Smokeless Tobacco Never Used     Family History   Problem Relation Age of Onset    Cancer Mother         cancer of uterus    Diabetes Sister     Mental illness Neg Hx         neg fam hx    Substance Abuse Neg Hx         neg fam hx       Meds/Allergies   current meds:   Current Facility-Administered Medications   Medication Dose Route Frequency    acetaminophen (TYLENOL) tablet 650 mg  650 mg Oral Q6H PRN    apixaban (ELIQUIS) tablet 2 5 mg  2 5 mg Oral BID    bumetanide (BUMEX) injection 2 mg  2 mg Intravenous BID    cefTRIAXone (ROCEPHIN) IVPB (premix) 1,000 mg 50 mL  1,000 mg Intravenous Q24H    DULoxetine (CYMBALTA) delayed release capsule 30 mg  30 mg Oral Daily    metoprolol tartrate (LOPRESSOR) partial tablet 12 5 mg  12 5 mg Oral Q12H [facility]    midodrine (PROAMATINE) tablet 5 mg  5 mg Oral TID AC    ondansetron (ZOFRAN) injection 4 mg  4 mg Intravenous Q6H PRN    pantoprazole (PROTONIX) EC tablet 40 mg  40 mg Oral Daily    vancomycin (VANCOCIN) oral solution 125 mg  125 mg Oral Q12H [facility]     No Known Allergies    Objective     Intake/Output Summary (Last 24 hours) at 8/8/2020 1459  Last data filed at 8/8/2020 0601  Gross per 24 hour   Intake 750 ml   Output 350 ml   Net 400 ml     Body mass index is 32 77 kg/m²  Invasive Devices:   Urethral Catheter Double-lumen 16 Fr  (Active)       PHYSICAL EXAM:  /56   Pulse 79   Temp (!) 96 5 °F (35 8 °C)   Resp 16   Wt 110 kg (241 lb 10 oz)   SpO2 100%   BMI 32 77 kg/m²     Physical Exam  Constitutional:       General: He is not in acute distress  Appearance: He is ill-appearing  He is not toxic-appearing or diaphoretic  HENT:      Head: Atraumatic        Comments: Oropharynx dry  Eyes:      General: No scleral icterus  Neck:      Musculoskeletal: Neck supple  Cardiovascular:      Rate and Rhythm: Normal rate  Rhythm irregular  Heart sounds: No friction rub  No gallop  Comments: Positive edema and hyperpigmented lower extremities  Pulmonary:      Effort: Pulmonary effort is normal  No respiratory distress  Breath sounds: Normal breath sounds  No wheezing, rhonchi or rales  Abdominal:      General: Bowel sounds are normal  There is no distension  Palpations: Abdomen is soft  Tenderness: There is no abdominal tenderness  Neurological:      Mental Status: He is alert and oriented to person, place, and time  Psychiatric:         Mood and Affect: Mood is not anxious or depressed  Behavior: Behavior is not agitated  Current Weight: Weight - Scale: 110 kg (241 lb 10 oz)  First Weight: Weight - Scale: 110 kg (241 lb 10 oz)    Lab Results:    Results from last 7 days   Lab Units 08/08/20  0455   WBC Thousand/uL 7 26   HEMOGLOBIN g/dL 14 2   HEMATOCRIT % 41 9   PLATELETS Thousands/uL 157     Results from last 7 days   Lab Units 08/08/20  0757   POTASSIUM mmol/L 4 3   CHLORIDE mmol/L 99*   CO2 mmol/L 25   BUN mg/dL 76*   CREATININE mg/dL 3 55*   CALCIUM mg/dL 9 6     Results from last 7 days   Lab Units 08/08/20  0757  08/07/20  2053   POTASSIUM mmol/L 4 3   < > 5 6*   CHLORIDE mmol/L 99*   < > 98*   CO2 mmol/L 25   < > 24   BUN mg/dL 76*   < > 76*   CREATININE mg/dL 3 55*   < > 3 61*   CALCIUM mg/dL 9 6   < > 9 6   ALK PHOS U/L  --   --  62   ALT U/L  --   --  14   AST U/L  --   --  50*    < > = values in this interval not displayed

## 2020-08-08 NOTE — PLAN OF CARE
Problem: Potential for Falls  Goal: Patient will remain free of falls  Description: INTERVENTIONS:  - Assess patient frequently for physical needs  -  Identify cognitive and physical deficits and behaviors that affect risk of falls  -  [place] fall precautions as indicated by assessment   - Educate patient/family on patient safety including physical limitations  - Instruct patient to call for assistance with activity based on assessment  - Modify environment to reduce risk of injury  - Consider OT/PT consult to assist with strengthening/mobility  Outcome: Progressing     Problem: Nutrition/Hydration-ADULT  Goal: Nutrient/Hydration intake appropriate for improving, restoring or maintaining nutritional needs  Description: Monitor and assess patient's nutrition/hydration status for malnutrition  Collaborate with interdisciplinary team and initiate plan and interventions as ordered  Monitor patient's weight and dietary intake as ordered or per policy  Utilize nutrition screening tool and intervene as necessary  Determine patient's food preferences and provide high-protein, high-caloric foods as appropriate       INTERVENTIONS:  - Monitor oral intake, urinary output, labs, and treatment plans  - Assess nutrition and hydration status and recommend course of action  - Evaluate amount of meals eaten  - Assist patient with eating if necessary   - Allow adequate time for meals  - Recommend/ encourage appropriate diets, oral nutritional supplements, and vitamin/mineral supplements  - Order, calculate, and assess calorie counts as needed  - Recommend, monitor, and adjust tube feedings and TPN/PPN based on assessed needs  - Assess need for intravenous fluids  - Provide specific nutrition/hydration education as appropriate  - Include patient/family/caregiver in decisions related to nutrition  Outcome: Progressing     Problem: Prexisting or High Potential for Compromised Skin Integrity  Goal: Skin integrity is maintained or improved  Description: INTERVENTIONS:  - Identify patients at risk for skin breakdown  - Assess and monitor skin integrity  - Assess and monitor nutrition and hydration status  - Monitor labs   - Assess for incontinence   - Turn and reposition patient  - Assist with mobility/ambulation  - Relieve pressure over bony prominences  - Avoid friction and shearing  - Provide appropriate hygiene as needed including keeping skin clean and dry  - Evaluate need for skin moisturizer/barrier cream  - Collaborate with interdisciplinary team   - Patient/family teaching  - Consider wound care consult   Outcome: Progressing

## 2020-08-08 NOTE — ASSESSMENT & PLAN NOTE
In the setting of dehydration   3 1  No infectious source, afebrile and no leukocytosis  Continue to trend

## 2020-08-08 NOTE — H&P
H&P- Linwood  1945, 77 y o  male MRN: [id number]    Unit/Bed#: SARINA Encounter: [id number]    Primary Care Provider: Justin Mejias MD   Date and time admitted to hospital: 8/7/2020  8:42 PM        * Acute kidney injury superimposed on CKD [facility])  Assessment & Plan  In the setting of acute urinary retention and dehydration 2/2 PO po intake and possible over diuresis  Baseline creatine 1 33-2 3  On admit 76/3 61  Hold nephrotoxins  Trend renal indices  Gentle hydration of 500 cc given by ED    Chronic diastolic (congestive) heart failure (HCC)  Assessment & Plan  Wt Readings from Last 3 Encounters:   07/17/20 114 kg (251 lb 1 7 oz)   07/02/20 122 kg (269 lb 2 9 oz)   07/01/20 121 kg (267 lb 6 7 oz)     Last echo from 6/26/2020 with EF 30% and there was severe diffuse hypokinesis with regional variations    Appears euvolemic   BNP pending  No complaints of dyspnea  Continue Bumex 1 mg PO BID  2+ pedal edema  Strict I and O's  Daily weights        Lactic acid acidosis  Assessment & Plan  In the setting of dehydration   3 1  No infectious source, afebrile and no leukocytosis  Continue to trend      Elevated troponin level not due myocardial infarction  Assessment & Plan  Chronically elevated and in the setting of dehyration   EKG with Atrial Fib and no ischemic changes or ST changes  Denies Chest pain   Continue to trend     Gastroesophageal reflux disease without esophagitis  Assessment & Plan  Continue home protonix    Essential hypertension  Assessment & Plan  Currently normotensive  Continue midodrine  Continue to monitor      Persistent atrial fibrillation  Assessment & Plan  Rate controlled 80-99  Continue home metoprolol and Eliquis  Monitor on telemetry    Benign prostatic hyperplasia with urinary retention  Assessment & Plan  Patient with urinary retention-650 ml on bladder scan  Lopez inserted in ED   Flomax given on last admission but caused patient to become hypotensive  Consider urology follow up as OP    Wound, open, foot  Assessment & Plan  Wound to left foot-does not appear infectious  Consult wound care    PVD (peripheral vascular disease) ([facility] [address] )  Assessment & Plan  raissa lower extremities with multiple wounds    Hyperkalemia  Assessment & Plan  In the setting of hemolysis  Will repeat  No acute changes on EKG      VTE Prophylaxis: Heparin  / sequential compression device   Code Status: Level 1 code  POLST: There is no POLST form on file for this patient (pre-hospital)  Discussion with family: Son left prior to my arrival    Anticipated Length of Stay:  Patient will be admitted on an Inpatient basis with an anticipated length of stay of  > 2 midnights  Justification for Hospital Stay: Acute Kidney Injury    Total Time for Visit, including Counseling / Coordination of Care: 45 minutes  Greater than 50% of this total time spent on direct patient counseling and coordination of care  Chief Complaint:   "Confusion and abnormal lab results"    History of Present Illness:    Kalli Snyder is a 77 y o  male with past medical history of chronic wounds, CHF, CKD, PVD, atrial fibrillation on Eliquis who presents from rehab with his son for abnormal lab results  Patient had outpatient labs which revealed a BUN of 69 creatinine of 3 2  Patient has no complaints of pain, or  shortness of breath  HE denies nausea, vomiting or diarrhea  IN the ED his BUN was 76 and creat 3 61, he had mild lactic acidosis and complained of mild suprapubic tenderness  Patient was bladder scanned and retained 650 ml of urine  Lopez catheter inserted  Patient will be admitted to Med/Surg for SAMY, trending of labs and diuresis  OF note, He was recently admitted for SAMY on CKD 3, CHF , falls and confusion from 7/11-7/17/2020  He was placed in [facility] for rehab  He wants to go home  However, his son is unable to manage him  Review of Systems:    Review of Systems   Constitutional: Negative for chills, fatigue and fever  Respiratory: Negative for chest tightness and shortness of breath  Cardiovascular: Positive for leg swelling  Negative for chest pain and palpitations  Genitourinary: Positive for difficulty urinating  Musculoskeletal: Negative for arthralgias and joint swelling  Skin: Positive for wound  Neurological: Negative for dizziness, tremors and speech difficulty  Psychiatric/Behavioral: Negative for confusion  The patient is not nervous/anxious  All other systems reviewed and are negative  Past Medical and Surgical History:     Past Medical History:   Diagnosis Date    [facility] fibrillation ([facility] )     Cardiac disease     Cellulitis     [facility]     Chronic pain     Chronic venous hypertension     with ulceration    GERD (gastroesophageal reflux disease)     History of methicillin resistant staphylococcus aureus (MRSA) 11/26/2019    negative nasal swab     Hyperlipidemia     Hypertension     Obesity     Pulmonary hypertension ([place] [address] )     PVD (peripheral vascular disease) ([place] [address] )     Type 2 diabetes mellitus with diabetic heel ulcer ([place] [address] ) 6/30/2020    Vascular disorder of extremity ([place] [address] )        Past Surgical History:   Procedure Laterality Date    ANTERIOR RELEASE VERTEBRAL BODY W/ POSTERIOR FUSION      APPENDECTOMY  1955    CATARACT EXTRACTION      DECOMPRESSION SPINE LUMBAR POSTERIOR      ELBOW SURGERY      FOOT/ANKLE ARTHRODESIS Right      complications postoperatively with bone healing and infection    INCISION AND DRAINAGE OF WOUND Left 3/5/2020    Procedure: INCISION AND DRAINAGE (I&D) EXTREMITY;  Surgeon: Bernadette Nixon DPM;  Location:  MAIN OR;  Service: Podiatry    KNEE SURGERY      NOSE SURGERY      turbinectomy    RETINAL DETACHMENT SURGERY      RHINOPLASTY      TONSILLECTOMY      VEIN LIGATION AND STRIPPING      saphenous vein long       Meds/Allergies:    Prior to Admission medications    Medication Sig Start Date End Date Taking? Authorizing Provider   acetaminophen (TYLENOL) 325 mg tablet Take 2 tablets (650 mg total) by mouth every 6 (six) hours as needed for mild pain, headaches or fever 7/1/20  Yes Ana Flores DO   apixaban (ELIQUIS) 5 mg Take 5 mg by mouth 2 (two) times a day    Yes Historical Provider, MD   Ascorbic Acid (VITAMIN C) 1000 MG tablet Take 1,000 mg by mouth 2 (two) times a day    Yes Historical Provider, MD   bumetanide (BUMEX) 1 mg tablet Take 1 tablet (1 mg total) by mouth 2 (two) times a day 7/17/20  Yes Anton Pak MD   cholecalciferol (VITAMIN D3) 1,000 units tablet Take 1,000 Units by mouth daily   Yes Historical Provider, MD   cholestyramine sugar free (QUESTRAN LIGHT) 4 g packet Take 1 packet (4 g total) by mouth daily for 11 days 7/17/20 8/7/20 Yes Anton Pak MD   collagenase (SANTYL) ointment Apply topically daily 7/1/20  Yes Ana Flores DO   DULoxetine (CYMBALTA) 30 mg delayed release capsule Take 1 capsule (30 mg total) by mouth daily 5/21/20  Yes Jennifer Rubio MD   metoprolol tartrate (LOPRESSOR) 25 mg tablet Take 0 5 tablets (12 5 mg total) by mouth every 12 (twelve) hours 7/17/20  Yes Anton Pak MD   midodrine (PROAMATINE) 5 mg tablet Take 1 tablet (5 mg total) by mouth 3 (three) times a day before meals 6/18/20  Yes Anton Pak MD   nystatin (MYCOSTATIN) powder Apply topically 2 (two) times a day 7/1/20  Yes Ana Flores DO   pantoprazole (PROTONIX) 40 mg tablet Take 1 tablet (40 mg total) by mouth daily 5/7/20  Yes Jennifer Rubio MD   potassium chloride (MICRO-K) 10 MEQ CR capsule Take 2 capsules (20 mEq total) by mouth daily 7/17/20  Yes Anton Pak MD     I have reviewed home medications using allscripts      Allergies: No Known Allergies    Social History:     Marital Status: Single   Occupation:  Retired  Patient Pre-hospital Living Situation:  Rehab center  Patient Pre-hospital Level of Mobility:  With assistance  Patient Pre-hospital Diet Restrictions:  Fluid restricted diet  Substance Use History: Social History     Substance and Sexual Activity   Alcohol Use Not Currently    Frequency: Never    Drinks per session: Patient refused    Binge frequency: Never     Social History     Tobacco Use   Smoking Status Never Smoker   Smokeless Tobacco Never Used     Social History     Substance and Sexual Activity   Drug Use Never       Family History:    non-contributory    Physical Exam:     Vitals:   Blood Pressure: 90/55 (20)  Pulse: 99 (20)  Temperature: 97 9 °F (36 6 °C) (20)  Temp Source: Tympanic (20)  Respirations: (!) 24 (20)  SpO2: 98 % (20)    Physical Exam  Constitutional:       Appearance: He is obese  Eyes:      General: Lids are normal       Extraocular Movements: Extraocular movements intact  Conjunctiva/sclera: Conjunctivae normal       Pupils: Pupils are equal, round, and reactive to light  Neck:      Musculoskeletal: Full passive range of motion without pain  Cardiovascular:      Rate and Rhythm: Rhythm irregularly irregular  Pulses: Normal pulses  Heart sounds: S1 normal and S2 normal  No murmur  Pulmonary:      Effort: Pulmonary effort is normal  No tachypnea  Breath sounds: No decreased air movement  Examination of the right-lower field reveals decreased breath sounds  Decreased breath sounds present  Abdominal:      General: Bowel sounds are normal       Palpations: Abdomen is soft  Tenderness: There is abdominal tenderness in the suprapubic area  Musculoskeletal: Normal range of motion  Right lower le+ Pitting Edema present  Left lower le+ Pitting Edema present  Skin:     General: Skin is warm  Capillary Refill: Capillary refill takes 2 to 3 seconds  Comments: hemosiderosis of raissa lower extremities with an open wound on left great toe   Neurological:      Mental Status: He is alert and oriented to person, place, and time  GCS: GCS eye subscore is 4   GCS verbal subscore is 5  GCS motor subscore is 6  Cranial Nerves: Cranial nerves are intact  Sensory: No sensory deficit  Psychiatric:         Attention and Perception: Attention normal          Mood and Affect: Mood normal          Behavior: Behavior is cooperative  Thought Content: Thought content normal              Additional Data:     Lab Results: I have personally reviewed pertinent reports  Results from last 7 days   Lab Units 08/07/20 2053   WBC Thousand/uL 7 80   HEMOGLOBIN g/dL 14 2   HEMATOCRIT % 42 1   PLATELETS Thousands/uL 174   NEUTROS PCT % 62   LYMPHS PCT % 17   MONOS PCT % 17*   EOS PCT % 3     Results from last 7 days   Lab Units 08/07/20 2053   SODIUM mmol/L 138   POTASSIUM mmol/L 5 6*   CHLORIDE mmol/L 98*   CO2 mmol/L 24   BUN mg/dL 76*   CREATININE mg/dL 3 61*   ANION GAP mmol/L 16*   CALCIUM mg/dL 9 6   ALBUMIN g/dL 2 4*   TOTAL BILIRUBIN mg/dL 2 60*   ALK PHOS U/L 62   ALT U/L 14   AST U/L 50*   GLUCOSE RANDOM mg/dL 68     Results from last 7 days   Lab Units 08/07/20 2053   INR  4 28*             Results from last 7 days   Lab Units 08/07/20 2053   LACTIC ACID mmol/L 3 1*       Imaging: No new imaging    No orders to display       EKG, Pathology, and Other Studies Reviewed on Admission:   · EKG: Atrial Fib without ST changes    Allscripts / Epic Records Reviewed: Yes     ** Please Note: This note has been constructed using a voice recognition system   **

## 2020-08-08 NOTE — UTILIZATION REVIEW
Initial Clinical Review    Admission: Date/Time/Statement:   Admission Orders (From admission, onward)     Ordered        08/07/20 2233  Inpatient Admission (expected length of stay for this patient Order details is greater than two midnights)  Once                   Orders Placed This Encounter   Procedures    Inpatient Admission (expected length of stay for this patient Order details is greater than two midnights)     Standing Status:   Standing     Number of Occurrences:   1     Order Specific Question:   Admitting Physician     Answer:   Ledy Farooq [[id number]]     Order Specific Question:   Level of Care     Answer:   Med Surg [[id number]]     Order Specific Question:   Estimated length of stay     Answer:   More than 2 Midnights     Order Specific Question:   Certification     Answer:   I certify that inpatient services are medically necessary for this patient for a duration of greater than two midnights  See H&P and MD Progress Notes for additional information about the patient's course of treatment  ED Arrival Information     Expected Arrival Acuity Means of Arrival Escorted By Service Admission Type    - 8/7/2020 20:41 [facility]gent [facility] Hospitalist Urgent    Arrival Complaint    Abnormal Labs        Chief Complaint   Patient presents with    Altered Mental Status     patient presents with AMS from [address] and abnormal labs      Assessment/Plan:  76 yo m with a pmh of chronic wounds, CHF, CKD( baseline cr 1 33)  PVD,  afib on Eliquis  He presents from his rehab facility due to abnormal labs, Bun/cr 69/3 2, repeat in the ED showed 76/3  61  Along with altered mental status with increased confusion and falls  Bladder scan showed retained urine of 650 ml, Lopez cath placed, started on Flomax  He is admitted inpatient for SAMY with urinary retention  Per his son he has been refusing his medications at the rehab the past few days       8/8 diagnosed with  UTI started on rocephin iv , CHF  start Bumex 2 mg iv bid  Strict  I & O' s  BNP > 35,000, cardiology input           ED Triage Vitals   Temperature Pulse Respirations Blood Pressure SpO2   08/07/20 2049 08/07/20 2045 08/07/20 2045 08/07/20 2045 08/07/20 2045   97 9 °F (36 6 °C) 79 20 107/58 98 %      Temp Source Heart Rate Source Patient Position - Orthostatic VS BP Location FiO2 (%)   08/07/20 2049 08/08/20 0023 08/07/20 2045 08/07/20 2045 --   Tympanic Monitor Sitting Left arm       Pain Score       08/08/20 0147       8          Wt Readings from Last 1 Encounters:   08/08/20 110 kg (241 lb 10 oz)     Additional Vital Signs:   Date/Time   Temp   Pulse   Resp   BP   MAP (mmHg)   SpO2   Calculated FIO2 (%) - Nasal Cannula   Nasal Cannula O2 Flow Rate (L/min)   O2 Device   Patient Position - Orthostatic VS    08/08/20 0900                  100 %   28   2 L/min   Nasal cannula       08/08/20 07:16:01   96 5 °F (35 8 °C)Abnormal     79   16   100/56   71   94 %                08/08/20 0235   95 9 °F (35 5 °C)Abnormal     100   20   101/81   88               Lying    08/08/20 0123      75      130/101Abnormal                        08/08/20 0023   96 °F (35 6 °C)Abnormal     76   20   118/81                  Lying    08/08/20 0000                  96 %   28   2 L/min   Nasal cannula       08/07/20 2230      99   24Abnormal     90/55   65   98 %                08/07/20 2200      86   22   108/65   81   97 %                08/07/20 2130      91   21   111/84   94   96 %                08/07/20 2115      90   20   101/58   73   97 %                        Pertinent Labs/Diagnostic Test Results:   Results from last 7 days   Lab Units 08/07/20 2059   SARS-COV-2  Negative     Results from last 7 days   Lab Units 08/08/20  0455 08/08/20  0109 08/07/20 2053   WBC Thousand/uL 7 26  --  7 80   HEMOGLOBIN g/dL 14 2  --  14 2   HEMATOCRIT % 41 9  --  42 1   PLATELETS Thousands/uL 157 171 174   NEUTROS ABS Thousands/µL  --   --  4 92         Results from last 7 days   Lab Units 08/08/20  0757 08/08/20  0109 08/07/20 2053   SODIUM mmol/L 138 137 138   POTASSIUM mmol/L 4 3 4 3 5 6*   CHLORIDE mmol/L 99* 99* 98*   CO2 mmol/L 25 24 24   ANION GAP mmol/L 14* 14* 16*   BUN mg/dL 76* 76* 76*   CREATININE mg/dL 3 55* 3 66* 3 61*   EGFR ml/min/1 73sq m 16 15 16   CALCIUM mg/dL 9 6 9 8 9 6   MAGNESIUM mg/dL  --  1 9  --      Results from last 7 days   Lab Units 08/07/20 2053   AST U/L 50*   ALT U/L 14   ALK PHOS U/L 62   TOTAL PROTEIN g/dL 8 1   ALBUMIN g/dL 2 4*   TOTAL BILIRUBIN mg/dL 2 60*     Results from last 7 days   Lab Units 08/08/20  0606   POC GLUCOSE mg/dl 69     Results from last 7 days   Lab Units 08/08/20  0757 08/08/20  0109 08/07/20 2053   GLUCOSE RANDOM mg/dL 69 57* 68       Results from last 7 days   Lab Units 08/08/20  0757 08/08/20  0413 08/08/20  0206 08/07/20  2109   TROPONIN I ng/mL 0 15* 0 15* 0 15* 0 15*         Results from last 7 days   Lab Units 08/07/20 2053   PROTIME seconds 40 4*   INR  4 28*   PTT seconds 55*     Results from last 7 days   Lab Units 08/08/20  0206 08/07/20 2053   LACTIC ACID mmol/L 3 1* 3 1*     Results from last 7 days   Lab Units 08/07/20 2053   NT-PRO BNP pg/mL >35,000*     Results from last 7 days   Lab Units 08/08/20  0413   CLARITY UA  Turbid   COLOR UA  Yellow   SPEC GRAV UA  1 025   PH UA  5 5   GLUCOSE UA mg/dl Negative   KETONES UA mg/dl Negative   BLOOD UA  Large*   PROTEIN UA mg/dl 100 (2+)*   NITRITE UA  Negative   BILIRUBIN UA  Negative   UROBILINOGEN UA E U /dl 0 2   LEUKOCYTES UA  Large*   WBC UA /hpf Innumerable*   RBC UA /hpf 20-30*   BACTERIA UA /hpf Innumerable*   EPITHELIAL CELLS WET PREP /hpf Occasional       CXR  8/8 - small left effusion and left base atelectasis   EKG - 8/8 - Afib - L axis deviation - no significant changes    ED Treatment:   Medication Administration from 08/07/2020 2041 to 08/07/2020 9541       Date/Time Order Dose Route Action 08/07/2020 2203 sodium chloride 0 9 % bolus 500 mL 500 mL Intravenous New Bag        Past Medical History:   Diagnosis Date    [facility]     Cardiac disease     Cellulitis     CHF (congestive heart failure) [facility]     Chronic pain     Chronic venous hypertension     with ulceration    Diabetes mellitus ([facility]     GERD (gastroesophageal reflux disease)     History of methicillin resistant staphylococcus aureus (MRSA) 11/26/2019    negative nasal swab     Hyperlipidemia     Hypertension     Obesity     Pressure injury of skin     Pulmonary hypertension ([place] [address] )     PVD (peripheral vascular disease) ([facility]     Type 2 diabetes mellitus with diabetic heel ulcer ([place] [address] ) 6/30/2020    Vascular disorder of extremity ([place] [address] )      Present on Admission:   Acute kidney injury superimposed on CKD ([place] [address] )   Elevated troponin level not due myocardial infarction   Gastroesophageal reflux disease without esophagitis   Persistent atrial fibrillation   Wound, open, foot   Hyperkalemia   Benign prostatic hyperplasia with urinary retention   Essential hypertension   PVD (peripheral vascular disease) ([facility])      Admitting Diagnosis: Laboratory test [Z01 89]  Chronic foot ulcer ([place] [address] ) [L97 509]  Elevated troponin [R79 89]  SAMY (acute kidney injury) ([place] [address] ) [N17 9]  Acute urinary retention [R33 8]  Elevated lactic acid level [R79 89]  Age/Sex: 77 y o  male  Admission Orders:  Scheduled Medications:  apixaban, 2 5 mg, Oral, BID  bumetanide, 2 mg, Intravenous, BID  cefTRIAXone, 1,000 mg, Intravenous, Q24H  DULoxetine, 30 mg, Oral, Daily  metoprolol tartrate, 12 5 mg, Oral, Q12H FRANKIE  midodrine, 5 mg, Oral, TID AC  pantoprazole, 40 mg, Oral, Daily  vancomycin, 125 mg, Oral, Q12H FRANKIE  Albumin 25G  IV once - 8/8  Dextrose 50% -  50 ml - IV once - 8/8      Continuous IV Infusions:  NSS @ 75 m/hr - d/c'd 8/8 @ 11:12     PRN Meds:  acetaminophen, 650 mg, Oral, Q6H PRN  ondansetron, 4 mg, Intravenous, Q6H PRN          Network Utilization Review Department  [email or web address]  ATTENTION: Please call with any questions or concerns to [phone] and carefully listen to the prompts so that you are directed to the right person  All voicemails are confidential   Mundo Cronin all requests for admission clinical reviews, approved or denied determinations and any other requests to dedicated fax number below belonging to the campus where the patient is receiving treatment   List of dedicated fax numbers for the Facilities:  [address] DENIALS (Administrative/Medical Necessity) [phone]   [address] (Maternity/NICU/Pediatrics) [phone]   [place] [phone]   [facility] [phone]   [address] [place] [address] [phone]   [address] [facility]  [phone]   [address] [place] [address] [address] [place] [address] [phone]   [place] [phone]   [address] [facility]  [address] [facility] [address] [facility] [phone]

## 2020-08-08 NOTE — ASSESSMENT & PLAN NOTE
Patient with urinary retention-650 ml on bladder scan  Lopez inserted in ED   Flomax given on last admission but caused patient to become hypotensive  Consider urology follow up as OP

## 2020-08-08 NOTE — ASSESSMENT & PLAN NOTE
In the setting of dehydration/SAMY  3 1 on admission  No infectious source, afebrile and no leukocytosis  Continue to trend

## 2020-08-08 NOTE — NURSING NOTE
During Head to toe admission assessment, it was noted pt had multiple wounds to lower extremities and large bruising to multiple areas  Lg bruise to lower back, bruising to upper extremities and a significantly large purple bruise to the left shoulder, neck and chest that extended to the back  Pts Lefon the dorsal side foot has several narcotic areas including a large wound Stage 3 to 4 on the dorsal side of foot  The Right great toe and 2nd toe also have necrotic wounds  Pt refused wound care to his feet  Pt has stage 2 wounds to B/L buttocks, 3 on the left buttock and one on the R buttock  These wounds have been cleansed and allevyn placed on the area  Will report above info to next shift

## 2020-08-08 NOTE — ASSESSMENT & PLAN NOTE
Chronically elevated and in the setting of dehyration   EKG with Atrial Fib and no ischemic changes or ST changes  Denies Chest pain   Continue to trend

## 2020-08-08 NOTE — ASSESSMENT & PLAN NOTE
Wound to left foot-does not appear infectious    Will consult podiatry  Wound care and dressing changes per recommendations

## 2020-08-09 ENCOUNTER — APPOINTMENT (INPATIENT)
Dept: RADIOLOGY | Facility: HOSPITAL | Age: 75
DRG: 682 | End: 2020-08-09
Payer: COMMERCIAL

## 2020-08-09 ENCOUNTER — APPOINTMENT (INPATIENT)
Dept: ULTRASOUND IMAGING | Facility: HOSPITAL | Age: 75
DRG: 682 | End: 2020-08-09
Payer: COMMERCIAL

## 2020-08-09 PROBLEM — R79.1 SUPRATHERAPEUTIC INR: Status: ACTIVE | Noted: 2020-08-09

## 2020-08-09 LAB
ALBUMIN SERPL BCP-MCNC: 2.4 G/DL (ref 3.5–5)
ALP SERPL-CCNC: 69 U/L (ref 46–116)
ALT SERPL W P-5'-P-CCNC: 12 U/L (ref 12–78)
ANION GAP SERPL CALCULATED.3IONS-SCNC: 15 MMOL/L (ref 4–13)
AST SERPL W P-5'-P-CCNC: 38 U/L (ref 5–45)
BASOPHILS # BLD AUTO: 0.04 THOUSANDS/ΜL (ref 0–0.1)
BASOPHILS NFR BLD AUTO: 1 % (ref 0–1)
BILIRUB DIRECT SERPL-MCNC: 1.82 MG/DL (ref 0–0.2)
BILIRUB SERPL-MCNC: 2.3 MG/DL (ref 0.2–1)
BUN SERPL-MCNC: 77 MG/DL (ref 5–25)
CALCIUM SERPL-MCNC: 9.2 MG/DL (ref 8.3–10.1)
CHLORIDE SERPL-SCNC: 100 MMOL/L (ref 100–108)
CO2 SERPL-SCNC: 21 MMOL/L (ref 21–32)
CREAT SERPL-MCNC: 3.73 MG/DL (ref 0.6–1.3)
EOSINOPHIL # BLD AUTO: 0.14 THOUSAND/ΜL (ref 0–0.61)
EOSINOPHIL NFR BLD AUTO: 2 % (ref 0–6)
ERYTHROCYTE [DISTWIDTH] IN BLOOD BY AUTOMATED COUNT: 20.8 % (ref 11.6–15.1)
GFR SERPL CREATININE-BSD FRML MDRD: 15 ML/MIN/1.73SQ M
GLUCOSE SERPL-MCNC: 64 MG/DL (ref 65–140)
GLUCOSE SERPL-MCNC: 68 MG/DL (ref 65–140)
GLUCOSE SERPL-MCNC: 70 MG/DL (ref 65–140)
GLUCOSE SERPL-MCNC: 75 MG/DL (ref 65–140)
GLUCOSE SERPL-MCNC: 82 MG/DL (ref 65–140)
GLUCOSE SERPL-MCNC: 84 MG/DL (ref 65–140)
HCT VFR BLD AUTO: 40.3 % (ref 36.5–49.3)
HGB BLD-MCNC: 13.7 G/DL (ref 12–17)
IMM GRANULOCYTES # BLD AUTO: 0.04 THOUSAND/UL (ref 0–0.2)
IMM GRANULOCYTES NFR BLD AUTO: 1 % (ref 0–2)
LYMPHOCYTES # BLD AUTO: 1.12 THOUSANDS/ΜL (ref 0.6–4.47)
LYMPHOCYTES NFR BLD AUTO: 15 % (ref 14–44)
MAGNESIUM SERPL-MCNC: 1.9 MG/DL (ref 1.6–2.6)
MCH RBC QN AUTO: 32.3 PG (ref 26.8–34.3)
MCHC RBC AUTO-ENTMCNC: 34 G/DL (ref 31.4–37.4)
MCV RBC AUTO: 95 FL (ref 82–98)
MONOCYTES # BLD AUTO: 1.07 THOUSAND/ΜL (ref 0.17–1.22)
MONOCYTES NFR BLD AUTO: 14 % (ref 4–12)
NEUTROPHILS # BLD AUTO: 5.29 THOUSANDS/ΜL (ref 1.85–7.62)
NEUTS SEG NFR BLD AUTO: 67 % (ref 43–75)
NRBC BLD AUTO-RTO: 1 /100 WBCS
PHOSPHATE SERPL-MCNC: 4.8 MG/DL (ref 2.3–4.1)
PLATELET # BLD AUTO: 135 THOUSANDS/UL (ref 149–390)
PMV BLD AUTO: 12.2 FL (ref 8.9–12.7)
POTASSIUM SERPL-SCNC: 4.1 MMOL/L (ref 3.5–5.3)
PROCALCITONIN SERPL-MCNC: 0.28 NG/ML
PROT SERPL-MCNC: 7.7 G/DL (ref 6.4–8.2)
RBC # BLD AUTO: 4.24 MILLION/UL (ref 3.88–5.62)
SODIUM SERPL-SCNC: 136 MMOL/L (ref 136–145)
WBC # BLD AUTO: 7.7 THOUSAND/UL (ref 4.31–10.16)

## 2020-08-09 PROCEDURE — 99232 SBSQ HOSP IP/OBS MODERATE 35: CPT | Performed by: INTERNAL MEDICINE

## 2020-08-09 PROCEDURE — 76770 US EXAM ABDO BACK WALL COMP: CPT

## 2020-08-09 PROCEDURE — 83735 ASSAY OF MAGNESIUM: CPT | Performed by: INTERNAL MEDICINE

## 2020-08-09 PROCEDURE — 80076 HEPATIC FUNCTION PANEL: CPT | Performed by: INTERNAL MEDICINE

## 2020-08-09 PROCEDURE — 84100 ASSAY OF PHOSPHORUS: CPT | Performed by: INTERNAL MEDICINE

## 2020-08-09 PROCEDURE — 73620 X-RAY EXAM OF FOOT: CPT

## 2020-08-09 PROCEDURE — 85025 COMPLETE CBC W/AUTO DIFF WBC: CPT | Performed by: INTERNAL MEDICINE

## 2020-08-09 PROCEDURE — 82948 REAGENT STRIP/BLOOD GLUCOSE: CPT

## 2020-08-09 PROCEDURE — 80048 BASIC METABOLIC PNL TOTAL CA: CPT | Performed by: INTERNAL MEDICINE

## 2020-08-09 PROCEDURE — 84145 PROCALCITONIN (PCT): CPT | Performed by: INTERNAL MEDICINE

## 2020-08-09 RX ADMIN — PANTOPRAZOLE SODIUM 40 MG: 40 TABLET, DELAYED RELEASE ORAL at 08:42

## 2020-08-09 RX ADMIN — Medication 125 MG: at 22:25

## 2020-08-09 RX ADMIN — MIDODRINE HYDROCHLORIDE 5 MG: 5 TABLET ORAL at 17:31

## 2020-08-09 RX ADMIN — APIXABAN 2.5 MG: 2.5 TABLET, FILM COATED ORAL at 08:42

## 2020-08-09 RX ADMIN — CEFTRIAXONE 1000 MG: 1 INJECTION, SOLUTION INTRAVENOUS at 12:28

## 2020-08-09 RX ADMIN — MIDODRINE HYDROCHLORIDE 5 MG: 5 TABLET ORAL at 12:30

## 2020-08-09 RX ADMIN — METOPROLOL TARTRATE 12.5 MG: 25 TABLET ORAL at 23:47

## 2020-08-09 RX ADMIN — Medication 125 MG: at 08:46

## 2020-08-09 RX ADMIN — MIDODRINE HYDROCHLORIDE 5 MG: 5 TABLET ORAL at 06:35

## 2020-08-09 RX ADMIN — DULOXETINE 30 MG: 30 CAPSULE, DELAYED RELEASE ORAL at 08:42

## 2020-08-09 NOTE — NURSING NOTE
Provided wound care to pts L foot per MD's orders this a deonte  Changed Allevyn to pts B/L buttucks protecting st 2 wounds  Pt tolerated well

## 2020-08-09 NOTE — ASSESSMENT & PLAN NOTE
Wt Readings from Last 3 Encounters:   08/08/20 112 kg (247 lb 9 2 oz)   07/17/20 114 kg (251 lb 1 7 oz)   07/02/20 122 kg (269 lb 2 9 oz)     Last echo from 6/26/2020 with EF 30% and there was severe diffuse hypokinesis with regional variations  Diuretics on hold  Gentle IV fluids  BNP is >35,000  Oxygen supplementation and bronchodilators p r n    Strict I and O's  Daily weights  Monitor renal function q 8 hours  X-ray results reviewed

## 2020-08-09 NOTE — PROGRESS NOTES
Progress Note - Nephrology   Linwood Mazariegos 77 y o  male MRN: [id number]  Unit/Bed#: -01 Encounter: [id number]  ASSESSMENT and PLAN:  Acute kidney injury (POA):  Suspect prerenal azotemia with decreased oral intake, diuretic use, perturbations of blood pressure with relative hypotension, and with obstructive component requiring Lopez catheter  - After review of records it appears that the patient has a most recent baseline Creatinine around 2 0 mg/dL  -previously 1 2-1 6 however has had frequent acute kidney injury with cardiorenal syndrome as well as urinary retention-of note outpatient creatinine was 2 2 on 07/27/2020   - patient was admitted with a creatinine of  mg/dL  - peak creatinine during this admission thus far was 3 61 mg/dL  - patient's creatinine today is at 3 73 mg/dL  -home diuretics Bumex currently on hold  -currently on IV fluids normal saline at 60 mL/hour-continue for now  - Avoid nephrotoxins, adjust meds to appropriate GFR   - Clinically patient is not uremic and there is no acute indication for renal replacement therapy (dialysis)  - Optimize hemodynamic status to avoid delay in renal recovery  - continue to trend I/O, lab values volume status  -renal ultrasound-pending    Chronic kidney disease IIIB/V:  Suspect secondary to cardiorenal syndrome and urinary retention requiring Lopez  -after review medical records most recent creatinine around 2 0  -patient recently seen in telemedicine on 07/28/2020 status post hospitalization earlier this month  -will need follow-up on discharge with Dr Key    Blood pressure/hypertension:  Soft 90s to low 100s  -current medications-midodrine 5 mg t i d  with meals and metoprolol 12 5 mg every 12 hours  -maximize hemodynamics to maintain MAP >65  -avoid hypotension or fluctuations in blood pressure  -will continue to trend    Acute on chronic combined CHF:  Echocardiogram revealed EF of 30%  -previously on Bumex 1 mg p o  b i d   As outpatient  -Bumex currently on hold secondary to volume depletion in the setting of AK I  -monitor closely with IV fluids  -may need to adjust and giv IV diuretics if respiratory issues     H&H/anemia:  -current hemoglobin stable at 13 7  -transfuse if hemoglobin less than 7 0    Acid-base:  Current bicarb 21-likely secondary to IV normal saline  -trend for now     Mineral bone disease of Chronic Kidney Disease  -will trend PTH and phosphorus as outpatient    Other medical issues:  Atrial fibrillation, diabetes, lower extremity cellulitis with leg wounds-follows wound care        SUBJECTIVE:  Patient seen and examined  Complaining of generalized pain all over specially when touched    Denies chest pain or shortness of breath    OBJECTIVE:  Current Weight: Weight - Scale: (Bed scale not working )  Vitals:    08/08/20 2222 08/09/20 0837 08/09/20 0838 08/09/20 0900   BP: 110/72 90/63 90/63    BP Location:       Pulse: 56 (!) 54 56    Resp:  14     Temp: (!) 97 2 °F (36 2 °C) (!) 95 8 °F (35 4 °C) (!) 96 3 °F (35 7 °C)    TempSrc:       SpO2: 96% 96% 97% 97%   Weight:       Height:           Intake/Output Summary (Last 24 hours) at 8/9/2020 1340  Last data filed at 8/9/2020 1228  Gross per 24 hour   Intake 600 ml   Output 625 ml   Net -25 ml     General:  No acute distress, cooperative  Skin:  Warm and dry   ENMT:  Mucous membranes dry, sclera anicteric, tongue is midline  Neck:  Supple without JVD noted  Respiratory:  Essentially clear on auscultation, decreased bases  Cardiac:  Regular rate and rhythm without rub, or murmur  GI:  Soft, nontender, no distention, active bowel sounds  Extremities:  No significant edema noted bilaterally, venous stasis discoloration noted bilaterally with chronic lower extremity wounds  Neuro:  Alert oriented and awake  Psych:  Appropriate affect    Medications:    Current Facility-Administered Medications:     acetaminophen (TYLENOL) tablet 650 mg, 650 mg, Oral, Q6H PRNMary Sedora, CRNP    cefTRIAXone (ROCEPHIN) IVPB (premix) 1,000 mg 50 mL, 1,000 mg, Intravenous, Q24H, Noam Cisneros MD, Last Rate: 100 mL/hr at 08/09/20 1228, 1,000 mg at 08/09/20 1228    DULoxetine (CYMBALTA) delayed release capsule 30 mg, 30 mg, Oral, Daily, Mary Fanora, CRNP, 30 mg at 08/09/20 0842    metoprolol tartrate (LOPRESSOR) partial tablet 12 5 mg, 12 5 mg, Oral, Q12H [address], Noam Cisneros MD, 12 5 mg at 08/08/20 2014    midodrine (PROAMATINE) tablet 5 mg, 5 mg, Oral, TID AC, Mary Fanora, CRNP, 5 mg at 08/09/20 1230    ondansetron (ZOFRAN) injection 4 mg, 4 mg, Intravenous, Q6H PRN, Noam Cisneros MD    pantoprazole (PROTONIX) EC tablet 40 mg, 40 mg, Oral, Daily, Mary Stewart, CRNP, 40 mg at 08/09/20 0842    sodium chloride 0 9 % infusion, 60 mL/hr, Intravenous, Continuous, Baljit Kim MD, Last Rate: 60 mL/hr at 08/08/20 1827, 60 mL/hr at 08/08/20 1827    vancomycin (VANCOCIN) oral solution 125 mg, 125 mg, Oral, Q12H [address], Noam Cisneros MD, 125 mg at 08/09/20 0846    Laboratory Results:  Results from last 7 days   Lab Units 08/09/20  0608 08/08/20  1630 08/08/20  0757 08/08/20  0455 08/08/20  0109 08/07/20  2053   WBC Thousand/uL 7 70  --   --  7 26  --  7 80   HEMOGLOBIN g/dL 13 7  --   --  14 2  --  14 2   HEMATOCRIT % 40 3  --   --  41 9  --  42 1   PLATELETS Thousands/uL 135*  --   --  157 171 174   SODIUM mmol/L 136 137 138  --  137 138   POTASSIUM mmol/L 4 1 4 2 4 3  --  4 3 5 6*   CHLORIDE mmol/L 100 98* 99*  --  99* 98*   CO2 mmol/L 21 22 25  --  24 24   BUN mg/dL 77* 74* 76*  --  76* 76*   CREATININE mg/dL 3 73* 3 68* 3 55*  --  3 66* 3 61*   CALCIUM mg/dL 9 2 9 3 9 6  --  9 8 9 6   MAGNESIUM mg/dL 1 9  --   --   --  1 9  --    PHOSPHORUS mg/dL 4 8*  --   --   --   --   --

## 2020-08-09 NOTE — ASSESSMENT & PLAN NOTE
Wound to left foot-does not appear infectious    Podiatry consult  Wound care and dressing changes per recommendations

## 2020-08-09 NOTE — PROGRESS NOTES
Progress Note - Linwood Mazariegos 1945, 77 y o  male MRN: [id number]    Unit/Bed#: -01 Encounter: [id number]    Primary Care Provider: Nelly Morin MD   Date and time admitted to hospital: 8/7/2020  8:42 PM        Supratherapeutic INR  Assessment & Plan  Patient presented with INR is 4 28  Monitor labs in a m     UTI (urinary tract infection)  Assessment & Plan  Continue on Rocephin  On prophylactic vancomycin for C diff  Trend WBC and fever curve  Follow-up culture results    Chronic combined systolic and diastolic heart failure (HCC)  Assessment & Plan  Wt Readings from Last 3 Encounters:   08/08/20 112 kg (247 lb 9 2 oz)   07/17/20 114 kg (251 lb 1 7 oz)   07/02/20 122 kg (269 lb 2 9 oz)     Last echo from 6/26/2020 with EF 30% and there was severe diffuse hypokinesis with regional variations  Diuretics on hold  Gentle IV fluids  BNP is >35,000  Oxygen supplementation and bronchodilators p r n    Strict I and O's  Daily weights  Monitor renal function q 8 hours  X-ray results reviewed    Benign prostatic hyperplasia with urinary retention  Assessment & Plan  Patient with urinary retention-650 ml on bladder scan  Lopez inserted in ED   Continue on Flomax    Hyperkalemia  Assessment & Plan  Secondary to hemolysis  Resolved    Acute metabolic encephalopathy  Assessment & Plan  Likely secondary to UTI  Continue Rocephin  Follow-up culture results    Elevated troponin level not due myocardial infarction  Assessment & Plan  Chronically elevated and in the setting of dehyration/SAMY   EKG with Atrial Fib and no ischemic changes or ST changes  Denies Chest pain   Troponin 0 15  Continue to monitor and trend troponin    Persistent atrial fibrillation  Assessment & Plan  Rate controlled 80-99  Continue on metoprolol  Monitor on telemetry  Eliquis is on hold    Essential hypertension  Assessment & Plan  Continue on Lopressor  On midodrine  Monitor vitals as per protocol      PVD (peripheral vascular disease) [facility])  Assessment & Plan  Bilateral lower extremities with multiple wounds  Follows up with Podiatry as outpatient  Gastroesophageal reflux disease without esophagitis  Assessment & Plan  Continue Protonix    Wound, open, foot  Assessment & Plan  Wound to left foot-does not appear infectious  Podiatry consult  Wound care and dressing changes per recommendations    * Acute kidney injury superimposed on CKD [facility])  Assessment & Plan  In the setting of acute urinary retention and dehydration 2/2 PO po intake and possible over diuresis  Baseline creatine 1 33-2 3  Presented with creatinine of 3 61  Creatinine this morning is 3 73  Diuretics on hold  On gentle IV fluids  Monitor BMPs q 8 hours  Avoid hypotension/nephrotoxins medication  Urinary retention protocol/bladder scan/PVR  Discussed with Nephrology and per recommendation will continue IV fluids and they will evaluate this afternoon  Labs & Imaging: I have personally reviewed pertinent reports  VTE Pharmacologic Prophylaxis:  Eliquis on hold  VTE Mechanical Prophylaxis: sequential compression device    Code Status:   Level 1 - Full Code    Patient Centered Rounds: I have performed bedside rounds with nursing staff today  Discussions with Specialists or Other Care Team Provider:  Nephrology    Education and Discussions with Family / Patient:  Wally Alcala at bedside    Current Length of Stay: 2 day(s)    Current Patient Status: Inpatient   Certification Statement: The patient will continue to require additional inpatient hospital stay due to see my assessment and plan  Subjective:   Patient is seen and examined at bedside  Feels a little better  Afebrile  No nausea, vomiting, shortness of breath, chest pain, abdominal pain, palpitations  All other ROS are negative  Objective:    Vitals: Blood pressure 90/63, pulse 56, temperature (!) 96 3 °F (35 7 °C), resp  rate 14, height 6' (1 829 m), weight 112 kg (247 lb 9 2 oz), SpO2 97 %  ,Body mass index is 33 58 kg/m²  SPO2 RA Rest      ED to Hosp-Admission (Current) from 8/7/2020 in [address] [facility]   SpO2  97 %   SpO2 Activity  At Rest   O2 Device  None (Room air)   O2 Flow Rate          I&O:     Intake/Output Summary (Last 24 hours) at 8/9/2020 1131  Last data filed at 8/9/2020 0608  Gross per 24 hour   Intake 200 ml   Output 625 ml   Net -425 ml       Physical Exam:    General- Alert, lying comfortably in bed  Not in any acute distress  HEENT- SERGIO, EOM intact  Mouth and lips are dry  CVS- irregular, S1 and S2 normal  Chest- Bilateral Air entry, decreased at bases  Abdomen- soft, nontender, not distended, no guarding or rigidity, BS+  Extremities-  No pedal edema, No calf tenderness  Has chronic lower extremity wound  CNS-   Alert, awake and orientedx3  No focal deficits present      Invasive Devices     Peripheral Intravenous Line            Peripheral IV 08/07/20 Right Antecubital 2 days          Drain            Urethral Catheter Double-lumen 16 Fr  1 day                      Social History  reviewed  Family History   Problem Relation Age of Onset    Cancer Mother         cancer of uterus    Diabetes Sister     Mental illness Neg Hx         neg fam hx    Substance Abuse Neg Hx         neg fam hx    reviewed    Meds:  Current Facility-Administered Medications   Medication Dose Route Frequency Provider Last Rate Last Dose    acetaminophen (TYLENOL) tablet 650 mg  650 mg Oral Q6H PRN NAVNEET Escobar        apixaban (ELIQUIS) tablet 2 5 mg  2 5 mg Oral BID Zen Hansen MD   2 5 mg at 08/09/20 0842    cefTRIAXone (ROCEPHIN) IVPB (premix) 1,000 mg 50 mL  1,000 mg Intravenous Q24H Zen Hansen  mL/hr at 08/08/20 1147 1,000 mg at 08/08/20 1147    DULoxetine (CYMBALTA) delayed release capsule 30 mg  30 mg Oral Daily NAVNEET Escobar   30 mg at 08/09/20 0842    metoprolol tartrate (LOPRESSOR) partial tablet 12 5 mg  12 5 mg Oral Q12H [address] Lucas Benitez MD   12 5 mg at 08/08/20 2014    midodrine (PROAMATINE) tablet 5 mg  5 mg Oral TID AC Mary CAMEJO Mengora, CRNP   5 mg at 08/09/20 0635    ondansetron (ZOFRAN) injection 4 mg  4 mg Intravenous Q6H PRN Lucas Benitez MD        pantoprazole (PROTONIX) EC tablet 40 mg  40 mg Oral Daily Mary CAMEJO Terry, CRNP   40 mg at 08/09/20 4871    sodium chloride 0 9 % infusion  60 mL/hr Intravenous Continuous Sheridan Gonzalez MD 60 mL/hr at 08/08/20 1827 60 mL/hr at 08/08/20 1827    vancomycin (VANCOCIN) oral solution 125 mg  125 mg Oral Q12H Giuliano Sims MD   125 mg at 08/09/20 0846      Medications Prior to Admission   Medication    acetaminophen (TYLENOL) 325 mg tablet    apixaban (ELIQUIS) 5 mg    Ascorbic Acid (VITAMIN C) 1000 MG tablet    bumetanide (BUMEX) 1 mg tablet    cholecalciferol (VITAMIN D3) 1,000 units tablet    cholestyramine sugar free (QUESTRAN LIGHT) 4 g packet    collagenase (SANTYL) ointment    DULoxetine (CYMBALTA) 30 mg delayed release capsule    metoprolol tartrate (LOPRESSOR) 25 mg tablet    midodrine (PROAMATINE) 5 mg tablet    nystatin (MYCOSTATIN) powder    pantoprazole (PROTONIX) 40 mg tablet    potassium chloride (MICRO-K) 10 MEQ CR capsule       Labs:  Results from last 7 days   Lab Units 08/09/20  0608 08/08/20  0455 08/08/20  0109 08/07/20  2053   WBC Thousand/uL 7 70 7 26  --  7 80   HEMOGLOBIN g/dL 13 7 14 2  --  14 2   HEMATOCRIT % 40 3 41 9  --  42 1   PLATELETS Thousands/uL 135* 157 171 174   NEUTROS PCT % 67  --   --  62   LYMPHS PCT % 15  --   --  17   MONOS PCT % 14*  --   --  17*   EOS PCT % 2  --   --  3     Results from last 7 days   Lab Units 08/09/20  0608 08/08/20  1630 08/08/20  0757  08/07/20  2053   POTASSIUM mmol/L 4 1 4 2 4 3   < > 5 6*   CHLORIDE mmol/L 100 98* 99*   < > 98*   CO2 mmol/L 21 22 25   < > 24   BUN mg/dL 77* 74* 76*   < > 76*   CREATININE mg/dL 3 73* 3 68* 3 55*   < > 3 61*   CALCIUM mg/dL 9 2 9 3 9 6   < > 9 6   ALK PHOS U/L 69  --   --   --  62   ALT U/L 12  --   --   --  14   AST U/L 38  --   --   --  50*    < > = values in this interval not displayed  Lab Results   Component Value Date    TROPONINI 0 15 (H) 08/08/2020    TROPONINI 0 15 (H) 08/08/2020    TROPONINI 0 15 (H) 08/08/2020    CKMB 11 1 (H) 06/14/2020    CKMB 12 8 (H) 06/13/2020    CKMB 13 3 (H) 06/12/2020    CKTOTAL 319 (H) 06/14/2020    CKTOTAL 431 (H) 06/13/2020    CKTOTAL 492 (H) 06/12/2020     Results from last 7 days   Lab Units 08/07/20 2053   INR  4 28*     Lab Results   Component Value Date    BLOODCX No Growth After 5 Days  07/11/2020    BLOODCX No Growth After 5 Days  07/11/2020    BLOODCX No Growth After 5 Days  06/25/2020    BLOODCX No Growth After 5 Days  06/25/2020    URINECX Culture too young- will reincubate 08/08/2020    URINECX <10,000 cfu/ml  02/19/2020    WOUNDCULT 2+ Growth of Serratia marcescens (A) 03/05/2020    WOUNDCULT 4+ Growth of Serratia marcescens (A) 03/02/2020    WOUNDCULT 3+ Growth of Serratia marcescens (A) 07/24/2019    WOUNDCULT 1+ Growth of  07/24/2019         Imaging:  Results for orders placed during the hospital encounter of 08/07/20   XR chest portable    Narrative CHEST     INDICATION:   stat  COMPARISON:  Chest radiograph from 6/25/2020 and chest CT from 7/11/2020  EXAM PERFORMED/VIEWS:  XR CHEST PORTABLE      FINDINGS:    Severe cardiomegaly  Small left effusion and left base atelectasis  No pneumothorax  Osseous structures appear within normal limits for patient age  Impression Small left effusion and left base atelectasis  Workstation performed: [id number]       Results for orders placed during the hospital encounter of 06/11/20   XR chest pa & lateral    Narrative CHEST     INDICATION:   sob  COMPARISON:  6/11/2020    EXAM PERFORMED/VIEWS:  XR CHEST PA & LATERAL      FINDINGS:    Cardiomediastinal silhouette remains enlarged  Slight vascular congestion and slight costophrenic angle blunting  No pneumothorax  Osseous structures appear within normal limits for patient age  Impression Cardiomegaly  Slight vascular congestion  Trace effusions  Workstation performed: [id number]         Last 24 Hours Medication List:   Current Facility-Administered Medications   Medication Dose Route Frequency Provider Last Rate    acetaminophen  650 mg Oral Q6H PRN NAVNEET Escobar      apixaban  2 5 mg Oral BID Cordell Morocho MD      cefTRIAXone  1,000 mg Intravenous Q24H Cordell Morocho MD 1,000 mg (08/08/20 1147)    DULoxetine  30 mg Oral Daily NAVNEET Escobar      metoprolol tartrate  12 5 mg Oral Q12H Jm Seymour MD      midodrine  5 mg Oral TID AC NAVNEET Escobar      ondansetron  4 mg Intravenous Q6H PRN Cordell Morocho MD      pantoprazole  40 mg Oral Daily NAVNEET Escobar      sodium chloride  60 mL/hr Intravenous Continuous Marce Pallas, MD 60 mL/hr (08/08/20 1827)    vancomycin  125 mg Oral Q12H Jm Seymour MD          Today, Patient Was Seen By: Cordell Morocho MD    ** Please Note: Dictation voice to text software may have been used in the creation of this document   **

## 2020-08-09 NOTE — ASSESSMENT & PLAN NOTE
Chronically elevated and in the setting of dehyration/SAMY   EKG with Atrial Fib and no ischemic changes or ST changes  Denies Chest pain   Troponin 0 15  Continue to monitor and trend troponin

## 2020-08-09 NOTE — PLAN OF CARE
Problem: Potential for Falls  Goal: Patient will remain free of falls  Description: INTERVENTIONS:  - Assess patient frequently for physical needs  -  Identify cognitive and physical deficits and behaviors that affect risk of falls    -  [place] fall precautions as indicated by assessment   - Educate patient/family on patient safety including physical limitations  - Instruct patient to call for assistance with activity based on assessment  - Modify environment to reduce risk of injury  - Consider OT/PT consult to assist with strengthening/mobility  Outcome: Progressing

## 2020-08-09 NOTE — CONSULTS
Consult - Podiatry   Linwood Mazariegos 77 y o  male MRN: [id number]  Unit/Bed#: -01 Encounter: [id number]    Assessment/Plan     1  Surgical Wound LFT - S/P I&D Abscess dorsal forefoot (03/05/2020)              -slightly improved since last evaluation  -X-ray left foot shows no evidence of osteomyelitis  Osteopenia and vascular calcifications noted               -Maxorb Ag, 4 x 4, ABD, and Kerlix every other day  2  Pressure ulcer Stage 3 -  Lateral left midfoot 5th Met base              -stable eschar with no drainage               -dry dressing is all that is needed now to protect this area  3  Multiple traumatic wounds bilateral feet and toes              -dry with stable eschars left hallux, right hallux, and right 2nd toe              -no bandaging necessary, watch for further breakdown  4  Chronic venous insufficiency bilateral              - Chronic +1 right , +1 left edema              - recommend patient continue to utilize Tubigrip compression or compression stockings  5  Limited ambulation/gait dysfunction   -PT consult, continue to work on transfer and conditioning  6  UTI, Acute kidney injury,CKD,HTN,CHF, BPH, acute metabolic encephalopathy, GERD, PVD   -managed per Internal Medicine, and Nephrology  History of Present Illness     HPI:  Nomi Aguila is a 77 y o  male who presents with acute kidney injury  Podiatry consult requested to evaluate podiatry consult requested to evaluate chronic wound left foot  Patient is known to me from prior admissions and surgical incision and drainage of abscess left foot 3/5/2020  The wound has been progressing extremely slowly since then with subtle improvement noted since last admission  Patient states they are not doing any physical therapy with him at the nursing home  Inpatient consult to Podiatry  Consult performed by: Eladio Dang DPM  Consult ordered by: Zen Hansen MD        Review of Systems   Constitutional: Negative      HENT: Negative  Eyes: Negative  Respiratory: Negative  Cardiovascular:  History CHF with acute kidney injury upon this admission   Gastrointestinal: Negative  Musculoskeletal:  Limited ambulation   Skin:  Multiple traumatic wounds and larger wound on dorsal left foot      Neurological:  Negative        Historical Information   Past Medical History:   Diagnosis Date    Atrial fibrillation [facility]     Cardiac disease     Cellulitis     CH[facility]congestive [facility] failure) [facility])     Chronic pain     Chronic venous hypertension     with ulceration    Diabetes mellitus ([facility])     GERD (gastroesophageal reflux disease)     History of methicillin resistant staphylococcus aureus (MRSA) 11/26/2019    negative nasal swab     Hyperlipidemia     Hypertension     Obesity     Pressure injury of skin     Pulmonary hypertension ([facility]     PVD (peripheral vascular disease) ([facility])     Type 2 diabetes mellitus [facility] diabetic [facility] 6/30/2020    Vascular disorder of extremity ([facility] )      Past Surgical History:   Procedure Laterality Date    ANTERIOR RELEASE VERTEBRAL BODY W/ POSTERIOR FUSION      APPENDECTOMY  1954    CATARACT EXTRACTION      DECOMPRESSION SPINE LUMBAR POSTERIOR      ELBOW SURGERY      FOOT/ANKLE ARTHRODESIS Right      complications postoperatively with bone healing and infection    INCISION AND DRAINAGE OF WOUND Left 3/5/2020    Procedure: INCISION AND DRAINAGE (I&D) EXTREMITY;  Surgeon: Gela Smith DPM;  Location:  MAIN OR;  Service: Podiatry    KNEE SURGERY      NOSE SURGERY      turbinectomy    RETINAL DETACHMENT SURGERY      RHINOPLASTY      TONSILLECTOMY      VEIN LIGATION AND STRIPPING      saphenous vein long     Social History   Social History     Substance and Sexual Activity   Alcohol Use Not Currently    Frequency: Never    Drinks per session: Patient refused    Binge frequency: Never     Social History     Substance and Sexual Activity   Drug Use Never     Social History     Tobacco Use   Smoking Status Never Smoker   Smokeless Tobacco Never Used     Family History:   Family History   Problem Relation Age of Onset    Cancer Mother         cancer of uterus    Diabetes Sister     Mental illness Neg Hx         neg fam hx    Substance Abuse Neg Hx         neg fam hx       Meds/Allergies   Medications Prior to Admission   Medication    acetaminophen (TYLENOL) 325 mg tablet    apixaban (ELIQUIS) 5 mg    Ascorbic Acid (VITAMIN C) 1000 MG tablet    bumetanide (BUMEX) 1 mg tablet    cholecalciferol (VITAMIN D3) 1,000 units tablet    cholestyramine sugar free (QUESTRAN LIGHT) 4 g packet    collagenase (SANTYL) ointment    DULoxetine (CYMBALTA) 30 mg delayed release capsule    metoprolol tartrate (LOPRESSOR) 25 mg tablet    midodrine (PROAMATINE) 5 mg tablet    nystatin (MYCOSTATIN) powder    pantoprazole (PROTONIX) 40 mg tablet    potassium chloride (MICRO-K) 10 MEQ CR capsule     No Known Allergies    Objective   First Vitals:   Blood Pressure: 107/58 (08/07/20 2045)  Pulse: 79 (08/07/20 2045)  Temperature: 97 9 °F (36 6 °C) (08/07/20 2049)  Temp Source: Tympanic (08/07/20 2049)  Respirations: 20 (08/07/20 2045)  Height: 6' (182 9 cm) (08/08/20 1737)  Weight - Scale: 110 kg (241 lb 10 oz) (08/08/20 0600)  SpO2: 98 % (08/07/20 2045)    Current Vitals:   Blood Pressure: 90/63 (08/09/20 0838)  Pulse: 56 (08/09/20 0838)  Temperature: (!) 96 3 °F (35 7 °C) (08/09/20 0838)  Temp Source: Axillary (08/08/20 1521)  Respirations: 14 (08/09/20 0837)  Height: 6' (182 9 cm) (08/08/20 1737)  Weight - Scale: 112 kg (247 lb 9 2 oz) (08/08/20 1737)  SpO2: 97 % (08/09/20 0900)        BP 90/63   Pulse 56   Temp (!) 96 3 °F (35 7 °C)   Resp 14   Ht 6' (1 829 m)   Wt 112 kg (247 lb 9 2 oz)   SpO2 97%   BMI 33 58 kg/m²     General Appearance:    Seems confused and rambling with his discussions     Head:    Normocephalic, without obvious abnormality, atraumatic   Eyes:    PERRL, conjunctiva/corneas clear, EOM's intact            Nose:   Moist mucous membranes, no drainage or sinus tenderness   Throat:   No tenderness, no exudates   Neck:   Supple, symmetrical, trachea midline, no JVD   Back:     Symmetric, no CVA tenderness   Lungs:     Respirations unlabored   Chest wall:    No tenderness or deformity   Heart:    Rate and rhythm noted on last vitals  Abdomen:     Soft, non-tender, bowel sounds active all four quadrants,     no masses, no organomegaly       Lower Ext/Ortho: Limited ability to ambulate and transfer with bilateral lower extremity weakness secondary to deconditioning  Neuro/Vasc: CNII-XII intact  Normal strength  Sensation and reflexes:  Grossly intact  Pulses: Right: DP 0/4, PT 0/4, Left: DP 0/4, PT 0/4  Capillary Filling:  3-4 Sec, Edema:  +1 bilateral feet   Skin: Texture,Tone and Turgor: Severely diminished, Dig Hair: None  Atrophic Changes: Moderate with thinning shiny nature of skin on most toes  Subcutaneous atrophy noted  Lesions:  None  Nail Pathology:  Multiple dystrophic consistent with mycosis  Ulcers/Wounds:  · Pressure ulcer lateral left midfoot measures 1 5 x 1 0  dry stable eschar,  no drainage, no evidence of infection  · Slow nonhealing surgical wound dorsal left forefoot measures 6 0 x 2 5 x 0 4 cm, 100% yellow white slough which easily should from wound revealing more granular healthy tissue beneath  moderate serosanguineous drainage, no evidence of infection  · Multiple traumatic wounds noted at tips of toes 1 2 and 3 bilateral which are stable with no drainage             Lab Results:   CBC w/diff  Results from last 7 days   Lab Units 08/09/20  0608   WBC Thousand/uL 7 70   HEMOGLOBIN g/dL 13 7   HEMATOCRIT % 40 3   PLATELETS Thousands/uL 135*   NEUTROS PCT % 67   LYMPHS PCT % 15   MONOS PCT % 14*   EOS PCT % 2     BMP  Results from last 7 days   Lab Units 08/09/20  0608   POTASSIUM mmol/L 4 1   CHLORIDE mmol/L 100   CO2 mmol/L 21   BUN mg/dL 77*   CREATININE mg/dL 3 73*   CALCIUM mg/dL 9 2     CMP  Results from last 7 days   Lab Units 08/09/20  0608   POTASSIUM mmol/L 4 1   CHLORIDE mmol/L 100   CO2 mmol/L 21   BUN mg/dL 77*   CREATININE mg/dL 3 73*   CALCIUM mg/dL 9 2   ALK PHOS U/L 69   ALT U/L 12   AST U/L 38     @Culture@  Lab Results   Component Value Date    BLOODCX No Growth After 5 Days  07/11/2020    BLOODCX No Growth After 5 Days  07/11/2020    BLOODCX No Growth After 5 Days  06/25/2020    BLOODCX No Growth After 5 Days  06/25/2020    BLOODCX No Growth After 5 Days  06/14/2020    BLOODCX No Growth After 5 Days  06/14/2020    BLOODCX Chryseobacterium (A) 06/11/2020    BLOODCX Flavobacterium (A) 06/11/2020    BLOODCX Gram-positive alec (A) 06/11/2020    BLOODCX No Growth After 5 Days  06/11/2020    URINECX Culture too young- will reincubate 08/08/2020    URINECX <10,000 cfu/ml  02/19/2020         Imaging: I have personally reviewed pertinent films in PACS      EKG, Pathology, and Other Studies: I have personally reviewed pertinent reports  Code Status: Level 1 - Full Code    Please Note: Voice to text dictation software was used in the creation of this document         Jesus Nair DPM

## 2020-08-09 NOTE — ASSESSMENT & PLAN NOTE
In the setting of acute urinary retention and dehydration 2/2 PO po intake and possible over diuresis  Baseline creatine 1 33-2 3  Presented with creatinine of 3 61  Creatinine this morning is 3 73  Diuretics on hold  On gentle IV fluids  Monitor BMPs q 8 hours  Avoid hypotension/nephrotoxins medication  Urinary retention protocol/bladder scan/PVR  Discussed with Nephrology and per recommendation will continue IV fluids and they will evaluate this afternoon

## 2020-08-09 NOTE — ASSESSMENT & PLAN NOTE
Continue on Rocephin  On prophylactic vancomycin for C diff  Trend WBC and fever curve  Follow-up culture results

## 2020-08-10 ENCOUNTER — TELEPHONE (OUTPATIENT)
Dept: FAMILY MEDICINE CLINIC | Facility: HOSPITAL | Age: 75
End: 2020-08-10

## 2020-08-10 ENCOUNTER — PATIENT OUTREACH (OUTPATIENT)
Dept: FAMILY MEDICINE CLINIC | Facility: HOSPITAL | Age: 75
End: 2020-08-10

## 2020-08-10 LAB
ANION GAP SERPL CALCULATED.3IONS-SCNC: 13 MMOL/L (ref 4–13)
ANION GAP SERPL CALCULATED.3IONS-SCNC: 14 MMOL/L (ref 4–13)
APTT PPP: 55 SECONDS (ref 23–37)
BACTERIA UR CULT: ABNORMAL
BASOPHILS # BLD AUTO: 0.04 THOUSANDS/ΜL (ref 0–0.1)
BASOPHILS NFR BLD AUTO: 1 % (ref 0–1)
BUN SERPL-MCNC: 78 MG/DL (ref 5–25)
BUN SERPL-MCNC: 78 MG/DL (ref 5–25)
CALCIUM SERPL-MCNC: 9.4 MG/DL (ref 8.3–10.1)
CALCIUM SERPL-MCNC: 9.6 MG/DL (ref 8.3–10.1)
CHLORIDE SERPL-SCNC: 100 MMOL/L (ref 100–108)
CHLORIDE SERPL-SCNC: 99 MMOL/L (ref 100–108)
CO2 SERPL-SCNC: 21 MMOL/L (ref 21–32)
CO2 SERPL-SCNC: 23 MMOL/L (ref 21–32)
CREAT SERPL-MCNC: 3.74 MG/DL (ref 0.6–1.3)
CREAT SERPL-MCNC: 3.94 MG/DL (ref 0.6–1.3)
EOSINOPHIL # BLD AUTO: 0.15 THOUSAND/ΜL (ref 0–0.61)
EOSINOPHIL NFR BLD AUTO: 3 % (ref 0–6)
ERYTHROCYTE [DISTWIDTH] IN BLOOD BY AUTOMATED COUNT: 20.6 % (ref 11.6–15.1)
GFR SERPL CREATININE-BSD FRML MDRD: 14 ML/MIN/1.73SQ M
GFR SERPL CREATININE-BSD FRML MDRD: 15 ML/MIN/1.73SQ M
GLUCOSE SERPL-MCNC: 55 MG/DL (ref 65–140)
GLUCOSE SERPL-MCNC: 57 MG/DL (ref 65–140)
GLUCOSE SERPL-MCNC: 63 MG/DL (ref 65–140)
GLUCOSE SERPL-MCNC: 63 MG/DL (ref 65–140)
GLUCOSE SERPL-MCNC: 64 MG/DL (ref 65–140)
GLUCOSE SERPL-MCNC: 64 MG/DL (ref 65–140)
GLUCOSE SERPL-MCNC: 68 MG/DL (ref 65–140)
GLUCOSE SERPL-MCNC: 81 MG/DL (ref 65–140)
GLUCOSE SERPL-MCNC: 83 MG/DL (ref 65–140)
HCT VFR BLD AUTO: 40.5 % (ref 36.5–49.3)
HGB BLD-MCNC: 14.1 G/DL (ref 12–17)
IMM GRANULOCYTES # BLD AUTO: 0.05 THOUSAND/UL (ref 0–0.2)
IMM GRANULOCYTES NFR BLD AUTO: 1 % (ref 0–2)
INR PPP: 2.99 (ref 0.84–1.19)
LYMPHOCYTES # BLD AUTO: 1.1 THOUSANDS/ΜL (ref 0.6–4.47)
LYMPHOCYTES NFR BLD AUTO: 19 % (ref 14–44)
MAGNESIUM SERPL-MCNC: 1.9 MG/DL (ref 1.6–2.6)
MCH RBC QN AUTO: 32.6 PG (ref 26.8–34.3)
MCHC RBC AUTO-ENTMCNC: 34.8 G/DL (ref 31.4–37.4)
MCV RBC AUTO: 94 FL (ref 82–98)
MONOCYTES # BLD AUTO: 0.58 THOUSAND/ΜL (ref 0.17–1.22)
MONOCYTES NFR BLD AUTO: 10 % (ref 4–12)
NEUTROPHILS # BLD AUTO: 4.03 THOUSANDS/ΜL (ref 1.85–7.62)
NEUTS SEG NFR BLD AUTO: 66 % (ref 43–75)
NRBC BLD AUTO-RTO: 1 /100 WBCS
PHOSPHATE SERPL-MCNC: 4.7 MG/DL (ref 2.3–4.1)
PLATELET # BLD AUTO: 132 THOUSANDS/UL (ref 149–390)
PMV BLD AUTO: 12.2 FL (ref 8.9–12.7)
POTASSIUM SERPL-SCNC: 4 MMOL/L (ref 3.5–5.3)
POTASSIUM SERPL-SCNC: 4 MMOL/L (ref 3.5–5.3)
PROCALCITONIN SERPL-MCNC: 0.26 NG/ML
PROTHROMBIN TIME: 30.6 SECONDS (ref 11.6–14.5)
RBC # BLD AUTO: 4.32 MILLION/UL (ref 3.88–5.62)
SODIUM SERPL-SCNC: 134 MMOL/L (ref 136–145)
SODIUM SERPL-SCNC: 136 MMOL/L (ref 136–145)
WBC # BLD AUTO: 5.95 THOUSAND/UL (ref 4.31–10.16)

## 2020-08-10 PROCEDURE — 85025 COMPLETE CBC W/AUTO DIFF WBC: CPT | Performed by: INTERNAL MEDICINE

## 2020-08-10 PROCEDURE — 85730 THROMBOPLASTIN TIME PARTIAL: CPT | Performed by: INTERNAL MEDICINE

## 2020-08-10 PROCEDURE — 82948 REAGENT STRIP/BLOOD GLUCOSE: CPT

## 2020-08-10 PROCEDURE — 84100 ASSAY OF PHOSPHORUS: CPT | Performed by: NURSE PRACTITIONER

## 2020-08-10 PROCEDURE — 80048 BASIC METABOLIC PNL TOTAL CA: CPT | Performed by: INTERNAL MEDICINE

## 2020-08-10 PROCEDURE — 84145 PROCALCITONIN (PCT): CPT | Performed by: INTERNAL MEDICINE

## 2020-08-10 PROCEDURE — 99232 SBSQ HOSP IP/OBS MODERATE 35: CPT | Performed by: INTERNAL MEDICINE

## 2020-08-10 PROCEDURE — 80048 BASIC METABOLIC PNL TOTAL CA: CPT | Performed by: NURSE PRACTITIONER

## 2020-08-10 PROCEDURE — 97167 OT EVAL HIGH COMPLEX 60 MIN: CPT

## 2020-08-10 PROCEDURE — 92610 EVALUATE SWALLOWING FUNCTION: CPT

## 2020-08-10 PROCEDURE — 99233 SBSQ HOSP IP/OBS HIGH 50: CPT | Performed by: INTERNAL MEDICINE

## 2020-08-10 PROCEDURE — 83735 ASSAY OF MAGNESIUM: CPT | Performed by: INTERNAL MEDICINE

## 2020-08-10 PROCEDURE — 85610 PROTHROMBIN TIME: CPT | Performed by: INTERNAL MEDICINE

## 2020-08-10 PROCEDURE — 97163 PT EVAL HIGH COMPLEX 45 MIN: CPT

## 2020-08-10 RX ORDER — MIDODRINE HYDROCHLORIDE 5 MG/1
10 TABLET ORAL
Status: DISCONTINUED | OUTPATIENT
Start: 2020-08-10 | End: 2020-08-13

## 2020-08-10 RX ORDER — DEXTROSE AND SODIUM CHLORIDE 5; .9 G/100ML; G/100ML
60 INJECTION, SOLUTION INTRAVENOUS CONTINUOUS
Status: DISCONTINUED | OUTPATIENT
Start: 2020-08-10 | End: 2020-08-11

## 2020-08-10 RX ADMIN — PANTOPRAZOLE SODIUM 40 MG: 40 TABLET, DELAYED RELEASE ORAL at 08:24

## 2020-08-10 RX ADMIN — METOPROLOL TARTRATE 12.5 MG: 25 TABLET ORAL at 22:12

## 2020-08-10 RX ADMIN — DEXTROSE AND SODIUM CHLORIDE 60 ML/HR: 5; .9 INJECTION, SOLUTION INTRAVENOUS at 21:00

## 2020-08-10 RX ADMIN — APIXABAN 5 MG: 5 TABLET, FILM COATED ORAL at 17:01

## 2020-08-10 RX ADMIN — CEFTRIAXONE 1000 MG: 1 INJECTION, SOLUTION INTRAVENOUS at 11:09

## 2020-08-10 RX ADMIN — APIXABAN 5 MG: 5 TABLET, FILM COATED ORAL at 11:09

## 2020-08-10 RX ADMIN — MIDODRINE HYDROCHLORIDE 5 MG: 5 TABLET ORAL at 08:26

## 2020-08-10 RX ADMIN — MIDODRINE HYDROCHLORIDE 10 MG: 5 TABLET ORAL at 11:09

## 2020-08-10 RX ADMIN — DULOXETINE 30 MG: 30 CAPSULE, DELAYED RELEASE ORAL at 08:24

## 2020-08-10 RX ADMIN — Medication 125 MG: at 22:12

## 2020-08-10 RX ADMIN — Medication 125 MG: at 08:24

## 2020-08-10 RX ADMIN — METOPROLOL TARTRATE 12.5 MG: 25 TABLET ORAL at 08:24

## 2020-08-10 NOTE — PROGRESS NOTES
NEPHROLOGY PROGRESS NOTE   Linwood Mazariegos 77 y o  male MRN: [id number]  Unit/Bed#: -01 Encounter: [id number]  Reason for Consult: SAMY on CKD     ASSESSMENT/PLAN:  Acute kidney injury on CKD stage IIIB/IV (POA):  Suspected prerenal with poor oral intake, diuretics, hypotension, as well as component of post renal with urinary retention   -baseline creatinine 1 8-2 0   -presented with creatinine of 3 6   -peak creatinine 3 73 on 08/09   -a m  Labs pending   -receiving slow IV fluids  Will stop after current bag has ended  -status post Lopez catheter placement    -continue to hold diuretic  Evaluate daily for re-initiation   -renal ultrasound:  No hydro, bilateral simple renal cysts  -urinalysis:  Large blood, large leukocytes, 2+ protein, 20-30 RBCs, innumerable WBCs and bacteria   -continue to avoid nephrotoxins, hypotension, contrast   -I/O  Urinary tract infection:  -continues on Rocephin and prophylactic vancomycin  -urine culture pending  Hemodynamics:  Blood pressure is stable  Periods of hypotension   -avoid further hypotension or high fluctuations in blood pressure   -recommend holding parameters for antihypertensive for systolic blood pressure less than 130    -continue midodrine 5 mg t i d  And metoprolol 12 5 mg every 12 hours  -will increase midodrine to 10 mg t i d     Volume status/combined heart failure:  Reduced left ventricular function as well as RV function  EF of 30%  -diuretics remain on hold  Will assess daily for re-initiation   -daily weights, strict I&O, fluid restriction  BPH with urinary retention:  Status post Lopez catheter insertion  -previously on Flomax   -of note, patient is on midodrine which can contribute to urinary retention   -may benefit from urology consult, patient with continued urinary retention  MBD in CKD:  - will continue to monitor and trend PTH and phosphorus as outpatient   -most recent phosphorus level 4 7      Bilateral lower extremity wounds:  -podiatry following  Other:  GERD, PVD    Disposition:  Requiring additional stay due to medical needs  SUBJECTIVE:  The patient is resting in bed  He denies chest pain or shortness of breath  He remains on supplemental oxygen  He states that he has a good appetite although the nursing PCA in the room states that he did not eat much of his breakfast today  He denies nausea, vomiting, diarrhea  He has a Lopez catheter      OBJECTIVE:  Current Weight: Weight - Scale: (Bed scale not working )  Vitals:    08/09/20 2347 08/10/20 0742 08/10/20 0824 08/10/20 0856   BP:  125/85     Pulse: 93 (!) 40 90    Resp:  18     Temp:  (!) 95 9 °F (35 5 °C)     TempSrc:       SpO2:  95%  95%   Weight:       Height:           Intake/Output Summary (Last 24 hours) at 8/10/2020 3885  Last data filed at 8/10/2020 5958  Gross per 24 hour   Intake 768 ml   Output 425 ml   Net 343 ml     General: NAD  Skin: warm, dry, intact, no rash, multiple lower extremity wounds  HEENT:  Dry mucous membranes, sclera anicteric, normocephalic, atraumatic  Neck: No apparent JVD appreciated  Chest: lung sounds clear B/L, on oxygen  CVS:Regular rate and rhythm, no murmer   Abdomen: Soft, round, non-tender, +BS  Extremities: B/L LE edema present, legs wrapped  Neuro: alert and oriented  Psych: appropriate mood and affect     Medications:    Current Facility-Administered Medications:     acetaminophen (TYLENOL) tablet 650 mg, 650 mg, Oral, Q6H PRN, NAVNEET Escobar    cefTRIAXone (ROCEPHIN) IVPB (premix) 1,000 mg 50 mL, 1,000 mg, Intravenous, Q24H, Fly Costello MD, Last Rate: 100 mL/hr at 08/09/20 1228, 1,000 mg at 08/09/20 1228    DULoxetine (CYMBALTA) delayed release capsule 30 mg, 30 mg, Oral, Daily, NAVNEET Escobar, 30 mg at 08/10/20 0824    metoprolol tartrate (LOPRESSOR) partial tablet 12 5 mg, 12 5 mg, Oral, Q12H [address], Fly Costello MD, 12 5 mg at 08/10/20 0824    midodrine (PROAMATINE) tablet 5 mg, 5 mg, Oral, TID AC, Mary CAMEJO Sedora, CRNP, 5 mg at 08/10/20 0826    ondansetron (ZOFRAN) injection 4 mg, 4 mg, Intravenous, Q6H PRN, Tianna Carcamo MD    pantoprazole (PROTONIX) EC tablet 40 mg, 40 mg, Oral, Daily, Mary CAMEJO Sedora, CRNP, 40 mg at 08/10/20 0824    sodium chloride 0 9 % infusion, 60 mL/hr, Intravenous, Continuous, Asuncion Jacobson MD, Last Rate: 60 mL/hr at 08/08/20 1827, 60 mL/hr at 08/08/20 1827    vancomycin (VANCOCIN) oral solution 125 mg, 125 mg, Oral, Q12H [facility], Tianna Carcamo MD, 125 mg at 08/10/20 9083    Laboratory Results:  Results from last 7 days   Lab Units 08/10/20  0508 08/09/20  0608 08/08/20  1630 08/08/20  0757 08/08/20  0455 08/08/20  0109 08/07/20  2053   WBC Thousand/uL 5 95 7 70  --   --  7 26  --  7 80   HEMOGLOBIN g/dL 14 1 13 7  --   --  14 2  --  14 2   HEMATOCRIT % 40 5 40 3  --   --  41 9  --  42 1   PLATELETS Thousands/uL 132* 135*  --   --  157 171 174   SODIUM mmol/L  --  136 137 138  --  137 138   POTASSIUM mmol/L  --  4 1 4 2 4 3  --  4 3 5 6*   CHLORIDE mmol/L  --  100 98* 99*  --  99* 98*   CO2 mmol/L  --  21 22 25  --  24 24   BUN mg/dL  --  77* 74* 76*  --  76* 76*   CREATININE mg/dL  --  3 73* 3 68* 3 55*  --  3 66* 3 61*   CALCIUM mg/dL  --  9 2 9 3 9 6  --  9 8 9 6   MAGNESIUM mg/dL 1 9 1 9  --   --   --  1 9  --    PHOSPHORUS mg/dL 4 7* 4 8*  --   --   --   --   --    ALK PHOS U/L  --  69  --   --   --   --  62   ALT U/L  --  12  --   --   --   --  14   AST U/L  --  38  --   --   --   --  50*

## 2020-08-10 NOTE — OCCUPATIONAL THERAPY NOTE
Occupational Therapy Evaluation     Patient Name: Nomi Aguila  Today's Date: 8/10/2020  Problem List  Principal Problem:    Acute kidney injury superimposed on CKD [facility])  Active Problems:    Wound, open, foot    Gastroesophageal reflux disease without esophagitis    Essential hypertension    Persistent atrial fibrillation    Elevated troponin level not due myocardial infarction    Acute metabolic encephalopathy    Hyperkalemia    Benign prostatic hyperplasia with urinary retention    Chronic combined systolic and diastolic heart failure ([facility])    UTI (urinary tract infection)    Supratherapeutic INR    Past Medical History  Past Medical History:   Diagnosis Date    Atrial fibrillation ([address] )     Cardiac disease     Cellulitis     CHF (congestive heart failure) ([facility])     Chronic pain     Chronic venous hypertension     with ulceration    Diabetes mellitus ([facility])     GERD (gastroesophageal reflux disease)     History of methicillin resistant staphylococcus aureus (MRSA) 11/26/2019    negative nasal swab     Hyperlipidemia     Hypertension     Obesity     Pressure injury of skin     Pul[facility]     PVD (peripheral vascular disease) ([facility])     Type 2 diabetes mellitus with diabetic heel ulcer ([facility] ) 6/30/2020    Vascular disorder of extremity ([facility] )      Past Surgical History  Past Surgical History:   Procedure Laterality Date    ANTERIOR RELEASE VERTEBRAL BODY W/ POSTERIOR FUSION      APPENDECTOMY  1955    CATARACT EXTRACTION      DECOMPRESSION SPINE LUMBAR POSTERIOR      ELBOW SURGERY      FOOT/ANKLE ARTHRODESIS Right      complications postoperatively with bone healing and infection    INCISION AND DRAINAGE OF WOUND Left 3/5/2020    Procedure: INCISION AND DRAINAGE (I&D) EXTREMITY;  Surgeon: Eladio Dang DPM;  Location:  MAIN OR;  Service: Podiatry    KNEE SURGERY      NOSE SURGERY      turbinectomy    RETINAL DETACHMENT SURGERY      RHINOPLASTY      TONSILLECTOMY  VEIN LIGATION AND STRIPPING      saphenous vein long         08/10/20 1651   Note Type   Note type Eval only   Pain Assessment   Pain Assessment Tool Villarreal-Baker FACES   Villarreal-Baker FACES Pain Rating 6   Pain Location/Orientation Location: Buttocks  (left shoulder, bilaterl feet)   Home Living   Type of Home SNF   Home Layout Able to live on main level with bedroom/bathroom   [address] [facility]   Prior Function   Level of [place] Needs assistance with ADLs and functional mobility   Lives With Facility staff   Receives Help From Personal care attendant   ADL Assistance Needs assistance   IADLs Needs assistance   Falls in the last 6 months 1 to 4   Vocational Retired   ADL   Eating Assistance 5  Supervision/Setup   Eating Deficit Setup   Grooming Assistance 4  Minimal Assistance   UB Bathing Assistance 3  Moderate Assistance   LB Bathing Assistance 2  Maximal Assistance   UB Dressing Assistance 5  Supervision/Setup   LB Dressing Assistance 2  Maximal Assistance   Toileting Assistance  2  Maximal Assistance   Bed Mobility   Rolling R 2  Maximal assistance   Additional items Assist x 2   Rolling L 2  Maximal assistance   Additional items Assist x 2   Supine to Sit 2  Maximal assistance   Additional items Assist x 2   Sit to Supine 2  Maximal assistance   Additional items Assist x 2   Transfers   Sit to Stand Unable to assess  (2/2 poor sitting balance, BLE weakness)   Balance   Static Sitting Poor -  (Pt listing to right; leaning only bedrail/forearm)   Dynamic Sitting Poor -   Activity Tolerance   Activity Tolerance Patient limited by fatigue  (weakness)   Medical Staff Made Aware PT Subha   Nurse Made Aware JERSEY Butler   RUE Assessment   RUE Assessment X  (shoulder 0-80 degrees)   LUE Assessment   LUE Assessment X  (shoulder 0-25 degrees)   Cognition   Overall Cognitive Status Impaired   Arousal/Participation Alert   Attention Attends with cues to redirect   Orientation Level Oriented to person Memory Decreased recall of precautions;Decreased recall of recent events;Decreased short term memory   Following Commands Follows one step commands inconsistently   Comments Pt tangential and occasionally responds illogically to questions  Assessment   Limitation Decreased ADL status; Decreased UE ROM; Decreased UE strength;Decreased endurance;Decreased cognition;Decreased Safe judgement during ADL;Decreased self-care trans;Decreased high-level ADLs   Prognosis Fair   Assessment Pt is a 77 y o  male seen for OT evaluation at [facility], admitted 8/7/2020 w/ Acute kidney injury superimposed on CKD ([facility] )  OT completed extensive review of pt's medical and social history  Comorbidities affecting pt's functional performance at time of assessment include:  obesity, ambulatory dysfunction, DM, intertrigo, CHF, pain , elevated troponin, left shoulder pain, chronic foot ulcer, SAMY, elevated lactic acid, elevated troponin, PVD,  etc (see chart for full hx)    Personal factors affecting pt at time of IE include:difficulty performing ADLS, difficulty performing IADLS , limited insight into deficits and decreased functional mobility  Pt with active OT orders  Prior to admission, pt was at rehab at [facility]  Pt required assist w/  ADLS and IADLs and requried use of wheelchair PTA  Upon evaluation: Pt requires max Ax x 2 for bed mobility, supervision-mod A for UB ADLs and max A  for LB ADLS 2* the following deficits impacting occupational performance: weakness, decreased ROM, decreased strength, decreased balance, decreased safety awareness and increased pain  Pt to benefit from continued skilled OT tx while in the hospital to address deficits as defined above and maximize level of functional independence w ADL's and functional mobility  Occupational Performance areas to address include: eating, grooming, bathing/shower, toilet hygiene, dressing, health maintenance and functional mobility   Based on findings, pt is of high complexity  At this time, OT recommendations at time of discharge are short term rehab  Goals   Patient Goals Pt wishes to get better   Plan   Treatment Interventions ADL retraining;Functional transfer training;UE strengthening/ROM; Endurance training;Cognitive reorientation;Patient/family training; Compensatory technique education;Continued evaluation;Equipment evaluation/education   Goal Expiration Date 08/20/20   OT Treatment Day 0   OT Frequency 3-5x/wk   Recommendation   OT Discharge Recommendation Post-Acute Rehabilitation Services       Pt will achieve the following goals within 10 days  *Pt will complete grooming with mod I      *Pt will complete UB bathing and dressing with supervision      *Pt will complete LB bathing and dressing with min A       * Pt will complete toileting w/ min A w/ G hygiene/thoroughness using DME PRN     *Pt will complete bed mobility with supervision, with bed flat and no side rail to prep for purposeful tasks     *Pt will perform functional transfers with on/off all surfaces with min A2 using DME as needed w/ G balance/safety      *Pt will increase standing tolerance to 2 minutes in order to complete rachel-care      *Pt will participate in UE therapeutic exercise in order to maximize strength for ADL transfers      *Pt will identify 3-5 fall risks during ADL routine to ensure home safety upon discharge      *Pt will demonstrate G carryover of pt/caregiver education and training as appropriate w/ mod I w/o cues w/ good tolerance        Gordo Huerta OTR/L

## 2020-08-10 NOTE — PROGRESS NOTES
Pastoral Care Progress Note    8/10/2020  Patient: Ankush Childress : 1945  Admission Date & Time: 2020  MRN: [id number] [facility]: [id number]                     Chaplaincy Interventions Utilized:   Empowerment: Encouraged focus on present and Provided chaplaincy education    Exploration: Explored emotional needs & resources, Explored relational needs & resources and Explored spiritual needs & resources        Relationship Building: Cultivated a relationship of care and support, Listened empathically and Hospitality                Chaplaincy Outcomes Achieved:  Unknown outcome          Spiritual Coping Strategies Utilized:   No spiritual coping       08/10/20 1100   Stress Factors   Patient Stress Factors Health changes; Other (Comment)  (Frustrated w/his health situation  Irritable  )   Coping Responses   Patient Coping Anger;Open/discussion; Other (Comment)  (Frustrated)   Plan of Care   Assessment Completed by: Unit visit

## 2020-08-10 NOTE — PLAN OF CARE
Problem: Potential for Falls  Goal: Patient will remain free of falls  Description: INTERVENTIONS:  - Assess patient frequently for physical needs  -  Identify cognitive and physical deficits and behaviors that affect risk of falls    -  [place] fall precautions as indicated by assessment   - Educate patient/family on patient safety including physical limitations  - Instruct patient to call for assistance with activity based on assessment  - Modify environment to reduce risk of injury  - Consider OT/PT consult to assist with strengthening/mobility  Outcome: Progressing

## 2020-08-10 NOTE — ASSESSMENT & PLAN NOTE
Patient presented with recurrent urinary retention and Lopez catheter was inserted  Patient's blood pressure is borderline, he developed hypotension on previous admissions on Flomax therefore patient is not on Flomax      Will continue Lopze catheter

## 2020-08-10 NOTE — SOCIAL WORK
LOS: 3 days  Pt is not a documented bundle  Pt is a 30 day readmission  Per Dusty Frank in admissions at [facility], [address] returned to ED due to altered mental status and increased confusion, abnormal labs  Per Britton Deng has been at [facility] since 7/17 for SNF  Dusty Frank informed CM that Pt's son is looking at long term care at their facility  Amalia informed CM that Pt needs total assist with adls and transfers  Met with Pt  Pt presents AA&Ox2  Discussed role of case management, discharge planning  Pt with limited information  Pt confirms he has been at [facility] for SNF  When CM inquired if Pt has assistance with adls and transfers, Pt responds "I don't know " Pt reports he does not wear oxygen at [facility]  Pt reports his son(Jaleel) is POA and he has living will  Call placed to Pt's son(Jaleel: [phone]) re: discharge planning  Pt's son confirms plan for Pt to return to [facility] upon discharge  Referral sent to [facility] via [facility]  Await PT/OT  CM to follow

## 2020-08-10 NOTE — SPEECH THERAPY NOTE
Speech Language/Pathology    Speech-Language Pathology Bedside Swallow Evaluation      Patient Name: Ilaan Bill    Today's Date: 8/10/2020     Problem List  Principal Problem:    Acute kidney injury superimposed on CKD [facility])  Active Problems:    Wound, open, foot    Gastroesophageal reflux disease without esophagitis    Essential hypertension    Persistent atrial fibrillation    Elevated troponin level not due myocardial infarction    Acute metabolic encephalopathy    Hyperkalemia    Benign prostatic hyperplasia with urinary retention    Chronic combined systolic and diastolic heart failure ([facility])    UTI (urinary tract infection)    Supratherapeutic INR         Past Medical History  Past Medical History:   Diagnosis Date    Atrial fibrillation ([facility] )     Cardiac disease     Cellulitis     CHF (congestive heart failure) ([facility])     Chronic pain     Chronic venous hypertension     with ulceration    Diabetes mellitus ([facility])     GERD (gastroesophageal reflux disease)     History of methicillin resistant staphylococcus aureus (MRSA) 11/26/2019    negative nasal swab     Hyperlipidemia     Hypertension     Obesity     Pressure injury of skin     Pulmonary hypertension ([facility] )     PVD (peripheral vascular disease) ([facility])     Type 2 diabetes mellitus with diabetic heel ulcer ([facility] ) 6/30/2020    Vascular disorder of extremity ([facility] )        Past Surgical History  Past Surgical History:   Procedure Laterality Date    ANTERIOR RELEASE VERTEBRAL BODY W/ POSTERIOR FUSION      APPENDECTOMY  1955    CATARACT EXTRACTION      DECOMPRESSION SPINE LUMBAR POSTERIOR      ELBOW SURGERY      FOOT/ANKLE ARTHRODESIS Right      complications postoperatively with bone healing and infection    INCISION AND DRAINAGE OF WOUND Left 3/5/2020    Procedure: INCISION AND DRAINAGE (I&D) EXTREMITY;  Surgeon: Lashawn Casillas DPM;  Location:  MAIN OR;  Service: Podiatry    KNEE SURGERY      NOSE SURGERY      turbinectomy    RETINAL DETACHMENT SURGERY      RHINOPLASTY      TONSILLECTOMY      VEIN LIGATION AND STRIPPING      saphenous vein long       Summary    Pt presents with mild oral and suspected functional pharyngeal stages of swallow eric by prolonged mastication/bolus formation w/ solids and suspected reduced mastication/breathing coordination given decrease in SpO2 w/ solid food trials  Pt refused majority of oral intake and c/o dry mouth/throat and need for water when he eats (which he was provided though still requesting water)  Recommendations:   Diet: soft/level 3 diet and thin liquids - use extra sauce/gravy to moisten foods   Meds: as tolerated   Frequent Oral care: 4x/day  Aspiration precautions and compensatory swallowing strategies: upright posture, only feed when fully alert, slow rate of feeding and small bites/sips, alternate food/liquid, assist w/ set up and encourage po intake   Other Recommendations/ considerations:  -will cont to follow to assess diet tolerance/potential to advance  -recommend dietitian referral given reduced po intake - pt stated he likes vanilla flavor       Current Medical Status  Pt is a 77 y o  male who presented to [address] [place] [address] Road  with acute kidney injury superimposed on CKD  Past medical history of chronic wounds, CHF, CKD, PVD, atrial fibrillation on Eliquis who presents from rehab with his son for abnormal lab results  Patient had outpatient labs which revealed a BUN of 69 creatinine of 3 2  Patient has no complaints of pain, or  shortness of breath  HE denies nausea, vomiting or diarrhea  IN the ED his BUN was 76 and creat 3 61, he had mild lactic acidosis and complained of mild suprapubic tenderness  Patient was bladder scanned and retained 650 ml of urine  Lopez catheter inserted  Patient will be admitted to Med/Surg for SAMY, trending of labs and diuresis      OF note, He was recently admitted for SAMY on CKD 3, CHF , falls and confusion from 7/11-7/17/2020   He was placed in [facility] for rehab  He wants to go home  However, his son is unable to manage him  Past medical history:   Please see H&P for details    Special Studies:  CXR: Small left effusion and left base atelectasis  Social/Education/Vocational Hx:  Pt lives- was at [facility] for [employer] Information   Current Risks for Dysphagia & Aspiration: AMS, general debilitation   Current Symptoms/Concerns: poor po intake  Current Diet: regular diet and thin liquids   Baseline Diet: regular diet and thin liquids    Baseline Assessment   Behavior/Cognition: alert and easily agitated   Speech/Language Status: able to participate in basic conversation and able to follow commands  Patient Positioning: upright in bed     Swallow Mechanism Exam   Facial: symmetrical  Labial: WFL  Lingual: WFL  Velum: symmetrical  Mandible: adequate ROM  Dentition: adequate, poor oral hygiene - per RN, pt has been refusing oral care   Vocal quality:clear/adequate   Volitional Cough: strong/productive   Respiratory: NC     Consistencies Assessed and Performance   Consistencies Administered: 1 tsp puree, 2 bites of tilapia, 1 bite of cookie, 4oz thin liquid via straw    Oral Stage: pt able to self-feed  Adequate retrieval  Prolonged mastication and bolus formation w/ solids with decrease in SpO2 to low 80s following all solid food trials- suspect reduced coordination of mastication/breathing  No overt s/s reduced oral control  No significant oral residue  Pharyngeal Stage: swallow initiation appeared timely and complete  No overt s/s aspiration across all po intake  Voice remained clear  Pt c/o food sticking in throat x1 and dryness - cleared w/ liquid wash       Esophageal Concerns: none reported      Results Reviewed with: RN and MD   Dysphagia Goals: pt will tolerate dysphagia level 3 with thin liquids without s/s of aspiration [id number]

## 2020-08-10 NOTE — ASSESSMENT & PLAN NOTE
Heart rates are controlled, will continue metoprolol 12 5 b i d     I see no contraindication to resuming Eliquis for CVA prophylaxis

## 2020-08-10 NOTE — PLAN OF CARE
Problem: Potential for Falls  Goal: Patient will remain free of falls  Description: INTERVENTIONS:  - Assess patient frequently for physical needs  -  Identify cognitive and physical deficits and behaviors that affect risk of falls    -  [place] fall precautions as indicated by assessment   - Educate patient/family on patient safety including physical limitations  - Instruct patient to call for assistance with activity based on assessment  - Modify environment to reduce risk of injury  - Consider OT/PT consult to assist with strengthening/mobility  Outcome: Progressing

## 2020-08-10 NOTE — PLAN OF CARE
Recommend dysphagia level 3/thin liquids- use extra sauce/gravy to moisten foods   Meds as tolerated  Assist w/ set up/encourage po intake - rec dietitian referral for supplements

## 2020-08-10 NOTE — PROGRESS NOTES
Very unhappy man  Shared his dissatisfaction w/care received at rehab/nursing home  Made one reference to a son but no detail  No     08/10/20 1100   Stress Factors   Patient Stress Factors Health changes; Other (Comment)  (Frustrated w/his health situation  Irritable  )   Coping Responses   Patient Coping Anger;Open/discussion; Other (Comment)  (Frustrated)   Plan of Care   Assessment Completed by: Unit visit    Orthodoxy coping

## 2020-08-10 NOTE — ASSESSMENT & PLAN NOTE
Patient had acute metabolic encephalopathy admission due to acute kidney injury and probably due to UTI

## 2020-08-10 NOTE — PROGRESS NOTES
Outpatient Care Management Note:    Chart reviewed for outreach purposes  Patient is currently an inpatient at [facility]

## 2020-08-10 NOTE — PLAN OF CARE
Problem: PHYSICAL THERAPY ADULT  Goal: Performs mobility at highest level of function for planned discharge setting  See evaluation for individualized goals  Description: Treatment/Interventions: ADL retraining, LE strengthening/ROM, Therapeutic exercise, Endurance training, Cognitive reorientation, Patient/family training, Equipment eval/education, Bed mobility, Spoke to nursing, Spoke to case management, OT  Equipment Recommended: Wheelchair(john for transfers)       See flowsheet documentation for full assessment, interventions and recommendations  Note: Prognosis: Fair  Problem List: Decreased strength, Decreased range of motion, Impaired balance, Decreased endurance, Decreased mobility, Decreased coordination, Decreased cognition, Decreased safety awareness, Decreased skin integrity, Pain  Assessment: Pt presetns with lingering confusion, weakness, poor bedmobility and impaired sitting balance  ALl mobility Ax2 for limb management , pull up in bed etc  REpositioned after session high Fowlers with heels elevated and needs in reach  Can benefit form skilled TP services for graded program to improve strnegth adn mobility; beginning with  bed mobility and sitting balance, progress to stand and trnasfers  FOr now only safe trnasfer oob is john  REcommend return to rehab once medically cleared  Will follow see goals  Barriers to Discharge: (medical status)     PT Discharge Recommendation: Post-Acute Rehabilitation Services     PT - OK to Discharge: No    See flowsheet documentation for full assessment

## 2020-08-10 NOTE — PROGRESS NOTES
Progress Note - Linwood Mazariegos 1945, 77 y o  male MRN: [id number]    Unit/Bed#: -01 Encounter: [id number]    Primary Care Provider: Ai Thompson MD   Date and time admitted to hospital: 8/7/2020  8:42 PM        * Acute kidney injury superimposed on CKD [facility])  Assessment & Plan  Patient presented with acute kidney injury which was most likely due to diuresis with Bumex, dehydration and BPH with urinary retention  Lopez catheter was inserted  Patient was started on normal saline solution at 60 cc an hour  Ultrasound of the abdomen showed no hydronephrosis  Renal function is pending today    Bumex is on hold    UTI (urinary tract infection)  Assessment & Plan  Patient's urinalysis on admission suggested urinary tract infection  Patient had no leukocytosis  Increased lactic acid level is not due to sepsis but due to CHF with ejection fraction 30%  Urine cultures are pending    Continue IV Rocephin in the meantime    Persistent atrial fibrillation  Assessment & Plan  Heart rates are controlled, will continue metoprolol 12 5 b i d     I see no contraindication to resuming Eliquis for CVA prophylaxis    Chronic combined systolic and diastolic heart failure (HCC)  Assessment & Plan  Wt Readings from Last 3 Encounters:   08/08/20 112 kg (247 lb 9 2 oz)   07/17/20 114 kg (251 lb 1 7 oz)   07/02/20 122 kg (269 lb 2 9 oz)     Echo from 6/26/2020 with EF 30% and there was severe diffuse hypokinesis with regional variations  Patient's Bumex is on hold, today I see no evidence of volume overload    Will monitor patient for volume overload closely    Benign prostatic hyperplasia with urinary retention  Assessment & Plan  Patient presented with recurrent urinary retention and Lopez catheter was inserted  Patient's blood pressure is borderline, he developed hypotension on previous admissions on Flomax therefore patient is not on Flomax      Will continue Lopez catheter        Acute metabolic encephalopathy  Assessment & Plan  Patient had acute metabolic encephalopathy admission due to acute kidney injury and probably due to UTI        VTE Prophylaxis: in place    Patient Centered Rounds: I rounded with patient's nurse    Current Length of Stay: 3 day(s)    Current Patient Status: Inpatient    Certification Statement: Pt requires additional inpatient hospital stay due to: see assessment and plan        Subjective:   Patient denies chest pain, palpitations, shortness of breath, abdominal pain, nausea  Patient's nurse reports no evidence of signs of bleeding  All other ROS are negative    Objective:     Vitals:   Temp (24hrs), Av 9 °F (35 5 °C), Min:95 8 °F (35 4 °C), Max:96 1 °F (35 6 °C)    Temp:  [95 8 °F (35 4 °C)-96 1 °F (35 6 °C)] 95 9 °F (35 5 °C)  HR:  [40-93] 90  Resp:  [18] 18  BP: ()/(63-85) 125/85  SpO2:  [95 %] 95 %  Body mass index is 33 58 kg/m²  Input and Output Summary (last 24 hours): Intake/Output Summary (Last 24 hours) at 8/10/2020 1036  Last data filed at 8/10/2020 0817  Gross per 24 hour   Intake 768 ml   Output 425 ml   Net 343 ml       Physical Exam:     Physical Exam  Vitals signs reviewed  Constitutional:       General: He is not in acute distress  Appearance: He is well-developed  HENT:      Head: Normocephalic  Eyes:      Conjunctiva/sclera: Conjunctivae normal    Neck:      Musculoskeletal: Neck supple  Cardiovascular:      Rate and Rhythm: Normal rate  Comments: Irregular irregular, controlled rate, patient has no lower extremity edema  Pulmonary:      Effort: Pulmonary effort is normal  No respiratory distress  Breath sounds: No wheezing or rales  Abdominal:      General: Bowel sounds are normal  There is no distension  Palpations: Abdomen is soft  Tenderness: There is no abdominal tenderness  Musculoskeletal:         General: Deformity (Patient has bony enlargement of the shoulders) present  No tenderness  Skin:     General: Skin is warm and dry  Comments: Patient has a subcutaneous hematoma over the left shoulder which is resolving   Neurological:      Mental Status: He is alert  Cranial Nerves: No cranial nerve deficit  I personally reviewed labs and imaging reports for today  Last 24 Hours Medication List:   Current Facility-Administered Medications   Medication Dose Route Frequency Provider Last Rate    acetaminophen  650 mg Oral Q6H PRN NAVNEET Escobar      apixaban  5 mg Oral BID Lenox Alpers, MD      cefTRIAXone  1,000 mg Intravenous Q24H Ada Valdez MD 1,000 mg (08/09/20 1228)    DULoxetine  30 mg Oral Daily NAVNEET Escobar      metoprolol tartrate  12 5 mg Oral Q12H Delvin Diaz MD      midodrine  10 mg Oral TID AC NAVNEET Sen      ondansetron  4 mg Intravenous Q6H PRN Ada Valdez MD      pantoprazole  40 mg Oral Daily NAVNEET Escobar      sodium chloride  60 mL/hr Intravenous Continuous Jet Enriquez MD 60 mL/hr (08/08/20 1827)    vancomycin  125 mg Oral Q12H Delvin Diaz MD            Today, Patient Was Seen By: Lenox Alpers, MD    ** Please Note: Dictation voice to text software may have been used in the creation of this document   **

## 2020-08-10 NOTE — ASSESSMENT & PLAN NOTE
Patient presented with acute kidney injury which was most likely due to diuresis with Bumex, dehydration and BPH with urinary retention  Lopez catheter was inserted  Patient was started on normal saline solution at 60 cc an hour  Ultrasound of the abdomen showed no hydronephrosis      Renal function is pending today    Bumex is on hold

## 2020-08-10 NOTE — ASSESSMENT & PLAN NOTE
Patient's urinalysis on admission suggested urinary tract infection  Patient had no leukocytosis  Increased lactic acid level is not due to sepsis but due to CHF with ejection fraction 30%      Urine cultures are pending    Continue IV Rocephin in the meantime

## 2020-08-10 NOTE — PHYSICAL THERAPY NOTE
PT eval     08/10/20 2601   Note Type   Note type Eval only   Pain Assessment   Pain Assessment Tool Villarreal-Baker FACES   Villarreal-Baker FACES Pain Rating 6   Pain Location/Orientation Location: Buttocks; Location: Generalized   Home Living   Type of Home SNF  (rehab)   Home Equipment Wheelchair-manual;Wheelchair-electric   Prior Function   Level of [place] Needs assistance with IADLs; Needs assistance with ADLs and functional mobility   Lives With Facility staff   Receives Help From Personal care attendant   ADL Assistance Needs assistance   IADLs Needs assistance   Falls in the last 6 months 1 to 4   Vocational Retired   [employer]   Additional Pertinent History sent from SNF rehab for confusion and SAMY on CKD   Family/Caregiver Present No   Cognition   Overall Cognitive Status Impaired   Arousal/Participation Responsive   Orientation Level Oriented to person   Memory Decreased recall of recent events;Decreased recall of precautions;Decreased short term memory   Following Commands Follows one step commands inconsistently   Comments Wait!    RUE Assessment   RUE Assessment   (shldr very limited others grossly functional)   LUE Assessment   LUE Assessment   (large bruise L shldr severely impedes ue movement)   RLE Assessment   RLE Assessment   (hip knees to 90/90 with stretch lacks full extension @20 deg)   LLE Assessment   LLE Assessment   (lacks full extension @ 20degr strength Ble 2-/5)   Coordination   Movements are Fluid and Coordinated 0   Coordination and Movement Description bradykinetic   Proprioception   RLE Proprioception Impaired   LLE Proprioception Impaired   Bed Mobility   Rolling R 2  Maximal assistance   Additional items Assist x 2   Rolling L 2  Maximal assistance   Additional items Assist x 2   Supine to Sit 2  Maximal assistance   Additional items Assist x 2   Sit to Supine 2  Maximal assistance   Additional items Assist x 2   Transfers   Sit to Stand Unable to assess   Balance   Static Sitting Poor -  (leans to R with rail and pillow prop)   Dynamic Sitting Poor -   Endurance Deficit   Endurance Deficit Yes   Endurance Deficit Description tires quickly 02 sat 885 on 2L at times other time no reading fingers dusky at times probe on ear   Activity Tolerance   Activity Tolerance Patient limited by fatigue;Patient limited by pain   Medical Staff Made Aware OT Makayla   Nurse Made Aware JERSEY Gutiérrez Last   Assessment   Prognosis Fair   Problem List Decreased strength;Decreased range of motion; Impaired balance;Decreased endurance;Decreased mobility; Decreased coordination;Decreased cognition;Decreased safety awareness;Decreased skin integrity;Pain   Assessment Pt presetns with lingering confusion, weakness, poor bedmobility and impaired sitting balance  ALl mobility Ax2 for limb management , pull up in bed etc  REpositioned after session high Fowlers with heels elevated and needs in reach  Can benefit form skilled TP services for graded program to improve strnegth adn mobility; beginning with  bed mobility and sitting balance, progress to stand and trnasfers  FOr now only safe trnasfer oob is john  REcommend return to rehab once medically cleared  Will follow see goals   Barriers to Discharge   (medical status)   Goals   Patient Goals get better   STG Expiration Date 08/20/20   Short Term Goal #1 1)imrove arom B les 1/ degrees 2) improve strength bles 1/2 grade 3) safe sit EOB x 5 min unsupported 5) safe bed mobility with 1 assist    Plan   Treatment/Interventions ADL retraining;LE strengthening/ROM; Therapeutic exercise; Endurance training;Cognitive reorientation;Patient/family training;Equipment eval/education; Bed mobility;Spoke to nursing;Spoke to case management;OT   PT Frequency 5x/wk   Recommendation   PT Discharge Recommendation Post-Acute Rehabilitation Services   Equipment Recommended Wheelchair  (john for transfers)   PT - OK to Discharge No   Modified Dolores Scale   Modified Dolores Scale 5   Comfort Ayers, PT

## 2020-08-10 NOTE — ASSESSMENT & PLAN NOTE
Wt Readings from Last 3 Encounters:   08/08/20 112 kg (247 lb 9 2 oz)   07/17/20 114 kg (251 lb 1 7 oz)   07/02/20 122 kg (269 lb 2 9 oz)     Echo from 6/26/2020 with EF 30% and there was severe diffuse hypokinesis with regional variations      Patient's Bumex is on hold, today I see no evidence of volume overload    Will monitor patient for volume overload closely

## 2020-08-10 NOTE — PROGRESS NOTES
Patient BS at approximately 1700 is 57  Patient awake and responsive   Given 40z of OJ, will recheck BS in 15min

## 2020-08-11 ENCOUNTER — APPOINTMENT (INPATIENT)
Dept: RADIOLOGY | Facility: HOSPITAL | Age: 75
DRG: 682 | End: 2020-08-11
Payer: COMMERCIAL

## 2020-08-11 PROBLEM — E87.2 ACIDOSIS: Status: ACTIVE | Noted: 2020-08-11

## 2020-08-11 PROBLEM — E87.5 HYPERKALEMIA: Status: RESOLVED | Noted: 2020-07-01 | Resolved: 2020-08-11

## 2020-08-11 LAB
ANION GAP SERPL CALCULATED.3IONS-SCNC: 15 MMOL/L (ref 4–13)
ARTERIAL PATENCY WRIST A: ABNORMAL
BASE EXCESS BLDA CALC-SCNC: -6 MMOL/L (ref -2–3)
BUN SERPL-MCNC: 76 MG/DL (ref 5–25)
CA-I BLD-SCNC: 1.15 MMOL/L (ref 1.12–1.32)
CALCIUM SERPL-MCNC: 9.5 MG/DL (ref 8.3–10.1)
CHLORIDE SERPL-SCNC: 99 MMOL/L (ref 100–108)
CHLORIDE UR-SCNC: 20 MMOL/L
CO2 SERPL-SCNC: 20 MMOL/L (ref 21–32)
CREAT SERPL-MCNC: 3.87 MG/DL (ref 0.6–1.3)
CREAT UR-MCNC: 97.2 MG/DL
DS:DELIVERY SYSTEM: ABNORMAL
ERYTHROCYTE [DISTWIDTH] IN BLOOD BY AUTOMATED COUNT: 20.6 % (ref 11.6–15.1)
FIO2 GAS DIL.REBREATH: 44 L
GFR SERPL CREATININE-BSD FRML MDRD: 14 ML/MIN/1.73SQ M
GLUCOSE SERPL-MCNC: 104 MG/DL (ref 65–140)
GLUCOSE SERPL-MCNC: 118 MG/DL (ref 65–140)
GLUCOSE SERPL-MCNC: 75 MG/DL (ref 65–140)
GLUCOSE SERPL-MCNC: 88 MG/DL (ref 65–140)
GLUCOSE SERPL-MCNC: 93 MG/DL (ref 65–140)
GLUCOSE SERPL-MCNC: 94 MG/DL (ref 65–140)
GLUCOSE SERPL-MCNC: 98 MG/DL (ref 65–140)
GLUCOSE SERPL-MCNC: 98 MG/DL (ref 65–140)
HCO3 BLDA-SCNC: 15.8 MMOL/L (ref 22–28)
HCT VFR BLD AUTO: 40.3 % (ref 36.5–49.3)
HCT VFR BLD CALC: 45 % (ref 36.5–49.3)
HGB BLD-MCNC: 13.9 G/DL (ref 12–17)
HGB BLDA-MCNC: 15.3 G/DL (ref 12–17)
LACTATE SERPL-SCNC: 3.6 MMOL/L (ref 0.5–2)
MCH RBC QN AUTO: 32.1 PG (ref 26.8–34.3)
MCHC RBC AUTO-ENTMCNC: 34.5 G/DL (ref 31.4–37.4)
MCV RBC AUTO: 93 FL (ref 82–98)
PCO2 BLD: 17 MMOL/L (ref 21–32)
PCO2 BLD: 23.9 MM HG (ref 36–44)
PH BLD: 7.43 [PH] (ref 7.35–7.45)
PLATELET # BLD AUTO: 113 THOUSANDS/UL (ref 149–390)
PMV BLD AUTO: 12 FL (ref 8.9–12.7)
PO2 BLD: 119 MM HG (ref 75–129)
POTASSIUM BLD-SCNC: 3.8 MMOL/L (ref 3.5–5.3)
POTASSIUM SERPL-SCNC: 3.9 MMOL/L (ref 3.5–5.3)
RBC # BLD AUTO: 4.33 MILLION/UL (ref 3.88–5.62)
SAMPLE SITE: ABNORMAL
SAO2 % BLD FROM PO2: 99 % (ref 60–85)
SODIUM 24H UR-SCNC: 13 MOL/L
SODIUM BLD-SCNC: 136 MMOL/L (ref 136–145)
SODIUM SERPL-SCNC: 134 MMOL/L (ref 136–145)
SPECIMEN SOURCE: ABNORMAL
UUN 24H UR-MCNC: 515 MG/DL
WBC # BLD AUTO: 6.14 THOUSAND/UL (ref 4.31–10.16)

## 2020-08-11 PROCEDURE — 84300 ASSAY OF URINE SODIUM: CPT | Performed by: NURSE PRACTITIONER

## 2020-08-11 PROCEDURE — 84540 ASSAY OF URINE/UREA-N: CPT | Performed by: NURSE PRACTITIONER

## 2020-08-11 PROCEDURE — 84132 ASSAY OF SERUM POTASSIUM: CPT

## 2020-08-11 PROCEDURE — 94640 AIRWAY INHALATION TREATMENT: CPT

## 2020-08-11 PROCEDURE — 82947 ASSAY GLUCOSE BLOOD QUANT: CPT

## 2020-08-11 PROCEDURE — 82803 BLOOD GASES ANY COMBINATION: CPT

## 2020-08-11 PROCEDURE — 94760 N-INVAS EAR/PLS OXIMETRY 1: CPT

## 2020-08-11 PROCEDURE — 84295 ASSAY OF SERUM SODIUM: CPT

## 2020-08-11 PROCEDURE — 99233 SBSQ HOSP IP/OBS HIGH 50: CPT | Performed by: INTERNAL MEDICINE

## 2020-08-11 PROCEDURE — 71045 X-RAY EXAM CHEST 1 VIEW: CPT

## 2020-08-11 PROCEDURE — 83605 ASSAY OF LACTIC ACID: CPT | Performed by: INTERNAL MEDICINE

## 2020-08-11 PROCEDURE — 82436 ASSAY OF URINE CHLORIDE: CPT | Performed by: NURSE PRACTITIONER

## 2020-08-11 PROCEDURE — 85027 COMPLETE CBC AUTOMATED: CPT | Performed by: INTERNAL MEDICINE

## 2020-08-11 PROCEDURE — 82330 ASSAY OF CALCIUM: CPT

## 2020-08-11 PROCEDURE — 82948 REAGENT STRIP/BLOOD GLUCOSE: CPT

## 2020-08-11 PROCEDURE — 93005 ELECTROCARDIOGRAM TRACING: CPT

## 2020-08-11 PROCEDURE — 85014 HEMATOCRIT: CPT

## 2020-08-11 PROCEDURE — 82570 ASSAY OF URINE CREATININE: CPT | Performed by: NURSE PRACTITIONER

## 2020-08-11 PROCEDURE — 80048 BASIC METABOLIC PNL TOTAL CA: CPT | Performed by: INTERNAL MEDICINE

## 2020-08-11 RX ORDER — FUROSEMIDE 10 MG/ML
20 INJECTION INTRAMUSCULAR; INTRAVENOUS ONCE
Status: COMPLETED | OUTPATIENT
Start: 2020-08-11 | End: 2020-08-11

## 2020-08-11 RX ORDER — DEXTROSE AND SODIUM CHLORIDE 5; .9 G/100ML; G/100ML
50 INJECTION, SOLUTION INTRAVENOUS CONTINUOUS
Status: DISCONTINUED | OUTPATIENT
Start: 2020-08-11 | End: 2020-08-12

## 2020-08-11 RX ORDER — LEVALBUTEROL 1.25 MG/.5ML
1.25 SOLUTION, CONCENTRATE RESPIRATORY (INHALATION) EVERY 6 HOURS PRN
Status: DISCONTINUED | OUTPATIENT
Start: 2020-08-11 | End: 2020-08-13

## 2020-08-11 RX ADMIN — LEVALBUTEROL HYDROCHLORIDE 1.25 MG: 1.25 SOLUTION, CONCENTRATE RESPIRATORY (INHALATION) at 21:27

## 2020-08-11 RX ADMIN — APIXABAN 5 MG: 5 TABLET, FILM COATED ORAL at 18:17

## 2020-08-11 RX ADMIN — MIDODRINE HYDROCHLORIDE 10 MG: 5 TABLET ORAL at 18:16

## 2020-08-11 RX ADMIN — DULOXETINE 30 MG: 30 CAPSULE, DELAYED RELEASE ORAL at 09:15

## 2020-08-11 RX ADMIN — MIDODRINE HYDROCHLORIDE 10 MG: 5 TABLET ORAL at 09:14

## 2020-08-11 RX ADMIN — APIXABAN 5 MG: 5 TABLET, FILM COATED ORAL at 09:14

## 2020-08-11 RX ADMIN — DEXTROSE AND SODIUM CHLORIDE 60 ML/HR: 5; .9 INJECTION, SOLUTION INTRAVENOUS at 14:58

## 2020-08-11 RX ADMIN — PANTOPRAZOLE SODIUM 40 MG: 40 TABLET, DELAYED RELEASE ORAL at 09:14

## 2020-08-11 RX ADMIN — IPRATROPIUM BROMIDE 0.5 MG: 0.5 SOLUTION RESPIRATORY (INHALATION) at 21:27

## 2020-08-11 RX ADMIN — FUROSEMIDE 20 MG: 10 INJECTION, SOLUTION INTRAMUSCULAR; INTRAVENOUS at 20:07

## 2020-08-11 RX ADMIN — MIDODRINE HYDROCHLORIDE 10 MG: 5 TABLET ORAL at 12:02

## 2020-08-11 RX ADMIN — CEFTRIAXONE 1000 MG: 1 INJECTION, SOLUTION INTRAVENOUS at 12:02

## 2020-08-11 RX ADMIN — DEXTROSE AND SODIUM CHLORIDE 50 ML/HR: 5; .9 INJECTION, SOLUTION INTRAVENOUS at 20:02

## 2020-08-11 RX ADMIN — METOPROLOL TARTRATE 12.5 MG: 25 TABLET ORAL at 09:14

## 2020-08-11 NOTE — PROGRESS NOTES
Has persistent lactic acidosis mostly due to severe systolic CHF with ejection fraction of 30% and right-sided CHF  Etiology of lactic acidosis I believe is not infectious!

## 2020-08-11 NOTE — PLAN OF CARE
Problem: Potential for Falls  Goal: Patient will remain free of falls  Description: INTERVENTIONS:  - Assess patient frequently for physical needs  -  Identify cognitive and physical deficits and behaviors that affect risk of falls    -  [place] fall precautions as indicated by assessment   - Educate patient/family on patient safety including physical limitations  - Instruct patient to call for assistance with activity based on assessment  - Modify environment to reduce risk of injury  - Consider OT/PT consult to assist with strengthening/mobility  Outcome: Progressing

## 2020-08-11 NOTE — PROGRESS NOTES
Progress Note - Linwood Mazariegos 1945, 77 y o  male MRN: [id number]    Unit/Bed#: -01 Encounter: [id number]    Primary Care Provider: Desiree Batista MD   Date and time admitted to hospital: 8/7/2020  8:42 PM        * Acute kidney injury superimposed on CKD [facility])  Assessment & Plan  Patient presented with acute kidney injury which was most likely due to diuresis with Bumex, dehydration and BPH with urinary retention  Lopez catheter was inserted  Patient was started on normal saline solution at 60 cc an hour  Ultrasound of the abdomen showed no hydronephrosis  Renal function is not much better, creatinine is 3 87,  Patient refuses to eat  D/w son on phone,   Son is open hospice if pt's not improving in the next 48hrs      UTI (urinary tract infection)  Assessment & Plan  Patient's urinalysis on admission suggested urinary tract infection  Urine cultures are growing Candida, suspect colonization not true infection    Will stop IV antibiotics    Persistent atrial fibrillation  Assessment & Plan  Heart rates are controlled, will continue metoprolol 12 5 b i d  Continue Eliquis for CVA prophylaxis    Chronic combined systolic and diastolic heart failure (HCC)  Assessment & Plan  Wt Readings from Last 3 Encounters:   08/11/20 110 kg (241 lb 6 5 oz)   07/17/20 114 kg (251 lb 1 7 oz)   07/02/20 122 kg (269 lb 2 9 oz)     Echo from 6/26/2020 with EF 30% and there was severe diffuse hypokinesis with regional variations      Patient's Bumex is on hold, today I see no evidence of volume overload    Repeat Chest x-ray shows no CHF    Acute metabolic encephalopathy  Assessment & Plan  Patient had acute metabolic encephalopathy admission due to acute kidney injury most likely        VTE Prophylaxis: in place    Patient Centered Rounds: I rounded with patient's nurse    Current Length of Stay: 4 day(s)    Current Patient Status: Inpatient    Certification Statement: Pt requires additional inpatient hospital stay due to: see assessment and plan        Subjective:   Patient denies being in pain, chest pain, shortness breath, nausea  Patient's nurse reports that patient has loose stools, refuses to eat    All other ROS are negative    Objective:     Vitals:   Temp (24hrs), Av 7 °F (35 9 °C), Min:96 4 °F (35 8 °C), Max:97 1 °F (36 2 °C)    Temp:  [96 4 °F (35 8 °C)-97 1 °F (36 2 °C)] 97 1 °F (36 2 °C)  HR:  [65-87] 81  Resp:  [16] 16  BP: (112-127)/(76-85) 115/79  SpO2:  [93 %-99 %] 93 %  Body mass index is 32 74 kg/m²  Input and Output Summary (last 24 hours): Intake/Output Summary (Last 24 hours) at 2020 1631  Last data filed at 2020 1251  Gross per 24 hour   Intake 565 ml   Output 410 ml   Net 155 ml       Physical Exam:     Physical Exam  Vitals signs reviewed  Constitutional:       Appearance: He is well-developed  HENT:      Head: Normocephalic  Mouth/Throat:      Mouth: Mucous membranes are dry  Eyes:      Conjunctiva/sclera: Conjunctivae normal    Cardiovascular:      Rate and Rhythm: Normal rate  Comments: Irregular irregular  Pulmonary:      Effort: Pulmonary effort is normal       Breath sounds: No wheezing or rales  Abdominal:      General: Bowel sounds are normal       Palpations: Abdomen is soft  Tenderness: There is no abdominal tenderness  Skin:     General: Skin is warm and dry  Comments: I see no evidence of cellulitis,    Patient has stage III buttocks pressure ulcer   Neurological:      Mental Status: He is alert  I personally reviewed labs and imaging reports for today        Last 24 Hours Medication List:   Current Facility-Administered Medications   Medication Dose Route Frequency Provider Last Rate    acetaminophen  650 mg Oral Q6H PRN NAVNEET Escobar      apixaban  5 mg Oral BID Latisha Abraham MD      dextrose 5 % and sodium chloride 0 9 %  60 mL/hr Intravenous Continuous NAVNEET Nuñez 60 mL/hr (20 6642)   [facility] DULoxetine  30 mg Oral Daily NAVNEET Escobar      metoprolol tartrate  12 5 mg Oral Q12H Home Dupont MD      midodrine  10 mg Oral TID AC NAVNEET Goss      ondansetron  4 mg Intravenous Q6H PRN Feliberto Quinn MD      pantoprazole  40 mg Oral Daily NAVNEET Escobar      vancomycin  125 mg Oral Q12H Home Dupont MD            Today, Patient Was Seen By: Tereza Falcon MD    ** Please Note: Dictation voice to text software may have been used in the creation of this document   **

## 2020-08-11 NOTE — PLAN OF CARE
Problem: Potential for Falls  Goal: Patient will remain free of falls  Description: INTERVENTIONS:  - Assess patient frequently for physical needs  -  Identify cognitive and physical deficits and behaviors that affect risk of falls  -  [place] fall precautions as indicated by assessment   - Educate patient/family on patient safety including physical limitations  - Instruct patient to call for assistance with activity based on assessment  - Modify environment to reduce risk of injury  - Consider OT/PT consult to assist with strengthening/mobility  8/11/2020 1126 by Meir Lares RN  Outcome: Progressing  8/11/2020 1126 by Meir Lares RN  Outcome: Not Progressing     Problem: Nutrition/Hydration-ADULT  Goal: Nutrient/Hydration intake appropriate for improving, restoring or maintaining nutritional needs  Description: Monitor and assess patient's nutrition/hydration status for malnutrition  Collaborate with interdisciplinary team and initiate plan and interventions as ordered  Monitor patient's weight and dietary intake as ordered or per policy  Utilize nutrition screening tool and intervene as necessary  Determine patient's food preferences and provide high-protein, high-caloric foods as appropriate       INTERVENTIONS:  - Monitor oral intake, urinary output, labs, and treatment plans  - Assess nutrition and hydration status and recommend course of action  - Evaluate amount of meals eaten  - Assist patient with eating if necessary   - Allow adequate time for meals  - Recommend/ encourage appropriate diets, oral nutritional supplements, and vitamin/mineral supplements  - Order, calculate, and assess calorie counts as needed  - Recommend, monitor, and adjust tube feedings and TPN/PPN based on assessed needs  - Assess need for intravenous fluids  - Provide specific nutrition/hydration education as appropriate  - Include patient/family/caregiver in decisions related to nutrition  8/11/2020 1126 by Johanna Elias Elidia Reid RN  Outcome: Progressing  8/11/2020 1126 by Evangelist Whelan RN  Outcome: Not Progressing     Problem: Prexisting or High Potential for Compromised Skin Integrity  Goal: Skin integrity is maintained or improved  Description: INTERVENTIONS:  - Identify patients at risk for skin breakdown  - Assess and monitor skin integrity  - Assess and monitor nutrition and hydration status  - Monitor labs   - Assess for incontinence   - Turn and reposition patient  - Assist with mobility/ambulation  - Relieve pressure over bony prominences  - Avoid friction and shearing  - Provide appropriate hygiene as needed including keeping skin clean and dry  - Evaluate need for skin moisturizer/barrier cream  - Collaborate with interdisciplinary team   - Patient/family teaching  - Consider wound care consult   8/11/2020 1126 by Evangelist Whelan RN  Outcome: Progressing  8/11/2020 1126 by Evangelist Whelan RN  Outcome: Not Progressing

## 2020-08-11 NOTE — ASSESSMENT & PLAN NOTE
Patient presented with acute kidney injury which was most likely due to diuresis with Bumex, dehydration and BPH with urinary retention  Lopez catheter was inserted  Patient was started on normal saline solution at 60 cc an hour  Ultrasound of the abdomen showed no hydronephrosis  Renal function is not much better, creatinine is 3 87,  Patient refuses to eat    D/w son on phone,   Son is open hospice if pt's not improving in the next 48hrs

## 2020-08-11 NOTE — ASSESSMENT & PLAN NOTE
Wt Readings from Last 3 Encounters:   08/11/20 110 kg (241 lb 6 5 oz)   07/17/20 114 kg (251 lb 1 7 oz)   07/02/20 122 kg (269 lb 2 9 oz)     Echo from 6/26/2020 with EF 30% and there was severe diffuse hypokinesis with regional variations      Patient's Bumex is on hold, today I see no evidence of volume overload    Repeat Chest x-ray shows no CHF

## 2020-08-11 NOTE — SPEECH THERAPY NOTE
Speech Language/Pathology    Speech/Language Pathology Progress Note    Patient Name: Debra Loja  Today's Date: 8/11/2020     Spoke w/ RN via phone  Pt reportedly continues to refuse to eat  RN stated pt took medications whole in puree this morning w/o difficulty, but refused afternoon meds  Reviewed dietitian note which states pt refused all nutritional supplements offered  Recommend to cont current diet for now  ST will cont to follow as able/appropriate       Pelon Dumont, SLP

## 2020-08-11 NOTE — NUTRITION
08/11/20 1248   Assessment   Timepoint Nutrition Review   Labs   List Completed Labs   (8/10/20 BUN 76, Creat 3 87 NA+ 134 meds: protonix)   Feeding Route   PO Independent   Adequacy of Intake   Nutrition Modality PO  (Cardiac, 1000 ml fluid restriction)   Intake Meals 0-25%  (breakfast: 2 irish mist 25% of each can, eggs 25% jello 25%, yogurt 10%)   Estimated Calorie Intake 0-25%   Estimated Protein Intake  0-25%   Estimated Fluid Intake 50-75%   Estimated calorie intake compared to estimated need po intake poor   Nutrition Prognosis   Nutrition Concerns   (skin car eplan:see podiatry note for details, surgical wound L forefoot, pressure ulcer stage 3 lateral midfoot, multiple traumatic wounds b/l feet, + 1 b/l LE edema)   Comorbid Concerns   (per chart review: acute metabolic encephalopathy, UTI, CKD on CKD4, chronic CHF, limited ambulation and gait dysfunction )   Nutrition Considerations   (diet education: pt refuses)   PES Statement   Problem Intake   Energy Balance (1) Inadequate energy intake NI-1 2   Related to Decreased Appetite   As evidenced by: Intake < estimated needs   PES Statement 2   Problem Continue previous diagnosis   Patient Nutrition Goals   Goal increase intake;skin integrity improved   Goal Status not met;extended   Timeframe to complete goal by d/c   Recommendations/Interventions   Summary MD consult for supplements and poor po intake  Pt refusing supplements: offered Ensure Compact, Ennsure Enlive, Glucerna, [employer], No Carb ProSource and Vahe  Pt reports the only thing I can get him is his chair and clothes, so he can leave  Discussed with Enrique Escamilla, RN  Interventions Diet: continued as ordered   Nutrition Recommendations Continue diet as ordered; Other (specify)   Nutrition Complexity Risk   Nutrition complexity level Moderate risk   Nutrition review: 08/17/20   Follow up date 08/17/20  (po intake, supplement acceptance?)

## 2020-08-11 NOTE — ASSESSMENT & PLAN NOTE
Heart rates are controlled, will continue metoprolol 12 5 b i d      Continue Eliquis for CVA prophylaxis

## 2020-08-11 NOTE — ASSESSMENT & PLAN NOTE
Patient's urinalysis on admission suggested urinary tract infection       Urine cultures are growing Candida, suspect colonization not true infection    Will stop IV antibiotics

## 2020-08-11 NOTE — PROGRESS NOTES
Progress Note - Podiatry  Linwood Mazariegos 77 y o  male MRN: [id number]  Unit/Bed#: -01 Encounter: [id number]    Assessment/Plan:  1  Surgical Wound LFT - S/P I&D Abscess dorsal forefoot (03/05/2020)   -dressing intact with no strike through drainage, Maxorb adherent               -Debridement skin and SQ ([id number]) right foot excisional with 10 blade, 10 sq cm excised(70%)  -X-ray left foot shows no evidence of osteomyelitis  Osteopenia and vascular calcifications noted               -Dermagran, 4 x 4, ABD, and Kerlix applied right foot  2  Pressure ulcer Stage 3 -  Lateral left midfoot 5th Met base              -stable eschar with no drainage               -dry dressing is all that is needed now to protect this area  3  Multiple traumatic wounds bilateral feet and toes              -dry with stable eschars left hallux, right hallux, and right 2nd toe              -no bandaging necessary, watch for further breakdown  4  Chronic venous insufficiency bilateral              - Chronic +1 right , +1 left edema              - recommend patient continue to utilize Tubigrip compression or compression stockings  5  Limited ambulation/gait dysfunction              -PT consult appreciated, needs continued work on transfers and conditioning  6  UTI, Acute kidney injury,CKD,HTN,CHF, BPH, acute metabolic encephalopathy, GERD, PVD              -managed per Internal Medicine, and Nephrology  Subjective/Objective   Chief Complaint:   Chief Complaint   Patient presents with    Altered Mental Status     patient presents with AMS from [address] and abnormal labs        Subjective:  30-year-old white male seen today resting bed, no acute pedal concerns though patient does seen somewhat confused  Blood pressure 115/79, pulse 81, temperature (!) 97 1 °F (36 2 °C), temperature source Oral, resp  rate 16, height 6' (1 829 m), weight 110 kg (241 lb 6 5 oz), SpO2 93 %  ,Body mass index is 32 74 kg/m²  Invasive Devices     Peripheral Intravenous Line            Peripheral IV Distal;Left;Dorsal (posterior) Forearm -- days          Drain            Urethral Catheter Double-lumen 16 Fr  3 days                Physical Exam:   General appearance: alert, cooperative and no distress  Neuro/Vascular: CNII-XII intact  Normal strength  Sensation and reflexes:  Grossly intact  Pulses: Right: DP 0/4, PT 0/4, Left: DP 0/4, PT 0/4  Capillary Filling:  3-4 Sec, Edema:  +1 bilateral feet  Ulcers/Wounds:  · Pressure ulcer lateral left midfoot measures 1 5 x 1 0  dry stable eschar,  no drainage, no evidence of infection  · Slow nonhealing surgical wound dorsal left forefoot measures 6 0 x 2 5 x 0 4 cm, 80% yellow slough which was easily debrided with 10 blade  minimal serosanguineous drainage, no evidence of infection  Wound margins dry  · Multiple traumatic wounds noted at tips of toes 1 2 and 3 bilateral which are stable with no drainage              Labs and other studies:   CBC w/diff  Results from last 7 days   Lab Units 08/11/20  0452 08/10/20  0508   WBC Thousand/uL 6 14 5 95   HEMOGLOBIN g/dL 13 9 14 1   HEMATOCRIT % 40 3 40 5   PLATELETS Thousands/uL 113* 132*   NEUTROS PCT %  --  66   LYMPHS PCT %  --  19   MONOS PCT %  --  10   EOS PCT %  --  3     BMP  Results from last 7 days   Lab Units 08/11/20  0452   POTASSIUM mmol/L 3 9   CHLORIDE mmol/L 99*   CO2 mmol/L 20*   BUN mg/dL 76*   CREATININE mg/dL 3 87*   CALCIUM mg/dL 9 5     CMP  Results from last 7 days   Lab Units 08/11/20  0452  08/09/20  0608   POTASSIUM mmol/L 3 9   < > 4 1   CHLORIDE mmol/L 99*   < > 100   CO2 mmol/L 20*   < > 21   BUN mg/dL 76*   < > 77*   CREATININE mg/dL 3 87*   < > 3 73*   CALCIUM mg/dL 9 5   < > 9 2   ALK PHOS U/L  --   --  69   ALT U/L  --   --  12   AST U/L  --   --  38    < > = values in this interval not displayed  @Culture@  Lab Results   Component Value Date    BLOODCX No Growth After 5 Days   07/11/2020    BLOODCX No Growth After 5 Days  07/11/2020    BLOODCX No Growth After 5 Days  06/25/2020    BLOODCX No Growth After 5 Days  06/25/2020    BLOODCX No Growth After 5 Days  06/14/2020    BLOODCX No Growth After 5 Days  06/14/2020    BLOODCX Chryseobacterium (A) 06/11/2020    BLOODCX Flavobacterium (A) 06/11/2020    BLOODCX Gram-positive alec (A) 06/11/2020    BLOODCX No Growth After 5 Days  06/11/2020    URINECX 50,000-59,000 cfu/ml Candida tropicalis (A) 08/08/2020    URINECX <10,000 cfu/ml  02/19/2020         Current Facility-Administered Medications:     acetaminophen (TYLENOL) tablet 650 mg, 650 mg, Oral, Q6H PRN, NAVNEET Escobar    apixaban (ELIQUIS) tablet 5 mg, 5 mg, Oral, BID, King Brittanie MD, 5 mg at 08/11/20 0914    cefTRIAXone (ROCEPHIN) IVPB (premix) 1,000 mg 50 mL, 1,000 mg, Intravenous, Q24H, Darlin Moncada MD, Last Rate: 100 mL/hr at 08/10/20 1109, 1,000 mg at 08/10/20 1109    dextrose 5 % and sodium chloride 0 9 % infusion, 60 mL/hr, Intravenous, Continuous, NAVNEET Peterson, Last Rate: 60 mL/hr at 08/10/20 2100, 60 mL/hr at 08/10/20 2100    DULoxetine (CYMBALTA) delayed release capsule 30 mg, 30 mg, Oral, Daily, NAVNEET Escobar, 30 mg at 08/11/20 0915    metoprolol tartrate (LOPRESSOR) partial tablet 12 5 mg, 12 5 mg, Oral, Q12H [facility], Darlin Moncada MD, 12 5 mg at 08/11/20 0914    midodrine (PROAMATINE) tablet 10 mg, 10 mg, Oral, TID ACAlexandra CRNP, 10 mg at 08/11/20 0914    ondansetron (ZOFRAN) injection 4 mg, 4 mg, Intravenous, Q6H PRN, Darlin Moncada MD    pantoprazole (PROTONIX) EC tablet 40 mg, 40 mg, Oral, Daily, NAVNEET Escobar, 40 mg at 08/11/20 0914    vancomycin (VANCOCIN) oral solution 125 mg, 125 mg, Oral, Q12H [facility], Darlin Moncada MD, 125 mg at 08/10/20 9749    Imaging: I have personally reviewed pertinent films in PACS  EKG, Pathology, and Other Studies: I have personally reviewed pertinent reports    VTE Pharmacologic Prophylaxis: Eliquis  VTE Mechanical Prophylaxis: sequential compression device    Mo Eagle DPM

## 2020-08-12 ENCOUNTER — APPOINTMENT (INPATIENT)
Dept: RADIOLOGY | Facility: HOSPITAL | Age: 75
DRG: 682 | End: 2020-08-12
Payer: COMMERCIAL

## 2020-08-12 LAB
ALBUMIN SERPL BCP-MCNC: 2.6 G/DL (ref 3.5–5)
ALP SERPL-CCNC: 74 U/L (ref 46–116)
ALT SERPL W P-5'-P-CCNC: 20 U/L (ref 12–78)
ANION GAP SERPL CALCULATED.3IONS-SCNC: 19 MMOL/L (ref 4–13)
ARTERIAL PATENCY WRIST A: ABNORMAL
AST SERPL W P-5'-P-CCNC: 54 U/L (ref 5–45)
ATRIAL RATE: 125 BPM
ATRIAL RATE: 125 BPM
BASE EXCESS BLDA CALC-SCNC: -11 MMOL/L (ref -2–3)
BILIRUB SERPL-MCNC: 2.9 MG/DL (ref 0.2–1)
BUN SERPL-MCNC: 79 MG/DL (ref 5–25)
CA-I BLD-SCNC: 1.06 MMOL/L (ref 1.12–1.32)
CA-I BLD-SCNC: 1.09 MMOL/L (ref 1.12–1.32)
CALCIUM SERPL-MCNC: 9.9 MG/DL (ref 8.3–10.1)
CHLORIDE SERPL-SCNC: 100 MMOL/L (ref 100–108)
CO2 SERPL-SCNC: 19 MMOL/L (ref 21–32)
CREAT SERPL-MCNC: 4.55 MG/DL (ref 0.6–1.3)
DS:DELIVERY SYSTEM: ABNORMAL
ERYTHROCYTE [DISTWIDTH] IN BLOOD BY AUTOMATED COUNT: 21.1 % (ref 11.6–15.1)
FIO2 GAS DIL.REBREATH: 44 L
GFR SERPL CREATININE-BSD FRML MDRD: 12 ML/MIN/1.73SQ M
GLUCOSE SERPL-MCNC: 117 MG/DL (ref 65–140)
GLUCOSE SERPL-MCNC: 120 MG/DL (ref 65–140)
GLUCOSE SERPL-MCNC: 126 MG/DL (ref 65–140)
GLUCOSE SERPL-MCNC: 148 MG/DL (ref 65–140)
GLUCOSE SERPL-MCNC: 271 MG/DL (ref 65–140)
GLUCOSE SERPL-MCNC: 45 MG/DL (ref 65–140)
GLUCOSE SERPL-MCNC: 46 MG/DL (ref 65–140)
GLUCOSE SERPL-MCNC: 48 MG/DL (ref 65–140)
GLUCOSE SERPL-MCNC: 53 MG/DL (ref 65–140)
GLUCOSE SERPL-MCNC: 59 MG/DL (ref 65–140)
GLUCOSE SERPL-MCNC: 69 MG/DL (ref 65–140)
GLUCOSE SERPL-MCNC: 78 MG/DL (ref 65–140)
GLUCOSE SERPL-MCNC: 81 MG/DL (ref 65–140)
GLUCOSE SERPL-MCNC: 82 MG/DL (ref 65–140)
GLUCOSE SERPL-MCNC: 83 MG/DL (ref 65–140)
HCO3 BLDA-SCNC: 11.9 MMOL/L (ref 22–28)
HCT VFR BLD AUTO: 41.5 % (ref 36.5–49.3)
HCT VFR BLD CALC: 45 % (ref 36.5–49.3)
HGB BLD-MCNC: 14.3 G/DL (ref 12–17)
HGB BLDA-MCNC: 15.3 G/DL (ref 12–17)
INR PPP: 5.3 (ref 0.84–1.19)
LACTATE SERPL-SCNC: 7.9 MMOL/L (ref 0.5–2)
MAGNESIUM SERPL-MCNC: 1.8 MG/DL (ref 1.6–2.6)
MCH RBC QN AUTO: 31.8 PG (ref 26.8–34.3)
MCHC RBC AUTO-ENTMCNC: 34.5 G/DL (ref 31.4–37.4)
MCV RBC AUTO: 92 FL (ref 82–98)
NT-PROBNP SERPL-MCNC: ABNORMAL PG/ML
PCO2 BLD: 12 MMOL/L (ref 21–32)
PCO2 BLD: 19.9 MM HG (ref 36–44)
PH BLD: 7.38 [PH] (ref 7.35–7.45)
PHOSPHATE SERPL-MCNC: 5.1 MG/DL (ref 2.3–4.1)
PLATELET # BLD AUTO: 106 THOUSANDS/UL (ref 149–390)
PMV BLD AUTO: 11.7 FL (ref 8.9–12.7)
PO2 BLD: 105 MM HG (ref 75–129)
POTASSIUM BLD-SCNC: 4.1 MMOL/L (ref 3.5–5.3)
POTASSIUM SERPL-SCNC: 4.3 MMOL/L (ref 3.5–5.3)
PROCALCITONIN SERPL-MCNC: 0.45 NG/ML
PROT SERPL-MCNC: 8.1 G/DL (ref 6.4–8.2)
PROTHROMBIN TIME: 47.7 SECONDS (ref 11.6–14.5)
QRS AXIS: -27 DEGREES
QRS AXIS: -46 DEGREES
QRSD INTERVAL: 114 MS
QRSD INTERVAL: 120 MS
QT INTERVAL: 348 MS
QT INTERVAL: 352 MS
QTC INTERVAL: 494 MS
QTC INTERVAL: 503 MS
RBC # BLD AUTO: 4.49 MILLION/UL (ref 3.88–5.62)
SAMPLE SITE: ABNORMAL
SAO2 % BLD FROM PO2: 98 % (ref 60–85)
SODIUM BLD-SCNC: 138 MMOL/L (ref 136–145)
SODIUM SERPL-SCNC: 138 MMOL/L (ref 136–145)
SPECIMEN SOURCE: ABNORMAL
T WAVE AXIS: 101 DEGREES
T WAVE AXIS: 112 DEGREES
T4 FREE SERPL-MCNC: 1.33 NG/DL (ref 0.76–1.46)
TROPONIN I SERPL-MCNC: 0.29 NG/ML
TSH SERPL DL<=0.05 MIU/L-ACNC: 9.46 UIU/ML (ref 0.36–3.74)
VENTRICULAR RATE: 121 BPM
VENTRICULAR RATE: 123 BPM
WBC # BLD AUTO: 9.23 THOUSAND/UL (ref 4.31–10.16)

## 2020-08-12 PROCEDURE — 80053 COMPREHEN METABOLIC PANEL: CPT | Performed by: INTERNAL MEDICINE

## 2020-08-12 PROCEDURE — 84439 ASSAY OF FREE THYROXINE: CPT | Performed by: INTERNAL MEDICINE

## 2020-08-12 PROCEDURE — 93010 ELECTROCARDIOGRAM REPORT: CPT | Performed by: INTERNAL MEDICINE

## 2020-08-12 PROCEDURE — 85014 HEMATOCRIT: CPT

## 2020-08-12 PROCEDURE — 99233 SBSQ HOSP IP/OBS HIGH 50: CPT | Performed by: INTERNAL MEDICINE

## 2020-08-12 PROCEDURE — 84145 PROCALCITONIN (PCT): CPT | Performed by: INTERNAL MEDICINE

## 2020-08-12 PROCEDURE — 83605 ASSAY OF LACTIC ACID: CPT | Performed by: INTERNAL MEDICINE

## 2020-08-12 PROCEDURE — 84132 ASSAY OF SERUM POTASSIUM: CPT

## 2020-08-12 PROCEDURE — 82948 REAGENT STRIP/BLOOD GLUCOSE: CPT

## 2020-08-12 PROCEDURE — 85027 COMPLETE CBC AUTOMATED: CPT | Performed by: INTERNAL MEDICINE

## 2020-08-12 PROCEDURE — 84443 ASSAY THYROID STIM HORMONE: CPT | Performed by: INTERNAL MEDICINE

## 2020-08-12 PROCEDURE — 99292 CRITICAL CARE ADDL 30 MIN: CPT | Performed by: INTERNAL MEDICINE

## 2020-08-12 PROCEDURE — 36600 WITHDRAWAL OF ARTERIAL BLOOD: CPT

## 2020-08-12 PROCEDURE — 84100 ASSAY OF PHOSPHORUS: CPT | Performed by: INTERNAL MEDICINE

## 2020-08-12 PROCEDURE — 82803 BLOOD GASES ANY COMBINATION: CPT

## 2020-08-12 PROCEDURE — 82330 ASSAY OF CALCIUM: CPT

## 2020-08-12 PROCEDURE — 85610 PROTHROMBIN TIME: CPT | Performed by: INTERNAL MEDICINE

## 2020-08-12 PROCEDURE — 83880 ASSAY OF NATRIURETIC PEPTIDE: CPT | Performed by: INTERNAL MEDICINE

## 2020-08-12 PROCEDURE — 87040 BLOOD CULTURE FOR BACTERIA: CPT | Performed by: INTERNAL MEDICINE

## 2020-08-12 PROCEDURE — 84484 ASSAY OF TROPONIN QUANT: CPT | Performed by: INTERNAL MEDICINE

## 2020-08-12 PROCEDURE — 83735 ASSAY OF MAGNESIUM: CPT | Performed by: INTERNAL MEDICINE

## 2020-08-12 PROCEDURE — 84295 ASSAY OF SERUM SODIUM: CPT

## 2020-08-12 PROCEDURE — 82947 ASSAY GLUCOSE BLOOD QUANT: CPT

## 2020-08-12 PROCEDURE — 82330 ASSAY OF CALCIUM: CPT | Performed by: INTERNAL MEDICINE

## 2020-08-12 PROCEDURE — 71045 X-RAY EXAM CHEST 1 VIEW: CPT

## 2020-08-12 PROCEDURE — 99291 CRITICAL CARE FIRST HOUR: CPT | Performed by: NURSE PRACTITIONER

## 2020-08-12 RX ORDER — FUROSEMIDE 10 MG/ML
20 INJECTION INTRAMUSCULAR; INTRAVENOUS ONCE
Status: DISCONTINUED | OUTPATIENT
Start: 2020-08-12 | End: 2020-08-13

## 2020-08-12 RX ORDER — FLUCONAZOLE 200 MG/100ML
100 INJECTION, SOLUTION INTRAVENOUS EVERY 24 HOURS
Status: DISCONTINUED | OUTPATIENT
Start: 2020-08-13 | End: 2020-08-13

## 2020-08-12 RX ORDER — FLUCONAZOLE 2 MG/ML
200 INJECTION, SOLUTION INTRAVENOUS EVERY 24 HOURS
Status: DISCONTINUED | OUTPATIENT
Start: 2020-08-12 | End: 2020-08-12

## 2020-08-12 RX ORDER — FLUCONAZOLE 2 MG/ML
200 INJECTION, SOLUTION INTRAVENOUS ONCE
Status: COMPLETED | OUTPATIENT
Start: 2020-08-12 | End: 2020-08-12

## 2020-08-12 RX ORDER — DEXTROSE AND SODIUM CHLORIDE 5; .9 G/100ML; G/100ML
60 INJECTION, SOLUTION INTRAVENOUS CONTINUOUS
Status: DISCONTINUED | OUTPATIENT
Start: 2020-08-12 | End: 2020-08-12

## 2020-08-12 RX ORDER — HYDROMORPHONE HCL/PF 1 MG/ML
0.5 SYRINGE (ML) INJECTION EVERY 2 HOUR PRN
Status: DISCONTINUED | OUTPATIENT
Start: 2020-08-12 | End: 2020-08-14 | Stop reason: HOSPADM

## 2020-08-12 RX ORDER — DEXTROSE MONOHYDRATE 25 G/50ML
50 INJECTION, SOLUTION INTRAVENOUS ONCE
Status: COMPLETED | OUTPATIENT
Start: 2020-08-12 | End: 2020-08-12

## 2020-08-12 RX ORDER — DEXTROSE MONOHYDRATE 25 G/50ML
50 INJECTION, SOLUTION INTRAVENOUS
Status: DISCONTINUED | OUTPATIENT
Start: 2020-08-12 | End: 2020-08-13

## 2020-08-12 RX ORDER — DEXTROSE MONOHYDRATE 25 G/50ML
25 INJECTION, SOLUTION INTRAVENOUS ONCE
Status: COMPLETED | OUTPATIENT
Start: 2020-08-12 | End: 2020-08-12

## 2020-08-12 RX ADMIN — DEXTROSE AND SODIUM CHLORIDE 60 ML/HR: 5; .9 INJECTION, SOLUTION INTRAVENOUS at 08:47

## 2020-08-12 RX ADMIN — DEXTROSE MONOHYDRATE 50 ML: 25 INJECTION, SOLUTION INTRAVENOUS at 08:25

## 2020-08-12 RX ADMIN — HYDROMORPHONE HYDROCHLORIDE 0.5 MG: 1 INJECTION, SOLUTION INTRAMUSCULAR; INTRAVENOUS; SUBCUTANEOUS at 09:55

## 2020-08-12 RX ADMIN — FLUCONAZOLE 200 MG: 2 INJECTION, SOLUTION INTRAVENOUS at 12:01

## 2020-08-12 RX ADMIN — SODIUM BICARBONATE 50 ML/HR: 84 INJECTION, SOLUTION INTRAVENOUS at 10:30

## 2020-08-12 RX ADMIN — DEXTROSE MONOHYDRATE 25 ML: 25 INJECTION, SOLUTION INTRAVENOUS at 07:23

## 2020-08-12 RX ADMIN — DEXTROSE MONOHYDRATE 50 ML: 25 INJECTION, SOLUTION INTRAVENOUS at 13:40

## 2020-08-12 RX ADMIN — HYDROMORPHONE HYDROCHLORIDE 0.5 MG: 1 INJECTION, SOLUTION INTRAMUSCULAR; INTRAVENOUS; SUBCUTANEOUS at 14:05

## 2020-08-12 NOTE — PROGRESS NOTES
Patient's nurse called me To evaluate patient because patient was more unresponsive  His systolic blood pressure is in the 110s, blood sugar is more than 110, his heart rate was a brief period of time was in the 30s currently is in the 100-120 and range and atrial fibrillation on EKG without signs of ischemia  On physical examination he has shortness of breath  I heard inspiratory crackles and irregular irregular heart rhythm  His eyes were open and followed commands    Plan  Will give patient Lasix 20 mg IV x1, he may be getting fluid overloaded    Will decrease IV fluids to 50 cc an hour  I called patient's son but could not reach him to make him aware of patient's condition

## 2020-08-12 NOTE — UTILIZATION REVIEW
Continued Stay Review    Date: 8/12/2020                     Current Patient Class: INPT  Current Level of Care: MED-SURG    HPI:75 y o  male w/ persistent Afib with -120, chronic combined systolic and diastolic HF, Echo from 0/10/2290 with EF 30% and there was severe diffuse hypokinesis with regional variations, initially admitted Inpatient on [email or web address] D/T benign prostatic hyperplasia with urinary retention and SAMY on CKD and acute metabolic encephalopathy presented with acute kidney injury which was most likely due to diuresis with Bumex, dehydration and BPH with urinary retention  Lopez catheter was inserted  Urine cultures are growing Candida, suspect colonization not true infection  Urine output overnight 100 cc, after IV Lasix given for shortness of breath yesterday  renal function has deteriorated  Patient not to have hemodialysis and not to be resuscitated if codes,made level 3 DNR  recheck renal function tomorrow  CXR today shows CHF  IVF, lopressor, eliquis  If continues to deteriorate, patient comfort care only, level 4 DNR with hospice consult  Pertinent Labs/Diagnostic Results:   Results from last 7 days   Lab Units 08/07/20 2059   SARS-COV-2  Negative     Results from last 7 days   Lab Units 08/12/20  0820 08/12/20  0814 08/11/20 2102 08/11/20 0452 08/10/20  0508 08/09/20  0608  08/07/20 2053   WBC Thousand/uL 9 23  --   --  6 14 5 95 7 70   < > 7 80   HEMOGLOBIN g/dL 14 3  --   --  13 9 14 1 13 7   < > 14 2   I STAT HEMOGLOBIN g/dl  --  15 3 15 3  --   --   --   --   --    HEMATOCRIT % 41 5  --   --  40 3 40 5 40 3   < > 42 1   HEMATOCRIT, ISTAT %  --  45 45  --   --   --   --   --    PLATELETS Thousands/uL 106*  --   --  113* 132* 135*   < > 174   NEUTROS ABS Thousands/µL  --   --   --   --  4 03 5 29  --  4 92    < > = values in this interval not displayed           Results from last 7 days   Lab Units 08/12/20  0814 08/12/20 0459 08/11/20 2102 08/11/20 0452 08/10/20  2220 08/10/20  1122 08/10/20  0508 08/09/20  0608  08/08/20  0109   SODIUM mmol/L  --  138  --  134* 134* 136  --  136   < > 137   POTASSIUM mmol/L  --  4 3  --  3 9 4 0 4 0  --  4 1   < > 4 3   CHLORIDE mmol/L  --  100  --  99* 99* 100  --  100   < > 99*   CO2 mmol/L  --  19*  --  20* 21 23  --  21   < > 24   CO2, I-STAT mmol/L 12*  --  17*  --   --   --   --   --   --   --    ANION GAP mmol/L  --  19*  --  15* 14* 13  --  15*   < > 14*   BUN mg/dL  --  79*  --  76* 78* 78*  --  77*   < > 76*   CREATININE mg/dL  --  4 55*  --  3 87* 3 94* 3 74*  --  3 73*   < > 3 66*   EGFR ml/min/1 73sq m  --  12  --  14 14 15  --  15   < > 15   CALCIUM mg/dL  --  9 9  --  9 5 9 4 9 6  --  9 2   < > 9 8   CALCIUM, IONIZED, ISTAT mmol/L 1 09*  --  1 15  --   --   --   --   --   --   --    MAGNESIUM mg/dL  --  1 8  --   --   --   --  1 9 1 9  --  1 9   PHOSPHORUS mg/dL  --  5 1*  --   --   --   --  4 7* 4 8*  --   --     < > = values in this interval not displayed       Results from last 7 days   Lab Units 08/12/20  0459 08/09/20  0608 08/07/20  2053   AST U/L 54* 38 50*   ALT U/L 20 12 14   ALK PHOS U/L 74 69 62   TOTAL PROTEIN g/dL 8 1 7 7 8 1   ALBUMIN g/dL 2 6* 2 4* 2 4*   TOTAL BILIRUBIN mg/dL 2 90* 2 30* 2 60*   BILIRUBIN DIRECT mg/dL  --  1 82*  --      Results from last 7 days   Lab Units 08/12/20  1003 08/12/20  0834 08/12/20  0809 08/12/20  0743 08/12/20  0740 08/12/20  0717 08/12/20  0715 08/11/20  2042 08/11/20  1950 08/11/20  1820 08/11/20  1131 08/11/20  0531   POC GLUCOSE mg/dl 81 148* 78 45* 271* 48* 46* 104 118 88 93 98     Results from last 7 days   Lab Units 08/12/20  0459 08/11/20  0452 08/10/20  2220 08/10/20  1122 08/09/20  0608 08/08/20  1630 08/08/20  0757 08/08/20  0109 08/07/20  2053   GLUCOSE RANDOM mg/dL 53* 94 81 63* 64* 57* 69 57* 68       Results from last 7 days   Lab Units 08/12/20  0814 08/11/20  2102   I STAT BASE EXC mmol/L -11* -6*   I STAT O2 SAT % 98* 99*   ISTAT PH ART  7 383 7 428   I STAT ART PCO2 mm HG 19 9* 23 9*   I STAT ART PO2 mm  0 119 0   I STAT ART HCO3 mmol/L 11 9* 15 8*         Results from last 7 days   Lab Units 08/12/20  0820 08/08/20  0757 08/08/20  0413 08/08/20  0206 08/07/20  2109   TROPONIN I ng/mL 0 29* 0 15* 0 15* 0 15* 0 15*         Results from last 7 days   Lab Units 08/12/20  0820 08/10/20  0508 08/07/20  2053   PROTIME seconds 47 7* 30 6* 40 4*   INR  5 30* 2 99* 4 28*   PTT seconds  --  55* 55*     Results from last 7 days   Lab Units 08/12/20  0821   TSH 3RD GENERATON uIU/mL 9 463*     Results from last 7 days   Lab Units 08/10/20  0508 08/09/20  0608   PROCALCITONIN ng/ml 0 26* 0 28*     Results from last 7 days   Lab Units 08/12/20  0821 08/11/20  1547 08/08/20  0206 08/07/20  2053   LACTIC ACID mmol/L 7 9* 3 6* 3 1* 3 1*       Results from last 7 days   Lab Units 08/07/20  2053   NT-PRO BNP pg/mL >35,000*       Results from last 7 days   Lab Units 08/11/20  1256 08/08/20  0413   CLARITY UA   --  Turbid   COLOR UA   --  Yellow   SPEC GRAV UA   --  1 025   PH UA   --  5 5   GLUCOSE UA mg/dl  --  Negative   KETONES UA mg/dl  --  Negative   BLOOD UA   --  Large*   PROTEIN UA mg/dl  --  100 (2+)*   NITRITE UA   --  Negative   BILIRUBIN UA   --  Negative   UROBILINOGEN UA E U /dl  --  0 2   LEUKOCYTES UA   --  Large*   WBC UA /hpf  --  Innumerable*   RBC UA /hpf  --  20-30*   BACTERIA UA /hpf  --  Innumerable*   EPITHELIAL CELLS WET PREP /hpf  --  Occasional   SODIUM UR  13  --    CREATININE UR mg/dL 97 2  --        Results from last 7 days   Lab Units 08/08/20  0413   URINE CULTURE  50,000-59,000 cfu/ml Candida tropicalis*     8/12 cxr:Findings consistent with ongoing congestive heart failure with cardiomegaly, vascular and interstitial edema and small bilateral effusions with associated compressive atelectasis  8/11 cxr:Persistent marked cardiomegaly and left-sided opacity, likely representing small to moderate effusion      8/9 u/s kidney and bladder:No hydronephrosis or perinephric collection      Right pelvic kidney      Bilateral renal simple cysts, the largest of which measures 3 cm on the left    8/9 xray L foot:Forefoot swelling  No radiographically evident osteomyelitis, though MRI has superior diagnostic accuracy, if clinical suspicion remains high      8/8 cxr:Small left effusion and left base atelectasis      8/8 ekg:Atrial fibrillation with occasional Premature ventricular complexes  Left axis deviation  Pulmonary disease pattern  Septal infarct (cited on or before 11-JUL-2020)  Prolonged QT  Abnormal ECG  When compared with ECG of 08-AUG-2020 02:22, (unconfirmed)  No significant change was found  Confirmed by Blaze Lara ([id number]) on 8/8/2020 9:03:22 AM    8/8 ekg:Atrial fibrillation with premature ventricular or aberrantly conducted complexes  Left axis deviation  Pulmonary disease pattern  Septal infarct (cited on or before 11-JUL-2020)  Abnormal ECG  When compared with ECG of 07-AUG-2020 21:04, (unconfirmed)  Questionable change in initial forces of Anterior leads  Confirmed by Blaze Lara ([id number]) on 8/8/2020 9:03:28 AM      Vital Signs:   08/12/20 0940   99 1 °F (37 3 °C)   72   16   106/67      94 %   40   5 L/min   Nasal cannula   Sitting    08/12/20 0300                  91 %                                                                                                               08/12/20 0000   97 °F (36 1 °C)Abnormal     56      139/85   103   90 %                08/11/20 23:55:47      48Abnormal     28Abnormal     139/85   103   84 %Abnormal                                          08/11/20 2355      48Abnormal        139/85   103   83 %Abnormal                                                                  08/11/20 2335      81            78 %Abnormal                  08/11/20 2330      51Abnormal              90 %                                        08/11/20 2320      73            88 %Abnormal                                                                  08/11/20 2305      56            73 %Abnormal                  08/11/20 2300      81            89 %Abnormal                  08/11/20 2245      75            92 %                08/11/20 2235      57            95 %                08/11/20 2225      43Abnormal              81 %Abnormal                  08/11/20 2215      111Abnormal              82 %Abnormal                  08/11/20 2205      115Abnormal              96 %                08/11/20 2155      117Abnormal              100 %                08/11/20 2145      125Abnormal              99 %                08/11/20 2135      72            82 %Abnormal                  08/11/20 2130      79   26Abnormal           90 %   44   6 L/min   Nasal cannula       08/11/20 2125      90            85 %Abnormal           Bhaskar Lyric          08/11/20 2105      78      121/87   98   78 %Abnormal                  08/11/20 2055      108Abnormal        107/76   86   98 %                                                                                                                08/11/20 2015      93            90 %   32   3 L/min   Nasal cannula                               08/11/20 2005      82            85 %Abnormal                  08/11/20 2000      68      122/84   97   88 %Abnormal                  08/11/20 1955      89            93 %                08/11/20 1950   97 5 °F (36 4 °C)   122Abnormal     24Abnormal     117/82   94   100 %                08/11/20 1945      37Abnormal              96 %                08/11/20 1925      55            95 %                                                                                        08/11/20 1815      118Abnormal              94 %                                         08/11/20 1800      101            95 %                                                                                                                                                                                        08/11/20 1330      109Abnormal              95 %                                                              08/11/20 0915                           None (Room air)       08/11/20 07:21:44   97 1 °F (36 2 °C)Abnormal     81   16   115/79   91   93 %         None (Room air)   Lying                                                  08/10/20 17:00:48   96 7 °F (35 9 °C)Abnormal     65      127/85   99   96 %                08/10/20 1700                  94 %   28   2 L/min   Nasal cannula                                                     08/10/20 07:42:26   95 9 °F (35 5 °C)Abnormal     40Abnormal     18   125/85   98   95 %                      Medications:   Scheduled Medications:  DULoxetine, 30 mg, Oral, Daily  furosemide, 20 mg, Intravenous, Once  metoprolol tartrate, 12 5 mg, Oral, Q12H FRANKIE  midodrine, 10 mg, Oral, TID AC  pantoprazole, 40 mg, Oral, Daily  vancomycin, 125 mg, Oral, Q12H FRANKIE      Continuous IV Infusions:  sodium bicarbonate infusion, 50 mL/hr, Intravenous, Continuous    dextrose 5 % and sodium chloride 0 9 % infusion    Rate: 60 mL/hr Dose: 60 mL/hr  Freq: Continuous Route: IV  Indications of Use: IV Hydration  Last Dose: 60 mL/hr (08/12/20 0847)  Start: 08/12/20 0845 End: 08/12/20 0918       dextrose 5 % and sodium chloride 0 9 % infusion    Rate: 50 mL/hr Dose: 50 mL/hr  Freq: Continuous Route: IV  Indications of Use: IV Hydration  Last Dose: Stopped (08/12/20 0800)  Start: 08/11/20 2015 End: 08/12/20 0725     dextrose 5 % and sodium chloride 0 9 % infusion    Rate: 60 mL/hr Dose: 60 mL/hr  Freq: Continuous Route: IV  Last Dose: Stopped (08/11/20 2002)  Start: 08/10/20 2030 End: 08/11/20 2001     sodium chloride 0 9 % infusion    Rate: 60 mL/hr Dose: 60 mL/hr  Freq: Continuous Route: IV  Indications of Use: IV Hydration  Last Dose: Stopped (08/10/20 2100)  Start: 08/08/20 1515 End: 08/10/20 2021       PRN Meds:  acetaminophen, 650 mg, Oral, Q6H PRN  dextrose, 50 mL, Intravenous, Q1H PRN  HYDROmorphone, 0 5 mg, Intravenous, Q2H PRN    8/12 x 1  ipratropium, 0 5 mg, Nebulization, Q6H PRN  8/11 x 1  levalbuterol, 1 25 mg, Nebulization, Q6H PRN  8/11 x 1  ondansetron, 4 mg, Intravenous, Q6H PRN    8/12:  Tele  Npo  scd    8/10:  OT  Dysphagia diet  Speech eval  Wound care  Tele  scd    8/9:   PT  scd    8/8:  wound care  Bladder scan  scd      Discharge Plan: TBD    Network Utilization Review Department  [email or web address]  ATTENTION: Please call with any questions or concerns to [phone] and carefully listen to the prompts so that you are directed to the right person  All voicemails are confidential   Harlene Pair all requests for admission clinical reviews, approved or denied determinations and any other requests to dedicated fax number below belonging to the campus where the patient is receiving treatment   List of dedicated fax numbers for the Facilities:  [address] DENIALS (Administrative/Medical Necessity) [phone]   [address] (Maternity/NICU/Pediatrics) [phone]   Jammie Olson [phone]   Jared Mejias [phone]   Toy Deni [phone]   [facility] [phone]   [facility] [address] [phone]   [facility]  [phone]   [address] [facility] [address]  [address] [facility] [address] [facility] [phone]

## 2020-08-12 NOTE — ASSESSMENT & PLAN NOTE
Patient had acute metabolic encephalopathy admission due to acute kidney injury, hypoxia, CHF most likely

## 2020-08-12 NOTE — SPEECH THERAPY NOTE
Speech Language/Pathology    Speech/Language Pathology Progress Note    Patient Name: Mechelle Ramires  Today's Date: 8/12/2020     Chart reviewed and spoke w/ pt's RN, Ottoniel Chaidez  Rapid response was called overnight due to acute change in SpO2/acute change in mental status  Pt noted to improve w/ face mask and is now on NC  RN reports pt is not appropriate at this time for dysphagia tx due to mental status  Pt also has reportedly continued to refuse all PO  Noted goals of care discussion in progress  Will cont to follow as able/appropriate       Ani Lacy, SLP

## 2020-08-12 NOTE — PLAN OF CARE
Problem: Potential for Falls  Goal: Patient will remain free of falls  Description: INTERVENTIONS:  - Assess patient frequently for physical needs  -  Identify cognitive and physical deficits and behaviors that affect risk of falls  -  [place] fall precautions as indicated by assessment   - Educate patient/family on patient safety including physical limitations  - Instruct patient to call for assistance with activity based on assessment  - Modify environment to reduce risk of injury  - Consider OT/PT consult to assist with strengthening/mobility  Outcome: Progressing     Problem: Potential for Falls  Goal: Patient will remain free of falls  Description: INTERVENTIONS:  - Assess patient frequently for physical needs  -  Identify cognitive and physical deficits and behaviors that affect risk of falls  -  [place] fall precautions as indicated by assessment   - Educate patient/family on patient safety including physical limitations  - Instruct patient to call for assistance with activity based on assessment  - Modify environment to reduce risk of injury  - Consider OT/PT consult to assist with strengthening/mobility  Outcome: Progressing     Problem: Nutrition/Hydration-ADULT  Goal: Nutrient/Hydration intake appropriate for improving, restoring or maintaining nutritional needs  Description: Monitor and assess patient's nutrition/hydration status for malnutrition  Collaborate with interdisciplinary team and initiate plan and interventions as ordered  Monitor patient's weight and dietary intake as ordered or per policy  Utilize nutrition screening tool and intervene as necessary  Determine patient's food preferences and provide high-protein, high-caloric foods as appropriate       INTERVENTIONS:  - Monitor oral intake, urinary output, labs, and treatment plans  - Assess nutrition and hydration status and recommend course of action  - Evaluate amount of meals eaten  - Assist patient with eating if necessary   - Allow adequate time for meals  - Recommend/ encourage appropriate diets, oral nutritional supplements, and vitamin/mineral supplements  - Order, calculate, and assess calorie counts as needed  - Recommend, monitor, and adjust tube feedings and TPN/PPN based on assessed needs  - Assess need for intravenous fluids  - Provide specific nutrition/hydration education as appropriate  - Include patient/family/caregiver in decisions related to nutrition  Outcome: Not Progressing     Problem: Prexisting or High Potential for Compromised Skin Integrity  Goal: Skin integrity is maintained or improved  Description: INTERVENTIONS:  - Identify patients at risk for skin breakdown  - Assess and monitor skin integrity  - Assess and monitor nutrition and hydration status  - Monitor labs   - Assess for incontinence   - Turn and reposition patient  - Assist with mobility/ambulation  - Relieve pressure over bony prominences  - Avoid friction and shearing  - Provide appropriate hygiene as needed including keeping skin clean and dry  - Evaluate need for skin moisturizer/barrier cream  - Collaborate with interdisciplinary team   - Patient/family teaching  - Consider wound care consult   Outcome: Not Progressing

## 2020-08-12 NOTE — CONSULTS
Consultation - Infectious Disease   Linwood Mazariegos 77 y o  male MRN: [id number]  Unit/Bed#: -01 Encounter: [id number]      Assessment/Plan     1  Urine culture + Candida tropicalis: This is usually a colonizer though could be a pathogen  Given patient can not tell us now if he has symptoms would be reasonable to rx with fluconazole 200 mg X 1, then 100 mg qday  Note that C  Tropicalis has some resistance to azoles  You can not use Echinocandins in UTI's  The only other option would be amphotericin which I do not think we should start given his ARF and the possibility this is just a colonizer  - start fluconazole 200 mg  IV X 1, then 100 mg qday   - follow cbc and fever curve  - with worsening renal function, no plans for dialysis, prognosis is grave  - will follow    History of Present Illness   Physician Requesting Consult: Gelacio Rodriguez MD  Reason for Consult / Principal Problem: funguria, candida    HPI: Juan Alberto Eagel is a 77y o  year old male with H/O CHF, CRI, PVD, AFIB on Eliquis, chronic foot wounds  Patient had labs at [facility], found to have increased cr and was sent over for further evaluation  Patient had his diuretics held and given some fluid, despite this his urine output remains negligable and his cr cont to increase  On the 8th an outpt culture was sent and it is growing Candida, he does not have a chronic teixeira  He has a renal US here that was negative for hydro  He was recently made a code level 3  Patient is confused currently and unable to provide any history  Nursing staff denies and diarrhea, he has had no fevers, no hypotension, did have an episode of hypoglycemia  Consults    ROS: 12 systems reviewed, remainder is neg      Historical Information   Past Medical History:   Diagnosis Date    Atrial fibrillation [facility])     Cardiac disease     Cellulitis     CHF (congestive heart failure) (HCC)     Chronic pain     Chronic venous hypertension     with ulceration    Diabetes mellitus ([place] [address] )     GERD (gastroesophageal reflux disease)     History of methicillin resistant staphylococcus aureus (MRSA) 11/26/2019    negative nasal swab     Hyperlipidemia     Hypertension     Obesity     Pressure injury of skin     Pulmonary hypertension ([place] [address] )     [facility]VD [facility]peripheral vascular disease) [facility])     Type 2 diabetes mellitus with diabetic heel ulcer ([place] [address] ) 6/30/2020    Vascular disorder of extremity ([place] [address] )      Past Surgical History:   Procedure Laterality Date    ANTERIOR RELEASE VERTEBRAL BODY W/ POSTERIOR FUSION      APPENDECTOMY  1954    CATARACT EXTRACTION      DECOMPRESSION SPINE LUMBAR POSTERIOR      ELBOW SURGERY      FOOT/ANKLE ARTHRODESIS Right      complications postoperatively with bone healing and infection    INCISION AND DRAINAGE OF WOUND Left 3/5/2020    Procedure: INCISION AND DRAINAGE (I&D) EXTREMITY;  Surgeon: Frida Haynes DPM;  Location:  MAIN OR;  Service: Podiatry    KNEE SURGERY      NOSE SURGERY      turbinectomy    RETINAL DETACHMENT SURGERY      RHINOPLASTY      TONSILLECTOMY      VEIN LIGATION AND STRIPPING      saphenous vein long     Social History   Social History     Substance and Sexual Activity   Alcohol Use Not Currently    Frequency: Never    Drinks per session: Patient refused    Binge frequency: Never     Social History     Substance and Sexual Activity   Drug Use Never     Social History     Tobacco Use   Smoking Status Never Smoker   Smokeless Tobacco Never Used     Family History   Problem Relation Age of Onset    Cancer Mother         cancer of uterus    Diabetes Sister     Mental illness Neg Hx         neg fam hx    Substance Abuse Neg Hx         neg fam hx       Meds/Allergies   MEDS: reviewed      Current Facility-Administered Medications:     acetaminophen (TYLENOL) tablet 650 mg, 650 mg, Oral, Q6H PRN, King Brittanie MD    dextrose 50 % IV solution 50 mL, 50 mL, Intravenous, Q1H PRN, King Brittanie MD    DULoxetine (CYMBALTA) delayed release capsule 30 mg, 30 mg, Oral, Daily, Arabella Brush MD, 30 mg at 08/11/20 0915    furosemide (LASIX) injection 20 mg, 20 mg, Intravenous, Once, Arablela Brush MD    HYDROmorphone (DILAUDID) injection 0 5 mg, 0 5 mg, Intravenous, Q2H PRN, Arabella Brush MD, 0 5 mg at 08/12/20 0955    ipratropium (ATROVENT) 0 02 % inhalation solution 0 5 mg, 0 5 mg, Nebulization, Q6H PRN, Arabella Brush MD, 0 5 mg at 08/11/20 2127    levalbuterol (Omie Lango) inhalation solution 1 25 mg, 1 25 mg, Nebulization, Q6H PRN, Arabella Brush MD, 1 25 mg at 08/11/20 2127    metoprolol tartrate (LOPRESSOR) partial tablet 12 5 mg, 12 5 mg, Oral, Q12H [address], Arabella Brush MD, 12 5 mg at 08/11/20 0914    midodrine (PROAMATINE) tablet 10 mg, 10 mg, Oral, TID AC, Arabella Brush MD, 10 mg at 08/11/20 1816    ondansetron (ZOFRAN) injection 4 mg, 4 mg, Intravenous, Q6H PRN, Arabella Brush MD    pantoprazole (PROTONIX) EC tablet 40 mg, 40 mg, Oral, Daily, Arabella Brush MD, 40 mg at 08/11/20 0914    sodium bicarbonate 150 mEq in dextrose 5 % 1,000 mL infusion, 50 mL/hr, Intravenous, Continuous, Arabella Brush MD    vancomycin (VANCOCIN) oral solution 125 mg, 125 mg, Oral, Q12H [address], Arabella Brush MD, 125 mg at 08/10/20 2212    No Known Allergies      Intake/Output Summary (Last 24 hours) at 8/12/2020 1040  Last data filed at 8/12/2020 0501  Gross per 24 hour   Intake 40 ml   Output 220 ml   Net -180 ml       PE:  WD, WN   VSS, Tmax:  Afebrile throughout  HEENT: anicteric, mouth breathing  NECK: supple no adenopathy  CARDIAC: irregular, + murmur  LUNGS: decreased bilaterally  ABDOMEN: soft nt/nd + bs  EXTREMITIES: eval by podiatry yesterday see note, dressed, hyperpigmentation of feet  SKIN: no rashes  NEURO:  Not following commands or answering questions    Invasive Devices:   Peripheral IV Distal;Left;Dorsal (posterior) Forearm (Active)   Site Assessment Clean;Dry; Intact 08/12/20 0000   Dressing Type Transparent 08/12/20 0000   Line Status Saline locked; Flushed 08/12/20 0000   Dressing Status Clean;Dry; Intact 08/12/20 0000       Peripheral IV 08/11/20 Right Forearm (Active)   Site Assessment Clean;Dry; Intact 08/12/20 0000   Dressing Type Transparent 08/12/20 0000   Line Status Saline locked; Flushed 08/12/20 0000   Dressing Status Dry;Clean; Intact 08/12/20 0000       Urethral Catheter Double-lumen 16 Fr  (Active)   Reasons to continue Urinary Catheter  Accurate I&O assessment in critically ill patients (48 hr  max); Acute urinary retention/obstruction failing urinary retention protocol 08/11/20 0915   Goal for Removal Other (Comment) 08/11/20 0915   Site Assessment Clean;Skin intact; Patent 08/12/20 0501   Collection Container Standard drainage bag 08/12/20 0501   Securement Method Securing device (Describe) 08/12/20 0501   Output (mL) 100 mL 08/12/20 0501           Lab Results:   No results displayed because visit has over 200 results  Imaging Studies: I have personally reviewed pertinent reports  EKG, Pathology, and Other Studies: I have personally reviewed pertinent reports  Culture  Lab Results   Component Value Date    BLOODCX No Growth After 5 Days  07/11/2020    BLOODCX No Growth After 5 Days  07/11/2020    BLOODCX No Growth After 5 Days  06/25/2020    BLOODCX No Growth After 5 Days  06/25/2020    BLOODCX No Growth After 5 Days  06/14/2020    BLOODCX No Growth After 5 Days  06/14/2020    BLOODCX Chryseobacterium (A) 06/11/2020    BLOODCX Flavobacterium (A) 06/11/2020    BLOODCX Gram-positive alec (A) 06/11/2020    BLOODCX No Growth After 5 Days   06/11/2020     Lab Results   Component Value Date    WOUNDCULT 2+ Growth of Serratia marcescens (A) 03/05/2020    WOUNDCULT 4+ Growth of Serratia marcescens (A) 03/02/2020    WOUNDCULT 3+ Growth of Serratia marcescens (A) 07/24/2019    WOUNDCULT 1+ Growth of  07/24/2019    WOUNDCULT (A) 01/02/2019     1+ Growth of Methicillin Resistant Staphylococcus aureus     Lab Results   Component Value Date    URINECX 50,000-59,000 cfu/ml Candida tropicalis (A) 08/08/2020    URINECX <10,000 cfu/ml  02/19/2020     No results found for: SPUTUMCULTUR    Principal Problem:    Acute kidney injury superimposed on CKD ([facility] [address] )  Active Problems:    Wound, open, foot    Gastroesophageal reflux disease without esophagitis    Essential hypertension    Persistent atrial fibrillation    Elevated troponin level not due myocardial infarction    Acute metabolic encephalopathy    Benign prostatic hyperplasia with urinary retention    Chronic combined systolic and diastolic heart failure (HCC)    UTI (urinary tract infection)    Supratherapeutic INR    Acidosis

## 2020-08-12 NOTE — ASSESSMENT & PLAN NOTE
Wt Readings from Last 3 Encounters:   08/12/20 109 kg (240 lb 11 9 oz)   07/17/20 114 kg (251 lb 1 7 oz)   07/02/20 122 kg (269 lb 2 9 oz)     Echo from 6/26/2020 with EF 30% and there was severe diffuse hypokinesis with regional variations  Chest x-ray done this morning there personally reviewed shows CHF    Will give 1 dose of Lasix 20 mg IV again

## 2020-08-12 NOTE — NURSING NOTE
Low blood glucose level noted on lab draw result; BGM obtained and IV Dextrose given as ordered  Day nurse to follow up

## 2020-08-12 NOTE — NURSING NOTE
Glucose 53, repeat IPOC 46  Dextrose 25% was admin  Repeat IPOC 270  Pts hands are blue/purple with very poor perfusion  Pt tachypnic, doesn't appear well  VSS, Sbp 102, Spo2 94%  MD was notified and he advised the AP in ICU will come see the pt  Pt then transferred to ICU bed 304  Report given to nurse

## 2020-08-12 NOTE — PROGRESS NOTES
Progress Note - Linwood Mazariegos 1945, 77 y o  male MRN: [id number]    Unit/Bed#: -01 Encounter: [id number]    Primary Care Provider: Violeta Acosta MD   Date and time admitted to hospital: 8/7/2020  8:42 PM        * Acute kidney injury superimposed on CKD [facility])  Assessment & Plan  Patient presented with acute kidney injury which was most likely due to diuresis with Bumex, dehydration and BPH with urinary retention  Lopez catheter was inserted  Patient's urine output overnight was 100 cc after Lasix 20 mg IV x1 that I gave him for shortness of breath yesterday  Patient's renal function has deteriorated  I spoke with patient's son Alvin Conrad and discussed further goals of care with him on the phone this morning  It is patient's previous which not to have hemodialysis and not to be resuscitated if he codes  I made patient level 3 DNR  Will resume D5 normal saline solution at 60 cc an hour  Will recheck renal function tomorrow  If patient's condition deteriorates son is open to making patient comfort care only, level 4 DNR with hospice consult    UTI (urinary tract infection)  Assessment & Plan  Patient's urinalysis on admission suggested urinary tract infection  Urine cultures are growing Candida, suspect colonization not true infection    Will ask ID to comment    Persistent atrial fibrillation  Assessment & Plan  Heart rates are 100-120    Patient on Lopressor 12 5 p o  B i d  He has a history of hypotension on higher doses  Will continue same dose    Continue Eliquis for CVA prophylaxis    Chronic combined systolic and diastolic heart failure (HCC)  Assessment & Plan  Wt Readings from Last 3 Encounters:   08/12/20 109 kg (240 lb 11 9 oz)   07/17/20 114 kg (251 lb 1 7 oz)   07/02/20 122 kg (269 lb 2 9 oz)     Echo from 6/26/2020 with EF 30% and there was severe diffuse hypokinesis with regional variations  Chest x-ray done this morning there personally reviewed shows CHF    Will give 1 dose of Lasix 20 mg IV again    Benign prostatic hyperplasia with urinary retention  Assessment & Plan  Patient presented with recurrent urinary retention and Lopez catheter was inserted  Patient's blood pressure is borderline, he developed hypotension on previous admissions on Flomax therefore patient is not on Flomax  Will continue Lopez catheter        Acute metabolic encephalopathy  Assessment & Plan  Patient had acute metabolic encephalopathy admission due to acute kidney injury, hypoxia, CHF most likely      VTE Prophylaxis: in place    Patient Centered Rounds: I rounded with patient's nurse    Current Length of Stay: 5 day(s)    Current Patient Status: Inpatient    Certification Statement: Pt requires additional inpatient hospital stay due to: see assessment and plan        Subjective:   Patient's nurse reports that patient was hypoxic and short of breath last night, more confused, had hypoglycemic episode  Patient has had loose stools but no diarrhea  Patient no signs of GI bleeding or  bleeding  Patient lies in bed with mild dyspnea on oxygen via nasal cannula  His eyes are open, he does not follow commands  Can not obtain history from him      All other ROS are negative    Objective:     Vitals:   Temp (24hrs), Av 3 °F (36 3 °C), Min:97 °F (36 1 °C), Max:97 5 °F (36 4 °C)    Temp:  [97 °F (36 1 °C)-97 5 °F (36 4 °C)] 97 °F (36 1 °C)  HR:  [] 65  Resp:  [20-28] 28  BP: (107-166)/() 139/85  SpO2:  [73 %-100 %] 91 %  Body mass index is 32 65 kg/m²  Input and Output Summary (last 24 hours): Intake/Output Summary (Last 24 hours) at 2020 0902  Last data filed at 2020 0501  Gross per 24 hour   Intake 40 ml   Output 220 ml   Net -180 ml       Physical Exam:     Physical Exam  HENT:      Head: Normocephalic  Eyes:      Conjunctiva/sclera: Conjunctivae normal    Neck:      Musculoskeletal: Neck supple     Cardiovascular:      Comments: Irregular irregular, tachycardic  Pulmonary:      Effort: Respiratory distress present  Breath sounds: Rales (Scant crackles are heard at the bases and decreased breath sounds are present at the bases) present  Abdominal:      General: Bowel sounds are normal       Tenderness: There is no abdominal tenderness  There is no guarding  Skin:     General: Skin is warm and dry  Comments: Chronic hyperpigmentation of the lower extremities is present    Patient has 2 stage II left buttocks pressure ulcer and 1 stage I novel pressure ulcer without cellulitis   Neurological:      Comments: Eyes are open, moves extremities spontaneously, does not follow commands             I personally reviewed labs and imaging reports for today  Last 24 Hours Medication List:   Current Facility-Administered Medications   Medication Dose Route Frequency Provider Last Rate    acetaminophen  650 mg Oral Q6H PRN NAVNEET Escobar      apixaban  5 mg Oral BID Myra Quintanilla MD      dextrose 5 % and sodium chloride 0 9 %  60 mL/hr Intravenous Continuous Myra Quintanilla MD 60 mL/hr (08/12/20 0847)    dextrose  50 mL Intravenous Q1H PRN NAVNEET Jaimes      DULoxetine  30 mg Oral Daily NAVNEET Escobar      furosemide  20 mg Intravenous Once Myra Quintanilla MD      HYDROmorphone  0 5 mg Intravenous Q2H PRN Myra Quintanilla MD      ipratropium  0 5 mg Nebulization Q6H PRN Myra Quintanilla MD      levalbuterol  1 25 mg Nebulization Q6H PRN NAVNEET Da Silva      metoprolol tartrate  12 5 mg Oral Q12H Sai Lewis MD      midodrine  10 mg Oral TID NAVNEET Newman      ondansetron  4 mg Intravenous Q6H PRN Blas Felton MD      pantoprazole  40 mg Oral Daily NAVNEET Escobar      vancomycin  125 mg Oral Q12H Sai Lewis MD            Today, Patient Was Seen By: Myra Quintanilla MD    ** Please Note: Dictation voice to text software may have been used in the creation of this document  **

## 2020-08-12 NOTE — ASSESSMENT & PLAN NOTE
Patient's urinalysis on admission suggested urinary tract infection       Urine cultures are growing Candida, suspect colonization not true infection    Will ask ID to comment

## 2020-08-12 NOTE — NURSING NOTE
Pt awake during most hrly rounds between q2hr repositioning and incont care  Remains tachypneic, poor perfusion but low to mid 90's when oximeter captures  Isha Nissen

## 2020-08-12 NOTE — ASSESSMENT & PLAN NOTE
Heart rates are 100-120    Patient on Lopressor 12 5 p o  B i d  He has a history of hypotension on higher doses    Will continue same dose    Continue Eliquis for CVA prophylaxis

## 2020-08-12 NOTE — RAPID RESPONSE
Progress Note - Rapid Response   Linwood Mazariegos 77 y o  male MRN: [id number]    Time Called ( Time): 2042  Date Called: 8/11/20  Level of Care: MS  Room#: 614  DINUR Time ( Time): 2043  Event End Time ( Time): 2115  Primary reason for call: Acute change in SPO2 and Acute change in mental status  Interventions:  Airway/Breathing:  Nebulizer Treatment and ABG  Circulation: N/A  Other Treatments: N/A       Assessment:   1  Acute hypoxic respiratory failure  2  Change in mental status    Plan:   · ABG-7 42/23 9/119/15 8  · Xopenex/Atrovent p r n  · Simple face mask when patient is mouth breathing  · Face mask applied, patient's color improved, mental status improved  · Patient on D5 normal saline, stopped due to possible overload, just received 20 of Lasix per Dr  Per cc around 7:45 p m  · Monitor urine output  · Monitor neuro status Q 4  · Continuous pulse ox       HPI/Chief Complaint (Background/Situation):   Mallory Oates is a 77y o  year old male with past medical history of CHF, CKD, PVD, AFib on Eliquis who presents from rehab with abnormal labs, showing an SAMY  Patient denies any issues  Patient has been monitor for SAMY, receiving IV fluids and dextrose for hypoglycemia  Patient refusing oral intake of food and medications at times  This evening, Dr Jasmin Landon had followed patient given him 20 of IV Lasix for tachypnea, patient became mottled and gray and hypoxic and a rapid response was called  On arrival, patient somnolent, ABG ordered, EKG done which showed no acute ST elevations  ABG as above, initial concern was for hypercapnia however ABG does not show this  Patient ordered Xopenex and Atrovent breathing treatments as patient does have expiratory wheezes  Patient placed on simple face mask his in 6 L nasal cannula and appeared to be mouth breathing  Patient's color improved while being on the face mask, and greatly improved after receiving adjuvant Xopenex    Patient's blood pressure and heart rate remained stable during rapid  Patient remained in Med surge saturations % on 6 L nasal cannula, converted back to nasal cannula after breathing treatment when patient mental status improved      Historical Information   Past Medical History:   Diagnosis Date    Atrial fibrillation ([facility] )     Cardiac disease     Cellulitis     [facility]     Chronic pain     Chronic venous hypertension     with ulceration    Diabetes mellitus ([facility])     GERD (gastroesophageal reflux disease)     History of methicillin resistant staphylococcus aureus (MRSA) 11/26/2019    negative nasal swab     Hyperlipidemia     Hypertension     Obesity     Pressure injury of skin     Pulmonary hypertension ([facility] )     PVD (peripheral vascular disease) ([facility]     Type 2 diabetes mellitus with diabetic heel ulcer ([facility] ) 6/30/2020    Vascular disorder of extremity ([facility] )      Past Surgical History:   Procedure Laterality Date    ANTERIOR RELEASE VERTEBRAL BODY W/ POSTERIOR FUSION      APPENDECTOMY  1954    CATARACT EXTRACTION      DECOMPRESSION SPINE LUMBAR POSTERIOR      ELBOW SURGERY      FOOT/ANKLE ARTHRODESIS Right      complications postoperatively with bone healing and infection    INCISION AND DRAINAGE OF WOUND Left 3/5/2020    Procedure: INCISION AND DRAINAGE (I&D) EXTREMITY;  Surgeon: Sugey Fong DPM;  Location:  MAIN OR;  Service: Podiatry    KNEE SURGERY      NOSE SURGERY      turbinectomy    RETINAL DETACHMENT SURGERY      RHINOPLASTY      TONSILLECTOMY      VEIN LIGATION AND STRIPPING      saphenous vein long     Social History   Social History     Substance and Sexual Activity   Alcohol Use Not Currently    Frequency: Never    Drinks per session: Patient refused    Binge frequency: Never     Social History     Substance and Sexual Activity   Drug Use Never     Social History     Tobacco Use   Smoking Status Never Smoker   Smokeless Tobacco Never Used     Family History:   Family History   Problem Relation Age of Onset    Cancer Mother         cancer of uterus    Diabetes Sister     Mental illness Neg Hx         neg fam hx    Substance Abuse Neg Hx         neg fam hx       Meds/Allergies   Current Facility-Administered Medications   Medication Dose Route Frequency Provider Last Rate    acetaminophen  650 mg Oral Q6H PRN NAVNEET Escobar      apixaban  5 mg Oral BID Melody Wills MD      dextrose 5 % and sodium chloride 0 9 %  50 mL/hr Intravenous Continuous Melody Wills MD 50 mL/hr (08/11/20 2002)    DULoxetine  30 mg Oral Daily NAVNEET Escobar      ipratropium  0 5 mg Nebulization Q6H PRN Melody Wills MD      levalbuterol  1 25 mg Nebulization Q6H PRN NAVNEET Leblanc      metoprolol tartrate  12 5 mg Oral Q12H [facility] Arabella Pugh MD      midodrine  10 mg Oral TID AC Tereasa GalNAVNEET      ondansetron  4 mg Intravenous Q6H PRN Arabella Pugh MD      pantoprazole  40 mg Oral Daily NAVNEET Escobar      vancomycin  125 mg Oral Q12H [facility] Arabella Pugh MD         dextrose 5 % and sodium chloride 0 9 %, 50 mL/hr, Last Rate: 50 mL/hr (08/11/20 2002)        No Known Allergies    ROS: History obtained from staff, patient lethargic unable to answer questions      Vitals:  Heart rate 66, /123 95% on 6 L    Physical Exam:  Gen:  Patient somnolent, responding to voice but not answer in full sentences  HEENT:  Pupils equal round reactive  Neck:  No acute issues  Chest:  Heart rate stable, blood pressure stable, S1-S2  Cor:  Expiratory wheezes, 6 L nasal cannula switched simple face mask  Abd:  No acute issues  Ext:  Mottling of bilateral lower extremities  Neuro:  Someone at beginning of rapid response, patient became more alert as rapid response when on  Skin:  Multiple was not scattered ecchymosis      Intake/Output Summary (Last 24 hours) at 8/12/2020 0059  Last data filed at 8/11/2020 2022  Gross per 24 hour Intake 65 ml   Output 470 ml   Net -405 ml       Respiratory    Lab Data (Last 4 hours)    None         O2/Vent Data (Last 4 hours)    None              Invasive Devices     Peripheral Intravenous Line            Peripheral IV Distal;Left;Dorsal (posterior) Forearm -- days    Peripheral IV 08/11/20 Right Forearm less than 1 day          Drain            Urethral Catheter Double-lumen 16 Fr  4 days                DIAGNOSTIC DATA:    Lab: I have personally reviewed pertinent lab results  CBC:   Results from last 7 days   Lab Units 08/11/20 2102 08/11/20 0452   WBC Thousand/uL  --  6 14   HEMOGLOBIN g/dL  --  13 9   I STAT HEMOGLOBIN g/dl 15 3  --    HEMATOCRIT %  --  40 3   HEMATOCRIT, ISTAT % 45  --    PLATELETS Thousands/uL  --  113*     CMP:   Results from last 7 days   Lab Units 08/11/20 2102 08/11/20 0452 08/10/20  2220 08/10/20  1122 08/09/20  0608  08/07/20  2053   POTASSIUM mmol/L  --  3 9 4 0 4 0 4 1   < > 5 6*   CHLORIDE mmol/L  --  99* 99* 100 100   < > 98*   CO2 mmol/L  --  20* 21 23 21   < > 24   CO2, I-STAT mmol/L 17*  --   --   --   --   --   --    BUN mg/dL  --  76* 78* 78* 77*   < > 76*   CREATININE mg/dL  --  3 87* 3 94* 3 74* 3 73*   < > 3 61*   CALCIUM mg/dL  --  9 5 9 4 9 6 9 2   < > 9 6   ALK PHOS U/L  --   --   --   --  69  --  62   ALT U/L  --   --   --   --  12  --  14   AST U/L  --   --   --   --  38  --  50*   GLUCOSE, ISTAT mg/dl 98  --   --   --   --   --   --     < > = values in this interval not displayed  PT/INR:   No results found for: PT, INR,   Magnesium: No components found for: MAG,   Phosphorous: No results found for: PHOS    Microbiology:  Lab Results   Component Value Date    BLOODCX No Growth After 5 Days  07/11/2020    BLOODCX No Growth After 5 Days  07/11/2020    BLOODCX No Growth After 5 Days  06/25/2020    BLOODCX No Growth After 5 Days   06/25/2020    URINECX 50,000-59,000 cfu/ml Candida tropicalis (A) 08/08/2020    URINECX <10,000 cfu/ml  02/19/2020 WOUNDCULT 2+ Growth of Serratia marcescens (A) 03/05/2020    WOUNDCULT 4+ Growth of Serratia marcescens (A) 03/02/2020    WOUNDCULT 3+ Growth of Serratia marcescens (A) 07/24/2019    WOUNDCULT 1+ Growth of  07/24/2019         OUTCOME:   Stayed in room   Family notified of transfer: no  Family member contacted: no transfer  Code Status: Level 1 - Full Code  Critical Care Time: Total Critical Care time spent 45 minutes excluding procedures, teaching and family updates

## 2020-08-12 NOTE — PROGRESS NOTES
1945- notified Dr Jaxon White that patient has become more confused and lethargic  Patient's coloring looked ashy and gray  Patient presented tachypnic, O2 sats were low, Patient placed on 2L, all other vitals were stable  Blood Sugar was 118  Dr Jaxon White at bedside order an EKG, which should patient to be in A  Fib, which he has a history of  Dr Jaxon White ordered 20mg of Lasix  Lasix was given  Patient continued to be monitored  2000- Patient's lungs with some scattered ex  Wheezes, but clear  Patient still tachypnic and hard to get an accurate pulse ox d/t the patients poor perfusion  Vitals still be monitoring and everything within stable range  Patient still look ashy and gray  Extremities noted to be cold  Patient given warm blankets and monitored  2030-Rapid response called  Patient noted to be more confused and lethargic  Patient noted to have high RR  Patient placed on 6L of O2  Provider order ABGs  Provider also ordered fluids to be stopped  Vitals stable  Blood Glucose stable  No changes in EKG  Provider ordered breathing treatments  After patient received breathing treatments, patient's respiratory status improved  Patient repositioned in bed and resting  Will continue to monitor

## 2020-08-12 NOTE — PROGRESS NOTES
NEPHROLOGY PROGRESS NOTE   Linwood Mazariegos 77 y o  male MRN: [id number]  Unit/Bed#: -01 Encounter: [id number]  Reason for Consult: SAMY (POA) on CKD IIIB/V    ASSESSMENT/PLAN:  Acute kidney injury on CKD stage IIIB/IV (POA):  Suspected prerenal with poor oral intake, diuretics, hypotension, as well as component of post renal with urinary retention   -baseline creatinine 1 8-2 0   -presented with creatinine of 3 6   -creatinine worsening this morning to 4 55    -status post Lopez catheter placement    -will place on bicarb in D5W @ 50 cc/hr  -received Lasix x 1 last night 08/11 and again this morning    -continue to hold diuretics   Evaluate daily for re-initiation  -persistent poor oral intake  NPO    -calculated Feurea suggest pre-renal state    -on eliquis, needs to be renally dosed  -continues on PO vanco    -renal ultrasound:  No hydro, bilateral simple renal cysts  -urinalysis:  Large blood, large leukocytes, 2+ protein, 20-30 RBCs, innumerable WBCs and bacteria   -may need RRT if renal function worsens or continued encephalopathy  Per primary care note, patient would not wish to pursue RRT  Considering hospice for decline in status    -continue to avoid nephrotoxins, hypotension, contrast   -I/O      Urinary tract infection:  Suspected colonization vs true infection    -continues on Rocephin and prophylactic vancomycin  -urine culture + for candida tropicalis     -awaiting input from ID       Hemodynamics:  Blood pressure acceptable, previous hypotension  Drop in BP from 166 to 107 last night    -avoid further hypotension or high fluctuations in blood pressure   -recommend holding parameters for antihypertensive for systolic blood pressure less than 130    -continue midodrine 10 mg t i d  and metoprolol 12 5 mg every 12 hours  -low threshold for pressors if MAP <65 mmHG     Volume status/combined heart failure:  Reduced left ventricular function as well as RV function   EF of 30%  On room air  -received Lasix 20 mg IV last night and this morning    -repeat chest x-ray shows: ongoing heart failure with cardiomegaly  Small B/L pleural effusions    -daily weights, strict I&O, fluid restriction   -documented weights are stable    -continues on gentle hydration    -consider albumin with Lasix to assist with further diuresis       BPH with urinary retention:  Status post Lopez catheter insertion  -previously on Flomax, stopped due to hypotension   -of note, patient is on midodrine which can contribute to urinary retention   -may benefit from urology consult, patient with continued urinary retention       Anion gap acidosis: in the setting of renal failure    -continue to monitor    -start on bicarbonate containing fluids  -lactic acid elevated       Mild hyponatremia: (resolved) poor oral intake, currently NPO  Acute metabolic encephalopathy: multifactorial with SAMY, hypoxia and suspected infection  Worsening with overall decline    -blood cultures pending, patient has multiple LE wounds  -lactic acid elevated at 7 9    -urine + for candida    -awaiting infectious disease eval    -speech therapy following for poor oral intake       MBD in CKD:  - will continue to monitor and trend PTH and phosphorus as outpatient   -most recent phosphorus level increasing to 5 1  Consider starting phos binders if continues to increase       Bilateral lower extremity wounds:  -podiatry following      Dysphagia:   -speech therapy consulted for difficulty with swallowing  patient remains NPO with no oral intake      Other:  GERD, PVD  Disposition:  Requiring additional stay due to medical needs  SUBJECTIVE:  The patient is lethargic  He is responsive to speech  He is not answering my questions  He remains on supplemental oxygen  He has a Lopez catheter  He is exhibiting labored breathing      OBJECTIVE:  Current Weight: Weight - Scale: 109 kg (240 lb 11 9 oz)  Vitals:    08/12/20 0100 08/12/20 0200 08/12/20 0300 08/12/20 0542   BP:       BP Location:       Pulse:  65     Resp:       Temp:       TempSrc:       SpO2: 92%  91%    Weight:    109 kg (240 lb 11 9 oz)   Height:           Intake/Output Summary (Last 24 hours) at 8/12/2020 4277  Last data filed at 8/12/2020 0501  Gross per 24 hour   Intake 40 ml   Output 220 ml   Net -180 ml     General:  Ill appearance  Skin: warm, dry, intact, no rash, generalized ecchymotic areas, multiple wounds to bilateral lower extremities  HEENT:  Dry mucous membranes, sclera anicteric, normocephalic, atraumatic  Neck: No apparent JVD appreciated  Chest: lung decreased  B/L, on O2, labored breathing   CVS:  Bradycardic, no murmer   Abdomen: Soft, round, non-tender, +BS  Extremities: No B/L LE edema present  Neuro:  Lethargic  Psych:  Unable to assess    Medications:    Current Facility-Administered Medications:     acetaminophen (TYLENOL) tablet 650 mg, 650 mg, Oral, Q6H PRN, NAVNEET Esocbar    apixaban (ELIQUIS) tablet 5 mg, 5 mg, Oral, BID, Mceca Sales MD, 5 mg at 08/11/20 1817    dextrose 5 % and sodium chloride 0 9 % infusion, 60 mL/hr, Intravenous, Continuous, Mecca Sales MD, Last Rate: 60 mL/hr at 08/12/20 0847, 60 mL/hr at 08/12/20 0847    dextrose 50 % IV solution 50 mL, 50 mL, Intravenous, Q1H PRN, [facility] NAVNEET Argueta    DULoxetine (CYMBALTA) delayed release capsule 30 mg, 30 mg, Oral, Daily, NAVNEET Escobar, 30 mg at 08/11/20 0915    furosemide (LASIX) injection 20 mg, 20 mg, Intravenous, Once, Mecca Sales MD    HYDROmorphone (DILAUDID) injection 0 5 mg, 0 5 mg, Intravenous, Q2H PRN, Mecca Sales MD    ipratropium (ATROVENT) 0 02 % inhalation solution 0 5 mg, 0 5 mg, Nebulization, Q6H PRN, Mecca Sales MD, 0 5 mg at 08/11/20 2127    levalbuterol (XOPENEX) inhalation solution 1 25 mg, 1 25 mg, Nebulization, Q6H PRN, NAVNEET Hernandez, 1 25 mg at 08/11/20 2127    metoprolol tartrate (LOPRESSOR) partial tablet 12 5 mg, 12 5 mg, Oral, Q12H [facility], Blas Felton MD, 12 5 mg at 08/11/20 0914    midodrine (PROAMATINE) tablet 10 mg, 10 mg, Oral, TID AC, NAVNEET Kirby, 10 mg at 08/11/20 1816    ondansetron (ZOFRAN) injection 4 mg, 4 mg, Intravenous, Q6H PRN, Blas Felton MD    pantoprazole (PROTONIX) EC tablet 40 mg, 40 mg, Oral, Daily, NAVNEET Escobar, 40 mg at 08/11/20 0914    vancomycin (VANCOCIN) oral solution 125 mg, 125 mg, Oral, Q12H [facility], Blas Felton MD, 125 mg at 08/10/20 2212    Laboratory Results:  Results from last 7 days   Lab Units 08/12/20  0820 08/12/20  0814 08/12/20  0459 08/11/20  2102 08/11/20  0452 08/10/20  2220  08/10/20  0508 08/09/20  0608  08/07/20  2053   WBC Thousand/uL 9 23  --   --   --  6 14  --   --  5 95 7 70   < > 7 80   HEMOGLOBIN g/dL 14 3  --   --   --  13 9  --   --  14 1 13 7   < > 14 2   I STAT HEMOGLOBIN g/dl  --  15 3  --  15 3  --   --   --   --   --   --   --    HEMATOCRIT % 41 5  --   --   --  40 3  --   --  40 5 40 3   < > 42 1   HEMATOCRIT, ISTAT %  --  45  --  45  --   --   --   --   --   --   --    PLATELETS Thousands/uL 106*  --   --   --  113*  --   --  132* 135*   < > 174   SODIUM mmol/L  --   --  138  --  134* 134*   < >  --  136   < > 138   POTASSIUM mmol/L  --   --  4 3  --  3 9 4 0   < >  --  4 1   < > 5 6*   CHLORIDE mmol/L  --   --  100  --  99* 99*   < >  --  100   < > 98*   CO2 mmol/L  --   --  19*  --  20* 21   < >  --  21   < > 24   CO2, I-STAT mmol/L  --  12*  --  17*  --   --   --   --   --   --   --    BUN mg/dL  --   --  79*  --  76* 78*   < >  --  77*   < > 76*   CREATININE mg/dL  --   --  4 55*  --  3 87* 3 94*   < >  --  3 73*   < > 3 61*   CALCIUM mg/dL  --   --  9 9  --  9 5 9 4   < >  --  9 2   < > 9 6   MAGNESIUM mg/dL  --   --  1 8  --   --   --   --  1 9 1 9   < >  --    PHOSPHORUS mg/dL  --   --  5 1*  --   --   --   --  4 7* 4 8*  --   --    ALK PHOS U/L  --   --  74  --   --   --   --   --  69  --  62   ALT U/L  --   --  20  --   --   --   --   --  12 --  14   AST U/L  --   --  54*  --   --   --   --   --  38  --  50*   GLUCOSE, ISTAT mg/dl  --  59*  --  98  --   --   --   --   --   --   --     < > = values in this interval not displayed

## 2020-08-12 NOTE — RAPID RESPONSE
Progress Note - Rapid Response   Linwood Mazariegos 77 y o  male MRN: [id number]    Time Called ( Time): 07:21  Date Called: 08/12/2020  Level of Care: MS  Room#: 306  [place] Time ( Time): 07:25  Event End Time ( Time): 08:35  Primary reason for call: Acute change in HR, Acute change in RR, Acute change in UO and Acute change in mental status  Interventions:  Airway/Breathing:  ABG  Circulation: N/A  Other Treatments: dextrose, stat labs, CXR obtained prior to arrival       Assessment:   1  Metabolic acidosis secondary to uremia in setting of SAMY on CKD, CHF  2  Suspect acute liver failure with hypoglycemia and hypocoagulability    Plan:   · STAT labs - CBC, INR, Trop, Lactic, Ammonia, TSH, Blood cultures  · Pt is compensating for metabolic acidosis ABG - 7 33/45/961/72 on 6L NC, RR 28  · Contact Nephrology - pt mets urgent criteria for Hemodialysis  · Sepsis work up - cultures, lactic  · Consider Hospice - pt now has multi system organ failure       HPI/Chief Complaint (Background/Situation):   Ilana Bill is a 77y o  year old male who presents with worsening encephalopathy, tachypnea, decreased urine output, hypoglycemia, and overall toxic/ill appearance  Pt has history of SAMY on CKD, CHF EF 30%, Afib on Eliquis, and PVD  He was admitted to the medicine service on 08/07 for SAMY on CKD  Pt has had worsening renal function despite IVF and diuretics  Nephrology has been following  This morning bedside RN contacted me to evaluate patient due to the above concerns  Upon exam pt is distressed, toxic/ill in appearance, encephalopathic, awake but is unable to answer questions or follow commands, has purple discoloration of his hands and legs/feet, dry cracked lips with blood coated on tongue and oral pharynx  CXR was obtained just prior to arrival and reveals persistent interstitial edema and atelectasis  Pt was transferred to the step down unit for further monitoring and workup  Historical Information   Past Medical History:   Diagnosis Date    Atrial fibrillation ([place] [address] )     Cardiac disease     Cellulitis     CH[facility]congest[facility] failure) [facility])     Chronic pain     Chronic venous hypertension     with ulceration    Diabetes mellitus (HCC)     GERD (gastroesophageal reflux disease)     History of methicillin resistant staphylococcus aureus (MRSA) 11/26/2019    negative nasal swab     Hyperlipidemia     Hypertension     Obesity     Pressure injury of skin     Pulmonary hypertension ([place] [address] )     PVD (peripheral vascular disease) ([place] [address] )     Type 2 diabetes mellitus with diabetic heel ulcer ([place] [address] ) 6/30/2020    Vascular disorder of extremity ([place] [address] )      Past Surgical History:   Procedure Laterality Date    ANTERIOR RELEASE VERTEBRAL BODY W/ POSTERIOR FUSION      APPENDECTOMY  1954    CATARACT EXTRACTION      DECOMPRESSION SPINE LUMBAR POSTERIOR      ELBOW SURGERY      FOOT/ANKLE ARTHRODESIS Right      complications postoperatively with bone healing and infection    INCISION AND DRAINAGE OF WOUND Left 3/5/2020    Procedure: INCISION AND DRAINAGE (I&D) EXTREMITY;  Surgeon: Dominique Patel DPM;  Location:  MAIN OR;  Service: Podiatry    KNEE SURGERY      NOSE SURGERY      turbinectomy    RETINAL DETACHMENT SURGERY      RHINOPLASTY      TONSILLECTOMY      VEIN LIGATION AND STRIPPING      saphenous vein long     Social History   Social History     Substance and Sexual Activity   Alcohol Use Not Currently    Frequency: Never    Drinks per session: Patient refused    Binge frequency: Never     Social History     Substance and Sexual Activity   Drug Use Never     Social History     Tobacco Use   Smoking Status Never Smoker   Smokeless Tobacco Never Used     Family History:   Family History   Problem Relation Age of Onset    Cancer Mother         cancer of uterus    Diabetes Sister     Mental illness Neg Hx         neg fam hx    Substance Abuse Neg Hx         neg fam hx       Meds/Allergies   Current Facility-Administered Medications   Medication Dose Route Frequency Provider Last Rate    acetaminophen  650 mg Oral Q6H PRN Felisha Patino, MD      dextrose  50 mL Intravenous Q1H PRN Felisha Patino, MD      DULoxetine  30 mg Oral Daily Felisha Peak, MD      furosemide  20 mg Intravenous Once Felisha Peak, MD      HYDROmorphone  0 5 mg Intravenous Q2H PRN Felisha Peak, MD      ipratropium  0 5 mg Nebulization Q6H PRN Felisha Peak, MD      levalbuterol  1 25 mg Nebulization Q6H PRN Felisha Peak, MD      metoprolol tartrate  12 5 mg Oral Q12H [address], MD      midodrine  10 mg Oral TID AC Felisha Peak, MD      ondansetron  4 mg Intravenous Q6H PRN Felisha Peak, MD      pantoprazole  40 mg Oral Daily Felisha Peak, MD      sodium bicarbonate infusion  50 mL/hr Intravenous Continuous Felisha Patino, MD      vancomycin  125 mg Oral Q12H [address], MD         sodium bicarbonate infusion, 50 mL/hr        No Known Allergies    ROS: Unable to obtain, pt is encephalopathic    Vitals: 99 1, 72, 106/67, 28, 94$ on 6L NC    Physical Exam:  Gen:toxic, ill in appearance, distressed  HEENT:tongue coated with dried blood, dry cracked lips, pupils +3 ERRL  Neck:ecchymosis on Left side of neck, trachea midline  Chest:S1 S2 distant, no MGR  Cor:bibasilar fine crackles, slight exp wheeze  Abd:soft, nontender, hyperactive bowel sounds  Ext:purple, cap refill 3 sec, cool to touch, no edema  Neuro:Awake, no verbal response, does not follow commands, withdrawals to pain  Skin:purple and cool extremities, cap refill >3 secs, petichial rash on perianal area, excoriated skin sacral/buttocks, several stage 2 pressure ulcers      Intake/Output Summary (Last 24 hours) at 8/12/2020 1013  Last data filed at 8/12/2020 0501  Gross per 24 hour   Intake 40 ml   Output 220 ml   Net -180 ml       Respiratory    Lab Data (Last 4 hours)    None         O2/Vent Data (Last 4 hours)    None Invasive Devices     Peripheral Intravenous Line            Peripheral IV Distal;Left;Dorsal (posterior) Forearm -- days    Peripheral IV 08/11/20 Right Forearm less than 1 day          Drain            Urethral Catheter Double-lumen 16 Fr  4 days                DIAGNOSTIC DATA:    Lab: I have personally reviewed pertinent lab results  CBC:   Results from last 7 days   Lab Units 08/12/20  0820   WBC Thousand/uL 9 23   HEMOGLOBIN g/dL 14 3   HEMATOCRIT % 41 5   PLATELETS Thousands/uL 106*     CMP:   Results from last 7 days   Lab Units 08/12/20  0814 08/12/20  0459 08/11/20  2102 08/11/20  0452 08/10/20  2220  08/09/20  0608  08/07/20 2053   POTASSIUM mmol/L  --  4 3  --  3 9 4 0   < > 4 1   < > 5 6*   CHLORIDE mmol/L  --  100  --  99* 99*   < > 100   < > 98*   CO2 mmol/L  --  19*  --  20* 21   < > 21   < > 24   CO2, I-STAT mmol/L 12*  --  17*  --   --   --   --   --   --    BUN mg/dL  --  79*  --  76* 78*   < > 77*   < > 76*   CREATININE mg/dL  --  4 55*  --  3 87* 3 94*   < > 3 73*   < > 3 61*   CALCIUM mg/dL  --  9 9  --  9 5 9 4   < > 9 2   < > 9 6   ALK PHOS U/L  --  74  --   --   --   --  69  --  62   ALT U/L  --  20  --   --   --   --  12  --  14   AST U/L  --  54*  --   --   --   --  38  --  50*   GLUCOSE, ISTAT mg/dl 59*  --  98  --   --   --   --   --   --     < > = values in this interval not displayed  PT/INR:   Lab Results   Component Value Date    INR 5 30 (HH) 08/12/2020   ,   Magnesium: No components found for: MAG,   Phosphorous:   Lab Results   Component Value Date    PHOS 5 1 (H) 08/12/2020       Microbiology:  Lab Results   Component Value Date    BLOODCX No Growth After 5 Days  07/11/2020    BLOODCX No Growth After 5 Days  07/11/2020    BLOODCX No Growth After 5 Days  06/25/2020    BLOODCX No Growth After 5 Days   06/25/2020    URINECX 50,000-59,000 cfu/ml Candida tropicalis (A) 08/08/2020    URINECX <10,000 cfu/ml  02/19/2020    WOUNDCULT 2+ Growth of Serratia marcescens (A) 03/05/2020 WOUNDCULT 4+ Growth of Serratia marcescens (A) 03/02/2020    WOUNDCULT 3+ Growth of Serratia marcescens (A) 07/24/2019    WOUNDCULT 1+ Growth of  07/24/2019         OUTCOME:   Transfer to step down unit  Family notified of transfer: yes  Family member contacted: Called pt's son Chema Corbin 3 times on both phone numbers listed, left message  Code Status: Level 3 - DNAR and DNI  Critical Care Time: Total Critical Care time spent 70 minutes excluding procedures, teaching and family updates

## 2020-08-12 NOTE — ASSESSMENT & PLAN NOTE
Patient presented with recurrent urinary retention and Lopez catheter was inserted  Patient's blood pressure is borderline, he developed hypotension on previous admissions on Flomax therefore patient is not on Flomax      Will continue Lopez catheter

## 2020-08-13 VITALS
HEIGHT: 72 IN | TEMPERATURE: 97.1 F | BODY MASS INDEX: 34.07 KG/M2 | SYSTOLIC BLOOD PRESSURE: 102 MMHG | OXYGEN SATURATION: 97 % | HEART RATE: 75 BPM | DIASTOLIC BLOOD PRESSURE: 66 MMHG | WEIGHT: 251.54 LBS | RESPIRATION RATE: 10 BRPM

## 2020-08-13 LAB
ANION GAP SERPL CALCULATED.3IONS-SCNC: 13 MMOL/L (ref 4–13)
BUN SERPL-MCNC: 82 MG/DL (ref 5–25)
CALCIUM SERPL-MCNC: 9 MG/DL (ref 8.3–10.1)
CHLORIDE SERPL-SCNC: 103 MMOL/L (ref 100–108)
CO2 SERPL-SCNC: 23 MMOL/L (ref 21–32)
CREAT SERPL-MCNC: 4.9 MG/DL (ref 0.6–1.3)
ERYTHROCYTE [DISTWIDTH] IN BLOOD BY AUTOMATED COUNT: 21.1 % (ref 11.6–15.1)
GFR SERPL CREATININE-BSD FRML MDRD: 11 ML/MIN/1.73SQ M
GLUCOSE SERPL-MCNC: 107 MG/DL (ref 65–140)
GLUCOSE SERPL-MCNC: 91 MG/DL (ref 65–140)
GLUCOSE SERPL-MCNC: 93 MG/DL (ref 65–140)
HCT VFR BLD AUTO: 37 % (ref 36.5–49.3)
HGB BLD-MCNC: 12.6 G/DL (ref 12–17)
MCH RBC QN AUTO: 32.2 PG (ref 26.8–34.3)
MCHC RBC AUTO-ENTMCNC: 34.1 G/DL (ref 31.4–37.4)
MCV RBC AUTO: 95 FL (ref 82–98)
PLATELET # BLD AUTO: 61 THOUSANDS/UL (ref 149–390)
PMV BLD AUTO: 12.8 FL (ref 8.9–12.7)
POTASSIUM SERPL-SCNC: 3.8 MMOL/L (ref 3.5–5.3)
PROCALCITONIN SERPL-MCNC: 0.88 NG/ML
RBC # BLD AUTO: 3.91 MILLION/UL (ref 3.88–5.62)
SODIUM SERPL-SCNC: 139 MMOL/L (ref 136–145)
WBC # BLD AUTO: 6.46 THOUSAND/UL (ref 4.31–10.16)

## 2020-08-13 PROCEDURE — 92526 ORAL FUNCTION THERAPY: CPT

## 2020-08-13 PROCEDURE — 85027 COMPLETE CBC AUTOMATED: CPT | Performed by: INTERNAL MEDICINE

## 2020-08-13 PROCEDURE — 99232 SBSQ HOSP IP/OBS MODERATE 35: CPT | Performed by: INTERNAL MEDICINE

## 2020-08-13 PROCEDURE — 80048 BASIC METABOLIC PNL TOTAL CA: CPT | Performed by: INTERNAL MEDICINE

## 2020-08-13 PROCEDURE — 82948 REAGENT STRIP/BLOOD GLUCOSE: CPT

## 2020-08-13 PROCEDURE — 84145 PROCALCITONIN (PCT): CPT | Performed by: INTERNAL MEDICINE

## 2020-08-13 RX ADMIN — HYDROMORPHONE HYDROCHLORIDE 0.5 MG: 1 INJECTION, SOLUTION INTRAMUSCULAR; INTRAVENOUS; SUBCUTANEOUS at 12:52

## 2020-08-13 RX ADMIN — HYDROMORPHONE HYDROCHLORIDE 0.5 MG: 1 INJECTION, SOLUTION INTRAMUSCULAR; INTRAVENOUS; SUBCUTANEOUS at 08:08

## 2020-08-13 RX ADMIN — HYDROMORPHONE HYDROCHLORIDE 0.5 MG: 1 INJECTION, SOLUTION INTRAMUSCULAR; INTRAVENOUS; SUBCUTANEOUS at 23:06

## 2020-08-13 RX ADMIN — HYDROMORPHONE HYDROCHLORIDE 0.5 MG: 1 INJECTION, SOLUTION INTRAMUSCULAR; INTRAVENOUS; SUBCUTANEOUS at 01:31

## 2020-08-13 NOTE — PROGRESS NOTES
Progress Note - Nephrology   Linwood Mazariegos 77 y o  male MRN: [id number]  Unit/Bed#: -01 Encounter: [id number]      Assessment / Plan:  1  SAMY, POA, on top of CKD stage 3b/4, worsening in setting of poor oral intake and probable sepsis  -being treated for UTI  -lactate elevated  I have concern for lactate of almost 8 from hypoperfusion  CHF is on differential but Fe Urea 27% supports prerenal azotemia(accurate despite diuretic use, < 35% prerenal azotemia)  Borderline urine Na also more supportive of prerenal state  -now off IVF as patient has been made comfort care/Level 4 DNR status  Nephrology will sign off  Call or text with questions or if goals of care change  Subjective:   Unable to elicit HPI or review of systems as patient lethargic  Objective:     Vitals: Blood pressure 102/66, pulse 75, temperature (!) 97 1 °F (36 2 °C), resp  rate (!) 10, height 6' (1 829 m), weight 114 kg (251 lb 8 7 oz), SpO2 97 %  ,Body mass index is 34 12 kg/m²  Temp (24hrs), Av 6 °F (36 4 °C), Min:97 1 °F (36 2 °C), Max:98 1 °F (36 7 °C)      Weight (last 2 days)     Date/Time   Weight    20 0600   114 (251 55)    20 0542   109 (240 74)    20 0532   110 (241 4)    20 0529   110 (241 4)                Intake/Output Summary (Last 24 hours) at 2020 1123  Last data filed at 2020 0600  Gross per 24 hour   Intake    Output 185 ml   Net -185 ml     I/O last 24 hours: In: -   Out: 185 [Urine:185]        Physical Exam:   Physical Exam  Vitals signs reviewed  Constitutional:       General: He is not in acute distress  Appearance: He is well-developed  He is not diaphoretic  Comments: lethargic   HENT:      Head: Normocephalic and atraumatic  Mouth/Throat:      Mouth: Mucous membranes are dry  Eyes:      General: No scleral icterus  Right eye: No discharge  Left eye: No discharge  Neck:      Musculoskeletal: Neck supple        Thyroid: No thyromegaly  Cardiovascular:      Rate and Rhythm: Normal rate  Heart sounds: Normal heart sounds  Comments: Irregular, tachycardic  Pulmonary:      Breath sounds: No wheezing or rales  Comments: Decreased BS and effort b/l  Abdominal:      General: Bowel sounds are normal  There is no distension  Palpations: Abdomen is soft  Tenderness: There is no abdominal tenderness  Musculoskeletal:         General: Swelling present  Comments: Left foot wrapped, trace LE edema   Lymphadenopathy:      Cervical: No cervical adenopathy  Skin:     General: Skin is warm and dry  Findings: No rash     Neurological:      Comments: lethargic   Psychiatric:      Comments: Unable to assess as patient lethargic         Invasive Devices     Peripheral Intravenous Line            Peripheral IV 08/09/20 Distal;Left;Dorsal (posterior) Forearm 3 days    Peripheral IV 08/11/20 Left Wrist 1 day    Peripheral IV 08/11/20 Right Forearm 1 day          Drain            Urethral Catheter Double-lumen 16 Fr  5 days                Medications:    Scheduled Meds:  Current Facility-Administered Medications   Medication Dose Route Frequency Provider Last Rate    acetaminophen  650 mg Oral Q6H PRN Kamar Yuen MD      dextrose  50 mL Intravenous Q1H PRN Kamar Yuen MD      DULoxetine  30 mg Oral Daily Kamar Yuen MD      fluconazole  100 mg Intravenous Q24H Vladislav Marcial MD      furosemide  20 mg Intravenous Once Kamar Yuen MD      HYDROmorphone  0 5 mg Intravenous Q2H PRN Kamar Yuen MD      ipratropium  0 5 mg Nebulization Q6H PRN Kamar Yuen MD      levalbuterol  1 25 mg Nebulization Q6H PRN Kamar Yuen MD      metoprolol tartrate  12 5 mg Oral Q12H [address], MD      midodrine  10 mg Oral TID AC Kamar Yuen MD      ondansetron  4 mg Intravenous Q6H PRN Kamar Yuen MD      pantoprazole  40 mg Oral Daily Kamar Yuen MD      sodium bicarbonate infusion  50 mL/hr Intravenous Continuous Anne Morris MD 50 mL/hr (08/12/20 1030)    vancomycin  125 mg Oral Q12H [address] Anne Morris MD         PRN Meds:   acetaminophen    dextrose    HYDROmorphone    ipratropium    levalbuterol    ondansetron    Continuous Infusions:sodium bicarbonate infusion, 50 mL/hr, Last Rate: 50 mL/hr (08/12/20 1030)            LAB RESULTS:      Results from last 7 days   Lab Units 08/13/20  0627 08/12/20  0820 08/12/20  0814 08/12/20  0459 08/11/20  2102 08/11/20  0452 08/10/20  2220 08/10/20  1122 08/10/20  0508 08/09/20  0608 08/08/20  1630  08/08/20  0455 08/08/20  0109 08/07/20  2053   WBC Thousand/uL 6 46 9 23  --   --   --  6 14  --   --  5 95 7 70  --   --  7 26  --  7 80   HEMOGLOBIN g/dL 12 6 14 3  --   --   --  13 9  --   --  14 1 13 7  --   --  14 2  --  14 2   I STAT HEMOGLOBIN g/dl  --   --  15 3  --  15 3  --   --   --   --   --   --   --   --   --   --    HEMATOCRIT % 37 0 41 5  --   --   --  40 3  --   --  40 5 40 3  --   --  41 9  --  42 1   HEMATOCRIT, ISTAT %  --   --  45  --  45  --   --   --   --   --   --   --   --   --   --    PLATELETS Thousands/uL 61* 106*  --   --   --  113*  --   --  132* 135*  --   --  157 171 174   NEUTROS PCT %  --   --   --   --   --   --   --   --  66 67  --   --   --   --  62   LYMPHS PCT %  --   --   --   --   --   --   --   --  19 15  --   --   --   --  17   MONOS PCT %  --   --   --   --   --   --   --   --  10 14*  --   --   --   --  17*   EOS PCT %  --   --   --   --   --   --   --   --  3 2  --   --   --   --  3   POTASSIUM mmol/L 3 8  --   --  4 3  --  3 9 4 0 4 0  --  4 1 4 2   < >  --  4 3 5 6*   CHLORIDE mmol/L 103  --   --  100  --  99* 99* 100  --  100 98*   < >  --  99* 98*   CO2 mmol/L 23  --   --  19*  --  20* 21 23  --  21 22   < >  --  24 24   CO2, I-STAT mmol/L  --   --  12*  --  17*  --   --   --   --   --   --   --   --   --   --    BUN mg/dL 82*  --   --  79*  --  76* 78* 78*  --  77* 74*   < >  --  76* 76*   CREATININE mg/dL 4 90*  --   -- 4 55*  --  3 87* 3 94* 3 74*  --  3 73* 3 68*   < >  --  3 66* 3 61*   CALCIUM mg/dL 9 0  --   --  9 9  --  9 5 9 4 9 6  --  9 2 9 3   < >  --  9 8 9 6   ALK PHOS U/L  --   --   --  74  --   --   --   --   --  69  --   --   --   --  62   ALT U/L  --   --   --  20  --   --   --   --   --  12  --   --   --   --  14   AST U/L  --   --   --  54*  --   --   --   --   --  38  --   --   --   --  50*   GLUCOSE, ISTAT mg/dl  --   --  59*  --  98  --   --   --   --   --   --   --   --   --   --    MAGNESIUM mg/dL  --   --   --  1 8  --   --   --   --  1 9 1 9  --   --   --  1 9  --    PHOSPHORUS mg/dL  --   --   --  5 1*  --   --   --   --  4 7* 4 8*  --   --   --   --   --     < > = values in this interval not displayed  CUTURES:  Lab Results   Component Value Date    BLOODCX No Growth at 24 hrs  08/12/2020    BLOODCX No Growth at 24 hrs  08/12/2020    BLOODCX No Growth After 5 Days  07/11/2020    BLOODCX No Growth After 5 Days  07/11/2020    BLOODCX No Growth After 5 Days  06/25/2020    BLOODCX No Growth After 5 Days  06/25/2020    BLOODCX No Growth After 5 Days  06/14/2020    URINECX 50,000-59,000 cfu/ml Candida tropicalis (A) 08/08/2020    URINECX <10,000 cfu/ml  02/19/2020                 Portions of the record may have been created with voice recognition software  Occasional wrong word or "sound a like" substitutions may have occurred due to the inherent limitations of voice recognition software  Read the chart carefully and recognize, using context, where substitutions have occurred  If you have any questions, please contact the dictating provider

## 2020-08-13 NOTE — PROGRESS NOTES
Upon assessment at 0100, Pt began to call out in pain and appear to be restless  Pt has Ordered PRNdilaudid 0 5mg  Assessed his blood pressure to be 100/69 and pulse 76  Alerted attending practitioner of blood pressure and pain  Instructed to give pain medication and reassess blood pressure after  Blood pressure post administration is 102/66, pt appears to be more comfortable in bed  Will continue to monitor  Texico Luz, RN

## 2020-08-13 NOTE — PROGRESS NOTES
Progress Note - Linwood Mazariegos 1945, 77 y o  male MRN: [id number]    Unit/Bed#: -01 Encounter: [id number]    Primary Care Provider: Ai Thompson MD   Date and time admitted to hospital: 8/7/2020  8:42 PM        * Acute kidney injury superimposed on CKD [facility])  Assessment & Plan  Patient presented with acute kidney injury which was most likely due to diuresis with Bumex, dehydration and BPH with urinary retention  Lopez catheter was inserted  Patient's renal function further deteriorated compared to yesterday:  Last shift his urine output was 60 cc an hour, creatinine increased to 4 90  Patient is nonverbal, does not take anything by mouth  I discussed the case with patient's son Marta Gill on the phone in detail:  Because of his CHF and worsening renal function patient's condition is terminal and patient is made level 4 DNR, comfort measures only, we are consulting hospice  Patient has received 4 doses IV Dilaudid last 24 hours for comfort, will continue the same    Persistent atrial fibrillation  Assessment & Plan  Heart rates are 100-120    Patient on Lopressor 12 5 p o  B i d  He has a history of hypotension on higher doses  Will continue same dose    Continue Eliquis for CVA prophylaxis    Chronic combined systolic and diastolic heart failure (HCC)  Assessment & Plan  Wt Readings from Last 3 Encounters:   08/13/20 114 kg (251 lb 8 7 oz)   07/17/20 114 kg (251 lb 1 7 oz)   07/02/20 122 kg (269 lb 2 9 oz)     Echo from 6/26/2020 with EF 30% and there was severe diffuse hypokinesis with regional variations  Patient has end-stage right and left-sided CHF  He is comfortable, will continue IV Dilaudid p r n   For shortness of breath        VTE Prophylaxis: in place    Patient Centered Rounds: I rounded with patient's nurse    Current Length of Stay: 6 day(s)    Current Patient Status: Inpatient    Certification Statement: Pt requires additional inpatient hospital stay due to: see assessment and plan        Subjective:   Patient's nurse reports that patient has had no p o  Intake, has not taking medications, has been comfortable on IV Dilaudid that he gets for discomfort and pain  Patient rests in the bed comfortably at times opens his eyes but does not answer questions or follows commands  All other ROS are negative    Objective:     Vitals:   Temp (24hrs), Av 6 °F (36 4 °C), Min:97 1 °F (36 2 °C), Max:98 1 °F (36 7 °C)    Temp:  [97 1 °F (36 2 °C)-98 1 °F (36 7 °C)] 97 1 °F (36 2 °C)  HR:  [43-81] 75  Resp:  [10-16] 10  BP: ()/(57-73) 102/66  SpO2:  [97 %-100 %] 97 %  Body mass index is 34 12 kg/m²  Input and Output Summary (last 24 hours): Intake/Output Summary (Last 24 hours) at 2020 1202  Last data filed at 2020 0600  Gross per 24 hour   Intake    Output 185 ml   Net -185 ml       Physical Exam:     Physical Exam  Cardiovascular:      Rate and Rhythm: Normal rate  Comments: irreg irreg  Pulmonary:      Effort: No respiratory distress  Abdominal:      General: There is no distension  Tenderness: There is no guarding  Skin:     General: Skin is warm and dry  Neurological:      Comments: No seizure activity seen, patient at times opens eyes but does not follow commands             I personally reviewed labs and imaging reports for today  Last 24 Hours Medication List:   Current Facility-Administered Medications   Medication Dose Route Frequency Provider Last Rate    HYDROmorphone  0 5 mg Intravenous Q2H PRN Anne Morris MD      ondansetron  4 mg Intravenous Q6H PRN Anne Morris MD            Today, Patient Was Seen By: Anne Morris MD    ** Please Note: Dictation voice to text software may have been used in the creation of this document   **

## 2020-08-13 NOTE — OCCUPATIONAL THERAPY NOTE
Occupational Therapy Cancellation/Discharge Note        Patient Name: Mechelle Ramires  Today's Date: 8/13/2020      Pt currently on OT program  Per RN Jyoti Herrera, pt is medically declining  Pt unable to participate in therapy at this time  Pt possibly to go on hospice  OT to D/C orders        Matthew Woodard, OTR/L

## 2020-08-13 NOTE — SOCIAL WORK
Continue to follow  CM notified by Dr Seng Lincoln of hospice eval  Call placed to Pt's son(Jaleel: [phone]) re: hospice  Pt's son informed CM that he spoke with Dr Seng Lincoln and is in agreement for hospice eval  Pt's son is requesting hospice liaison to contact him on his cell number at [phone]  Freedom of Choice given  Pt's son is choosing [facility]  Referral sent to [facility] via [employer]  Spoke with Carlie Montez in admissions at [facility], [address] informed Carlie Montez of hospice referral  Carlie Montez informed CM that [facility] can accept Pt with hospice as Pt's son is starting MA process  Await determination from [facility]  CM to follow

## 2020-08-13 NOTE — SPEECH THERAPY NOTE
Speech Language/Pathology    Speech/Language Pathology Progress Note    Patient Name: Lyudmila Rudolph  Today's Date: 8/13/2020     Chart reviewed  Pt now made level 4 comfort care  Pt's son is interested in hospice, will sign off       Levi Berger CCC-SLP  Speech Pathogist  Available via  tiger text

## 2020-08-13 NOTE — HOSPICE NOTE
RECEIVED REFERRAL AND TT FROM [facility]  SPOKE WITH SON RAINER REGARDING HOSPICE  HE IS OPEN TO  CENTER OR IPU  CHECKED WITH DR Tony Strickland    NO IPU BEDS AVAILABLE TODAY  I TOLD  [facility] AND SON RAINER WE WOULD REASSESS BED AVAILABILITY AND PT STATUS TOMORROW  WILL HAVE HOSPICE LIAISON FOLLOW UP TOMORROW  TEA

## 2020-08-13 NOTE — ASSESSMENT & PLAN NOTE
Patient presented with acute kidney injury which was most likely due to diuresis with Bumex, dehydration and BPH with urinary retention  Lopez catheter was inserted  Patient's renal function further deteriorated compared to yesterday:  Last shift his urine output was 60 cc an hour, creatinine increased to 4 90  Patient is nonverbal, does not take anything by mouth  I discussed the case with patient's son Zachariah Vaughan on the phone in detail:  Because of his CHF and worsening renal function patient's condition is terminal and patient is made level 4 DNR, comfort measures only, we are consulting hospice      Patient has received 4 doses IV Dilaudid last 24 hours for comfort, will continue the same

## 2020-08-13 NOTE — PROGRESS NOTES
Progress Note - Podiatry  Linwood Mazariegos 77 y o  male MRN: [id number]  Unit/Bed#: -01 Encounter: [id number]    Assessment/Plan:  1  Surgical Wound LFT - S/P I&D Abscess dorsal forefoot (03/05/2020)              -continue with Dermagran, 4 x 4, ABD, and Kerlix applied right foot  2  Pressure ulcer Stage 3 -  Lateral left midfoot 5th Met base              -unchanged stable eschar with no drainage               -dry dressing is all that is needed now to protect this area  3  Multiple traumatic wounds bilateral feet and toes              -dry with stable eschars left hallux, right hallux, and right 2nd toe clinically unchanged              -no bandaging necessary, watch for further breakdown  4  Chronic venous insufficiency bilateral              - Chronic +1 right , +1 left edema clinically unchanged              - recommend patient continue to utilize Tubigrip compression or compression stockings  5  Limited ambulation/gait dysfunction              -PT consult appreciated, needs continued work on transfers and conditioning  6  UTI, Acute kidney injury,CKD,HTN,CHF, BPH, acute metabolic encephalopathy, GERD, PVD              -managed per Internal Medicine, and Nephrology  Subjective/Objective   Chief Complaint:   Chief Complaint   Patient presents with    Altered Mental Status     patient presents with [email or web address] and abnormal labs        Subjective:  81-year-old white male seen today resting bed, nonresponsive to verbal questioning, looked up then turned his head and went back to sleep  Blood pressure 102/66, pulse 75, temperature (!) 97 1 °F (36 2 °C), resp  rate (!) 10, height 6' (1 829 m), weight 114 kg (251 lb 8 7 oz), SpO2 97 %  ,Body mass index is 34 12 kg/m²      Invasive Devices     Peripheral Intravenous Line            Peripheral IV 08/09/20 Distal;Left;Dorsal (posterior) Forearm 3 days    Peripheral IV 08/11/20 Right Forearm 1 day          Drain            Urethral Catheter Double-lumen 16 Fr  5 days                Physical Exam:   General appearance: alert, cooperative and no distress  Neuro/Vascular: CNII-XII intact  Normal strength  Sensation and reflexes:  Grossly intact  Pulses: Right: DP 0/4, PT 0/4, Left: DP 0/4, PT 0/4  Capillary Filling:  3-4 Sec, Edema:  +1 bilateral feet  Ulcers/Wounds:  · Pressure ulcer lateral left midfoot measures 1 5 x 1 0  dry stable eschar,  no drainage, no evidence of infection  · Slow nonhealing surgical wound dorsal left forefoot measures 6 0 x 2 5 x 0 4 cm, 80% yellow slough which was easily debrided with 10 blade  dressing intact with no strike through drainage, minimal serosanguineous drainage, no evidence of infection  · Multiple traumatic wounds noted at tips of toes 1 2 and 3 bilateral which are dry/stable with no drainage            Labs and other studies:   CBC w/diff  Results from last 7 days   Lab Units 08/13/20  0627  08/10/20  0508   WBC Thousand/uL 6 46   < > 5 95   HEMOGLOBIN g/dL 12 6   < > 14 1   I STAT HEMOGLOBIN   --    < >  --    HEMATOCRIT % 37 0   < > 40 5   HEMATOCRIT, ISTAT   --    < >  --    PLATELETS Thousands/uL 61*   < > 132*   NEUTROS PCT %  --   --  66   LYMPHS PCT %  --   --  19   MONOS PCT %  --   --  10   EOS PCT %  --   --  3    < > = values in this interval not displayed       BMP  Results from last 7 days   Lab Units 08/13/20 0627 08/12/20  0814   POTASSIUM mmol/L 3 8  --    CHLORIDE mmol/L 103  --    CO2 mmol/L 23  --    CO2, I-STAT mmol/L  --  12*   BUN mg/dL 82*  --    CREATININE mg/dL 4 90*  --    GLUCOSE, ISTAT mg/dl  --  59*   CALCIUM mg/dL 9 0  --      CMP  Results from last 7 days   Lab Units 08/13/20  0627 08/12/20  0814 08/12/20  0459   POTASSIUM mmol/L 3 8  --  4 3   CHLORIDE mmol/L 103  --  100   CO2 mmol/L 23  --  19*   CO2, I-STAT mmol/L  --  12*  --    BUN mg/dL 82*  --  79*   CREATININE mg/dL 4 90*  --  4 55*   CALCIUM mg/dL 9 0  --  9 9   ALK PHOS U/L  --   --  74   ALT U/L  --   --  20   AST U/L  --   --  54*   GLUCOSE, ISTAT mg/dl  --  59*  --        @Culture@  Lab Results   Component Value Date    BLOODCX No Growth at 24 hrs  08/12/2020    BLOODCX No Growth at 24 hrs  08/12/2020    BLOODCX No Growth After 5 Days  07/11/2020    BLOODCX No Growth After 5 Days  07/11/2020    BLOODCX No Growth After 5 Days  06/25/2020    BLOODCX No Growth After 5 Days  06/25/2020    BLOODCX No Growth After 5 Days  06/14/2020    BLOODCX No Growth After 5 Days  06/14/2020    BLOODCX Chryseobacterium (A) 06/11/2020    BLOODCX Flavobacterium (A) 06/11/2020    BLOODCX Gram-positive alec (A) 06/11/2020    BLOODCX No Growth After 5 Days  06/11/2020    URINECX 50,000-59,000 cfu/ml Candida tropicalis (A) 08/08/2020    URINECX <10,000 cfu/ml  02/19/2020         Current Facility-Administered Medications:     HYDROmorphone (DILAUDID) injection 0 5 mg, 0 5 mg, Intravenous, Q2H PRN, Carmen Juarez MD, 0 5 mg at 08/13/20 1252    ondansetron (ZOFRAN) injection 4 mg, 4 mg, Intravenous, Q6H PRN, Carmen Juarez MD    Imaging: I have personally reviewed pertinent films in PACS  EKG, Pathology, and Other Studies: I have personally reviewed pertinent reports    VTE Pharmacologic Prophylaxis:  Eliquis  VTE Mechanical Prophylaxis: sequential compression device    Cherie Dominguez DPM

## 2020-08-13 NOTE — SPEECH THERAPY NOTE
Speech Language/Pathology    Speech/Language Pathology Progress Note    Patient Name: Cleveland Cage  Today's Date: 8/13/2020       Subjective:  Pt seen for dysphagia tx  Pt s/p rapid response yesterday  Made NPO  Per nsg pt not awake and alert  Objective:  Pt awakened to verbal and tactile stim  Confused, somewhat uncooperative, pushing away, stating "stop, go away"  Pt w/ dried blood and oral mucosa on lips and tongue  Pt eventually took tsps of puree and NTL, consuming 4 oz of NTL and 2 oz of puree  Fair bolus retrieval  Adequate manipulation and transfer  Swallows were complete  No coughing, throat clearing noted  Voice remained clear  Assessment:  Pt uncooperative w/ taking po, but eventually took serving of each puree and NTL w/o overt s/s aspiration  Plan/Recommendations:  Begin dysphagia 1 puree w/ nectar thick liquids by tsp  Aspiration precautions  meds crushed in puree   Feed when awake and alert  Will cont to follow       April Ronal Ma MA CCC-SLP  Speech Patholgist  [place] license # [id number]  [place] license # [id number]  Available via [employer]

## 2020-08-13 NOTE — PHYSICAL THERAPY NOTE
D/C PT  Pt with deteriorating status  Family considering hospice care  D/c PT, pt unable to participate

## 2020-08-13 NOTE — PLAN OF CARE
Problem: Potential for Falls  Goal: Patient will remain free of falls  Description: INTERVENTIONS:  - Assess patient frequently for physical needs  -  Identify cognitive and physical deficits and behaviors that affect risk of falls  -  [place] fall precautions as indicated by assessment   - Educate patient/family on patient safety including physical limitations  - Instruct patient to call for assistance with activity based on assessment  - Modify environment to reduce risk of injury  - Consider OT/PT consult to assist with strengthening/mobility  Outcome: Progressing     Problem: Nutrition/Hydration-ADULT  Goal: Nutrient/Hydration intake appropriate for improving, restoring or maintaining nutritional needs  Description: Monitor and assess patient's nutrition/hydration status for malnutrition  Collaborate with interdisciplinary team and initiate plan and interventions as ordered  Monitor patient's weight and dietary intake as ordered or per policy  Utilize nutrition screening tool and intervene as necessary  Determine patient's food preferences and provide high-protein, high-caloric foods as appropriate       INTERVENTIONS:  - Monitor oral intake, urinary output, labs, and treatment plans  - Assess nutrition and hydration status and recommend course of action  - Evaluate amount of meals eaten  - Assist patient with eating if necessary   - Allow adequate time for meals  - Recommend/ encourage appropriate diets, oral nutritional supplements, and vitamin/mineral supplements  - Order, calculate, and assess calorie counts as needed  - Recommend, monitor, and adjust tube feedings and TPN/PPN based on assessed needs  - Assess need for intravenous fluids  - Provide specific nutrition/hydration education as appropriate  - Include patient/family/caregiver in decisions related to nutrition  Outcome: Not Progressing     Problem: Prexisting or High Potential for Compromised Skin Integrity  Goal: Skin integrity is maintained or improved  Description: INTERVENTIONS:  - Identify patients at risk for skin breakdown  - Assess and monitor skin integrity  - Assess and monitor nutrition and hydration status  - Monitor labs   - Assess for incontinence   - Turn and reposition patient  - Assist with mobility/ambulation  - Relieve pressure over bony prominences  - Avoid friction and shearing  - Provide appropriate hygiene as needed including keeping skin clean and dry  - Evaluate need for skin moisturizer/barrier cream  - Collaborate with interdisciplinary team   - Patient/family teaching  - Consider wound care consult   Outcome: Not Progressing

## 2020-08-13 NOTE — ASSESSMENT & PLAN NOTE
Wt Readings from Last 3 Encounters:   08/13/20 114 kg (251 lb 8 7 oz)   07/17/20 114 kg (251 lb 1 7 oz)   07/02/20 122 kg (269 lb 2 9 oz)     Echo from 6/26/2020 with EF 30% and there was severe diffuse hypokinesis with regional variations  Patient has end-stage right and left-sided CHF  He is comfortable, will continue IV Dilaudid p r n   For shortness of breath

## 2020-08-13 NOTE — PLAN OF CARE
Begin puree w/ nectar thick liquids by cup or tsp  Meds crushed  Feed when awake and alert  Encourage po  MOUTH CARE

## 2020-08-14 PROCEDURE — 99239 HOSP IP/OBS DSCHRG MGMT >30: CPT | Performed by: INTERNAL MEDICINE

## 2020-08-14 RX ADMIN — HYDROMORPHONE HYDROCHLORIDE 0.5 MG: 1 INJECTION, SOLUTION INTRAMUSCULAR; INTRAVENOUS; SUBCUTANEOUS at 02:45

## 2020-08-14 RX ADMIN — HYDROMORPHONE HYDROCHLORIDE 0.5 MG: 1 INJECTION, SOLUTION INTRAMUSCULAR; INTRAVENOUS; SUBCUTANEOUS at 19:08

## 2020-08-14 RX ADMIN — HYDROMORPHONE HYDROCHLORIDE 0.5 MG: 1 INJECTION, SOLUTION INTRAMUSCULAR; INTRAVENOUS; SUBCUTANEOUS at 16:05

## 2020-08-14 RX ADMIN — HYDROMORPHONE HYDROCHLORIDE 0.5 MG: 1 INJECTION, SOLUTION INTRAMUSCULAR; INTRAVENOUS; SUBCUTANEOUS at 12:14

## 2020-08-14 NOTE — ASSESSMENT & PLAN NOTE
Wt Readings from Last 3 Encounters:   08/13/20 114 kg (251 lb 8 7 oz)   07/17/20 114 kg (251 lb 1 7 oz)   07/02/20 122 kg (269 lb 2 9 oz)     Echo from 6/26/2020 with EF 30% and there was severe diffuse hypokinesis with regional variations  Patient has end-stage right and left-sided CHF  He developed acute systolic CHF towards the end of the hospital stay and he received IV Lasix  His renal function continued to deteriorate

## 2020-08-14 NOTE — ASSESSMENT & PLAN NOTE
Patient presented with acute kidney injury which was most likely due to diuresis with Bumex, dehydration and BPH with urinary retention  Lopez catheter was inserted  Patient's renal function further deteriorated during the hospital stay mostly due to cardiorenal syndrome      Patient was made level 4 dnr, comfort care on August 13th    Hospice was consulted    I spoke with patient's son and updated him on patient's status

## 2020-08-14 NOTE — SOCIAL WORK
Continue to follow  Spoke with Lara Welch at [facility], hospice house has bed available for Pt this evening  Ani informed CM that she will be meeting with Pt's son this afternoon at 4:30pm  Call placed to [facility], spoke with Tara Odonnell, [facility] ambulance arranged to [facility]  Pickup is 7:30pm  No ambulance auth needed per [employer]  Lara Welch, hospice liaison informed of transport time  Call placed to Pt's son(Jaleel: [phone]) informed of transport time of 7:30pm to [facility]  Pt's son informed he is meeting with hospice liaison this afternoon at 4:30pm  Pt's nurse(Shan) informed of transport time to [facility]

## 2020-08-14 NOTE — ASSESSMENT & PLAN NOTE
Patient has chronic atrial fibrillation and heart rates were controlled on Lopressor 12 5 p o  B i d      He was maintained on Eliquis for CVA prophylaxis during hospital stay

## 2020-08-14 NOTE — ASSESSMENT & PLAN NOTE
Patient presented with acute kidney injury which was most likely due to diuresis with Bumex, dehydration and BPH with urinary retention  Lopez catheter was inserted  Patient's renal function further deteriorated during the hospital stay most likely due to cardiorenal syndrome this patient also has systolic and right-sided CHF  Patient was managed by Nephrology with IV fluids but his renal function continued to deteriorate and he developed shortness of breath and acute metabolic encephalopathy  Patient was made level 4 dnr, comfort care on August 13th after discussion with patient's son    Hospice was consulted and patient will be transferred to hospice tonight      I spoke with patient's son today

## 2020-08-14 NOTE — ASSESSMENT & PLAN NOTE
Patient had acute metabolic encephalopathy on admission due to acute kidney injury, hypoxia, CHF most likely    His confusion and lethargy got progressively worse during the hospital stay as patient's renal function declined

## 2020-08-14 NOTE — PLAN OF CARE
Problem: Potential for Falls  Goal: Patient will remain free of falls  Description: INTERVENTIONS:  - Assess patient frequently for physical needs  -  Identify cognitive and physical deficits and behaviors that affect risk of falls    -  [place] fall precautions as indicated by assessment   - Educate patient/family on patient safety including physical limitations  - Instruct patient to call for assistance with activity based on assessment  - Modify environment to reduce risk of injury  - Consider OT/PT consult to assist with strengthening/mobility  Outcome: Progressing     Problem: Prexisting or High Potential for Compromised Skin Integrity  Goal: Skin integrity is maintained or improved  Description: INTERVENTIONS:  - Identify patients at risk for skin breakdown  - Assess and monitor skin integrity  - Assess and monitor nutrition and hydration status  - Monitor labs   - Assess for incontinence   - Turn and reposition patient  - Assist with mobility/ambulation  - Relieve pressure over bony prominences  - Avoid friction and shearing  - Provide appropriate hygiene as needed including keeping skin clean and dry  - Evaluate need for skin moisturizer/barrier cream  - Collaborate with interdisciplinary team   - Patient/family teaching  - Consider wound care consult   Outcome: Progressing

## 2020-08-14 NOTE — PROGRESS NOTES
Progress Note - Linwood Mazariegos 1945, 77 y o  male MRN: [id number]    Unit/Bed#: -01 Encounter: [id number]    Primary Care Provider: Hema Tate MD   Date and time admitted to hospital: 8/7/2020  8:42 PM        * Acute kidney injury superimposed on CKD [facility])  Assessment & Plan  Patient presented with acute kidney injury which was most likely due to diuresis with Bumex, dehydration and BPH with urinary retention  Lopez catheter was inserted  Patient's renal function further deteriorated during the hospital stay mostly due to cardiorenal syndrome  Patient was made level 4 dnr, comfort care on August 13th    Hospice was consulted    I spoke with patient's son and updated him on patient's status        VTE Prophylaxis: in place    Patient Centered Rounds: I rounded with patient's nurse    Current Length of Stay: 7 day(s)    Current Patient Status: Inpatient    Certification Statement: Pt requires additional inpatient hospital stay due to: see assessment and plan        Subjective:   Patient's nurse reports that patient is calm, required IV Dilaudid twice  Patient opens eyes when I call his name and answers no to my question whether he is in pain, otherwise falls back to sleep  All other ROS are negative    Objective:     Vitals:   No data recorded  Body mass index is 34 12 kg/m²  Input and Output Summary (last 24 hours): Intake/Output Summary (Last 24 hours) at 8/14/2020 1212  Last data filed at 8/14/2020 0830  Gross per 24 hour   Intake 0 ml   Output 180 ml   Net -180 ml       Physical Exam:     Physical Exam  Constitutional:       Comments: Patient is in no distress, lies in bed, calm   HENT:      Head: Normocephalic  Cardiovascular:      Rate and Rhythm: Normal rate  Comments: Irregular irregular  Abdominal:      General: Abdomen is flat  There is no distension     Skin:     Comments: Hands are cyanotic and cold             I personally reviewed labs and imaging reports for today  Last 24 Hours Medication List:   Current Facility-Administered Medications   Medication Dose Route Frequency Provider Last Rate    HYDROmorphone  0 5 mg Intravenous Q2H PRN Tonya Finley MD      ondansetron  4 mg Intravenous Q6H PRN Tonya Finley MD            Today, Patient Was Seen By: Tonya Finley MD    ** Please Note: Dictation voice to text software may have been used in the creation of this document   **

## 2020-08-14 NOTE — DISCHARGE SUMMARY
Discharge- Mae Bernabe 1945, 77 y o  male MRN: [id number]    Unit/Bed#: -01 Encounter: [id number]    Primary Care Provider: Karen Howard MD   Date and time admitted to hospital: 8/7/2020  8:42 PM        * Acute kidney injury superimposed on CKD [facility])  Assessment & Plan  Patient presented with acute kidney injury which was most likely due to diuresis with Bumex, dehydration and BPH with urinary retention  Lopez catheter was inserted  Patient's renal function further deteriorated during the hospital stay most likely due to cardiorenal syndrome this patient also has systolic and right-sided CHF  Patient was managed by Nephrology with IV fluids but his renal function continued to deteriorate and he developed shortness of breath and acute metabolic encephalopathy  Patient was made level 4 dnr, comfort care on August 13th after discussion with patient's son    Hospice was consulted and patient will be transferred to hospice tonight  I spoke with patient's son today      Persistent atrial fibrillation  Assessment & Plan  Patient has chronic atrial fibrillation and heart rates were controlled on Lopressor 12 5 p o  B i d  He was maintained on Eliquis for CVA prophylaxis during hospital stay    Chronic combined systolic and diastolic heart failure (HCC)  Assessment & Plan  Wt Readings from Last 3 Encounters:   08/13/20 114 kg (251 lb 8 7 oz)   07/17/20 114 kg (251 lb 1 7 oz)   07/02/20 122 kg (269 lb 2 9 oz)     Echo from 6/26/2020 with EF 30% and there was severe diffuse hypokinesis with regional variations  Patient has end-stage right and left-sided CHF  He developed acute systolic CHF towards the end of the hospital stay and he received IV Lasix  His renal function continued to deteriorate  Benign prostatic hyperplasia with urinary retention  Assessment & Plan  Patient presented with recurrent urinary retention and Lopez catheter was inserted      Due to borderline blood pressure Flomax was not started        Acute metabolic encephalopathy  Assessment & Plan  Patient had acute metabolic encephalopathy on admission due to acute kidney injury, hypoxia, CHF most likely  His confusion and lethargy got progressively worse during the hospital stay as patient's renal function declined            Hospital Course:     Ilana Bill is a 77 y o  male patient who originally presented to the hospital on   Admission Orders (From admission, onward)     Ordered        08/07/20 2233  Inpatient Admission (expected length of stay for this patient Order details is greater than two midnights)  Once                  due to acute kidney injury    Please see above list of diagnoses and related plan for additional information  Condition at Discharge:  good      Discharge instructions/Information to patient and family:   See after visit summary for information provided to patient and family  Provisions for Follow-Up Care:  See after visit summary for information related to follow-up care and any pertinent home health orders  Disposition:     Other: [facility] hospice       Discharge Statement:  I spent 40 minutes discharging the patient  This time was spent on the day of discharge  I had direct contact with the patient on the day of discharge  Greater than 50% of the total time was spent examining patient, answering all patient questions, arranging and discussing plan of care with patient as well as directly providing post-discharge instructions  Additional time then spent on discharge activities  Discharge Medications:  See after visit summary for reconciled discharge medications provided to patient and family        ** Please Note: This note has been constructed using a voice recognition system **

## 2020-08-14 NOTE — NURSING NOTE
Per MD orders patient discharged to [address] [place] [address]  Report called into nurse Marco rachel

## 2020-08-14 NOTE — HOSPICE NOTE
Patient approved for inpatient hospice  Liaison meeting Judy lam Maye, at the \[facility]\"" for consents at 200 today  Transport arranged by KRISTIN at 730pm   Rn to please call with report and medicate for the trip

## 2020-08-15 ENCOUNTER — TELEPHONE (OUTPATIENT)
Dept: OTHER | Facility: OTHER | Age: 75
End: 2020-08-15

## 2020-08-17 ENCOUNTER — PATIENT OUTREACH (OUTPATIENT)
Dept: FAMILY MEDICINE CLINIC | Facility: HOSPITAL | Age: 75
End: 2020-08-17

## 2020-08-17 LAB
BACTERIA BLD CULT: NORMAL
BACTERIA BLD CULT: NORMAL

## 2020-08-17 NOTE — PROGRESS NOTES
Outpatient Care Management Note:    Chart reviewed for outreach purposes  Patient admitted to hospice house on  and since

## 2020-08-17 NOTE — UTILIZATION REVIEW
Notification of Discharge  This is a Notification of Discharge from our facility [address]  Please be advised that this patient has been discharge from our facility  Below you will find the admission and discharge date and time including the patients disposition  PRESENTATION DATE: 8/7/2020  8:42 PM  OBS ADMISSION DATE:   IP ADMISSION DATE: 8/7/20 2233   DISCHARGE DATE: 8/14/2020  8:02 PM  DISPOSITION: Discharge/Transfer to not defined Healthcare Facility Discharge/Transfer to not defined [facility]   Admission Orders listed below:  Admission Orders (From admission, onward)     Ordered        08/07/20 2233  Inpatient Admission (expected length of stay for this patient Order details is greater than two midnights)  Once                   Please contact the UR Department if additional information is required to close this patient's authorization/case  [address] [employer] Utilization Review Department  Main: [phone] x carefully listen to the prompts  All voicemails are confidential   [email or web address]  Send all requests for admission clinical reviews, approved or denied determinations and any other requests to dedicated fax number below belonging to the campus where the patient is receiving treatment   List of dedicated fax numbers:  [address] DENIALS (Administrative/Medical Necessity) [phone]   [address] (Maternity/NICU/Pediatrics) [phone]   Malinda Handing [phone]   Kvng Bales [phone]   Nikky Hassan [phone]   Marta Batista [facility] 1525  [phone]   [facility]  [phone]   [address] [place] [address], [address]  [address] [facility] [address] [facility] [phone]

## 2021-02-23 NOTE — ASSESSMENT & PLAN NOTE
· Marked leukocytosis of 19 on admission improved to 14 78 this AM  · Antibiotic therapy as above  · Trend CBC No

## 2022-08-10 NOTE — PROGRESS NOTES
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 (POA):  Suspected prerenal with poor oral intake, diuretics, hypotension, as well as component of post renal with urinary retention   -baseline creatinine 1 8-2 0   -presented with creatinine of 3 6   -peak creatinine 3 9 on 08/10    -previously on gentle IV hydration    -started on D5 NSS last night @ 60 cc/hr    -status post Lopez catheter placement    -continue to hold diuretic  Evaluate daily for re-initiation   -renal ultrasound:  No hydro, bilateral simple renal cysts  -urinalysis:  Large blood, large leukocytes, 2+ protein, 20-30 RBCs, innumerable WBCs and bacteria   -check urine lytes  -continue to avoid nephrotoxins, hypotension, contrast   -I/O      Urinary tract infection:  -continues on Rocephin and prophylactic vancomycin  -urine culture + for candida tropicalis        Hemodynamics:  Blood pressure improving, previous hypotension   -avoid further hypotension or high fluctuations in blood pressure   -recommend holding parameters for antihypertensive for systolic blood pressure less than 130    -continue midodrine 10 mg t i d  and metoprolol 12 5 mg every 12 hours      Volume status/combined heart failure:  Reduced left ventricular function as well as RV function  EF of 30%  On room air    -diuretics remain on hold  Will assess daily for re-initiation   -daily weights, strict I&O, fluid restriction   -documented weights are stable       BPH with urinary retention:  Status post Lopez catheter insertion  -previously on Flomax   -of note, patient is on midodrine which can contribute to urinary retention   -may benefit from urology consult, patient with continued urinary retention  Anion gap acidosis: in the setting of renal failure    -continue to monitor   Consider bicarbonate Pt came into office, no appt today. Looking to see if he could see ADF sooner than his 9/23 appt. Pt complaining of chest pain, dizziness, confused, forgetful and nauseous. Says his BP goes up and then when taking meds, it drops low. He states he hasn't worked in 3 days and is unable to do so in this condition.     Talked to ADF RN and pt was advised to go to ED.    replacement if continues to trend downward  Mild hyponatremia:   -place on 1 L fluid restriction    -diuretic on hold  -consider checking urine osm, urine Na, serum osm, TSH, uric acid and am cortisol if trending downward       MBD in CKD:  - will continue to monitor and trend PTH and phosphorus as outpatient   -most recent phosphorus level 4 7      Bilateral lower extremity wounds:  -podiatry following  Dysphagia:   -speech therapy consulted for difficulty with swallowing       Other:  GERD, PVD    Disposition:  requiring additional stay due to medical needs  SUBJECTIVE:  The patient is resting in bed  He remains room air  He denies chest pain or shortness of breath  He denies nausea, vomiting, diarrhea  He has Lopez catheter  He states that he has a poor appetite      OBJECTIVE:  Current Weight: Weight - Scale: 110 kg (241 lb 6 5 oz)  Vitals:    08/10/20 2339 08/11/20 0529 08/11/20 0532 08/11/20 0721   BP: 112/77   115/79   BP Location:    Left arm   Pulse: 87   81   Resp:    16   Temp: (!) 96 4 °F (35 8 °C)   (!) 97 1 °F (36 2 °C)   TempSrc:    Oral   SpO2: 97%   93%   Weight:  110 kg (241 lb 6 5 oz) 110 kg (241 lb 6 5 oz)    Height:           Intake/Output Summary (Last 24 hours) at 8/11/2020 6885  Last data filed at 8/11/2020 0900  Gross per 24 hour   Intake 500 ml   Output 750 ml   Net -250 ml     General: NAD  Skin: warm, dry, intact, no rash, generalized ecchymotic area, ecchymosis to left shoulder  HEENT:  Dry mucous membranes, sclera anicteric, normocephalic, atraumatic  Neck: No apparent JVD appreciated  Chest: lung sounds decreased B/L, on RA   CVS:Regular rate and rhythm, no murmer   Abdomen: Soft, round, non-tender, +BS, Lopez catheter  Extremities:  Trace B/L LE edema present, bilateral lower extremity wounds  Neuro: alert and oriented, hard of hearing  Psych: appropriate mood and affect     Medications:    Current Facility-Administered Medications:     acetaminophen (TYLENOL) tablet 650 mg, 650 mg, Oral, Q6H PRN, NAVNEET Escobar    apixaban (ELIQUIS) tablet 5 mg, 5 mg, Oral, BID, Felisha Patino MD, 5 mg at 08/11/20 0914    cefTRIAXone (ROCEPHIN) IVPB (premix) 1,000 mg 50 mL, 1,000 mg, Intravenous, Q24H, Rajni Fitzgerald MD, Last Rate: 100 mL/hr at 08/10/20 1109, 1,000 mg at 08/10/20 1109    dextrose 5 % and sodium chloride 0 9 % infusion, 60 mL/hr, Intravenous, Continuous, NAVNEET Amaral, Last Rate: 60 mL/hr at 08/10/20 2100, 60 mL/hr at 08/10/20 2100    DULoxetine (CYMBALTA) delayed release capsule 30 mg, 30 mg, Oral, Daily, NAVNEET Escobar, 30 mg at 08/11/20 0915    metoprolol tartrate (LOPRESSOR) partial tablet 12 5 mg, 12 5 mg, Oral, Q12H [facility], Rajni Fitzgerald MD, 12 5 mg at 08/11/20 0914    midodrine (PROAMATINE) tablet 10 mg, 10 mg, Oral, TID ACGogo CRNP, 10 mg at 08/11/20 0914    ondansetron (ZOFRAN) injection 4 mg, 4 mg, Intravenous, Q6H PRN, Rajni Fitzgerald MD    pantoprazole (PROTONIX) EC tablet 40 mg, 40 mg, Oral, Daily, NAVNEET Escobar, 40 mg at 08/11/20 0914    vancomycin (VANCOCIN) oral solution 125 mg, 125 mg, Oral, Q12H [facility], Rajni Fitzgerald MD, 125 mg at 08/10/20 2212    Laboratory Results:  Results from last 7 days   Lab Units 08/11/20  0452 08/10/20  2220 08/10/20  1122 08/10/20  0508 08/09/20  0608  08/08/20  0109 08/07/20  2053   WBC Thousand/uL 6 14  --   --  5 95 7 70   < >  --  7 80   HEMOGLOBIN g/dL 13 9  --   --  14 1 13 7   < >  --  14 2   HEMATOCRIT % 40 3  --   --  40 5 40 3   < >  --  42 1   PLATELETS Thousands/uL 113*  --   --  132* 135*   < > 171 174   SODIUM mmol/L 134* 134* 136  --  136   < > 137 138   POTASSIUM mmol/L 3 9 4 0 4 0  --  4 1   < > 4 3 5 6*   CHLORIDE mmol/L 99* 99* 100  --  100   < > 99* 98*   CO2 mmol/L 20* 21 23  --  21   < > 24 24   BUN mg/dL 76* 78* 78*  --  77*   < > 76* 76*   CREATININE mg/dL 3 87* 3 94* 3 74*  --  3 73*   < > 3 66* 3 61*   CALCIUM mg/dL 9 5 9 4 9 6  --  9 2   < > 9 8 9  6   MAGNESIUM mg/dL  --   --   --  1 9 1 9  --  1 9  --    PHOSPHORUS mg/dL  --   --   --  4 7* 4 8*  --   --   --    ALK PHOS U/L  --   --   --   --  69  --   --  62   ALT U/L  --   --   --   --  12  --   --  14   AST U/L  --   --   --   --  38  --   --  50*    < > = values in this interval not displayed

## 2023-01-01 NOTE — SOCIAL WORK
Continuing to follow patient  Met with patient, he has not slept well the past few nights and wants his bed at home  He hopes to go home soon and is agreeable to [facility]  Freedom of Choice given and he has no preference  Referral to [facility] made via [facility]  His family can transport him home 
Met with patient to discuss the role of Care Management  Patient resides with his son and 15year old grandson in a mobile home with 4 LUCERO  He uses a power WC when out of the house and tries to walk when inside, though he does have a fear of falling  He is independent of ADL's and prepare meals and drives  He has been followed by [facility] in the past   He plans to go home and anticipates no discharge needs 
Statement Selected

## 2023-01-01 NOTE — ASSESSMENT & PLAN NOTE
I have called and left a message with return phone number    HUB OK TO READ:  Please let the patient's parents know that his bilirubin was within normal limits and no additional interventions are needed. Thanks.    · Patient has chronic atrial fibrillation    · Rates are controlled on Lopressor 25 mg p o bid  · Eliquis was held pre-op per podiatry - defer re-initiation of anticoagulation per podiatry should further procedures be planned  · Patient denies signs of bleeding

## 2023-04-02 NOTE — ASSESSMENT & PLAN NOTE
Tylenol or ibuprofen at home for any pain symptoms.  Apply ice to the area to help with any swelling and pain.    Return if: Uncontrolled pain, pus drainage, redness of the arm, new symptoms   · Present on admission with sodium of 129  · Gentle IV fluids  · Repeat morning

## 2024-05-08 NOTE — ASSESSMENT & PLAN NOTE
Call placed to patient, no answer and message left to return call.    Chronically elevated and in the setting of dehyration/SAMY   EKG with Atrial Fib and no ischemic changes or ST changes  Denies Chest pain   Troponin 0 15  Continue to monitor and trend troponin

## 2025-01-14 NOTE — ASSESSMENT & PLAN NOTE
CBC normal   Lipid high total cholesterol high HDL low triglycerides high non   TSH normal   Screening HIV negative   Testosterone normal  Screening hep C negative  CMP normal   A1c normal    Results to patient via [employer].  Work on TLC.  Physical exam 1 year     Patient presented with recurrent urinary retention and Lopez catheter was inserted      Due to borderline blood pressure Flomax was not started

## 2025-06-06 NOTE — ASSESSMENT & PLAN NOTE
Left a voicemail for Cherie Rosas regarding MRI [facility] approval and authorization being valid until 7/2/2025.  Patient was instructed that their MRI needs to be scheduled at [facility]. The patient was instructed to contact the facility to schedule  at [phone].    A follow up appointment will need to be scheduled to review the results and treatment plan.     The patient was provided contact information should the need arise to reschedule any of the appointments.      · Lifestyle modifications recommended

## (undated) DEVICE — THE SIMPULSE SOLO SYSTEM WITH ULTREX RETRACTABLE SPLASH SHIELD TIP: Brand: SIMPULSE SOLO

## (undated) DEVICE — ASTOUND STANDARD SURGICAL GOWN, XXL: Brand: CONVERTORS

## (undated) DEVICE — CURITY IDOFORM PACKING STRIP: Brand: CURITY

## (undated) DEVICE — CAST PADDING 3 IN UNSTERILE

## (undated) DEVICE — ACE WRAP 4 IN UNSTERILE

## (undated) DEVICE — WET SKIN PREP TRAY: Brand: MEDLINE INDUSTRIES, INC.

## (undated) DEVICE — BETHLEHEM UNIVERSAL  MIONR EXT: Brand: CARDINAL HEALTH

## (undated) DEVICE — GLOVE INDICATOR PI UNDERGLOVE SZ 7 BLUE

## (undated) DEVICE — GLOVE SRG BIOGEL 7

## (undated) DEVICE — CURITY NON-ADHERENT STRIPS: Brand: CURITY

## (undated) DEVICE — INTENDED FOR TISSUE SEPARATION, AND OTHER PROCEDURES THAT REQUIRE A SHARP SURGICAL BLADE TO PUNCTURE OR CUT.: Brand: BARD-PARKER SAFETY BLADES SIZE 15, STERILE

## (undated) DEVICE — STRL PENROSE DRAIN 18" X 1/4": Brand: CARDINAL HEALTH

## (undated) DEVICE — GLOVE INDICATOR PI UNDERGLOVE SZ 7.5 BLUE

## (undated) DEVICE — GLOVE SRG BIOGEL 9

## (undated) DEVICE — GLOVE INDICATOR PI UNDERGLOVE SZ 6.5 BLUE